# Patient Record
Sex: FEMALE | Race: WHITE | NOT HISPANIC OR LATINO | Employment: OTHER | URBAN - METROPOLITAN AREA
[De-identification: names, ages, dates, MRNs, and addresses within clinical notes are randomized per-mention and may not be internally consistent; named-entity substitution may affect disease eponyms.]

---

## 2017-01-04 ENCOUNTER — ALLSCRIPTS OFFICE VISIT (OUTPATIENT)
Dept: OTHER | Facility: OTHER | Age: 67
End: 2017-01-04

## 2017-02-24 ENCOUNTER — ALLSCRIPTS OFFICE VISIT (OUTPATIENT)
Dept: OTHER | Facility: OTHER | Age: 67
End: 2017-02-24

## 2017-05-16 ENCOUNTER — TRANSCRIBE ORDERS (OUTPATIENT)
Dept: ADMINISTRATIVE | Facility: HOSPITAL | Age: 67
End: 2017-05-16

## 2017-05-16 ENCOUNTER — ALLSCRIPTS OFFICE VISIT (OUTPATIENT)
Dept: OTHER | Facility: OTHER | Age: 67
End: 2017-05-16

## 2017-05-16 DIAGNOSIS — R07.89 OTHER CHEST PAIN: Primary | ICD-10-CM

## 2017-05-16 DIAGNOSIS — R06.02 SHORTNESS OF BREATH: ICD-10-CM

## 2017-05-16 DIAGNOSIS — R07.89 OTHER CHEST PAIN: ICD-10-CM

## 2017-05-30 ENCOUNTER — HOSPITAL ENCOUNTER (OUTPATIENT)
Dept: NON INVASIVE DIAGNOSTICS | Facility: HOSPITAL | Age: 67
Discharge: HOME | End: 2017-05-30
Attending: INTERNAL MEDICINE
Payer: MEDICARE

## 2017-05-30 ENCOUNTER — HOSPITAL ENCOUNTER (OUTPATIENT)
Dept: RADIOLOGY | Facility: HOSPITAL | Age: 67
Discharge: HOME | End: 2017-05-30
Attending: INTERNAL MEDICINE
Payer: MEDICARE

## 2017-05-30 DIAGNOSIS — R07.89 OTHER CHEST PAIN: ICD-10-CM

## 2017-05-30 PROCEDURE — 93017 CV STRESS TEST TRACING ONLY: CPT

## 2017-05-30 PROCEDURE — 78452 HT MUSCLE IMAGE SPECT MULT: CPT

## 2017-05-30 PROCEDURE — A9502 TC99M TETROFOSMIN: HCPCS

## 2017-05-30 RX ORDER — FLUTICASONE PROPIONATE 50 MCG
1 SPRAY, SUSPENSION (ML) NASAL DAILY
COMMUNITY
End: 2018-04-03

## 2017-05-30 RX ORDER — LOSARTAN POTASSIUM 25 MG/1
25 TABLET ORAL DAILY
COMMUNITY
End: 2018-05-01 | Stop reason: SDUPTHER

## 2017-05-30 RX ORDER — POTASSIUM CHLORIDE 750 MG/1
10 TABLET, FILM COATED, EXTENDED RELEASE ORAL 2 TIMES DAILY
COMMUNITY
End: 2018-03-26 | Stop reason: SDUPTHER

## 2017-05-30 RX ORDER — BUMETANIDE 1 MG/1
1 TABLET ORAL DAILY
COMMUNITY
End: 2018-04-03

## 2017-05-30 RX ORDER — LEVOTHYROXINE SODIUM 0.05 MG/1
50 TABLET ORAL DAILY
COMMUNITY
End: 2018-06-24 | Stop reason: SDUPTHER

## 2017-05-30 RX ORDER — ROSUVASTATIN CALCIUM 10 MG/1
10 TABLET, COATED ORAL DAILY
COMMUNITY
End: 2018-01-22

## 2017-05-30 RX ORDER — OMEPRAZOLE 20 MG/1
20 CAPSULE, DELAYED RELEASE ORAL DAILY
COMMUNITY
End: 2018-01-22

## 2017-05-31 ENCOUNTER — GENERIC CONVERSION - ENCOUNTER (OUTPATIENT)
Dept: OTHER | Facility: OTHER | Age: 67
End: 2017-05-31

## 2017-06-01 LAB
MAX DIASTOLIC BP: 90 MMHG
MAX HEART RATE: 146 BPM
MAX PREDICTED HEART RATE: 153 BPM
MAX. SYSTOLIC BP: 150 MMHG
PROTOCOL NAME: NORMAL
TIME IN EXERCISE PHASE: 304 S

## 2017-06-09 ENCOUNTER — ALLSCRIPTS OFFICE VISIT (OUTPATIENT)
Dept: OTHER | Facility: OTHER | Age: 67
End: 2017-06-09

## 2017-07-05 ENCOUNTER — GENERIC CONVERSION - ENCOUNTER (OUTPATIENT)
Dept: OTHER | Facility: OTHER | Age: 67
End: 2017-07-05

## 2017-07-05 ENCOUNTER — HOSPITAL ENCOUNTER (OUTPATIENT)
Dept: PULMONOLOGY | Facility: HOSPITAL | Age: 67
Discharge: HOME | End: 2017-07-05
Attending: INTERNAL MEDICINE
Payer: MEDICARE

## 2017-07-05 DIAGNOSIS — R06.02 SHORTNESS OF BREATH: ICD-10-CM

## 2017-07-05 PROCEDURE — 94729 DIFFUSING CAPACITY: CPT

## 2017-07-05 PROCEDURE — 94060 EVALUATION OF WHEEZING: CPT

## 2017-07-05 PROCEDURE — 94726 PLETHYSMOGRAPHY LUNG VOLUMES: CPT

## 2017-07-05 PROCEDURE — 94760 N-INVAS EAR/PLS OXIMETRY 1: CPT

## 2017-07-05 RX ORDER — ALBUTEROL SULFATE 2.5 MG/3ML
2.5 SOLUTION RESPIRATORY (INHALATION) ONCE
Status: DISCONTINUED | OUTPATIENT
Start: 2017-07-05 | End: 2017-07-09 | Stop reason: HOSPADM

## 2017-07-07 ENCOUNTER — GENERIC CONVERSION - ENCOUNTER (OUTPATIENT)
Dept: OTHER | Facility: OTHER | Age: 67
End: 2017-07-07

## 2017-07-17 ENCOUNTER — GENERIC CONVERSION - ENCOUNTER (OUTPATIENT)
Dept: OTHER | Facility: OTHER | Age: 67
End: 2017-07-17

## 2017-07-20 ENCOUNTER — ALLSCRIPTS OFFICE VISIT (OUTPATIENT)
Dept: OTHER | Facility: OTHER | Age: 67
End: 2017-07-20

## 2017-07-20 ENCOUNTER — GENERIC CONVERSION - ENCOUNTER (OUTPATIENT)
Dept: OTHER | Facility: OTHER | Age: 67
End: 2017-07-20

## 2017-07-20 LAB
BASOPHILS # BLD AUTO: 0.4 %
BASOPHILS # BLD AUTO: 19 CELLS/UL (ref 0–200)
DEPRECATED RDW RBC AUTO: 13.9 % (ref 11–15)
EOSINOPHIL # BLD AUTO: 1.7 %
EOSINOPHIL # BLD AUTO: 82 CELLS/UL (ref 15–500)
HCT VFR BLD AUTO: 29.2 % (ref 35–45)
HGB BLD-MCNC: 9.6 G/DL (ref 11.7–15.5)
LYMPHOCYTES # BLD AUTO: 1421 CELLS/UL (ref 850–3900)
LYMPHOCYTES # BLD AUTO: 29.6 %
MCH RBC QN AUTO: 27.9 PG (ref 27–33)
MCHC RBC AUTO-ENTMCNC: 32.9 G/DL (ref 32–36)
MCV RBC AUTO: 84.9 FL (ref 80–100)
MONOCYTES # BLD AUTO: 374 CELLS/UL (ref 200–950)
MONOCYTES (HISTORICAL): 7.8 %
NEUTROPHILS # BLD AUTO: 2904 CELLS/UL (ref 1500–7800)
NEUTROPHILS # BLD AUTO: 60.5 %
PLATELET # BLD AUTO: 211 THOUSAND/UL (ref 140–400)
PMV BLD AUTO: 11.2 FL (ref 7.5–12.5)
RBC # BLD AUTO: 3.44 MILLION/UL (ref 3.8–5.1)
WBC # BLD AUTO: 4.8 THOUSAND/UL (ref 3.8–10.8)

## 2017-07-21 ENCOUNTER — GENERIC CONVERSION - ENCOUNTER (OUTPATIENT)
Dept: OTHER | Facility: OTHER | Age: 67
End: 2017-07-21

## 2017-07-25 ENCOUNTER — GENERIC CONVERSION - ENCOUNTER (OUTPATIENT)
Dept: OTHER | Facility: OTHER | Age: 67
End: 2017-07-25

## 2017-07-27 ENCOUNTER — APPOINTMENT (OUTPATIENT)
Dept: LAB | Facility: HOSPITAL | Age: 67
End: 2017-07-27
Attending: FAMILY MEDICINE
Payer: MEDICARE

## 2017-07-27 ENCOUNTER — TRANSCRIBE ORDERS (OUTPATIENT)
Dept: ADMINISTRATIVE | Facility: HOSPITAL | Age: 67
End: 2017-07-27

## 2017-07-27 DIAGNOSIS — D50.0 IRON DEFICIENCY ANEMIA SECONDARY TO BLOOD LOSS (CHRONIC): Primary | ICD-10-CM

## 2017-07-27 DIAGNOSIS — D50.0 IRON DEFICIENCY ANEMIA SECONDARY TO BLOOD LOSS (CHRONIC): ICD-10-CM

## 2017-07-27 LAB
BASOPHILS # BLD AUTO: 0 THOUSANDS/ΜL (ref 0–0.1)
BASOPHILS NFR BLD AUTO: 0 % (ref 0–1)
EOSINOPHIL # BLD AUTO: 0.1 THOUSAND/ΜL (ref 0–0.61)
EOSINOPHIL NFR BLD AUTO: 2 % (ref 0–6)
ERYTHROCYTE [DISTWIDTH] IN BLOOD BY AUTOMATED COUNT: 15 % (ref 11.6–15.1)
HCT VFR BLD AUTO: 33.3 % (ref 37–47)
HGB BLD-MCNC: 10.9 G/DL (ref 12–16)
LYMPHOCYTES # BLD AUTO: 1.4 THOUSANDS/ΜL (ref 0.6–4.47)
LYMPHOCYTES NFR BLD AUTO: 31 % (ref 14–44)
MCH RBC QN AUTO: 27.8 PG (ref 27–31)
MCHC RBC AUTO-ENTMCNC: 32.6 G/DL (ref 31.4–37.4)
MCV RBC AUTO: 85 FL (ref 82–98)
MONOCYTES # BLD AUTO: 0.4 THOUSAND/ΜL (ref 0.17–1.22)
MONOCYTES NFR BLD AUTO: 9 % (ref 4–12)
NEUTROPHILS # BLD AUTO: 2.7 THOUSANDS/ΜL (ref 1.85–7.62)
NEUTS SEG NFR BLD AUTO: 58 % (ref 43–75)
NRBC BLD AUTO-RTO: 0 /100 WBCS
PLATELET # BLD AUTO: 218 THOUSANDS/UL (ref 130–400)
PMV BLD AUTO: 8.7 FL (ref 8.9–12.7)
RBC # BLD AUTO: 3.9 MILLION/UL (ref 4.2–5.4)
WBC # BLD AUTO: 4.7 THOUSAND/UL (ref 4.8–10.8)

## 2017-07-27 PROCEDURE — 36415 COLL VENOUS BLD VENIPUNCTURE: CPT

## 2017-07-27 PROCEDURE — 85025 COMPLETE CBC W/AUTO DIFF WBC: CPT

## 2017-08-28 ENCOUNTER — APPOINTMENT (OUTPATIENT)
Dept: LAB | Facility: HOSPITAL | Age: 67
End: 2017-08-28
Attending: FAMILY MEDICINE
Payer: MEDICARE

## 2017-08-28 ENCOUNTER — GENERIC CONVERSION - ENCOUNTER (OUTPATIENT)
Dept: OTHER | Facility: OTHER | Age: 67
End: 2017-08-28

## 2017-08-28 ENCOUNTER — TRANSCRIBE ORDERS (OUTPATIENT)
Dept: ADMINISTRATIVE | Facility: HOSPITAL | Age: 67
End: 2017-08-28

## 2017-08-28 DIAGNOSIS — D50.0 IRON DEFICIENCY ANEMIA DUE TO CHRONIC BLOOD LOSS: ICD-10-CM

## 2017-08-28 LAB
BASOPHILS # BLD AUTO: 0 THOUSANDS/ΜL (ref 0–0.1)
BASOPHILS NFR BLD AUTO: 1 % (ref 0–1)
EOSINOPHIL # BLD AUTO: 0.1 THOUSAND/ΜL (ref 0–0.61)
EOSINOPHIL NFR BLD AUTO: 1 % (ref 0–6)
ERYTHROCYTE [DISTWIDTH] IN BLOOD BY AUTOMATED COUNT: 15.3 % (ref 11.6–15.1)
HCT VFR BLD AUTO: 39 % (ref 37–47)
HGB BLD-MCNC: 12.8 G/DL (ref 12–16)
LYMPHOCYTES # BLD AUTO: 1.4 THOUSANDS/ΜL (ref 0.6–4.47)
LYMPHOCYTES NFR BLD AUTO: 34 % (ref 14–44)
MCH RBC QN AUTO: 28.1 PG (ref 27–31)
MCHC RBC AUTO-ENTMCNC: 32.7 G/DL (ref 31.4–37.4)
MCV RBC AUTO: 86 FL (ref 82–98)
MONOCYTES # BLD AUTO: 0.4 THOUSAND/ΜL (ref 0.17–1.22)
MONOCYTES NFR BLD AUTO: 10 % (ref 4–12)
NEUTROPHILS # BLD AUTO: 2.3 THOUSANDS/ΜL (ref 1.85–7.62)
NEUTS SEG NFR BLD AUTO: 54 % (ref 43–75)
NRBC BLD AUTO-RTO: 0 /100 WBCS
PLATELET # BLD AUTO: 188 THOUSANDS/UL (ref 130–400)
PMV BLD AUTO: 8.8 FL (ref 8.9–12.7)
RBC # BLD AUTO: 4.53 MILLION/UL (ref 4.2–5.4)
WBC # BLD AUTO: 4.2 THOUSAND/UL (ref 4.8–10.8)

## 2017-08-28 PROCEDURE — 36415 COLL VENOUS BLD VENIPUNCTURE: CPT

## 2017-08-28 PROCEDURE — 85025 COMPLETE CBC W/AUTO DIFF WBC: CPT

## 2017-09-19 ENCOUNTER — GENERIC CONVERSION - ENCOUNTER (OUTPATIENT)
Dept: OTHER | Facility: OTHER | Age: 67
End: 2017-09-19

## 2017-09-25 ENCOUNTER — TRANSCRIBE ORDERS (OUTPATIENT)
Dept: ADMINISTRATIVE | Facility: HOSPITAL | Age: 67
End: 2017-09-25

## 2017-09-25 DIAGNOSIS — K31.89 DYSPEPSIA AND OTHER SPECIFIED DISORDERS OF FUNCTION OF STOMACH: Primary | ICD-10-CM

## 2017-09-25 DIAGNOSIS — A08.11 EPIDEMIC VOMITING SYNDROME: ICD-10-CM

## 2017-09-25 DIAGNOSIS — B15.9: ICD-10-CM

## 2017-09-25 DIAGNOSIS — R10.13 DYSPEPSIA AND OTHER SPECIFIED DISORDERS OF FUNCTION OF STOMACH: Primary | ICD-10-CM

## 2017-10-02 ENCOUNTER — HOSPITAL ENCOUNTER (OUTPATIENT)
Dept: RADIOLOGY | Facility: HOSPITAL | Age: 67
Discharge: HOME | End: 2017-10-02
Attending: INTERNAL MEDICINE
Payer: MEDICARE

## 2017-10-02 DIAGNOSIS — R10.13 DYSPEPSIA AND OTHER SPECIFIED DISORDERS OF FUNCTION OF STOMACH: ICD-10-CM

## 2017-10-02 DIAGNOSIS — B15.9: ICD-10-CM

## 2017-10-02 DIAGNOSIS — A08.11 EPIDEMIC VOMITING SYNDROME: ICD-10-CM

## 2017-10-02 DIAGNOSIS — K31.89 DYSPEPSIA AND OTHER SPECIFIED DISORDERS OF FUNCTION OF STOMACH: ICD-10-CM

## 2017-10-02 PROCEDURE — 74246 X-RAY XM UPR GI TRC 2CNTRST: CPT

## 2017-10-04 ENCOUNTER — ALLSCRIPTS OFFICE VISIT (OUTPATIENT)
Dept: OTHER | Facility: OTHER | Age: 67
End: 2017-10-04

## 2017-10-04 DIAGNOSIS — E03.9 HYPOTHYROIDISM: ICD-10-CM

## 2017-10-04 DIAGNOSIS — Z12.31 ENCOUNTER FOR SCREENING MAMMOGRAM FOR MALIGNANT NEOPLASM OF BREAST: ICD-10-CM

## 2017-10-04 DIAGNOSIS — E78.2 MIXED HYPERLIPIDEMIA: ICD-10-CM

## 2017-10-04 DIAGNOSIS — I25.10 ATHEROSCLEROTIC HEART DISEASE OF NATIVE CORONARY ARTERY WITHOUT ANGINA PECTORIS: ICD-10-CM

## 2017-10-04 DIAGNOSIS — D51.0 VITAMIN B12 DEFICIENCY ANEMIA DUE TO INTRINSIC FACTOR DEFICIENCY: ICD-10-CM

## 2017-10-04 DIAGNOSIS — N39.3 STRESS INCONTINENCE: ICD-10-CM

## 2017-10-04 DIAGNOSIS — I10 ESSENTIAL (PRIMARY) HYPERTENSION: ICD-10-CM

## 2017-10-04 DIAGNOSIS — Z13.820 ENCOUNTER FOR SCREENING FOR OSTEOPOROSIS: ICD-10-CM

## 2017-10-24 ENCOUNTER — HOSPITAL ENCOUNTER (OUTPATIENT)
Dept: RADIOLOGY | Facility: HOSPITAL | Age: 67
Discharge: HOME | End: 2017-10-24
Attending: FAMILY MEDICINE
Payer: MEDICARE

## 2017-10-24 ENCOUNTER — APPOINTMENT (OUTPATIENT)
Dept: LAB | Facility: HOSPITAL | Age: 67
End: 2017-10-24
Attending: FAMILY MEDICINE
Payer: MEDICARE

## 2017-10-24 ENCOUNTER — TRANSCRIBE ORDERS (OUTPATIENT)
Dept: ADMINISTRATIVE | Facility: HOSPITAL | Age: 67
End: 2017-10-24

## 2017-10-24 DIAGNOSIS — N39.3 STRESS INCONTINENCE: ICD-10-CM

## 2017-10-24 DIAGNOSIS — Z13.820 ENCOUNTER FOR SCREENING FOR OSTEOPOROSIS: ICD-10-CM

## 2017-10-24 DIAGNOSIS — I25.10 ATHEROSCLEROTIC HEART DISEASE OF NATIVE CORONARY ARTERY WITHOUT ANGINA PECTORIS: ICD-10-CM

## 2017-10-24 DIAGNOSIS — D51.0 VITAMIN B12 DEFICIENCY ANEMIA DUE TO INTRINSIC FACTOR DEFICIENCY: ICD-10-CM

## 2017-10-24 DIAGNOSIS — Z12.31 ENCOUNTER FOR SCREENING MAMMOGRAM FOR MALIGNANT NEOPLASM OF BREAST: ICD-10-CM

## 2017-10-24 DIAGNOSIS — E78.2 MIXED HYPERLIPIDEMIA: ICD-10-CM

## 2017-10-24 DIAGNOSIS — I10 ESSENTIAL (PRIMARY) HYPERTENSION: ICD-10-CM

## 2017-10-24 DIAGNOSIS — E03.9 HYPOTHYROIDISM: ICD-10-CM

## 2017-10-24 LAB
ALBUMIN SERPL BCP-MCNC: 3.6 G/DL (ref 3.5–5)
ALP SERPL-CCNC: 77 U/L (ref 46–116)
ALT SERPL W P-5'-P-CCNC: 34 U/L (ref 12–78)
ANION GAP SERPL CALCULATED.3IONS-SCNC: 10 MMOL/L (ref 4–13)
AST SERPL W P-5'-P-CCNC: 27 U/L (ref 5–45)
BASOPHILS # BLD AUTO: 0 THOUSANDS/ΜL (ref 0–0.1)
BASOPHILS NFR BLD AUTO: 1 % (ref 0–1)
BILIRUB SERPL-MCNC: 0.6 MG/DL (ref 0.2–1)
BUN SERPL-MCNC: 14 MG/DL (ref 5–25)
CALCIUM SERPL-MCNC: 8.9 MG/DL (ref 8.3–10.1)
CHLORIDE SERPL-SCNC: 104 MMOL/L (ref 100–108)
CHOLEST SERPL-MCNC: 160 MG/DL (ref 50–200)
CO2 SERPL-SCNC: 26 MMOL/L (ref 21–32)
CREAT SERPL-MCNC: 0.72 MG/DL (ref 0.6–1.3)
EOSINOPHIL # BLD AUTO: 0.1 THOUSAND/ΜL (ref 0–0.61)
EOSINOPHIL NFR BLD AUTO: 3 % (ref 0–6)
ERYTHROCYTE [DISTWIDTH] IN BLOOD BY AUTOMATED COUNT: 14.8 % (ref 11.6–15.1)
GFR SERPL CREATININE-BSD FRML MDRD: 86 ML/MIN/1.73SQ M
GLUCOSE P FAST SERPL-MCNC: 103 MG/DL (ref 65–99)
HCT VFR BLD AUTO: 41.2 % (ref 37–47)
HDLC SERPL-MCNC: 61 MG/DL (ref 40–60)
HGB BLD-MCNC: 13.3 G/DL (ref 12–16)
LDLC SERPL CALC-MCNC: 82 MG/DL (ref 0–100)
LYMPHOCYTES # BLD AUTO: 1.6 THOUSANDS/ΜL (ref 0.6–4.47)
LYMPHOCYTES NFR BLD AUTO: 34 % (ref 14–44)
MCH RBC QN AUTO: 27.3 PG (ref 27–31)
MCHC RBC AUTO-ENTMCNC: 32.3 G/DL (ref 31.4–37.4)
MCV RBC AUTO: 84 FL (ref 82–98)
MONOCYTES # BLD AUTO: 0.4 THOUSAND/ΜL (ref 0.17–1.22)
MONOCYTES NFR BLD AUTO: 9 % (ref 4–12)
NEUTROPHILS # BLD AUTO: 2.5 THOUSANDS/ΜL (ref 1.85–7.62)
NEUTS SEG NFR BLD AUTO: 53 % (ref 43–75)
NRBC BLD AUTO-RTO: 0 /100 WBCS
PLATELET # BLD AUTO: 213 THOUSANDS/UL (ref 130–400)
PMV BLD AUTO: 9 FL (ref 8.9–12.7)
POTASSIUM SERPL-SCNC: 3.6 MMOL/L (ref 3.5–5.3)
PROT SERPL-MCNC: 7 G/DL (ref 6.4–8.2)
RBC # BLD AUTO: 4.89 MILLION/UL (ref 4.2–5.4)
SODIUM SERPL-SCNC: 140 MMOL/L (ref 136–145)
TRIGL SERPL-MCNC: 85 MG/DL
TSH SERPL DL<=0.05 MIU/L-ACNC: 0.61 UIU/ML (ref 0.36–3.74)
VIT B12 SERPL-MCNC: 234 PG/ML (ref 100–900)
WBC # BLD AUTO: 4.7 THOUSAND/UL (ref 4.8–10.8)

## 2017-10-24 PROCEDURE — G0202 SCR MAMMO BI INCL CAD: HCPCS

## 2017-10-24 PROCEDURE — 36415 COLL VENOUS BLD VENIPUNCTURE: CPT

## 2017-10-24 PROCEDURE — 82607 VITAMIN B-12: CPT

## 2017-10-24 PROCEDURE — 85025 COMPLETE CBC W/AUTO DIFF WBC: CPT

## 2017-10-24 PROCEDURE — 80053 COMPREHEN METABOLIC PANEL: CPT

## 2017-10-24 PROCEDURE — 84443 ASSAY THYROID STIM HORMONE: CPT

## 2017-10-24 PROCEDURE — 80061 LIPID PANEL: CPT

## 2017-10-24 PROCEDURE — 77080 DXA BONE DENSITY AXIAL: CPT

## 2017-10-25 ENCOUNTER — ALLSCRIPTS OFFICE VISIT (OUTPATIENT)
Dept: OTHER | Facility: OTHER | Age: 67
End: 2017-10-25

## 2017-10-26 NOTE — CONSULTS
Assessment  1  Abdominal pain (789 00) (R10 9)   2  Hernia, hiatal (553 3) (K44 9)   3  Constipation, unspecified constipation type (564 00) (K59 00)    Discussion/Summary  Discussion Summary:   69-year-old female with a history of hiatal hernia, Jim's ulcer, irritable bowel syndrome, colonoscopy 2 years ago with tubular adenoma  Hiatal hernia with Jim's ulcers:PPI therapy in the morning, H2 blocker in the eveningsignificant other concerning symptoms at this timeindication for upper endoscopyhemoglobin was normaldiscussed the role of surgery for repair of her hiatal hernia if her symptoms do not improve with medical therapy  Chronic constipation: Likely secondary to irritable bowel syndrome, she had colonoscopy 2 years ago with tubular adenomathat she take daily stool softener, MiraLax, fiber supplementationreports gas with Metamucil, recommended Benefiber or Citrucel forconcerning lower GI symptomsreports having a CT scan [facility], we will try to obtain these recordswill see the patient back in the next 2 to 3 months, she was asked to cough worsen symptoms  Chief Complaint  Chief Complaint Free Text Note Form: Evaluation for hiatal hernia and change in bowel habits      History of Present Illness  HPI: As you know this is a 71-year-old female with a history of cholecystectomy, hysterectomy, hypertension who has had an upper endoscopy and colonoscopy in the past, recently had a cardiac catheterization the next day she developed anemia and melena presented to the hospital was found to have bleeding Jim's ulcers in a large hiatal hernia which was treated with argon plasma coagulation  Since then she reports some retrosternal discomfort and pressure at times, she has been taking PPI therapy in the morning H2 blocker in the evening  She has a history of dysphagia in the past which required dilation but that has been improved at this point    also then developed constipation type symptoms since July of this year with bowel movements occurring approximately every 7 to 10 days, with stool softener she goes about every for 5 days  She has tried MiraLax, Benefiber, probiotics in the past which have not helped her  In fact probiotics worsen her symptoms  She had a colonoscopy 2 years ago which was unremarkable except for 1 tubular adenoma  has lost approximately 17 lb since modifying her diet  She has gone on a gluten free diet, she has avoided dairy products, avoided fatty foods, acidic foods  any melena or rectal bleeding  She does have right lower quadrant pain which is intermittent, does seem to improve after bowel movement  Occasional she have a explosive bowel movement after several days of no bowel movements at all  In the past many years ago she had more diarrhea predominant symptoms  has a continued occasional nausea but no vomiting  He notes that pantoprazole gave her headaches and was transitioned ultimately to Nexium which seems to work fairly well for her  denies any alcohol or tobacco  Has a history of colitis in her mother, most recent upper endoscopy in July of 2017  Review of Systems  Complete-Female GI Adult: Other Symptoms: The remainder of the ten ROS was negative  Active Problems  1  Anemia, pernicious (281 0) (D51 0)   2  Benign hypertension (401 1) (I10)   3  BMI 32 0-32 9,adult (V85 32) (Z68 32)   4  Coronary artery disease (414 00) (I25 10)   5  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) (I77 89)   6  Encounter for screening breast examination (V76 10) (Z12 31)   7  Encounter for screening for malignant neoplasm of colon (V76 51) (Z12 11)   8  Encounter for screening for osteoporosis (V82 81) (Z13 820)   9  GERD (gastroesophageal reflux disease) (530 81) (K21 9)   10  Hernia, hiatal (553 3) (K44 9)   11  Hypokalemia (276 8) (E87 6)   12  Hypothyroidism (244 9) (E03 9)   13  IBS (irritable bowel syndrome) (564 1) (K58 9)   14   Medicare annual wellness visit, initial (V70 0) (Z00 00)   15  Mixed hyperlipidemia (272 2) (E78 2)   16  Multinodular goiter (241 1) (E04 2)   17  Need for vaccination (V05 9) (Z23)   18  Obesity, Class I, BMI 30-34 9 (278 00) (E66 9)   19  Osteopenia (733 90) (M85 80)   20  Screening for glaucoma (V80 1) (Z13 5)   21  Stress incontinence, female (625 6) (N39 3)   22  Upper gastrointestinal bleeding (578 9) (K92 2)    Past Medical History  1  History of Anxiety (300 00) (F41 9)   2  History of Atypical chest pain (786 59) (R07 89)   3  Benign paroxysmal positional vertigo (386 11) (H81 10)   4  History of Blood loss anemia (280 0) (D50 0)   5  History of BMI 35 0-35 9,adult (V85 35) (Z68 35)   6  History of acute sinusitis (V12 69) (Z87 09)   7  History of lipoma (V13 89) (Z86 018)   8  History of shortness of breath (V13 89) (Z87 898)   9  Influenza (487 1) (J11 1)   10  History of Influenza vaccine needed (V04 81) (Z23)   11  History of Need for pneumococcal vaccination (V03 82) (Z23)   12  History of Need for vaccine for TD (tetanus-diphtheria) (V06 5) (Z23)   13  History of Need for zoster vaccine (V04 89) (Z23)   14  History of Otalgia, unspecified laterality (388 70) (H92 09)   15  History of Screening for other and unspecified genitourinary condition (V81 6) (Z13 89)  Active Problems And Past Medical History Reviewed: The active problems and past medical history were reviewed and updated today  Surgical History  1  History of Biopsy Skin   2  History of Cholecystectomy   3  History of Hysterectomy   4  History of Surgery Excision Lipoma   5  History of Thyroid Surgery Sub-Total Thyroidectomy   6  History of Tonsillectomy  Surgical History Reviewed: The surgical history was reviewed and updated today  Family History  Father    1  Family history of hypertension (V17 49) (Z82 49)  Sister    2  History of open heart surgery  Family History Reviewed: The family history was reviewed and updated today         Social History   · Never smoker  Social History Reviewed: The social history was reviewed and updated today  Current Meds   1  Atorvastatin Calcium 20 MG Oral Tablet; 1 Every Day At Bedtime; Therapy: 40LFM1420 to (Last Rx:80Ksq4496)  Requested for: 50Edz0895 Ordered   2  Bumetanide 1 MG Oral Tablet; TAKE 1 TABLET DAILY; Therapy: 87FYS1808 to ([phone])  Requested for: 04Oct2017; Last   Rx:04Oct2017 Ordered   3  Docusate Sodium 100 MG Oral Capsule; TAKE 2 CAPSULE Daily; Therapy: (AMTZQILP:28SAV7658) to Recorded   4  Esomeprazole Magnesium 40 MG Oral Capsule Delayed Release; TAKE 1 CAPSULE   ONCE DAILY; Therapy: (CEUSHJEX:76RXR8282) to Recorded   5  Famotidine 20 MG Oral Tablet; TAKE 1 TABLET AT BEDTIME; Therapy: (HDOSABKO:21GKU0416) to Recorded   6  Flonase Allergy Relief 50 MCG/ACT Nasal Suspension; instill 2 sprays into each nostril   once daily; Therapy: 84CLZ9210 to (Last Rx:10Aug2016)  Requested for: 54Xfr7660 Ordered   7  Klor-Con 10 10 MEQ Oral Tablet Extended Release; one tablet daily as directed; Therapy: 92ELN5762 to (Last Rx:04Oct2017)  Requested for: 26QSI0707 Ordered   8  Levothyroxine Sodium 50 MCG Oral Tablet; Take 1 tablet daily; Therapy: 98QCZ8684 to (Evaluate:16Jan2018)  Requested for: 24Znm7024; Last   Rx:53Jgc0557 Ordered   9  Losartan Potassium 25 MG Oral Tablet; TAKE 1 TABLET DAILY; Therapy: 23HEG6767 to (Last Rx:55Ehu3553)  Requested for: 31EBF8738 Ordered   10  Spiriva Respimat 2 5 MCG/ACT Inhalation Aerosol Solution; inhale 2 puffs once daily; Therapy: 63SBK7734 to (Last Rx:04Oct2017)  Requested for: 37XTR5758 Ordered  Medication List Reviewed: The medication list was reviewed and updated today  Allergies  1   Penicillins    Vitals  Vital Signs    Recorded: 25FSN4466 03:37PM   Temperature 98 5 F   Heart Rate 89   Respiration 16   Systolic 422   Diastolic 80   Height 5 ft 5 5 in   Weight 198 lb 6 oz   BMI Calculated 32 51   BSA Calculated 1 98   O2 Saturation 98     Physical Exam  Gen: wn/wd, NAD  HEENT: anicteric, MMM, no cervical LAD  CVS: RRR, no m/r/g  CHEST: CTA b/l  ABD: +BS, soft, NT,ND, no hepatosplenomegaly  EXT: no c/c/e  NEURO: aaox3  SKIN: NO rashes         Results/Data  (1) CBC/PLT/DIFF 24Oct2017 09:13AM Rox Castorena    Order Number: [id number]     Test Name Result Flag Reference   WBC COUNT 4 70 Thousand/uL L 4 80-10 80   RBC COUNT 4 89 Million/uL  4 20-5 40   HEMOGLOBIN 13 3 g/dL  12 0-16 0   HEMATOCRIT 41 2 %  37 0-47 0   MCV 84 fL  82-98   MCH 27 3 pg  27 0-31 0   MCHC 32 3 g/dL  31 4-37 4   RDW 14 8 %  11 6-15 1   MPV 9 0 fL  8 9-12 7   PLATELET COUNT 533 Thousands/uL  130-400   nRBC AUTOMATED 0 /100 WBCs     NEUTROPHILS RELATIVE PERCENT 53 %  43-75   LYMPHOCYTES RELATIVE PERCENT 34 %  14-44   MONOCYTES RELATIVE PERCENT 9 %  4-12   EOSINOPHILS RELATIVE PERCENT 3 %  0-6   BASOPHILS RELATIVE PERCENT 1 %  0-1   NEUTROPHILS ABSOLUTE COUNT 2 50 Thousands/? ??L  1 85-7 62   LYMPHOCYTES ABSOLUTE COUNT 1 60 Thousands/? ??L  0 60-4 47   MONOCYTES ABSOLUTE COUNT 0 40 Thousand/? ??L  0 17-1 22   EOSINOPHILS ABSOLUTE COUNT 0 10 Thousand/? ??L  0 00-0 61   BASOPHILS ABSOLUTE COUNT 0 00 Thousands/? ??L  0 00-0 10     (1) COMPREHENSIVE METABOLIC PANEL 75YZD5714 64:46JF [employer]  Order Number: [id number]     Test Name Result Flag Reference   SODIUM 140 mmol/L  136-145   POTASSIUM 3 6 mmol/L  3 5-5 3   CHLORIDE 104 mmol/L  100-108   CARBON DIOXIDE 26 mmol/L  21-32   ANION GAP (CALC) 10 mmol/L  4-13   BLOOD UREA NITROGEN 14 mg/dL  5-25   CREATININE 0 72 mg/dL  0 60-1 30   Standardized to IDMS reference method   CALCIUM 8 9 mg/dL  8 3-10 1   BILI, TOTAL 0 60 mg/dL  0 20-1 00   ALK PHOSPHATAS 77 U/L     ALT (SGPT) 34 U/L  12-78   Specimen collection should occur prior to Sulfasalazine administration due to the potential for falsely depressed results     AST(SGOT) 27 U/L  5-45   Specimen collection should occur prior to Sulfasalazine administration due to the potential for falsely depressed results  ALBUMIN 3 6 g/dL  3 5-5 0   TOTAL PROTEIN 7 0 g/dL  6 4-8 2   eGFR 86 ml/min/1 73sq m     National Kidney Disease Education Program recommendations are as follows:  GFR calculation is accurate only with a steady state creatinine  Chronic Kidney disease less than 60 ml/min/1 73 sq  meters  Kidney failure less than 15 ml/min/1 73 sq  meters  GLUCOSE FASTING 103 mg/dL H 65-99   Specimen collection should occur prior to Sulfasalazine administration due to the potential for falsely depressed results  Specimen collection should occur prior to Sulfapyridine administration due to the potential for falsely elevated results  (1) LIPID PANEL, FASTING 24Oct2017 09:13AM [employer] Order Number: [id number]     Test Name Result Flag Reference   CHOLESTEROL 160 mg/dL     HDL,DIRECT 61 mg/dL H 40-60   Specimen collection should occur prior to Metamizole administration due to the potential for falsley depressed results  LDL CHOLESTEROL CALCULATED 82 mg/dL  0-100   Triglyceride:        Normal <150 mg/dl   Borderline High 150-199 mg/dl   High 200-499 mg/dl   Very High >499 mg/dl      Cholesterol:       Desirable <200 mg/dl    Borderline High 200-239 mg/dl    High >239 mg/dl      HDL Cholesterol:       High>59 mg/dL    Low <41 mg/dL      This screening LDL is a calculated result  It does not have the accuracy of the Direct Measured LDL in the monitoring of patients with hyperlipidemia and/or statin therapy  Direct Measure LDL (RSV225) must be ordered separately in these patients  TRIGLYCERIDES 85 mg/dL  <=150   Specimen collection should occur prior to N-Acetylcysteine or Metamizole administration due to the potential for falsely depressed results       (1) TSH 24Oct2017 09:13AM [employer] Order Number: [id number]     Test Name Result Flag Reference   TSH 0 606 uIU/mL  0 358-3 740   Patients undergoing fluorescein dye angiography may retain small amounts of fluorescein in the body for 48-72 hours post procedure  Samples containing fluorescein can produce falsely depressed TSH values  If the patient had this procedure,a specimen should be resubmitted post fluorescein clearance  The recommended reference ranges for TSH during pregnancy are as follows:  First trimester 0 1 to 2 5 uIU/mL  Second trimester  0 2 to 3 0 uIU/mL  Third trimester 0 3 to 3 0 uIU/m     (1) VITAMIN B12 24Oct2017 09:13AM Claire Colonleif Order Number: [id number]     Test Name Result Flag Reference   VITAMIN B12 234 pg/mL  100-900     * DXA BONE DENSITY SPINE HIP AND PELVIS 24Oct2017 09:12AM Claire Colonleif Order Number: [id number]    - Patient Instructions: To schedule this appointment, please contact Central Scheduling at [phone]  Test Name Result Flag Reference   DXA BONE DENSITY SPINE HIP AND PELVIS (Report)     CENTRAL DXA SCAN     CLINICAL HISTORY:  75year old post-menopausal  female  TECHNIQUE: Bone densitometry was performed using a [employer] bone densitometer  Regions of interest appear properly placed  There are no obvious fractures or other confounding variables which could limit the study  COMPARISON: None  RESULTS:    LUMBAR SPINE: L1-L4:   BMD 1 034 gm/cm2   T-score -1 3   Z-score 0 3     LEFT TOTAL HIP:   BMD 0 788 gm/cm2   T-score -1 7   Z-score -0 4     LEFT FEMORAL NECK:   BMD 0 790 gm/cm2   T-score -1 8   Z-score -0 2     RIGHT TOTAL HIP:   BMD 0 789 gm/cm2   T-score -1 7   Z-score -0 4     RIGHT FEMORAL NECK:   BMD 0 836 gm/cm2   T-score -1 5   Z-score 0 2       IMPRESSION:   1  Based on the [facility] classification, the T-score of -1 8 is consistent with low bone mineral density  2  The 10 year risk of hip fracture is 2 4 %, with the 10 year risk of major osteoporotic fracture being 16 1 %, as calculated by the WHO fracture risk assessment tool (FRAX)   The current NOF guidelines recommend treating patients with FRAX 10 year    risk score of >3% for hip fracture and >20% for major osteoporotic fracture  3  Any secondary causes of low bone mineral density should be excluded prior to treatment, if clinically indicated  4  A daily intake of at least 1200 mg calcium and 800 - 1000 IU of Vitamin D, as well as weight bearing and muscle strengthening exercise, fall prevention and avoidance of tobacco and excessive alcohol intake as basic preventive measures are suggested  5  Repeat DXA scan in 18-24 months as clinically indicated  WHO CLASSIFICATION:   Normal (a T-score of -1 0 or higher)   Low bone mineral density (a T-score of less than -1 0 but higher than -2 5)   Osteoporosis (a T-score of -2 5 or less)   Severe osteoporosis (a T-score of -2 5 or less with a fragility fracture)             Workstation performed: [id number]     Signed by:   Nay Starkey MD   10/24/17     * MAMMO SCREENING BILATERAL W CAD 78Gni7737 09:11AM Roxie Gallegos Order Number: [id number]    - Patient Instructions: To schedule this appointment, please contact Central Scheduling at [phone]  Do not wear any perfume, powder, lotion or deodorant on breast or underarm area  Please bring your doctors order, referral (if needed) and insurance information with you on the day of the test  Failure to bring this information may result in this test being rescheduled  Arrive 15 minutes prior to your appointment time to register  On the day of your test, please bring any prior mammogram or breast studies with you that were not performed at a [facility]  Failure to bring prior exams may result in your test needing to be rescheduled  Test Name Result Flag Reference   MAMMO SCREENING BILATERAL W CAD (Report)     Patient History:   Patient is postmenopausal    Family history of endometrial cancer at age 72 in maternal    grandmother  Benign stereotactic core biopsy of both breasts     Patient has never smoked  Patient's BMI is 32 9  Reason for exam: screening, asymptomatic  Mammo Screening Bilateral W CAD: October 24, 2017 - Check In #:    [de-identified]   Bilateral CC and MLO view(s) were taken  Technologist: IZABELA Bey   Prior study comparison: November 22, 2013, mammo screening    bilateral W CAD, performed at [facility]  December 28, 2011, mammo screening bilateral W CAD, performed at [facility]  Zaira 3, 2010, mammo screening bilateral W CAD,    performed at [facility]  June 30, 2009, bilateral    diagnostic mammogram, performed at [facility]      The breast tissue is heterogeneously dense, potentially limiting    the sensitivity of mammography  Patient risk, included in this    report, assists in determining the appropriate screening regimen    (such as 3-D mammography or the inclusion of automated breast    ultrasound or MRI)  3-D mammography may also remain indicated as    screening  No dominant soft tissue mass, architectural distortion or    suspicious calcifications are noted in either breast   The skin    and nipple structures are within normal limits  Scattered benign   appearing calcifications are noted  No evidence of malignancy  No significant changes when compared with prior studies  ACR BI-RADSï¾® Assessments: BiRad:2 - Benign     Recommendation:   Routine screening mammogram of both breasts in 1 year  Analyzed by CAD     The patient is scheduled in a reminder system for screening    mammography  8-10% of cancers will be missed on mammography  Management of a    palpable abnormality must be based on clinical grounds  Patients   will be notified of their results via letter from our facility  Accredited by [employer] of Radiology and FDA       Transcription Location: [facility] 98: [id number]     Risk Value(s):   Miriamer-Cucalvinck 10 Year: 2 800%, Tyrer-Cukeysha Lifetime: 5 000%,    Myriad Table: 1 5%, DEMI 5 Year: 2 2%, NCI Lifetime: 7 1%   Signed by:   Nahomi Jerry MD   10/24/17       Future Appointments    Date/Time Provider Specialty Site   12/19/2017 10:00 AM SREEDHAR Peoples   Gastroenterology Adult [facility] GASTROENTEROLOGY [address] [place] [address]     Signatures   Electronically signed by : SREEDHAR Garcia ; Oct 25 2017  9:10PM EST                       (Author)

## 2017-12-19 ENCOUNTER — GENERIC CONVERSION - ENCOUNTER (OUTPATIENT)
Dept: OTHER | Facility: OTHER | Age: 67
End: 2017-12-19

## 2017-12-19 ENCOUNTER — TRANSCRIBE ORDERS (OUTPATIENT)
Dept: ADMINISTRATIVE | Facility: HOSPITAL | Age: 67
End: 2017-12-19

## 2017-12-19 ENCOUNTER — ALLSCRIPTS OFFICE VISIT (OUTPATIENT)
Dept: OTHER | Facility: OTHER | Age: 67
End: 2017-12-19

## 2017-12-19 ENCOUNTER — APPOINTMENT (OUTPATIENT)
Dept: LAB | Facility: HOSPITAL | Age: 67
End: 2017-12-19
Attending: INTERNAL MEDICINE
Payer: MEDICARE

## 2017-12-19 DIAGNOSIS — K21.9 GASTROESOPHAGEAL REFLUX DISEASE, ESOPHAGITIS PRESENCE NOT SPECIFIED: ICD-10-CM

## 2017-12-19 DIAGNOSIS — R10.9 ABDOMINAL PAIN: ICD-10-CM

## 2017-12-19 DIAGNOSIS — K21.9 GASTRO-ESOPHAGEAL REFLUX DISEASE WITHOUT ESOPHAGITIS: ICD-10-CM

## 2017-12-19 DIAGNOSIS — K44.9 DIAPHRAGMATIC HERNIA WITHOUT OBSTRUCTION OR GANGRENE: ICD-10-CM

## 2017-12-19 DIAGNOSIS — E66.01 MORBID OBESITY WITH BMI OF 40.0-44.9, ADULT (HCC): ICD-10-CM

## 2017-12-19 DIAGNOSIS — R10.9 ABDOMINAL PAIN, UNSPECIFIED ABDOMINAL LOCATION: Primary | ICD-10-CM

## 2017-12-19 LAB
ERYTHROCYTE [DISTWIDTH] IN BLOOD BY AUTOMATED COUNT: 15.9 % (ref 11.6–15.1)
HCT VFR BLD AUTO: 40.6 % (ref 37–47)
HGB BLD-MCNC: 13.2 G/DL (ref 12–16)
MCH RBC QN AUTO: 27.3 PG (ref 27–31)
MCHC RBC AUTO-ENTMCNC: 32.5 G/DL (ref 31.4–37.4)
MCV RBC AUTO: 84 FL (ref 82–98)
PLATELET # BLD AUTO: 218 THOUSANDS/UL (ref 130–400)
PMV BLD AUTO: 8.4 FL (ref 8.9–12.7)
RBC # BLD AUTO: 4.84 MILLION/UL (ref 4.2–5.4)
WBC # BLD AUTO: 5 THOUSAND/UL (ref 4.8–10.8)

## 2017-12-19 PROCEDURE — 36415 COLL VENOUS BLD VENIPUNCTURE: CPT

## 2017-12-19 PROCEDURE — 85027 COMPLETE CBC AUTOMATED: CPT

## 2017-12-20 NOTE — PROGRESS NOTES
Assessment  1  GERD (gastroesophageal reflux disease) (530 81) (K21 9)   2  Hernia, hiatal (553 3) (K44 9)   3  Abdominal pain (789 00) (R10 9)   4  Constipation, unspecified constipation type (564 00) (K59 00)    Plan  Abdominal pain, GERD (gastroesophageal reflux disease), Hernia, hiatal    · Esomeprazole Magnesium 40 MG Oral Capsule Delayed Release; TAKE 1CAPSULE TWICE DAILY 30 MINUTES PRIOR TO BREAKFAST AND DINNER   Rx By: Socorro Bruno; Dispense: 0 Days ; #:60 Capsule Delayed Release; Refill: 3;For: Abdominal pain, GERD (gastroesophageal reflux disease), Hernia, hiatal; LEA = N; Verified Transmission to [facility] (; Last Updated By: System, SureScripts; 12/19/2017 10:50:05 AM   · (1) CBC/ PLT (NO DIFF); Status:Resulted - Requires Verification;   Done: [id number]   Due:97Wvy4883; Ordered; For:Abdominal pain, GERD (gastroesophageal reflux disease), Hernia, hiatal; Ordered By:Richard Bledsoe;   · EGD; Status:Active; Requested for:90Xfs8604;    Perform:[facility]; Due:94Cdb9140; Last Updated By:Rufus Graham; 12/19/2017 10:46:36 AM;Ordered; For:Abdominal pain, GERD (gastroesophageal reflux disease), Hernia, hiatal; Ordered By:Richard Bledsoe;   · Esophageal Manometry; Status:Active; Requested for:62Ndv1439;    Perform:[employer] [facility]; NBB:85VNX7454;MARIAHABMBELLG; For:Abdominal pain, GERD (gastroesophageal reflux disease), Hernia, hiatal; Ordered By:Richard Bledsoe;  Constipation, unspecified constipation type    · Linzess 72 MCG Oral Capsule; take 1 capsule daily   Rx By: Socorro Bruno; Dispense: 30 Days ; #:3 Capsule; Refill: 3;For: Constipation, unspecified constipation type; LEA = N; Verified Transmission to [facility] ([address] [facility]; Last Updated By: System, SureScripts; 12/19/2017 10:50:05 AM    Discussion/Summary  Discussion Summary: This is a pleasant 71-year-old female with a history of a large hiatal hernia, chronic constipation, symptomatic reflux and dysphagia  #1 symptomatic hiatal hernia: Now with 2 episodes of epigastric abdominal pain  Continues to have some reflux symptoms and now with intermittent dysphagia-We'll increase her PPI therapy to twice daily-We will proceed with an upper endoscopy to evaluate-We'll also proceed with an esophageal manometry-Based on these results, we may have her evaluated for repair of the hiatal hernia/Nissen fundoplication given her persistent symptoms which I suspect are due to her large hiatal hernia,-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 hernia as well#2 chronic constipation: Likely functional constipation/IBS-C, she has had difficulty with Radha lax, has had disruption to her daily life, has had increasing bloating and discomfort with both fiber and probiotics-we'll start the patient on lens a 72 Âµg daily, I asked her to call us with an update on her symptoms  Chief Complaint  Chief Complaint Free Text Note Form: Evaluation for difficulty swallowing and epigastric pain      History of Present Illness  HPI: As you know this is a pleasant 77-year-old female, with a history of hypertension, hypothyroidism, with known history of a large hiatal hernia  This was seen on her last endoscopy, she's had dilations in the past  She has episodes recently of sharp epigastric pain once before eating, once after eating which she has taken her breath away  Occasionally associated with some nausea for several minutes  She notes occasional difficulty swallowing which had been improving the past but recent has become more frequent  We had started her on PPI therapy in the morning, H2 blocker at night initially did well with this but has had again worsening symptoms are noted above  We tried Radha lax, stool softeners and fiber for her chronic constipation  She notes that she'll have no bowel movement for 3-4 days and then loose watery stools one or 2 times a day   This interferes with her quality of life as she has a urgency, abdominal cramping and bloating and this afraid to leave the house  Previously she was having a bowel movement every 6 or 7 a there are some improvement however the interferes in the patient's quality of life is substantial at this time  She continues to have some cramping  She has stopped the fiber supplement, she stopped the probiotics which also caused bloating and cramping  She's been trying to adjust the Radha lax to see if her symptoms  Review of Systems  Complete-Female GI Adult: Other Symptoms: The remainder of the ten ROS was negative  Active Problems  1  Abdominal pain (789 00) (R10 9)   2  Anemia, pernicious (281 0) (D51 0)   3  Benign hypertension (401 1) (I10)   4  BMI 32 0-32 9,adult (V85 32) (Z68 32)   5  Change in bowel habits (787 99) (R19 4)   6  Constipation, unspecified constipation type (564 00) (K59 00)   7  Coronary artery disease (414 00) (I25 10)   8  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) (I77 89)   9  Diarrhea (787 91) (R19 7)   10  Encounter for screening breast examination (V76 10) (Z12 31)   11  Encounter for screening for malignant neoplasm of colon (V76 51) (Z12 11)   12  Encounter for screening for osteoporosis (V82 81) (Z13 820)   13  GERD (gastroesophageal reflux disease) (530 81) (K21 9)   14  Hemorrhoids (455 6) (K64 9)   15  Hernia, hiatal (553 3) (K44 9)   16  Hypokalemia (276 8) (E87 6)   17  Hypothyroidism (244 9) (E03 9)   18  IBS (irritable bowel syndrome) (564 1) (K58 9)   19  Medicare annual wellness visit, initial (V70 0) (Z00 00)   20  Mixed hyperlipidemia (272 2) (E78 2)   21  Multinodular goiter (241 1) (E04 2)   22  Nausea and vomiting (787 01) (R11 2)   23  Need for vaccination (V05 9) (Z23)   24  Obesity, Class I, BMI 30-34 9 (278 00) (E66 9)   25  Osteopenia (733 90) (M85 80)   26  Recent weight loss (783 21) (R63 4)   27  Screening for glaucoma (V80 1) (Z13 5)   28  Stress incontinence, female (625 6) (N39 3)   29  Trouble swallowing (787 20) (R13 10)   30   Upper gastrointestinal bleeding (578 9) (K92 2)    Past Medical History  1  History of Anxiety (300 00) (F41 9)   2  History of Atypical chest pain (786 59) (R07 89)   3  Benign paroxysmal positional vertigo (386 11) (H81 10)   4  History of Blood loss anemia (280 0) (D50 0)   5  History of BMI 35 0-35 9,adult (V85 35) (Z68 35)   6  History of acute sinusitis (V12 69) (Z87 09)   7  History of lipoma (V13 89) (Z86 018)   8  History of shortness of breath (V13 89) (Z87 898)   9  Influenza (487 1) (J11 1)   10  History of Influenza vaccine needed (V04 81) (Z23)   11  History of Need for pneumococcal vaccination (V03 82) (Z23)   12  History of Need for vaccine for TD (tetanus-diphtheria) (V06 5) (Z23)   13  History of Need for zoster vaccine (V04 89) (Z23)   14  History of Otalgia, unspecified laterality (388 70) (H92 09)   15  History of Screening for other and unspecified genitourinary condition (V81 6) (Z13 89)  Active Problems And Past Medical History Reviewed: The active problems and past medical history were reviewed and updated today  Surgical History  1  History of Biopsy Skin   2  History of Cholecystectomy   3  History of Complete Colonoscopy   4  History of Diagnostic Esophagogastroduodenoscopy   5  History of Hysterectomy   6  History of Surgery Excision Lipoma   7  History of Thyroid Surgery Sub-Total Thyroidectomy   8  History of Tonsillectomy  Surgical History Reviewed: The surgical history was reviewed and updated today  Family History  Mother    1  Family history of colitis (V18 59) (Z83 79)   2  Denied: Family history of colon cancer   3  Denied: Family history of colonic polyps   4  Denied: Family history of liver disease  Father    11  Denied: Family history of colon cancer   6  Denied: Family history of colonic polyps   7  Family history of hypertension (V17 49) (Z82 49)   8  Denied: Family history of liver disease  Sister    5  Family history of colitis (V18 59) (Z83 79)   10   History of open heart surgery  Family History Reviewed: The family history was reviewed and updated today  Social History   · Feels safe at home   · Never smoker   · No alcohol use  Social History Reviewed: The social history was reviewed and updated today  Current Meds   1  Atorvastatin Calcium 20 MG Oral Tablet; 1 Every Day At Bedtime; Therapy: 74DFC7989 to (Last Rx:43Twi0230)  Requested for: 06Ick3801 Ordered   2  Bumetanide 1 MG Oral Tablet; TAKE 1 TABLET DAILY; Therapy: 19WSF4298 to (132-921-325)  Requested for: 04Oct2017; Last Rx:04Oct2017 Ordered   3  Docusate Sodium 100 MG Oral Capsule; TAKE 2 CAPSULE Daily; Therapy: (CHOFVEUT:91PSH2227) to Recorded   4  Esomeprazole Magnesium 40 MG Oral Capsule Delayed Release; TAKE 1 CAPSULE ONCE DAILY; Therapy: (MUMGRWDZ:59XLP0890) to Recorded   5  Famotidine 20 MG Oral Tablet; TAKE 1 TABLET AT BEDTIME; Therapy: (QOLEGQLI:62RMA5472) to Recorded   6  Flonase Allergy Relief 50 MCG/ACT Nasal Suspension; instill 2 sprays into each nostril once daily; Therapy: 95DBR9168 to (Last Rx:10Aug2016)  Requested for: 05Nou6002 Ordered   7  Klor-Con 10 10 MEQ Oral Tablet Extended Release; one tablet daily as directed; Therapy: 53OPT0895 to (Last Rx:04Oct2017)  Requested for: 32STY2013 Ordered   8  Levothyroxine Sodium 50 MCG Oral Tablet; Take 1 tablet daily; Therapy: 20HYP0790 to (Evaluate:16Jan2018)  Requested for: 93Wuv1057; Last Rx:05Sfj5079 Ordered   9  Losartan Potassium 25 MG Oral Tablet; TAKE 1 TABLET DAILY; Therapy: 61XJL3491 to (Last Rx:80Ooo6113)  Requested for: 97UFZ4149 Ordered   10  Spiriva Respimat 2 5 MCG/ACT Inhalation Aerosol Solution; inhale 2 puffs once daily; Therapy: 39EBA0614 to (Last Rx:04Oct2017)  Requested for: 78KHX7960 Ordered  Medication List Reviewed: The medication list was reviewed and updated today  Allergies  1  Penicillins  2  Animal dander - Cats   3   Other    Vitals  Vital Signs    Recorded: 33KCL0406 10:06AM   Temperature 96 3 F   Heart Rate 69   Systolic 682   Diastolic 64   Weight 145 lb    BMI Calculated 32 78   BSA Calculated 1 99   O2 Saturation 97     Physical Exam  Gen  : Elderly female, Well-nourished well-developed, no acute distress HEENT: Anicteric, moist mucous membranes, no cervical or supraclavicular lymphadenopathy Cardiovascular: Regular rate and rhythm, no murmurs rubs or gallops Pulmonary: Clear to auscultation bilaterally Abdomen: Soft, nontender, nondistended  No hepatosplenomegaly  Bowel sounds present and regular  Extremities: No cyanosis, clubbing, or edema, Skin: No rashes Neuro: Alert, awake, oriented x3      Results/Data  (1) CBC/ PLT (NO DIFF) 09ZYV5218 11:13AM Markne Pencil Order Number: [id number]     Test Name Result Flag Reference   HEMATOCRIT 40 6 %  37 0-47 0   HEMOGLOBIN 13 2 g/dL  12 0-16 0   MCHC 32 5 g/dL  31 4-37 4   MCH 27 3 pg  27 0-31 0   MCV 84 fL  82-98   PLATELET COUNT 419 Thousands/uL  130-400   RBC COUNT 4 84 Million/uL  4 20-5  40   RDW 15 9 % H 11 6-15 1   WBC COUNT 5 00 Thousand/uL  4 80-10 80   MPV 8 4 fL L 8 9-12 7       Future Appointments    Date/Time Provider Specialty Site   01/23/2018 08:00 AM SREEDHAR Moscoso   Gastroenterology Adult [facility]     Signatures   Electronically signed by : SREEDHAR Villa ; Dec 19 2017  4:04PM EST                       (Author)

## 2018-01-11 NOTE — RESULT NOTES
Discussion/Summary   hemoglobin has stabilized  Should be rechecked in three months     Verified Results  (1) CBC/PLT/DIFF 34Gyw1799 11:32AM Giovanny Mendoza Order Number: [id number]     Test Name Result Flag Reference   WBC COUNT 4 20 Thousand/uL L 4 80-10 80   RBC COUNT 4 53 Million/uL  4 20-5 40   HEMOGLOBIN 12 8 g/dL  12 0-16 0   HEMATOCRIT 39 0 %  37 0-47 0   MCV 86 fL  82-98   MCH 28 1 pg  27 0-31 0   MCHC 32 7 g/dL  31 4-37 4   RDW 15 3 % H 11 6-15 1   MPV 8 8 fL L 8 9-12 7   PLATELET COUNT 549 Thousands/uL  130-400   nRBC AUTOMATED 0 /100 WBCs     NEUTROPHILS RELATIVE PERCENT 54 %  43-75   LYMPHOCYTES RELATIVE PERCENT 34 %  14-44   MONOCYTES RELATIVE PERCENT 10 %  4-12   EOSINOPHILS RELATIVE PERCENT 1 %  0-6   BASOPHILS RELATIVE PERCENT 1 %  0-1   NEUTROPHILS ABSOLUTE COUNT 2 30 Thousands/? ??L  1 85-7 62   LYMPHOCYTES ABSOLUTE COUNT 1 40 Thousands/? ??L  0 60-4 47   MONOCYTES ABSOLUTE COUNT 0 40 Thousand/? ??L  0 17-1 22   EOSINOPHILS ABSOLUTE COUNT 0 10 Thousand/? ??L  0 00-0 61   BASOPHILS ABSOLUTE COUNT 0 00 Thousands/? ??L  0 00-0 10

## 2018-01-12 VITALS
WEIGHT: 215 LBS | SYSTOLIC BLOOD PRESSURE: 106 MMHG | BODY MASS INDEX: 34.55 KG/M2 | HEART RATE: 76 BPM | TEMPERATURE: 98 F | DIASTOLIC BLOOD PRESSURE: 76 MMHG | RESPIRATION RATE: 16 BRPM | HEIGHT: 66 IN

## 2018-01-12 NOTE — RESULT NOTES
Discussion/Summary   please call pt ;ooks like labs are ok  Her mammo is ok as well and will need to be repeated in a year  PT did have osteopenia on the dxa screen  I would suggest 1000 of vitamin d 1200 of calcium daily as well as light weight bearing exercises 4-5 days a week  this should be repeated in 2 years to watch for progress  Mammo should be repeated in a year - please send reminder to greg maximo for mammo in one year     Verified Results  (1) CBC/PLT/DIFF 24Oct2017 09:13AM Alison Doughertys Order Number: [id number]     Test Name Result Flag Reference   WBC COUNT 4 70 Thousand/uL L 4 80-10 80   RBC COUNT 4 89 Million/uL  4 20-5 40   HEMOGLOBIN 13 3 g/dL  12 0-16 0   HEMATOCRIT 41 2 %  37 0-47 0   MCV 84 fL  82-98   MCH 27 3 pg  27 0-31 0   MCHC 32 3 g/dL  31 4-37 4   RDW 14 8 %  11 6-15 1   MPV 9 0 fL  8 9-12 7   PLATELET COUNT 776 Thousands/uL  130-400   nRBC AUTOMATED 0 /100 WBCs     NEUTROPHILS RELATIVE PERCENT 53 %  43-75   LYMPHOCYTES RELATIVE PERCENT 34 %  14-44   MONOCYTES RELATIVE PERCENT 9 %  4-12   EOSINOPHILS RELATIVE PERCENT 3 %  0-6   BASOPHILS RELATIVE PERCENT 1 %  0-1   NEUTROPHILS ABSOLUTE COUNT 2 50 Thousands/? ??L  1 85-7 62   LYMPHOCYTES ABSOLUTE COUNT 1 60 Thousands/? ??L  0 60-4 47   MONOCYTES ABSOLUTE COUNT 0 40 Thousand/? ??L  0 17-1 22   EOSINOPHILS ABSOLUTE COUNT 0 10 Thousand/? ??L  0 00-0 61   BASOPHILS ABSOLUTE COUNT 0 00 Thousands/? ??L  0 00-0 10     (1) COMPREHENSIVE METABOLIC PANEL 42CNK1649 05:31BQ Alison Doughertys Order Number: [id number]     Test Name Result Flag Reference   SODIUM 140 mmol/L  136-145   POTASSIUM 3 6 mmol/L  3 5-5 3   CHLORIDE 104 mmol/L  100-108   CARBON DIOXIDE 26 mmol/L  21-32   ANION GAP (CALC) 10 mmol/L  4-13   BLOOD UREA NITROGEN 14 mg/dL  5-25   CREATININE 0 72 mg/dL  0 60-1 30   Standardized to IDMS reference method   CALCIUM 8 9 mg/dL  8 3-10 1   BILI, TOTAL 0 60 mg/dL  0 20-1 00   ALK PHOSPHATAS 77 U/L     ALT (SGPT) 34 U/L  12-78   Specimen collection should occur prior to Sulfasalazine administration due to the potential for falsely depressed results  AST(SGOT) 27 U/L  5-45   Specimen collection should occur prior to Sulfasalazine administration due to the potential for falsely depressed results  ALBUMIN 3 6 g/dL  3 5-5 0   TOTAL PROTEIN 7 0 g/dL  6 4-8 2   eGFR 86 ml/min/1 73sq m     National Kidney Disease Education Program recommendations are as follows:  GFR calculation is accurate only with a steady state creatinine  Chronic Kidney disease less than 60 ml/min/1 73 sq  meters  Kidney failure less than 15 ml/min/1 73 sq  meters  GLUCOSE FASTING 103 mg/dL H 65-99   Specimen collection should occur prior to Sulfasalazine administration due to the potential for falsely depressed results  Specimen collection should occur prior to Sulfapyridine administration due to the potential for falsely elevated results  (1) LIPID PANEL, FASTING 24Oct2017 09:13AM Jayla Soni Order Number: [id number]     Test Name Result Flag Reference   CHOLESTEROL 160 mg/dL     HDL,DIRECT 61 mg/dL H 40-60   Specimen collection should occur prior to Metamizole administration due to the potential for falsley depressed results  LDL CHOLESTEROL CALCULATED 82 mg/dL  0-100   Triglyceride:        Normal <150 mg/dl   Borderline High 150-199 mg/dl   High 200-499 mg/dl   Very High >499 mg/dl      Cholesterol:       Desirable <200 mg/dl    Borderline High 200-239 mg/dl    High >239 mg/dl      HDL Cholesterol:       High>59 mg/dL    Low <41 mg/dL      This screening LDL is a calculated result  It does not have the accuracy of the Direct Measured LDL in the monitoring of patients with hyperlipidemia and/or statin therapy  Direct Measure LDL (SNN315) must be ordered separately in these patients     TRIGLYCERIDES 85 mg/dL  <=150   Specimen collection should occur prior to N-Acetylcysteine or Metamizole administration due to the potential for falsely depressed results  (1) TSH 24Oct2017 09:13AM Damaris Heck Order Number: [id number]     Test Name Result Flag Reference   TSH 0 606 uIU/mL  0 358-3 740   Patients undergoing fluorescein dye angiography may retain small amounts of fluorescein in the body for 48-72 hours post procedure  Samples containing fluorescein can produce falsely depressed TSH values  If the patient had this procedure,a specimen should be resubmitted post fluorescein clearance  The recommended reference ranges for TSH during pregnancy are as follows:  First trimester 0 1 to 2 5 uIU/mL  Second trimester  0 2 to 3 0 uIU/mL  Third trimester 0 3 to 3 0 uIU/m     (1) VITAMIN B12 24Oct2017 09:13AM Damaris Heck Order Number: [id number]     Test Name Result Flag Reference   VITAMIN B12 234 pg/mL  100-900     * DXA BONE DENSITY SPINE HIP AND PELVIS 24Oct2017 09:12AM Damaris Heck Order Number: [id number]    - Patient Instructions: To schedule this appointment, please contact Central Scheduling at [phone]  Test Name Result Flag Reference   DXA BONE DENSITY SPINE HIP AND PELVIS (Report)     CENTRAL DXA SCAN     CLINICAL HISTORY:  75year old post-menopausal  female  TECHNIQUE: Bone densitometry was performed using a Fablic bone densitometer  Regions of interest appear properly placed  There are no obvious fractures or other confounding variables which could limit the study  COMPARISON: None  RESULTS:    LUMBAR SPINE: L1-L4:   BMD 1 034 gm/cm2   T-score -1 3   Z-score 0 3     LEFT TOTAL HIP:   BMD 0 788 gm/cm2   T-score -1 7   Z-score -0 4     LEFT FEMORAL NECK:   BMD 0 790 gm/cm2   T-score -1 8   Z-score -0 2     RIGHT TOTAL HIP:   BMD 0 789 gm/cm2   T-score -1 7   Z-score -0 4     RIGHT FEMORAL NECK:   BMD 0 836 gm/cm2   T-score -1 5   Z-score 0 2       IMPRESSION:   1   Based on the [facility] classification, the T-score of -1 8 is consistent with low bone mineral density  2  The 10 year risk of hip fracture is 2 4 %, with the 10 year risk of major osteoporotic fracture being 16 1 %, as calculated by the WHO fracture risk assessment tool (FRAX)  The current NOF guidelines recommend treating patients with FRAX 10 year    risk score of >3% for hip fracture and >20% for major osteoporotic fracture  3  Any secondary causes of low bone mineral density should be excluded prior to treatment, if clinically indicated  4  A daily intake of at least 1200 mg calcium and 800 - 1000 IU of Vitamin D, as well as weight bearing and muscle strengthening exercise, fall prevention and avoidance of tobacco and excessive alcohol intake as basic preventive measures are suggested  5  Repeat DXA scan in 18-24 months as clinically indicated  WHO CLASSIFICATION:   Normal (a T-score of -1 0 or higher)   Low bone mineral density (a T-score of less than -1 0 but higher than -2 5)   Osteoporosis (a T-score of -2 5 or less)   Severe osteoporosis (a T-score of -2 5 or less with a fragility fracture)             Workstation performed: [id number]     Signed by:   Gopal Robbins MD   10/24/17     * MAMMO SCREENING BILATERAL W CAD 06Hnx2994 09:11AM Brandie River Order Number: [id number]    - Patient Instructions: To schedule this appointment, please contact Central Scheduling at [phone]  Do not wear any perfume, powder, lotion or deodorant on breast or underarm area  Please bring your doctors order, referral (if needed) and insurance information with you on the day of the test  Failure to bring this information may result in this test being rescheduled  Arrive 15 minutes prior to your appointment time to register  On the day of your test, please bring any prior mammogram or breast studies with you that were not performed at a [facility]  Failure to bring prior exams may result in your test needing to be rescheduled       Test Name Result Flag Reference   MAMMO SCREENING BILATERAL W CAD (Report)     Patient History:   Patient is postmenopausal    Family history of endometrial cancer at age 72 in maternal    grandmother  Benign stereotactic core biopsy of both breasts  Patient has never smoked  Patient's BMI is 32 9  Reason for exam: screening, asymptomatic  Mammo Screening Bilateral W CAD: October 24, 2017 - Check In #:    [de-identified]   Bilateral CC and MLO view(s) were taken  Technologist: IZABELA Anton   Prior study comparison: November 22, 2013, mammo screening    bilateral W CAD, performed at [facility]  December 28, 2011, mammo screening bilateral W CAD, performed at [facility]  Zaira 3, 2010, mammo screening bilateral W CAD,    performed at [facility]  June 30, 2009, bilateral    diagnostic mammogram, performed at [facility]      The breast tissue is heterogeneously dense, potentially limiting    the sensitivity of mammography  Patient risk, included in this    report, assists in determining the appropriate screening regimen    (such as 3-D mammography or the inclusion of automated breast    ultrasound or MRI)  3-D mammography may also remain indicated as    screening  No dominant soft tissue mass, architectural distortion or    suspicious calcifications are noted in either breast   The skin    and nipple structures are within normal limits  Scattered benign   appearing calcifications are noted  No evidence of malignancy  No significant changes when compared with prior studies  ACR BI-RADSï¾® Assessments: BiRad:2 - Benign     Recommendation:   Routine screening mammogram of both breasts in 1 year  Analyzed by CAD     The patient is scheduled in a reminder system for screening    mammography  8-10% of cancers will be missed on mammography  Management of a    palpable abnormality must be based on clinical grounds   Patients   will be notified of their results via letter from our facility  Accredited by [employer] of [employer] and FDA       Transcription Location: [facility] [address]erta 98: [id number]     Risk Value(s):   Tyrer-Cuzick 10 Year: 2 800%, Tyrer-Cuzick Lifetime: 5 000%,    Myriad Table: 1 5%, DEMI 5 Year: 2 2%, NCI Lifetime: 7 1%   Signed by:   Samy Dodd MD   10/24/17

## 2018-01-13 VITALS
SYSTOLIC BLOOD PRESSURE: 134 MMHG | HEART RATE: 87 BPM | WEIGHT: 213 LBS | DIASTOLIC BLOOD PRESSURE: 76 MMHG | HEIGHT: 66 IN | BODY MASS INDEX: 34.23 KG/M2

## 2018-01-14 VITALS
DIASTOLIC BLOOD PRESSURE: 80 MMHG | HEART RATE: 89 BPM | TEMPERATURE: 98.5 F | SYSTOLIC BLOOD PRESSURE: 128 MMHG | RESPIRATION RATE: 16 BRPM | HEIGHT: 66 IN | BODY MASS INDEX: 31.88 KG/M2 | OXYGEN SATURATION: 98 % | WEIGHT: 198.38 LBS

## 2018-01-14 VITALS
DIASTOLIC BLOOD PRESSURE: 80 MMHG | SYSTOLIC BLOOD PRESSURE: 126 MMHG | WEIGHT: 214 LBS | TEMPERATURE: 98.5 F | BODY MASS INDEX: 34.39 KG/M2 | HEIGHT: 66 IN | RESPIRATION RATE: 16 BRPM | HEART RATE: 72 BPM

## 2018-01-14 VITALS
HEART RATE: 92 BPM | SYSTOLIC BLOOD PRESSURE: 122 MMHG | HEIGHT: 66 IN | DIASTOLIC BLOOD PRESSURE: 80 MMHG | WEIGHT: 210 LBS | BODY MASS INDEX: 33.75 KG/M2

## 2018-01-14 NOTE — PROGRESS NOTES
Assessment    1  Medicare annual wellness visit, initial (V70 0) (Z00 00)   2  Stress incontinence, female (625 6) (N39 3)   3  Benign hypertension (401 1) (I10)   4  Coronary artery disease (414 00) (I25 10)   5  Hypothyroidism (244 9) (E03 9)   6  Mixed hyperlipidemia (272 2) (E78 2)   7  History of Thyroid Surgery Sub-Total Thyroidectomy   8  Multinodular goiter (241 1) (E04 2)   9  Anemia, pernicious (281 0) (D51 0)   10  History of Cholecystectomy   11  History of Hysterectomy   12  History of Biopsy Skin   13  History of Surgery Excision Lipoma   14  History of lipoma (V13 89) (Z86 018)   15  History of Tonsillectomy   16  Hernia, hiatal (553 3) (K44 9)   17  IBS (irritable bowel syndrome) (564 1) (K58 9)   18  Upper gastrointestinal bleeding (578 9) (K92 2)   19  Encounter for screening breast examination (V76 10) (Z12 31)   20  Encounter for screening for osteoporosis (V82 81) (Z13 820)   21  Screening for glaucoma (V80 1) (Z13 5)   22  Hypokalemia (276 8) (E87 6)   23  Never smoker   24  BMI 32 0-32 9,adult (V85 32) (Z68 32)   25  Obesity, Class I, BMI 30-34 9 (278 00) (E66 9)   26  Need for vaccination (V05 9) (Z23)    Plan  Anemia, pernicious, Benign hypertension, Coronary artery disease, Hypothyroidism,  Mixed hyperlipidemia, Stress incontinence, female    · (1) VITAMIN B12; Status:Active; Requested for:04Oct2017;   Benign hypertension    · Bumetanide 1 MG Oral Tablet; TAKE 1 TABLET DAILY   · Losartan Potassium 25 MG Oral Tablet; TAKE 1 TABLET DAILY  Benign hypertension, Coronary artery disease, Hypothyroidism, Mixed hyperlipidemia,  Stress incontinence, female    · (1) CBC/PLT/DIFF; Status:Active; Requested for:04Oct2017;    · (1) COMPREHENSIVE METABOLIC PANEL; Status:Active; Requested for:04Oct2017;    · (1) LIPID PANEL, FASTING; Status:Active; Requested for:04Oct2017;    · (1) TSH; Status:Active;  Requested EAJ:86WCC0727;   Encounter for screening breast examination    · * MAMMO SCREENING BILATERAL W CAD; Status:Active; Requested for:04Oct2017;   Encounter for screening for osteoporosis    · * DXA BONE DENSITY SPINE HIP AND PELVIS; Status:Active; Requested  for:04Oct2017;   Hypokalemia    · Klor-Con 10 10 MEQ Oral Tablet Extended Release; one tablet daily as directed  IBS (irritable bowel syndrome)    · 1 - Richard Bledsoe MD (Gastroenterology) Co-Management  *  Status: Active  Requested for:  01DMC2461  Care Summary provided  : Yes  Need for vaccination    · Fluzone High-Dose 0 5 ML Intramuscular Suspension Prefilled Syringe   · Pneumo (Pneumovax)  Obesity, Class I, BMI 30-34 9    · Avoid alcoholic beverages ; Status:Complete;   Done: 76NAW3674   · Begin a limited exercise program ; Status:Complete;   Done: 11UHZ5437   · Begin or continue regular aerobic exercise  Gradually work up to at least 3 sessions of 30  minutes of exercise a week ; Status:Complete;   Done: 99JHB9286   · Eat a low fat and low cholesterol diet ; Status:Complete;   Done: 58YUJ7787   · Keep a diary of when and what you eat ; Status:Complete;   Done: 29KEK2632   · Shared Decision Making Aid given; Status:Complete;   Done: 74JZJ7092   · Some eating tips that can help you lose weight ; Status:Complete;   Done: 31XTG8882   · Stretch and warm up your muscles during the first 10 minutes , then cool down your  muscles for the last 10 minutes of exercise ; Status:Complete;   Done: 78RTV7872   · There are many exercise options for seniors ; Status:Complete;   Done: 18JDZ9687   · We encourage all of our patients to exercise regularly  30 minutes of exercise or physical  activity five or more days a week is recommended for children and adults ;  Status:Complete;   Done: 05QCC2692   · We recommend that you bring your body mass index down to 26 ; Status:Complete;    Done: 18NZS4501   · We recommend that you change your eating habits slowly ; Status:Complete;   Done:  32MSU3578   · We recommend you modify your diet to achieve and maintain a healthy weight  Being  overweight may increase your risk for developing health problems such as diabetes,  heart disease, and cancer  Avoid high fat foods and eat a balanced diet rich  in fruits and vegetables  The combination of a reduced-calorie diet and increased  physical activity is recommended    Please let us know if you would like to  learn more about your nutrition and calorie needs, and additional options including  weight loss programs that can help you achieve your goals ; Status:Complete;   Done:  50DHL4659   · We want you to follow the Therapeutic Lifestyle Changes (TLC) diet ; Status:Complete;    Done: 04LXJ3204   · Call [phone] if: You are considering suicide ; Status:Complete;   Done:  46EJF0001   · Call [phone] if: You are having difficulty sleeping (insomnia) ; Status:Complete;    Done: 22QEU2842   · Call [phone] if: You are urinating too frequently ; Status:Complete;   Done:  68QBZ0906   · Call [phone] if: You feel thirsty most of the time ; Status:Complete;   Done:  09POU2425   · Call [phone] if: You feel your heart is beating very fast or skipping beats ;  Status:Complete;   Done: 89VOD8701   · Call [phone] if: You have feelings of extreme sadness and feelings of  hopelessness ; Status:Complete;   Done: 02GSP1698   · Call [phone] if: You have pain in your abdomen ; Status:Complete;   Done:  43LTB2527   · Call [phone] if: You have symptoms of sleep apnea ; Status:Complete;   Done:  49WID5665   · Call 911 if: You have sudden or severe chest pain with shortness of breath, rapid  breathing, or cough ; Status:Complete;   Done: 87MYT9428   · Seek Immediate Medical Attention if: You experience a new kind of chest pain (angina)  or pressure ; Status:Complete;   Done: 53UQA5971  PMH: History of shortness of breath    · Spiriva Respimat 2 5 MCG/ACT Inhalation Aerosol Solution; inhale 2 puffs once  daily  Screening for glaucoma    · Lluvia Irene MD, Erna Riddle (Ophthalmology) Co-Management  *  Status: Active  Requested  for: 64NDZ9565  Care Summary provided  : Yes  Stress incontinence, female    · Call [phone] if: You have pain in the kidney or low back area ; Status:Complete;    Done: 99NPI8388   · Seek Immediate Medical Attention if: You are unable to urinate ; Status:Complete;    Done: 00BKV5515   · Seek Immediate Medical Attention if: You feel too sick or weak to get out of bed ;  Status:Complete;   Done: 18HXB9107   · Seek Immediate Medical Attention if: You have pain in the lower abdominal area ;  Status:Complete;   Done: 38KQF6754   · Seek Immediate Medical Attention if: You or your family members notice any confusion  or difficulty with memory ; Status:Complete;   Done: 59VLO7696   · Seek Immediate Medical Attention if: You see blood in your urine ; Status:Complete;    Done: 33LLE3826   · Seek Immediate Medical Attention if: Your limb becomes weak ; Status:Active; Requested for:04Oct2017;    · After you empty your bladder, wait a moment and try to go again  Do not strain or push to  empty ; Status:Active; Requested VBY:55DLD6956;    · Avoid being constipated and avoid straining while having a bowel movement ;  Status:Complete;   Done: 93CGR0006   · Begin a bladder training program to help you control your urgency  Start by urinating  every 2 hours ; Status:Complete;   Done: 41STC0702   · Keep a record of your urination for 3 days ; Status:Complete;   Done: 17WMZ4452   · We have prescribed Kegel exercises to be done in a set of 15 repetitions, 3 times a day ;  Status:Complete;   Done: 90QIN5357    Discussion/Summary    Pt states it was advised she can stop the spiriva and see if her symptoms return  Impression: Initial Annual Wellness Visit  Cardiovascular screening and counseling: the risks and benefits of screening were discussed, screening is current, due for a lipid panel and Dx - V81 2 Screen for CV Disorder     Diabetes screening and counseling: the risks and benefits of screening were discussed, screening is current, due for blood glucose and Dx - V77 1 Screen for DM  Colorectal cancer screening and counseling: the risks and benefits of screening were discussed, screening is current and Dx - V76 51 Screen for CRC  Osteoporosis screening and counseling: the risks and benefits of screening were discussed and due for bone density ultrasound  Glaucoma screening and counseling: the risks and benefits of screening were discussed, ophthalmologist referral and Dx - V80 1 Screen for Glaucoma  Chief Complaint  pt present for AWV  ac/cma      History of Present Illness  HPI: pt here for an AWV    pt states she has issues with sneezing and coughing and then urinating    since custodial anxiety has cleared    pt states years ago she was getting b12 shots for pernicious anemia would like to know what her b 12 is  has not had shots recently    pt states she had a sub total thyroid ectomy and the [address] [place] [address] has a multinodular goiter  pt was seeing endo but states they have advised her to follow with her pcp and it can be followed with labs    pt states she had an upper gi done and this was performed by DR Genny Nayak - pt states she has a hietal hernia    pt states we put her on spiriva and states it has worked like a miricale - staes all her chest pain and sob has gone away   Welcome to [employer] and Wellness Visits: The patient is being seen for the initial annual wellness visit  Medicare Screening and Risk Factors   Hospitalizations: she has been previously hospitalizied and 1  Medicare Screening Tests Risk Questions   Drug and Alcohol Use: The patient has never smoked cigarettes  The patient reports never drinking alcohol  She has never used illicit drugs     Diet and Physical Activity: Current diet includes well balanced meals, low fat food choices, low carbohydrate food choices, low salt food choices, limited junk food, 2 servings of fruit per day, 2 servings of vegetables per day, 2 servings of meat per day, 1 servings of whole grains per day, 1 servings of simple carbohydrates per day, 1 cups of tea per day and 6 glasses of water  She exercises infrequently  Exercise: walking  Mood Disorder and Cognitive Impairment Screening: Anxiety screening was done using  Depression screening score was 0     negative for symptoms  She denies feeling down, depressed, or hopeless over the past two weeks  She denies feeling little interest or pleasure in doing things over the past two weeks  Cognitive impairment screening: difficulty learning/retaining new information, denies difficulty handling complex tasks, denies difficulty with reasoning, denies difficulty with spatial ability and orientation, denies difficulty with language and denies difficulty with behavior  Functional Ability/Level of Safety: Hearing is normal bilaterally, normal in the right ear and normal in the left ear  She denies hearing difficulties  She does not use a hearing aid  Activities of daily living details: does not need help using the phone, no transportation help needed, does not need help shopping, no meal preparation help needed, does not need help doing housework, does not need help doing laundry, does not need help managing medications and does not need help managing money  Fall risk factors: The patient fell 0 times in the past 12 months  Home safety risk factors:  pt does not have stairs in her house , but no unfamiliar surroundings, no loose rugs, no poor household lighting, no uneven floors, no household clutter and grab bars in the bathroom  Advance Directives: Advance directives: living will and durable power of  for health care directives, but no advance directives     Co-Managers and Medical Equipment/Suppliers: See Patient Care Team   Preventive Quality Program 72 and Older: Urinary Incontinence Symptoms includes: urinary incontinence        Patient Care Team    Care Team Member Role Specialty Office Number   Jericho Abbott MD Specialist Ophthalmology [phone]   Val Pretty MD Specialist Gastroenterology Adult [phone]   Eanbrianda Dumont  [facility] [phone] [facility] Specialist Internal Medicine [phone]     Review of Systems    Constitutional: negative  Head and Face: negative  Eyes: negative  ENT: negative  Cardiovascular: negative  Respiratory: negative  Gastrointestinal: negative  Genitourinary: stress incontinance  Musculoskeletal: negative  Integumentary and Breasts: negative  Neurological: negative  Psychiatric: negative  Endocrine: negative  Active Problems    1  Benign hypertension (401 1) (I10)   2  Coronary artery disease (414 00) (I25 10)   3  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) (I77 89)   4  Encounter for screening for malignant neoplasm of colon (V76 51) (Z12 11)   5  GERD (gastroesophageal reflux disease) (530 81) (K21 9)   6  Hypokalemia (276 8) (E87 6)   7  Hypothyroidism (244 9) (E03 9)   8  Mixed hyperlipidemia (272 2) (E78 2)   9   Upper gastrointestinal bleeding (578 9) (K92 2)    Past Medical History    · History of Anxiety (300 00) (F41 9)   · History of Atypical chest pain (786 59) (R07 89)   · Benign paroxysmal positional vertigo (386 11) (H81 10)   · History of Blood loss anemia (280 0) (D50 0)   · History of BMI 35 0-35 9,adult (V85 35) (Z68 35)   · History of acute sinusitis (V12 69) (Z87 09)   · History of lipoma (V13 89) (Z86 018)   · History of shortness of breath (V13 89) (Z87 898)   · Influenza (487 1) (J11 1)   · History of Influenza vaccine needed (V04 81) (Z23)   · History of Need for pneumococcal vaccination (V03 82) (Z23)   · History of Need for vaccine for TD (tetanus-diphtheria) (V06 5) (Z23)   · History of Need for zoster vaccine (V04 89) (Z23)   · History of Otalgia, unspecified laterality (388 70) (H92 09)   · History of Screening for other and unspecified genitourinary condition (V81 6) (Z13 89)    The active problems and past medical history were reviewed and updated today  Surgical History    · History of Biopsy Skin   · History of Cholecystectomy   · History of Hysterectomy   · History of Surgery Excision Lipoma   · History of Thyroid Surgery Sub-Total Thyroidectomy   · History of Tonsillectomy    The surgical history was reviewed and updated today  Family History  Father    · Family history of hypertension (V17 49) (Z82 49)  Sister    · History of open heart surgery    The family history was reviewed and updated today  Social History    · Never smoker  The social history was reviewed and updated today  Current Meds   1  Atorvastatin Calcium 20 MG Oral Tablet; 1 Every Day At Bedtime; Therapy: 70UTV1387 to (Last Rx:64Nxh7930)  Requested for: 09Eyp0520 Ordered   2  Bumetanide 1 MG Oral Tablet; TAKE 1 TABLET DAILY; Therapy: 44QGO2309 to (Nelma Channel)  Requested for: 03Apr2017; Last   Rx:03Apr2017 Ordered   3  Flonase Allergy Relief 50 MCG/ACT Nasal Suspension; instill 2 sprays into each nostril   once daily; Therapy: 56PAM7938 to (Last Rx:10Aug2016)  Requested for: 10Aug2016 Ordered   4  Klor-Con 10 10 MEQ Oral Tablet Extended Release; one tablet daily as directed; Therapy: 08VIW4905 to (Last Rx:04Apr2017)  Requested for: 04Apr2017 Ordered   5  Levothyroxine Sodium 50 MCG Oral Tablet; Take 1 tablet daily; Therapy: 25WBC6755 to (Evaluate:16Jan2018)  Requested for: 51Ddx8939; Last   Rx:17Xjm1352 Ordered   6  Losartan Potassium 25 MG Oral Tablet; TAKE 1 TABLET DAILY; Therapy: 03WMO5194 to (Last Rx:36Bqx6086)  Requested for: 81Wyy9360 Ordered   7  Metamucil CAPS; USE AS DIRECTED; Therapy: (Recorded:84Emh9018) to Recorded   8  Omeprazole 40 MG Oral Capsule Delayed Release; 1 DAILY; Therapy: (Recorded:10Ctr5962) to Recorded   9   Spiriva Respimat 2 5 MCG/ACT Inhalation Aerosol Solution; inhale 2 puffs once daily; Therapy: 09Zra0429 to ([facility])  Requested for: 73Ydt0304 Ordered    The medication list was reviewed and updated today  Allergies    1  Penicillins    Immunizations   1 2    Influenza  02-Nov-2015 27-Oct-2016    PCV  10-Aug-2016     Tdap  30-Jul-2015      Vitals  Signs    Temperature: 97 F  Heart Rate: 72  Respiration: 16  Systolic: 615  Diastolic: 74  Height: 5 ft 5 5 in  Weight: 200 lb   BMI Calculated: 32 78  BSA Calculated: 1 99    Physical Exam    Constitutional   General appearance: No acute distress, well appearing and well nourished  Head and Face   Head and face: Normal     Palpation of the face and sinuses: No sinus tenderness  Eyes   Conjunctiva and lids: No swelling, erythema or discharge  Pupils and irises: Equal, round, reactive to light  Ears, Nose, Mouth, and Throat   External inspection of ears and nose: Normal     Otoscopic examination: Tympanic membranes translucent with normal light reflex  Canals patent without erythema  Pulmonary   Respiratory effort: No increased work of breathing or signs of respiratory distress  Auscultation of lungs: Clear to auscultation  Cardiovascular   Palpation of heart: Normal PMI, no thrills  Auscultation of heart: Normal rate and rhythm, normal S1 and S2, no murmurs  Abdomen   Abdomen: Non-tender, no masses  Lymphatic   Palpation of lymph nodes in neck: No lymphadenopathy  Palpation of lymph nodes in axillae: No lymphadenopathy  Palpation of lymph nodes in groin: No lymphadenopathy  Palpation of lymph nodes in other areas: No lymphadenopathy  Musculoskeletal   Gait and station: Normal     Skin   Skin and subcutaneous tissue: Normal without rashes or lesions  Neurologic   Cranial nerves: Cranial nerves II-XII intact      Psychiatric   Judgment and insight: Normal        Results/Data  PHQ-2 Adult Depression Screening 25WTK5085 01:32PM [facility]     Test Name Result Flag Reference   PHQ-2 Adult Depression Score 0     Over the last two weeks, how often have you been bothered by any of the following problems?   Little interest or pleasure in doing things: Not at all - 0  Feeling down, depressed, or hopeless: Not at all - 0   PHQ-2 Adult Depression Screening Negative       Falls Risk Assessment (Dx Z13 89 Screen for Neurologic Disorder) 44UAD8574 01:32PM [facility]up[facility]     Test Name Result Flag Reference   Falls Risk      No falls in the past year       Signatures   Electronically signed by : Rajiv Mena DO; Oct  4 2017  3:54PM EST                       (Author)

## 2018-01-15 NOTE — RESULT NOTES
Verified Results  (1) CBC/PLT/DIFF 01Sep2016 09:10AM [facility] [employer]     Test Name Result Flag Reference   WHITE BLOOD CELL COUNT 4 7 Thousand/uL  3 8-10 8   RED BLOOD CELL COUNT 4 72 Million/uL  3 80-5 10   HEMOGLOBIN 13 2 g/dL  11 7-15 5   HEMATOCRIT 40 3 %  35 0-45 0   MCV 85 2 fL  80 0-100 0   MCH 27 9 pg  27 0-33 0   EOSINOPHILS 2 5 %     BASOPHILS 0 5 %     ABSOLUTE MONOCYTES 343 cells/uL  200-950   ABSOLUTE EOSINOPHILS 118 cells/uL     ABSOLUTE BASOPHILS 24 cells/uL  0-200   NEUTROPHILS 60 1 %     LYMPHOCYTES 29 6 %     MONOCYTES 7 3 %     MCHC 32 8 g/dL  32 0-36 0   RDW 14 8 %  11 0-15 0   PLATELET COUNT 848 Thousand/uL  140-400   MPV 9 2 fL  7 5-11 5   ABSOLUTE NEUTROPHILS 2825 cells/uL  7337-4778   ABSOLUTE LYMPHOCYTES 1391 cells/uL  850-3900     (1) COMPREHENSIVE METABOLIC PANEL 94YOJ8882 90:77WI [facility] [employer]     Test Name Result Flag Reference   GLUCOSE 100 mg/dL H 65-99   Fasting reference interval   UREA NITROGEN (BUN) 10 mg/dL  7-25   CREATININE 0 69 mg/dL  0 50-0 99   For patients >52years of age, the reference limit  for Creatinine is approximately 13% higher for people  identified as -American  eGFR NON-AFR   AMERICAN 90 mL/min/1 73m2  > OR = 60   eGFR AFRICAN AMERICAN 104 mL/min/1 73m2  > OR = 60   BUN/CREATININE RATIO   9-98   NOT APPLICABLE (calc)   ALT 19 U/L  6-29   ALBUMIN 4 2 g/dL  3 6-5 1   GLOBULIN 2 2 g/dL (calc)  1 9-3 7   ALBUMIN/GLOBULIN RATIO 1 9 (calc)  1 0-2 5   BILIRUBIN, TOTAL 0 8 mg/dL  0 2-1 2   ALKALINE PHOSPHATASE 61 U/L     AST 25 U/L  10-35   SODIUM 140 mmol/L  135-146   POTASSIUM 4 1 mmol/L  3 5-5 3   CHLORIDE 105 mmol/L     CARBON DIOXIDE 27 mmol/L  20-31   CALCIUM 9 2 mg/dL  8 6-10 4   PROTEIN, TOTAL 6 4 g/dL  6 1-8 1     (1) LIPID PANEL, FASTING 01Sep2016 09:10AM [facility] [employer]     Test Name Result Flag Reference   CHOLESTEROL, TOTAL 144 mg/dL  125-200   HDL CHOLESTEROL 57 mg/dL  > OR = 46   TRIGLICERIDES 92 mg/dL  <343   LDL-CHOLESTEROL 69 mg/dL (calc)  <130   Desirable range <100 mg/dL for patients with CHD or  diabetes and <70 mg/dL for diabetic patients with  known heart disease  CHOL/HDLC RATIO 2 5 (calc)  < OR = 5 0   NON HDL CHOLESTEROL 87 mg/dL (calc)     Target for non-HDL cholesterol is 30 mg/dL higher than   LDL cholesterol target       (Q) TSH, 3RD GENERATION W/REFLEX TO FT4 84Qbg0644 09:10AM Pedro Danielle   REPORT COMMENT:  FASTING:YES     Test Name Result Flag Reference   TSH W/REFLEX TO FT4 0 46 mIU/L  0 40-4 50       Discussion/Summary   Your bloodwork looks very good, please continue same medications  - Dr Nguyễn Oneal

## 2018-01-17 NOTE — RESULT NOTES
Verified Results  NM MYOCARDIAL PERFUSION SPECT (RX STRESS AND/OR REST) [id number] 08:47AM Ganesh Callaway     Test Name Result Flag Reference   NM MYOCARDIAL PERFUSION SPECT (RX STRESS AND/OR REST) (Report)     [address]   [address] [facility]   [place]   [phone]     Rest/Stress Gated SPECT Myocardial Perfusion Imaging After Exercise     Patient: Dori Boles   MR number: [id number]   Account number: [de-identified]   : 1950   Age: 78 years   Gender: Female   Status: Outpatient   Location: Stress lab   Height: 65 in   Weight: 210 lb   BP: 132/ 84 mmHg     Allergies: ALLERGIES NOT ON FILE     Diagnosis: R07 89 - Other chest pain     Primary Physician: Lianne Alcazar MD   RN: NURYS Lyn   Referring Physician: Adeel Aguilar MD   Group: Kristi Hanna   Report Prepared By[de-identified] NURYS Lyn   Interpreting Physician: Adeel Aguilar MD     INDICATIONS: Evaluation of known coronary artery disease  HISTORY: The patient is a 78year old  female  Chest pain status: no chest pain  Other symptoms: dyspnea  Coronary artery disease risk factors: dyslipidemia, hypertension, and family history of premature coronary artery disease  Cardiovascular history: coronary artery disease  Prior cardiovascular procedures: coronary angiogram (on )  Medications: aspirin, a lipid lowering agent, an antihypertensive agent, and thyroid medications  PHYSICAL EXAM: Baseline physical exam screening: normal      REST ECG: Normal sinus rhythm  The ECG showed rare premature ventricular contractions  PROCEDURE: The study was performed in the stress lab  Treadmill exercise testing was performed, using the Miguel protocol  Gated SPECT myocardial perfusion imaging was performed after stress and at rest  Systolic blood pressure was 132   mmHg, at the start of the study  Diastolic blood pressure was 84 mmHg, at the start of the study   The heart rate was 77 bpm, at the start of the study  IV double checked  MAGAN PROTOCOL:   HR bpm SBP mmHg DBP mmHg Symptoms Rhythm/conduct   Baseline 77 132 84 none NSR, occasional PVC's   Stage 1 129 145 84 none --   Stage 2 142 -- -- chest discomfort, moderate dyspnea same as above, occasional PVC's   Immediate 129 150 90 subsiding --   Recovery 1 113 135 80 none --   No medications or fluids given  STRESS SUMMARY: Duration of exercise was 5 min  The patient exercised to protocol stage 2  Maximal work rate was 7 METs  Maximal heart rate during stress was 146 bpm ( 95 % of maximal predicted heart rate)  The heart rate response to   stress was exaggerated  There was resting hypertension with an appropriate blood pressure response to stress  The rate-pressure product for the peak heart rate and blood pressure was 77827  There was no chest pain during stress  The   patient experienced chest pain during stress; pain resolved spontaneously  The stress test was terminated due to achievement of target heart rate, chest discomfort, and moderate dyspnea  The stress ECG was negative for ischemia and normal    There were no stress arrhythmias or conduction abnormalities  ISOTOPE ADMINISTRATION:   Resting isotope administration Stress isotope administration   Agent Tetrofosmin Tetrofosmin   Dose 10 9 mCi 32 6 mCi   Date 05/30/2017 05/30/2017     Radiopharmaceutical was administered 3 min, 52 sec into the stress protocol  There was 1 min of exercise after the injection  MYOCARDIAL PERFUSION IMAGING:   The image quality was fair  Rotating projection images reveal mild breast attenuation  Left ventricular size was normal  The TID ratio was 1 01  PERFUSION DEFECTS:   - There were no perfusion defects  GATED SPECT:   The calculated left ventricular ejection fraction was 70 %  Left ventricular ejection fraction was within normal limits by visual estimate  There was no left ventricular regional abnormality       SUMMARY:   - Stress results: Duration of exercise was 5 min  Target heart rate was achieved  There was resting hypertension with an appropriate blood pressure response to stress  There was no chest pain during stress  The patient experienced chest   pain during stress; pain resolved spontaneously  - ECG conclusions: The stress ECG was negative for ischemia and normal    - Perfusion imaging: There were no perfusion defects    - Gated SPECT: The calculated left ventricular ejection fraction was 70 %  Left ventricular ejection fraction was within normal limits by visual estimate  There was no left ventricular regional abnormality  IMPRESSIONS: Normal study after maximal exercise  There were no significant perfusion abnormalities   Left ventricular systolic function was normal      Prepared and signed by     Vanita Wu MD   Signed 05/31/2017 10:11:42

## 2018-01-18 NOTE — RESULT NOTES
Discussion/Summary   see task     Verified Results  (1) CBC/PLT/DIFF [id number] 11:21AM Oneil Lennon   REPORT COMMENT:  FASTING:NO     Test Name Result Flag Reference   WHITE BLOOD CELL COUNT 4 8 Thousand/uL  3 8-10 8   RED BLOOD CELL COUNT 3 44 Million/uL L 3 80-5 10   HEMOGLOBIN 9 6 g/dL L 11 7-15 5   HEMATOCRIT 29 2 % L 35 0-45 0   MCV 84 9 fL  80 0-100 0   MCH 27 9 pg  27 0-33 0   MCHC 32 9 g/dL  32 0-36 0   RDW 13 9 %  11 0-15 0   PLATELET COUNT 342 Thousand/uL  140-400   ABSOLUTE NEUTROPHILS 2904 cells/uL  2096-4908   ABSOLUTE LYMPHOCYTES 1421 cells/uL  850-3900   ABSOLUTE MONOCYTES 374 cells/uL  200-950   ABSOLUTE EOSINOPHILS 82 cells/uL     ABSOLUTE BASOPHILS 19 cells/uL  0-200   NEUTROPHILS 60 5 %     LYMPHOCYTES 29 6 %     MONOCYTES 7 8 %     EOSINOPHILS 1 7 %     BASOPHILS 0 4 %     MPV 11 2 fL  7 5-12 5

## 2018-01-22 ENCOUNTER — ANESTHESIA EVENT (OUTPATIENT)
Dept: GASTROENTEROLOGY | Facility: AMBULARY SURGERY CENTER | Age: 68
End: 2018-01-22
Payer: MEDICARE

## 2018-01-22 VITALS
TEMPERATURE: 97 F | HEIGHT: 66 IN | BODY MASS INDEX: 32.14 KG/M2 | WEIGHT: 200 LBS | DIASTOLIC BLOOD PRESSURE: 74 MMHG | HEART RATE: 72 BPM | RESPIRATION RATE: 16 BRPM | SYSTOLIC BLOOD PRESSURE: 120 MMHG

## 2018-01-22 VITALS
HEIGHT: 66 IN | BODY MASS INDEX: 33.59 KG/M2 | RESPIRATION RATE: 16 BRPM | SYSTOLIC BLOOD PRESSURE: 116 MMHG | WEIGHT: 209 LBS | TEMPERATURE: 97.4 F | DIASTOLIC BLOOD PRESSURE: 74 MMHG | HEART RATE: 72 BPM

## 2018-01-22 RX ORDER — ESOMEPRAZOLE MAGNESIUM 40 MG/1
40 CAPSULE, DELAYED RELEASE ORAL
COMMUNITY
End: 2018-02-23 | Stop reason: SDUPTHER

## 2018-01-22 RX ORDER — FAMOTIDINE 20 MG/1
20 TABLET, FILM COATED ORAL 2 TIMES DAILY
COMMUNITY
End: 2018-02-02 | Stop reason: SDUPTHER

## 2018-01-22 RX ORDER — ATORVASTATIN CALCIUM 20 MG/1
20 TABLET, FILM COATED ORAL DAILY
COMMUNITY
End: 2018-05-01 | Stop reason: SDUPTHER

## 2018-01-22 NOTE — PRE-PROCEDURE INSTRUCTIONS
Pre-Surgery Instructions:   Medication Instructions    atorvastatin (LIPITOR) 20 mg tablet Patient was instructed by Physician and understands   bumetanide (BUMEX) 1 mg tablet Patient was instructed by Physician and understands   Calcium Carb-Cholecalciferol 600-1000 MG-UNIT CAPS Patient was instructed by Physician and understands   esomeprazole (NexIUM) 40 MG capsule Patient was instructed by Physician and understands   famotidine (PEPCID) 20 mg tablet Patient was instructed by Physician and understands   fluticasone (FLONASE ALLERGY RELIEF) 50 mcg/act nasal spray Patient was instructed by Physician and understands   levothyroxine 50 mcg tablet Patient was instructed by Physician and understands   Linaclotide (LINZESS) 67 MCG CAPS Patient was instructed by Physician and understands   losartan (COZAAR) 25 mg tablet Patient was instructed by Physician and understands   potassium chloride (KLOR-CON 10) 10 mEq tablet Patient was instructed by Physician and understands   tiotropium (SPIRIVA) 18 mcg inhalation capsule Patient was instructed by Physician and understands

## 2018-01-22 NOTE — ANESTHESIA PREPROCEDURE EVALUATION
Review of Systems/Medical History  Patient summary reviewed  Chart reviewed      Cardiovascular    Comment: NM myocardial perfusion spect (rx stress and/or rest)   Status: Final result  PACS Images     Show images for NM myocardial perfusion spect (rx stress and/or rest)  Order Report      Order Details   Study Result     St  [address]  [address] [facility] [place] [facility]  [phone]     Rest/Stress Gated SPECT Myocardial Perfusion Imaging After Exercise     Patient: Paola Lopez  MR number: [id number]  Account number: [de-identified]  : 1950  Age: 78 years  Gender: Female  Status: Outpatient  Location: Stress lab  Height: 65 in  Weight: 210 lb  BP: 132/ 84 mmHg     Allergies: ALLERGIES NOT ON FILE     Diagnosis: R07 89 - Other chest pain     Primary Physician:  Shoshana Alvarez MD  RN:  NURYS Hudson  Referring Physician:  Myriam Jackson MD  Group:  Ambika Dietz  Report Prepared By[de-identified]  NURYS Hudson  Interpreting Physician:  Myriam Jackson MD     INDICATIONS: Evaluation of known coronary artery disease      HISTORY: The patient is a 78year old  female  Chest pain status: no chest pain  Other symptoms: dyspnea  Coronary artery disease risk factors: dyslipidemia, hypertension, and family history of premature coronary artery disease  Cardiovascular history: coronary artery disease  Prior cardiovascular procedures: coronary angiogram (on )  Medications: aspirin, a lipid lowering agent, an antihypertensive agent, and thyroid medications      PHYSICAL EXAM: Baseline physical exam screening: normal      REST ECG: Normal sinus rhythm  The ECG showed rare premature ventricular contractions      PROCEDURE: The study was performed in the stress lab  Treadmill exercise testing was performed, using the Miguel protocol   Gated SPECT myocardial perfusion imaging was performed after stress and at rest  Systolic blood pressure was 132  mmHg, at the start of the study  Diastolic blood pressure was 84 mmHg, at the start of the study  The heart rate was 77 bpm, at the start of the study  IV double checked      MAGAN PROTOCOL:  HR bpm SBP mmHg DBP mmHg Symptoms Rhythm/conduct  Baseline 77 132 84 none NSR, occasional PVC's  Stage 1 129 145 84 none --  Stage 2 142 -- -- chest discomfort, moderate dyspnea same as above, occasional PVC's  Immediate 129 150 90 subsiding --  Recovery 1 113 135 80 none --  No medications or fluids given      STRESS SUMMARY: Duration of exercise was 5 min  The patient exercised to protocol stage 2  Maximal work rate was 7 METs  Maximal heart rate during stress was 146 bpm ( 95 % of maximal predicted heart rate)  The heart rate response to  stress was exaggerated  There was resting hypertension with an appropriate blood pressure response to stress  The rate-pressure product for the peak heart rate and blood pressure was 96111  There was no chest pain during stress  The  patient experienced chest pain during stress; pain resolved spontaneously  The stress test was terminated due to achievement of target heart rate, chest discomfort, and moderate dyspnea  The stress ECG was negative for ischemia and normal   There were no stress arrhythmias or conduction abnormalities      ISOTOPE ADMINISTRATION:  Resting isotope administration Stress isotope administration  Agent Tetrofosmin Tetrofosmin  Dose 10 9 mCi 32 6 mCi  Date 05/30/2017 05/30/2017     Radiopharmaceutical was administered 3 min, 52 sec into the stress protocol  There was 1 min of exercise after the injection      MYOCARDIAL PERFUSION IMAGING:  The image quality was fair  Rotating projection images reveal mild breast attenuation  Left ventricular size was normal  The TID ratio was 1 01      PERFUSION DEFECTS:  -  There were no perfusion defects      GATED SPECT:  The calculated left ventricular ejection fraction was 70 %   Left ventricular ejection fraction was within normal limits by visual estimate  There was no left ventricular regional abnormality      SUMMARY:  -  Stress results: Duration of exercise was 5 min  Target heart rate was achieved  There was resting hypertension with an appropriate blood pressure response to stress  There was no chest pain during stress  The patient experienced chest  pain during stress; pain resolved spontaneously  -  ECG conclusions: The stress ECG was negative for ischemia and normal   -  Perfusion imaging: There were no perfusion defects   -  Gated SPECT: The calculated left ventricular ejection fraction was 70 %  Left ventricular ejection fraction was within normal limits by visual estimate  There was no left ventricular regional abnormality      IMPRESSIONS: Normal study after maximal exercise  There were no significant perfusion abnormalities  Left ventricular systolic function was normal      Prepared and signed by     Jamila Monroe MD  Signed 05/31/2017 10:11:42       ,  Pulmonary       GI/Hepatic            Endo/Other    Obesity    GYN       Hematology   Musculoskeletal       Neurology   Psychology           Physical Exam    Airway    Mallampati score: II  TM Distance: >3 FB  Neck ROM: full     Dental       Cardiovascular  Rhythm: regular, Rate: normal,     Pulmonary  Breath sounds clear to auscultation,     Other Findings        Anesthesia Plan  ASA Score- 2     Anesthesia Type- IV sedation with anesthesia with ASA Monitors  Additional Monitors:   Airway Plan:         Plan Factors-    Induction- intravenous  Postoperative Plan-     Informed Consent- Anesthetic plan and risks discussed with patient

## 2018-01-23 ENCOUNTER — HOSPITAL ENCOUNTER (OUTPATIENT)
Facility: AMBULARY SURGERY CENTER | Age: 68
Setting detail: OUTPATIENT SURGERY
Discharge: HOME | End: 2018-01-23
Attending: INTERNAL MEDICINE | Admitting: INTERNAL MEDICINE
Payer: MEDICARE

## 2018-01-23 ENCOUNTER — ANESTHESIA (OUTPATIENT)
Dept: GASTROENTEROLOGY | Facility: AMBULARY SURGERY CENTER | Age: 68
End: 2018-01-23
Payer: MEDICARE

## 2018-01-23 VITALS
DIASTOLIC BLOOD PRESSURE: 74 MMHG | TEMPERATURE: 97.6 F | OXYGEN SATURATION: 97 % | SYSTOLIC BLOOD PRESSURE: 143 MMHG | HEART RATE: 58 BPM | RESPIRATION RATE: 16 BRPM

## 2018-01-23 VITALS
SYSTOLIC BLOOD PRESSURE: 122 MMHG | OXYGEN SATURATION: 97 % | DIASTOLIC BLOOD PRESSURE: 64 MMHG | WEIGHT: 200 LBS | BODY MASS INDEX: 32.78 KG/M2 | TEMPERATURE: 96.3 F | HEART RATE: 69 BPM

## 2018-01-23 DIAGNOSIS — K44.9 DIAPHRAGMATIC HERNIA WITHOUT OBSTRUCTION OR GANGRENE: ICD-10-CM

## 2018-01-23 DIAGNOSIS — K21.9 GASTRO-ESOPHAGEAL REFLUX DISEASE WITHOUT ESOPHAGITIS: ICD-10-CM

## 2018-01-23 DIAGNOSIS — R10.9 ABDOMINAL PAIN: ICD-10-CM

## 2018-01-23 PROCEDURE — 88341 IMHCHEM/IMCYTCHM EA ADD ANTB: CPT | Performed by: INTERNAL MEDICINE

## 2018-01-23 PROCEDURE — 88342 IMHCHEM/IMCYTCHM 1ST ANTB: CPT | Performed by: INTERNAL MEDICINE

## 2018-01-23 PROCEDURE — 88305 TISSUE EXAM BY PATHOLOGIST: CPT | Performed by: INTERNAL MEDICINE

## 2018-01-23 RX ORDER — PROPOFOL 10 MG/ML
INJECTION, EMULSION INTRAVENOUS AS NEEDED
Status: DISCONTINUED | OUTPATIENT
Start: 2018-01-23 | End: 2018-01-23 | Stop reason: SURG

## 2018-01-23 RX ORDER — SODIUM CHLORIDE 9 MG/ML
125 INJECTION, SOLUTION INTRAVENOUS CONTINUOUS
Status: DISCONTINUED | OUTPATIENT
Start: 2018-01-23 | End: 2018-01-23 | Stop reason: HOSPADM

## 2018-01-23 RX ORDER — PROPOFOL 10 MG/ML
INJECTION, EMULSION INTRAVENOUS CONTINUOUS PRN
Status: DISCONTINUED | OUTPATIENT
Start: 2018-01-23 | End: 2018-01-23 | Stop reason: SURG

## 2018-01-23 RX ADMIN — SODIUM CHLORIDE 125 ML/HR: 0.9 INJECTION, SOLUTION INTRAVENOUS at 07:51

## 2018-01-23 RX ADMIN — SODIUM CHLORIDE: 0.9 INJECTION, SOLUTION INTRAVENOUS at 08:01

## 2018-01-23 RX ADMIN — PROPOFOL 100 MG: 10 INJECTION, EMULSION INTRAVENOUS at 08:05

## 2018-01-23 RX ADMIN — PROPOFOL 50 MCG/KG/MIN: 10 INJECTION, EMULSION INTRAVENOUS at 08:05

## 2018-01-23 RX ADMIN — PROPOFOL 50 MCG/KG/MIN: 10 INJECTION, EMULSION INTRAVENOUS at 08:14

## 2018-01-23 NOTE — RESULT NOTES
Discussion/Summary   Patient called requesting lab results  CBC normal  No signs of iron def anemia  Does not need to take iron  Office to call patient  Verified Results  (1) CBC/ PLT (NO DIFF) [id number] 11:13AM Anabel Renner Order Number: [id number]     Test Name Result Flag Reference   HEMATOCRIT 40 6 %  37 0-47 0   HEMOGLOBIN 13 2 g/dL  12 0-16 0   MCHC 32 5 g/dL  31 4-37 4   MCH 27 3 pg  27 0-31 0   MCV 84 fL  82-98   PLATELET COUNT 925 Thousands/uL  130-400   RBC COUNT 4 84 Million/uL  4 20-5  40   RDW 15 9 % H 11 6-15 1   WBC COUNT 5 00 Thousand/uL  4 80-10 80   MPV 8 4 fL L 8 9-12 7

## 2018-01-23 NOTE — DISCHARGE INSTRUCTIONS
Discharge home  Resume regular diet  Resume home medications  Follow up biopsy results  Proceed with esophageal manometry as scheduled  Call with any abdominal pain, bleeding, fevers

## 2018-01-23 NOTE — OP NOTE
ESOPHAGOGASTRODUODENOSCOPY    PROCEDURE: EGD/ Biopsy    INDICATIONS: Abdominal pain, Epigastric and GERD    POST-OP DIAGNOSIS: See the impression below    SEDATION: Monitored anesthesia care, check anesthesia records    PHYSICAL EXAM:    Vitals:    01/23/18 0734   BP: 140/77   Pulse: 63   Resp: 18   Temp: 97 6 °F (36 4 °C)   SpO2: 98%    There is no height or weight on file to calculate BMI  General: NAD  Heart: S1 & S2 normal, RRR  Lungs: CTA, No rales or rhonchi  Abdomen: Soft, nontender, nondistended, good bowel sounds    CONSENT:  Informed consent was obtained for the procedure, including sedation after explaining the risks and benefits of the procedure  Risks including but not limited to bleeding, perforation, infection, aspiration were discussed in detail  Also explained about less than 100% sensitivity with the exam and other alternatives  PREPARATION:   EKG tracing, pulse oximetry, blood pressure were monitored throughout the procedure  Patient was identified by myself both verbally and by visual inspection of ID band  DESCRIPTION:   Patient was placed in the left lateral decubitus position and was sedated with the above medication  The gastroscope was introduced in to the oropharynx and the esophagus was intubated under direct visualization  Scope was passed down the esophagus up to 2nd part of the duodenum  A careful inspection was made as the gastroscope was withdrawn, including a retroflexed view of the stomach; findings and interventions are described below  FINDINGS:    #1  Esophagus and GEJ- tortuous distal esophagus    #2  Stomach- medium to large size hiatal hernia, GE junction at 34 cm, hiatal opening at 40 cm, no significant Jim's ulcerations were noted, appears to be a sliding hiatal hernia  Moderate antral gastritis with several erosions, biopsies taken for H pylori  Several gastric polyps consistent with fundic gland polyps were seen on prior endoscopy    #3   Duodenum- normal appearing duodenum  Tortuous distal esophagus, medium to large size hiatal hernia measuring approximately 6 cm  Moderate gastritis biopsies taken for H pylori  Several small gastric polyps noted         IMPRESSIONS:      Moderate to large size hiatal hernia  Tortuous distal esophagus  Gastritis biopsies taken    RECOMMENDATIONS:     Discharge home  Resume regular diet  Resume home medications  Follow up biopsy results  Proceed with esophageal manometry as scheduled  Call with any abdominal pain, bleeding, fevers    COMPLICATIONS:  None; patient tolerated the procedure well            DISPOSITION: PACU           CONDITION: Stable

## 2018-01-23 NOTE — H&P
History and Physical - [facility]  Ender Vera 75 y o  female MRN: [id number]    HPI: Ender Vera is a 75y o  year old female who presents with epigastric pain and hiatal hernia         Review of Systems    Historical Information   Past Medical History:   Diagnosis Date    Anemia     Hx secondary to bleeding ulcer    Asthma     seasonal    Disease of thyroid gland     GERD (gastroesophageal reflux disease)     Goiter, nodular     History of transfusion     x1 after bleeding ulcer    Hyperlipidemia     Hypertension     Irritable bowel syndrome     Kidney stone     RA (rheumatoid arthritis) ([address] )     Seasonal allergies      Past Surgical History:   Procedure Laterality Date    CARDIAC CATHETERIZATION      x2 secondary to exertional chest pain, due to lung issues, possible mild COPD    CHOLECYSTECTOMY  2015    lap    HYSTERECTOMY      partial    SKIN BIOPSY Right     lump benign    THYROIDECTOMY, PARTIAL      TONSILLECTOMY       Social History   History   Alcohol Use No     History   Drug Use No     History   Smoking Status    Never Smoker   Smokeless Tobacco    Never Used     Family History   Problem Relation Age of Onset    Alzheimer's disease Mother     Cancer Mother      skin     Thyroid disease Mother     Cancer Father      prostate    Stroke Father     Hypertension Father     Thyroid disease Sister     Hyperlipidemia Brother     No Known Problems Son     No Known Problems Son        Meds/Allergies     Prescriptions Prior to Admission   Medication    atorvastatin (LIPITOR) 20 mg tablet    bumetanide (BUMEX) 1 mg tablet    Calcium Carb-Cholecalciferol 600-1000 MG-UNIT CAPS    esomeprazole (NexIUM) 40 MG capsule    famotidine (PEPCID) 20 mg tablet    levothyroxine 50 mcg tablet    Linaclotide (LINZESS) 72 MCG CAPS    losartan (COZAAR) 25 mg tablet    potassium chloride (KLOR-CON 10) 10 mEq tablet    fluticasone (FLONASE ALLERGY RELIEF) 50 mcg/act nasal spray    tiotropium (SPIRIVA) 18 mcg inhalation capsule       Allergies   Allergen Reactions    Pantoprazole Headache    Penicillins Other (See Comments)     During allergy testing instructed to NEVER take PCN       Objective     Blood pressure 140/77, pulse 63, temperature 97 6 °F (36 4 °C), temperature source Tympanic, resp  rate 18, SpO2 98 %  PHYSICAL EXAM    Gen: NAD  CV: RRR  CHEST: Clear  ABD: soft, NT/ND  EXT: no edema  Neuro: AAO      ASSESSMENT/PLAN:  This is a 75y o  year old female here for epigastric pain and hiatal hernia       PLAN:   Procedure: Kirby Diaz

## 2018-01-24 NOTE — MISCELLANEOUS
Assessment   1  Shortness of breath (786 05) (R06 02)1   2  Coronary artery disease (414 00) (I25 10)1   3  Blood loss anemia (280 0) (D50 0)1   4  Upper gastrointestinal bleeding (578 9) (K92 2)1   5  Benign hypertension (401 1) (I10)1   6  Atypical chest pain (786 59) (R07 89)1   7  GERD (gastroesophageal reflux disease) (530 81) (K21 9)1   8  Mixed hyperlipidemia (272 2) (E78 2)1      1 Amended By: Mahnaz Stringer ; Jul 20 2017 10:53 AM EST    Plan  Benign hypertension    · Renew: Losartan Potassium 25 MG Oral Tablet; TAKE 1 TABLET DAILY1   Rx By: Mahnaz Stringer; Dispense: 0 Days ; #:90 Tablet; Refill: 1;For: Benign   hypertension; LEA = N;1 1,2  Verified Transmission to [facility];2 1,2  Last Updated By: System, SureWestleyXiimo; 7/20/2017   10:56:23 AM2   Benign hypertension, Blood loss anemia    · (1) CBC/PLT/DIFF; Status:Active; Requested for:03Wpy9983; 1   Perform:Quest; Due:34Fqy3639; Ordered; For:Benign hypertension, Blood loss anemia; Ordered By:Lombardi, Frank T1   Hypothyroidism    · Renew: Levothyroxine Sodium 50 MCG Oral Tablet; Take 1 tablet daily1   Rx By: Mahnaz Stringer; Dispense: 90 Days ; #:90 Tablet; Refill: 1;For: Hypothyroidism;   LEA = N;1 1,2  Verified Transmission to [facility];2 1,2  Last Updated By: System, [employer]; 7/20/2017 10:56:23 AM2   Mixed hyperlipidemia    · Start: Atorvastatin Calcium 20 MG Oral Tablet; 1 Every Day At [facility]   Rx By: Mahnaz Stringer; Dispense: 0 Days ; #:90 Tablet;  Refill: 1;For: Mixed   hyperlipidemia; LEA = N;1 1,2  Verified Transmission to [facility];2 1,2  Last Updated By: System, [employer]; 7/20/2017   10:56:23 AM2   Shortness of breath    · Start: Spiriva Respimat 2 5 MCG/ACT Inhalation Aerosol Solution; INHALE 2 PUFFS  ONCE DAILY1   Rx By: Arva Clement; Dispense: 0 Days ; #:1 GM; Refill: 0;For: Shortness of breath;   LEA = N;1 1,2  Verified Transmission to [place] [address] (;2 1,2  Last Updated By: System, [employer]; 7/20/2017 10:56:22 AM2      1 Amended By: Reddy Garza ; Jul 20 2017 10:49 AM EST   2 Amended By: Reddy Garza ; Jul 20 2017 10:57 AM EST    Discussion/Summary  Discussion Summary:   Pulmonary test reviewed  pt advised to stop the baby asa for now  pt advised on getting her cbc 1  1,2  today    I will trial pt on spiriva seems her flow improved with the dilator not the capacity this could be an issue with her SOB at least if coupled with her non obtructive CAD when she exerts herself  Of course she really did not have chest pain on the stress but she was only on the treadmill for 5 mon  as far as the bleeding etc  i feel she may still be bleeding from her GI bleed  as she is washed out and is not really feeling much better and still having black tarry stools  i will move her cbc to today  she was discharged with what i mentioned in the hpi    pt had her discharge packet with her which i reviewed with her2        1 Amended By: Reddy Garza ; Jul 20 2017 10:20 AM EST   2 Amended By: Reddy Garza ; Jul 20 2017 10:57 AM EST    Chief Complaint   I spoke with Marcelle Ndiaye on 7/19/17 after her [facility] discharge to her home on 7/17/17  She feels better than she did  She states that she had a blood transfusion while hospitalized  She is still having some acid reflux at times  Last pm she ate rice with chicken broth and carrots and she experienced some reflux last pm afterward  She is taking the 40 mg Omeprazole as directed  As per Marcelle Ndiaye, she will need a repeat endoscopy in a few weeks  She eats a gluten free diet and avoid spicy foods  She is drinking fluids and tolerating that   I reviewed her medications with her and updated her chart and she understands to call our office at any time with any questions or concerns and she is aware to go to the ER if any chest pain, dyspnea, weakness, dizziness, palpitations, dark stools or vomiting/abdominal pains  Nini 32       Chief Complaint Free Text Note Form: follow up from dark tarry stools needing blood transfusion2        2 Amended By: Perico Wade; Jul 20 2017 9:59 AM EST    History of Present Illness  TCM Communication [facility]: The patient is being contacted for follow-up after hospitalization  Hospital records were reviewed and I received [facility] but not a discharge summary  She was hospitalized [facility]  The date of discharge: 7/17/17  Diagnosis: GI Bleed  She was discharged to home  Medications reviewed and updated today  She scheduled a follow up appointment  Follow-up appointments with other specialists: Dr Sumeet Rosales, Cardiologist next week  Counseling was provided to the patient  Topics counseled included instructions for management, risk factor reductions, patient and family education, activities of daily living and importance of compliance with treatment  Communication performed and completed by [facility] LPN 6/55/10   HPI: nurses TCM note appreciated    Pt states she is here post hospital stay  pt will be traveling next week    Pt states friday she went in for a cardiac cath, saturday started with black stool did this all night was very sick by the am  She went to [place] [facility], she was admitted to \[facility]\"" hemoglobin was dropping down to 8 and she was transfused  Pt left the hospital with a 9 9  this was on the 18th  Pt states she needs coordination of her care  Pt states she went to a different cardiac doc , she was seeing Dr Emmy Gonzalez, pt states she told them she had sob with ambulation  pt had a nuclear stress testing was fine and pt asked again with her chest pain - pt was given ranexa  When pt went home given her IBS etc she did not take it - had a second opinion  PT had the cath by dr Stephanie Monson - then her cath came back clean   pt states she had a cath done a number of years ago and would like that copy  Pt states she also had a pulmonary test on the fifth that she was told i would go over this with her  hbg was 11 7    pt states it has been suggested to her that her chest pain and sob could be from her acid reflux and her anemia  pt wants to know what her chest pain and SOB is coming from    Pt states states she has not been able to walk due to being sick  Pt so she is not sure if she is going to have issues going forward    pt states she woke up this am with a moth full of acid  pt had a bm this am was still black but she was told it wouyld be  pt has a hemoglobin ordered for tomorrow  pt will be seeing dr Zaira Medel    pt also needs refills on her chronic meds1        1 Amended By: Pennie Turner ; Jul 20 2017 10:13 AM EST    Review of Systems  Complete-Female:   Constitutional:1  feeling poorly1  and feeling tired1 , but as noted in HPI1   Eyes: No complaints of eye pain, no red eyes, no eyesight problems, no discharge, no dry eyes, no itching of eyes1   ENT:1  no complaints of earache, no loss of hearing, no nose bleeds, no nasal discharge, no sore throat, no hoarseness1   Cardiovascular:1  chest pain1 , but as noted in HPI1   Respiratory: shortness of breath during exertion1 , but as noted in HPI1   Gastrointestinal:1  No complaints of abdominal pain, no constipation, no nausea or vomiting, no diarrhea, no bloody stools1   Genitourinary:1  No complaints of dysuria, no incontinence, no pelvic pain, no dysmenorrhea, no vaginal discharge or bleeding1   Musculoskeletal:1  No complaints of arthralgias, no myalgias, no joint swelling or stiffness, no limb pain or swelling1   Integumentary:1  bruise1 , but as noted in HPI1   Neurological:1  No complaints of headache, no confusion, no convulsions, no numbness, no dizziness or fainting, no tingling, no limb weakness, no difficulty walking1      Psychiatric:1  Not suicidal, no sleep disturbance, no anxiety or depression, no change in personality, no emotional problems1         1 Amended By: Nilda Dinh T ; Jul 20 2017 10:57 AM EST    Active Problems   1  Anxiety (300 00) (F41 9)  2  Atypical chest pain (786 59) (R07 89)  3  Benign hypertension (401 1) (I10)  4  BMI 35 0-35 9,adult (V85 35) (Z68 35)  5  Coronary artery disease (414 00) (I25 10)  6  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) (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)  7  Encounter for screening for malignant neoplasm of colon (V76 51) (Z12 11)  8  GERD (gastroesophageal reflux disease) (530 81) (K21 9)  9  Hypokalemia (276 8) (E87 6)  10  Hypothyroidism (244 9) (E03 9)  11  Mixed hyperlipidemia (272 2) (E78 2)  12  Shortness of breath (786 05) (R06 02)    Past Medical History   1  Benign paroxysmal positional vertigo (386 11) (H81 10)  2  History of acute sinusitis (V12 69) (Z87 09)  3  Influenza (487 1) (J11 1)  4  History of Influenza vaccine needed (V04 81) (Z23)  5  History of Need for pneumococcal vaccination (V03 82) (Z23)  6  History of Need for vaccine for TD (tetanus-diphtheria) (V06 5) (Z23)  7  History of Need for zoster vaccine (V04 89) (Z23)  8  History of Otalgia, unspecified laterality (388 70) (H92 09)  9  History of Screening for other and unspecified genitourinary condition (V81 6) (Z13 89)    Surgical History  Surgical History Reviewed: The surgical history was reviewed and updated today  1        1 Amended By: Ivan Mckeon ; Jul 20 2017 10:57 AM EST    Family History  Father   1  Family history of hypertension (V17 49) (Z82 49)  Sister   2  History of open heart surgery  Family History Reviewed: The family history was reviewed and updated today  1        1 Amended By: Ivan Mckeon ; Jul 20 2017 10:57 AM EST    Social History    · Never smoker    Social History Reviewed: The social history was reviewed and updated today  1        1 Amended By: Ivan Mckeon ; Jul 20 2017 10:57 AM EST    Current Meds  1  Aspirin 81 MG TABS; Take 1 tablet daily Recorded  2  Bumetanide 1 MG Oral Tablet; TAKE 1 TABLET DAILY;    Therapy: 86EZV7575 to (Samina Crane) Requested for: 03Apr2017; Last   Rx:03Apr2017 Ordered  3  Crestor 10 MG Oral Tablet; TAKE 1 TABLET AT BEDTIME; Therapy: 74IFB1127 to (Elnor Alert) Recorded  4  Flonase Allergy Relief 50 MCG/ACT Nasal Suspension; instill 2 sprays into each nostril   once daily; Therapy: 13EVO2544 to (Last Rx:10Aug2016)  Requested for: 10Aug2016 Ordered  5  Klor-Con 10 10 MEQ Oral Tablet Extended Release; one tablet daily as directed; Therapy: 48NVV8714 to (Last Rx:04Apr2017)  Requested for: 04Apr2017 Ordered  6  Levothyroxine Sodium 50 MCG Oral Tablet; Take 1 tablet daily; Therapy: 09ZEH0103 to (Evaluate:51Psc2305)  Requested for: 19ZBP5144; Last   Rx:13Jan2017 Ordered  7  Losartan Potassium 25 MG Oral Tablet; TAKE 1 TABLET DAILY; Therapy: 06UNK5990 to (Last Rx:13Jan2017)  Requested for: 21MDF0251 Ordered  8  Metamucil CAPS; USE AS DIRECTED; Therapy: (Recorded:28Eme0908) to Recorded  9  Omeprazole 40 MG Oral Capsule Delayed Release; 1 DAILY; Therapy: (Recorded:01Vqg9957) to Recorded  Medication List Reviewed: The medication list was reviewed and updated today  Allergies   1  Penicillins    Physical Exam    Constitutional1    General appearance: No acute distress, well appearing and well nourished  1    Eyes1    Conjunctiva and lids: No swelling, erythema or discharge  1    Pupils and irises: Equal, round and reactive to light  1    Ears, Nose, Mouth, and Throat1    External inspection of ears and nose: Normal 1    Otoscopic examination: Tympanic membranes translucent with normal light reflex  Canals patent without erythema  1    Pulmonary1    Auscultation of lungs: Clear to auscultation  1    Cardiovascular1    Auscultation of heart: Normal rate and rhythm, normal S1 and S2, without murmurs  1    Abdomen1    Abdomen: Non-tender, no masses  1    Lymphatic1    Palpation of lymph nodes in neck: No lymphadenopathy  1    Musculoskeletal1    Gait and station: Normal 1    Digits and nails: Normal without clubbing or cyanosis  1    Skin1    Examination of the skin for lesions: Abnormal  1  Bruised cath site1           1 Amended By: Loreto An ; 2017 10:57 AM EST    Results/Data  NM MYOCARDIAL PERFUSION SPECT (RX STRESS AND/OR REST)1 [id number] 08:47AM1 Juanjo Ledesma     Test Name Result Flag Reference   NM MYOCARDIAL PERFUSION SPECT (RX STRESS AND/OR REST)1 Ousmane Ortiz [id number] [facility] 6 [phone]     Rest/Stress Gated SPECT Myocardial Perfusion Imaging After Exercise     Patient: Nichole Harrell   MR number: [id number]   Account number: [de-identified]   : 1950   Age: 78 years   Gender: Female   Status: Outpatient   Location: Stress lab   Height: 65 in   Weight: 210 lb   BP: 132/ 84 mmHg     Allergies: ALLERGIES NOT ON FILE     Diagnosis: R07 89 - Other chest pain     Primary Physician: Rosy Runner, MD   RN: NURYS Garcia   Referring Physician: Rosio Mendez MD   Group: Geetha Rajan   Report Prepared By[de-identified] NURYS Garcia   Interpreting Physician: Rosio Mendez MD     INDICATIONS: Evaluation of known coronary artery disease  HISTORY: The patient is a 78year old  female  Chest pain status: no chest pain  Other symptoms: dyspnea  Coronary artery disease risk factors: dyslipidemia, hypertension, and family history of premature coronary artery disease  Cardiovascular history: coronary artery disease  Prior cardiovascular procedures: coronary angiogram (on )  Medications: aspirin, a lipid lowering agent, an antihypertensive agent, and thyroid medications  PHYSICAL EXAM: Baseline physical exam screening: normal      REST ECG: Normal sinus rhythm  The ECG showed rare premature ventricular contractions  PROCEDURE: The study was performed in the stress lab  Treadmill exercise testing was performed, using the Miguel protocol   Gated SPECT myocardial perfusion imaging was performed after stress and at rest  Systolic blood pressure was 132   mmHg, at the start of the study  Diastolic blood pressure was 84 mmHg, at the start of the study  The heart rate was 77 bpm, at the start of the study  IV double checked  MAGAN PROTOCOL:   HR bpm SBP mmHg DBP mmHg Symptoms Rhythm/conduct   Baseline 77 132 84 none NSR, occasional PVC's   Stage 1 129 145 84 none --   Stage 2 142 -- -- chest discomfort, moderate dyspnea same as above, occasional PVC's   Immediate 129 150 90 subsiding --   Recovery 1 113 135 80 none --   No medications or fluids given  STRESS SUMMARY: Duration of exercise was 5 min  The patient exercised to protocol stage 2  Maximal work rate was 7 METs  Maximal heart rate during stress was 146 bpm ( 95 % of maximal predicted heart rate)  The heart rate response to   stress was exaggerated  There was resting hypertension with an appropriate blood pressure response to stress  The rate-pressure product for the peak heart rate and blood pressure was 14440  There was no chest pain during stress  The   patient experienced chest pain during stress; pain resolved spontaneously  The stress test was terminated due to achievement of target heart rate, chest discomfort, and moderate dyspnea  The stress ECG was negative for ischemia and normal    There were no stress arrhythmias or conduction abnormalities  ISOTOPE ADMINISTRATION:   Resting isotope administration Stress isotope administration   Agent Tetrofosmin Tetrofosmin   Dose 10 9 mCi 32 6 mCi   Date 05/30/2017 05/30/2017     Radiopharmaceutical was administered 3 min, 52 sec into the stress protocol  There was 1 min of exercise after the injection  MYOCARDIAL PERFUSION IMAGING:   The image quality was fair  Rotating projection images reveal mild breast attenuation  Left ventricular size was normal  The TID ratio was 1 01  PERFUSION DEFECTS:   - There were no perfusion defects  GATED SPECT:   The calculated left ventricular ejection fraction was 70 %  Left ventricular ejection fraction was within normal limits by visual estimate  There was no left ventricular regional abnormality  SUMMARY:   - Stress results: Duration of exercise was 5 min  Target heart rate was achieved  There was resting hypertension with an appropriate blood pressure response to stress  There was no chest pain during stress  The patient experienced chest   pain during stress; pain resolved spontaneously  - ECG conclusions: The stress ECG was negative for ischemia and normal    - Perfusion imaging: There were no perfusion defects    - Gated SPECT: The calculated left ventricular ejection fraction was 70 %  Left ventricular ejection fraction was within normal limits by visual estimate  There was no left ventricular regional abnormality  IMPRESSIONS: Normal study after maximal exercise  There were no significant perfusion abnormalities  Left ventricular systolic function was normal      Prepared and signed by     Ankush Tompkins MD   Signed 05/31/2017 10:11:42          1  Amended By: Kailash Brian ; Jul 20 2017 10:24 AM EST Message   Recorded as Task   Date: 07/18/2017 03:38 PM, Created By: Alix Goldsmith   Task Name: Follow Up   Assigned To: Madhu Sanchez   Regarding Patient: Anjelica Casper, Status: Active   Comment:    Noemi Rashid - 18 Jul 2017 3:38 PM     TASK CREATED  Caller: Self; Other; [phone] (Home)  F/U scheduled for Thursday with Dr Dena Young because Dr Jose Roberto Chavez and Dr Zari Best have nothing  She wanted to be seen sooner than later  She was in [facility], blood transfusion, gastro bleed  [place]?    [phone]   [facility] 87 - 19 Jul 2017 9:17 AM     TASK EDITED  should this be a TCM?      Future Appointments    Date/Time Provider Specialty Site   07/20/2017 10:00 AM [place] [id number], DO Family Medicine [facility]     Signatures   Electronically signed by : Amy Parks, ; Jul 19 2017 11:51AM EST (Co-author)    Electronically signed by : Adolfo Gandhi DO; Jul 19 2017 12:07PM EST                       (Author)    Electronically signed by : Adolfo Gandhi DO; Jul 19 2017 12:50PM EST                       (Author)    Electronically signed by : Adolfo Gandhi DO; Jul 20 2017 10:59AM EST                       (Author)

## 2018-01-29 ENCOUNTER — TELEPHONE (OUTPATIENT)
Dept: GASTROENTEROLOGY | Facility: CLINIC | Age: 68
End: 2018-01-29

## 2018-01-29 DIAGNOSIS — K21.9 GASTROESOPHAGEAL REFLUX DISEASE WITHOUT ESOPHAGITIS: Primary | ICD-10-CM

## 2018-01-29 NOTE — TELEPHONE ENCOUNTER
[facility]  RX Cloyde Males [id number]  RX GROUP# [id number]  [facility]# [id number]  #[phone]  [address] [place] [address] Road [phone]

## 2018-01-30 ENCOUNTER — HOSPITAL ENCOUNTER (OUTPATIENT)
Dept: GASTROENTEROLOGY | Facility: AMBULARY SURGERY CENTER | Age: 68
Discharge: HOME | End: 2018-01-30
Payer: MEDICARE

## 2018-01-30 VITALS
RESPIRATION RATE: 18 BRPM | WEIGHT: 198 LBS | SYSTOLIC BLOOD PRESSURE: 136 MMHG | HEART RATE: 56 BPM | OXYGEN SATURATION: 100 % | DIASTOLIC BLOOD PRESSURE: 92 MMHG | HEIGHT: 65 IN | TEMPERATURE: 98 F | BODY MASS INDEX: 32.99 KG/M2

## 2018-01-30 RX ORDER — LIDOCAINE HYDROCHLORIDE 40 MG/ML
SOLUTION TOPICAL ONCE
Status: DISCONTINUED | OUTPATIENT
Start: 2018-01-30 | End: 2018-01-30

## 2018-01-30 RX ADMIN — LIDOCAINE HYDROCHLORIDE 15 ML: 20 SOLUTION ORAL; TOPICAL at 13:24

## 2018-01-30 NOTE — PERIOPERATIVE NURSING NOTE
Esophogeal manometry probe passed by Anna Alegre without difficulty via left nares  Patient tolerated procedure well  LES at 37cm  Patient refused 24 hour pH study at this time  Stated the probe was too uncomfortable in her throat area  Was concerned about if she had a problem with the probe at home her  would not be able to assist her  He would "pass out " Support given to patient  Encouraged to let the doctor know if she changes her mind and would want to move forward with the 24 hour pH study   She agreed

## 2018-02-02 ENCOUNTER — TELEPHONE (OUTPATIENT)
Dept: GASTROENTEROLOGY | Facility: AMBULARY SURGERY CENTER | Age: 68
End: 2018-02-02

## 2018-02-02 DIAGNOSIS — K21.9 GASTROESOPHAGEAL REFLUX DISEASE WITHOUT ESOPHAGITIS: Primary | ICD-10-CM

## 2018-02-02 RX ORDER — FAMOTIDINE 20 MG/1
20 TABLET, FILM COATED ORAL 2 TIMES DAILY
Qty: 60 TABLET | Refills: 3 | Status: SHIPPED | OUTPATIENT
Start: 2018-02-02 | End: 2018-02-05 | Stop reason: SDUPTHER

## 2018-02-02 NOTE — TELEPHONE ENCOUNTER
Patient called regarding her results  She is aware  [facility]  Thank you

## 2018-02-05 DIAGNOSIS — K21.9 GASTROESOPHAGEAL REFLUX DISEASE WITHOUT ESOPHAGITIS: ICD-10-CM

## 2018-02-05 RX ORDER — FAMOTIDINE 20 MG/1
20 TABLET, FILM COATED ORAL 2 TIMES DAILY
Qty: 180 TABLET | Refills: 3 | Status: SHIPPED | OUTPATIENT
Start: 2018-02-05 | End: 2018-02-23 | Stop reason: SDUPTHER

## 2018-02-05 RX ORDER — FAMOTIDINE 20 MG/1
20 TABLET, FILM COATED ORAL 2 TIMES DAILY
Qty: 180 TABLET | Refills: 1 | Status: SHIPPED | OUTPATIENT
Start: 2018-02-05 | End: 2018-02-23 | Stop reason: SDUPTHER

## 2018-02-05 NOTE — TELEPHONE ENCOUNTER
Pt called again wanting to speak to someone about her medication  Pt stated it was the wrong meds and sent to the wrong pharmacy  Pt did not want to talk to me and wanted someone from the office to call right away   Pt can be reached at [phone]

## 2018-02-06 NOTE — TELEPHONE ENCOUNTER
I spoke with patient; she should continue pepcid twice daily until her upcoming appointment with Dr Fish [place] at the end of the month  She agreed to that

## 2018-02-15 ENCOUNTER — TELEPHONE (OUTPATIENT)
Dept: GASTROENTEROLOGY | Facility: AMBULARY SURGERY CENTER | Age: 68
End: 2018-02-15

## 2018-02-23 ENCOUNTER — OFFICE VISIT (OUTPATIENT)
Dept: GASTROENTEROLOGY | Facility: CLINIC | Age: 68
End: 2018-02-23
Payer: MEDICARE

## 2018-02-23 VITALS
DIASTOLIC BLOOD PRESSURE: 76 MMHG | HEIGHT: 65 IN | RESPIRATION RATE: 14 BRPM | WEIGHT: 200 LBS | TEMPERATURE: 98.6 F | HEART RATE: 68 BPM | BODY MASS INDEX: 33.32 KG/M2 | SYSTOLIC BLOOD PRESSURE: 112 MMHG

## 2018-02-23 DIAGNOSIS — K44.9 HIATAL HERNIA WITH GERD WITHOUT ESOPHAGITIS: ICD-10-CM

## 2018-02-23 DIAGNOSIS — K21.9 HIATAL HERNIA WITH GERD WITHOUT ESOPHAGITIS: ICD-10-CM

## 2018-02-23 DIAGNOSIS — K21.9 GASTROESOPHAGEAL REFLUX DISEASE WITHOUT ESOPHAGITIS: ICD-10-CM

## 2018-02-23 DIAGNOSIS — K59.09 CHRONIC CONSTIPATION: Primary | ICD-10-CM

## 2018-02-23 PROCEDURE — 99213 OFFICE O/P EST LOW 20 MIN: CPT | Performed by: INTERNAL MEDICINE

## 2018-02-23 RX ORDER — ESOMEPRAZOLE MAGNESIUM 40 MG/1
40 CAPSULE, DELAYED RELEASE ORAL
Qty: 180 CAPSULE | Refills: 3 | Status: SHIPPED | OUTPATIENT
Start: 2018-02-23 | End: 2018-10-02 | Stop reason: ALTCHOICE

## 2018-02-23 RX ORDER — FAMOTIDINE 20 MG/1
20 TABLET, FILM COATED ORAL 2 TIMES DAILY
Qty: 180 TABLET | Refills: 3 | Status: SHIPPED | OUTPATIENT
Start: 2018-02-23 | End: 2018-05-01

## 2018-02-23 NOTE — PROGRESS NOTES
[facility]  Callie Blake 75 y o  female MRN: [id number]            Assessment & Plan: This is a pleasant 75year old female who presents in follow-up to discuss EGD (1/23/18) and esophageal manometry (1/31/18) results  She feels that her GERD is better controlled with her current regimen of Nexium 40 mg twice daily and Pepcid 20 mg at bedtime  Her bowel urgency and regularity has improved greatly since starting Linzess  1  GERD  -Reflux is well controlled with Nexium 40 mg twice daily and Pepcid 20 mg twice daily  -Changed Pepcid to 20 mg once daily at bedtime  -She may consider eliminating evening dose of Nexium if her reflux remains well controlled      2  Hiatal hernia   -Briefly discussed options for laparoscopic hiatal hernia repair with Nissen Fundoplication given symptoms of intermittent pain, longstanding reflux, Jim's ulcers  -Referred to Dr Lovely Paz for further evaluation   -manometry demonstrates normal motility, somewhat hypertensive LES      3  Chronic constipation:  -much improved with Linzess  -continue current regimen, prescription was sent to patient's pharmacy    _____________________________________________________________      CC: EGD and Esophageal Manometry Follow-up and Hiatal Hernia    HPI:  Callie Blake is a 75 y  o female who is here for follow-up after her EGD on 1/23/18 and esophageal manometry and to discuss need for hiatal hernia repair  EGD biopsy showed benign mucosa with mild chronic inflammation and epithelial changes consistent with reactive/chemical gastritis, no evidence of ulceration, dysplasia, or malignancy  Esophageal manometry on 1/31/18 showed elevated pressure in her lower esophagus consistent with reflux and hiatal hernia  She feels significant improvement since starting Linzess  She feels that she is able to have complete bowel movements   If she takes the medication in the morning she will have a bowel movement the next morning  Her urgency has improved significantly  She is now able to avoid incontinence  She currently takes Nexium 40 mg twice daily and Pepcid 20 mg at bedtime  Before, she had a "golf ball" in her throat when she wakes up in the morning  Otherwise, she feels her GERD control has improved greatly  Her abdominal pain and difficulty swallowing have also improved  She continues to have a limited diet  She only has infrequent RUQ pain  Return for follow-up in 3 months  ROS:  The remainder of the ROS was negative except for the pertinent positives mentioned in HPI        Allergies: Pantoprazole and Penicillins    Medications:   Current Outpatient Prescriptions:     atorvastatin (LIPITOR) 20 mg tablet, Take 20 mg by mouth daily, Disp: , Rfl:     bumetanide (BUMEX) 1 mg tablet, Take 1 mg by mouth daily, Disp: , Rfl:     Calcium Carb-Cholecalciferol 600-1000 MG-UNIT CAPS, Take by mouth, Disp: , Rfl:     esomeprazole (NexIUM) 40 MG capsule, Take 40 mg by mouth 2 (two) times a day before meals, Disp: , Rfl:     famotidine (PEPCID) 20 mg tablet, Take 1 tablet (20 mg total) by mouth 2 (two) times a day, Disp: 180 tablet, Rfl: 1    fluticasone (FLONASE ALLERGY RELIEF) 50 mcg/act nasal spray, 1 spray into each nostril daily, Disp: , Rfl:     levothyroxine 50 mcg tablet, Take 50 mcg by mouth daily, Disp: , Rfl:     Linaclotide (LINZESS) 72 MCG CAPS, Take by mouth every evening, Disp: , Rfl:     losartan (COZAAR) 25 mg tablet, Take 25 mg by mouth daily, Disp: , Rfl:     potassium chloride (KLOR-CON 10) 10 mEq tablet, Take 10 mEq by mouth 2 (two) times a day, Disp: , Rfl:     tiotropium (SPIRIVA) 18 mcg inhalation capsule, Place 18 mcg into inhaler and inhale as needed, Disp: , Rfl:     Past Medical History:   Diagnosis Date    Anemia     Hx secondary to bleeding ulcer    Asthma     seasonal    Disease of thyroid gland     GERD (gastroesophageal reflux disease)     Goiter, nodular     History of transfusion     x1 after bleeding ulcer    Hyperlipidemia     Hypertension     Irritable bowel syndrome     Kidney stone     RA (rheumatoid arthritis) ([address] )     Seasonal allergies        Past Surgical History:   Procedure Laterality Date    CARDIAC CATHETERIZATION      x2 secondary to exertional chest pain, due to lung issues, possible mild COPD    CHOLECYSTECTOMY  2015    lap    ESOPHAGOGASTRODUODENOSCOPY N/A 1/23/2018    Procedure: ESOPHAGOGASTRODUODENOSCOPY (EGD); Surgeon: Gema Cohen MD;  Location: [facility]; Service: Gastroenterology    HYSTERECTOMY      partial    SKIN BIOPSY Right     lump benign    THYROIDECTOMY, PARTIAL      TONSILLECTOMY         Family History   Problem Relation Age of Onset    Alzheimer's disease Mother     Cancer Mother      skin     Thyroid disease Mother     Cancer Father      prostate    Stroke Father     Hypertension Father     Thyroid disease Sister     Hyperlipidemia Brother     No Known Problems Son     No Known Problems Son         reports that she has never smoked  She has never used smokeless tobacco  She reports that she does not drink alcohol or use drugs  Physical Exam:    /76   Pulse 68   Temp 98 6 °F (37 °C) (Tympanic)   Resp 14   Ht 5' 5" (1 651 m)   Wt 90 7 kg (200 lb)   BMI 33 28 kg/m²     Gen: wn/wd, NAD  HEENT: anicteric, MMM, no cervical LAD  CVS: RRR, no m/r/g  CHEST: CTA b/l  ABD: +BS, soft, NT,ND, no hepatosplenomegaly  EXT: no c/c/e  NEURO: aaox3  SKIN: NO rashes    By signing my name below, Stephie MEZA, attest that this documentation has been prepared under the direction and in the presence of Gema Cohen MD  Electonically Signed: Teresa Alaniz 2/23/2018      Gema MEZA, personally performed the services described in this documenation  All medical record entries made by the scribe were at my direction and in my presence   I have reviewed the chart and discharge instructions (if applicable) and agree that the record reflects my personal performance and is accurate and complete  Ralph Al MD  2/23/2018

## 2018-02-23 NOTE — LETTER
February 23, 2018     Arturo Dugan, DO  [address] [id number]    Patient: Tre Miramontes   YOB: 1950   Date of Visit: 2/23/2018       Dear Dr Bijal Baldwin:    Thank you for referring Betty Zaragoza to me for evaluation  Below are my notes for this consultation  If you have questions, please do not hesitate to call me  I look forward to following your patient along with you           Sincerely,        Wicho Kim MD        CC: No Recipients

## 2018-02-23 NOTE — PATIENT INSTRUCTIONS
Take Nexium twice daily,  Take pepcid before bedtime  If doing very well, consider stopping evening dose of nexium  Continue linzess  Refer for correction of hiatal hernia  Check at local store famotidine 20mg (pepcid) or ranitidine 150mg (zantac extra strength)

## 2018-02-27 ENCOUNTER — TELEPHONE (OUTPATIENT)
Dept: GASTROENTEROLOGY | Facility: CLINIC | Age: 68
End: 2018-02-27

## 2018-02-27 NOTE — TELEPHONE ENCOUNTER
Dr Cassidy Hernandez pt    Dr Ap Brice office needs results for Manometry, they can see it in the chart but they stated there are no interpretations on it  Please send to [phone]   Pt is scheduled with Dr Ap Brice on 3/26, results are needed before then

## 2018-03-22 ENCOUNTER — TELEPHONE (OUTPATIENT)
Dept: GASTROENTEROLOGY | Facility: AMBULARY SURGERY CENTER | Age: 68
End: 2018-03-22

## 2018-03-26 ENCOUNTER — OFFICE VISIT (OUTPATIENT)
Dept: CARDIAC SURGERY | Facility: CLINIC | Age: 68
End: 2018-03-26
Payer: MEDICARE

## 2018-03-26 VITALS
HEART RATE: 58 BPM | WEIGHT: 205.8 LBS | SYSTOLIC BLOOD PRESSURE: 173 MMHG | TEMPERATURE: 97.5 F | OXYGEN SATURATION: 99 % | BODY MASS INDEX: 34.29 KG/M2 | HEIGHT: 65 IN | DIASTOLIC BLOOD PRESSURE: 94 MMHG

## 2018-03-26 DIAGNOSIS — K21.9 HIATAL HERNIA WITH GERD WITHOUT ESOPHAGITIS: Primary | ICD-10-CM

## 2018-03-26 DIAGNOSIS — K44.9 HIATAL HERNIA WITH GERD WITHOUT ESOPHAGITIS: Primary | ICD-10-CM

## 2018-03-26 DIAGNOSIS — I10 ESSENTIAL HYPERTENSION: Primary | ICD-10-CM

## 2018-03-26 PROCEDURE — 99204 OFFICE O/P NEW MOD 45 MIN: CPT | Performed by: THORACIC SURGERY (CARDIOTHORACIC VASCULAR SURGERY)

## 2018-03-26 RX ORDER — POTASSIUM CHLORIDE 750 MG/1
TABLET, FILM COATED, EXTENDED RELEASE ORAL
Qty: 90 TABLET | Refills: 1 | Status: SHIPPED | OUTPATIENT
Start: 2018-03-26 | End: 2018-09-27 | Stop reason: SDUPTHER

## 2018-03-26 RX ORDER — SODIUM CHLORIDE 9 MG/ML
50 INJECTION, SOLUTION INTRAVENOUS CONTINUOUS
Status: CANCELLED | OUTPATIENT
Start: 2018-03-26

## 2018-03-26 NOTE — ASSESSMENT & PLAN NOTE
Ms Carlos Gibbons has a large symptomatic hiatal hernia  We discussed options that included further management with proton pump inhibitors as well as a surgical approach  I described for her a laparoscopic paraesophageal hernia repair with, fundoplication, and possible esophageal lengthening procedure  I emphasized that surgical repair would be done to improve her quality of life as I think it would be low risk that this hernia would become strangulated  I, personally, described the risks and benefits associated with the procedure and she would like to proceed  She will undergo routine preoperative testing and pick a date with our surgical scheduler

## 2018-03-26 NOTE — PROGRESS NOTES
Thoracic Consult  Assessment/Plan:    Hiatal hernia with GERD without esophagitis  Ms Elliott Norris has a large symptomatic hiatal hernia  We discussed options that included further management with proton pump inhibitors as well as a surgical approach  I described for her a laparoscopic paraesophageal hernia repair with, fundoplication, and possible esophageal lengthening procedure  I emphasized that surgical repair would be done to improve her quality of life as I think it would be low risk that this hernia would become strangulated  I, personally, described the risks and benefits associated with the procedure and she would like to proceed  She will undergo routine preoperative testing and pick a date with our surgical scheduler  Diagnoses and all orders for this visit:    Hiatal hernia with GERD without esophagitis  -     Ambulatory referral to Thoracic Surgery  -     Type and screen; Future  -     Basic metabolic panel; Future  -     APTT; Future  -     CBC and Platelet; Future  -     Protime-INR; Future  -     EKG 12 lead; Future  -     Case request operating room: REPAIR HERNIA PARAESOPHAGEAL  LAPAROSCOPIC, ESOPHAGOGASTRODUODENOSCOPY (EGD); Standing  -     Case request operating room: REPAIR HERNIA PARAESOPHAGEAL  LAPAROSCOPIC, ESOPHAGOGASTRODUODENOSCOPY (EGD)    Other orders  -     Diet NPO; Sips with meds; Standing  -     Height and weight upon arrival; Standing  -     Void on call to OR; Standing  -     Insert peripheral IV; Standing  -     Place sequential compression device; Standing  -     Shower/scrub; Standing  -     sodium chloride 0 9 % infusion;  Infuse 50 mL/hr into a venous catheter continuous   -     ceFAZolin (ANCEF) IVPB (premix) 2,000 mg; Infuse 2,000 mg into a venous catheter once   -     metroNIDAZOLE (FLAGYL) IVPB (premix) 500 mg; Infuse 100 mL (500 mg total) into a venous catheter once           Thoracic History   Diagnosis: Hiatal hernia   Procedures/Surgeries:    Pathology:    Adjuvant Therapy:       Subjective:    Patient ID: Alona Hay is a 75 y o  female  Ms Morena Herman is a 54-year-old referred by Dr Maty Pal for evaluation for a hiatal hernia repair  She has had a long-standing hiatal hernia and gastroesophageal reflux disease  In July of 2017 she was anticoagulated with aspirin and experienced an upper GI bleed associated with Jim's ulcers  She has had no further bleeding since stopping the aspirin  She has some dysphagia in the cervical region when eating bread but does not feel like food gets stuck further down in her chest   She does have frequent regurgitant symptoms with initially brackish ascitic fluid but now that she is on proton pump inhibitors it is less ascitic fluid  She has undergone a barium swallow in October of 2017 confirming a large hiatal hernia with a large portion of her stomach in her chest   She appeared to have normal esophageal motility and no reflux was observed on the swallow  She also underwent esophageal manometry in January of 2018 and this demonstrated normal esophageal contractility and bolus transit  Her LES relaxed normally and her DCI was 1006  She has had no major upper abdominal surgery other than a laparoscopic cholecystectomy  She denies weight loss, hematemesis, cough or admissions for pneumonia  She does have irritable bowel syndrome that is constipation predominant  This is controlled with medication          The following portions of the patient's history were reviewed and updated as appropriate: allergies, current medications, past family history, past medical history, past social history, past surgical history and problem list     Past Medical History:   Diagnosis Date    Anemia     Hx secondary to bleeding ulcer    Asthma     seasonal    Disease of thyroid gland     GERD (gastroesophageal reflux disease)     Goiter, nodular     History of transfusion     x1 after bleeding ulcer    Hyperlipidemia     Hypertension     Irritable bowel syndrome     Kidney stone     RA (rhe[address]oid arthritis) [address] [address]     Seasonal allergies       Past Surgical History:   Procedure Laterality Date    CARDIAC CATHETERIZATION      x2 secondary to exertional chest pain, due to lung issues, possible mild COPD    CHOLECYSTECTOMY  2015    lap    ESOPHAGOGASTRODUODENOSCOPY N/A 1/23/2018    Procedure: ESOPHAGOGASTRODUODENOSCOPY (EGD); Surgeon: Dawn Huggins MD;  Location: [facility]; Service: Gastroenterology    HYSTERECTOMY      partial    SKIN BIOPSY Right     lump benign    THYROIDECTOMY, PARTIAL      TONSILLECTOMY        Family History   Problem Relation Age of Onset    Alzheimer's disease Mother     Cancer Mother      skin     Thyroid disease Mother     Cancer Father      prostate    Stroke Father     Hypertension Father     Thyroid disease Sister     Hyperlipidemia Brother     No Known Problems Son     No Known Problems Son       Social History     Social History    Marital status: /Civil Union     Spouse name: N/A    Number of children: N/A    Years of education: N/A     Occupational History    Not on file  Social History Main Topics    Smoking status: Never Smoker    Smokeless tobacco: Never Used    Alcohol use No    Drug use: No    Sexual activity: Not on file     Other Topics Concern    Not on file     Social History Narrative    No narrative on file      Review of Systems   Constitutional: Negative for activity change, appetite change, chills, fever and unexpected weight change  HENT: Negative for voice change  Respiratory: Negative for cough, shortness of breath and wheezing  Cardiovascular: Positive for chest pain (Associated with eating) and leg swelling (Stable)  Negative for palpitations  Gastrointestinal: Positive for abdominal distention and constipation (Better recently)  Negative for abdominal pain, diarrhea, nausea and vomiting  Musculoskeletal: Negative for arthralgias and myalgias  Skin: Negative for rash  Neurological: Negative for dizziness, seizures, weakness, numbness and headaches  Hematological: Negative for adenopathy  Psychiatric/Behavioral: Negative for confusion  Objective:   Physical Exam   Constitutional: She is oriented to person, place, and time  She appears well-developed and well-nourished  HENT:   Head: Normocephalic and atraumatic  Mouth/Throat: No oropharyngeal exudate  Eyes: Conjunctivae and EOM are normal  Pupils are equal, round, and reactive to light  No scleral icterus  Neck: Normal range of motion  Neck supple  No tracheal deviation present  No thyromegaly present  Cardiovascular: Normal rate, regular rhythm and normal heart sounds  Pulmonary/Chest: Effort normal and breath sounds normal  No respiratory distress  She has no wheezes  Abdominal: Soft  She exhibits no distension  There is no tenderness  Musculoskeletal: Normal range of motion  Lymphadenopathy:     She has no cervical adenopathy  Neurological: She is alert and oriented to person, place, and time  Skin: Skin is warm and dry  Psychiatric: She has a normal mood and affect  Her behavior is normal  Judgment and thought content normal    Vitals reviewed  BP (!) 173/94 (BP Location: Right arm, Patient Position: Sitting, Cuff Size: Standard)   Pulse 58   Temp 97 5 °F (36 4 °C) (Tympanic)   Ht 5' 5" (1 651 m)   Wt 93 4 kg (205 lb 12 8 oz)   SpO2 99%   BMI 34 25 kg/m²     Upper GI on October 22, 2017 is personally reviewed  FINDINGS:    There  is  a  large  hiatal  hernia  present  with  a  good  portion  of  the  stomach  lying  within  the  chest     Esophageal  motility  is  normal  and  emptying  of  contrast  from  the  esophagus  is  prompt   There  is  no  mucosal  mass,  ulceration  or  fold  thickening  identified       Surgical  clips  on  the  right  side  of  the  neck  related  to  right  thyroidectomy      The  stomach  is  unremarkable  in  size     The  gastric  mucosa  is  normal     No  penetrating  ulcers  or  masses  Contrast  empties  promptly  into  the  duodenum     The  duodenum  is  normal  in  caliber     The  ligament  of  Treitz/duodenojejunal  junction  lies  in  a  normal  position  Gastroesophageal  reflux  was  not  observed         Large  hiatal  hernia  as  described  above             IMPRESSION:    Large  hiatal  hernia  with  a  good  portion  of  the  upper  stomach  lying  within  the  lower  chest     Otherwise  unremarkable

## 2018-03-29 ENCOUNTER — TRANSCRIBE ORDERS (OUTPATIENT)
Dept: ADMINISTRATIVE | Facility: HOSPITAL | Age: 68
End: 2018-03-29

## 2018-03-31 ENCOUNTER — OFFICE VISIT (OUTPATIENT)
Dept: LAB | Facility: HOSPITAL | Age: 68
End: 2018-03-31
Payer: MEDICARE

## 2018-03-31 ENCOUNTER — APPOINTMENT (OUTPATIENT)
Dept: LAB | Facility: HOSPITAL | Age: 68
End: 2018-03-31
Payer: MEDICARE

## 2018-03-31 ENCOUNTER — TRANSCRIBE ORDERS (OUTPATIENT)
Dept: ADMINISTRATIVE | Facility: HOSPITAL | Age: 68
End: 2018-03-31

## 2018-03-31 DIAGNOSIS — K44.9 HIATAL HERNIA WITH GERD WITHOUT ESOPHAGITIS: ICD-10-CM

## 2018-03-31 DIAGNOSIS — Z01.818 PRE-OP EXAM: ICD-10-CM

## 2018-03-31 DIAGNOSIS — Z01.818 PRE-OP EXAM: Primary | ICD-10-CM

## 2018-03-31 DIAGNOSIS — K21.9 HIATAL HERNIA WITH GERD WITHOUT ESOPHAGITIS: ICD-10-CM

## 2018-03-31 LAB
ANION GAP SERPL CALCULATED.3IONS-SCNC: 10 MMOL/L (ref 4–13)
APTT PPP: 28 SECONDS (ref 24–33)
BUN SERPL-MCNC: 7 MG/DL (ref 5–25)
CALCIUM SERPL-MCNC: 9 MG/DL (ref 8.3–10.1)
CHLORIDE SERPL-SCNC: 105 MMOL/L (ref 100–108)
CO2 SERPL-SCNC: 28 MMOL/L (ref 21–32)
CREAT SERPL-MCNC: 0.82 MG/DL (ref 0.6–1.3)
ERYTHROCYTE [DISTWIDTH] IN BLOOD BY AUTOMATED COUNT: 14.4 % (ref 11.6–15.1)
GFR SERPL CREATININE-BSD FRML MDRD: 74 ML/MIN/1.73SQ M
GLUCOSE P FAST SERPL-MCNC: 80 MG/DL (ref 65–99)
HCT VFR BLD AUTO: 40.5 % (ref 37–47)
HGB BLD-MCNC: 13.1 G/DL (ref 12–16)
INR PPP: 1.02 (ref 0.86–1.16)
MCH RBC QN AUTO: 27 PG (ref 27–31)
MCHC RBC AUTO-ENTMCNC: 32.3 G/DL (ref 31.4–37.4)
MCV RBC AUTO: 84 FL (ref 82–98)
PLATELET # BLD AUTO: 210 THOUSANDS/UL (ref 130–400)
PMV BLD AUTO: 8.6 FL (ref 8.9–12.7)
POTASSIUM SERPL-SCNC: 3.5 MMOL/L (ref 3.5–5.3)
PROTHROMBIN TIME: 10.7 SECONDS (ref 9.4–11.7)
RBC # BLD AUTO: 4.85 MILLION/UL (ref 4.2–5.4)
SODIUM SERPL-SCNC: 143 MMOL/L (ref 136–145)
WBC # BLD AUTO: 5.2 THOUSAND/UL (ref 4.8–10.8)

## 2018-03-31 PROCEDURE — 85027 COMPLETE CBC AUTOMATED: CPT

## 2018-03-31 PROCEDURE — 85610 PROTHROMBIN TIME: CPT

## 2018-03-31 PROCEDURE — 80048 BASIC METABOLIC PNL TOTAL CA: CPT

## 2018-03-31 PROCEDURE — 36415 COLL VENOUS BLD VENIPUNCTURE: CPT

## 2018-03-31 PROCEDURE — 93005 ELECTROCARDIOGRAM TRACING: CPT

## 2018-03-31 PROCEDURE — 85730 THROMBOPLASTIN TIME PARTIAL: CPT

## 2018-04-01 ENCOUNTER — LAB REQUISITION (OUTPATIENT)
Dept: LAB | Facility: HOSPITAL | Age: 68
End: 2018-04-01
Payer: MEDICARE

## 2018-04-01 DIAGNOSIS — Z01.818 ENCOUNTER FOR OTHER PREPROCEDURAL EXAMINATION: ICD-10-CM

## 2018-04-01 LAB
ABO GROUP BLD: NORMAL
BLD GP AB SCN SERPL QL: NEGATIVE
RH BLD: NEGATIVE
SPECIMEN EXPIRATION DATE: NORMAL

## 2018-04-01 PROCEDURE — 86850 RBC ANTIBODY SCREEN: CPT | Performed by: THORACIC SURGERY (CARDIOTHORACIC VASCULAR SURGERY)

## 2018-04-01 PROCEDURE — 86900 BLOOD TYPING SEROLOGIC ABO: CPT | Performed by: THORACIC SURGERY (CARDIOTHORACIC VASCULAR SURGERY)

## 2018-04-01 PROCEDURE — 86901 BLOOD TYPING SEROLOGIC RH(D): CPT | Performed by: THORACIC SURGERY (CARDIOTHORACIC VASCULAR SURGERY)

## 2018-04-02 LAB
ATRIAL RATE: 62 BPM
P AXIS: 66 DEGREES
PR INTERVAL: 176 MS
QRS AXIS: -1 DEGREES
QRSD INTERVAL: 92 MS
QT INTERVAL: 422 MS
QTC INTERVAL: 428 MS
T WAVE AXIS: 30 DEGREES
VENTRICULAR RATE: 62 BPM

## 2018-04-02 PROCEDURE — 93010 ELECTROCARDIOGRAM REPORT: CPT | Performed by: INTERNAL MEDICINE

## 2018-04-03 ENCOUNTER — ANESTHESIA EVENT (OUTPATIENT)
Dept: PERIOP | Facility: HOSPITAL | Age: 68
DRG: 328 | End: 2018-04-03
Payer: MEDICARE

## 2018-04-03 RX ORDER — BUMETANIDE 0.5 MG/1
0.5 TABLET ORAL DAILY
COMMUNITY
End: 2019-01-09 | Stop reason: SDUPTHER

## 2018-04-03 NOTE — PRE-PROCEDURE INSTRUCTIONS
Pre-Surgery Instructions:   Medication Instructions    atorvastatin (LIPITOR) 20 mg tablet Instructed patient per Anesthesia Guidelines   bumetanide (BUMEX) 0 5 MG tablet Instructed patient per Anesthesia Guidelines   Calcium Carb-Cholecalciferol 600-1000 MG-UNIT CAPS Instructed patient per Anesthesia Guidelines   esomeprazole (NexIUM) 40 MG capsule Instructed patient per Anesthesia Guidelines   famotidine (PEPCID) 20 mg tablet Instructed patient per Anesthesia Guidelines   levothyroxine 50 mcg tablet Instructed patient per Anesthesia Guidelines   Linaclotide (LINZESS) 67 MCG CAPS Instructed patient per Anesthesia Guidelines   losartan (COZAAR) 25 mg tablet Instructed patient per Anesthesia Guidelines   potassium chloride (K-DUR) 10 mEq tablet Instructed patient per Anesthesia Guidelines   tiotropium (SPIRIVA) 18 mcg inhalation capsule Instructed patient per Anesthesia Guidelines

## 2018-04-10 NOTE — ANESTHESIA PREPROCEDURE EVALUATION
Review of Systems/Medical History  Patient summary reviewed  Chart reviewed  No history of anesthetic complications     Cardiovascular  EKG reviewed, Exercise tolerance: good,  Hyperlipidemia, Hypertension ,   Comment: Narrative       Normal sinus rhythm  Nonspecific ST and T wave abnormality  Abnormal ECG  No previous ECGs available  Confirmed by Laura Ramos ([id number]) on 4/2/2018 10:34:23 AM          GATED SPECT:  The calculated left ventricular ejection fraction was 70 %  Left ventricular ejection fraction was within normal limits by visual estimate  There was no left ventricular regional abnormality      SUMMARY:  -  Stress results: Duration of exercise was 5 min  Target heart rate was achieved  There was resting hypertension with an appropriate blood pressure response to stress  There was no chest pain during stress  The patient experienced chest  pain during stress; pain resolved spontaneously  -  ECG conclusions: The stress ECG was negative for ischemia and normal   -  Perfusion imaging: There were no perfusion defects   -  Gated SPECT: The calculated left ventricular ejection fraction was 70 %  Left ventricular ejection fraction was within normal limits by visual estimate  There was no left ventricular regional abnormality      IMPRESSIONS: Normal study after maximal exercise  There were no significant perfusion abnormalities   Left ventricular systolic function was normal      Prepared and signed by     Lorna Arboleda MD  Signed 05/31/2017 10:11:42    Specimen Collected: 03/31/18 11:07  Last Resulted: 04/02/18 10:34      ,  Pulmonary  Asthma: ,        GI/Hepatic    PUD, GERD ,  Hiatal hernia,   Comment: IBS     Kidney stones,        Endo/Other  History of thyroid disease , hypothyroidism,      GYN       Hematology  Anemia ,     Musculoskeletal  Rheumatoid arthritis ,   Arthritis     Neurology      Comment: History of vertigo Psychology           Physical Exam    Airway    Mallampati score: II  TM Distance: >3 FB  Neck ROM: full     Dental   No notable dental hx     Cardiovascular  Cardiovascular exam normal    Pulmonary  Pulmonary exam normal Breath sounds clear to auscultation,     Other Findings        Anesthesia Plan  ASA Score- 3     Anesthesia Type- general with ASA Monitors  Additional Monitors:   Airway Plan: ETT  Plan Factors- Patient instructed to abstain from smoking on day of procedure  Patient did not smoke on day of surgery  Induction- intravenous and rapid sequence induction  Postoperative Plan- Plan for postoperative opioid use  Planned trial extubation    Informed Consent- Anesthetic plan and risks discussed with patient  I personally reviewed this patient with the CRNA  Discussed and agreed on the Anesthesia Plan with the CRNA             Lab Results   Component Value Date    WBC 5 20 03/31/2018    HGB 13 1 03/31/2018    HCT 40 5 03/31/2018    MCV 84 03/31/2018     03/31/2018     Lab Results   Component Value Date    CALCIUM 9 0 03/31/2018     03/31/2018    K 3 5 03/31/2018    CO2 28 03/31/2018     03/31/2018    BUN 7 03/31/2018    CREATININE 0 82 03/31/2018     Lab Results   Component Value Date    INR 1 02 03/31/2018    PROTIME 10 7 03/31/2018     Lab Results   Component Value Date    PTT 28 03/31/2018     Type and Screen:  O        I, Dr Kip Pedraza, the attending physician, have personally seen and evaluated the patient prior to anesthetic care  I have reviewed the pre-anesthetic record, and other medical records if appropriate to the anesthetic care  If a CRNA is involved in the case, I have reviewed the CRNA assessment, if present, and agree  The patient is in a suitable condition to proceed with my formulated anesthetic plan

## 2018-04-11 ENCOUNTER — APPOINTMENT (OUTPATIENT)
Dept: RADIOLOGY | Facility: HOSPITAL | Age: 68
DRG: 328 | End: 2018-04-11
Payer: MEDICARE

## 2018-04-11 ENCOUNTER — ANESTHESIA (OUTPATIENT)
Dept: PERIOP | Facility: HOSPITAL | Age: 68
DRG: 328 | End: 2018-04-11
Payer: MEDICARE

## 2018-04-11 ENCOUNTER — HOSPITAL ENCOUNTER (INPATIENT)
Facility: HOSPITAL | Age: 68
LOS: 3 days | Discharge: HOME HEALTH | DRG: 328 | End: 2018-04-14
Attending: THORACIC SURGERY (CARDIOTHORACIC VASCULAR SURGERY) | Admitting: THORACIC SURGERY (CARDIOTHORACIC VASCULAR SURGERY)
Payer: MEDICARE

## 2018-04-11 DIAGNOSIS — K44.9 HIATAL HERNIA WITH GERD WITHOUT ESOPHAGITIS: ICD-10-CM

## 2018-04-11 DIAGNOSIS — K21.9 HIATAL HERNIA WITH GERD WITHOUT ESOPHAGITIS: ICD-10-CM

## 2018-04-11 LAB
PLATELET # BLD AUTO: 168 THOUSANDS/UL (ref 149–390)
PMV BLD AUTO: 10 FL (ref 8.9–12.7)

## 2018-04-11 PROCEDURE — 0DV44ZZ RESTRICTION OF ESOPHAGOGASTRIC JUNCTION, PERCUTANEOUS ENDOSCOPIC APPROACH: ICD-10-PCS | Performed by: THORACIC SURGERY (CARDIOTHORACIC VASCULAR SURGERY)

## 2018-04-11 PROCEDURE — 4A133B1 MONITORING OF ARTERIAL PRESSURE, PERIPHERAL, PERCUTANEOUS APPROACH: ICD-10-PCS | Performed by: ANESTHESIOLOGY

## 2018-04-11 PROCEDURE — 43283 LAP ESOPH LENGTHENING: CPT | Performed by: THORACIC SURGERY (CARDIOTHORACIC VASCULAR SURGERY)

## 2018-04-11 PROCEDURE — 85049 AUTOMATED PLATELET COUNT: CPT | Performed by: PHYSICIAN ASSISTANT

## 2018-04-11 PROCEDURE — 0DX64Z5 TRANSFER STOMACH TO ESOPHAGUS, PERCUTANEOUS ENDOSCOPIC APPROACH: ICD-10-PCS | Performed by: THORACIC SURGERY (CARDIOTHORACIC VASCULAR SURGERY)

## 2018-04-11 PROCEDURE — 03HY32Z INSERTION OF MONITORING DEVICE INTO UPPER ARTERY, PERCUTANEOUS APPROACH: ICD-10-PCS | Performed by: ANESTHESIOLOGY

## 2018-04-11 PROCEDURE — 88302 TISSUE EXAM BY PATHOLOGIST: CPT | Performed by: PATHOLOGY

## 2018-04-11 PROCEDURE — 0DJ08ZZ INSPECTION OF UPPER INTESTINAL TRACT, VIA NATURAL OR ARTIFICIAL OPENING ENDOSCOPIC: ICD-10-PCS | Performed by: THORACIC SURGERY (CARDIOTHORACIC VASCULAR SURGERY)

## 2018-04-11 PROCEDURE — 43281 LAP PARAESOPHAG HERN REPAIR: CPT | Performed by: THORACIC SURGERY (CARDIOTHORACIC VASCULAR SURGERY)

## 2018-04-11 PROCEDURE — 0BQT4ZZ REPAIR DIAPHRAGM, PERCUTANEOUS ENDOSCOPIC APPROACH: ICD-10-PCS | Performed by: THORACIC SURGERY (CARDIOTHORACIC VASCULAR SURGERY)

## 2018-04-11 PROCEDURE — 94760 N-INVAS EAR/PLS OXIMETRY 1: CPT

## 2018-04-11 PROCEDURE — 4A133J1 MONITORING OF ARTERIAL PULSE, PERIPHERAL, PERCUTANEOUS APPROACH: ICD-10-PCS | Performed by: ANESTHESIOLOGY

## 2018-04-11 PROCEDURE — 71045 X-RAY EXAM CHEST 1 VIEW: CPT

## 2018-04-11 RX ORDER — ONDANSETRON 2 MG/ML
4 INJECTION INTRAMUSCULAR; INTRAVENOUS EVERY 6 HOURS
Status: DISCONTINUED | OUTPATIENT
Start: 2018-04-11 | End: 2018-04-14 | Stop reason: HOSPADM

## 2018-04-11 RX ORDER — ROCURONIUM BROMIDE 10 MG/ML
INJECTION, SOLUTION INTRAVENOUS AS NEEDED
Status: DISCONTINUED | OUTPATIENT
Start: 2018-04-11 | End: 2018-04-11 | Stop reason: SURG

## 2018-04-11 RX ORDER — CALCIUM CHLORIDE 100 MG/ML
INJECTION INTRAVENOUS; INTRAVENTRICULAR AS NEEDED
Status: DISCONTINUED | OUTPATIENT
Start: 2018-04-11 | End: 2018-04-11 | Stop reason: SURG

## 2018-04-11 RX ORDER — ONDANSETRON 2 MG/ML
INJECTION INTRAMUSCULAR; INTRAVENOUS AS NEEDED
Status: DISCONTINUED | OUTPATIENT
Start: 2018-04-11 | End: 2018-04-11 | Stop reason: SURG

## 2018-04-11 RX ORDER — PANTOPRAZOLE SODIUM 20 MG/1
20 TABLET, DELAYED RELEASE ORAL
Status: DISCONTINUED | OUTPATIENT
Start: 2018-04-12 | End: 2018-04-14 | Stop reason: HOSPADM

## 2018-04-11 RX ORDER — ONDANSETRON 2 MG/ML
4 INJECTION INTRAMUSCULAR; INTRAVENOUS ONCE AS NEEDED
Status: DISCONTINUED | OUTPATIENT
Start: 2018-04-11 | End: 2018-04-11 | Stop reason: HOSPADM

## 2018-04-11 RX ORDER — ALBUMIN, HUMAN INJ 5% 5 %
SOLUTION INTRAVENOUS CONTINUOUS PRN
Status: DISCONTINUED | OUTPATIENT
Start: 2018-04-11 | End: 2018-04-11 | Stop reason: SURG

## 2018-04-11 RX ORDER — MAGNESIUM SULFATE HEPTAHYDRATE 40 MG/ML
INJECTION, SOLUTION INTRAVENOUS AS NEEDED
Status: DISCONTINUED | OUTPATIENT
Start: 2018-04-11 | End: 2018-04-11 | Stop reason: SURG

## 2018-04-11 RX ORDER — PROPOFOL 10 MG/ML
INJECTION, EMULSION INTRAVENOUS AS NEEDED
Status: DISCONTINUED | OUTPATIENT
Start: 2018-04-11 | End: 2018-04-11 | Stop reason: SURG

## 2018-04-11 RX ORDER — LIDOCAINE HYDROCHLORIDE AND EPINEPHRINE 10; 10 MG/ML; UG/ML
INJECTION, SOLUTION INFILTRATION; PERINEURAL AS NEEDED
Status: DISCONTINUED | OUTPATIENT
Start: 2018-04-11 | End: 2018-04-11 | Stop reason: HOSPADM

## 2018-04-11 RX ORDER — MIDAZOLAM HYDROCHLORIDE 1 MG/ML
INJECTION INTRAMUSCULAR; INTRAVENOUS AS NEEDED
Status: DISCONTINUED | OUTPATIENT
Start: 2018-04-11 | End: 2018-04-11 | Stop reason: SURG

## 2018-04-11 RX ORDER — GLYCOPYRROLATE 0.2 MG/ML
INJECTION INTRAMUSCULAR; INTRAVENOUS AS NEEDED
Status: DISCONTINUED | OUTPATIENT
Start: 2018-04-11 | End: 2018-04-11 | Stop reason: SURG

## 2018-04-11 RX ORDER — METOCLOPRAMIDE HYDROCHLORIDE 5 MG/ML
10 INJECTION INTRAMUSCULAR; INTRAVENOUS ONCE AS NEEDED
Status: DISCONTINUED | OUTPATIENT
Start: 2018-04-11 | End: 2018-04-11 | Stop reason: HOSPADM

## 2018-04-11 RX ORDER — LOSARTAN POTASSIUM 25 MG/1
25 TABLET ORAL DAILY
Status: DISCONTINUED | OUTPATIENT
Start: 2018-04-12 | End: 2018-04-14 | Stop reason: HOSPADM

## 2018-04-11 RX ORDER — ATORVASTATIN CALCIUM 20 MG/1
20 TABLET, FILM COATED ORAL DAILY
Status: DISCONTINUED | OUTPATIENT
Start: 2018-04-12 | End: 2018-04-14 | Stop reason: HOSPADM

## 2018-04-11 RX ORDER — SODIUM CHLORIDE, SODIUM LACTATE, POTASSIUM CHLORIDE, CALCIUM CHLORIDE 600; 310; 30; 20 MG/100ML; MG/100ML; MG/100ML; MG/100ML
INJECTION, SOLUTION INTRAVENOUS CONTINUOUS PRN
Status: DISCONTINUED | OUTPATIENT
Start: 2018-04-11 | End: 2018-04-11

## 2018-04-11 RX ORDER — FENTANYL CITRATE 50 UG/ML
INJECTION, SOLUTION INTRAMUSCULAR; INTRAVENOUS AS NEEDED
Status: DISCONTINUED | OUTPATIENT
Start: 2018-04-11 | End: 2018-04-11 | Stop reason: SURG

## 2018-04-11 RX ORDER — SODIUM CHLORIDE 9 MG/ML
50 INJECTION, SOLUTION INTRAVENOUS CONTINUOUS
Status: DISCONTINUED | OUTPATIENT
Start: 2018-04-11 | End: 2018-04-11

## 2018-04-11 RX ORDER — SODIUM CHLORIDE, SODIUM LACTATE, POTASSIUM CHLORIDE, CALCIUM CHLORIDE 600; 310; 30; 20 MG/100ML; MG/100ML; MG/100ML; MG/100ML
50 INJECTION, SOLUTION INTRAVENOUS CONTINUOUS
Status: DISCONTINUED | OUTPATIENT
Start: 2018-04-11 | End: 2018-04-12

## 2018-04-11 RX ORDER — HEPARIN SODIUM 5000 [USP'U]/ML
5000 INJECTION, SOLUTION INTRAVENOUS; SUBCUTANEOUS EVERY 8 HOURS SCHEDULED
Status: DISCONTINUED | OUTPATIENT
Start: 2018-04-11 | End: 2018-04-14 | Stop reason: HOSPADM

## 2018-04-11 RX ORDER — FENTANYL CITRATE/PF 50 MCG/ML
50 SYRINGE (ML) INJECTION
Status: COMPLETED | OUTPATIENT
Start: 2018-04-11 | End: 2018-04-11

## 2018-04-11 RX ORDER — SODIUM CHLORIDE 9 MG/ML
50 INJECTION, SOLUTION INTRAVENOUS CONTINUOUS
Status: DISCONTINUED | OUTPATIENT
Start: 2018-04-11 | End: 2018-04-13

## 2018-04-11 RX ORDER — METOCLOPRAMIDE HYDROCHLORIDE 5 MG/ML
10 INJECTION INTRAMUSCULAR; INTRAVENOUS EVERY 6 HOURS PRN
Status: DISCONTINUED | OUTPATIENT
Start: 2018-04-11 | End: 2018-04-14 | Stop reason: HOSPADM

## 2018-04-11 RX ORDER — ALBUTEROL SULFATE 2.5 MG/3ML
2.5 SOLUTION RESPIRATORY (INHALATION) ONCE AS NEEDED
Status: DISCONTINUED | OUTPATIENT
Start: 2018-04-11 | End: 2018-04-11 | Stop reason: HOSPADM

## 2018-04-11 RX ORDER — LIDOCAINE HYDROCHLORIDE 10 MG/ML
INJECTION, SOLUTION INFILTRATION; PERINEURAL AS NEEDED
Status: DISCONTINUED | OUTPATIENT
Start: 2018-04-11 | End: 2018-04-11 | Stop reason: SURG

## 2018-04-11 RX ORDER — MAGNESIUM HYDROXIDE 1200 MG/15ML
LIQUID ORAL AS NEEDED
Status: DISCONTINUED | OUTPATIENT
Start: 2018-04-11 | End: 2018-04-11 | Stop reason: HOSPADM

## 2018-04-11 RX ORDER — LEVOTHYROXINE SODIUM 0.05 MG/1
50 TABLET ORAL DAILY
Status: DISCONTINUED | OUTPATIENT
Start: 2018-04-12 | End: 2018-04-14 | Stop reason: HOSPADM

## 2018-04-11 RX ORDER — PROMETHAZINE HYDROCHLORIDE 25 MG/ML
12.5 INJECTION, SOLUTION INTRAMUSCULAR; INTRAVENOUS ONCE AS NEEDED
Status: DISCONTINUED | OUTPATIENT
Start: 2018-04-11 | End: 2018-04-11 | Stop reason: HOSPADM

## 2018-04-11 RX ADMIN — SODIUM CHLORIDE, SODIUM LACTATE, POTASSIUM CHLORIDE, AND CALCIUM CHLORIDE: .6; .31; .03; .02 INJECTION, SOLUTION INTRAVENOUS at 11:10

## 2018-04-11 RX ADMIN — ONDANSETRON 4 MG: 2 INJECTION INTRAMUSCULAR; INTRAVENOUS at 22:31

## 2018-04-11 RX ADMIN — MIDAZOLAM HYDROCHLORIDE 1 MG: 1 INJECTION, SOLUTION INTRAMUSCULAR; INTRAVENOUS at 08:16

## 2018-04-11 RX ADMIN — CEFAZOLIN SODIUM 2000 MG: 2 SOLUTION INTRAVENOUS at 08:40

## 2018-04-11 RX ADMIN — HEPARIN SODIUM 5000 UNITS: 5000 INJECTION, SOLUTION INTRAVENOUS; SUBCUTANEOUS at 22:31

## 2018-04-11 RX ADMIN — ROCURONIUM BROMIDE 10 MG: 10 INJECTION INTRAVENOUS at 11:18

## 2018-04-11 RX ADMIN — DEXAMETHASONE SODIUM PHOSPHATE 10 MG: 10 INJECTION INTRAMUSCULAR; INTRAVENOUS at 09:01

## 2018-04-11 RX ADMIN — FENTANYL CITRATE 50 MCG: 50 INJECTION, SOLUTION INTRAMUSCULAR; INTRAVENOUS at 12:48

## 2018-04-11 RX ADMIN — SODIUM CHLORIDE 100 ML/HR: 0.9 INJECTION, SOLUTION INTRAVENOUS at 23:30

## 2018-04-11 RX ADMIN — PROPOFOL 200 MG: 10 INJECTION, EMULSION INTRAVENOUS at 08:24

## 2018-04-11 RX ADMIN — ONDANSETRON 4 MG: 2 INJECTION INTRAMUSCULAR; INTRAVENOUS at 11:51

## 2018-04-11 RX ADMIN — FENTANYL CITRATE 50 MCG: 50 INJECTION, SOLUTION INTRAMUSCULAR; INTRAVENOUS at 08:49

## 2018-04-11 RX ADMIN — HYDROMORPHONE HYDROCHLORIDE 0.5 MG: 1 INJECTION, SOLUTION INTRAMUSCULAR; INTRAVENOUS; SUBCUTANEOUS at 17:28

## 2018-04-11 RX ADMIN — FENTANYL CITRATE 50 MCG: 50 INJECTION, SOLUTION INTRAMUSCULAR; INTRAVENOUS at 12:30

## 2018-04-11 RX ADMIN — MAGNESIUM SULFATE IN WATER 2 G: 40 INJECTION, SOLUTION INTRAVENOUS at 10:05

## 2018-04-11 RX ADMIN — HEPARIN SODIUM 5000 UNITS: 5000 INJECTION, SOLUTION INTRAVENOUS; SUBCUTANEOUS at 17:28

## 2018-04-11 RX ADMIN — CALCIUM CHLORIDE 0.5 G: 100 INJECTION INTRAVENOUS; INTRAVENTRICULAR at 10:16

## 2018-04-11 RX ADMIN — ALBUMIN HUMAN: 0.05 INJECTION, SOLUTION INTRAVENOUS at 10:16

## 2018-04-11 RX ADMIN — ONDANSETRON 4 MG: 2 INJECTION INTRAMUSCULAR; INTRAVENOUS at 17:27

## 2018-04-11 RX ADMIN — ROCURONIUM BROMIDE 40 MG: 10 INJECTION INTRAVENOUS at 08:24

## 2018-04-11 RX ADMIN — LIDOCAINE HYDROCHLORIDE 100 MG: 10 INJECTION, SOLUTION INFILTRATION; PERINEURAL at 08:24

## 2018-04-11 RX ADMIN — MIDAZOLAM HYDROCHLORIDE 1 MG: 1 INJECTION, SOLUTION INTRAMUSCULAR; INTRAVENOUS at 11:01

## 2018-04-11 RX ADMIN — METRONIDAZOLE 500 MG: 500 INJECTION, SOLUTION INTRAVENOUS at 08:46

## 2018-04-11 RX ADMIN — GLYCOPYRROLATE 0.8 MG: 0.2 INJECTION, SOLUTION INTRAMUSCULAR; INTRAVENOUS at 11:51

## 2018-04-11 RX ADMIN — FENTANYL CITRATE 50 MCG: 50 INJECTION, SOLUTION INTRAMUSCULAR; INTRAVENOUS at 14:33

## 2018-04-11 RX ADMIN — HYDROMORPHONE HYDROCHLORIDE 0.4 MG: 1 INJECTION, SOLUTION INTRAMUSCULAR; INTRAVENOUS; SUBCUTANEOUS at 15:38

## 2018-04-11 RX ADMIN — SODIUM CHLORIDE 100 ML/HR: 0.9 INJECTION, SOLUTION INTRAVENOUS at 13:30

## 2018-04-11 RX ADMIN — ONDANSETRON 4 MG: 2 INJECTION INTRAMUSCULAR; INTRAVENOUS at 08:24

## 2018-04-11 RX ADMIN — HYDROMORPHONE HYDROCHLORIDE 0.5 MG: 1 INJECTION, SOLUTION INTRAMUSCULAR; INTRAVENOUS; SUBCUTANEOUS at 22:31

## 2018-04-11 RX ADMIN — SODIUM CHLORIDE, SODIUM LACTATE, POTASSIUM CHLORIDE, AND CALCIUM CHLORIDE: .6; .31; .03; .02 INJECTION, SOLUTION INTRAVENOUS at 07:35

## 2018-04-11 RX ADMIN — FENTANYL CITRATE 50 MCG: 50 INJECTION, SOLUTION INTRAMUSCULAR; INTRAVENOUS at 08:24

## 2018-04-11 RX ADMIN — FENTANYL CITRATE 50 MCG: 50 INJECTION, SOLUTION INTRAMUSCULAR; INTRAVENOUS at 13:32

## 2018-04-11 RX ADMIN — ROCURONIUM BROMIDE 20 MG: 10 INJECTION INTRAVENOUS at 09:36

## 2018-04-11 RX ADMIN — HYDROMORPHONE HYDROCHLORIDE 0.4 MG: 1 INJECTION, SOLUTION INTRAMUSCULAR; INTRAVENOUS; SUBCUTANEOUS at 16:09

## 2018-04-11 RX ADMIN — NEOSTIGMINE METHYLSULFATE 4 MG: 1 INJECTION, SOLUTION INTRAMUSCULAR; INTRAVENOUS; SUBCUTANEOUS at 11:51

## 2018-04-11 RX ADMIN — PHENYLEPHRINE HYDROCHLORIDE 20 MCG/MIN: 10 INJECTION INTRAVENOUS at 09:19

## 2018-04-11 NOTE — ANESTHESIA POSTPROCEDURE EVALUATION
Post-Op Assessment Note      CV Status:  Stable    Mental Status:  Alert and awake    Hydration Status:  Euvolemic    PONV Controlled:  Controlled    Airway Patency:  Patent    Post Op Vitals Reviewed: Yes          Staff: CRNA           /62 (04/11/18 1219)    Temp 97 5 °F (36 4 °C) (04/11/18 1219)    Pulse 88 (04/11/18 1219)   Resp (!) 24 (04/11/18 1219)    SpO2 98 % (04/11/18 1219)

## 2018-04-11 NOTE — PROGRESS NOTES
Pt arrived to P4 post-op  V S  Stable, Alert and oriented x4  Pt complained of swelling of right eye and hard to open  Left eye normal  PERRLA b/l eyes  Neurologically intact  Neurovascular assessment done and stable  Notified white surgery of swelling and irritation complaint in right eye  White surgery came and assessed patient  Contributed swelling of right eye due to post-op suborbital emphysema  Normal findings per white surgery  No further orders at this time

## 2018-04-11 NOTE — H&P (VIEW-ONLY)
Thoracic Consult  Assessment/Plan:    Hiatal hernia with GERD without esophagitis  Ms Otto Cramer has a large symptomatic hiatal hernia  We discussed options that included further management with proton pump inhibitors as well as a surgical approach  I described for her a laparoscopic paraesophageal hernia repair with, fundoplication, and possible esophageal lengthening procedure  I emphasized that surgical repair would be done to improve her quality of life as I think it would be low risk that this hernia would become strangulated  I, personally, described the risks and benefits associated with the procedure and she would like to proceed  She will undergo routine preoperative testing and pick a date with our surgical scheduler  Diagnoses and all orders for this visit:    Hiatal hernia with GERD without esophagitis  -     Ambulatory referral to Thoracic Surgery  -     Type and screen; Future  -     Basic metabolic panel; Future  -     APTT; Future  -     CBC and Platelet; Future  -     Protime-INR; Future  -     EKG 12 lead; Future  -     Case request operating room: REPAIR HERNIA PARAESOPHAGEAL  LAPAROSCOPIC, ESOPHAGOGASTRODUODENOSCOPY (EGD); Standing  -     Case request operating room: REPAIR HERNIA PARAESOPHAGEAL  LAPAROSCOPIC, ESOPHAGOGASTRODUODENOSCOPY (EGD)    Other orders  -     Diet NPO; Sips with meds; Standing  -     Height and weight upon arrival; Standing  -     Void on call to OR; Standing  -     Insert peripheral IV; Standing  -     Place sequential compression device; Standing  -     Shower/scrub; Standing  -     sodium chloride 0 9 % infusion;  Infuse 50 mL/hr into a venous catheter continuous   -     ceFAZolin (ANCEF) IVPB (premix) 2,000 mg; Infuse 2,000 mg into a venous catheter once   -     metroNIDAZOLE (FLAGYL) IVPB (premix) 500 mg; Infuse 100 mL (500 mg total) into a venous catheter once           Thoracic History   Diagnosis: Hiatal hernia   Procedures/Surgeries:    Pathology:    Adjuvant Therapy:       Subjective:    Patient ID: Mary Anne Alarcon is a 75 y o  female  Ms Lupillo Peter is a 58-year-old referred by Dr German Anderson for evaluation for a hiatal hernia repair  She has had a long-standing hiatal hernia and gastroesophageal reflux disease  In July of 2017 she was anticoagulated with aspirin and experienced an upper GI bleed associated with Jim's ulcers  She has had no further bleeding since stopping the aspirin  She has some dysphagia in the cervical region when eating bread but does not feel like food gets stuck further down in her chest   She does have frequent regurgitant symptoms with initially brackish ascitic fluid but now that she is on proton pump inhibitors it is less ascitic fluid  She has undergone a barium swallow in October of 2017 confirming a large hiatal hernia with a large portion of her stomach in her chest   She appeared to have normal esophageal motility and no reflux was observed on the swallow  She also underwent esophageal manometry in January of 2018 and this demonstrated normal esophageal contractility and bolus transit  Her LES relaxed normally and her DCI was 1006  She has had no major upper abdominal surgery other than a laparoscopic cholecystectomy  She denies weight loss, hematemesis, cough or admissions for pneumonia  She does have irritable bowel syndrome that is constipation predominant  This is controlled with medication          The following portions of the patient's history were reviewed and updated as appropriate: allergies, current medications, past family history, past medical history, past social history, past surgical history and problem list     Past Medical History:   Diagnosis Date    Anemia     Hx secondary to bleeding ulcer    Asthma     seasonal    Disease of thyroid gland     GERD (gastroesophageal reflux disease)     Goiter, nodular     History of transfusion     x1 after bleeding ulcer    Hyperlipidemia     Hypertension     Irritable bowel syndrome     Kidney stone     RA [facility]umatoid arthritis) ([facility] [address] )     Seasonal allergies       Past Surgical History:   Procedure Laterality Date    CARDIAC CATHETERIZATION      x2 secondary to exertional chest pain, due to lung issues, possible mild COPD    CHOLECYSTECTOMY  2015    lap    ESOPHAGOGASTRODUODENOSCOPY N/A 1/23/2018    Procedure: ESOPHAGOGASTRODUODENOSCOPY (EGD); Surgeon: Karen Johnson MD;  Location: [facility]; Service: Gastroenterology    HYSTERECTOMY      partial    SKIN BIOPSY Right     lump benign    THYROIDECTOMY, PARTIAL      TONSILLECTOMY        Family History   Problem Relation Age of Onset    Alzheimer's disease Mother     Cancer Mother      skin     Thyroid disease Mother     Cancer Father      prostate    Stroke Father     Hypertension Father     Thyroid disease Sister     Hyperlipidemia Brother     No Known Problems Son     No Known Problems Son       Social History     Social History    Marital status: /Civil Union     Spouse name: N/A    Number of children: N/A    Years of education: N/A     Occupational History    Not on file  Social History Main Topics    Smoking status: Never Smoker    Smokeless tobacco: Never Used    Alcohol use No    Drug use: No    Sexual activity: Not on file     Other Topics Concern    Not on file     Social History Narrative    No narrative on file      Review of Systems   Constitutional: Negative for activity change, appetite change, chills, fever and unexpected weight change  HENT: Negative for voice change  Respiratory: Negative for cough, shortness of breath and wheezing  Cardiovascular: Positive for chest pain (Associated with eating) and leg swelling (Stable)  Negative for palpitations  Gastrointestinal: Positive for abdominal distention and constipation (Better recently)  Negative for abdominal pain, diarrhea, nausea and vomiting  Musculoskeletal: Negative for arthralgias and myalgias  Skin: Negative for rash  Neurological: Negative for dizziness, seizures, weakness, numbness and headaches  Hematological: Negative for adenopathy  Psychiatric/Behavioral: Negative for confusion  Objective:   Physical Exam   Constitutional: She is oriented to person, place, and time  She appears well-developed and well-nourished  HENT:   Head: Normocephalic and atraumatic  Mouth/Throat: No oropharyngeal exudate  Eyes: Conjunctivae and EOM are normal  Pupils are equal, round, and reactive to light  No scleral icterus  Neck: Normal range of motion  Neck supple  No tracheal deviation present  No thyromegaly present  Cardiovascular: Normal rate, regular rhythm and normal heart sounds  Pulmonary/Chest: Effort normal and breath sounds normal  No respiratory distress  She has no wheezes  Abdominal: Soft  She exhibits no distension  There is no tenderness  Musculoskeletal: Normal range of motion  Lymphadenopathy:     She has no cervical adenopathy  Neurological: She is alert and oriented to person, place, and time  Skin: Skin is warm and dry  Psychiatric: She has a normal mood and affect  Her behavior is normal  Judgment and thought content normal    Vitals reviewed  BP (!) 173/94 (BP Location: Right arm, Patient Position: Sitting, Cuff Size: Standard)   Pulse 58   Temp 97 5 °F (36 4 °C) (Tympanic)   Ht 5' 5" (1 651 m)   Wt 93 4 kg (205 lb 12 8 oz)   SpO2 99%   BMI 34 25 kg/m²      Upper GI on October 22, 2017 is personally reviewed  FINDINGS:    There  is  a  large  hiatal  hernia  present  with  a  good  portion  of  the  stomach  lying  within  the  chest     Esophageal  motility  is  normal  and  emptying  of  contrast  from  the  esophagus  is  prompt   There  is  no  mucosal  mass,  ulceration  or  fold  thickening  identified       Surgical  clips  on  the  right  side  of  the  neck  related  to  right  thyroidectomy      The  stomach  is  unremarkable  in  size     The  gastric  mucosa  is  normal     No  penetrating  ulcers  or  masses  Contrast  empties  promptly  into  the  duodenum     The  duodenum  is  normal  in  caliber     The  ligament  of  Treitz/duodenojejunal  junction  lies  in  a  normal  position  Gastroesophageal  reflux  was  not  observed         Large  hiatal  hernia  as  described  above             IMPRESSION:    Large  hiatal  hernia  with  a  good  portion  of  the  upper  stomach  lying  within  the  lower  chest     Otherwise  unremarkable

## 2018-04-11 NOTE — ANESTHESIA PROCEDURE NOTES
Arterial Line Insertion  Date/Time: 4/11/2018 8:35 AM  Performed by: Dayana Upton  Authorized by: Elena Rodrigues   Consent: Written consent obtained  Risks and benefits: risks, benefits and alternatives were discussed  Consent given by: patient and spouse  Patient understanding: patient states understanding of the procedure being performed  Patient consent: the patient's understanding of the procedure matches consent given  Procedure consent: procedure consent matches procedure scheduled  Relevant documents: relevant documents present and verified  Required items: required blood products, implants, devices, and special equipment available  Patient identity confirmed: arm band, provided demographic data and hospital-assigned identification number  Time out: Immediately prior to procedure a "time out" was called to verify the correct patient, procedure, equipment, support staff and site/side marked as required  Preparation: Patient was prepped and draped in the usual sterile fashion    Indications: hemodynamic monitoring  Orientation:  RightLocation: radial artery  Anesthesia: see MAR for details    Sedation:  Patient sedated: no  Reuben's test normal: yes  Needle gauge: 20  Seldinger technique: Seldinger technique used  Number of attempts: 1  Post-procedure: dressing applied  Post-procedure CNS: normal and unchanged  Patient tolerance: Patient tolerated the procedure well with no immediate complications

## 2018-04-11 NOTE — OP NOTE
OPERATIVE REPORT  PATIENT NAME: Lanie Mace    :  1950  MRN: [id number]  Pt Location: [facility] OR ROOM 08    SURGERY DATE: 2018    Surgeon(s) and Role:     * Darya Gonzales MD - Primary     * Kimi Vanegas PA-C - Jo-Ann Herrera MD - Assisting    Preop Diagnosis:  Paraesophageal hernia with GERD without esophagitis [K44 9, K21 9]    Post-Op Diagnosis Codes:  Paraesophageal hernia with GERD without esophagitis [K44 9, K21 9]  Short esophagus    Procedure(s) (LRB):  REPAIR HERNIA PARAESOPHAGEAL  LAPAROSCOPIC,ELEONORA GASTROPLASTY, TUEPET FUNDOPLICATION (N/A)  ESOPHAGOGASTRODUODENOSCOPY (EGD) (N/A)    Specimen(s):  ID Type Source Tests Collected by Time Destination   1 : HERNIA SAC, WEDGE GASTROPLASTY  Tissue Other TISSUE EXAM Darya Gonzales MD 2018 [id number]        Estimated Blood Loss:   Minimal    Drains:   None    Anesthesia Type:   General    Operative Indications:  Paraesophageal hernia with GERD without esophagitis [K44 9, K21 9]  Mrs Earnest Oropeza is a 18-year-old with a BMI of 34 who has a large symptomatic paraesophageal hernia  She has had Jim's ulcers the lead the past   She has normal motility but evidence concerning for a short esophagus  Operative Findings:  Large paraesophageal hernia with approximately 50% of the stomach in the chest   Short esophagus    Complications:   None    Procedure and Technique:  The patient was brought to the operating room and placed in the supine position  After institution of adequate general anesthesia she was placed in a modified lithotomy position with bolsters to her thighs to allow for reverse Trendelenburg  Her entire abdomen was prepped and draped into a sterile field  A Veress needle was used to insufflate the abdomen at a 0 2 finger breaths below the midclavicular line off the costal arch  A 10 mm camera port was then placed through a incision 2 fingerbreadths above the umbilicus and just to the left using a Opti port technique  A [place] liver retractor was utilized  The surgeon's right hand was a 10 mm port located where the Veress needle was put in and a 5 mm left hand port  The gastrohepatic ligament was divided and the right crura isolated  The hernia sac was dissected away from the right crura and retracted medially  Dissection just outside of the hernia sac was performed in the avascular plane beginning on the right side and then going anterior and leftward  The hernia sac was dissected away from the left valerie where it was noted to be thickest   The hernia sac could then be dissected out of the mediastinum away from the mediastinal structures allowing the stomach to be reduced  Dissection posterior to the esophagus was then performed beginning along the right side and going in a leftward direction  Left valerie was identified and staying on the abdominal side a free plane was identified to the left side of the abdomen  A Penrose drain was then placed around the esophagus for retraction  The greater curve was then freed at the level of the fundus by dividing the short gastric vessels with the Harmonic  Dissection was then carried up into the mediastinum  The vagus nerves were identified and preserved  Dissection was carried out circumferentially around the esophagus up into the mediastinum to the level of the inferior pulmonary veins bilaterally  Intraoperative endoscopy was then performed  The Z-line was identified and the light was visible through the wall of the esophagus and was clearly at or just above the level of the diaphragm  As expected, this was diagnostic for a short esophagus  The hernia sac was resected from the left side clearly avoiding the anterior vagus and exposing the angle of Hiss  The 10 mm right hand surgeon's port was exchanged for a 15 mm port to accommodate a linear stapler  A Werner gastroplasty was then performed with multiple fires of the linear stapler   A small wedge fundoplasty was performed creating a 3-3 5 cm lisa esophagus  This Werner gastroplasty was performed over a 42 Slovak bougie  The esophageal hiatus was then Re calibrated using multiple interrupted 0 pledgetted Ethibond sutures posteriorly  At the completion the crura touched the edges of the esophagus but there was plenty of room around the esophagus for graspers tube be placed up along side it  The remaining fundus was then wrapped behind the esophagus and a toupet fundoplication was performed  The wrapped stomach was sutured to the lisa esophagus with 2 0 Ethibond  A single suture between the wrapped fundus on the right side and the right valerie was performed  There was no twisting motion on the esophagus and there was no upward traction being placed to pull the wrap into the chest   Hemostasis was excellent  The 10 and 15 mm ports were repaired using 0 Vicryl the rest of the ports were removed under direct vision  The abdomen was desufflated and the skin closed with Monocryl  Dry sterile dressings were applied  The patient was extubated on the table and brought to the recovery room in stable condition having tolerated the procedure well  Sponge and instrument counts were correct     I was present for the entire procedure    Patient Disposition:  PACU     SIGNATURE: Evi San MD  DATE: April 11, 2018  TIME: 11:58 AM

## 2018-04-12 ENCOUNTER — APPOINTMENT (INPATIENT)
Dept: RADIOLOGY | Facility: HOSPITAL | Age: 68
DRG: 328 | End: 2018-04-12
Payer: MEDICARE

## 2018-04-12 LAB
ANION GAP SERPL CALCULATED.3IONS-SCNC: 6 MMOL/L (ref 4–13)
BUN SERPL-MCNC: 14 MG/DL (ref 5–25)
CALCIUM SERPL-MCNC: 8.5 MG/DL
CHLORIDE SERPL-SCNC: 109 MMOL/L (ref 100–108)
CO2 SERPL-SCNC: 26 MMOL/L (ref 21–32)
CREAT SERPL-MCNC: 0.69 MG/DL (ref 0.6–1.3)
ERYTHROCYTE [DISTWIDTH] IN BLOOD BY AUTOMATED COUNT: 14.4 % (ref 11.6–15.1)
GFR SERPL CREATININE-BSD FRML MDRD: 90 ML/MIN/1.73SQ M
GLUCOSE SERPL-MCNC: 117 MG/DL (ref 65–140)
HCT VFR BLD AUTO: 37.6 % (ref 34.8–46.1)
HGB BLD-MCNC: 11.8 G/DL (ref 11.5–15.4)
MAGNESIUM SERPL-MCNC: 2.4 MG/DL (ref 1.6–2.6)
MCH RBC QN AUTO: 27.2 PG (ref 26.8–34.3)
MCHC RBC AUTO-ENTMCNC: 31.4 G/DL (ref 31.4–37.4)
MCV RBC AUTO: 87 FL (ref 82–98)
PLATELET # BLD AUTO: 151 THOUSANDS/UL (ref 149–390)
PMV BLD AUTO: 10.9 FL (ref 8.9–12.7)
POTASSIUM SERPL-SCNC: 3.8 MMOL/L (ref 3.5–5.3)
RBC # BLD AUTO: 4.34 MILLION/UL (ref 3.81–5.12)
SODIUM SERPL-SCNC: 141 MMOL/L (ref 136–145)
WBC # BLD AUTO: 10.48 THOUSAND/UL (ref 4.31–10.16)

## 2018-04-12 PROCEDURE — 83735 ASSAY OF MAGNESIUM: CPT | Performed by: THORACIC SURGERY (CARDIOTHORACIC VASCULAR SURGERY)

## 2018-04-12 PROCEDURE — G8979 MOBILITY GOAL STATUS: HCPCS

## 2018-04-12 PROCEDURE — 97162 PT EVAL MOD COMPLEX 30 MIN: CPT

## 2018-04-12 PROCEDURE — G8988 SELF CARE GOAL STATUS: HCPCS

## 2018-04-12 PROCEDURE — 74220 X-RAY XM ESOPHAGUS 1CNTRST: CPT

## 2018-04-12 PROCEDURE — 97166 OT EVAL MOD COMPLEX 45 MIN: CPT

## 2018-04-12 PROCEDURE — G8989 SELF CARE D/C STATUS: HCPCS

## 2018-04-12 PROCEDURE — G8987 SELF CARE CURRENT STATUS: HCPCS

## 2018-04-12 PROCEDURE — 80048 BASIC METABOLIC PNL TOTAL CA: CPT | Performed by: THORACIC SURGERY (CARDIOTHORACIC VASCULAR SURGERY)

## 2018-04-12 PROCEDURE — G8978 MOBILITY CURRENT STATUS: HCPCS

## 2018-04-12 PROCEDURE — 85027 COMPLETE CBC AUTOMATED: CPT | Performed by: PHYSICIAN ASSISTANT

## 2018-04-12 RX ORDER — OXYCODONE HYDROCHLORIDE 10 MG/1
10 TABLET ORAL EVERY 4 HOURS PRN
Status: DISCONTINUED | OUTPATIENT
Start: 2018-04-12 | End: 2018-04-14 | Stop reason: HOSPADM

## 2018-04-12 RX ORDER — GUAIFENESIN 600 MG
600 TABLET, EXTENDED RELEASE 12 HR ORAL EVERY 12 HOURS SCHEDULED
Status: DISCONTINUED | OUTPATIENT
Start: 2018-04-12 | End: 2018-04-14 | Stop reason: HOSPADM

## 2018-04-12 RX ORDER — ACETAMINOPHEN 325 MG/1
650 TABLET ORAL EVERY 6 HOURS PRN
Status: DISCONTINUED | OUTPATIENT
Start: 2018-04-12 | End: 2018-04-14 | Stop reason: HOSPADM

## 2018-04-12 RX ORDER — OXYCODONE HYDROCHLORIDE 5 MG/1
5 TABLET ORAL EVERY 4 HOURS PRN
Status: DISCONTINUED | OUTPATIENT
Start: 2018-04-12 | End: 2018-04-14 | Stop reason: HOSPADM

## 2018-04-12 RX ADMIN — HYDROMORPHONE HYDROCHLORIDE 0.5 MG: 1 INJECTION, SOLUTION INTRAMUSCULAR; INTRAVENOUS; SUBCUTANEOUS at 05:06

## 2018-04-12 RX ADMIN — GUAIFENESIN 600 MG: 600 TABLET, EXTENDED RELEASE ORAL at 23:36

## 2018-04-12 RX ADMIN — HEPARIN SODIUM 5000 UNITS: 5000 INJECTION, SOLUTION INTRAVENOUS; SUBCUTANEOUS at 13:24

## 2018-04-12 RX ADMIN — HYDROMORPHONE HYDROCHLORIDE 0.5 MG: 1 INJECTION, SOLUTION INTRAMUSCULAR; INTRAVENOUS; SUBCUTANEOUS at 13:11

## 2018-04-12 RX ADMIN — LOSARTAN POTASSIUM 25 MG: 25 TABLET, FILM COATED ORAL at 13:23

## 2018-04-12 RX ADMIN — ONDANSETRON 4 MG: 2 INJECTION INTRAMUSCULAR; INTRAVENOUS at 17:06

## 2018-04-12 RX ADMIN — LEVOTHYROXINE SODIUM 50 MCG: 50 TABLET ORAL at 13:24

## 2018-04-12 RX ADMIN — ONDANSETRON 4 MG: 2 INJECTION INTRAMUSCULAR; INTRAVENOUS at 23:26

## 2018-04-12 RX ADMIN — IOHEXOL 100 ML: 350 INJECTION, SOLUTION INTRAVENOUS at 12:15

## 2018-04-12 RX ADMIN — HEPARIN SODIUM 5000 UNITS: 5000 INJECTION, SOLUTION INTRAVENOUS; SUBCUTANEOUS at 23:26

## 2018-04-12 RX ADMIN — HEPARIN SODIUM 5000 UNITS: 5000 INJECTION, SOLUTION INTRAVENOUS; SUBCUTANEOUS at 05:06

## 2018-04-12 RX ADMIN — SODIUM CHLORIDE 100 ML/HR: 0.9 INJECTION, SOLUTION INTRAVENOUS at 11:11

## 2018-04-12 RX ADMIN — OXYCODONE HYDROCHLORIDE 5 MG: 5 TABLET ORAL at 17:07

## 2018-04-12 RX ADMIN — ONDANSETRON 4 MG: 2 INJECTION INTRAMUSCULAR; INTRAVENOUS at 05:06

## 2018-04-12 RX ADMIN — ATORVASTATIN CALCIUM 20 MG: 20 TABLET, FILM COATED ORAL at 13:24

## 2018-04-12 RX ADMIN — ONDANSETRON 4 MG: 2 INJECTION INTRAMUSCULAR; INTRAVENOUS at 12:02

## 2018-04-12 NOTE — CASE MANAGEMENT
Initial Clinical Review    Age/Sex: 75 y o  female    Surgery Date: 04/11/2018    Procedure: REPAIR HERNIA PARAESOPHAGEAL  LAPAROSCOPIC,ELEONORA GASTROPLASTY, TUEPET FUNDOPLICATION (N/A)  ESOPHAGOGASTRODUODENOSCOPY (EGD) (N/A)     Anesthesia: General    Admission Orders: Date/Time/Statement: 4/11/18 @ 1707   Orders Placed This Encounter   Procedures    Inpatient Admission     Standing Status:   Standing     Number of Occurrences:   1     Order Specific Question:   Admitting Physician     Answer:   Ralf Farnsworth [[id number]]     Order Specific Question:   Level of Care     Answer:   Med Surg [16]     Order Specific Question:   Estimated length of stay     Answer:   Inpatient Only Surgery       Vital Signs: /58   Pulse 85   Temp 98 2 °F (36 8 °C) (Oral)   Resp 20   Ht 5' 5" (1 651 m)   Wt 96 kg (211 lb 10 3 oz)   SpO2 92%   BMI 35 22 kg/m²     Diet:        Diet Orders            Start     Ordered    04/12/18 1304  Diet Clear Liquid  Diet effective now     Question Answer Comment   Diet Type Clear Liquid    RD to adjust diet per protocol?  Yes        04/12/18 1303      Consult PT  Elevate HOB  30 degree    Mobility: Ambulate TID    DVT Prophylaxis: Geraldo SCDs    Medication:    Scheduled Meds:  Current Facility-Administered Medications:  acetaminophen 650 mg Oral Q6H PRN    atorvastatin 20 mg Oral Daily    heparin (porcine) 5,000 Units Subcutaneous Q8H [facility] term    levothyroxine 50 mcg Oral Daily    losartan 25 mg Oral Daily    metoclopramide 10 mg Intravenous Q6H PRN    ondansetron 4 mg Intravenous Q6H    oxyCODONE 10 mg Oral Q4H PRN    oxyCODONE 5 mg Oral Q4H PRN    pantoprazole 20 mg Oral Early Morning    sodium chloride 50 mL/hr Intravenous Continuous Last Rate: 50 mL/hr (04/12/18 1315)   tiotropium 18 mcg Inhalation Daily    IV dilaudid x 4 doses then DC 04/12    Continuous Infusions:  sodium chloride 50 mL/hr Last Rate: 50 mL/hr (04/12/18 1315)

## 2018-04-12 NOTE — SOCIAL WORK
MCG Guide Used for Initial Round: transthoracic hiatel henia repair  Optimal GLOS: 3  Hospital Day: 1 day  DC Readiness:   Goal Length of Stay: 3 days postoperative  Note: Goal Length of Stay assumes optimal recovery, decision making, and care  Patients may be discharged to a lower level of care (either later than or sooner than the goal) when it is appropriate for their clinical status and care needs  Discharge Readiness  Return to top of Hiatal Hernia Repair, Transthoracic RRG - ISC   Discharge readiness is indicated by patient meeting Recovery Milestones, including ALL of the following:   Hemodynamic stability   No evidence of ileus or bowel obstruction   Ambulatory   No evidence of postoperative or surgical site infection   Stable respiratory status   Pain absent or managed   Oral hydration and diet   Discharge plans and education understood    Identified Barriers: anemia, asthma, thyroid disease, GERD, bleeding ulcer hx, HTN, IBS, kidney stone hx, RA, possible COPD    Older patients   Patients 72 years or older may require longer acute hospital care  Expect brief stay extension    Anemia   Extended stay beyond goal length of stay for primary condition may be needed until ALL of the following are present:   Hemodynamic stability   Active blood loss absent   Signs and symptoms of anemia absent or improved   Mental status normal or at baseline   Hgb/Hct level stable and acceptable for next level of care   Etiology of anemia requiring inpatient care absent  Gastrointestinal bleeding   Extended stay beyond goal length of stay for primary condition may be needed until ALL of the following are present:   Hemodynamic stability   Bleeding absent   Stable Hgb/Hct   Platelet count, prothrombin time, and partial thromboplastin time acceptable for next level of care   Surgical or other acute intervention not needed   Mental status at baseline   Pain absent or managed   Oral hydration and diet tolerated  Respiratory insufficiency   Extended stay beyond goal length of stay for primary condition may be needed until ALL of the following are present:   Evaluation of cause of respiratory insufficiency complete   Suctioning, pulmonary toilet, or other therapy performable at lower level of care   Respiratory insufficiency has resolved or is manageable at lower level of care  Anticoagulation treatment is not needed or can be performed at lower level of care     Medical comorbidities manageable at lower level of care    Discussion Date (Time): 04/12/18 with Dr Williams Esquivel

## 2018-04-12 NOTE — OCCUPATIONAL THERAPY NOTE
[address]  Evaluation     Patient Name: Carlyon Lanes  IADLP'X Date: 4/12/2018  Problem List  Patient Active Problem List   Diagnosis    Hernia, hiatal    GERD (gastroesophageal reflux disease)    Hiatal hernia with GERD without esophagitis     Past Medical History  Past Medical History:   Diagnosis Date    Anemia     Hx secondary to bleeding ulcer    Asthma     seasonal    Disease of thyroid gland     GERD (gastroesophageal reflux disease)     Goiter, nodular     History of transfusion     x1 after bleeding ulcer    Hyperlipidemia     Hypertension     Irritable bowel syndrome     Kidney stone     RA (rheumatoid arthritis) ([address] )     Seasonal allergies     Stomach ulcer     Vertigo      Past Surgical History  Past Surgical History:   Procedure Laterality Date    CARDIAC CATHETERIZATION      x2 secondary to exertional chest pain, due to lung issues, possible mild COPD    CHOLECYSTECTOMY  2015    lap    ESOPHAGOGASTRODUODENOSCOPY N/A 1/23/2018    Procedure: ESOPHAGOGASTRODUODENOSCOPY (EGD); Surgeon: Radha Tracy MD;  Location: [facility] GI LAB; Service: Gastroenterology    HYSTERECTOMY      partial    [place] ESOPHAGOSCOPY FLEXIBLE TRANSORAL WITH BIOPSY N/A 4/11/2018    Procedure: ESOPHAGOGASTRODUODENOSCOPY (EGD);   Surgeon: Jonn Augustine MD;  [facility];  Service: Thoracic    [place] LAP, REPAIR PARAESOPHAGEAL HERNIA, INCL FUNDOPLASTY W/ MESH N/A 4/11/2018    Procedure: REPAIR HERNIA PARAESOPHAGEAL  LAPAROSCOPIC,ELEONORA GASTROPLASTY, Con Gosselin FUNDOPLICATION;  Surgeon: Jonn Augustine MD;  [facility];  Service: Thoracic    SKIN BIOPSY Right     lump benign    THYROIDECTOMY, PARTIAL      TONSILLECTOMY             04/12/18 1204   Note Type   Note type Eval only   Restrictions/Precautions   Weight Bearing Precautions Per Order No   Other Precautions Multiple lines;Pain   Pain Assessment   Pain Assessment 0-10   Pain Score 9   Pain Type Acute pain;Surgical pain   Pain Location Abdomen; Chest   Pain Orientation Mid   Pain Descriptors Aching;Discomfort   Pain Frequency Intermittent   Pain Onset Gradual   Patient's Stated Pain Goal No pain   Hospital Pain Intervention(s) Repositioned; Ambulation/increased activity; Emotional support   Response to Interventions tolerated well   Multiple Pain Sites Yes   Home Living   Type of [address] One level   Bathroom Shower/Tub Tub/shower unit   Bathroom Toilet Standard   Bathroom Equipment Grab bars in [facility]   Additional Comments Pt reports living in 1 story ranch home w/ 1 NESTOR   Prior Function   Level of [place] Independent with ADLs and functional mobility   Lives With Margot Help From Family   ADL Assistance Independent   IADLs Independent   Vocational Retired   Comments Pt reports being I w/ ADLS, IADLS, transfers and functional mobility PTA   Lifestyle   Autonomy Pt reports being I w/ ADLS, IADLS, transfers and functional mobility PTA   Reciprocal Relationships Pt lives w/ spouse   Service to Others Pt is retired   Intrinsic Gratification Pt enjoys going to [address] [facility] ([facility]) [address] [facility] 7  3 [facility] 7  [address] 5  Supervision/Setup   [address] 5  [address] [facility] 5  Supervision/Setup    [facility] 5  [address]  5  [address] [facility] 5  Supervision/Setup   Bed Mobility   Additional Comments Unable to assess, pt seated in chair prior to and end of session   Transfers   Sit to Stand 5  Supervision   Additional items Verbal cues; Increased time required;Armrests   Stand to Sit 5  Supervision   Additional items Increased time required;Verbal cues;Armrests   Toilet transfer 5  Supervision   Additional items Increased time required;Verbal cues;Standard toilet   Additional Comments Pt performed sit-stand from chair w/ S for safety and 1 toilet transfer to standard toilet w/ S for safety and use of grab bars for support  Functional Mobility   Functional Mobility 5  Supervision   Additional Comments Pt ambulated down hallways w/ S for safety and RW in standing for support and stability   Additional items Rolling walker   Balance   Static Sitting Good   Dynamic Sitting Fair   Static Standing Fair   Ambulatory Fair   Activity Tolerance   Activity Tolerance Patient tolerated treatment well   Medical Staff Made Aware PT, Sharon   Nurse Made Aware yes, Carson Hay Assessment   RUE Assessment WFL   LUE Assessment   LUE Assessment WFL   Hand Function   Gross Motor Coordination Functional   Fine Motor Coordination Functional   Cognition   Overall Cognitive Status WFL   Arousal/Participation Alert; Responsive; Cooperative   Attention Within functional limits   Orientation Level Oriented X4   Memory Within functional limits   Following Commands Follows one step commands without difficulty   Comments Pt is pleasant and cooperative   Assessment   Limitation Decreased high-level ADLs; Decreased endurance   Prognosis Fair   Assessment Pt is a 74 y/o female seen for OT eval s/p adm to [facility] for eval for hiatal hernia repair  Pt is s/p REPAIR HERNIA PARAESOPHAGEAL LAPAROSCOPIC,ELEONORA GASTROPLASTY, TUEPET FUNDOPLICATION (N/A)ESOPHAGOGASTRODUODENOSCOPY (EGD)(N/A) dx'd w/ hiatal hernia w/ GERD w/o esophagitis  Comorbidities include a h/o anemia, asthma, disease of thyroid gland, GERD, goiter, hx of transfusion, HLD, HTN, irritiable bowel syndrome, kidney stone, Ra, cardiac cath, and EGD  Pt with active OT orders and OOB, ambulate pt  Pt lives with spouse in 3 story ran home w/ 1 NESTOR   Pt was I w/  ADLS and IADLS, drove, & required no use of DME PTA but has a walker  Pt is currently demonstrating the following occupational deficits: S UB and LB ADLS, S transfers and functional mobility w/ RW    Pt with deficits and limitations in all baseline areas of occupation 2* decreased endurance, fatigue, decreased activity tolerance, and pain  Pt scored overall 85/100 on the Barthel Index  Based on the aforementioned OT evaluation, functional performance deficits, and assessments, pt has been identified as a moderate complexity evaluation  Recommend pt d/c home w/ increased family support when medically stable  Pt appears to be functioning at baseline and does not have any further questions or concerns regarding OT services  Pt has family support at home and was left w/ OT contact information, if questions arise in the future  Pt will no longer benefit from immediate acute skilled OT services   D/C OT    Goals   Patient Goals none expressed   Recommendation   OT Discharge Recommendation Home with family support   OT - OK to Discharge Yes  (when medically stable)   Barthel Index   Feeding 10   Bathing 5   Grooming Score 5   Dressing Score 10   Bladder Score 10   Bowels Score 10   Toilet Use Score 10   Transfers (Bed/Chair) Score 15   Mobility (Level Surface) Score 10   Stairs Score 0   Barthel Index Score 85   Modified [place] Scale   Modified [place] Scale 3  (assist to walk only for IV pole)        [facility] MOT, OTR/L

## 2018-04-12 NOTE — PROGRESS NOTES
Patient voided 150 ml post catheter removal  Bladder scanned for 333 ml  Notified white surgery resident  Ye surgery stated was okay to hold off straight-cath'ing pt and to give pt more time to urinate until morning, allowing anesthesia to further wear off

## 2018-04-12 NOTE — PHYSICAL THERAPY NOTE
Physical Therapy Evaluation      Patient Active Problem List   Diagnosis    Hernia, hiatal    GERD (gastroesophageal reflux disease)    Hiatal hernia with GERD without esophagitis       Past Medical History:   Diagnosis Date    Anemia     Hx secondary to bleeding ulcer    Asthma     seasonal    Disease of thyroid gland     GERD (gastroesophageal reflux disease)     Goiter, nodular     History of transfusion     x1 after bleeding ulcer    Hyperlipidemia     Hypertension     Irritable bowel syndrome     Kidney stone     [address]he[address]oid [address]) [address] [address]     Seasonal allergies     Stomach ulcer     Vertigo        Past Surgical History:   Procedure Laterality Date    CARDIAC CATHETERIZATION      x2 secondary to exertional chest pain, due to lung issues, possible mild COPD    CHOLECYSTECTOMY  2015    lap    ESOPHAGOGASTRODUODENOSCOPY N/A 1/23/2018    Procedure: ESOPHAGOGASTRODUODENOSCOPY (EGD); Surgeon: Amy Charles MD;  Location: [facility] GI LAB; Service: Gastroenterology    HYSTERECTOMY      partial    [place] ESOPHAGOSCOPY FLEXIBLE TRANSORAL WITH BIOPSY N/A 4/11/2018    Procedure: ESOPHAGOGASTRODUODENOSCOPY (EGD); Surgeon: Angelique Majano MD;  [facility];  Service: Thoracic    [place] LAP, REPAIR PARAESOPHAGEAL HERNIA, INCL FUNDOPLASTY W/ MESH N/A 4/11/2018    Procedure: REPAIR HERNIA PARAESOPHAGEAL  LAPAROSCOPIC,ELEONORA GASTROPLASTY, Audria Ledger FUNDOPLICATION;  Surgeon: Angelique Majano MD;  [facility];  Service: Thoracic    SKIN BIOPSY Right     lump benign    THYROIDECTOMY, PARTIAL      TONSILLECTOMY        04/12/18 1200   Note Type   Note type Eval only   Pain Assessment   Pain Assessment 0-10   Pain Score 8   Pain Type Acute pain   Pain Location Abdomen; Chest   Pain Descriptors Dull;Discomfort   Pain Onset Ongoing   Hospital Pain Intervention(s) Medication (See MAR); Repositioned; Emotional support   Home Living   Type of [address] [place] [address] One level   Bathroom Shower/Tub Tub/shower unit   Bathroom Toilet Standard   Bathroom Equipment Grab bars in [facility]  (does not use - owns from husbands prior sx)   Prior Function   Level of [place] Independent with ADLs and functional mobility   Lives With Reich-Miranda Help From Family   ADL Assistance Independent   IADLs Independent   Falls in the last 6 months 0   Vocational Retired   Restrictions/Precautions   [place]s [place] Bearing Precautions Per Order No   General   Additional Pertinent History pt is scheduled for barium swallow   Family/Caregiver Present Yes  ()   Cognition   Overall Cognitive Status WFL   Arousal/Participation Alert   Orientation Level Oriented X4   Memory Within functional limits   Following Commands Follows all commands and directions without difficulty   RLE Assessment   RLE Assessment WNL   LLE Assessment   LLE Assessment WNL   Bed Mobility   Additional Comments positioned/recevied OOB   Transfers   Sit to Stand 5  Supervision   Additional items Increased time required   Stand to Sit 5  Supervision   Additional items Increased time required   Stand pivot 5  Supervision   Additional items Increased time required   Ambulation/Elevation   Gait pattern Foward flexed; Short stride; Step to;Excessively slow   Gait Assistance 5  Supervision   Assistive Device Rolling walker   Distance 250   Stair Management Assistance Not tested  (pt deferred)   Balance   Dynamic Standing Fair -   Ambulatory Fair -   Endurance Deficit   Endurance Deficit Yes   Endurance Deficit Description generalized deconditioning   Activity Tolerance   Activity Tolerance Patient limited by fatigue   Medical Staff Made Aware OT   Nurse Made Aware yes   Assessment   Prognosis Good   Problem List Decreased endurance;Decreased mobility;Pain   Assessment Pt is 75 y o  female seen for PT evaluation s/p admit to [facility] on 4/11/2018 w/ Hiatal hernia with GERD without esophagitis   PT consulted to assess pt's functional mobility and d/c needs  Order placed for PT eval and tx, w/ up w/ A order  Comorbidities affecting pt's physical performance at time of assessment include: pain, deconditioning, fatigue  PTA, pt was independent w/ all functional mobility w/ without the use of AD  Personal factors affecting pt at time of IE include: ambulating w/ assistive device, unable to perform dynamic tasks in community and limited home support  Please find objective findings from PT assessment regarding body systems outlined above with impairments and limitations including weakness, impaired balance, decreased endurance, gait deviations, decreased activity tolerance, decreased functional mobility tolerance and decreased safety awareness  The following objective measures performed on IE also reveal limitations: Barthel Index: 75/100  Pt's clinical presentation is currently evolving seen in pt's presentation of above deficits and imapirments  Pt to benefit from continued PT tx to address deficits as defined above and maximize level of functional independent mobility and consistency  From PT/mobility standpoint, recommendation at time of d/c would be Home PT pending progress in order to facilitate return to PLOF  Goals   Patient Goals to return home   STG Expiration Date 04/22/18   Short Term Goal #1 In 7-10 days pt will amb >500ft with no AD, transfer mod I with all transfers and bed mobility and negotiate 1 step to enter home  Plan   Treatment/Interventions Functional transfer training;LE strengthening/ROM; Elevations; Therapeutic exercise; Endurance training;Patient/family training;Gait training;OT;Spoke to case management;Spoke to nursing   PT Frequency 5x/wk   Recommendation   Recommendation Home PT   Barthel Index   Feeding 10   Bathing 5   Grooming Score 5   Dressing Score 5   Bladder Score 10   Bowels Score 10   Toilet Use Score 5   Transfers (Bed/Chair) Score 10   Mobility (Level Surface) Score 10 Stairs Score 5   Barthel Index Score 75         Monica Cummins, PT

## 2018-04-12 NOTE — PROGRESS NOTES
Progress Note - Thoracic  Hughrosio Numbers 75 y o  female MRN: [id number]  Unit/Bed#: [facility] 419-01 Encounter: [id number]    Assessment:  75y o -year-old female with paraesophageal hernia, s/p lap PEH repair, kelly gastroplasty, toupet fundoplication 5/72    Plan:  1  PEH   - barium swallow today   - adv to clears if passes   - OOB/ambulate   - due to void   - saline lock if diet advanced    2  DVT Prophylaxis   - SQH   - SCDs    3  Disposition   - med surg, possible discharge today or tomorrow depending on results of barium swallow and if tolerates clear liquids    Alice Marquez MD PGY-4  6:58 AM  04/12/18      Subjective:  Feels well, pain controlled  Whiteside out due to void  Not passing flatus      Objective:  Patient Vitals for the past 24 hrs:   BP Temp Temp src Pulse Resp SpO2   04/11/18 2305 127/73 97 6 °F (36 4 °C) Oral 75 18 90 %   04/11/18 1815 132/66 98 8 °F (37 1 °C) Oral 80 18 -   04/11/18 1800 132/68 98 7 °F (37 1 °C) Oral 78 18 -   04/11/18 1755 - - - 90 - 95 %   04/11/18 1745 138/72 98 8 °F (37 1 °C) Oral 80 18 -   04/11/18 1730 - - - - - 98 %   04/11/18 1728 142/82 98 8 °F (37 1 °C) Oral 80 18 98 %   04/11/18 1630 133/70 98 6 °F (37 °C) - 82 16 99 %   04/11/18 1600 136/78 - - 80 15 99 %   04/11/18 1530 140/74 - - 80 14 100 %   04/11/18 1500 132/74 - - 74 13 100 %   04/11/18 1430 129/73 - - 76 13 100 %   04/11/18 1400 140/67 - - 72 13 100 %   04/11/18 1330 117/66 (!) 97 4 °F (36 3 °C) - 76 15 98 %   04/11/18 1300 106/59 - - 66 15 97 %   04/11/18 1245 110/60 - - 66 12 97 %   04/11/18 1230 112/64 - - 76 20 97 %   04/11/18 1219 144/62 97 5 °F (36 4 °C) - 88 (!) 24 98 %   04/11/18 1213 121/80 97 5 °F (36 4 °C) - 89 15 96 %     Intake/Output Summary (Last 24 hours) at 04/12/18 0658  Last data filed at 04/12/18 0501   Gross per 24 hour   Intake             4385 ml   Output             1465 ml   Net             2920 ml   UOP  1 5    Physical Exam:  General: NAD  Cardiovascular: RRR  Respiratory: breath sounds b/l  Abdomen: soft, NT, ND incisions c/d/i  Extremities: no edema    Medications:    Current Facility-Administered Medications:  atorvastatin 20 mg Oral Daily Jordan Regan PA-C    heparin (porcine) 5,000 Units Subcutaneous [facility] Jordan Regan PA-C    HYDROmorphone 0 5 mg Intravenous Q2H PRN Jordan Regan PA-C    lactated ringers 50 mL/hr Intravenous Continuous Jessie Puri MD    lactated ringers 50 mL/hr Intravenous Continuous Soumya Res, CRNA Last Rate: Stopped (04/11/18 1328)   levothyroxine 50 mcg Oral Daily Jordan Regan PA-C    losartan 25 mg Oral Daily Jordan Regan PA-C    metoclopramide 10 mg Intravenous Q6H PRN Jordan Regan PA-C    ondansetron 4 mg Intravenous Q6H Lia Son PA-C    pantoprazole 20 mg Oral Early Morning Jordan Regan PA-C    sodium chloride 100 mL/hr Intravenous Continuous Jordan Regan PA-C Last Rate: 100 mL/hr (04/11/18 2330)   tiotropium 18 mcg Inhalation Daily Lia Son PA-C      lactated ringers 50 mL/hr    lactated ringers 50 mL/hr Last Rate: Stopped (04/11/18 1328)   sodium chloride 100 mL/hr Last Rate: 100 mL/hr (04/11/18 2330)     HYDROmorphone 0 5 mg Q2H PRN   metoclopramide 10 mg Q6H PRN     Laboratory results:   CBC:   Lab Results   Component Value Date    WBC 10 48 (H) 04/12/2018    HGB 11 8 04/12/2018    HCT 37 6 04/12/2018    MCV 87 04/12/2018     04/12/2018    MCH 27 2 04/12/2018    MCHC 31 4 04/12/2018    RDW 14 4 04/12/2018    MPV 10 9 04/12/2018   , CMP: No results found for: NA, K, CL, CO2, ANIONGAP, BUN, CREATININE, GLUCOSE, CALCIUM, AST, ALT, ALKPHOS, PROT, ALBUMIN, BILITOT, EGFR, Coagulation: No results found for: PT, INR, APTT, Urinalysis: No results found for: David Saber, SPECGRAV, PHUR, LEUKOCYTESUR, NITRITE, PROTEINUA, GLUCOSEU, KETONESU, BILIRUBINUR, BLOODU, Amylase: No results found for: AMYLASE, Lipase: No results found for: LIPASE    VTE Pharmacologic Prophylaxis: Heparin  VTE Mechanical Prophylaxis: sequential compression device

## 2018-04-12 NOTE — CASE MANAGEMENT
The correct patient class is INPATIENT  Initially had an OUTPT No Charge Bed order entered 4/11/18 @1254  The case was then reviewed by Coding and determination made that INPATIENT ONLY procedure was performed  Requested INPT order, and a second Extended Recovery or Outpatient No Charge Bed order was entered at approximately 1620 on 4/11/18 as signed and held  Additional request for INPT order was made and INPT order was then entered as signed & held, around 4/11/18 @1655  Spoke with RN as patient was arriving to floor to notify that INPT is the correct patient class  Both the Extended Recovery/Outpt No Charge Bed and INPT orders were released at the same exact time  There was no way to avoid this based on EPIC functionality, of releasing orders simultaneously, although the INPT order is the active correct order, and was entered later than the Extended Recovery/OP NC Bed order     04/11/18 1707  Extended Recovery or Outpatient No Charge Bed Once     Transfer Service: Thoracic Surgery       Question: Admitting Physician Answer: Liang Lomeli    04/11/18      04/11/18 1707  Inpatient Admission Once     Transfer Service: Thoracic Surgery    Expected Discharge Date: 04/12/18       Question Answer Comment   Admitting Physician Liang Lomeli    Level of Care Med Surg    Estimated length of stay Inpatient Only Surgery        04/11/18 1707     04/11/18 1255  Outpatient No Charge Bed Once     Transfer Service: Thoracic Surgery       Question: Admitting Physician Answer: Liang Lomeli    04/11/18 1254

## 2018-04-12 NOTE — PROGRESS NOTES
Notified by nursing that patient's eye is "still puffy " Asked nursing to continue to monitor  Natalie Timbo [facility]  PGY3

## 2018-04-12 NOTE — SOCIAL WORK
Met with pt at bedside to explain CM role  Pt resides with her hsb in a ranch home with 1 NESTOR  Pt is indep and has never required home care or DME  Pt and hsb both drive  Pts PCP is Dr Franklyn Torres and her RX is [employer] in [place]  Pt denies mental illness or substance abuse  Pts primary contact is her hsb Cem Shepherd [phone]  CM reviewed d/c planning process including the following: identifying help at home, patient preference for d/c planning needs, Discharge Lounge, [facility] Meds to Bed program, availability of treatment team to discuss questions or concerns patient and/or family may have regarding understanding medications and recognizing signs and symptoms once discharged  CM also encouraged patient to follow up with all recommended appointments after discharge  Patient advised of importance for patient and family to participate in managing patients medical well being  Patient/caregiver received discharge checklist  Content reviewed  Patient/caregiver encouraged to participate in discharge plan of care prior to discharge home

## 2018-04-12 NOTE — PLAN OF CARE
Problem: PHYSICAL THERAPY ADULT  Goal: Performs mobility at highest level of function for planned discharge setting  See evaluation for individualized goals  Treatment/Interventions: Functional transfer training, LE strengthening/ROM, Elevations, Therapeutic exercise, Endurance training, Patient/family training, Gait training, OT, Spoke to case management, Spoke to nursing          See flowsheet documentation for full assessment, interventions and recommendations  Outcome: Progressing  Prognosis: Good  Problem List: Decreased endurance, Decreased mobility, Pain  Assessment: Pt is 75 y o  female seen for PT evaluation s/p admit to [facility] on 4/11/2018 w/ Hiatal hernia with GERD without esophagitis  PT consulted to assess pt's functional mobility and d/c needs  Order placed for PT eval and tx, w/ up w/ A order  Comorbidities affecting pt's physical performance at time of assessment include: pain, deconditioning, fatigue  PTA, pt was independent w/ all functional mobility w/ without the use of AD  Personal factors affecting pt at time of IE include: ambulating w/ assistive device, unable to perform dynamic tasks in community and limited home support  Please find objective findings from PT assessment regarding body systems outlined above with impairments and limitations including weakness, impaired balance, decreased endurance, gait deviations, decreased activity tolerance, decreased functional mobility tolerance and decreased safety awareness  The following objective measures performed on IE also reveal limitations: Barthel Index: 75/100  Pt's clinical presentation is currently evolving seen in pt's presentation of above deficits and imapirments  Pt to benefit from continued PT tx to address deficits as defined above and maximize level of functional independent mobility and consistency   From PT/mobility standpoint, recommendation at time of d/c would be Home PT pending progress in order to facilitate return to PLOF  Recommendation: Home PT          See flowsheet documentation for full assessment

## 2018-04-12 NOTE — PROGRESS NOTES
Pt  With very mild right eye swelling, pt  Offers no complaints about right eye  Magdalena Fabiana with sx  Notified

## 2018-04-13 PROCEDURE — 97116 GAIT TRAINING THERAPY: CPT

## 2018-04-13 PROCEDURE — 97110 THERAPEUTIC EXERCISES: CPT

## 2018-04-13 PROCEDURE — 99024 POSTOP FOLLOW-UP VISIT: CPT | Performed by: THORACIC SURGERY (CARDIOTHORACIC VASCULAR SURGERY)

## 2018-04-13 RX ADMIN — ATORVASTATIN CALCIUM 20 MG: 20 TABLET, FILM COATED ORAL at 08:01

## 2018-04-13 RX ADMIN — LOSARTAN POTASSIUM 25 MG: 25 TABLET, FILM COATED ORAL at 08:13

## 2018-04-13 RX ADMIN — ONDANSETRON 4 MG: 2 INJECTION INTRAMUSCULAR; INTRAVENOUS at 16:51

## 2018-04-13 RX ADMIN — GUAIFENESIN 600 MG: 600 TABLET, EXTENDED RELEASE ORAL at 22:31

## 2018-04-13 RX ADMIN — HEPARIN SODIUM 5000 UNITS: 5000 INJECTION, SOLUTION INTRAVENOUS; SUBCUTANEOUS at 22:32

## 2018-04-13 RX ADMIN — OXYCODONE HYDROCHLORIDE 5 MG: 5 TABLET ORAL at 17:14

## 2018-04-13 RX ADMIN — GUAIFENESIN 600 MG: 600 TABLET, EXTENDED RELEASE ORAL at 08:01

## 2018-04-13 RX ADMIN — ONDANSETRON 4 MG: 2 INJECTION INTRAMUSCULAR; INTRAVENOUS at 11:25

## 2018-04-13 RX ADMIN — OXYCODONE HYDROCHLORIDE 5 MG: 5 TABLET ORAL at 11:57

## 2018-04-13 RX ADMIN — LEVOTHYROXINE SODIUM 50 MCG: 50 TABLET ORAL at 06:06

## 2018-04-13 RX ADMIN — ONDANSETRON 4 MG: 2 INJECTION INTRAMUSCULAR; INTRAVENOUS at 23:37

## 2018-04-13 RX ADMIN — ONDANSETRON 4 MG: 2 INJECTION INTRAMUSCULAR; INTRAVENOUS at 04:16

## 2018-04-13 RX ADMIN — HEPARIN SODIUM 5000 UNITS: 5000 INJECTION, SOLUTION INTRAVENOUS; SUBCUTANEOUS at 06:06

## 2018-04-13 RX ADMIN — SODIUM CHLORIDE 50 ML/HR: 0.9 INJECTION, SOLUTION INTRAVENOUS at 04:00

## 2018-04-13 RX ADMIN — OXYCODONE HYDROCHLORIDE 5 MG: 5 TABLET ORAL at 22:32

## 2018-04-13 RX ADMIN — OXYCODONE HYDROCHLORIDE 5 MG: 5 TABLET ORAL at 03:55

## 2018-04-13 RX ADMIN — HEPARIN SODIUM 5000 UNITS: 5000 INJECTION, SOLUTION INTRAVENOUS; SUBCUTANEOUS at 13:06

## 2018-04-13 NOTE — PLAN OF CARE
DISCHARGE PLANNING     Discharge to home or other facility with appropriate resources Progressing        DISCHARGE PLANNING - CARE MANAGEMENT     Discharge to post-acute care or home with appropriate resources Progressing        GASTROINTESTINAL - ADULT     Minimal or absence of nausea and/or vomiting Progressing     Maintains or returns to baseline bowel function Progressing     Maintains adequate nutritional intake Progressing        GENITOURINARY - ADULT     Maintains or returns to baseline urinary function Progressing     Absence of urinary retention Progressing        Knowledge Deficit     Patient/family/caregiver demonstrates understanding of disease process, treatment plan, medications, and discharge instructions Progressing        METABOLIC, FLUID AND ELECTROLYTES - ADULT     Electrolytes maintained within normal limits Progressing     Fluid balance maintained Progressing     Glucose maintained within target range Progressing        MUSCULOSKELETAL - ADULT     Maintain or return mobility to safest level of function Progressing        PAIN - ADULT     Verbalizes/displays adequate comfort level or baseline comfort level Progressing        Potential for Falls     Patient will remain free of falls Progressing        RESPIRATORY - ADULT     Achieves optimal ventilation and oxygenation Progressing        SAFETY ADULT     Maintain or return to baseline ADL function Progressing     Maintain or return mobility status to optimal level Progressing        SKIN/TISSUE INTEGRITY - ADULT     Skin integrity remains intact Progressing     Incision(s), wounds(s) or drain site(s) healing without S/S of infection Progressing     Oral mucous membranes remain intact Progressing

## 2018-04-13 NOTE — PLAN OF CARE
Present in room with Dr Dulce Tinoco to discuss plan of care to possibly discharge home later today after ability to tolerate descent amounts of full liquids  Questions and concerns addressed from patient at this time

## 2018-04-13 NOTE — PLAN OF CARE
Problem: PHYSICAL THERAPY ADULT  Goal: Performs mobility at highest level of function for planned discharge setting  See evaluation for individualized goals  Treatment/Interventions: Functional transfer training, LE strengthening/ROM, Elevations, Therapeutic exercise, Endurance training, Patient/family training, Gait training, OT, Spoke to case management, Spoke to nursing          See flowsheet documentation for full assessment, interventions and recommendations  Outcome: Progressing  Prognosis: Good  Problem List: Decreased endurance, Pain  Assessment: Pt demonstrates progress with overall mobility - continues to move purposefully, slow but without LOB or noted deficits  This session pt amb without RW with SLOW justen and almost step-to gait  Encouraged pt to continue walks without AD to progress toward PLOF  Pt and  in agreement with POC  Provided pt with exercises to perform in room as well  Pt and  demonstrated understanding of exercises  Recommendation: Home PT     PT - OK to Discharge: Yes    See flowsheet documentation for full assessment

## 2018-04-13 NOTE — DISCHARGE INSTRUCTIONS
Gently wash your incisions daily with soap and water, do not soak in a tub  Do not apply any lotions, creams, or ointments to incisions  No lifting over 10 lbs or strenuous exercise  No driving until seen at your post operative visit  Please obtain a pa/lat chest xray at a [facility] within 3 days of your follow up visit  When you are discharged from the hospital, you will be given a prescription for a narcotic pain medicine every 4-6 hours as needed for pain and start to decrease the frequency as soon as possible  We would also like you to take an anti-inflammatory medicine with your narcotic  We recommend Ibuprofen (Advil, Motrin) 800 mg 3 times a day with food  Continue Ibuprofen until your first post -operative visit  Your pain medicine needs will be reassessed at that visit  If you are unable to take anti-inflammatory medicines secondary to a pre-existing medical condition, please take Tylenol 650 mg 4 times a day as needed for pain

## 2018-04-13 NOTE — PROGRESS NOTES
Progress Note - General Surgery   Talisha Hill 75 y o  female MRN: [id number]  Unit/Bed#: [facility] 419-01 Encounter: [id number]    Assessment:  75y o -year-old female with paraesophageal hernia, s/p lap PEH repair, kelly gastroplasty, toupet fundoplication 7/89    Plan:  - advance to fulls  - d/c IVF  - resp protocol  - OOB/ambulate  - dispo planning      Subjective/Objective   Subjective: tolerating clears, no flatus or BM    Objective:    Blood pressure 127/72, pulse 102, temperature 98 7 °F (37 1 °C), temperature source Oral, resp  rate 18, height 5' 5" (1 651 m), weight 96 kg (211 lb 10 3 oz), SpO2 92 %  ,Body mass index is 35 22 kg/m²  I/O last 24 hours: In: 3094 2 [P O :200;  I V :2894 2]  Out: 1350 [Urine:1350]    Invasive Devices     Peripheral Intravenous Line            Peripheral IV 04/11/18 Left;Lateral Forearm 1 day    Peripheral IV 04/11/18 Right;Dorsal (posterior) Hand 1 day                Physical Exam:   NAD  Norm resp effort  RRR  Abd soft, incisions cdi closed with histoacryl, appropriately tender, ND    Lab, Imaging and other studies:  Lab Results   Component Value Date    WBC 10 48 (H) 04/12/2018    HGB 11 8 04/12/2018    HCT 37 6 04/12/2018    MCV 87 04/12/2018     04/12/2018      Lab Results   Component Value Date    GLUCOSE 117 04/12/2018    CALCIUM 8 5 04/12/2018     04/12/2018    K 3 8 04/12/2018    CO2 26 04/12/2018     (H) 04/12/2018    BUN 14 04/12/2018    CREATININE 0 69 04/12/2018       VTE Pharmacologic Prophylaxis: Heparin  VTE Mechanical Prophylaxis: sequential compression device

## 2018-04-13 NOTE — SOCIAL WORK
Pt will be cleared for d/c either later this day for next day  Pt is accepted for services by [address] [place] [address] for her aftercare plan  The pt and her  Cecille Conn were both informed of d/c paln   will transport pt home upon d/c, pickup day and time TBD  IMM signed  No chart copy required  CM to follow

## 2018-04-13 NOTE — PHYSICAL THERAPY NOTE
Physical Therapy Progress Note     04/13/18 1420   Pain Assessment   Pain Assessment 0-10   Pain Score 4   Pain Type Acute pain;Surgical pain   Pain Location Abdomen   Pain Descriptors Discomfort;Aching   Pain Frequency Intermittent   Pain Onset Gradual   Clinical Progression Gradually improving   Effect of Pain on Daily Activities limits mobility   Patient's Stated Pain Goal No pain   Hospital Pain Intervention(s) Medication (See MAR); Cold applied;Repositioned; Ambulation/increased activity   Response to Interventions toelrated   Restrictions/Precautions   Weight Bearing Precautions Per Order No   Other Precautions Pain   General   Chart Reviewed Yes   Response to Previous Treatment Patient with no complaints from previous session  Family/Caregiver Present Yes  ()   Cognition   Overall Cognitive Status WFL   Arousal/Participation Alert; Responsive; Cooperative   Attention Within functional limits   Orientation Level Oriented X4   Memory Within functional limits   Following Commands Follows all commands and directions without difficulty   Comments Pt is pleasent and cooperative - agreeable to therapy session     Subjective   Subjective Received supine in bed,  at side, stating she recently returned from a walk   Bed Mobility   Rolling L 6  Modified independent   Additional items Increased time required   Supine to Sit 6  Modified independent   Additional items Increased time required   Sit to Supine 6  Modified independent   Additional items Increased time required   Transfers   Sit to Stand 6  Modified independent   Additional items Increased time required   Stand to Sit 6  Modified independent   Additional items Increased time required   Ambulation/Elevation   Gait pattern Forward Flexion;Narrow CLAUDIO;Step to;Excessively slow   Gait Assistance 6  Modified independent   Assistive Device None   Distance 400ft   Stair Management Assistance Not tested  (pt deferred as not a necessity at home)   Balance Dynamic Standing Fair   Ambulatory Fair   Endurance Deficit   Endurance Deficit Yes   Endurance Deficit Description pain   Activity Tolerance   Activity Tolerance Patient limited by pain; Patient limited by fatigue   Nurse Made Aware yes   Exercises   Heelslides Supine;10 reps;AROM; Bilateral   Hip Abduction Supine;10 reps;AROM; Bilateral   Hip Adduction 10 reps;AROM; Bilateral;Sitting;Supine   Knee AROM Long Arc Quad Sitting;10 reps;AROM; Bilateral   Ankle Pumps Sitting;10 reps;AROM; Bilateral   Assessment   Prognosis Good   Problem List Decreased endurance;Pain   Assessment Pt demonstrates progress with overall mobility - continues to move purposefully, slow but without LOB or noted deficits  This session pt amb without RW with SLOW justen and almost step-to gait  Encouraged pt to continue walks without AD to progress toward PLOF  Pt and  in agreement with POC  Provided pt with exercises to perform in room as well  Pt and  demonstrated understanding of exercises      Goals   STG Expiration Date 04/22/18   Treatment Day 1   Plan   Treatment/Interventions Therapeutic exercise;Gait training;Spoke to case management;OT   PT Frequency Other (Comment)  (1-2 additional sessions to progress gait/amb distance)   Recommendation   Recommendation Home PT   Equipment Recommended Other (Comment)  (none)   PT - OK to Discharge Yes   *of note session was between [address], PT

## 2018-04-14 VITALS
OXYGEN SATURATION: 92 % | TEMPERATURE: 98 F | BODY MASS INDEX: 35.08 KG/M2 | RESPIRATION RATE: 18 BRPM | WEIGHT: 210.54 LBS | SYSTOLIC BLOOD PRESSURE: 131 MMHG | HEART RATE: 75 BPM | DIASTOLIC BLOOD PRESSURE: 80 MMHG | HEIGHT: 65 IN

## 2018-04-14 PROCEDURE — 99024 POSTOP FOLLOW-UP VISIT: CPT | Performed by: THORACIC SURGERY (CARDIOTHORACIC VASCULAR SURGERY)

## 2018-04-14 RX ADMIN — ONDANSETRON 4 MG: 2 INJECTION INTRAMUSCULAR; INTRAVENOUS at 05:56

## 2018-04-14 RX ADMIN — ATORVASTATIN CALCIUM 20 MG: 20 TABLET, FILM COATED ORAL at 09:11

## 2018-04-14 RX ADMIN — PANTOPRAZOLE SODIUM 20 MG: 20 TABLET, DELAYED RELEASE ORAL at 05:56

## 2018-04-14 RX ADMIN — LOSARTAN POTASSIUM 25 MG: 25 TABLET, FILM COATED ORAL at 09:11

## 2018-04-14 RX ADMIN — HEPARIN SODIUM 5000 UNITS: 5000 INJECTION, SOLUTION INTRAVENOUS; SUBCUTANEOUS at 05:57

## 2018-04-14 RX ADMIN — LEVOTHYROXINE SODIUM 50 MCG: 50 TABLET ORAL at 05:56

## 2018-04-14 NOTE — PROGRESS NOTES
Progress Note - Thoracic Surgery   Emmy Medley 75 y o  female MRN: [id number]  Unit/Bed#: [facility] 419-01 Encounter: [id number]    Assessment:  75y o -year-old female with paraesophageal hernia, s/p lap PEH repair, kelly gastroplasty, toupet fundoplication 7/99    Plan:  - fulls as tolerated  - discharge once patient tolerating adequate PO  - OOB, ambulate  - analgesia  - dispo planning    Subjective/Objective     Subjective: Patient denies pain  Tolerating small sips  Objective:     Vitals: Blood pressure 131/80, pulse 75, temperature 98 °F (36 7 °C), temperature source Oral, resp  rate 18, height 5' 5" (1 651 m), weight 95 5 kg (210 lb 8 6 oz), SpO2 92 %  ,Body mass index is 35 04 kg/m²  I/O       04/12 0701 - 04/13 0700 04/13 0701 - 04/14 0700 04/14 0701 - 04/15 0700    P  O  200 75     I V  (mL/kg) 1659 2 (17 3) 100 (1)     IV Piggyback       Total Intake(mL/kg) 1859 2 (19 4) 175 (1 8)     Urine (mL/kg/hr) 1025 (0 4) 858 (0 4)     Total Output 1025 858      Net +834 2 -683                   Physical Exam:  GEN: NAD  HEENT: MMM  CV: RRR  Lung: Normal effort  Ab: Soft, NT/ND  Extrem: No CCE  Neuro:  A+Ox3    Lab, Imaging and other studies: CBC with diff: No results found for: WBC, HGB, HCT, MCV, PLT, ADJUSTEDWBC, MCH, MCHC, RDW, MPV, NRBC, BMP/CMP: No results found for: NA, K, CL, CO2, ANIONGAP, BUN, CREATININE, GLUCOSE, CALCIUM, AST, ALT, ALKPHOS, PROT, ALBUMIN, BILITOT, EGFR, Magnesium: No results found for: MAG  VTE Pharmacologic Prophylaxis: Heparin  VTE Mechanical Prophylaxis: sequential compression device

## 2018-04-16 ENCOUNTER — TELEPHONE (OUTPATIENT)
Dept: FAMILY MEDICINE CLINIC | Facility: CLINIC | Age: 68
End: 2018-04-16

## 2018-04-16 NOTE — TELEPHONE ENCOUNTER
I spoke with Alexx Joshi on 4/14/18  She was discharged from [facility] on 4/14 S/P Hiatal Hernia Repair  She states that she is not able to leave the house right now (her physicians advised this) and she has visiting nurses set up  I offered a home visit with Dr Alice Lara but she declined  She is doing fine  Her pain is controlled right now and she is trying not to take the Oxycodone right now  She is doing fine without it  She has a follow up with Dr Giselle Solis (thoracic sugeon) on 4/27 and Dr German Anderson on 5/15/18   She knows to call us at any time with any questions or concerns and she knows to go to the ER if any chest pain, dyspnea, weakness, dizziness, palpitations, uncontrolled pain, etc ADRYAN LEE

## 2018-04-17 ENCOUNTER — HOSPITAL ENCOUNTER (INPATIENT)
Facility: HOSPITAL | Age: 68
LOS: 2 days | Discharge: HOME HEALTH | DRG: 948 | End: 2018-04-20
Attending: EMERGENCY MEDICINE | Admitting: INTERNAL MEDICINE
Payer: MEDICARE

## 2018-04-17 ENCOUNTER — APPOINTMENT (EMERGENCY)
Dept: RADIOLOGY | Facility: HOSPITAL | Age: 68
DRG: 948 | End: 2018-04-17
Payer: MEDICARE

## 2018-04-17 ENCOUNTER — TELEPHONE (OUTPATIENT)
Dept: FAMILY MEDICINE CLINIC | Facility: CLINIC | Age: 68
End: 2018-04-17

## 2018-04-17 DIAGNOSIS — K76.9 LIVER LESION, LEFT LOBE: ICD-10-CM

## 2018-04-17 DIAGNOSIS — R16.0 LIVER MASS, LEFT LOBE: ICD-10-CM

## 2018-04-17 DIAGNOSIS — R10.9 ABDOMINAL PAIN: ICD-10-CM

## 2018-04-17 DIAGNOSIS — K44.9 HIATAL HERNIA WITH GERD WITHOUT ESOPHAGITIS: ICD-10-CM

## 2018-04-17 DIAGNOSIS — R16.0 LIVER MASS: ICD-10-CM

## 2018-04-17 DIAGNOSIS — R07.9 LEFT SIDED CHEST PAIN: ICD-10-CM

## 2018-04-17 DIAGNOSIS — K21.9 HIATAL HERNIA WITH GERD WITHOUT ESOPHAGITIS: ICD-10-CM

## 2018-04-17 DIAGNOSIS — R06.02 SHORTNESS OF BREATH: Primary | ICD-10-CM

## 2018-04-17 PROBLEM — E03.9 HYPOTHYROIDISM: Status: ACTIVE | Noted: 2018-04-17

## 2018-04-17 PROBLEM — E87.6 HYPOKALEMIA: Status: ACTIVE | Noted: 2018-04-17

## 2018-04-17 PROBLEM — I10 HTN (HYPERTENSION): Status: ACTIVE | Noted: 2018-04-17

## 2018-04-17 PROBLEM — E78.5 HLD (HYPERLIPIDEMIA): Status: ACTIVE | Noted: 2018-04-17

## 2018-04-17 LAB
ALBUMIN SERPL BCP-MCNC: 3.1 G/DL (ref 3.5–5)
ALP SERPL-CCNC: 68 U/L (ref 46–116)
ALT SERPL W P-5'-P-CCNC: 144 U/L (ref 12–78)
ANION GAP SERPL CALCULATED.3IONS-SCNC: 9 MMOL/L (ref 4–13)
APTT PPP: 27 SECONDS (ref 24–33)
AST SERPL W P-5'-P-CCNC: 35 U/L (ref 5–45)
BASOPHILS # BLD AUTO: 0 THOUSANDS/ΜL (ref 0–0.1)
BASOPHILS NFR BLD AUTO: 0 % (ref 0–1)
BILIRUB SERPL-MCNC: 0.7 MG/DL (ref 0.2–1)
BUN SERPL-MCNC: 6 MG/DL (ref 5–25)
CALCIUM SERPL-MCNC: 8.7 MG/DL (ref 8.3–10.1)
CHLORIDE SERPL-SCNC: 103 MMOL/L (ref 100–108)
CO2 SERPL-SCNC: 27 MMOL/L (ref 21–32)
CREAT SERPL-MCNC: 0.76 MG/DL (ref 0.6–1.3)
EOSINOPHIL # BLD AUTO: 0.2 THOUSAND/ΜL (ref 0–0.61)
EOSINOPHIL NFR BLD AUTO: 2 % (ref 0–6)
ERYTHROCYTE [DISTWIDTH] IN BLOOD BY AUTOMATED COUNT: 14.7 % (ref 11.6–15.1)
GFR SERPL CREATININE-BSD FRML MDRD: 81 ML/MIN/1.73SQ M
GLUCOSE SERPL-MCNC: 101 MG/DL (ref 65–140)
HCT VFR BLD AUTO: 35.8 % (ref 37–47)
HGB BLD-MCNC: 11.6 G/DL (ref 12–16)
INR PPP: 1.08 (ref 0.86–1.16)
LYMPHOCYTES # BLD AUTO: 1.4 THOUSANDS/ΜL (ref 0.6–4.47)
LYMPHOCYTES NFR BLD AUTO: 20 % (ref 14–44)
MAGNESIUM SERPL-MCNC: 1.8 MG/DL (ref 1.6–2.6)
MCH RBC QN AUTO: 27 PG (ref 27–31)
MCHC RBC AUTO-ENTMCNC: 32.3 G/DL (ref 31.4–37.4)
MCV RBC AUTO: 84 FL (ref 82–98)
MONOCYTES # BLD AUTO: 0.8 THOUSAND/ΜL (ref 0.17–1.22)
MONOCYTES NFR BLD AUTO: 11 % (ref 4–12)
NEUTROPHILS # BLD AUTO: 4.6 THOUSANDS/ΜL (ref 1.85–7.62)
NEUTS SEG NFR BLD AUTO: 66 % (ref 43–75)
NRBC BLD AUTO-RTO: 0 /100 WBCS
PHOSPHATE SERPL-MCNC: 3.3 MG/DL (ref 2.3–4.1)
PLATELET # BLD AUTO: 217 THOUSANDS/UL (ref 130–400)
PMV BLD AUTO: 8.2 FL (ref 8.9–12.7)
POTASSIUM SERPL-SCNC: 3 MMOL/L (ref 3.5–5.3)
PROT SERPL-MCNC: 6.9 G/DL (ref 6.4–8.2)
PROTHROMBIN TIME: 11.3 SECONDS (ref 9.4–11.7)
RBC # BLD AUTO: 4.27 MILLION/UL (ref 4.2–5.4)
SODIUM SERPL-SCNC: 139 MMOL/L (ref 136–145)
TROPONIN I SERPL-MCNC: <0.02 NG/ML
TROPONIN I SERPL-MCNC: <0.02 NG/ML
WBC # BLD AUTO: 6.9 THOUSAND/UL (ref 4.8–10.8)

## 2018-04-17 PROCEDURE — 36415 COLL VENOUS BLD VENIPUNCTURE: CPT | Performed by: EMERGENCY MEDICINE

## 2018-04-17 PROCEDURE — 85610 PROTHROMBIN TIME: CPT | Performed by: EMERGENCY MEDICINE

## 2018-04-17 PROCEDURE — 85025 COMPLETE CBC W/AUTO DIFF WBC: CPT | Performed by: EMERGENCY MEDICINE

## 2018-04-17 PROCEDURE — 85730 THROMBOPLASTIN TIME PARTIAL: CPT | Performed by: EMERGENCY MEDICINE

## 2018-04-17 PROCEDURE — 99285 EMERGENCY DEPT VISIT HI MDM: CPT

## 2018-04-17 PROCEDURE — 93005 ELECTROCARDIOGRAM TRACING: CPT

## 2018-04-17 PROCEDURE — 84484 ASSAY OF TROPONIN QUANT: CPT | Performed by: INTERNAL MEDICINE

## 2018-04-17 PROCEDURE — 80053 COMPREHEN METABOLIC PANEL: CPT | Performed by: EMERGENCY MEDICINE

## 2018-04-17 PROCEDURE — 74177 CT ABD & PELVIS W/CONTRAST: CPT

## 2018-04-17 PROCEDURE — 76705 ECHO EXAM OF ABDOMEN: CPT

## 2018-04-17 PROCEDURE — 71275 CT ANGIOGRAPHY CHEST: CPT

## 2018-04-17 PROCEDURE — 87081 CULTURE SCREEN ONLY: CPT | Performed by: INTERNAL MEDICINE

## 2018-04-17 PROCEDURE — 83735 ASSAY OF MAGNESIUM: CPT | Performed by: EMERGENCY MEDICINE

## 2018-04-17 PROCEDURE — 96360 HYDRATION IV INFUSION INIT: CPT

## 2018-04-17 PROCEDURE — 84100 ASSAY OF PHOSPHORUS: CPT | Performed by: EMERGENCY MEDICINE

## 2018-04-17 PROCEDURE — 84484 ASSAY OF TROPONIN QUANT: CPT | Performed by: EMERGENCY MEDICINE

## 2018-04-17 PROCEDURE — 99220 PR INITIAL OBSERVATION CARE/DAY 70 MINUTES: CPT | Performed by: INTERNAL MEDICINE

## 2018-04-17 RX ORDER — ESOMEPRAZOLE MAGNESIUM 40 MG/1
40 CAPSULE, DELAYED RELEASE ORAL
Status: DISCONTINUED | OUTPATIENT
Start: 2018-04-17 | End: 2018-04-17

## 2018-04-17 RX ORDER — ACETAMINOPHEN 160 MG/5ML
650 SUSPENSION, ORAL (FINAL DOSE FORM) ORAL EVERY 6 HOURS PRN
Status: DISCONTINUED | OUTPATIENT
Start: 2018-04-17 | End: 2018-04-17

## 2018-04-17 RX ORDER — POTASSIUM CHLORIDE 20 MEQ/1
40 TABLET, EXTENDED RELEASE ORAL ONCE
Status: DISCONTINUED | OUTPATIENT
Start: 2018-04-17 | End: 2018-04-17

## 2018-04-17 RX ORDER — ACETAMINOPHEN 325 MG/1
650 TABLET ORAL EVERY 6 HOURS PRN
Status: DISCONTINUED | OUTPATIENT
Start: 2018-04-17 | End: 2018-04-17

## 2018-04-17 RX ORDER — FAMOTIDINE 20 MG/1
20 TABLET, FILM COATED ORAL 2 TIMES DAILY
Status: DISCONTINUED | OUTPATIENT
Start: 2018-04-17 | End: 2018-04-17

## 2018-04-17 RX ORDER — FAMOTIDINE 20 MG/1
20 TABLET, FILM COATED ORAL
Status: DISCONTINUED | OUTPATIENT
Start: 2018-04-18 | End: 2018-04-20 | Stop reason: HOSPADM

## 2018-04-17 RX ORDER — ATORVASTATIN CALCIUM 20 MG/1
20 TABLET, FILM COATED ORAL
Status: DISCONTINUED | OUTPATIENT
Start: 2018-04-18 | End: 2018-04-20 | Stop reason: HOSPADM

## 2018-04-17 RX ORDER — BUMETANIDE 1 MG/1
0.5 TABLET ORAL DAILY
Status: DISCONTINUED | OUTPATIENT
Start: 2018-04-18 | End: 2018-04-20 | Stop reason: HOSPADM

## 2018-04-17 RX ORDER — ACETAMINOPHEN 160 MG/5ML
650 SUSPENSION, ORAL (FINAL DOSE FORM) ORAL EVERY 6 HOURS PRN
Status: DISCONTINUED | OUTPATIENT
Start: 2018-04-17 | End: 2018-04-20 | Stop reason: HOSPADM

## 2018-04-17 RX ORDER — OXYCODONE HYDROCHLORIDE AND ACETAMINOPHEN 5; 325 MG/1; MG/1
TABLET ORAL
COMMUNITY
Start: 2018-04-14 | End: 2018-04-20 | Stop reason: HOSPADM

## 2018-04-17 RX ORDER — LOSARTAN POTASSIUM 25 MG/1
25 TABLET ORAL DAILY
Status: DISCONTINUED | OUTPATIENT
Start: 2018-04-18 | End: 2018-04-20 | Stop reason: HOSPADM

## 2018-04-17 RX ORDER — LEVOTHYROXINE SODIUM 0.05 MG/1
50 TABLET ORAL
Status: DISCONTINUED | OUTPATIENT
Start: 2018-04-18 | End: 2018-04-20 | Stop reason: HOSPADM

## 2018-04-17 RX ORDER — POTASSIUM CHLORIDE 20MEQ/15ML
40 LIQUID (ML) ORAL ONCE
Status: COMPLETED | OUTPATIENT
Start: 2018-04-17 | End: 2018-04-17

## 2018-04-17 RX ADMIN — ACETAMINOPHEN 650 MG: 160 SUSPENSION ORAL at 21:31

## 2018-04-17 RX ADMIN — POTASSIUM CHLORIDE 40 MEQ: 20 SOLUTION ORAL at 19:06

## 2018-04-17 RX ADMIN — IOHEXOL 100 ML: 350 INJECTION, SOLUTION INTRAVENOUS at 15:15

## 2018-04-17 RX ADMIN — SODIUM CHLORIDE 1000 ML: 0.9 INJECTION, SOLUTION INTRAVENOUS at 15:26

## 2018-04-17 NOTE — ED NOTES
Call to pharmacy to change KCL tablets to liquid, patient is post esophageal procedure and cannot swallow pills   Report to [place] Sudha, CRISSY  04/17/18 5047

## 2018-04-17 NOTE — ASSESSMENT & PLAN NOTE
MRI confirmed irregular left liver lobe mass [id number], unclear primary vs secondary  CT chest/abd/pelvis reviewed - no additional mass  Hem/Onc consult - d/w'ed Dr Carreon Colon input appreciated   GI and IR consulted for biopsy plan  F/u AFP, CEA, :   Ref   Range 4/18/2018 13:03   AFP-TUMOR MARKER Latest Ref Range: 0 5 - 8 ng/mL 4 5   CA 19-9 Latest Ref Range: 0 - 35 U/mL 4   CARCINOEMBRYONIC ANTIGEN Latest Ref Range: 0 0 - 3 0 ng/mL 0 9

## 2018-04-17 NOTE — ED NOTES
US was completed  Patient is having broth at this time   at bedside  Patient for admission    For troponin at Melisa Neff RN  04/17/18 2620

## 2018-04-17 NOTE — H&P
History and Physical - [facility] Internal Medicine  Patient Information: Arnoldo Lakhani 75 y o  female MRN: [id number]  Unit/Bed#: ED 10 Encounter: [id number]  Admitting Physician: Moni Sung MD  PCP: Bety Brandon DO  Date of Admission:  4/17/2018    Chief Complaint: Shortness of Breath (Patient presents with SOB to her left lung  "The pain is like a rubber band, when I breath in it tightens " Patient reports that she had a laproscopic hiatal hernia repare and esophageal elongation done on 4/11 by Dr Jonny Henderson at [address] [place] [address]  )    History of Present Illness: The history is provided by the patient  Family is at bedside  75 y o  female with a history of HTN, HLD, Severe hiatal hernia and GERD s/p recent laproscopic hiatal hernia repair and esophageal elongation on 4/11/18 (Dr Jonny Henderson at [facility]), presents with intermittent left sided chest pain and subjective sob for 1 day  She described the chest pain as rubber band like, accompanied by sob  She denied any fever, chill, cough, N/V, epigastric abd pain, melena, or BRBPR  Upon arrival to ED, patient appears tachypneic and SpO2 92% at RA  STAT CTA no evidence of PE or PNA, but ?? left hepatic lobe segment 3 infarct  US liver:   1  Liver hepatic artery, vein and portal vein Doppler appear normal   2   Ill-defined isoechoic 3 7 x 3 4 cm mass in the left lobe of the liver as seen on CT could represent hemangioma or focal fatty infiltration or pulmonary infarct as suggested on CT  Follow-up contrast enhanced abdominal MRI for further catheterization  K = 3  First troponin -ve  EKG NS with PAC    Review of Systems:  10 point remainder of review of systems negative, see HPI  Constitutional: Negative for fatigue, fever, chills, appetite change and unexpected weight change  HEENT: Negative for hearing loss, ear pain, congestion, rhinorrhea, neck pain, neck stiffness, postnasal drip, sinus pressure and ear discharge     Eyes: Negative for photophobia and visual disturbance  Respiratory: Negative for cough, Positive for shortness of breath  Cardiovascular: Positive for left sided chest pain  Gastrointestinal: Negative for nausea, abdominal pain, diarrhea, constipation and blood in stool  Genitourinary: Negative for dysuria and difficulty urinating  Skin: Negative for rash  Neurological: Negative for dizziness, numbness and headaches  Negative for syncope and weakness  Psychiatric/Behavioral: Negative for sleep disturbance  Negative for suicidal ideas, self-injury and dysphoric mood  Past Medical and Surgical History:   Past Medical History:   Diagnosis Date    Anemia     Hx secondary to bleeding ulcer    Asthma     seasonal    Disease of thyroid gland     GERD (gastroesophageal reflux disease)     Goiter, nodular     History of transfusion     x1 after bleeding ulcer    Hyperlipidemia     Hypertension     Irritable bowel syndrome     Kidney stone     RA [facility]he[facility]oid arthritis) ([facility] [address] )     Seasonal allergies     Stomach ulcer     Vertigo        Past Surgical History:   Procedure Laterality Date    CARDIAC CATHETERIZATION      x2 secondary to exertional chest pain, due to lung issues, possible mild COPD    CHOLECYSTECTOMY  2015    lap    ESOPHAGOGASTRODUODENOSCOPY N/A 1/23/2018    Procedure: ESOPHAGOGASTRODUODENOSCOPY (EGD); Surgeon: Emmy Goss MD;  Location: [facility] GI LAB; Service: Gastroenterology    HYSTERECTOMY      partial    OK ESOPHAGOSCOPY FLEXIBLE TRANSORAL WITH BIOPSY N/A 4/11/2018    Procedure: ESOPHAGOGASTRODUODENOSCOPY (EGD);   Surgeon: Raul Rolle MD;  Location: [facility];  Service: Thoracic    OK LAP, REPAIR PARAESOPHAGEAL HERNIA, INCL FUNDOPLASTY W/ MESH N/A 4/11/2018    Procedure: REPAIR HERNIA PARAESOPHAGEAL  LAPAROSCOPIC,ELEONORA GASTROPLASTY, Margarita Sinner FUNDOPLICATION;  Surgeon: Raul Rolle MD;  Location: [facility];  Service: Thoracic    SKIN BIOPSY Right     lump benign    THYROIDECTOMY, PARTIAL      TONSILLECTOMY         Family History:  Family History   Problem Relation Age of Onset    Alzheimer's disease Mother     Cancer Mother      skin     Thyroid disease Mother     Cancer Father      prostate    Stroke Father     Hypertension Father     Thyroid disease Sister     Hyperlipidemia Brother     No Known Problems Son     No Known Problems Son        Meds/Allergies:  (Not in a hospital admission)    Allergies   Allergen Reactions    Pantoprazole Headache    Penicillins Other (See Comments)     During allergy testing instructed to NEVER take PCN       Current Medications:  Current Facility-Administered Medications   Medication Dose Route Frequency Provider Last Rate Last Dose    acetaminophen (TYLENOL) tablet 650 mg  650 mg Oral Q6H PRN Carmen Archuleta MD        potassium chloride 10 % oral solution 40 mEq  40 mEq Oral Once Carmen Archuleta MD         Current Outpatient Prescriptions   Medication Sig Dispense Refill    oxyCODONE-acetaminophen (PERCOCET) 5-325 mg per tablet       atorvastatin (LIPITOR) 20 mg tablet Take 20 mg by mouth daily      bumetanide (BUMEX) 0 5 MG tablet Take 0 5 mg by mouth daily      Calcium Carb-Cholecalciferol 600-1000 MG-UNIT CAPS Take 1 capsule by mouth daily        esomeprazole (NexIUM) 40 MG capsule Take 1 capsule (40 mg total) by mouth 2 (two) times a day before meals for 360 days 180 capsule 3    famotidine (PEPCID) 20 mg tablet Take 1 tablet (20 mg total) by mouth 2 (two) times a day (Patient taking differently: Take 20 mg by mouth every evening  ) 180 tablet 3    levothyroxine 50 mcg tablet Take 50 mcg by mouth daily      Linaclotide (LINZESS) 72 MCG CAPS Take 72 mcg by mouth every evening 90 capsule 3    losartan (COZAAR) 25 mg tablet Take 25 mg by mouth daily      potassium chloride (K-DUR) 10 mEq tablet TAKE 1 TABLET DAILY AS DIRECTED 90 tablet 1    tiotropium (SPIRIVA) 18 mcg inhalation capsule Place 18 mcg into inhaler and inhale as needed Immunizations:  Immunization History   Administered Date(s) Administered    Influenza Split High Dose Preservative Free IM 11/02/2015, 10/27/2016, 10/04/2017    Pneumococcal Conjugate 13-Valent 08/10/2016    Pneumococcal Polysaccharide PPV23 10/04/2017    Tdap 07/30/2015     History:  Social History     Social History    Marital status: /Civil Union     Spouse name: N/A    Number of children: N/A    Years of education: N/A     Occupational History    Not on file  Social History Main Topics    Smoking status: Never Smoker    Smokeless tobacco: Never Used    Alcohol use No    Drug use: No    Sexual activity: Not on file     Other Topics Concern    Not on file     Social History Narrative    No narrative on file        Substance Use History:   History   Alcohol Use No     History   Smoking Status    Never Smoker   Smokeless Tobacco    Never Used     History   Drug Use No       Physical Exam:   Vitals:   Blood Pressure: 160/73 (04/17/18 1815)  Pulse: 72 (04/17/18 1845)  Temperature: 97 9 °F (36 6 °C) (04/17/18 1423)  Temp Source: Oral (04/17/18 1423)  Respirations: (!) 24 (04/17/18 1845)  Height: 5' 5" (165 1 cm) (04/17/18 1423)  Weight - Scale: 90 7 kg (200 lb) (04/17/18 1423)  SpO2: 96 % (04/17/18 1845)  Body mass index is 33 28 kg/m²    General: NAD  HEENT: EOMI, anicteric, oral moist, no oral thrush or mucosal lesion, neck supple, no mass or JVD  Chest: Left under breast tenderness on palpation, CTAB, no wheeze/rales  Cardiac: RRR, S1/S2, No murmur  Abd: S/ND/NT/BS+  MSK: No LE pitting edema, pulses intact  Neuro: AAOx3, moving all extremities  Psychiatric: Mood with normal affect    Lab Results:     Results from last 7 days  Lab Units 04/17/18  1436   WBC Thousand/uL 6 90   HEMOGLOBIN g/dL 11 6*   HEMATOCRIT % 35 8*   PLATELETS Thousands/uL 217   NEUTROS PCT % 66   LYMPHS PCT % 20   MONOS PCT % 11   EOS PCT % 2       Results from last 7 days  Lab Units 04/17/18  1436   SODIUM mmol/L 139   POTASSIUM mmol/L 3 0*   CHLORIDE mmol/L 103   CO2 mmol/L 27   BUN mg/dL 6   CREATININE mg/dL 0 76   CALCIUM mg/dL 8 7   TOTAL PROTEIN g/dL 6 9   BILIRUBIN TOTAL mg/dL 0 70   ALK PHOS U/L 68   ALT U/L 144*   AST U/L 35   GLUCOSE RANDOM mg/dL 101       Results from last 7 days  Lab Units 04/17/18  1436   INR  1 08       Recent Results (from the past 24 hour(s))   CBC and differential    Collection Time: 04/17/18  2:36 PM   Result Value Ref Range    WBC 6 90 4 80 - 10 80 Thousand/uL    RBC 4 27 4 20 - 5 40 Million/uL    Hemoglobin 11 6 (L) 12 0 - 16 0 g/dL    Hematocrit 35 8 (L) 37 0 - 47 0 %    MCV 84 82 - 98 fL    MCH 27 0 27 0 - 31 0 pg    MCHC 32 3 31 4 - 37 4 g/dL    RDW 14 7 11 6 - 15 1 %    MPV 8 2 (L) 8 9 - 12 7 fL    Platelets 609 395 - 979 Thousands/uL    nRBC 0 /100 WBCs    Neutrophils Relative 66 43 - 75 %    Lymphocytes Relative 20 14 - 44 %    Monocytes Relative 11 4 - 12 %    Eosinophils Relative 2 0 - 6 %    Basophils Relative 0 0 - 1 %    Neutrophils Absolute 4 60 1 85 - 7 62 Thousands/µL    Lymphocytes Absolute 1 40 0 60 - 4 47 Thousands/µL    Monocytes Absolute 0 80 0 17 - 1 22 Thousand/µL    Eosinophils Absolute 0 20 0 00 - 0 61 Thousand/µL    Basophils Absolute 0 00 0 00 - 0 10 Thousands/µL   Comprehensive metabolic panel    Collection Time: 04/17/18  2:36 PM   Result Value Ref Range    Sodium 139 136 - 145 mmol/L    Potassium 3 0 (L) 3 5 - 5 3 mmol/L    Chloride 103 100 - 108 mmol/L    CO2 27 21 - 32 mmol/L    Anion Gap 9 4 - 13 mmol/L    BUN 6 5 - 25 mg/dL    Creatinine 0 76 0 60 - 1 30 mg/dL    Glucose 101 65 - 140 mg/dL    Calcium 8 7 8 3 - 10 1 mg/dL    AST 35 5 - 45 U/L     (H) 12 - 78 U/L    Alkaline Phosphatase 68 46 - 116 U/L    Total Protein 6 9 6 4 - 8 2 g/dL    Albumin 3 1 (L) 3 5 - 5 0 g/dL    Total Bilirubin 0 70 0 20 - 1 00 mg/dL    eGFR 81 ml/min/1 73sq m   Magnesium    Collection Time: 04/17/18  2:36 PM   Result Value Ref Range    Magnesium 1 8 1 6 - 2 6 mg/dL Phosphorus    Collection Time: 04/17/18  2:36 PM   Result Value Ref Range    Phosphorus 3 3 2 3 - 4 1 mg/dL   Protime-INR    Collection Time: 04/17/18  2:36 PM   Result Value Ref Range    Protime 11 3 9 4 - 11 7 seconds    INR 1 08 0 86 - 1 16   APTT    Collection Time: 04/17/18  2:36 PM   Result Value Ref Range    PTT 27 24 - 33 seconds   Troponin I    Collection Time: 04/17/18  2:46 PM   Result Value Ref Range    Troponin I <0 02 <=0 04 ng/mL       Imaging:  Xr Chest Portable    Result Date: 4/11/2018  Narrative: CHEST INDICATION:   s/p paraesophageal hernia repair  COMPARISON:  None EXAM PERFORMED/VIEWS:  XR CHEST PORTABLE FINDINGS:  Extensive subcutaneous emphysema in the chest wall and neck  Cardiomediastinal silhouette appears unremarkable  The lungs are clear  There is a faint curvilinear density in the left lung apex, however there appears to be lung markings extending beyond this  Otherwise no pneumothorax or pleural effusion  Osseous structures appear within normal limits for patient age  Impression: Extensive subcutaneous emphysema in the chest wall and neck  No acute cardiopulmonary disease  Workstation performed: [id number]     Fl Barium Swallow    Result Date: 4/12/2018  Narrative: BARIUM SWALLOW-ESOPHAGRAM INDICATION:   s/p paraesophageal hernia repair  COMPARISON:  10/2/2017 IMAGES:  8 FLUOROSCOPY TIME:   0 97 min  TECHNIQUE: The patient was given water-soluble contrast by mouth and multiple images of the esophagus with attention to the gastroesophageal junction were obtained  After no leak was confirmed, the patient was given thin barium and multiple images obtained  FINDINGS: Postsurgical changes of fundoplication for repair of hernia is noted  There is no evidence of extraluminal contrast   Contrast flows freely through the gastroesophageal junction  Impression: Postsurgical changes of fundoplication  No evidence of leak   Workstation performed: [id number]     Cta Chest Ct Abdomen Pelvis W Contrast    Result Date: 4/17/2018  Narrative: CT PULMONARY ANGIOGRAM OF THE CHEST AND CT ABDOMEN AND PELVIS WITH INTRAVENOUS CONTRAST INDICATION:   Shortness of breath, hypoxia  Status post laparoscopic paraesophageal hernia repair with fundoplication COMPARISON: None  TECHNIQUE:  CT examination of the chest, abdomen and pelvis was performed  Thin section CT angiographic technique was used in the chest in order to evaluate for pulmonary embolus and coronal 3D MIP postprocessing was performed on the acquisition scanner  Axial, sagittal, and coronal 2D reformatted images were created from the source data and submitted for interpretation  Radiation dose length product (DLP) for this visit:  1127 81 mGy-cm   This examination, like all CT scans performed in the [facility], was performed utilizing techniques to minimize radiation dose exposure, including the use of iterative reconstruction and automated exposure control  IV Contrast:  100 mL of iohexol (OMNIPAQUE) Enteric Contrast:  Enteric contrast was not administered  FINDINGS: CHEST PULMONARY ARTERIAL TREE:  No pulmonary embolus is seen  LUNGS:  There is bibasilar consolidation, atelectasis versus aspiration  There is no tracheal or endobronchial lesion  PLEURA:  There are small bilateral pleural effusions  HEART/AORTA:  Unremarkable for patient's age  MEDIASTINUM AND GUILLE:  There is gas in the anterior mediastinum  CHEST WALL AND LOWER NECK:   There is gas tracking along the right neck, chest/abdominal wall and axilla  ABDOMEN LIVER/BILIARY TREE:  There is geographic hypoattenuation throughout segment 3 of the left hepatic lobe which is suspicious for infarct, particularly as the portal veins to this segment appear attenuated  This is of uncertain etiology  GALLBLADDER:  Gallbladder is surgically absent  SPLEEN:  Unremarkable  PANCREAS:  Unremarkable  ADRENAL GLANDS:  Unremarkable   KIDNEYS/URETERS:  There is a punctate nonobstructing right renal calculus  No hydronephrosis  STOMACH AND BOWEL:  There are postoperative changes at the GE junction status post hiatal hernia repair and fundoplication  APPENDIX:  No findings to suggest appendicitis  ABDOMINOPELVIC CAVITY:  There is mild pelvic free fluid  No free intraperitoneal air  No lymphadenopathy  VESSELS:  Unremarkable for patient's age  PELVIS REPRODUCTIVE ORGANS:  Patient is status post hysterectomy  URINARY BLADDER:  There is a small focus of gas in the urinary bladder likely related to instrumentation  ABDOMINAL WALL/INGUINAL REGIONS:  Unremarkable  OSSEOUS STRUCTURES:  No acute fracture or destructive osseous lesion  Impression: 1  No acute pulmonary embolism  2   Small bilateral pleural effusions with overlying consolidation, atelectasis versus aspiration  3   Gas extending from the neck along the right chest/abdominal wall and axilla as well as in the anterior mediastinum likely related to prior surgery  4   Geographic hypoattenuation throughout the lateral segment of the left hepatic lobe  This is suspicious for segment 3 infarct, particularly as the portal veins within this segment appear somewhat attenuated  The etiology for this is uncertain  Less  likely is direct liver contusion related to surgery as the study was performed laparoscopically  5   Punctate nonobstructing right renal calculus  Workstation performed: [id number]       US right upper quadrant with liver dopplers   Final Result         1  Liver hepatic artery, vein and portal vein Doppler appear normal    2   Ill-defined isoechoic 3 7 x 3 4 cm mass in the left lobe of the liver as seen on CT could represent hemangioma or focal fatty infiltration or pulmonary infarct as suggested on CT  Follow-up contrast enhanced abdominal MRI for further    catheterization  Workstation performed: [id number]         CTA chest ct abdomen pelvis w contrast   Final Result      1  No acute pulmonary embolism        2   Small bilateral pleural effusions with overlying consolidation, atelectasis versus aspiration  3   Gas extending from the neck along the right chest/abdominal wall and axilla as well as in the anterior mediastinum likely related to prior surgery  4   Geographic hypoattenuation throughout the lateral segment of the left hepatic lobe  This is suspicious for segment 3 infarct, particularly as the portal veins within this segment appear somewhat attenuated  The etiology for this is uncertain  Less    likely is direct liver contusion related to surgery as the study was performed laparoscopically  5   Punctate nonobstructing right renal calculus  Workstation performed: [id number]         MRI inpatient order    (Results Pending)       EKG, Pathology, and Other Studies Reviewed on Admission:     Allscripts Records Reviewed: Yes     Assessment/Plan:  Hospital Problem List:   Principal Problem:    Shortness of breath  Active Problems:    Left sided chest pain    Hypokalemia    Liver lesion, left lobe    GERD (gastroesophageal reflux disease)    Hiatal hernia with GERD without esophagitis    HTN (hypertension)    HLD (hyperlipidemia)    Hypothyroidism    75 y o  female with a history of HTN, HLD, Severe hiatal hernia and GERD s/p recent laproscopic hiatal hernia repair and esophageal elongation on 4/11/18 (Dr Kevin Bobby at [facility]), presents with intermittent left sided chest pain and subjective sob for 1 day  She described the chest pain as rubber band like, accompanied by sob  She denied any fever, chill, cough, N/V, epigastric abd pain, melena, or BRBPR  Upon arrival to ED, patient appears tachypneic and SpO2 92% at RA  STAT CTA no evidence of PE or PNA, but ?? left hepatic lobe segment 3 infarct  US liver:   1    Liver hepatic artery, vein and portal vein Doppler appear normal   2   Ill-defined isoechoic 3 7 x 3 4 cm mass in the left lobe of the liver as seen on CT could represent hemangioma or focal fatty infiltration or pulmonary infarct as suggested on CT  Follow-up contrast enhanced abdominal MRI for further catheterization  K = 3  First troponin -ve  EKG NS with PAC    * Shortness of breath   Assessment & Plan    Unclear etiology  CTA chest no evidence of PE or PNA  ? more likely recent lap procedure related as per hx  Serial trop, EKG, tele to r/o cardiac origin  Continue supplemental O2 to keep SpO2>90%  Continue home PPI bid, Pepcid bid  Tylenol prn for pain  CTA, US liver reviewed  MRI liver am  Consider GI consult am          Left sided chest pain   Assessment & Plan    Serial trop, EKG, tele to r/o cardiac origin        Liver lesion, left lobe   Assessment & Plan    See above        Hypokalemia   Assessment & Plan    KCl supplement        Hypothyroidism   Assessment & Plan    Continue Synthyroid        HLD (hyperlipidemia)   Assessment & Plan    Continue Lipitor        HTN (hypertension)   Assessment & Plan    Continue losartan        GERD (gastroesophageal reflux disease)   Assessment & Plan    Continue home Nexium bid, Pepcid bid (intolerance to Protonix in the past)          DVT Prophylaxis: on SCD, ambulation  Code Status: Full Code  Disposition: anticipate d/c home once clinically improved  Anticipated Length of Stay:  Patient will be admitted on an Observation basis with an anticipated length of stay of  <= 2 midnights  Total Time for Visit, including Counseling/Coordination of Care: 45 minutes  Greater than 50% of this total time spent on direct patient counseling and coordination of care      Signed by:  Kay Lincoln MD  Attending Hospitalist  Page#: [phone] (Hours 7am to 7pm)  4/17/2018 7:03 PM

## 2018-04-17 NOTE — ED PROVIDER NOTES
History  Chief Complaint   Patient presents with    Shortness of Breath     Patient presents with SOB to her left lung  "The pain is like a rubber band, when I breath in it tightens " Patient reports that she had a laproscopic hiatal hernia repare and esophageal elongation done on 4/11 by Dr Anne Law at [place] [address]  50-year-old female with past medical history of IBS, GERD, hypothyroidism, hyperlipidemia, hypertension, asthma, rheumatoid arthritis, kidney stone, anemia, vertigo, presents to the ER with acute onset shortness of breath and left-sided chest tightness since yesterday  Patient is status post 1 week after having laparoscopic hiatal hernia repair  Patient started to note dyspnea on exertion yesterday  Patient was seen by her visiting nurse today who recommended patient call her PCP  Patient called her PCP who recommended patient come to the ER for further evaluation  Upon arrival to the ER patient is tachypneic and satting 92% on room air  History provided by:  Patient  Shortness of Breath   Associated symptoms: no abdominal pain, no chest pain, no cough, no ear pain, no fever, no headaches, no rash and no wheezing        Prior to Admission Medications   Prescriptions Last Dose Informant Patient Reported? Taking?    Calcium Carb-Cholecalciferol 600-1000 MG-UNIT CAPS   Yes No   Sig: Take 1 capsule by mouth daily     Linaclotide (LINZESS) 72 MCG CAPS   No No   Sig: Take 72 mcg by mouth every evening   atorvastatin (LIPITOR) 20 mg tablet   Yes No   Sig: Take 20 mg by mouth daily   bumetanide (BUMEX) 0 5 MG tablet   Yes No   Sig: Take 0 5 mg by mouth daily   esomeprazole (NexIUM) 40 MG capsule   No No   Sig: Take 1 capsule (40 mg total) by mouth 2 (two) times a day before meals for 360 days   famotidine (PEPCID) 20 mg tablet   No No   Sig: Take 1 tablet (20 mg total) by mouth 2 (two) times a day   Patient taking differently: Take 20 mg by mouth every evening     levothyroxine 50 mcg tablet Yes No   Sig: Take 50 mcg by mouth daily   losartan (COZAAR) 25 mg tablet   Yes No   Sig: Take 25 mg by mouth daily   oxyCODONE-acetaminophen (PERCOCET) 5-325 mg per tablet Past Week at Unknown time  Yes Yes   potassium chloride (K-DUR) 10 mEq tablet   No No   Sig: TAKE 1 TABLET DAILY AS DIRECTED   tiotropium (SPIRIVA) 18 mcg inhalation capsule   Yes No   Sig: Place 18 mcg into inhaler and inhale as needed      Facility-Administered Medications: None       Past Medical History:   Diagnosis Date    Anemia     Hx secondary to bleeding ulcer    Asthma     seasonal    Disease of thyroid gland     GERD (gastroesophageal reflux disease)     Goiter, nodular     History of transfusion     x1 after bleeding ulcer    Hyperlipidemia     Hypertension     Irritable bowel syndrome     Kidney stone     RA (rheumatoid arthritis) (HCC)     Seasonal allergies     Stomach ulcer     Vertigo        Past Surgical History:   Procedure Laterality Date    CARDIAC CATHETERIZATION      x2 secondary to exertional chest pain, due to lung issues, possible mild COPD    CHOLECYSTECTOMY  2015    lap    ESOPHAGOGASTRODUODENOSCOPY N/A 1/23/2018    Procedure: ESOPHAGOGASTRODUODENOSCOPY (EGD); Surgeon: Melania Childs MD;  Location: [facility]; Service: Gastroenterology    HYSTERECTOMY      partial    VA ESOPHAGOSCOPY FLEXIBLE TRANSORAL WITH BIOPSY N/A 4/11/2018    Procedure: ESOPHAGOGASTRODUODENOSCOPY (EGD);   Surgeon: Anel Cerda MD;  Location: [facility];  Service: Thoracic    VA LAP, REPAIR PARAESOPHAGEAL HERNIA, INCL FUNDOPLASTY W/ MESH N/A 4/11/2018    Procedure: REPAIR HERNIA PARAESOPHAGEAL  LAPAROSCOPIC,ELEONORA GASTROPLASTY, Gerry Gelineau FUNDOPLICATION;  Surgeon: Anel Cerda MD;  Location: [facility];  Service: Thoracic    SKIN BIOPSY Right     lump benign    THYROIDECTOMY, PARTIAL      TONSILLECTOMY         Family History   Problem Relation Age of Onset    Alzheimer's disease Mother     Cancer Mother      skin  Thyroid disease Mother     Cancer Father      prostate    Stroke Father     Hypertension Father     Thyroid disease Sister     Hyperlipidemia Brother     No Known Problems Son     No Known Problems Son      I have reviewed and agree with the history as documented  Social History   Substance Use Topics    Smoking status: Never Smoker    Smokeless tobacco: Never Used    Alcohol use No        Review of Systems   Constitutional: Negative for activity change, appetite change, chills and fever  HENT: Negative for congestion and ear pain  Eyes: Negative for pain and discharge  Respiratory: Positive for shortness of breath  Negative for cough, chest tightness, wheezing and stridor  Cardiovascular: Negative for chest pain and palpitations  Gastrointestinal: Negative for abdominal distention, abdominal pain, constipation, diarrhea and nausea  Endocrine: Negative for cold intolerance  Genitourinary: Negative for dysuria, frequency and urgency  Musculoskeletal: Negative for arthralgias and back pain  Skin: Negative for color change and rash  Allergic/Immunologic: Negative for environmental allergies and food allergies  Neurological: Negative for dizziness, weakness, numbness and headaches  Hematological: Negative for adenopathy  Psychiatric/Behavioral: Negative for agitation, behavioral problems and confusion  The patient is not nervous/anxious  All other systems reviewed and are negative        Physical Exam  ED Triage Vitals [04/17/18 1423]   Temperature Pulse Respirations Blood Pressure SpO2   97 9 °F (36 6 °C) 94 22 153/93 92 %      Temp Source Heart Rate Source Patient Position - Orthostatic VS BP Location FiO2 (%)   Oral Monitor Lying Right arm --      Pain Score       5           Orthostatic Vital Signs  Vitals:    04/17/18 1800 04/17/18 1815 04/17/18 1830 04/17/18 1845   BP: 166/74 160/73     Pulse: 72 68 68 72   Patient Position - Orthostatic VS:           Physical Exam Constitutional: She is oriented to person, place, and time  She appears well-developed and well-nourished  HENT:   Head: Normocephalic and atraumatic  Mouth/Throat: Oropharynx is clear and moist    Eyes: Conjunctivae and EOM are normal    Neck: Normal range of motion  Neck supple  Cardiovascular: Normal rate, regular rhythm, normal heart sounds and intact distal pulses  Pulmonary/Chest: Effort normal and breath sounds normal    No wheezing or rales noted on exam     Left lower rib tenderness noted to palpation  Abdominal: Soft  Bowel sounds are normal  She exhibits no distension  There is no tenderness  Bowel sounds are present, abdomen is soft  Five laparoscopic incision scars noted throughout abdomen that appears to be healing well  No edema, erythema, warm to touch noted along any of the incision sites  Patient does have mild epigastric tenderness to palpation that patient states has been present since her surgery  Musculoskeletal: Normal range of motion  Neurological: She is alert and oriented to person, place, and time  Skin: Skin is warm and dry  Psychiatric: She has a normal mood and affect  Her behavior is normal  Judgment and thought content normal    Nursing note and vitals reviewed        ED Medications  Medications   potassium chloride (K-DUR,KLOR-CON) CR tablet 40 mEq (not administered)   sodium chloride 0 9 % bolus 1,000 mL (0 mL Intravenous Stopped 4/17/18 1638)   iohexol (OMNIPAQUE) 350 MG/ML injection (MULTI-DOSE) 100 mL (100 mL Intravenous Given 4/17/18 1515)       Diagnostic Studies  Results Reviewed     Procedure Component Value Units Date/Time    Troponin I [[id number]]     Lab Status:  No result Specimen:  Blood     APTT [[id number]]  (Normal) Collected:  04/17/18 1436    Lab Status:  Final result Specimen:  Blood from Arm, Left Updated:  04/17/18 1756     PTT 27 seconds     Protime-INR [[id number]]  (Normal) Collected:  04/17/18 1436    Lab Status:  Final result Specimen: Blood from Arm, Left Updated:  04/17/18 1756     Protime 11 3 seconds      INR 1 08    Troponin I [[id number]]  (Normal) Collected:  04/17/18 1446    Lab Status:  Final result Specimen:  Blood from Arm, Left Updated:  04/17/18 1511     Troponin I <0 02 ng/mL     Narrative:         [employer] Chemistry analyzer 99% cutoff is > 0 04 ng/mL in network labs    o cTnI 99% cutoff is useful only when applied to patients in the clinical setting of myocardial ischemia  o cTnI 99% cutoff should be interpreted in the context of clinical history, ECG findings and possibly cardiac imaging to establish correct diagnosis  o cTnI 99% cutoff may be suggestive but clearly not indicative of a coronary event without the clinical setting of myocardial ischemia  Comprehensive metabolic panel [[id number]]  (Abnormal) Collected:  04/17/18 1436    Lab Status:  Final result Specimen:  Blood from Arm, Left Updated:  04/17/18 1504     Sodium 139 mmol/L      Potassium 3 0 (L) mmol/L      Chloride 103 mmol/L      CO2 27 mmol/L      Anion Gap 9 mmol/L      BUN 6 mg/dL      Creatinine 0 76 mg/dL      Glucose 101 mg/dL      Calcium 8 7 mg/dL      AST 35 U/L       (H) U/L      Alkaline Phosphatase 68 U/L      Total Protein 6 9 g/dL      Albumin 3 1 (L) g/dL      Total Bilirubin 0 70 mg/dL      eGFR 81 ml/min/1 73sq m     Narrative:         National Kidney Disease Education Program recommendations are as follows:  GFR calculation is accurate only with a steady state creatinine  Chronic Kidney disease less than 60 ml/min/1 73 sq  meters  Kidney failure less than 15 ml/min/1 73 sq  meters      Magnesium [[id number]]  (Normal) Collected:  04/17/18 1436    Lab Status:  Final result Specimen:  Blood from Arm, Left Updated:  04/17/18 1504     Magnesium 1 8 mg/dL     Phosphorus [[id number]]  (Normal) Collected:  04/17/18 1436    Lab Status:  Final result Specimen:  Blood from Arm, Left Updated:  04/17/18 1504     Phosphorus 3 3 mg/dL     CBC and differential [[id number]]  (Abnormal) Collected:  04/17/18 1436    Lab Status:  Final result Specimen:  Blood from Arm, Left Updated:  04/17/18 1447     WBC 6 90 Thousand/uL      RBC 4 27 Million/uL      Hemoglobin 11 6 (L) g/dL      Hematocrit 35 8 (L) %      MCV 84 fL      MCH 27 0 pg      MCHC 32 3 g/dL      RDW 14 7 %      MPV 8 2 (L) fL      Platelets 140 Thousands/uL      nRBC 0 /100 WBCs      Neutrophils Relative 66 %      Lymphocytes Relative 20 %      Monocytes Relative 11 %      Eosinophils Relative 2 %      Basophils Relative 0 %      Neutrophils Absolute 4 60 Thousands/µL      Lymphocytes Absolute 1 40 Thousands/µL      Monocytes Absolute 0 80 Thousand/µL      Eosinophils Absolute 0 20 Thousand/µL      Basophils Absolute 0 00 Thousands/µL                  US right upper quadrant with liver dopplers   Final Result by Krissy Martinez MD (04/17 3193)         1  Liver hepatic artery, vein and portal vein Doppler appear normal    2   Ill-defined isoechoic 3 7 x 3 4 cm mass in the left lobe of the liver as seen on CT could represent hemangioma or focal fatty infiltration or pulmonary infarct as suggested on CT  Follow-up contrast enhanced abdominal MRI for further    catheterization  Workstation performed: [id number]         CTA chest ct abdomen pelvis w contrast   Final Result by Lolita Agiurre MD (04/17 1606)      1  No acute pulmonary embolism  2   Small bilateral pleural effusions with overlying consolidation, atelectasis versus aspiration  3   Gas extending from the neck along the right chest/abdominal wall and axilla as well as in the anterior mediastinum likely related to prior surgery  4   Geographic hypoattenuation throughout the lateral segment of the left hepatic lobe  This is suspicious for segment 3 infarct, particularly as the portal veins within this segment appear somewhat attenuated  The etiology for this is uncertain    Less    likely is direct liver contusion related to surgery as the study was performed laparoscopically  5   Punctate nonobstructing right renal calculus  Workstation performed: [id number]                    Procedures  ECG 12 Lead Documentation  Date/Time: 4/17/2018 2:23 PM  Performed by: Azam Baltazar  Authorized by: Azam Baltazar     Indications / Diagnosis:  Shortness of breath  ECG reviewed by me, the ED Provider: yes    Patient location:  ED  Previous ECG:     Previous ECG:  Unavailable  Interpretation:     Interpretation: abnormal    Comments:      Sinus rhythm, rate 90, all normal axis home all  milliseconds, T-wave inversions noted to lead 3, no acute ST elevations, nonspecific T-wave abnormalities  Phone Contacts  ED Phone Contact    ED Course  ED Course as of Apr 17 1854   Tue Apr 17, 2018   1708 Case discussed with GI Dr Addis Doty, who recommends obtaining Pt/INR, US RUQ with dopplars and consult Vascular surgery  1710 Patient vascular surgeon on call, awaiting callback  1742 Case discussed with patient's surgeon Dr Rosio Wagner,   Who notes that CT finding of possible hepatic infarct is most likely secondary to manipulation during surgery  At this point no intervention is necessary and patient should be observed for any worsening symptoms  Patient can be observed at home or if patient is symptomatic then she can be observed in the hospital   If patient's symptom worsens then Dr Rosio Wagner  will be happy to see patient at [facility]  Number of Diagnoses or Management Options  Abdominal pain: new and requires workup  Shortness of breath: new and requires workup  Diagnosis management comments: Obtain CBC, CMP, troponin, CTA chest with CT abdomen pelvis with IV contrast to rule out any acute abnormalities  Place patient was supplemental nasal cannula oxygen  Give IV fluids       Amount and/or Complexity of Data Reviewed  Clinical lab tests: ordered and reviewed  Tests in the radiology section of CPT®: ordered and reviewed  Tests in the medicine section of CPT®: ordered and reviewed  Decide to obtain previous medical records or to obtain history from someone other than the patient: yes  Obtain history from someone other than the patient: yes  Review and summarize past medical records: yes  Discuss the patient with other providers: yes  Independent visualization of images, tracings, or specimens: yes    Risk of Complications, Morbidity, and/or Mortality  General comments: Patient presenting with chest pain, shortness of breath, abdominal pain  Patient did not have any elevated white count or any other concern for infection  CTA lungs does not show any concern for PE but does show some mild atelectasis  CT scan of abdomen/pelvis shows possible hepatic infarct  Case was discussed with GI on call who recommended right upper quadrant ultrasound with Doppler flow for further evaluation  Ultrasound results are currently pending  Case was discussed with patient's surgeon who states that this may be a sequela from manipulation of during surgery  Dr Chloe Neil recommended observation for any worsening symptoms  Patient was initially tachypneic and tachycardic and mildly hypoxic upon arrival to the ER  Patient's oxygenation improved with supplemental nasal cannula oxygen  At this point patient is admitted to observation for any worsening shortness of breath and abdominal pain  Case was discussed with inpatient team and at this point decision was made not to start patient on any antibiotics for her shortness of breath as her lung findings does not show any acute infiltrate and patient does not have any fever white count in the ER  Inpatient team will follow up with right upper quadrant ultrasound results  Patient currently agrees with admission plans      Patient Progress  Patient progress: improved    CritCare Time    Disposition  Final diagnoses:   Shortness of breath   Abdominal pain Time reflects when diagnosis was documented in both MDM as applicable and the Disposition within this note     Time User Action Codes Description Comment    4/17/2018  5:56 PM Ivon Downey Add [R06 02] Shortness of breath     4/17/2018  5:59 PM Nasreen [address] [place] [address] [R10 9] Abdominal pain       ED Disposition     ED Disposition Condition Comment    Admit  Case was discussed with Dr Jordan Caro and the patient's admission status was agreed to be Admission Status: observation status to the service of Dr Jordan Caro  Follow-up Information    None       Patient's Medications   Discharge Prescriptions    No medications on file     No discharge procedures on file      ED Provider  Electronically Signed by           Varun Manley DO  04/17/18 0700

## 2018-04-17 NOTE — ED NOTES
Patient is permitted ice chips by physician   Patient placed on O2 at [address]  04/17/18 6796

## 2018-04-17 NOTE — TELEPHONE ENCOUNTER
I spoke with both Sandra Harman and her Physical Therapist Brittani Hill who just arrived at her home  Sandra Harman is complaining of intermittent shortness of breath which started last pm  (she is S/P hiatal hernia repair)  She has no fever and her O2 Sat is 96 %  Her pulse is 78 but she does admit to some chest "tightness" which is new  I recommended that she go immediately to the ER and Dr Judy Torres is aware of this triage   Sandra Harman agreed to go now to [facility] [address]

## 2018-04-18 ENCOUNTER — APPOINTMENT (OUTPATIENT)
Dept: RADIOLOGY | Facility: HOSPITAL | Age: 68
DRG: 948 | End: 2018-04-18
Payer: MEDICARE

## 2018-04-18 PROBLEM — R16.0 LIVER MASS, LEFT LOBE: Status: ACTIVE | Noted: 2018-04-17

## 2018-04-18 LAB
ANION GAP SERPL CALCULATED.3IONS-SCNC: 10 MMOL/L (ref 4–13)
ATRIAL RATE: 90 BPM
BUN SERPL-MCNC: 4 MG/DL (ref 5–25)
CALCIUM SERPL-MCNC: 8.6 MG/DL (ref 8.3–10.1)
CEA SERPL-MCNC: 0.9 NG/ML (ref 0–3)
CHLORIDE SERPL-SCNC: 110 MMOL/L (ref 100–108)
CO2 SERPL-SCNC: 23 MMOL/L (ref 21–32)
CREAT SERPL-MCNC: 0.64 MG/DL (ref 0.6–1.3)
GFR SERPL CREATININE-BSD FRML MDRD: 92 ML/MIN/1.73SQ M
GLUCOSE P FAST SERPL-MCNC: 107 MG/DL (ref 65–99)
GLUCOSE SERPL-MCNC: 107 MG/DL (ref 65–140)
P AXIS: 70 DEGREES
POTASSIUM SERPL-SCNC: 3.2 MMOL/L (ref 3.5–5.3)
PR INTERVAL: 204 MS
QRS AXIS: -12 DEGREES
QRSD INTERVAL: 94 MS
QT INTERVAL: 402 MS
QTC INTERVAL: 491 MS
SODIUM SERPL-SCNC: 143 MMOL/L (ref 136–145)
T WAVE AXIS: 9 DEGREES
TROPONIN I SERPL-MCNC: <0.02 NG/ML
VENTRICULAR RATE: 90 BPM

## 2018-04-18 PROCEDURE — 93010 ELECTROCARDIOGRAM REPORT: CPT | Performed by: INTERNAL MEDICINE

## 2018-04-18 PROCEDURE — 84484 ASSAY OF TROPONIN QUANT: CPT | Performed by: INTERNAL MEDICINE

## 2018-04-18 PROCEDURE — 82105 ALPHA-FETOPROTEIN SERUM: CPT | Performed by: INTERNAL MEDICINE

## 2018-04-18 PROCEDURE — 86301 IMMUNOASSAY TUMOR CA 19-9: CPT | Performed by: INTERNAL MEDICINE

## 2018-04-18 PROCEDURE — 74183 MRI ABD W/O CNTR FLWD CNTR: CPT

## 2018-04-18 PROCEDURE — 99232 SBSQ HOSP IP/OBS MODERATE 35: CPT | Performed by: INTERNAL MEDICINE

## 2018-04-18 PROCEDURE — A9577 INJ MULTIHANCE: HCPCS | Performed by: INTERNAL MEDICINE

## 2018-04-18 PROCEDURE — 82378 CARCINOEMBRYONIC ANTIGEN: CPT | Performed by: INTERNAL MEDICINE

## 2018-04-18 PROCEDURE — 80048 BASIC METABOLIC PNL TOTAL CA: CPT | Performed by: INTERNAL MEDICINE

## 2018-04-18 RX ORDER — LANOLIN ALCOHOL/MO/W.PET/CERES
6 CREAM (GRAM) TOPICAL
Status: DISCONTINUED | OUTPATIENT
Start: 2018-04-18 | End: 2018-04-20 | Stop reason: HOSPADM

## 2018-04-18 RX ORDER — POTASSIUM CHLORIDE 20MEQ/15ML
40 LIQUID (ML) ORAL EVERY 4 HOURS
Status: COMPLETED | OUTPATIENT
Start: 2018-04-18 | End: 2018-04-18

## 2018-04-18 RX ORDER — POTASSIUM CHLORIDE 20 MEQ/1
40 TABLET, EXTENDED RELEASE ORAL EVERY 4 HOURS
Status: DISCONTINUED | OUTPATIENT
Start: 2018-04-18 | End: 2018-04-18

## 2018-04-18 RX ADMIN — LOSARTAN POTASSIUM 25 MG: 25 TABLET, FILM COATED ORAL at 10:25

## 2018-04-18 RX ADMIN — POTASSIUM CHLORIDE 40 MEQ: 20 SOLUTION ORAL at 10:25

## 2018-04-18 RX ADMIN — Medication 40 MG: at 10:27

## 2018-04-18 RX ADMIN — POTASSIUM CHLORIDE 40 MEQ: 20 SOLUTION ORAL at 14:41

## 2018-04-18 RX ADMIN — BUMETANIDE 0.5 MG: 1 TABLET ORAL at 10:25

## 2018-04-18 RX ADMIN — GADOBENATE DIMEGLUMINE 18 ML: 529 INJECTION, SOLUTION INTRAVENOUS at 09:36

## 2018-04-18 RX ADMIN — ATORVASTATIN CALCIUM 20 MG: 20 TABLET, FILM COATED ORAL at 17:16

## 2018-04-18 RX ADMIN — Medication 40 MG: at 17:18

## 2018-04-18 RX ADMIN — MELATONIN TAB 3 MG 6 MG: 3 TAB at 21:13

## 2018-04-18 RX ADMIN — FAMOTIDINE 20 MG: 20 TABLET, FILM COATED ORAL at 17:17

## 2018-04-18 RX ADMIN — LEVOTHYROXINE SODIUM 50 MCG: 50 TABLET ORAL at 05:14

## 2018-04-18 NOTE — PLAN OF CARE
CARDIOVASCULAR - ADULT     Maintains optimal cardiac output and hemodynamic stability Progressing     Absence of cardiac dysrhythmias or at baseline rhythm Progressing        INFECTION - ADULT     Absence or prevention of progression during hospitalization Progressing        PAIN - ADULT     Verbalizes/displays adequate comfort level or baseline comfort level Progressing        RESPIRATORY - ADULT     Achieves optimal ventilation and oxygenation Progressing        SAFETY ADULT     Maintain or return to baseline ADL function Progressing

## 2018-04-18 NOTE — CASE MANAGEMENT
Initial Clinical Review    Admission: Date/Time/Statement: 4/17/18 1800    Orders Placed This Encounter   Procedures    Place in Observation (expected length of stay for this patient is less than two midnights)     Standing Status:   Standing     Number of Occurrences:   1     Order Specific Question:   Admitting Physician     Answer:   Yariel Torres     Order Specific Question:   Level of Care     Answer:   Med Surg [16]         ED: Date/Time/Mode of Arrival:   ED Arrival Information     Expected Arrival Acuity Means of Arrival Escorted By Service Admission Type    - 4/17/2018 14:11 Urgent Walk-In Spouse General Medicine Urgent    Arrival Complaint    shortness of breath          Chief Complaint:   Chief Complaint   Patient presents with    Shortness of Breath     Patient presents with SOB to her left lung  "The pain is like a rubber band, when I breath in it tightens " Patient reports that she had a laproscopic hiatal hernia repare and esophageal elongation done on 4/11 by Dr Narayan Najera at [facility]  History of Illness: 75 y o  female with a history of HTN, HLD, Severe hiatal hernia and GERD s/p recent laproscopic hiatal hernia repair and esophageal elongation on 4/11/18 (Dr Narayan Najera at [facility]), presents with intermittent left sided chest pain and subjective sob for 1 day  She described the chest pain as rubber band like, accompanied by sob  She denied any fever, chill, cough, N/V, epigastric abd pain, melena, or BRBPR  Upon arrival to ED, patient appears tachypneic and SpO2 92% at        ED Vital Signs:   ED Triage Vitals [04/17/18 1423]   Temperature Pulse Respirations Blood Pressure SpO2   97 9 °F (36 6 °C) 94 22 153/93 92 %      Temp Source Heart Rate Source Patient Position - Orthostatic VS BP Location FiO2 (%)   Oral Monitor Lying Right arm --      Pain Score       5        Wt Readings from Last 1 Encounters:   04/18/18 92 1 kg (203 lb)       Abnormal Labs/Diagnostic Test Results: K 3 2  BUN 4 GLUCOSE 107 TROPONIN <0 02 X3 , H/H 11 6/35 8   CTA ABDOMEN  No acute pulmonary embolism  2   Small bilateral pleural effusions with overlying consolidation, atelectasis versus aspiration  3   Gas extending from the neck along the right chest/abdominal wall and axilla as well as in the anterior mediastinum likely related to prior surgery  4   Geographic hypoattenuation throughout the lateral segment of the left hepatic lobe  This is suspicious for segment 3 infarct, particularly as the portal veins within this segment appear somewhat attenuated  The etiology for this is uncertain  Less   likely is direct liver contusion related to surgery as the study was performed laparoscopically  5   Punctate nonobstructing right renal calculus  US RUQ LIVER DIPPLERS   1   Liver hepatic artery, vein and portal vein Doppler appear normal   2   Ill-defined isoechoic 3 7 x 3 4 cm mass in the left lobe of the liver as seen on CT could represent hemangioma or focal fatty infiltration or pulmonary infarct as suggested on CT  Follow-up contrast enhanced abdominal MRI for further   catheterization  ED Treatment:   Medication Administration from 04/17/2018 1411 to 04/17/2018 2000       Date/Time Order Dose Route Action Action by Comments     04/17/2018 1638 sodium chloride 0 9 % bolus 1,000 mL 0 mL Intravenous Stopped Jack Franz RN      04/17/2018 1526 sodium chloride 0 9 % bolus 1,000 mL 1,000 mL Intravenous [address] Jack Franz RN      04/17/2018 1515 iohexol (OMNIPAQUE) 350 MG/ML injection (MULTI-DOSE) 100 mL 100 mL Intravenous Given Kelsey Sipel V      04/17/2018 1906 potassium chloride 10 % oral solution 40 mEq 40 mEq Oral Given Kyrie Pearson RN           Past Medical/Surgical History:    Active Ambulatory Problems     Diagnosis Date Noted    Hernia, hiatal 10/04/2017    GERD (gastroesophageal reflux disease) 04/24/2015    Hiatal hernia with GERD without esophagitis 03/26/2018     Resolved Ambulatory Problems     Diagnosis Date Noted    No Resolved Ambulatory Problems     Past Medical History:   Diagnosis Date    Anemia     Asthma     Disease of thyroid gland     GERD (gastroesophageal reflux disease)     Goiter, nodular     History of transfusion     Hyperlipidemia     Hypertension     Irritable bowel syndrome     Kidney stone     RA (rheumatoid arthritis) (HCC)     Seasonal allergies     Stomach ulcer     Vertigo        Admitting Diagnosis: Shortness of breath [R06 02]  Abdominal pain [R10 9]    Age/Sex: 75 y o  female    Assessment/Plan:   Shortness of breath   Assessment & Plan     Unclear etiology  CTA chest no evidence of PE or PNA  ? more likely recent lap procedure related as per hx  Serial trop, EKG, tele to r/o cardiac origin  Continue supplemental O2 to keep SpO2>90%  Continue home PPI bid, Pepcid bid  Tylenol prn for pain  CTA, US liver reviewed  MRI liver am  Consider GI consult am   Left sided chest pain   Assessment & Plan     Serial trop, EKG, tele to r/o cardiac origin      Liver lesion, left lobe   Assessment & Plan     See above   Hypokalemia   Assessment & Plan     KCl supplement      Hypothyroidism   Assessment & Plan     Continue Synthyroid      HLD (hyperlipidemia)   Assessment & Plan     Continue Lipitor      HTN (hypertension)   Assessment & Plan     Continue losartan      GERD (gastroesophageal reflux disease)   Assessment & Plan     Continue home Nexium bid, Pepcid bid (intolerance to Protonix in the past)         DVT Prophylaxis: on SCD, ambulation  Code Status: Full Code  Disposition: anticipate d/c home once clinically improved  Anticipated Length of Stay:  Patient will be admitted on an Observation basis with an anticipated length of stay of  <= 2 midnights        Admission Orders:  OBSERVATION  MRI  DAILY WEIGHT  IS  SURGICAL LIQUID  Scheduled Meds:   Current Facility-Administered Medications:  acetaminophen 650 mg Oral Q6H PRN KENNEDY Turner atorvastatin 20 mg Oral Daily With Susan Parker MD   bumetanide 0 5 mg Oral Daily Kay Lincoln MD   famotidine 20 mg Oral Daily With Dinner KENNEDY Walters   levothyroxine 50 mcg Oral Early Morning Kay Lincoln MD   losartan 25 mg Oral Daily Kay Lincoln MD   omeprazole (PRILOSEC) oral suspension 40 mg Oral BID AC KENNEDY Walters     Continuous Infusions:    PRN Meds:   acetaminophen

## 2018-04-18 NOTE — PHYSICIAN ADVISOR
Current patient class: Inpatient  The patient is currently on Hospital Day: 2      The patient was admitted to the hospital  on 4/18/18 at 1520 for the following diagnosis:  Shortness of breath [R06 02]  Abdominal pain [R10 9]       There is documentation in the medical record of an expected length of stay of at least 2 midnights  The patient is therefore expected to satisfy the 2 midnight benchmark and given the 2 midnight presumption is appropriate for INPATIENT ADMISSION  Given this expectation of a satisfying stay, CMS instructs us that the patient is most often appropriate for inpatient admission under part A provided medical necessity is documented in the chart  After review of the relevant documentation, labs, vital signs and test results, the patient is appropriate for INPATIENT ADMISSION  Admission to the hospital as an inpatient is a complex decision making process which requires the practitioner to consider the patients presenting complaint, history and physical examination and all relevant testing  With this in mind, in this case, the patient was deemed appropriate for INPATIENT ADMISSION  After review of the documentation and testing available at the time of the admission I concur with this clinical determination of medical necessity  The patient does have an inpatient admission within the previous 30 days  The patient was admitted on 4/11/18 and discharged on 4/14/18 as an inpatient  The patient therefore required readmission review  In this case the patient should be considered a SEPARATE and UNRELATED INPATIENT ADMISSION  The patient had been discharged in stable condition with a completed care plan  There were no unresolved acute medical issues at the time of discharged which would have reasonably been expected to prompt this readmission  Rationale is as follows:    75 y  o  female with a history of HTN, HLD, Severe hiatal hernia and GERD s/p recent laproscopic hiatal hernia repair and esophageal elongation on 4/11/18 by Dr Jacob Nicolas at [facility], presents with intermittent left sided chest pain and subjective SOB for 1 day   CT c/a/p showed ill defined isoechoic mass in left lobe of liver which could represent hemangioma or focal fatty infiltration or pulmonary infarct   Pt was admitted for pain control and further investigation and MRI revealed peripherally enhancing heterogeneous left hepatic lobe mass measuring 6 6 x 4 1 x 5 0 cm concerning for infiltrating neoplasia including a peripheral cholangiocarcinoma  Pt is NPO, oncology has been consulted   After reviewing both admissions and results of diagnostic imaging, visits appear UNRELATED  The patients vitals on arrival were ED Triage Vitals [04/17/18 1423]   Temperature Pulse Respirations Blood Pressure SpO2   97 9 °F (36 6 °C) 94 22 153/93 92 %      Temp Source Heart Rate Source Patient Position - Orthostatic VS BP Location FiO2 (%)   Oral Monitor Lying Right arm --      Pain Score       5           Past Medical History:   Diagnosis Date    Anemia     Hx secondary to bleeding ulcer    Asthma     seasonal    Disease of thyroid gland     GERD (gastroesophageal reflux disease)     Goiter, nodular     History of transfusion     x1 after bleeding ulcer    Hyperlipidemia     Hypertension     Irritable bowel syndrome     Kidney stone     RA (rheumatoid arthritis) (HCC)     Seasonal allergies     Stomach ulcer     Vertigo      Past Surgical History:   Procedure Laterality Date    CARDIAC CATHETERIZATION      x2 secondary to exertional chest pain, due to lung issues, possible mild COPD    CHOLECYSTECTOMY  2015    lap    ESOPHAGOGASTRODUODENOSCOPY N/A 1/23/2018    Procedure: ESOPHAGOGASTRODUODENOSCOPY (EGD); Surgeon: Denisse Kim MD;  Location: [facility];   Service: Gastroenterology    HYSTERECTOMY      partial    AL ESOPHAGOSCOPY FLEXIBLE TRANSORAL WITH BIOPSY N/A 4/11/2018    Procedure: ESOPHAGOGASTRODUODENOSCOPY (EGD); Surgeon: Angelique Majano MD;  Location: [facility];  Service: Thoracic    CA LAP, REPAIR PARAESOPHAGEAL HERNIA, INCL FUNDOPLASTY W/ MESH N/A 4/11/2018    Procedure: REPAIR HERNIA PARAESOPHAGEAL  LAPAROSCOPIC,ELEONORA GASTROPLASTY, Audria Ledger FUNDOPLICATION;  Surgeon: Angelique Majano MD;  Location: [facility];  Service: Thoracic    SKIN BIOPSY Right     lump benign    THYROIDECTOMY, PARTIAL      TONSILLECTOMY             Consults have been placed to:   IP CONSULT TO ONCOLOGY    Vitals:    04/18/18 0324 04/18/18 0459 04/18/18 0748 04/18/18 1435   BP: 141/82  141/90 170/89   BP Location: Right arm  Right arm Right arm   Pulse: 96  65 70   Resp: 18 18 19   Temp: 97 6 °F (36 4 °C)  98 1 °F (36 7 °C) (!) 96 5 °F (35 8 °C)   TempSrc: Tympanic  Tympanic Tympanic   SpO2: 96%  95% 96%   Weight:  92 1 kg (203 lb)     Height:           Most recent labs:    Recent Labs      04/17/18   1436   04/18/18   0521   WBC  6 90   --    --    HGB  11 6*   --    --    HCT  35 8*   --    --    PLT  217   --    --    K  3 0*   --   3 2*   NA  139   --   143   CALCIUM  8 7   --   8 6   BUN  6   --   4*   CREATININE  0 76   --   0 64   INR  1 08   --    --    TROPONINI   --    < >  <0 02   AST  35   --    --    ALT  144*   --    --    ALKPHOS  68   --    --    BILITOT  0 70   --    --     < > = values in this interval not displayed         Scheduled Meds:  Current Facility-Administered Medications:  acetaminophen 650 mg Oral Q6H PRN KENNEDY Walters   atorvastatin 20 mg Oral Daily With Rad Herman MD   bumetanide 0 5 mg Oral Daily Eli Anderson MD   famotidine 20 mg Oral Daily With Dinner KENNEDY Walters   levothyroxine 50 mcg Oral Early Morning Eli Anderson MD   losartan 25 mg Oral Daily Eli Anderson MD   melatonin 6 mg Oral HS Eli Anderson MD   omeprazole (PRILOSEC) suspension 2 mg/mL 40 mg Oral BID AC KENNEDY Walters     Continuous Infusions:   PRN Meds:   acetaminophen    Surgical procedures (if appropriate):

## 2018-04-18 NOTE — PROGRESS NOTES
[facility] Progress Note  Patient: Divya Yan 75 y o  female   MRN: [id number]  PCP: Dilshad Pacheco DO  Unit/Bed#: [address] [place] [address] Encounter: [id number]  Date Of Visit: 18    Subjective:   SOB resolved  Improving left lower chest pain    Objective:   Vitals:   Temp (24hrs), Av 5 °F (36 9 °C), Min:97 6 °F (36 4 °C), Max:100 2 °F (37 9 °C)    HR:  [62-96] 65  Resp:  [15-24] 18  BP: (140-176)/(73-99) 141/90  SpO2:  [92 %-100 %] 95 %  Body mass index is 33 78 kg/m²         Intake/Output Summary (Last 24 hours) at 18 1355  Last data filed at 18 1638   Gross per 24 hour   Intake             1000 ml   Output                0 ml   Net             1000 ml       Physical Exam:   General: NAD  HEENT: EOMI, anicteric, oral moist, no oral thrush or mucosal lesion, neck supple, no mass or JVD  Chest: CTAB, no wheeze/rales  Cardiac: RRR, S1/S2, No murmur  Abd: S/ND/NT/BS+  MSK: No LE pitting edema, pulses intact  Neuro: AAOx3, moving all extremities  Psychiatric: Mood with normal affect    Additional Data:     Labs:    Results from last 7 days  Lab Units 18  1436   WBC Thousand/uL 6 90   HEMOGLOBIN g/dL 11 6*   HEMATOCRIT % 35 8*   PLATELETS Thousands/uL 217   NEUTROS PCT % 66   LYMPHS PCT % 20   MONOS PCT % 11   EOS PCT % 2       Results from last 7 days  Lab Units 18  0521 18  1436   SODIUM mmol/L 143 139   POTASSIUM mmol/L 3 2* 3 0*   CHLORIDE mmol/L 110* 103   CO2 mmol/L 23 27   BUN mg/dL 4* 6   CREATININE mg/dL 0 64 0 76   CALCIUM mg/dL 8 6 8 7   TOTAL PROTEIN g/dL  --  6 9   BILIRUBIN TOTAL mg/dL  --  0 70   ALK PHOS U/L  --  68   ALT U/L  --  144*   AST U/L  --  35   GLUCOSE RANDOM mg/dL 107 101       Results from last 7 days  Lab Units 18  1436   INR  1 08       Recent Results (from the past 24 hour(s))   ECG 12 lead    Collection Time: 18  2:23 PM   Result Value Ref Range    Ventricular Rate 90 BPM    Atrial Rate 90 BPM    IN Interval 204 ms    QRSD Interval 94 ms    QT Interval 402 ms    QTC Interval 491 ms    P Axis 70 degrees    QRS Axis -12 degrees    T Wave Axis 9 degrees   CBC and differential    Collection Time: 04/17/18  2:36 PM   Result Value Ref Range    WBC 6 90 4 80 - 10 80 Thousand/uL    RBC 4 27 4 20 - 5 40 Million/uL    Hemoglobin 11 6 (L) 12 0 - 16 0 g/dL    Hematocrit 35 8 (L) 37 0 - 47 0 %    MCV 84 82 - 98 fL    MCH 27 0 27 0 - 31 0 pg    MCHC 32 3 31 4 - 37 4 g/dL    RDW 14 7 11 6 - 15 1 %    MPV 8 2 (L) 8 9 - 12 7 fL    Platelets 043 259 - 150 Thousands/uL    nRBC 0 /100 WBCs    Neutrophils Relative 66 43 - 75 %    Lymphocytes Relative 20 14 - 44 %    Monocytes Relative 11 4 - 12 %    Eosinophils Relative 2 0 - 6 %    Basophils Relative 0 0 - 1 %    Neutrophils Absolute 4 60 1 85 - 7 62 Thousands/µL    Lymphocytes Absolute 1 40 0 60 - 4 47 Thousands/µL    Monocytes Absolute 0 80 0 17 - 1 22 Thousand/µL    Eosinophils Absolute 0 20 0 00 - 0 61 Thousand/µL    Basophils Absolute 0 00 0 00 - 0 10 Thousands/µL   Comprehensive metabolic panel    Collection Time: 04/17/18  2:36 PM   Result Value Ref Range    Sodium 139 136 - 145 mmol/L    Potassium 3 0 (L) 3 5 - 5 3 mmol/L    Chloride 103 100 - 108 mmol/L    CO2 27 21 - 32 mmol/L    Anion Gap 9 4 - 13 mmol/L    BUN 6 5 - 25 mg/dL    Creatinine 0 76 0 60 - 1 30 mg/dL    Glucose 101 65 - 140 mg/dL    Calcium 8 7 8 3 - 10 1 mg/dL    AST 35 5 - 45 U/L     (H) 12 - 78 U/L    Alkaline Phosphatase 68 46 - 116 U/L    Total Protein 6 9 6 4 - 8 2 g/dL    Albumin 3 1 (L) 3 5 - 5 0 g/dL    Total Bilirubin 0 70 0 20 - 1 00 mg/dL    eGFR 81 ml/min/1 73sq m   Magnesium    Collection Time: 04/17/18  2:36 PM   Result Value Ref Range    Magnesium 1 8 1 6 - 2 6 mg/dL   Phosphorus    Collection Time: 04/17/18  2:36 PM   Result Value Ref Range    Phosphorus 3 3 2 3 - 4 1 mg/dL   Protime-INR    Collection Time: 04/17/18  2:36 PM   Result Value Ref Range    Protime 11 3 9 4 - 11 7 seconds    INR 1 08 0 86 - 1 16 APTT    Collection Time: 04/17/18  2:36 PM   Result Value Ref Range    PTT 27 24 - 33 seconds   Troponin I    Collection Time: 04/17/18  2:46 PM   Result Value Ref Range    Troponin I <0 02 <=0 04 ng/mL   Troponin I    Collection Time: 04/17/18  7:05 PM   Result Value Ref Range    Troponin I <0 02 <=0 04 ng/mL   Troponin I    Collection Time: 04/18/18  5:21 AM   Result Value Ref Range    Troponin I <0 02 <=0 04 ng/mL   Basic metabolic panel    Collection Time: 04/18/18  5:21 AM   Result Value Ref Range    Sodium 143 136 - 145 mmol/L    Potassium 3 2 (L) 3 5 - 5 3 mmol/L    Chloride 110 (H) 100 - 108 mmol/L    CO2 23 21 - 32 mmol/L    Anion Gap 10 4 - 13 mmol/L    BUN 4 (L) 5 - 25 mg/dL    Creatinine 0 64 0 60 - 1 30 mg/dL    Glucose 107 65 - 140 mg/dL    Glucose, Fasting 107 (H) 65 - 99 mg/dL    Calcium 8 6 8 3 - 10 1 mg/dL    eGFR 92 ml/min/1 73sq m       Cultures:         Imaging:  Xr Chest Portable    Result Date: 4/11/2018  Narrative: CHEST INDICATION:   s/p paraesophageal hernia repair  COMPARISON:  None EXAM PERFORMED/VIEWS:  XR CHEST PORTABLE FINDINGS:  Extensive subcutaneous emphysema in the chest wall and neck  Cardiomediastinal silhouette appears unremarkable  The lungs are clear  There is a faint curvilinear density in the left lung apex, however there appears to be lung markings extending beyond this  Otherwise no pneumothorax or pleural effusion  Osseous structures appear within normal limits for patient age  Impression: Extensive subcutaneous emphysema in the chest wall and neck  No acute cardiopulmonary disease  Workstation performed: [id number]     Mri Abdomen W Wo Contrast    Result Date: 4/18/2018  Narrative: MRI OF THE ABDOMEN (LIVER) WITH AND WITHOUT CONTRAST INDICATION:  Follow-up left hepatic lobe lesion  COMPARISON:  CT abdomen pelvis 4/17/2018; right upper quadrant ultrasound 4/17/2018   TECHNIQUE:  The following pulse sequences were obtained on a 1 5 T scanner:  Coronal and axial T2 with TE of 90 and 180 respectively, axial T2 with fat saturation, axial FIESTA fat-sat, axial T1-weighted in-and-out-of phase, axial DWI/ADC, pre-contrast axial T1 with fat saturation, post-contrast dynamic axial T1 with fat saturation at 20, 70, and 180 seconds, followed by coronal and 7 minute delayed axial T1 with fat saturation  IV Contrast:  18 mL of gadobenate dimeglumine (MULTIHANCE) FINDINGS: LIVER:  General:  Normal in size and contour  No loss of signal on out-of-phase images to suggest hepatic steatosis  Lesions: Within the lateral segment left hepatic lobe, a markedly heterogeneous ill-defined mass measuring 6 6 x 4 1 x 5 0 cm corresponds the CT finding  The lesion is centrally hypointense on the T2-weighted sequences with peripheral hyperintensity and predominantly hypointense on T1-weighted sequence  The margins are ill-defined with gradual enhancement of the poorly defined wall extending to the hepatic capsule  With delayed imaging, there is no enhancement of the central portion nor significant washout of the peripheral enhancement  There is no overlying capsular retraction  The finding is concerning for an infiltrating neoplastic lesion including a peripheral cholangiocarcinoma versus a solitary metastatic lesion  The liver is otherwise normal in morphology favoring against hepatocellular carcinoma  The poorly defined margins favor against a hyalinized hemangioma  Vasculature:  Portal and hepatic veins patent without evidence of thrombosis  BILIARY TREE:  Normal   GALLBLADDER:  Surgically absent  PANCREAS:  Normal  ADRENAL GLANDS:  Normal  SPLEEN:  Normal  KIDNEYS:  Normal  ABDOMINAL CAVITY:  No lymphadenopathy or mass  No ascites  BOWEL:  Postoperative changes status post gastric fundoplication  No bowel obstruction  OSSEOUS STRUCTURES:  No osseous destruction   EXTRAHEPATIC VASCULAR STRUCTURES:  Visualized vasculature is normal  ABDOMINAL WALL:  Infiltration of the ventral wall fat again noted, perhaps iatrogenic due to subcutaneous injections  LOWER CHEST:  Bibasilar atelectasis, left greater than right, with tiny pleural effusion stable  Impression: Peripherally enhancing heterogeneous left hepatic lobe mass measuring 6 6 x 4 1 x 5 0 cm concerning for infiltrating neoplasia including a peripheral cholangiocarcinoma  Biopsy recommended  Cholecystectomy  Bibasilar atelectasis with tiny pleural effusions  I personally discussed this study with Clementina Galeano on 4/18/2018 at 12:00 PM  Workstation performed: [id number]     Fl Barium Swallow    Result Date: 4/12/2018  Narrative: BARIUM SWALLOW-ESOPHAGRAM INDICATION:   s/p paraesophageal hernia repair  COMPARISON:  10/2/2017 IMAGES:  8 FLUOROSCOPY TIME:   0 97 min  TECHNIQUE: The patient was given water-soluble contrast by mouth and multiple images of the esophagus with attention to the gastroesophageal junction were obtained  After no leak was confirmed, the patient was given thin barium and multiple images obtained  FINDINGS: Postsurgical changes of fundoplication for repair of hernia is noted  There is no evidence of extraluminal contrast   Contrast flows freely through the gastroesophageal junction  Impression: Postsurgical changes of fundoplication  No evidence of leak  Workstation performed: [id number]     Cta Chest Ct Abdomen Pelvis W Contrast    Result Date: 4/17/2018  Narrative: CT PULMONARY ANGIOGRAM OF THE CHEST AND CT ABDOMEN AND PELVIS WITH INTRAVENOUS CONTRAST INDICATION:   Shortness of breath, hypoxia  Status post laparoscopic paraesophageal hernia repair with fundoplication COMPARISON: None  TECHNIQUE:  CT examination of the chest, abdomen and pelvis was performed  Thin section CT angiographic technique was used in the chest in order to evaluate for pulmonary embolus and coronal 3D MIP postprocessing was performed on the acquisition scanner   Axial, sagittal, and coronal 2D reformatted images were created from the source data and submitted for interpretation  Radiation dose length product (DLP) for this visit:  1127 81 mGy-cm   This examination, like all CT scans performed in the [facility], was performed utilizing techniques to minimize radiation dose exposure, including the use of iterative reconstruction and automated exposure control  IV Contrast:  100 mL of iohexol (OMNIPAQUE) Enteric Contrast:  Enteric contrast was not administered  FINDINGS: CHEST PULMONARY ARTERIAL TREE:  No pulmonary embolus is seen  LUNGS:  There is bibasilar consolidation, atelectasis versus aspiration  There is no tracheal or endobronchial lesion  PLEURA:  There are small bilateral pleural effusions  HEART/AORTA:  Unremarkable for patient's age  MEDIASTINUM AND GUILLE:  There is gas in the anterior mediastinum  CHEST WALL AND LOWER NECK:   There is gas tracking along the right neck, chest/abdominal wall and axilla  ABDOMEN LIVER/BILIARY TREE:  There is geographic hypoattenuation throughout segment 3 of the left hepatic lobe which is suspicious for infarct, particularly as the portal veins to this segment appear attenuated  This is of uncertain etiology  GALLBLADDER:  Gallbladder is surgically absent  SPLEEN:  Unremarkable  PANCREAS:  Unremarkable  ADRENAL GLANDS:  Unremarkable  KIDNEYS/URETERS:  There is a punctate nonobstructing right renal calculus  No hydronephrosis  STOMACH AND BOWEL:  There are postoperative changes at the GE junction status post hiatal hernia repair and fundoplication  APPENDIX:  No findings to suggest appendicitis  ABDOMINOPELVIC CAVITY:  There is mild pelvic free fluid  No free intraperitoneal air  No lymphadenopathy  VESSELS:  Unremarkable for patient's age  PELVIS REPRODUCTIVE ORGANS:  Patient is status post hysterectomy  URINARY BLADDER:  There is a small focus of gas in the urinary bladder likely related to instrumentation  ABDOMINAL WALL/INGUINAL REGIONS:  Unremarkable   OSSEOUS STRUCTURES:  No acute fracture or destructive osseous lesion  Impression: 1  No acute pulmonary embolism  2   Small bilateral pleural effusions with overlying consolidation, atelectasis versus aspiration  3   Gas extending from the neck along the right chest/abdominal wall and axilla as well as in the anterior mediastinum likely related to prior surgery  4   Geographic hypoattenuation throughout the lateral segment of the left hepatic lobe  This is suspicious for segment 3 infarct, particularly as the portal veins within this segment appear somewhat attenuated  The etiology for this is uncertain  Less  likely is direct liver contusion related to surgery as the study was performed laparoscopically  5   Punctate nonobstructing right renal calculus  Workstation performed: [id number]     Us Right Upper Quadrant With Liver Dopplers    Result Date: 4/17/2018  Narrative: RIGHT UPPER QUADRANT ULTRASOUND WITH LIVER DOPPLER INDICATION: Liver infarct COMPARISON:  Same date CT TECHNIQUE:   Real-time ultrasound of the right upper quadrant was performed with a curvilinear transducer with both volumetric sweeps and still imaging techniques  FINDINGS: PANCREAS:  Visualized portions of the pancreas are within normal limits  AORTA AND IVC:  Visualized portions are normal for patient age  LIVER: Size:  Within normal range  The liver measures 16 5 cm in the midclavicular line  Contour:  Surface contour is smooth  Parenchyma:  Echogenicity and echotexture are within normal limits  Ill-defined isoechoic 3 7 x 3 4 cm mass in the left lobe of the liver as seen on CT could represent hemangioma or pulmonary infarct as suggested on CT  Follow-up contrast enhanced abdominal MRI for further catheterization  LIVER DOPPLER: The main portal vein and primary branch segments are patent and hepatopetal with normal spectral waveform  Hepatic veins are patent  Spectral waveforms within normal limits    Main hepatic artery appears normal size, patent with normal spectral waveform  BILIARY: Patient has undergone prior cholecystectomy  No intrahepatic biliary dilatation  CBD measures 7 9 mm  No choledocholithiasis  KIDNEY: Right kidney measures 11 9 cm  Within normal limits  ASCITES:   None  Impression: 1  Liver hepatic artery, vein and portal vein Doppler appear normal  2   Ill-defined isoechoic 3 7 x 3 4 cm mass in the left lobe of the liver as seen on CT could represent hemangioma or focal fatty infiltration or pulmonary infarct as suggested on CT  Follow-up contrast enhanced abdominal MRI for further catheterization   Workstation performed: [id number]     Imaging Reports Reviewed by myself    Last 24 Hours Medication List:     Current Facility-Administered Medications:     acetaminophen (TYLENOL) oral suspension 650 mg, 650 mg, Oral, Q6H PRN, KENNEDY Walters, 650 mg at 04/17/18 2131    atorvastatin (LIPITOR) tablet 20 mg, 20 mg, Oral, Daily With Dinner, [place] May MD    [facility]) tablet 0 5 mg, 0 5 mg, Oral, Daily, [place] May MD, 0 5 mg at 04/18/18 1025    famotidine (PEPCID) tablet 20 mg, 20 mg, Oral, Daily With Dinner, KENNEDY Walters    levothyroxine tablet 50 mcg, 50 mcg, Oral, Early Morning, [place] May MD, 50 mcg at 04/18/18 0514    losartan (COZAAR) tablet 25 mg, 25 mg, Oral, Daily, [place] May MD, 25 mg at 04/18/18 1025    omeprazole (PRILOSEC) suspension 2 mg/mL, 40 mg, Oral, BID AC, KENNEDY Walters, 40 mg at 04/18/18 1027    potassium chloride 10 % oral solution 40 mEq, 40 mEq, Oral, Q4H, [place] May MD, 40 mEq at 04/18/18 1025     Assessment and Plans:  Hospital Problem List:   Principal Problem:    Shortness of breath  Active Problems:    Left sided chest pain    Hypokalemia    Liver mass, left lobe    GERD (gastroesophageal reflux disease)    Hiatal hernia with GERD without esophagitis    HTN (hypertension)    HLD (hyperlipidemia)    Hypothyroidism      * Shortness of breath   Assessment & Plan    Unclear etiology  CTA chest no evidence of PE or PNA  ? more likely recent lap procedure related as per hx  SOB improving today sh is at RA today  Continue home PPI bid, Pepcid bid  Tylenol prn for pain        Liver mass, left lobe   Assessment & Plan    MRI confirmed irregular left liver lobe mass [id number]  CT chest/abd/pelvis reviewed - no additional mass  Hem/Onc consult - d/w'ed Dr Lianne Valencia input appreciated   Will attempt IR guided liver biopsy, NPO m/n  F/u AFP, CEA,         Left sided chest pain   Assessment & Plan    Serial trop, EKG, tele to r/o cardiac origin        Hypokalemia   Assessment & Plan    KCl supplement        Hypothyroidism   Assessment & Plan    Continue Synthyroid        HLD (hyperlipidemia)   Assessment & Plan    Continue Lipitor        HTN (hypertension)   Assessment & Plan    Continue losartan        GERD (gastroesophageal reflux disease)   Assessment & Plan    Continue home Nexium bid, Pepcid bid (intolerance to Protonix in the past)          DVT Prophylaxis: on SCDs, ambulation  Code Status: Level 1 - Full Code  Disposition: anticipate d/c home once clinically improved      Signed by:  Charbel Jeff MD  Attending Hospitalist  Page#: [phone] (Hours 7am to 7pm)  4/18/2018 1:55 PM

## 2018-04-18 NOTE — SOCIAL WORK
DASH discussion completed  Discussed goals of making sure pt's  needs are met upon discharge, pt's preferences are taken into account, pt understands health condition, medications and symptoms to watch for after returning home and pt is aware of any follow up appointments recommended by hospital physician  PT LIVES INDEPENDENTLY WITH [facility], NO DME, IS CUR[facility] AT THIS [facility] S/P RECENT HERNIA SURGERY, USES [facility] FOR RX NEEDS, CM WILL FOLLOW FOR DCP NEEDS

## 2018-04-18 NOTE — PLAN OF CARE
CARDIOVASCULAR - ADULT     Maintains optimal cardiac output and hemodynamic stability Progressing     Absence of cardiac dysrhythmias or at baseline rhythm Progressing        DISCHARGE PLANNING - CARE MANAGEMENT     Discharge to post-acute care or home with appropriate resources Progressing        INFECTION - ADULT     Absence or prevention of progression during hospitalization Progressing        PAIN - ADULT     Verbalizes/displays adequate comfort level or baseline comfort level Progressing        RESPIRATORY - ADULT     Achieves optimal ventilation and oxygenation Progressing        SAFETY ADULT     Maintain or return to baseline ADL function Progressing

## 2018-04-19 LAB
AFP-TM SERPL-MCNC: 4.5 NG/ML (ref 0.5–8)
CANCER AG19-9 SERPL-ACNC: 4 U/ML (ref 0–35)
MRSA NOSE QL CULT: NORMAL

## 2018-04-19 PROCEDURE — 99232 SBSQ HOSP IP/OBS MODERATE 35: CPT | Performed by: INTERNAL MEDICINE

## 2018-04-19 PROCEDURE — 99223 1ST HOSP IP/OBS HIGH 75: CPT | Performed by: INTERNAL MEDICINE

## 2018-04-19 RX ORDER — ESOMEPRAZOLE MAGNESIUM 40 MG/1
40 CAPSULE, DELAYED RELEASE ORAL
Status: DISCONTINUED | OUTPATIENT
Start: 2018-04-19 | End: 2018-04-20 | Stop reason: HOSPADM

## 2018-04-19 RX ADMIN — FAMOTIDINE 20 MG: 20 TABLET, FILM COATED ORAL at 16:40

## 2018-04-19 RX ADMIN — LOSARTAN POTASSIUM 25 MG: 25 TABLET, FILM COATED ORAL at 08:41

## 2018-04-19 RX ADMIN — ATORVASTATIN CALCIUM 20 MG: 20 TABLET, FILM COATED ORAL at 16:39

## 2018-04-19 RX ADMIN — BUMETANIDE 0.5 MG: 1 TABLET ORAL at 08:41

## 2018-04-19 RX ADMIN — MELATONIN TAB 3 MG 6 MG: 3 TAB at 21:27

## 2018-04-19 RX ADMIN — LEVOTHYROXINE SODIUM 50 MCG: 50 TABLET ORAL at 06:08

## 2018-04-19 RX ADMIN — ESOMEPRAZOLE MAGNESIUM 40 MG: 40 CAPSULE, DELAYED RELEASE ORAL at 16:39

## 2018-04-19 NOTE — PROGRESS NOTES
[facility] Internal Medicine Progress Note  Patient: Ever Davidson 75 y o  female   MRN: [id number]  PCP: Stewart Lieberman DO  Unit/Bed#: [address] [place] [address] Encounter: [id number]  Date Of Visit: 18    Subjective:   SOB resolved  Left lower chest pain improving  No N/V    Objective:   Vitals:   Temp (24hrs), Av 2 °F (36 8 °C), Min:97 6 °F (36 4 °C), Max:99 2 °F (37 3 °C)    HR:  [68-76] 68  Resp:  [18-20] 18  BP: (128-134)/(78-97) 132/80  SpO2:  [94 %-98 %] 98 %  Body mass index is 33 86 kg/m²  No intake or output data in the 24 hours ending 18 2328    Physical Exam:   General: NAD  HEENT: EOMI, anicteric, oral moist, no oral thrush or mucosal lesion, neck supple, no mass or JVD  Chest: CTAB, no wheeze/rales  Cardiac: RRR, S1/S2, No murmur  Abd: S/ND/NT/BS+  MSK: No LE pitting edema, pulses intact  Neuro: AAOx3, moving all extremities  Psychiatric: Mood with normal affect    Additional Data:     Labs:    Results from last 7 days  Lab Units 18  1436   WBC Thousand/uL 6 90   HEMOGLOBIN g/dL 11 6*   HEMATOCRIT % 35 8*   PLATELETS Thousands/uL 217   NEUTROS PCT % 66   LYMPHS PCT % 20   MONOS PCT % 11   EOS PCT % 2       Results from last 7 days  Lab Units 18  0521 18  1436   SODIUM mmol/L 143 139   POTASSIUM mmol/L 3 2* 3 0*   CHLORIDE mmol/L 110* 103   CO2 mmol/L 23 27   BUN mg/dL 4* 6   CREATININE mg/dL 0 64 0 76   CALCIUM mg/dL 8 6 8 7   TOTAL PROTEIN g/dL  --  6 9   BILIRUBIN TOTAL mg/dL  --  0 70   ALK PHOS U/L  --  68   ALT U/L  --  144*   AST U/L  --  35   GLUCOSE RANDOM mg/dL 107 101       Results from last 7 days  Lab Units 18  1436   INR  1 08       No results found for this or any previous visit (from the past 24 hour(s))  Cultures:         Imaging:  Xr Chest Portable    Result Date: 2018  Narrative: CHEST INDICATION:   s/p paraesophageal hernia repair   COMPARISON:  None EXAM PERFORMED/VIEWS:  XR CHEST PORTABLE FINDINGS:  Extensive subcutaneous emphysema in the chest wall and neck  Cardiomediastinal silhouette appears unremarkable  The lungs are clear  There is a faint curvilinear density in the left lung apex, however there appears to be lung markings extending beyond this  Otherwise no pneumothorax or pleural effusion  Osseous structures appear within normal limits for patient age  Impression: Extensive subcutaneous emphysema in the chest wall and neck  No acute cardiopulmonary disease  Workstation performed: [id number]     Mri Abdomen W Wo Contrast    Result Date: 4/18/2018  Narrative: MRI OF THE ABDOMEN (LIVER) WITH AND WITHOUT CONTRAST INDICATION:  Follow-up left hepatic lobe lesion  COMPARISON:  CT abdomen pelvis 4/17/2018; right upper quadrant ultrasound 4/17/2018  TECHNIQUE:  The following pulse sequences were obtained on a 1 5 T scanner:  Coronal and axial T2 with TE of 90 and 180 respectively, axial T2 with fat saturation, axial FIESTA fat-sat, axial T1-weighted in-and-out-of phase, axial DWI/ADC, pre-contrast axial T1 with fat saturation, post-contrast dynamic axial T1 with fat saturation at 20, 70, and 180 seconds, followed by coronal and 7 minute delayed axial T1 with fat saturation  IV Contrast:  18 mL of gadobenate dimeglumine (MULTIHANCE) FINDINGS: LIVER:  General:  Normal in size and contour  No loss of signal on out-of-phase images to suggest hepatic steatosis  Lesions: Within the lateral segment left hepatic lobe, a markedly heterogeneous ill-defined mass measuring 6 6 x 4 1 x 5 0 cm corresponds the CT finding  The lesion is centrally hypointense on the T2-weighted sequences with peripheral hyperintensity and predominantly hypointense on T1-weighted sequence  The margins are ill-defined with gradual enhancement of the poorly defined wall extending to the hepatic capsule  With delayed imaging, there is no enhancement of the central portion nor significant washout of the peripheral enhancement  There is no overlying capsular retraction    The finding is concerning for an infiltrating neoplastic lesion including a peripheral cholangiocarcinoma versus a solitary metastatic lesion  The liver is otherwise normal in morphology favoring against hepatocellular carcinoma  The poorly defined margins favor against a hyalinized hemangioma  Vasculature:  Portal and hepatic veins patent without evidence of thrombosis  BILIARY TREE:  Normal   GALLBLADDER:  Surgically absent  PANCREAS:  Normal  ADRENAL GLANDS:  Normal  SPLEEN:  Normal  KIDNEYS:  Normal  ABDOMINAL CAVITY:  No lymphadenopathy or mass  No ascites  BOWEL:  Postoperative changes status post gastric fundoplication  No bowel obstruction  OSSEOUS STRUCTURES:  No osseous destruction  EXTRAHEPATIC VASCULAR STRUCTURES:  Visualized vasculature is normal  ABDOMINAL WALL:  Infiltration of the ventral wall fat again noted, perhaps iatrogenic due to subcutaneous injections  LOWER CHEST:  Bibasilar atelectasis, left greater than right, with tiny pleural effusion stable  Impression: Peripherally enhancing heterogeneous left hepatic lobe mass measuring 6 6 x 4 1 x 5 0 cm concerning for infiltrating neoplasia including a peripheral cholangiocarcinoma  Biopsy recommended  Cholecystectomy  Bibasilar atelectasis with tiny pleural effusions  I personally discussed this study with Stephanie Spence on 4/18/2018 at 12:00 PM  Workstation performed: [id number]     Fl Barium Swallow    Result Date: 4/12/2018  Narrative: BARIUM SWALLOW-ESOPHAGRAM INDICATION:   s/p paraesophageal hernia repair  COMPARISON:  10/2/2017 IMAGES:  8 FLUOROSCOPY TIME:   0 97 min  TECHNIQUE: The patient was given water-soluble contrast by mouth and multiple images of the esophagus with attention to the gastroesophageal junction were obtained  After no leak was confirmed, the patient was given thin barium and multiple images obtained  FINDINGS: Postsurgical changes of fundoplication for repair of hernia is noted    There is no evidence of extraluminal contrast   Contrast flows freely through the gastroesophageal junction  Impression: Postsurgical changes of fundoplication  No evidence of leak  Workstation performed: [id number]     Cta Chest Ct Abdomen Pelvis W Contrast    Result Date: 4/17/2018  Narrative: CT PULMONARY ANGIOGRAM OF THE CHEST AND CT ABDOMEN AND PELVIS WITH INTRAVENOUS CONTRAST INDICATION:   Shortness of breath, hypoxia  Status post laparoscopic paraesophageal hernia repair with fundoplication COMPARISON: None  TECHNIQUE:  CT examination of the chest, abdomen and pelvis was performed  Thin section CT angiographic technique was used in the chest in order to evaluate for pulmonary embolus and coronal 3D MIP postprocessing was performed on the acquisition scanner  Axial, sagittal, and coronal 2D reformatted images were created from the source data and submitted for interpretation  Radiation dose length product (DLP) for this visit:  1127 81 mGy-cm   This examination, like all CT scans performed in the [facility], was performed utilizing techniques to minimize radiation dose exposure, including the use of iterative reconstruction and automated exposure control  IV Contrast:  100 mL of iohexol (OMNIPAQUE) Enteric Contrast:  Enteric contrast was not administered  FINDINGS: CHEST PULMONARY ARTERIAL TREE:  No pulmonary embolus is seen  LUNGS:  There is bibasilar consolidation, atelectasis versus aspiration  There is no tracheal or endobronchial lesion  PLEURA:  There are small bilateral pleural effusions  HEART/AORTA:  Unremarkable for patient's age  MEDIASTINUM AND GUILLE:  There is gas in the anterior mediastinum  CHEST WALL AND LOWER NECK:   There is gas tracking along the right neck, chest/abdominal wall and axilla  ABDOMEN LIVER/BILIARY TREE:  There is geographic hypoattenuation throughout segment 3 of the left hepatic lobe which is suspicious for infarct, particularly as the portal veins to this segment appear attenuated  This is of uncertain etiology  GALLBLADDER:  Gallbladder is surgically absent  SPLEEN:  Unremarkable  PANCREAS:  Unremarkable  ADRENAL GLANDS:  Unremarkable  KIDNEYS/URETERS:  There is a punctate nonobstructing right renal calculus  No hydronephrosis  STOMACH AND BOWEL:  There are postoperative changes at the GE junction status post hiatal hernia repair and fundoplication  APPENDIX:  No findings to suggest appendicitis  ABDOMINOPELVIC CAVITY:  There is mild pelvic free fluid  No free intraperitoneal air  No lymphadenopathy  VESSELS:  Unremarkable for patient's age  PELVIS REPRODUCTIVE ORGANS:  Patient is status post hysterectomy  URINARY BLADDER:  There is a small focus of gas in the urinary bladder likely related to instrumentation  ABDOMINAL WALL/INGUINAL REGIONS:  Unremarkable  OSSEOUS STRUCTURES:  No acute fracture or destructive osseous lesion  Impression: 1  No acute pulmonary embolism  2   Small bilateral pleural effusions with overlying consolidation, atelectasis versus aspiration  3   Gas extending from the neck along the right chest/abdominal wall and axilla as well as in the anterior mediastinum likely related to prior surgery  4   Geographic hypoattenuation throughout the lateral segment of the left hepatic lobe  This is suspicious for segment 3 infarct, particularly as the portal veins within this segment appear somewhat attenuated  The etiology for this is uncertain  Less  likely is direct liver contusion related to surgery as the study was performed laparoscopically  5   Punctate nonobstructing right renal calculus   Workstation performed: [id number]     Us Right Upper Quadrant With Liver Dopplers    Result Date: 4/17/2018  Narrative: RIGHT UPPER QUADRANT ULTRASOUND WITH LIVER DOPPLER INDICATION: Liver infarct COMPARISON:  Same date CT TECHNIQUE:   Real-time ultrasound of the right upper quadrant was performed with a curvilinear transducer with both volumetric sweeps and still imaging techniques  FINDINGS: PANCREAS:  Visualized portions of the pancreas are within normal limits  AORTA AND IVC:  Visualized portions are normal for patient age  LIVER: Size:  Within normal range  The liver measures 16 5 cm in the midclavicular line  Contour:  Surface contour is smooth  Parenchyma:  Echogenicity and echotexture are within normal limits  Ill-defined isoechoic 3 7 x 3 4 cm mass in the left lobe of the liver as seen on CT could represent hemangioma or pulmonary infarct as suggested on CT  Follow-up contrast enhanced abdominal MRI for further catheterization  LIVER DOPPLER: The main portal vein and primary branch segments are patent and hepatopetal with normal spectral waveform  Hepatic veins are patent  Spectral waveforms within normal limits  Main hepatic artery appears normal size, patent with normal spectral waveform  BILIARY: Patient has undergone prior cholecystectomy  No intrahepatic biliary dilatation  CBD measures 7 9 mm  No choledocholithiasis  KIDNEY: Right kidney measures 11 9 cm  Within normal limits  ASCITES:   None  Impression: 1  Liver hepatic artery, vein and portal vein Doppler appear normal  2   Ill-defined isoechoic 3 7 x 3 4 cm mass in the left lobe of the liver as seen on CT could represent hemangioma or focal fatty infiltration or pulmonary infarct as suggested on CT  Follow-up contrast enhanced abdominal MRI for further catheterization   Workstation performed: [id number]     Imaging Reports Reviewed by myself    Last 24 Hours Medication List:     Current Facility-Administered Medications:     acetaminophen (TYLENOL) oral suspension 650 mg, 650 mg, Oral, Q6H PRN, KENNEDY Walters, 650 mg at 04/17/18 2131    atorvastatin (LIPITOR) tablet 20 mg, 20 mg, Oral, Daily With Dinner, Janina Moser MD, 20 mg at 04/19/18 1639    bumetanide (BUMEX) tablet 0 5 mg, 0 5 mg, Oral, Daily, Janina Moser MD, 0 5 mg at 04/19/18 0841    esomeprazole (NexIUM) capsule 40 mg, 40 mg, Oral, BID Viktor Waller MD, 40 mg at 04/19/18 1639    famotidine (PEPCID) tablet 20 mg, 20 mg, Oral, Daily With Dinner, KENNEDY Walters, 20 mg at 04/19/18 1640    levothyroxine tablet 50 mcg, 50 mcg, Oral, Early Morning, Eli Anderson MD, 50 mcg at 04/19/18 0608    losartan (COZAAR) tablet 25 mg, 25 mg, Oral, Daily, Eli Anderson MD, 25 mg at 04/19/18 0841    melatonin tablet 6 mg, 6 mg, Oral, HS, Eli Anderson MD, 6 mg at 04/19/18 2127     Assessment and Plans:  Hospital Problem List:   Principal Problem:    Liver mass, left lobe  Active Problems:    Shortness of breath    Left sided chest pain    Hypokalemia    GERD (gastroesophageal reflux disease)    Hiatal hernia with GERD without esophagitis    HTN (hypertension)    HLD (hyperlipidemia)    Hypothyroidism    75 y o  female with a history of HTN, HLD, Severe hiatal hernia and GERD s/p recent laproscopic hiatal hernia repair and esophageal elongation on 4/11/18 (Dr Angelique Majano at [facility]), presented with intermittent left sided chest pain and subjective sob for 1 day now resolved  Imaging studies revealing left liver lobe irregular mass [id number]  Shortness of breath   Assessment & Plan    Unclear etiology though more likely 2/2 left chest pain which is recent lap procedure related as per hx  CTA chest no evidence of PE or PNA  SOB resolved - she is at [facility]  Continue home PPI bid, Pepcid bid  Tylenol prn for pain        * Liver mass, left lobe   Assessment & Plan    MRI confirmed irregular left liver lobe mass [id number], unclear primary vs secondary  CT chest/abd/pelvis reviewed - no additional mass  Hem/Onc consult - d/w'ed Dr Shasha Paredes input appreciated   GI and IR consulted for biopsy plan  F/u AFP, CEA, :   Ref   Range 4/18/2018 13:03   AFP-TUMOR MARKER Latest Ref Range: 0 5 - 8 ng/mL 4 5   CA 19-9 Latest Ref Range: 0 - 35 U/mL 4   CARCINOEMBRYONIC ANTIGEN Latest Ref Range: 0 0 - 3 0 ng/mL 0 9           Left sided chest pain   Assessment & Plan    Serial trop, EKG, tele to r/o cardiac origin        Hypokalemia   Assessment & Plan    KCl supplement        Hypothyroidism   Assessment & Plan    Continue Synthyroid        HLD (hyperlipidemia)   Assessment & Plan    Continue Lipitor        HTN (hypertension)   Assessment & Plan    Continue losartan        GERD (gastroesophageal reflux disease)   Assessment & Plan    Continue home Nexium bid, Pepcid bid (intolerance to Protonix in the past)          DVT Prophylaxis: on SCDs, ambulation  Code Status: Level 1 - Full Code  Disposition: anticipate d/c home once clinically improved      Signed by:  Carlos Walter MD  Attending Hospitalist  Page#: [phone] (Hours 7am to 7pm)  4/19/2018 11:28 PM

## 2018-04-19 NOTE — CONSULTS
Hematology Oncology Consult Report    Hever Treviño, [birth year], [id number]  [address]    Impression and plan:    1  Shortness of breath POA  Improving  Patient is now stable on room air  Extensive subcutaneous emphysema in the chest wall and neck seen on Chest X-ray on 4/11/18  2  Left hepatic lobe mass measuring 6 6 x 4 1 x 5 cm  This mass needs to be biopsied to rule out neoplasm  Ultrasound guided biopsy scheduled for tomorrow  Tumor markers unremarkable  Dr Poppy Mackay had lengthy discussion with Dr Cindy Brumfield concerning biopsy  Also consider GI consult  Ref  Range 4/18/2018 13:03   AFP-TUMOR MARKER Latest Ref Range: 0 5 - 8 ng/mL 4 5   CA 19-9 Latest Ref Range: 0 - 35 U/mL 4   CARCINOEMBRYONIC ANTIGEN Latest Ref Range: 0 0 - 3 0 ng/mL 0 9       3  POD # 8 s/p laprascopic hernia repair and esophageal elongation  Patient is tolerating diet  Pain control is adequate  4  Anemia  Likely related to complication of large paraesophageal hernia + hx of GI bleed and Rheumatoid Arthritis  Hemoglobin is 11 6 g/dL  Continue to monitor with CBC  Monitor for active bleeding  Please do not hesitate to contact the Hematology service if you have any questions or concerns  Thank you for this consult   ______________________________________________________________________________    Chief complaint:  Chest pain/Shortness of breath    History of present illness: This is a 74 yo  F with history of hypertension, hyperlipidemia, multinodular goiter, anemia 2/2 bleeding ulcer, and large hiatal hernia s/p laprascopic hiatal hernia repair with esophageal elongation on 4/11/18 who presents with pleuritic chest pain associated with shortness of breath x few days  Hematology/Oncology consult has been requested for incidental finding of left hepatic lobe mass measuring 6 6 x 4 1 x 5 cm  Recent mammogram and colonoscopy within normal limits   Of note, patient did work in a [employer] x 18 years  Patient has also had a partial hysterectomy for endometriosis  Currently, patient is seen sitting up in bed in no apparent distress  Spouse is at bedside  Patient states she is very anxious about undergoing liver biopsy tomorrow       Past medical history:   Past Medical History:   Diagnosis Date    Anemia     Hx secondary to bleeding ulcer    Asthma     seasonal    Disease of thyroid gland     GERD (gastroesophageal reflux disease)     Goiter, nodular     History of transfusion     x1 after bleeding ulcer    Hyperlipidemia     Hypertension     Irritable bowel syndrome     Kidney stone     RA (rheumatoid arthritis) ([address] )     Seasonal allergies     Stomach ulcer     Vertigo        Past surgical history:   Past Surgical History:   Procedure Laterality Date    CARDIAC CATHETERIZATION      x2 secondary to exertional chest pain, due to lung issues, possible mild COPD    CHOLECYSTECTOMY  2015    lap    ESOPHAGOGASTRODUODENOSCOPY N/A 1/23/2018    Procedure: ESOPHAGOGASTRODUODENOSCOPY (EGD); Surgeon: Radha Tracy MD;  Location: [facility]; Service: Gastroenterology    HYSTERECTOMY      partial    [place] ESOPHAGOSCOPY FLEXIBLE TRANSORAL WITH BIOPSY N/A 4/11/2018    Procedure: ESOPHAGOGASTRODUODENOSCOPY (EGD); Surgeon: Jonn Augustine MD;  Location: [facility];  Service: Thoracic    [place] LAP, REPAIR PARAESOPHAGEAL HERNIA, INCL FUNDOPLASTY W/ MESH N/A 4/11/2018    Procedure: REPAIR HERNIA PARAESOPHAGEAL  LAPAROSCOPIC,ELEONORA GASTROPLASTY, [place] FUNDOPLICATION;  Surgeon: Jonn Augustine MD;  Location: [facility];  Service: Thoracic    SKIN BIOPSY Right     lump benign    THYROIDECTOMY, PARTIAL      TONSILLECTOMY         Allergies:    Allergies   Allergen Reactions    Pantoprazole Headache    Penicillins Other (See Comments)     During allergy testing instructed to NEVER take PCN       Home medications:   Prescriptions Prior to Admission   Medication    oxyCODONE-acetaminophen (PERCOCET) 5-325 mg per tablet    atorvastatin (LIPITOR) 20 mg tablet    bumetanide (BUMEX) 0 5 MG tablet    Calcium Carb-Cholecalciferol 600-1000 MG-UNIT CAPS    esomeprazole (NexIUM) 40 MG capsule    famotidine (PEPCID) 20 mg tablet    levothyroxine 50 mcg tablet    Linaclotide (LINZESS) 72 MCG CAPS    losartan (COZAAR) 25 mg tablet    potassium chloride (K-DUR) 10 mEq tablet    tiotropium (SPIRIVA) 18 mcg inhalation capsule       Hospital medications:   Current Facility-Administered Medications:     acetaminophen (TYLENOL) oral suspension 650 mg, 650 mg, Oral, Q6H PRN, KENNEDY Walters, 650 mg at 04/17/18 2131    atorvastatin (LIPITOR) tablet 20 mg, 20 mg, Oral, Daily With Dinner, Mp Alford MD, 20 mg at 04/18/18 1716    bumetanide (BUMEX) tablet 0 5 mg, 0 5 mg, Oral, Daily, Mp Alford MD, 0 5 mg at 04/19/18 0841    famotidine (PEPCID) tablet 20 mg, 20 mg, Oral, Daily With Dinner, KENNEDY Walters, 20 mg at 04/18/18 1717    levothyroxine tablet 50 mcg, 50 mcg, Oral, Early Morning, Mp Alford MD, 50 mcg at 04/19/18 0608    losartan (COZAAR) tablet 25 mg, 25 mg, Oral, Daily, Mp Alford MD, 25 mg at 04/19/18 0841    melatonin tablet 6 mg, 6 mg, Oral, HS, Mp Alford MD, 6 mg at 04/18/18 2113    omeprazole (PRILOSEC) suspension 2 mg/mL, 40 mg, Oral, BID AC, KENNEDY Walters, 40 mg at 04/18/18 1718    Social history:   Social History     Social History    Marital status: /Civil Union     Spouse name: N/A    Number of children: N/A    Years of education: N/A     Occupational History    Not on file       Social History Main Topics    Smoking status: Never Smoker    Smokeless tobacco: Never Used    Alcohol use No    Drug use: No    Sexual activity: Not on file     Other Topics Concern    Not on file     Social History Narrative    No narrative on file       Family history:   Family History   Problem Relation Age of Onset    Alzheimer's disease Mother     Cancer Mother      skin     Thyroid disease Mother     Cancer Father      prostate    Stroke Father     Hypertension Father     Thyroid disease Sister     Hyperlipidemia Brother     No Known Problems Son     No Known Problems Son        Review of systems: No blurred vision or double vision, no tinnitus or trouble hearing, no headaches, dizziness or body aches, no chest pain or pressure, no shortness of breath or dyspnea on exertion, no cough, sputum or hemoptysis, no nausea, vomiting,or constipation, no blood in the stools, no weight loss, no urinary incontinence, frequency or dribbling, no hematuria, no yellowing of the skin, no problems with excessive bruising or bleeding from the skin, no numbness, tingling, seizures or syncopal episodes    Physical exam  Vitals:    04/19/18 0735   BP: 134/97   Pulse: 76   Resp: 18   Temp: 97 9 °F (36 6 °C)   SpO2: 98%     Constitutional:  Well nourished, well developed  In no apparent distress  Eyes:  PERRL, conjunctiva pink, anicteric   HENT:  Atraumatic, external ears normal, nose normal,   Oropharynx: moist, no pharyngeal exudates, no thrush, pink  Neck: No adenopathy, good range of motion  Respiratory: Fair air entry bilaterally  Breasts: Deferred  Cardiovascular:  Normal rate, normal rhythm, no murmurs, no gallops, no rubs   GI:  Soft, appropriately tender, non-distended, bowel sounds +, port sites without any signs of infection  :  No costovertebral angle tenderness, normal reproductive organs, no discharge  Musculoskeletal: Normal range of motion  Integument: Scattered ecchymoses over abdomen  No purpura or petechiae  No active bleeding  Lymphatic:  No lymphadenopathy in the neck, supraclavicular region, infraclavicular region, axilla and groin bilaterally  Extremities:  Trace edema  Pulses 1+   No cords  Neurologic:  Alert & oriented x 3, CN 2-12 normal, normal motor function, normal sensory function, no focal deficits noted  Psychiatric:  Speech and behavior appropriate, response time appropriate  +Anxious  Rectal: Deferred    Laboratory test results  Results for [facility] (MRN [id number]) as of 4/19/2018 15:52   Ref  Range 4/12/2018 04:47 4/17/2018 14:36   WBC Latest Ref Range: 4 80 - 10 80 Thousand/uL 10 48 (H) 6 90   RBC Latest Ref Range: 4 20 - 5 40 Million/uL 4 34 4 27   Hemoglobin Latest Ref Range: 12 0 - 16 0 g/dL 11 8 11 6 (L)   Hematocrit Latest Ref Range: 37 0 - 47 0 % 37 6 35 8 (L)   MCV Latest Ref Range: 82 - 98 fL 87 84   MCH Latest Ref Range: 27 0 - 31 0 pg 27 2 27 0   MCHC Latest Ref Range: 31 4 - 37 4 g/dL 31 4 32 3   RDW Latest Ref Range: 11 6 - 15 1 % 14 4 14 7   Platelets Latest Ref Range: 130 - 400 Thousands/uL 151 217     Radiology reports    Xr Chest Portable     Result Date: 4/11/2018     Impression: Extensive subcutaneous emphysema in the chest wall and neck  No acute cardiopulmonary disease       Mri Abdomen W Wo Contrast     Result Date: 4/18/2018     Impression: Peripherally enhancing heterogeneous left hepatic lobe mass measuring 6 6 x 4 1 x 5 0 cm concerning for infiltrating neoplasia including a peripheral cholangiocarcinoma  Biopsy recommended  Cholecystectomy  Bibasilar atelectasis with tiny pleural effusions      Result Date: 4/12/2018  Narrative: BARIUM SWALLOW-ESOPHAGRAM INDICATION:   s/p paraesophageal hernia repair       Impression: Postsurgical changes of fundoplication  No evidence of leak  Workstation performed: [id number]      Cta Chest Ct Abdomen Pelvis W Contrast     Result Date: 4/17/2018  Impression:     1  No acute pulmonary embolism  2   Small bilateral pleural effusions with overlying consolidation, atelectasis versus aspiration  3   Gas extending from the neck along the right chest/abdominal wall and axilla as well as in the anterior mediastinum likely related to prior surgery  4   Geographic hypoattenuation throughout the lateral segment of the left hepatic lobe    This is suspicious for segment 3 infarct, particularly as the portal veins within this segment appear somewhat attenuated  The etiology for this is uncertain  Less  likely is direct liver contusion related to surgery as the study was performed laparoscopically  5   Punctate nonobstructing right renal calculus  Workstation performed: [id number]      Us Right Upper Quadrant With Liver Dopplers     Result Date: 4/17/2018    Impression: 1  Liver hepatic artery, vein and portal vein Doppler appear normal  2   Ill-defined isoechoic 3 7 x 3 4 cm mass in the left lobe of the liver as seen on CT could represent hemangioma or focal fatty infiltration or pulmonary infarct as suggested on CT  Follow-up contrast enhanced abdominal MRI for further catheterization    Mammogram (10/24/17): No evidence of malignancy  No significant changes when compared with prior studies  ACR BI-RADS® Assessments: BiRad:2 - Benign      Pathology     Final Diagnosis   A   Hernia sac, paraesophageal,  wedge gastroplasty:             - Benign oxyntic mucosa with submucosal and subserosal hemorrhage              - No dysplasia or malignancy is identified      Interpretation performed at [address]     Electronically signed by Allison Pedro MD on 4/13/2018 at [phone]

## 2018-04-20 ENCOUNTER — TELEPHONE (OUTPATIENT)
Dept: FAMILY MEDICINE CLINIC | Facility: CLINIC | Age: 68
End: 2018-04-20

## 2018-04-20 ENCOUNTER — TRANSITIONAL CARE MANAGEMENT (OUTPATIENT)
Dept: FAMILY MEDICINE CLINIC | Facility: CLINIC | Age: 68
End: 2018-04-20

## 2018-04-20 VITALS
OXYGEN SATURATION: 95 % | HEART RATE: 100 BPM | HEIGHT: 65 IN | SYSTOLIC BLOOD PRESSURE: 159 MMHG | WEIGHT: 197.31 LBS | RESPIRATION RATE: 18 BRPM | BODY MASS INDEX: 32.87 KG/M2 | DIASTOLIC BLOOD PRESSURE: 68 MMHG | TEMPERATURE: 98 F

## 2018-04-20 PROCEDURE — 99238 HOSP IP/OBS DSCHRG MGMT 30/<: CPT | Performed by: INTERNAL MEDICINE

## 2018-04-20 RX ADMIN — LEVOTHYROXINE SODIUM 50 MCG: 50 TABLET ORAL at 06:16

## 2018-04-20 RX ADMIN — ESOMEPRAZOLE MAGNESIUM 40 MG: 40 CAPSULE, DELAYED RELEASE ORAL at 06:16

## 2018-04-20 RX ADMIN — LOSARTAN POTASSIUM 25 MG: 25 TABLET, FILM COATED ORAL at 08:37

## 2018-04-20 RX ADMIN — BUMETANIDE 0.5 MG: 1 TABLET ORAL at 08:37

## 2018-04-20 NOTE — NURSING NOTE
Pt left via wheelchair with steady gait accompanied by Rn  Discharge instructions provided  Patient supplied medication returned to patient  IV dc and intact  Up to date with immunizations  No further questions at this time

## 2018-04-20 NOTE — DISCHARGE SUMMARY
Discharge Summary - [facility]  Patient Information: Aldo Archibald 75 y o  female MRN: [id number]  Unit/Bed#: [address] Encounter: [id number]    Admission Date: 4/17/2018  Discharge Date: 4/20/2018    Admitting Physician: [place] May MD  Discharging Physician/Practitioner: [place] May MD  PCP: Jayesh Yeager DO    Reason for Admission: Shortness of Breath (Patient presents with SOB to her left lung   "The pain is like a rubber band, when I breath in it tightens " Patient reports that she had a laproscopic hiatal hernia repare and esophageal elongation done on 4/11 by Dr Cade Salvador at [facility]  )    Admission Diagnoses:  Shortness of breath [R06 02]  Abdominal pain [R10 9]    Discharge Diagnoses:  Principal Problem:    Shortness of breath  Active Problems:    Liver lesion, left lobe    Left sided chest pain    Hypokalemia    GERD (gastroesophageal reflux disease)    Hiatal hernia with GERD without esophagitis    HTN (hypertension)    HLD (hyperlipidemia)    Hypothyroidism      Consultations During Hospital Stay:  IP CONSULT TO ONCOLOGY  IP CONSULT TO PULMONOLOGY    Hospital Course:   75 y o  female with a history of HTN, HLD, Severe hiatal hernia and GERD s/p recent laproscopic hiatal hernia repair and esophageal elongation on 4/11/18 (Dr Griselda Graham at [facility]), presented with intermittent left sided chest pain and subjective sob      Shortness of breath  Unclear etiology though more likely 2/2 left chest pain which is recent lap procedure related as per hx  CTA chest no evidence of PE or PNA  SOB resolved - she is at RA  Continue home PPI bid, Pepcid bid  Tylenol prn for pain     Liver lesion, left lobe  MRI confirmed irregular left liver lobe lesion [id number], unclear origin  CT chest/abd/pelvis reviewed - no additional mass  Hem/Onc consulted and Dr Rosio Wagner discussed - could be hemorrhage from recent surgical maneuver, recs a follow up CT within 3 weeks and follow up as outpatient, no liver biopsy now  Tumor markers of AFP, CEA,  all negative    Left sided chest pain  Resolved  Serial trop, EKG, tele to r/o cardiac origin     Hypokalemia  S/p KCl supplement    Hypothyroidism  Continue Synthyroid     HLD (hyperlipidemia)  Continue Lipitor    HTN (hypertension)  Continue losartan     GERD (gastroesophageal reflux disease)  Continue home Nexium bid, Pepcid bid (intolerance to Protonix in the past)     DVT Prophylaxis: on SCDs, ambulation  Code Status: Level 1 - Full Code    Imaging while in hospital:  Xr Chest Portable    Result Date: 4/11/2018  Narrative: CHEST INDICATION:   s/p paraesophageal hernia repair  COMPARISON:  None EXAM PERFORMED/VIEWS:  XR CHEST PORTABLE FINDINGS:  Extensive subcutaneous emphysema in the chest wall and neck  Cardiomediastinal silhouette appears unremarkable  The lungs are clear  There is a faint curvilinear density in the left lung apex, however there appears to be lung markings extending beyond this  Otherwise no pneumothorax or pleural effusion  Osseous structures appear within normal limits for patient age  Impression: Extensive subcutaneous emphysema in the chest wall and neck  No acute cardiopulmonary disease  Workstation performed: [id number]     Mri Abdomen W Wo Contrast    Result Date: 4/18/2018  Narrative: MRI OF THE ABDOMEN (LIVER) WITH AND WITHOUT CONTRAST INDICATION:  Follow-up left hepatic lobe lesion  COMPARISON:  CT abdomen pelvis 4/17/2018; right upper quadrant ultrasound 4/17/2018  TECHNIQUE:  The following pulse sequences were obtained on a 1 5 T scanner:  Coronal and axial T2 with TE of 90 and 180 respectively, axial T2 with fat saturation, axial FIESTA fat-sat, axial T1-weighted in-and-out-of phase, axial DWI/ADC, pre-contrast axial T1 with fat saturation, post-contrast dynamic axial T1 with fat saturation at 20, 70, and 180 seconds, followed by coronal and 7 minute delayed axial T1 with fat saturation    IV Contrast:  18 mL of gadobenate dimeglumine (MULTIHANCE) FINDINGS: LIVER:  General:  Normal in size and contour  No loss of signal on out-of-phase images to suggest hepatic steatosis  Lesions: Within the lateral segment left hepatic lobe, a markedly heterogeneous ill-defined mass measuring 6 6 x 4 1 x 5 0 cm corresponds the CT finding  The lesion is centrally hypointense on the T2-weighted sequences with peripheral hyperintensity and predominantly hypointense on T1-weighted sequence  The margins are ill-defined with gradual enhancement of the poorly defined wall extending to the hepatic capsule  With delayed imaging, there is no enhancement of the central portion nor significant washout of the peripheral enhancement  There is no overlying capsular retraction  The finding is concerning for an infiltrating neoplastic lesion including a peripheral cholangiocarcinoma versus a solitary metastatic lesion  The liver is otherwise normal in morphology favoring against hepatocellular carcinoma  The poorly defined margins favor against a hyalinized hemangioma  Vasculature:  Portal and hepatic veins patent without evidence of thrombosis  BILIARY TREE:  Normal   GALLBLADDER:  Surgically absent  PANCREAS:  Normal  ADRENAL GLANDS:  Normal  SPLEEN:  Normal  KIDNEYS:  Normal  ABDOMINAL CAVITY:  No lymphadenopathy or mass  No ascites  BOWEL:  Postoperative changes status post gastric fundoplication  No bowel obstruction  OSSEOUS STRUCTURES:  No osseous destruction  EXTRAHEPATIC VASCULAR STRUCTURES:  Visualized vasculature is normal  ABDOMINAL WALL:  Infiltration of the ventral wall fat again noted, perhaps iatrogenic due to subcutaneous injections  LOWER CHEST:  Bibasilar atelectasis, left greater than right, with tiny pleural effusion stable  Impression: Peripherally enhancing heterogeneous left hepatic lobe mass measuring 6 6 x 4 1 x 5 0 cm concerning for infiltrating neoplasia including a peripheral cholangiocarcinoma    Biopsy recommended  Cholecystectomy  Bibasilar atelectasis with tiny pleural effusions  I personally discussed this study with Akash Martinez on 4/18/2018 at 12:00 PM  Workstation performed: [id number]     Fl Barium Swallow    Result Date: 4/12/2018  Narrative: BARIUM SWALLOW-ESOPHAGRAM INDICATION:   s/p paraesophageal hernia repair  COMPARISON:  10/2/2017 IMAGES:  8 FLUOROSCOPY TIME:   0 97 min  TECHNIQUE: The patient was given water-soluble contrast by mouth and multiple images of the esophagus with attention to the gastroesophageal junction were obtained  After no leak was confirmed, the patient was given thin barium and multiple images obtained  FINDINGS: Postsurgical changes of fundoplication for repair of hernia is noted  There is no evidence of extraluminal contrast   Contrast flows freely through the gastroesophageal junction  Impression: Postsurgical changes of fundoplication  No evidence of leak  Workstation performed: [id number]     Cta Chest Ct Abdomen Pelvis W Contrast    Result Date: 4/17/2018  Narrative: CT PULMONARY ANGIOGRAM OF THE CHEST AND CT ABDOMEN AND PELVIS WITH INTRAVENOUS CONTRAST INDICATION:   Shortness of breath, hypoxia  Status post laparoscopic paraesophageal hernia repair with fundoplication COMPARISON: None  TECHNIQUE:  CT examination of the chest, abdomen and pelvis was performed  Thin section CT angiographic technique was used in the chest in order to evaluate for pulmonary embolus and coronal 3D MIP postprocessing was performed on the acquisition scanner  Axial, sagittal, and coronal 2D reformatted images were created from the source data and submitted for interpretation  Radiation dose length product (DLP) for this visit:  1127 81 mGy-cm   This examination, like all CT scans performed in the [facility], was performed utilizing techniques to minimize radiation dose exposure, including the use of iterative reconstruction and automated exposure control   IV Contrast: 100 mL of iohexol (OMNIPAQUE) Enteric Contrast:  Enteric contrast was not administered  FINDINGS: CHEST PULMONARY ARTERIAL TREE:  No pulmonary embolus is seen  LUNGS:  There is bibasilar consolidation, atelectasis versus aspiration  There is no tracheal or endobronchial lesion  PLEURA:  There are small bilateral pleural effusions  HEART/AORTA:  Unremarkable for patient's age  MEDIASTINUM AND GUILLE:  There is gas in the anterior mediastinum  CHEST WALL AND LOWER NECK:   There is gas tracking along the right neck, chest/abdominal wall and axilla  ABDOMEN LIVER/BILIARY TREE:  There is geographic hypoattenuation throughout segment 3 of the left hepatic lobe which is suspicious for infarct, particularly as the portal veins to this segment appear attenuated  This is of uncertain etiology  GALLBLADDER:  Gallbladder is surgically absent  SPLEEN:  Unremarkable  PANCREAS:  Unremarkable  ADRENAL GLANDS:  Unremarkable  KIDNEYS/URETERS:  There is a punctate nonobstructing right renal calculus  No hydronephrosis  STOMACH AND BOWEL:  There are postoperative changes at the GE junction status post hiatal hernia repair and fundoplication  APPENDIX:  No findings to suggest appendicitis  ABDOMINOPELVIC CAVITY:  There is mild pelvic free fluid  No free intraperitoneal air  No lymphadenopathy  VESSELS:  Unremarkable for patient's age  PELVIS REPRODUCTIVE ORGANS:  Patient is status post hysterectomy  URINARY BLADDER:  There is a small focus of gas in the urinary bladder likely related to instrumentation  ABDOMINAL WALL/INGUINAL REGIONS:  Unremarkable  OSSEOUS STRUCTURES:  No acute fracture or destructive osseous lesion  Impression: 1  No acute pulmonary embolism  2   Small bilateral pleural effusions with overlying consolidation, atelectasis versus aspiration  3   Gas extending from the neck along the right chest/abdominal wall and axilla as well as in the anterior mediastinum likely related to prior surgery   4   Geographic hypoattenuation throughout the lateral segment of the left hepatic lobe  This is suspicious for segment 3 infarct, particularly as the portal veins within this segment appear somewhat attenuated  The etiology for this is uncertain  Less  likely is direct liver contusion related to surgery as the study was performed laparoscopically  5   Punctate nonobstructing right renal calculus  Workstation performed: [id number]     Us Right Upper Quadrant With Liver Dopplers    Result Date: 4/17/2018  Narrative: RIGHT UPPER QUADRANT ULTRASOUND WITH LIVER DOPPLER INDICATION: Liver infarct COMPARISON:  Same date CT TECHNIQUE:   Real-time ultrasound of the right upper quadrant was performed with a curvilinear transducer with both volumetric sweeps and still imaging techniques  FINDINGS: PANCREAS:  Visualized portions of the pancreas are within normal limits  AORTA AND IVC:  Visualized portions are normal for patient age  LIVER: Size:  Within normal range  The liver measures 16 5 cm in the midclavicular line  Contour:  Surface contour is smooth  Parenchyma:  Echogenicity and echotexture are within normal limits  Ill-defined isoechoic 3 7 x 3 4 cm mass in the left lobe of the liver as seen on CT could represent hemangioma or pulmonary infarct as suggested on CT  Follow-up contrast enhanced abdominal MRI for further catheterization  LIVER DOPPLER: The main portal vein and primary branch segments are patent and hepatopetal with normal spectral waveform  Hepatic veins are patent  Spectral waveforms within normal limits  Main hepatic artery appears normal size, patent with normal spectral waveform  BILIARY: Patient has undergone prior cholecystectomy  No intrahepatic biliary dilatation  CBD measures 7 9 mm  No choledocholithiasis  KIDNEY: Right kidney measures 11 9 cm  Within normal limits  ASCITES:   None  Impression: 1    Liver hepatic artery, vein and portal vein Doppler appear normal  2   Ill-defined isoechoic 3 7 x 3 4 cm mass in the left lobe of the liver as seen on CT could represent hemangioma or focal fatty infiltration or pulmonary infarct as suggested on CT  Follow-up contrast enhanced abdominal MRI for further catheterization  Workstation performed: [id number]       Allergies:    Allergies   Allergen Reactions    Pantoprazole Headache    Penicillins Other (See Comments)     During allergy testing instructed to NEVER take PCN       Discharge Medications:  Current Discharge Medication List        Current Discharge Medication List        Current Discharge Medication List      CONTINUE these medications which have NOT CHANGED    Details   atorvastatin (LIPITOR) 20 mg tablet Take 20 mg by mouth daily      bumetanide (BUMEX) 0 5 MG tablet Take 0 5 mg by mouth daily      Calcium Carb-Cholecalciferol 600-1000 MG-UNIT CAPS Take 1 capsule by mouth daily        esomeprazole (NexIUM) 40 MG capsule Take 1 capsule (40 mg total) by mouth 2 (two) times a day before meals for 360 days  Qty: 180 capsule, Refills: 3    Associated Diagnoses: Hiatal hernia with GERD without esophagitis      famotidine (PEPCID) 20 mg tablet Take 1 tablet (20 mg total) by mouth 2 (two) times a day  Qty: 180 tablet, Refills: 3    Associated Diagnoses: Gastroesophageal reflux disease without esophagitis      levothyroxine 50 mcg tablet Take 50 mcg by mouth daily      Linaclotide (LINZESS) 72 MCG CAPS Take 72 mcg by mouth every evening  Qty: 90 capsule, Refills: 3    Associated Diagnoses: Chronic constipation      losartan (COZAAR) 25 mg tablet Take 25 mg by mouth daily      potassium chloride (K-DUR) 10 mEq tablet TAKE 1 TABLET DAILY AS DIRECTED  Qty: 90 tablet, Refills: 1    Associated Diagnoses: Essential hypertension      tiotropium (SPIRIVA) 18 mcg inhalation capsule Place 18 mcg into inhaler and inhale as needed           Current Discharge Medication List      STOP taking these medications       oxyCODONE-acetaminophen (PERCOCET) 5-325 mg per tablet Comments: Reason for Stopping:               Medications were reconciliated and instructions were given to Ms Lopez at bedside prior to the discharge  Discharge Day Visit/Exam:   Subjective:  /80 (BP Location: Left arm)   Pulse 61   Temp 97 7 °F (36 5 °C) (Oral)   Resp 18   Ht 5' 5" (1 651 m)   Wt 89 5 kg (197 lb 5 oz)   SpO2 97%   Breastfeeding? No   BMI 32 83 kg/m²   General: NAD  HEENT: EOMI, anicteric, oral moist, no oral thrush or mucosal lesion, neck supple, no mass or JVD  Chest: CTAB, no wheeze/rales  Cardiac: RRR, S1/S2, No murmur  Abd: S/ND/NT/BS+  MSK: No LE pitting edema, pulses intact  Neuro: AAOx3, moving all extremities  Psychiatric: Mood with normal affect    Patient Instructions: Activity: activity as tolerated  Diet: regular diet  Wound Care: none needed    Discharge instructions/Information to patient and family:(Discharge Medications and Follow up):  See after visit summary for information provided to patient and family  Provisions for Follow-Up Care:  See after visit summary for information related to follow-up care and any pertinent home health orders  Follow-up Informations:  No follow-up provider specified  Disposition: Home    Planned Readmission: No     Discharge Statement:  I spent 20 minutes discharging the patient  This time was spent on the day of discharge  I had direct contact with the patient on the day of discharge  Greater than 50% of the total time was spent examining patient, answering all patient questions, arranging and discussing plan of care with patient as well as directly providing post-discharge instructions  Additional time then spent on discharge activities  Discharge Medications:  See after visit summary for reconciled discharge medications provided to patient and family

## 2018-04-20 NOTE — TELEPHONE ENCOUNTER
Pt  Being discharge today      gave  Verbal okay for [facility] seervices  Including OT and PT      [facility]   Will be faxing a verbal order to be signed  af/rma lombardi

## 2018-04-20 NOTE — DISCHARGE INSTRUCTIONS
Follow up with Dr Rios Valdes at [place] as scheduled  Repeat CT abd/pelvis with contrast within 3 weeks to re-evaluation left liver lobe finding

## 2018-04-20 NOTE — PLAN OF CARE
CARDIOVASCULAR - ADULT     Maintains optimal cardiac output and hemodynamic stability Adequate for Discharge     Absence of cardiac dysrhythmias or at baseline rhythm Adequate for Discharge        DISCHARGE PLANNING - CARE MANAGEMENT     Discharge to post-acute care or home with appropriate resources Adequate for Discharge        INFECTION - ADULT     Absence or prevention of progression during hospitalization Adequate for Discharge        PAIN - ADULT     Verbalizes/displays adequate comfort level or baseline comfort level Adequate for Discharge        RESPIRATORY - ADULT     Achieves optimal ventilation and oxygenation Adequate for Discharge        SAFETY ADULT     Maintain or return to baseline ADL function Adequate for Discharge

## 2018-04-27 ENCOUNTER — OFFICE VISIT (OUTPATIENT)
Dept: CARDIAC SURGERY | Facility: CLINIC | Age: 68
End: 2018-04-27

## 2018-04-27 VITALS
TEMPERATURE: 98.4 F | OXYGEN SATURATION: 98 % | HEIGHT: 65 IN | BODY MASS INDEX: 32.82 KG/M2 | WEIGHT: 197 LBS | DIASTOLIC BLOOD PRESSURE: 63 MMHG | SYSTOLIC BLOOD PRESSURE: 126 MMHG | HEART RATE: 67 BPM

## 2018-04-27 DIAGNOSIS — K44.9 HERNIA, HIATAL: Primary | ICD-10-CM

## 2018-04-27 PROCEDURE — 99024 POSTOP FOLLOW-UP VISIT: CPT | Performed by: PHYSICIAN ASSISTANT

## 2018-04-27 NOTE — ASSESSMENT & PLAN NOTE
Ms Tila Chaves is progressing slowly from a thoracic surgery standpoint  She is advancing her diet, without any limitation  Her pain is slowly improving and she returning to her daily activities  She is scheduled to undergo a repeat CT scan of the abdomen/pelvis 3 weeks after her last one, to further evaluate the liver lesion seen on the MRI from her hospital admission  She will return to our office in 4 weeks for a 2nd post operative visit  She is in agreement with the above plan

## 2018-04-27 NOTE — PROGRESS NOTES
Thoracic Follow-Up  Assessment/Plan:    Hernia, hiatal  Ms Violet Logan is progressing slowly from a thoracic surgery standpoint  She is advancing her diet, without any limitation  Her pain is slowly improving and she returning to her daily activities  She is scheduled to undergo a repeat CT scan of the abdomen/pelvis 3 weeks after her last one, to further evaluate the liver lesion seen on the MRI from her hospital admission  She will return to our office in 4 weeks for a 2nd post operative visit  She is in agreement with the above plan  Diagnoses and all orders for this visit:    Hernia, hiatal          Thoracic History     Diagnosis: Paraesophageal hernia   Procedure: EGD, laparoscopic paraesophageal hernia repair, Werner gastroplasty, Toupet fundoplication performed on 4/11/18  Pathology: hernia sac, wedge gastroplasty revealed benign oxyntic mucosa with submucosal and subserosal hemorrhage  No dysplasia or malignancy identified  Patient ID: Aldo Archibald is a 75 y o  female  HPI  Ms Violet Logan is a 74 yo female seen in our office for a paraesophageal hernia, which was repaired on 4/11/18  Her hospital course was uneventful and she was discharged to home on 4/14/18  She was admitted to [facility]"" from 4/17-4/20 for shortness of breath  Her CT scan did not reveal any abnormalities  A MRI revealed a 6 x 5 x 4 cm left liver lesion, which could have been a hematoma from her surgery  Dr Rosio Wagner had recommended a repeat CT scan in 3 weeks  On discussion, she still has discomfort around her incisions  She is eating soft cookies, oatmeal, rice, canned fruit, and fish  She does have chronic IBS, and is starting to have her bowels normalize  She did have visiting nurses, but was not happy with the continuity of her care  She denies further shortness of breath, cough, or dysphagia  Review of Systems   Constitutional: Negative for chills and fever  HENT: Negative for trouble swallowing      Respiratory: Negative for cough, chest tightness and shortness of breath  Cardiovascular: Negative for chest pain  Gastrointestinal: Negative for abdominal distention, nausea and vomiting  Neurological: Negative for headaches  Psychiatric/Behavioral: Negative for agitation and behavioral problems  Objective:   Physical Exam   Constitutional: She is oriented to person, place, and time  She appears well-developed and well-nourished  HENT:   Head: Normocephalic and atraumatic  Eyes: EOM are normal  Pupils are equal, round, and reactive to light  Cardiovascular: Normal rate, regular rhythm and normal heart sounds  Pulmonary/Chest: Effort normal and breath sounds normal  No respiratory distress  She has no wheezes  Abdominal: Soft  Bowel sounds are normal  She exhibits no distension  Laparoscopic incisions healing well  No evidence of infection  Musculoskeletal: Normal range of motion  Neurological: She is alert and oriented to person, place, and time  Skin: Skin is warm and dry  Psychiatric: She has a normal mood and affect  Her behavior is normal    Vitals reviewed  /63 (BP Location: Left arm, Patient Position: Sitting, Cuff Size: Adult)   Pulse 67   Temp 98 4 °F (36 9 °C)   Ht 5' 5" (1 651 m)   Wt 89 4 kg (197 lb)   SpO2 98%   BMI 32 78 kg/m²     No Chest XR results available for this patient

## 2018-04-30 PROBLEM — E04.2 MULTINODULAR GOITER: Status: ACTIVE | Noted: 2017-10-04

## 2018-04-30 PROBLEM — R06.02 SHORTNESS OF BREATH: Status: RESOLVED | Noted: 2018-04-17 | Resolved: 2018-04-30

## 2018-04-30 PROBLEM — K64.9 HEMORRHOIDS: Status: ACTIVE | Noted: 2017-10-25

## 2018-04-30 PROBLEM — E78.2 MIXED HYPERLIPIDEMIA: Status: ACTIVE | Noted: 2018-04-17

## 2018-04-30 PROBLEM — I25.10 CORONARY ARTERY DISEASE: Status: ACTIVE | Noted: 2017-05-16

## 2018-04-30 PROBLEM — D51.0 ANEMIA, PERNICIOUS: Status: ACTIVE | Noted: 2017-10-04

## 2018-04-30 PROBLEM — E03.0 CONGENITAL HYPOTHYROIDISM WITH DIFFUSE GOITER: Status: ACTIVE | Noted: 2018-04-17

## 2018-04-30 PROBLEM — E04.2 MULTINODULAR GOITER: Status: RESOLVED | Noted: 2017-10-04 | Resolved: 2018-04-30

## 2018-04-30 PROBLEM — R07.9 LEFT SIDED CHEST PAIN: Status: RESOLVED | Noted: 2018-04-17 | Resolved: 2018-04-30

## 2018-04-30 PROBLEM — M85.80 OSTEOPENIA: Status: ACTIVE | Noted: 2017-10-24

## 2018-04-30 PROBLEM — I77.89 CORONARY ARTERY ECTASIA (HCC): Status: ACTIVE | Noted: 2017-06-09

## 2018-04-30 PROBLEM — N39.3 STRESS INCONTINENCE, FEMALE: Status: ACTIVE | Noted: 2017-10-04

## 2018-04-30 PROBLEM — K58.9 IBS (IRRITABLE BOWEL SYNDROME): Status: ACTIVE | Noted: 2017-10-04

## 2018-04-30 PROBLEM — K44.9 HERNIA, HIATAL: Status: RESOLVED | Noted: 2017-10-04 | Resolved: 2018-04-30

## 2018-05-01 ENCOUNTER — OFFICE VISIT (OUTPATIENT)
Dept: FAMILY MEDICINE CLINIC | Facility: CLINIC | Age: 68
End: 2018-05-01
Payer: MEDICARE

## 2018-05-01 VITALS
SYSTOLIC BLOOD PRESSURE: 122 MMHG | WEIGHT: 198 LBS | HEIGHT: 65 IN | TEMPERATURE: 97.8 F | BODY MASS INDEX: 32.99 KG/M2 | DIASTOLIC BLOOD PRESSURE: 68 MMHG | HEART RATE: 78 BPM | RESPIRATION RATE: 18 BRPM

## 2018-05-01 DIAGNOSIS — K76.9 LIVER LESION, LEFT LOBE: ICD-10-CM

## 2018-05-01 DIAGNOSIS — M25.511 CHRONIC RIGHT SHOULDER PAIN: ICD-10-CM

## 2018-05-01 DIAGNOSIS — D51.0 ANEMIA, PERNICIOUS: ICD-10-CM

## 2018-05-01 DIAGNOSIS — R06.02 SHORTNESS OF BREATH: Primary | ICD-10-CM

## 2018-05-01 DIAGNOSIS — R79.89 ABNORMAL LFTS: ICD-10-CM

## 2018-05-01 DIAGNOSIS — G89.29 CHRONIC RIGHT SHOULDER PAIN: ICD-10-CM

## 2018-05-01 DIAGNOSIS — E78.2 MIXED HYPERLIPIDEMIA: ICD-10-CM

## 2018-05-01 DIAGNOSIS — I10 BENIGN HYPERTENSION: ICD-10-CM

## 2018-05-01 PROCEDURE — 99495 TRANSJ CARE MGMT MOD F2F 14D: CPT | Performed by: FAMILY MEDICINE

## 2018-05-01 RX ORDER — LOSARTAN POTASSIUM 25 MG/1
25 TABLET ORAL DAILY
Refills: 0
Start: 2018-05-01 | End: 2018-06-24 | Stop reason: SDUPTHER

## 2018-05-01 RX ORDER — ALBUTEROL SULFATE 90 UG/1
2 AEROSOL, METERED RESPIRATORY (INHALATION) EVERY 6 HOURS PRN
Qty: 1 INHALER | Refills: 0 | Status: SHIPPED | OUTPATIENT
Start: 2018-05-01 | End: 2018-10-02 | Stop reason: ALTCHOICE

## 2018-05-01 RX ORDER — ATORVASTATIN CALCIUM 20 MG/1
20 TABLET, FILM COATED ORAL DAILY
Refills: 0
Start: 2018-05-01 | End: 2018-06-24 | Stop reason: SDUPTHER

## 2018-05-01 NOTE — PROGRESS NOTES
Assessment/Plan:    Problem List Items Addressed This Visit     Benign hypertension    Relevant Medications    losartan (COZAAR) 25 mg tablet    Other Relevant Orders    CBC and differential    Comprehensive metabolic panel    Mixed hyperlipidemia    Relevant Medications    atorvastatin (LIPITOR) 20 mg tablet    Other Relevant Orders    CBC and differential    Comprehensive metabolic panel    Liver lesion, left lobe    Relevant Orders    CBC and differential    Comprehensive metabolic panel    Anemia, pernicious     Pt advised it would be ok to continue with iron supplementation         Relevant Orders    CBC and differential    Comprehensive metabolic panel      Other Visit Diagnoses     Shortness of breath    -  Primary    Relevant Medications    Tiotropium Bromide Monohydrate (SPIRIVA RESPIMAT) 2 5 MCG/ACT AERS    albuterol (PROAIR HFA) 90 mcg/act inhaler    Other Relevant Orders    Ambulatory referral to Physical Therapy    CBC and differential    Comprehensive metabolic panel    Chronic right shoulder pain        Relevant Orders    CBC and differential    Comprehensive metabolic panel    Abnormal LFTs        Relevant Orders    CBC and differential    Comprehensive metabolic panel          There are no Patient Instructions on file for this visit  No Follow-up on file  Subjective:      Patient ID: Lanie Mace is a 75 y o  female  Chief Complaint   Patient presents with   [address]  [facility]   Hiatal Hernia repair  [id number]       Pt states she went in for her hiatal hernia surgery states she got out Saturday am   Monday night had bouts of SOB  Pt was sent to the ed - pt states she was advised she has liver cancer  Pt states her SOB was not addressed as per pt in the hospital   PT states she had a ct scan and an US that saw something on the liver  Pt had an MRI done - pt was advised she was to have a biopsy - pt states the procedure was cancelled   Pt states she spoke with a surgeon - she was advised she did not need a biopsy at this time and the liver mass could be a hematoma from the surgery  Pt was discharged  Pt has a follow up ct scan sched for may 11th  This will be to evaluate the liver lesion  Pt states she is concerned that her breathing issues were not addressed  Pt had similar episodes last year and we gave her spiriova  Pt states she started it on Friday and states its taking its time to get into her system  Pt states her physical therapy is to walk and she gets very winded  States her lungs feel clear and the feeling of SOB is high in her throat  Pt was sent home with an incentive spirometer she is not using it as she said they listened to her lungs and she was told she did not need it  Pt has already followed with surgery    PT 02 sat on RA is 98%    Pt states she had shoulder pain before she went in for the surgery,  States if she reaches for something it hurts  This got worse with the surgery back to base  Pt is asking about shoulder exercises  Pt states she is at the point she can only take tylenol            The following portions of the patient's history were reviewed and updated as appropriate: allergies, current medications, past family history, past medical history, past social history, past surgical history and problem list     Review of Systems   Constitutional: Negative for activity change, appetite change, chills, diaphoresis, fatigue and fever  HENT: Positive for trouble swallowing  Negative for dental problem, sinus pressure and sore throat  Eyes: Negative for discharge and itching  Respiratory: Positive for shortness of breath  Negative for chest tightness  Cardiovascular: Negative for chest pain, palpitations and leg swelling  Gastrointestinal: Positive for abdominal pain  Negative for blood in stool  Endocrine: Negative for cold intolerance and heat intolerance  Genitourinary: Negative for difficulty urinating  Musculoskeletal: Positive for arthralgias and myalgias  Neurological: Negative for facial asymmetry  Current Outpatient Prescriptions   Medication Sig Dispense Refill    atorvastatin (LIPITOR) 20 mg tablet Take 1 tablet (20 mg total) by mouth daily  0    bumetanide (BUMEX) 0 5 MG tablet Take 0 5 mg by mouth daily      Calcium Carb-Cholecalciferol 600-1000 MG-UNIT CAPS Take 1 capsule by mouth daily        esomeprazole (NexIUM) 40 MG capsule Take 1 capsule (40 mg total) by mouth 2 (two) times a day before meals for 360 days 180 capsule 3    levothyroxine 50 mcg tablet Take 50 mcg by mouth daily      losartan (COZAAR) 25 mg tablet Take 1 tablet (25 mg total) by mouth daily  0    potassium chloride (K-DUR) 10 mEq tablet TAKE 1 TABLET DAILY AS DIRECTED 90 tablet 1    albuterol (PROAIR HFA) 90 mcg/act inhaler Inhale 2 puffs every 6 (six) hours as needed for wheezing 1 Inhaler 0    Tiotropium Bromide Monohydrate (SPIRIVA RESPIMAT) 2 5 MCG/ACT AERS Inhale 2 Act (5 mcg total) daily 1 Inhaler 3     No current facility-administered medications for this visit  Objective:    /68   Pulse 78   Temp 97 8 °F (36 6 °C)   Resp 18   Ht 5' 5" (1 651 m)   Wt 89 8 kg (198 lb)   BMI 32 95 kg/m²     I will change her spiriva back to the script she had as the hospital chnaged it  Pt advised on using the incentive spirometer  Pt offerd chest x ray - she would like to wait to see what ct scan will reveal    I will get LFT's as well as cbc for the anemia and the lft's being abnormal   Physical Exam   Constitutional: She is oriented to person, place, and time  She appears well-developed and well-nourished  HENT:   Head: Normocephalic and atraumatic  Right Ear: External ear normal    Left Ear: External ear normal    Eyes: EOM are normal  Pupils are equal, round, and reactive to light  Neck: Normal range of motion  Neck supple  Cardiovascular: Regular rhythm    Exam reveals no gallop and no friction rub     No murmur heard  Pulmonary/Chest: Effort normal and breath sounds normal  No respiratory distress  She has no wheezes  Abdominal: Soft  Bowel sounds are normal    Musculoskeletal: Normal range of motion  Neurological: She is alert and oriented to person, place, and time  She has normal reflexes  Skin: Skin is warm  Nursing note and vitals reviewed             Date and time hospital follow up call was made:  4/20/2018  2:55 PM  Hospital care reviewed:  Records reviewed  Patient was hopsitalized at:  [facility]  Date of admission:  4/17/18  Date of discharge:  4/20/18  Diagnosis:  Shortness of Breath  Were the patients medicaitons reviewed and updated:  Yes  Current symptoms:  None  Post hospital issues:  None  Should patient be enrolled in anticoag monitoring?:  No  Scheduled for follow up?:  Yes  Patients specialists:  Other (comment)  Other specialists Name:  Dr Marilynn Rose 4/27/18  Other specialists contact #:  Dr Yair Garcia 5/15/18  Did you obtain your prescribed medications:  Yes  Do you need help managing your perscriptions or medications:  Yes  Is transportation to your appointments needed:  Yes  Specify why:  She is post op  I have advised the patient to call PCP with any new or worsening symptoms (please type in name along with any credentials):  Lazarus Sahni LPN  Living Arrangements:  Spouse or Significiant other  Support System:  Family, Friends  The type of support provided:  Physical, Emotional  Do you have social support:  Yes, as much as I need  Are you recieving outpatient services:  No  Are you recieving home care services:  No  Are you using any community resources:  No  Current waiver service:  No  Have you fallen in the last 12 months:  No  Interperter language line required?:  No  Counseling:  Patient  Counseling topics:  Activities of daily living, Importance of RX compliance, patient and family education, Risk factor reduction, instructions for management  Comments:  I spoke with Alo Eric who states that she is feeling better  She has no fever, chest pain or dyspnea  She declined visiting nursing since she does not feel that she needs any assistance with PT or OT   She knows to call our office at any time with any concerns and is aware to go to the ER if any chest pain, dyspnea, weakness, dizziness, palpitations, severe pain, etc            Manish Nino, DO

## 2018-05-08 ENCOUNTER — TELEPHONE (OUTPATIENT)
Dept: FAMILY MEDICINE CLINIC | Facility: CLINIC | Age: 68
End: 2018-05-08

## 2018-05-08 DIAGNOSIS — R53.81 PHYSICAL DECONDITIONING: Primary | ICD-10-CM

## 2018-05-08 DIAGNOSIS — R06.00 DYSPNEA, UNSPECIFIED TYPE: ICD-10-CM

## 2018-05-08 NOTE — TELEPHONE ENCOUNTER
Debbie from [facility] called  She states pt came in with PT order from Dr Lorenzo Sanchez, diagnosis was shortness of breath, which she says will not work for physical therapy  Patient is also complaining of shoulder pain  Debbie states if it is shortness of breath, she should be going to [facility] [employer] [facility] [employer]  Pls follow up with  and then contact patient on how to proceed

## 2018-05-10 ENCOUNTER — TELEPHONE (OUTPATIENT)
Dept: FAMILY MEDICINE CLINIC | Facility: CLINIC | Age: 68
End: 2018-05-10

## 2018-05-10 NOTE — TELEPHONE ENCOUNTER
I do not see an order in chart for a Pulmonary Dr Flora Solo you please create order   Lorenzo Carrizales, [address] [facility] Betzaida Mabry

## 2018-05-10 NOTE — TELEPHONE ENCOUNTER
Referral for Pulmonary Dr  And one for new  PT per Samia Emery   Please call when ready for     She wants a hard copy of referrals

## 2018-05-10 NOTE — TELEPHONE ENCOUNTER
Bobby Smith wants to know if she can get CT scan of her chest as well?   She doesn't understand why they would be able to do this test since she has had so many tests done lately and her breathing is good    Please call Bobby Smith

## 2018-05-11 ENCOUNTER — HOSPITAL ENCOUNTER (OUTPATIENT)
Dept: CT IMAGING | Facility: HOSPITAL | Age: 68
Discharge: HOME | End: 2018-05-11
Payer: MEDICARE

## 2018-05-11 DIAGNOSIS — K76.9 LIVER LESION, LEFT LOBE: ICD-10-CM

## 2018-05-11 PROCEDURE — 74177 CT ABD & PELVIS W/CONTRAST: CPT

## 2018-05-11 RX ADMIN — IOHEXOL 100 ML: 350 INJECTION, SOLUTION INTRAVENOUS at 07:22

## 2018-05-11 NOTE — TELEPHONE ENCOUNTER
Patient calling back to speak to Lexis Hernandez  She stated that she is returning her phone call    [phone]

## 2018-05-15 ENCOUNTER — TELEPHONE (OUTPATIENT)
Dept: FAMILY MEDICINE CLINIC | Facility: CLINIC | Age: 68
End: 2018-05-15

## 2018-05-15 ENCOUNTER — OFFICE VISIT (OUTPATIENT)
Dept: GASTROENTEROLOGY | Facility: CLINIC | Age: 68
End: 2018-05-15
Payer: MEDICARE

## 2018-05-15 VITALS
DIASTOLIC BLOOD PRESSURE: 88 MMHG | HEIGHT: 65 IN | SYSTOLIC BLOOD PRESSURE: 124 MMHG | BODY MASS INDEX: 32.86 KG/M2 | WEIGHT: 197.2 LBS | TEMPERATURE: 99.1 F | HEART RATE: 85 BPM

## 2018-05-15 DIAGNOSIS — K44.9 HIATAL HERNIA WITH GERD WITHOUT ESOPHAGITIS: Primary | ICD-10-CM

## 2018-05-15 DIAGNOSIS — M25.519 SHOULDER PAIN, UNSPECIFIED CHRONICITY, UNSPECIFIED LATERALITY: ICD-10-CM

## 2018-05-15 DIAGNOSIS — K21.9 HIATAL HERNIA WITH GERD WITHOUT ESOPHAGITIS: Primary | ICD-10-CM

## 2018-05-15 DIAGNOSIS — R93.2 ABNORMAL CT OF LIVER: ICD-10-CM

## 2018-05-15 DIAGNOSIS — R06.00 DYSPNEA, UNSPECIFIED TYPE: Primary | ICD-10-CM

## 2018-05-15 DIAGNOSIS — K58.1 IRRITABLE BOWEL SYNDROME WITH CONSTIPATION: ICD-10-CM

## 2018-05-15 PROCEDURE — 99213 OFFICE O/P EST LOW 20 MIN: CPT | Performed by: INTERNAL MEDICINE

## 2018-05-15 NOTE — PROGRESS NOTES
[facility]  Consuelo Figueroa 75 y o  female MRN: [id number]            Assessment & Plan:    Pleasant 60-year-old female status post laparoscopic repair of large hiatal hernia/paraesophageal hernia with a fundoplication due to refractory reflux  Symptomatic the patient is much improved  1   Acid reflux was large hiatal hernia paraesophageal component:  -patient is much better after surgical repair  -still having some difficulty swallowing with gradually improving, she reports that she has about 70 percent back to normal  -taking PPI therapy once daily as per thoracic surgery to be weaned off in the future    2  Abnormal imaging:  Area in her liver previously noted was the hematoma, discussed with Radiology today   - apears to be resolving consistent with hematoma, no evidence of malignancy    3  Acute onset of right upper quadrant abdominal pain:  Most likely musculoskeletal given positional component, absence of fevers  -she was asked to give us a call or call thoracic surgery for symptoms worsen over the next 24 hours  -she had a CT scan 4 days ago which demonstrates significant improvement in the hematoma    4  Constipation:  The patient was instructed to take Linzess once again and increased fluid intake    Will see the patient back in about 3 months       _____________________________________________________________         CC:  Abnormal imaging    HPI:  Consuelo Figueroa is a 75 y  o female who is here for abnormal imaging  She is status post laparoscopic repair of hiatal hernia and fundoplication due to refractory acid reflux symptoms  The patient did well initially postoperatively though she did have some respiratory discomfort with pleural effusions, currently has been on inhalers for this  Also had imaging studies which demonstrated a hematoma for which she is here to have followed up as well  She was doing relatively well, increased exercise tolerance recently    This morning when getting out of bed she had acute onset of right-sided abdominal pain  Seems to be positional in certain positions the pain continues to worsen  She denies any fevers or chills  No symptoms with taking cough or deep breaths  ROS:  The remainder of the ROS was negative except for the pertinent positives mentioned in HPI        Allergies: Pantoprazole and Penicillins    Medications:   Current Outpatient Prescriptions:     albuterol (PROAIR HFA) 90 mcg/act inhaler, Inhale 2 puffs every 6 (six) hours as needed for wheezing, Disp: 1 Inhaler, Rfl: 0    atorvastatin (LIPITOR) 20 mg tablet, Take 1 tablet (20 mg total) by mouth daily, Disp: , Rfl: 0    bumetanide (BUMEX) 0 5 MG tablet, Take 0 5 mg by mouth daily, Disp: , Rfl:     Calcium Carb-Cholecalciferol 600-1000 MG-UNIT CAPS, Take 1 capsule by mouth daily  , Disp: , Rfl:     esomeprazole (NexIUM) 40 MG capsule, Take 1 capsule (40 mg total) by mouth 2 (two) times a day before meals for 360 days (Patient taking differently: Take 40 mg by mouth daily  ), Disp: 180 capsule, Rfl: 3    levothyroxine 50 mcg tablet, Take 50 mcg by mouth daily, Disp: , Rfl:     losartan (COZAAR) 25 mg tablet, Take 1 tablet (25 mg total) by mouth daily, Disp: , Rfl: 0    potassium chloride (K-DUR) 10 mEq tablet, TAKE 1 TABLET DAILY AS DIRECTED, Disp: 90 tablet, Rfl: 1    Tiotropium Bromide Monohydrate (SPIRIVA RESPIMAT) 2 5 MCG/ACT AERS, Inhale 2 Act (5 mcg total) daily, Disp: 1 Inhaler, Rfl: 3    Past Medical History:   Diagnosis Date    Anemia     Hx secondary to bleeding ulcer    Anxiety     resolved 10/4/17    Asthma     seasonal    Chronic kidney disease     kidney stone    Disease of thyroid gland     GERD (gastroesophageal reflux disease)     Goiter, nodular     History of transfusion     x1 after bleeding ulcer    Hyperlipidemia     Hypertension     Irritable bowel syndrome     RA (rheumatoid arthritis) (HCC)     Seasonal allergies     Stomach ulcer  Vertigo        Past Surgical History:   Procedure Laterality Date    CARDIAC CATHETERIZATION      x2 secondary to exertional chest pain, due to lung issues, possible mild COPD    CHOLECYSTECTOMY  2015    lap - last assessed 10/4/17    COLONOSCOPY      complete    ESOPHAGOGASTRODUODENOSCOPY N/A 1/23/2018    Procedure: ESOPHAGOGASTRODUODENOSCOPY (EGD); Surgeon: Kimi Solomon MD;  Location: [facility]; Service: Gastroenterology    HYSTERECTOMY      partial - last assessed 10/4/17    LIPOMA RESECTION      last assessed 10/4/17    AL ESOPHAGOSCOPY FLEXIBLE TRANSORAL WITH BIOPSY N/A 4/11/2018    Procedure: ESOPHAGOGASTRODUODENOSCOPY (EGD); Surgeon: Alicia Abbasi MD;  [facility];  Service: Thoracic    AL LAP, REPAIR PARAESOPHAGEAL HERNIA, INCL FUNDOPLASTY W/ MESH N/A 4/11/2018    Procedure: REPAIR HERNIA PARAESOPHAGEAL  LAPAROSCOPIC,ELEONORA GASTROPLASTY, [place] FUNDOPLICATION;  Surgeon: Alicia Abbasi MD;  [facility];  Service: Thoracic    SKIN BIOPSY Right     lump benign - last assessed 10/4/17    THYROIDECTOMY, PARTIAL      sub - total - last assessed 10/4/17    TONSILLECTOMY         Family History   Problem Relation Age of Onset    Alzheimer's disease Mother     Cancer Mother      skin     Thyroid disease Mother     Ulcerative colitis Mother     Cancer Father      prostate    Stroke Father     Hypertension Father     Thyroid disease Sister     Ulcerative colitis Sister     Other Sister      open heart surgery    Hyperlipidemia Brother     No Known Problems Son     No Known Problems Son         reports that she has never smoked  She has never used smokeless tobacco  She reports that she does not drink alcohol or use drugs        Physical Exam:    /88 (BP Location: Right arm, Patient Position: Sitting, Cuff Size: Standard)   Pulse 85   Temp 99 1 °F (37 3 °C) (Axillary)   Ht 5' 5" (1 651 m)   Wt 89 4 kg (197 lb 3 2 oz)   BMI 32 82 kg/m²     Gen: wn/wd, NAD  HEENT: anicteric, MMM, no cervical LAD  CVS:  Tachycardic, no m/r/g  CHEST: CTA b/l  ABD: +BS, soft, subcutaneous nodules at the site of port from recent surgery, tenderness in right upper quadrant, no rebound, no guarding, no ecchymosis  EXT: no c/c/e  NEURO: aaox3  SKIN: NO rashes

## 2018-05-15 NOTE — TELEPHONE ENCOUNTER
There is a PT referral in chart and Jacqueline Saravia is calling to change the diagnosis  She said it is for her right shoulder, why does it say deconditioning? She doesn't know what the problem is  She left a message and wants a call back  Not sure exactly why she is upset      Please call Jacqueline Saravia at [phone]    Thank You

## 2018-05-15 NOTE — TELEPHONE ENCOUNTER
She was not sent to physical therapy for her houlder pain she was sent because of her breathing  Would she like to go for her shoulder pain instead?   I can give her that order if she likes

## 2018-05-15 NOTE — LETTER
May 15, 2018     [facility] Muller   [address] [address]    Patient: Shaq Walker   YOB: 1950   Date of Visit: 5/15/2018       Dear Dr John Ramirez:    Thank you for referring Chinmay Dumont to me for evaluation  Below are my notes for this consultation  If you have questions, please do not hesitate to call me  I look forward to following your patient along with you  Sincerely,        Jun Gamble MD        CC: No Recipients  Jun Gamble MD  5/15/2018  5:12 PM  Sign at close encounter  [facility]  Shaq Walker 75 y o  female MRN: [id number]            Assessment & Plan:    Pleasant 50-year-old female status post laparoscopic repair of large hiatal hernia/paraesophageal hernia with a fundoplication due to refractory reflux  Symptomatic the patient is much improved  1   Acid reflux was large hiatal hernia paraesophageal component:  -patient is much better after surgical repair  -still having some difficulty swallowing with gradually improving, she reports that she has about 70 percent back to normal  -taking PPI therapy once daily as per thoracic surgery to be weaned off in the future    2  Abnormal imaging:  Area in her liver previously noted was the hematoma, discussed with Radiology today   - apears to be resolving consistent with hematoma, no evidence of malignancy    3  Acute onset of right upper quadrant abdominal pain:  Most likely musculoskeletal given positional component, absence of fevers  -she was asked to give us a call or call thoracic surgery for symptoms worsen over the next 24 hours  -she had a CT scan 4 days ago which demonstrates significant improvement in the hematoma    Will see the patient back in about 3 months       _____________________________________________________________         CC:  Abnormal imaging    HPI:  Shaq Walker is a 75 y  o female who is here for abnormal imaging    She is status post laparoscopic repair of hiatal hernia and fundoplication due to refractory acid reflux symptoms  The patient did well initially postoperatively though she did have some respiratory discomfort with pleural effusions, currently has been on inhalers for this  Also had imaging studies which demonstrated a hematoma for which she is here to have followed up as well  She was doing relatively well, increased exercise tolerance recently  This morning when getting out of bed she had acute onset of right-sided abdominal pain  Seems to be positional in certain positions the pain continues to worsen  She denies any fevers or chills  No symptoms with taking cough or deep breaths  ROS:  The remainder of the ROS was negative except for the pertinent positives mentioned in HPI        Allergies: Pantoprazole and Penicillins    Medications:   Current Outpatient Prescriptions:     albuterol (PROAIR HFA) 90 mcg/act inhaler, Inhale 2 puffs every 6 (six) hours as needed for wheezing, Disp: 1 Inhaler, Rfl: 0    atorvastatin (LIPITOR) 20 mg tablet, Take 1 tablet (20 mg total) by mouth daily, Disp: , Rfl: 0    bumetanide (BUMEX) 0 5 MG tablet, Take 0 5 mg by mouth daily, Disp: , Rfl:     Calcium Carb-Cholecalciferol 600-1000 MG-UNIT CAPS, Take 1 capsule by mouth daily  , Disp: , Rfl:     esomeprazole (NexIUM) 40 MG capsule, Take 1 capsule (40 mg total) by mouth 2 (two) times a day before meals for 360 days (Patient taking differently: Take 40 mg by mouth daily  ), Disp: 180 capsule, Rfl: 3    levothyroxine 50 mcg tablet, Take 50 mcg by mouth daily, Disp: , Rfl:     losartan (COZAAR) 25 mg tablet, Take 1 tablet (25 mg total) by mouth daily, Disp: , Rfl: 0    potassium chloride (K-DUR) 10 mEq tablet, TAKE 1 TABLET DAILY AS DIRECTED, Disp: 90 tablet, Rfl: 1    Tiotropium Bromide Monohydrate (SPIRIVA RESPIMAT) 2 5 MCG/ACT AERS, Inhale 2 Act (5 mcg total) daily, Disp: 1 Inhaler, Rfl: 3    Past Medical History:   Diagnosis Date  Anemia     Hx secondary to bleeding ulcer    Anxiety     resolved 10/4/17    Asthma     seasonal    Chronic kidney disease     kidney stone    Disease of thyroid gland     GERD (gastroesophageal reflux disease)     Goiter, nodular     History of transfusion     x1 after bleeding ulcer    Hyperlipidemia     Hypertension     Irritable bowel syndrome     RA (rheumatoid arthritis) ([facility] [address] )     Seasonal allergies     Stomach ulcer     Vertigo        Past Surgical History:   Procedure Laterality Date    CARDIAC CATHETERIZATION      x2 secondary to exertional chest pain, due to lung issues, possible mild COPD    CHOLECYSTECTOMY  2015    lap - last assessed 10/4/17    COLONOSCOPY      complete    ESOPHAGOGASTRODUODENOSCOPY N/A 1/23/2018    Procedure: ESOPHAGOGASTRODUODENOSCOPY (EGD); Surgeon: Emmy Goss MD;  Location: [facility]; Service: Gastroenterology    HYSTERECTOMY      partial - last assessed 10/4/17    LIPOMA RESECTION      last assessed 10/4/17    IN ESOPHAGOSCOPY FLEXIBLE TRANSORAL WITH BIOPSY N/A 4/11/2018    Procedure: ESOPHAGOGASTRODUODENOSCOPY (EGD);   Surgeon: Raul Rolle MD;  [facility];  Service: Thoracic    IN LAP, REPAIR PARAESOPHAGEAL HERNIA, INCL FUNDOPLASTY W/ MESH N/A 4/11/2018    Procedure: REPAIR HERNIA PARAESOPHAGEAL  LAPAROSCOPIC,ELEONORA GASTROPLASTY, [place] FUNDOPLICATION;  Surgeon: Raul Rolle MD;  [facility];  Service: Thoracic    SKIN BIOPSY Right     lump benign - last assessed 10/4/17    THYROIDECTOMY, PARTIAL      sub - total - last assessed 10/4/17    TONSILLECTOMY         Family History   Problem Relation Age of Onset    Alzheimer's disease Mother     Cancer Mother      skin     Thyroid disease Mother     Ulcerative colitis Mother     Cancer Father      prostate    Stroke Father     Hypertension Father     Thyroid disease Sister     Ulcerative colitis Sister     Other Sister      open heart surgery    Hyperlipidemia Brother     No Known Problems Son     No Known Problems Son         reports that she has never smoked  She has never used smokeless tobacco  She reports that she does not drink alcohol or use drugs        Physical Exam:    /88 (BP Location: Right arm, Patient Position: Sitting, Cuff Size: Standard)   Pulse 85   Temp 99 1 °F (37 3 °C) (Axillary)   Ht 5' 5" (1 651 m)   Wt 89 4 kg (197 lb 3 2 oz)   BMI 32 82 kg/m²      Gen: wn/wd, NAD  HEENT: anicteric, MMM, no cervical LAD  CVS:  Tachycardic, no m/r/g  CHEST: CTA b/l  ABD: +BS, soft, subcutaneous nodules at the site of port from recent surgery, tenderness in right upper quadrant, no rebound, no guarding, no ecchymosis  EXT: no c/c/e  NEURO: aaox3  SKIN: NO rashes

## 2018-05-17 ENCOUNTER — EVALUATION (OUTPATIENT)
Dept: PHYSICAL THERAPY | Facility: CLINIC | Age: 68
End: 2018-05-17
Payer: MEDICARE

## 2018-05-17 DIAGNOSIS — M25.511 PAIN IN JOINT OF RIGHT SHOULDER: Primary | ICD-10-CM

## 2018-05-17 PROCEDURE — 97162 PT EVAL MOD COMPLEX 30 MIN: CPT | Performed by: PHYSICAL THERAPIST

## 2018-05-17 PROCEDURE — G8985 CARRY GOAL STATUS: HCPCS | Performed by: PHYSICAL THERAPIST

## 2018-05-17 PROCEDURE — G8984 CARRY CURRENT STATUS: HCPCS | Performed by: PHYSICAL THERAPIST

## 2018-05-17 NOTE — PROGRESS NOTES
PT Evaluation     Today's date: 2018  Patient name: Juarez Marie  : 1950  MRN: [id number]  Referring provider: Luciana Santillan DO  Dx:   Encounter Diagnosis     ICD-10-CM    1  Pain in joint of right shoulder M25 511 Ambulatory referral to Physical Therapy       Start Time: 425  Stop Time: 050  Total time in clinic (min): 40 minutes    Assessment  Impairments: abnormal muscle tone, abnormal or restricted ROM, activity intolerance, impaired physical strength, lacks appropriate home exercise program, pain with function and scapular dyskinesis    Assessment details: Juarez Marie is a 75 y o  female who presents to physical therapy with pain, decreased UE range of motion, decreased UE strength and fair posture  Patient's clinical presentation is consistent with their referring diagnosis of Pain in joint of right shoulder  (primary encounter diagnosis)  The pt presents with functional limitations of ADLs, performing household chores, self-care and reaching  Pt would benefit from physical therapy services to address these limitations and maximize function  Pt was instructed and educated on good sitting posture today and demonstrates understanding  Understanding of Dx/Px/POC: good  Goals  Short term goals:  (3 weeks)  1  Patient will have pain level 2/10 right shoulder at rest  2  Patient will report a 50% improvement in symptoms with ADL's  3  Patient will have improved right shoulder ROM by 20 degrees    Long term goals: (6 weeks)  1  Patient will report 85 % improvement improvement in symptoms with ADL's  2  Patient will demonstrate appropriate scapulohumeral rhythm with reaching overhead  3   Patient will be independent in a comprehensive home exercise program      Plan  Patient would benefit from: PT eval and skilled PT  Planned modality interventions: thermotherapy: hydrocollator packs and cryotherapy  Planned therapy interventions: joint mobilization, massage, neuromuscular re-education, patient education, postural training, abdominal trunk stabilization, home exercise program, functional ROM exercises, flexibility, therapeutic exercise, stretching and strengthening  Frequency: 2x week  Duration in visits: 12  Duration in weeks: 6  Treatment plan discussed with: patient        Subjective Evaluation    History of Present Illness  Date of surgery: 2018  Mechanism of injury: He reports 2 months ago she began having little twinges of pain in Right shld  She then had a laproscopic procedure to repair a hiatal hernia on 18 and since then her shld has become more constantly painful especially with pulling and reaching  Pain  Current pain ratin  At best pain ratin  At worst pain rating: 10  Quality: sharp and dull ache  Relieving factors: rest  Aggravating factors: overhead activity    Social Support    Employment status: not working (retired)  Hand dominance: right    Patient Goals  Patient goals for therapy: decreased pain          Objective     Postural Observations  Seated posture: fair        Palpation     Right   Hypertonic in the levator scapulae, pectoralis major, pectoralis minor, rhomboids and upper trapezius  Tenderness of the levator scapulae, rhomboids and upper trapezius       Passive Range of Motion   Left Shoulder   Flexion: 155 degrees   Abduction: 137 degrees   External rotation 0°: 80 degrees   Internal rotation 0°: 90 degrees     Right Shoulder   Flexion: 118 degrees   Abduction: 54 degrees   External rotation 0°: 36 degrees   Internal rotation 0°: 69 degrees     Scapular Mobility     Right Shoulder   Scapular Dyskinesis: grade I  Scapular mobility: fair  Scapular Mobility with Shoulder to 90° FF   Scapular winging: minimal  Scapular elevation: minimal    Scapular Mobility beyond 90° FF   Scapular winging: minimal  Scapular elevation: moderate  Upward rotation: inadequate    Strength/Myotome Testing     Left Shoulder     Planes of Motion   Flexion: 5   Abduction: 5 External rotation at 0°: 5   Internal rotation at 0°: 5     Right Shoulder     Planes of Motion   Flexion: 4-   Abduction: 3-   External rotation at 0°: 4-   Internal rotation at 0°: 4-       Flowsheet Rows      Most Recent Value   PT/OT G-Codes   Current Score  51   Projected Score  65   FOTO information reviewed  Yes   Assessment Type  Evaluation   G code set  Carrying, Moving & Handling Objects   Carrying, Moving and Handling Objects Current Status ()  CK   Carrying, Moving and Handling Objects Goal Status ()  CJ          Precautions - Asthma, Hypertension , IBS    Specialty Daily Treatment Diary     Manual  5/17/18       STM to Right UT ,levator, paraspinals        ST joint mobs into retraction        PROM Right shld                            Exercise Diary         Pulleys        TBand row        TBand ext        TBand walk aways        UT stretch        Scap squeeze        ER  AROM        Pec corner stretch                                                                                                            Modalities        MH  (pre)        CP  (post)        Visit # 1

## 2018-05-30 ENCOUNTER — OFFICE VISIT (OUTPATIENT)
Dept: PHYSICAL THERAPY | Facility: CLINIC | Age: 68
End: 2018-05-30
Payer: MEDICARE

## 2018-05-30 DIAGNOSIS — M25.511 PAIN IN JOINT OF RIGHT SHOULDER: Primary | ICD-10-CM

## 2018-05-30 PROCEDURE — 97140 MANUAL THERAPY 1/> REGIONS: CPT

## 2018-05-30 PROCEDURE — 97110 THERAPEUTIC EXERCISES: CPT

## 2018-05-30 NOTE — PROGRESS NOTES
Daily Note     Today's date: 2018  Patient name: Stephen Sparrow  : 1950  MRN: [id number]  Referring provider: Pavan Madden DO  Dx:   Encounter Diagnosis     ICD-10-CM    1  Pain in joint of right shoulder M25 511                   Subjective:  Reported no pain in R shld at this time but gets pain when "shifting pocketbook from side to side"  And when returning to neutral after reaching for something  Objective: See treatment diary below    Precautions - Asthma, Hypertension , IBS    Specialty Daily Treatment Diary     Manual  18      STM to Right UT ,levator, paraspinals  8 min      ST joint mobs into retraction  3 min      PROM Right shld  4 min                          Exercise Diary         Pulleys  20      TBand row  10 x red      TBand ext  10 x red      TBand walk aways  10 x red in Ext      UT stretch  10 x 10 sec      Scap squeeze  20      ER  AROM  20      Pec corner stretch  5 X 10 sec                                                                                                          Modalities        MH  (pre)  10 min      CP  (post)        Visit # 1 2                Assessment: Tolerated treatment well  Reported R shld fatigue ext walk aways  Reported R UT discomfort when returning to neutral with stretching  Pt has difficulty lying on side secondary to recent surgery  Patient would benefit from continued PT      Plan: Continue per plan of care

## 2018-06-01 ENCOUNTER — OFFICE VISIT (OUTPATIENT)
Dept: PHYSICAL THERAPY | Facility: CLINIC | Age: 68
End: 2018-06-01
Payer: MEDICARE

## 2018-06-01 DIAGNOSIS — M25.511 PAIN IN JOINT OF RIGHT SHOULDER: Primary | ICD-10-CM

## 2018-06-01 PROCEDURE — 97140 MANUAL THERAPY 1/> REGIONS: CPT | Performed by: PHYSICAL THERAPIST

## 2018-06-01 PROCEDURE — 97110 THERAPEUTIC EXERCISES: CPT | Performed by: PHYSICAL THERAPIST

## 2018-06-01 NOTE — PROGRESS NOTES
Daily Note     Today's date: 2018  Patient name: Babatunde Sumner  : 1950  MRN: [id number]  Referring provider: Sindy Butler DO  Dx:   Encounter Diagnosis     ICD-10-CM    1  Pain in joint of right shoulder M25 511                   Subjective:  Reported 2/10 pain in R shld  Today stating that the shld is sore to the touch and that she fell into her clothes dryer yesterday    Objective: See treatment diary below    Precautions - Asthma, Hypertension , IBS    Specialty Daily Treatment Diary     Manual  18     STM to Right UT ,levator, paraspinals  8 min 9 min     ST joint mobs into retraction  3 min      PROM Right shld  4 min 6 min                         Exercise Diary        Pulleys  20 20x     TBand row  10 x red 20x     TBand ext  10 x red 10x Single arm    TBand walk aways  10 x red in Ext 5x     UT stretch  10 x 10 sec 10x     Scap squeeze  20      ER  AROM  20      Pec corner stretch  5 X 10 sec 10x     Wand flex   10x                                                                                                 Modalities       MH  (pre)  10 min 10 min     CP  (post)        Visit # 1 2 3               Assessment:  She has abdominal soreness with shld ext from high pull  Plan: Continue per plan of care  Attempt single arm ext from neutral position

## 2018-06-04 ENCOUNTER — OFFICE VISIT (OUTPATIENT)
Dept: CARDIAC SURGERY | Facility: CLINIC | Age: 68
End: 2018-06-04

## 2018-06-04 VITALS
OXYGEN SATURATION: 98 % | HEIGHT: 65 IN | WEIGHT: 197.8 LBS | DIASTOLIC BLOOD PRESSURE: 64 MMHG | SYSTOLIC BLOOD PRESSURE: 136 MMHG | BODY MASS INDEX: 32.96 KG/M2 | TEMPERATURE: 98 F | HEART RATE: 71 BPM

## 2018-06-04 DIAGNOSIS — K44.9 HIATAL HERNIA WITH GERD WITHOUT ESOPHAGITIS: Primary | ICD-10-CM

## 2018-06-04 DIAGNOSIS — K21.9 HIATAL HERNIA WITH GERD WITHOUT ESOPHAGITIS: Primary | ICD-10-CM

## 2018-06-04 PROCEDURE — 99024 POSTOP FOLLOW-UP VISIT: CPT | Performed by: PHYSICIAN ASSISTANT

## 2018-06-04 NOTE — LETTER
June 4, 2018     Jacqueline Morales, DO  [facility]    Patient: Jay Jay Avitia   YOB: 1950   Date of Visit: 6/4/2018       Dear Dr Keo Small:    Thank you for referring Martha Villasenor to me for evaluation  Below are my notes for this consultation  If you have questions, please do not hesitate to call me  I look forward to following your patient along with you  Sincerely,        Melburn Median, PA-C        CC: No Recipients  Melburn Median, PA-C  6/4/2018  9:38 AM  Sign at close encounter  Thoracic Follow-Up  Assessment/Plan:    Hiatal hernia with GERD without esophagitis  Ms Nicky Langley is currently 6 weeks out of her paraesophageal hernia repair and is progressing slowly from a thoracic surgery standpoint  She is advancing her diet without any significant limitations  Her left sided pain should improve  Her follow up CT scan was consistent with resolving liver hematoma  With respect to her hoarseness and intermittent cough, I had suggested she discuss a possible ENT consultation with her PCP  We will have her return in 4 months with a repeat CXR for her final appt  She was instructed to call the office if any issues arise in the meantime  She is in agreement with the above plan  Diagnoses and all orders for this visit:    Hiatal hernia with GERD without esophagitis  -     XR chest pa & lateral; Future          Thoracic History    Diagnosis: Paraesophageal hernia   Procedure: EGD, laparoscopic paraesophageal hernia repair, Werner gastroplasty, Toupet fundoplication performed on 4/11/18  Pathology: hernia sac, wedge gastroplasty revealed benign oxyntic mucosa with submucosal and subserosal hemorrhage  No dysplasia or malignancy identified               Patient ID: Jay Jay Avitia is a 75 y o  female  HPI  Ms Nicky Langley is a 74 yo female seen in our office for a paraesophageal hernia, which was repaired on 4/11/18   Her hospital course was uneventful and she was discharged to home on 4/14/18  She was admitted to [facility] from 4/17-4/20 for shortness of breath  Her CT scan did not reveal any abnormalities  A MRI revealed a 6 x 5 x 4 cm left liver lesion, which could have been a hematoma from her surgery  Dr Tarsha Marshall had recommended a repeat CT scan in 3 weeks       She was last seen on 4/27/18 and was tolerating soft foods  Her follow up CT scan from 5/11/18 revealed a resolving liver hematoma, no evidence of malignancy  On discussion, her incisional pain is improving, but still present  She is eating almost everything with exception of raw vegetables and steak  She also has some dysphagia with potatoes and water  She does have a cough and some hoarseness since the procedure  She does have an appt in August with a pulmonologist  She denies any regurgitation, nausea, or vomiting  Review of Systems   Constitutional: Negative for chills and fever  HENT: Positive for voice change  Negative for trouble swallowing  Respiratory: Positive for cough  Negative for chest tightness and shortness of breath  Cardiovascular: Negative for chest pain  Gastrointestinal: Positive for abdominal pain  Negative for abdominal distention, nausea and vomiting  Neurological: Negative for headaches  Psychiatric/Behavioral: Negative for agitation and behavioral problems  Objective:   Physical Exam   Constitutional: She is oriented to person, place, and time  She appears well-developed and well-nourished  HENT:   Head: Normocephalic and atraumatic  Eyes: EOM are normal  Pupils are equal, round, and reactive to light  Cardiovascular: Normal rate, regular rhythm and normal heart sounds  Pulmonary/Chest: Effort normal and breath sounds normal  No respiratory distress  She has no wheezes  Abdominal: Soft  Bowel sounds are normal  She exhibits no distension  Laparoscopic incisions healing well  No evidence of infection  Musculoskeletal: Normal range of motion  Neurological: She is alert and oriented to person, place, and time  Skin: Skin is warm and dry  Psychiatric: She has a normal mood and affect  Her behavior is normal    Vitals reviewed      /64 (BP Location: Left arm, Patient Position: Sitting, Cuff Size: Adult)   Pulse 71   Temp 98 °F (36 7 °C)   Ht 5' 5" (1 651 m)   Wt 89 7 kg (197 lb 12 8 oz)   SpO2 98%   BMI 32 92 kg/m²

## 2018-06-04 NOTE — ASSESSMENT & PLAN NOTE
Ms Ivet oYung is currently 6 weeks out of her paraesophageal hernia repair and is progressing slowly from a thoracic surgery standpoint  She is advancing her diet without any significant limitations  Her left sided pain should improve  Her follow up CT scan was consistent with resolving liver hematoma  With respect to her hoarseness and intermittent cough, I had suggested she discuss a possible ENT consultation with her PCP  We will have her return in 4 months with a repeat CXR for her final appt  She was instructed to call the office if any issues arise in the meantime  She is in agreement with the above plan

## 2018-06-04 NOTE — PROGRESS NOTES
Thoracic Follow-Up  Assessment/Plan:    Hiatal hernia with GERD without esophagitis  Ms Kassie Burr is currently 6 weeks out of her paraesophageal hernia repair and is progressing slowly from a thoracic surgery standpoint  She is advancing her diet without any significant limitations  Her left sided pain should improve  Her follow up CT scan was consistent with resolving liver hematoma  With respect to her hoarseness and intermittent cough, I had suggested she discuss a possible ENT consultation with her PCP  We will have her return in 4 months with a repeat CXR for her final appt  She was instructed to call the office if any issues arise in the meantime  She is in agreement with the above plan  Diagnoses and all orders for this visit:    Hiatal hernia with GERD without esophagitis  -     XR chest pa & lateral; Future          Thoracic History    Diagnosis: Paraesophageal hernia   Procedure: EGD, laparoscopic paraesophageal hernia repair, Werner gastroplasty, Toupet fundoplication performed on 4/11/18  Pathology: hernia sac, wedge gastroplasty revealed benign oxyntic mucosa with submucosal and subserosal hemorrhage  No dysplasia or malignancy identified               Patient ID: Carlyon Lanes is a 75 y o  female  HPI  Ms Kassie Burr is a 76 yo female seen in our office for a paraesophageal hernia, which was repaired on 4/11/18  Her hospital course was uneventful and she was discharged to home on 4/14/18  She was admitted to [facility] from 4/17-4/20 for shortness of breath  Her CT scan did not reveal any abnormalities  A MRI revealed a 6 x 5 x 4 cm left liver lesion, which could have been a hematoma from her surgery  Dr Idalia Baird had recommended a repeat CT scan in 3 weeks       She was last seen on 4/27/18 and was tolerating soft foods  Her follow up CT scan from 5/11/18 revealed a resolving liver hematoma, no evidence of malignancy  On discussion, her incisional pain is improving, but still present   She is eating almost everything with exception of raw vegetables and steak  She also has some dysphagia with potatoes and water  She does have a cough and some hoarseness since the procedure  She does have an appt in August with a pulmonologist  She denies any regurgitation, nausea, or vomiting  Review of Systems   Constitutional: Negative for chills and fever  HENT: Positive for voice change  Negative for trouble swallowing  Respiratory: Positive for cough  Negative for chest tightness and shortness of breath  Cardiovascular: Negative for chest pain  Gastrointestinal: Positive for abdominal pain  Negative for abdominal distention, nausea and vomiting  Neurological: Negative for headaches  Psychiatric/Behavioral: Negative for agitation and behavioral problems  Objective:   Physical Exam   Constitutional: She is oriented to person, place, and time  She appears well-developed and well-nourished  HENT:   Head: Normocephalic and atraumatic  Eyes: EOM are normal  Pupils are equal, round, and reactive to light  Cardiovascular: Normal rate, regular rhythm and normal heart sounds  Pulmonary/Chest: Effort normal and breath sounds normal  No respiratory distress  She has no wheezes  Abdominal: Soft  Bowel sounds are normal  She exhibits no distension  Laparoscopic incisions healing well  No evidence of infection  Musculoskeletal: Normal range of motion  Neurological: She is alert and oriented to person, place, and time  Skin: Skin is warm and dry  Psychiatric: She has a normal mood and affect  Her behavior is normal    Vitals reviewed      /64 (BP Location: Left arm, Patient Position: Sitting, Cuff Size: Adult)   Pulse 71   Temp 98 °F (36 7 °C)   Ht 5' 5" (1 651 m)   Wt 89 7 kg (197 lb 12 8 oz)   SpO2 98%   BMI 32 92 kg/m²

## 2018-06-06 ENCOUNTER — OFFICE VISIT (OUTPATIENT)
Dept: PHYSICAL THERAPY | Facility: CLINIC | Age: 68
End: 2018-06-06
Payer: MEDICARE

## 2018-06-06 DIAGNOSIS — R06.02 SHORTNESS OF BREATH: Primary | ICD-10-CM

## 2018-06-06 DIAGNOSIS — M25.511 PAIN IN JOINT OF RIGHT SHOULDER: Primary | ICD-10-CM

## 2018-06-06 PROCEDURE — 97140 MANUAL THERAPY 1/> REGIONS: CPT | Performed by: PHYSICAL THERAPIST

## 2018-06-06 PROCEDURE — 97110 THERAPEUTIC EXERCISES: CPT | Performed by: PHYSICAL THERAPIST

## 2018-06-06 NOTE — PROGRESS NOTES
Daily Note     Today's date: 2018  Patient name: Gerard Harrington  : 1950  MRN: [id number]  Referring provider: Ari Greer DO  Dx:   Encounter Diagnosis     ICD-10-CM    1  Pain in joint of right shoulder M25 511                   Subjective:  Reported 2/10 pain in R shld  Since last session she has had headaches at times each day  Objective: See treatment diary below    Precautions - Asthma, Hypertension , IBS    Specialty Daily Treatment Diary     Manual  18    STM to Right UT ,levator, paraspinals  8 min 9 min 9 min    ST joint mobs into retraction  3 min      PROM Right shld  4 min 6 min 5 min                        Exercise Diary   18    Pulleys  20 20x 20x    TBand row  10 x red 20x 20x    TBand ext  10 x red 10x Single arm    TBand walk aways  10 x red in Ext 5x 5x    UT stretch  10 x 10 sec 10x     Scap squeeze  20  20    ER  AROM  20  20    Pec corner stretch  5 X 10 sec 10x 10x    Wand flex   10x 10x                                                                                                Modalities       MH  (pre)  10 min 10 min 10 min    CP  (post)    5 min    Visit # 1 2 3 4              Assessment:  She has abdominal soreness with shld ext from high pull  Plan: Continue per plan of care  Attempt single arm ext from neutral position

## 2018-06-07 NOTE — TELEPHONE ENCOUNTER
Pt left a message on refill hotline stating she called on Monday and needed her flonase refilled and she needs a different inhaler because the one she has now gives her heart palpitations  Pt wants Rx's sent to [facility] Please advise   Azeem Evans

## 2018-06-08 ENCOUNTER — OFFICE VISIT (OUTPATIENT)
Dept: PHYSICAL THERAPY | Facility: CLINIC | Age: 68
End: 2018-06-08
Payer: MEDICARE

## 2018-06-08 DIAGNOSIS — M25.511 PAIN IN JOINT OF RIGHT SHOULDER: Primary | ICD-10-CM

## 2018-06-08 PROCEDURE — 97140 MANUAL THERAPY 1/> REGIONS: CPT | Performed by: PHYSICAL THERAPIST

## 2018-06-08 PROCEDURE — 97110 THERAPEUTIC EXERCISES: CPT | Performed by: PHYSICAL THERAPIST

## 2018-06-08 NOTE — PROGRESS NOTES
Daily Note     Today's date: 2018  Patient name: Andrea Valderrama  : 1950  MRN: [id number]  Referring provider: Damian Parson DO  Dx:   Encounter Diagnosis     ICD-10-CM    1  Pain in joint of right shoulder M25 511                   Subjective:  Reported 2/10 pain in R shld  Since last session she has had headaches at times each day  Objective: See treatment diary below    Precautions - Asthma, Hypertension , IBS    Specialty Daily Treatment Diary     Manual  18   STM to Right UT ,levator, paraspinals  8 min 9 min 9 min 10 min   ST joint mobs into retraction  3 min      PROM Right shld  4 min 6 min 5 min 6 min                       Exercise Diary   18    Pulleys  20 20x 20x 20x   TBand row  10 x red 20x 20x 20x  green   TBand ext  10 x red 10x Single arm Single arm  10x   red   TBand walk aways  10 x red in Ext 5x 5x    UT stretch  10 x 10 sec 10x     Scap squeeze  20  20 20x   ER  AROM  20  20 20x   Pec corner stretch  5 X 10 sec 10x 10x 10x   Wand flex   10x 10x 10x                                                                                               Modalities       MH  (pre)  10 min 10 min 10 min 10 min   CP  (post)    5 min 5 min   Visit # 1 2 3 4 5 - FOTO             Assessment:  Pain after 45 degrees of ER during PROM  She has no abdominal pain with single arm extension from neutral position  Plan: Continue per plan of care

## 2018-06-11 ENCOUNTER — OFFICE VISIT (OUTPATIENT)
Dept: PHYSICAL THERAPY | Facility: CLINIC | Age: 68
End: 2018-06-11
Payer: MEDICARE

## 2018-06-11 DIAGNOSIS — M25.511 PAIN IN JOINT OF RIGHT SHOULDER: Primary | ICD-10-CM

## 2018-06-11 PROCEDURE — 97110 THERAPEUTIC EXERCISES: CPT | Performed by: PHYSICAL THERAPIST

## 2018-06-11 PROCEDURE — 97140 MANUAL THERAPY 1/> REGIONS: CPT | Performed by: PHYSICAL THERAPIST

## 2018-06-11 NOTE — PROGRESS NOTES
Daily Note     Today's date: 2018  Patient name: Luann Garcia  : 1950  MRN: [id number]  Referring provider: Rox Castorena DO  Dx:   Encounter Diagnosis     ICD-10-CM    1  Pain in joint of right shoulder M25 511                   Subjective:  Reported 2/10 pain in R shld  Objective: See treatment diary below    Precautions - Asthma, Hypertension , IBS    Specialty Daily Treatment Diary     Manual  18       STM to Right UT ,levator, paraspinals 8 min       ST joint mobs into retraction 2 min       PROM Right shld 5 min                           Exercise Diary         Pulleys 20x       TBand row 20x   green       TBand ext 10x   green       TBand walk aways        UT stretch ---------       Scap squeeze 20x       ER  AROM 20x       Pec corner stretch 10x       Wand flex 15x       Ball over head                                                                                            Modalities        MH  (pre) 10 min       CP  (post) 5 min       Visit # 6                 Assessment:  Flexion and ER PROM continues to improve but becomes painful at 75% full motion  Plan: Continue per plan of care

## 2018-06-13 ENCOUNTER — OFFICE VISIT (OUTPATIENT)
Dept: PHYSICAL THERAPY | Facility: CLINIC | Age: 68
End: 2018-06-13
Payer: MEDICARE

## 2018-06-13 DIAGNOSIS — M25.511 PAIN IN JOINT OF RIGHT SHOULDER: Primary | ICD-10-CM

## 2018-06-13 PROCEDURE — 97140 MANUAL THERAPY 1/> REGIONS: CPT | Performed by: PHYSICAL THERAPIST

## 2018-06-13 PROCEDURE — 97110 THERAPEUTIC EXERCISES: CPT | Performed by: PHYSICAL THERAPIST

## 2018-06-13 NOTE — PROGRESS NOTES
Daily Note     Today's date: 2018  Patient name: Jeffrey Ventura  : 1950  MRN: [id number]  Referring provider: Radha Shen DO  Dx:   Encounter Diagnosis     ICD-10-CM    1  Pain in joint of right shoulder M25 511                   Subjective:  2/10 pain today  Objective: See treatment diary below    Precautions - Asthma, Hypertension , IBS    Specialty Daily Treatment Diary     Manual  18      STM to Right UT ,levator, paraspinals 8 min 8 min      ST joint mobs into retraction 2 min 2 min      PROM Right shld 5 min 5 min                          Exercise Diary         Pulleys 20x 20x      TBand row 20x   green 20x  green      TBand ext 10x   green 15x  green      TBand walk aways        UT stretch --------- ---------      Scap squeeze 20x 20x      ER  AROM 20x 20x      Pec corner stretch 10x 10x      Wand flex 15x 20x      Ball over head  15x                                                                                          Modalities        MH  (pre) 10 min 10 min      CP  (post) 5 min 5 min      Visit # 6 7                Assessment:  Have achieved short term ROM goal for Right shld and will adjust goal to have full ROM in 2 more weeks       Plan: Continue per plan of care

## 2018-06-15 ENCOUNTER — APPOINTMENT (OUTPATIENT)
Dept: PHYSICAL THERAPY | Facility: CLINIC | Age: 68
End: 2018-06-15
Payer: MEDICARE

## 2018-06-18 ENCOUNTER — OFFICE VISIT (OUTPATIENT)
Dept: PHYSICAL THERAPY | Facility: CLINIC | Age: 68
End: 2018-06-18
Payer: MEDICARE

## 2018-06-18 DIAGNOSIS — M25.511 PAIN IN JOINT OF RIGHT SHOULDER: Primary | ICD-10-CM

## 2018-06-18 PROCEDURE — 97110 THERAPEUTIC EXERCISES: CPT | Performed by: PHYSICAL THERAPIST

## 2018-06-18 PROCEDURE — 97140 MANUAL THERAPY 1/> REGIONS: CPT | Performed by: PHYSICAL THERAPIST

## 2018-06-18 NOTE — PROGRESS NOTES
Daily Note     Today's date: 2018  Patient name: Oswaldo Tesfaye  : 1950  MRN: [id number]  Referring provider: Oneil Lennon DO  Dx:   Encounter Diagnosis     ICD-10-CM    1  Pain in joint of right shoulder M25 511                   Subjective:  2/10 pain today  Objective: See treatment diary below    Precautions - Asthma, Hypertension , IBS    Specialty Daily Treatment Diary     Manual  18     STM to Right UT ,levator, paraspinals 8 min 8 min 8 min     ST joint mobs into retraction 2 min 2 min 2 min     PROM Right shld 5 min 5 min 5 min                         Exercise Diary         Pulleys 20x 20x 20x     TBand row 20x   green 20x  green 20x   green     TBand ext 10x   green 15x  green 20x   green     TBand walk aways        UT stretch --------- ---------      Scap squeeze 20x 20x 20x     ER  AROM 20x 20x 20x     Pec corner stretch 10x 10x 10x     Wand flex 15x 20x 20x     Ball over head  15x 15x                                                                                         Modalities        MH  (pre) 10 min 10 min 10 min     CP  (post) 5 min 5 min 5 min     Visit # 6 7 8               Assessment:  Flexion PROM is the greatest deficit  She agreed to perform wand flexion twice a day at home  Plan: Continue per plan of care

## 2018-06-20 ENCOUNTER — OFFICE VISIT (OUTPATIENT)
Dept: PHYSICAL THERAPY | Facility: CLINIC | Age: 68
End: 2018-06-20
Payer: MEDICARE

## 2018-06-20 DIAGNOSIS — M25.511 PAIN IN JOINT OF RIGHT SHOULDER: Primary | ICD-10-CM

## 2018-06-20 PROCEDURE — 97110 THERAPEUTIC EXERCISES: CPT | Performed by: PHYSICAL THERAPIST

## 2018-06-20 PROCEDURE — G8984 CARRY CURRENT STATUS: HCPCS | Performed by: PHYSICAL THERAPIST

## 2018-06-20 PROCEDURE — G8985 CARRY GOAL STATUS: HCPCS | Performed by: PHYSICAL THERAPIST

## 2018-06-20 PROCEDURE — 97140 MANUAL THERAPY 1/> REGIONS: CPT | Performed by: PHYSICAL THERAPIST

## 2018-06-20 NOTE — PROGRESS NOTES
Daily Note     Today's date: 2018  Patient name: Jay Jay Avitia  : 1950  MRN: [id number]  Referring provider: Jaime Brennan DO  Dx:   Encounter Diagnosis     ICD-10-CM    1  Pain in joint of right shoulder M25 511                   Subjective:  2/10 pain today  Objective: See treatment diary below    Precautions - Asthma, Hypertension , IBS    Specialty Daily Treatment Diary     Manual  18    STM to Right UT ,levator, paraspinals 8 min 8 min 8 min 8 min    ST joint mobs into retraction 2 min 2 min 2 min 2 min     PROM Right shld 5 min 5 min 5 min 5 min                        Exercise Diary         Pulleys 20x 20x 20x 20x    TBand row 20x   green 20x  green 20x   green 20x  green    TBand ext 10x   green 15x  green 20x   green 20x  green    TBand walk aways        UT stretch --------- ---------      Scap squeeze 20x 20x 20x 20x    ER  AROM 20x 20x 20x 20x    Pec corner stretch 10x 10x 10x 10x    Wand flex 15x 20x 20x 20x    Ball over head  15x 15x 15x                                                                                        Modalities        MH  (pre) 10 min 10 min 10 min 10 min    CP  (post) 5 min 5 min 5 min 5 min    Visit # 6 7 8 9              Assessment:  Manual pec stretch at side of table improved flexion ROM by 50% more  She agreed to perform pec corner stretch twice per day  Plan: Continue per plan of care    Attempt active warm up on Nustep

## 2018-06-22 ENCOUNTER — APPOINTMENT (OUTPATIENT)
Dept: PHYSICAL THERAPY | Facility: CLINIC | Age: 68
End: 2018-06-22
Payer: MEDICARE

## 2018-06-24 DIAGNOSIS — E78.2 MIXED HYPERLIPIDEMIA: ICD-10-CM

## 2018-06-24 DIAGNOSIS — E03.0 CONGENITAL HYPOTHYROIDISM WITH DIFFUSE GOITER: Primary | ICD-10-CM

## 2018-06-24 DIAGNOSIS — I10 BENIGN HYPERTENSION: ICD-10-CM

## 2018-06-25 ENCOUNTER — OFFICE VISIT (OUTPATIENT)
Dept: PHYSICAL THERAPY | Facility: CLINIC | Age: 68
End: 2018-06-25
Payer: MEDICARE

## 2018-06-25 DIAGNOSIS — M25.511 PAIN IN JOINT OF RIGHT SHOULDER: Primary | ICD-10-CM

## 2018-06-25 PROCEDURE — 97140 MANUAL THERAPY 1/> REGIONS: CPT | Performed by: PHYSICAL THERAPIST

## 2018-06-25 PROCEDURE — 97110 THERAPEUTIC EXERCISES: CPT | Performed by: PHYSICAL THERAPIST

## 2018-06-25 PROCEDURE — G8985 CARRY GOAL STATUS: HCPCS

## 2018-06-25 PROCEDURE — G8984 CARRY CURRENT STATUS: HCPCS

## 2018-06-25 RX ORDER — LOSARTAN POTASSIUM 25 MG/1
TABLET ORAL
Qty: 90 TABLET | Refills: 1 | Status: SHIPPED | OUTPATIENT
Start: 2018-06-25 | End: 2019-01-07 | Stop reason: SDUPTHER

## 2018-06-25 RX ORDER — LEVOTHYROXINE SODIUM 0.05 MG/1
TABLET ORAL
Qty: 90 TABLET | Refills: 1 | Status: SHIPPED | OUTPATIENT
Start: 2018-06-25 | End: 2019-01-04 | Stop reason: SDUPTHER

## 2018-06-25 RX ORDER — BUMETANIDE 1 MG/1
TABLET ORAL
Qty: 90 TABLET | Refills: 1 | Status: SHIPPED | OUTPATIENT
Start: 2018-06-25 | End: 2019-07-22

## 2018-06-25 RX ORDER — ATORVASTATIN CALCIUM 20 MG/1
TABLET, FILM COATED ORAL
Qty: 90 TABLET | Refills: 1 | Status: SHIPPED | OUTPATIENT
Start: 2018-06-25 | End: 2019-01-07 | Stop reason: SDUPTHER

## 2018-06-25 NOTE — PROGRESS NOTES
Daily Note     Today's date: 2018  Patient name: Katherine Munguia  : 1950  MRN: [id number]  Referring provider: Fide Santiago DO  Dx:   Encounter Diagnosis     ICD-10-CM    1  Pain in joint of right shoulder M25 511                   Subjective:  4/10 pain today in R shld    Objective: See treatment diary below    Precautions - Asthma, Hypertension , IBS    Specialty Daily Treatment Diary     Manual  18   STM to Right UT ,levator, paraspinals 8 min 8 min 8 min 8 min 8 min   ST joint mobs into retraction 2 min 2 min 2 min 2 min  2 min   PROM Right shld 5 min 5 min 5 min 5 min 5 min                       Exercise Diary         Pulleys 20x 20x 20x 20x 20   TBand row 20x   green 20x  green 20x   green 20x  green 20 x gr   TBand ext 10x   green 15x  green 20x   green 20x  green 20 x gr   TBand walk aways        UT stretch --------- ---------      Scap squeeze 20x 20x 20x 20x 20   ER  AROM 20x 20x 20x 20x 20   Pec corner stretch 10x 10x 10x 10x 10   Wand flex 15x 20x 20x 20x 20   Ball over head  15x 15x 15x 15                                                                                       Modalities        MH  (pre) 10 min 10 min 10 min 10 min 10 min   CP  (post) 5 min 5 min 5 min 5 min 5 min   Visit # 6 7 8 9 10  FOTO             Assessment:   Pt stated that she can reach the second shelf now in her kitchen and she vacumned over the week end  Some discomfort at end range ER    Plan: Continue per plan of care    Attempt active warm up on Nustep

## 2018-06-27 ENCOUNTER — OFFICE VISIT (OUTPATIENT)
Dept: PHYSICAL THERAPY | Facility: CLINIC | Age: 68
End: 2018-06-27
Payer: MEDICARE

## 2018-06-27 DIAGNOSIS — M25.511 PAIN IN JOINT OF RIGHT SHOULDER: Primary | ICD-10-CM

## 2018-06-27 PROCEDURE — 97140 MANUAL THERAPY 1/> REGIONS: CPT | Performed by: PHYSICAL THERAPIST

## 2018-06-27 PROCEDURE — 97110 THERAPEUTIC EXERCISES: CPT | Performed by: PHYSICAL THERAPIST

## 2018-06-27 NOTE — PROGRESS NOTES
Daily Note     Today's date: 2018  Patient name: Joanne Reid  : 1950  MRN: [id number]  Referring provider: Ovi Kunz DO  Dx:   No diagnosis found  Subjective:  4/10 pain today in R shld  I reached back for the door yesterday and I saw stars but the pain pasted quickly    Objective: See treatment diary below    Precautions - Asthma, Hypertension , IBS    Specialty Daily Treatment Diary     Manual  18       STM to Right UT ,levator, paraspinals 8 min       ST joint mobs into retraction 2 min       PROM Right shld 5 min                           Exercise Diary         Pulleys 20x       TBand row 20x   green       TBand ext 10x   green       TBand walk aways        UT stretch ---------       Scap squeeze 20x       ER  AROM 20x       Pec corner stretch 10x       Wand flex 15x       Ball over head                                                                                            Modalities        MH  (pre) 10 min       CP  (post) 5 min       Visit # 11                 Assessment:   Pt stated Some discomfort at end range ER and flexion  Reported tenderness lasting the rest of the day after the reaching for the door indcident    Plan: Continue per plan of care    Attempt active warm up on Nustep

## 2018-07-02 ENCOUNTER — OFFICE VISIT (OUTPATIENT)
Dept: PHYSICAL THERAPY | Facility: CLINIC | Age: 68
End: 2018-07-02
Payer: MEDICARE

## 2018-07-02 DIAGNOSIS — M25.511 PAIN IN JOINT OF RIGHT SHOULDER: Primary | ICD-10-CM

## 2018-07-02 PROCEDURE — 97140 MANUAL THERAPY 1/> REGIONS: CPT

## 2018-07-02 PROCEDURE — 97110 THERAPEUTIC EXERCISES: CPT

## 2018-07-02 NOTE — PROGRESS NOTES
Daily Note     Today's date: 2018  Patient name: Janett Dorsey  : 1950  MRN: [id number]  Referring provider: Nell Lara DO  Dx:   Encounter Diagnosis     ICD-10-CM    1  Pain in joint of right shoulder M25 511        Start Time: 930  Stop Time: 102  Total time in clinic (min): 55 minutes    Subjective:  my R shld feels better today      Objective: See treatment diary below    Precautions - Asthma, Hypertension , IBS    Specialty Daily Treatment Diary     Manual  18      STM to Right UT ,levator, paraspinals 8 min 8 min      ST joint mobs into retraction 2 min 2 min      PROM Right shld 5 min 5 min                          Exercise Diary        Pulleys 20x 20      TBand row 20x   green 20 x gr      TBand ext 10x   green 20 x gr      TBand walk aways        UT stretch ---------       Scap squeeze 20x 20      ER  AROM 20x       Pec corner stretch 10x 10 x 10 sec      Wand flex 15x 15      Ball over head  15      Nustep  10 min Lev 1                                                                                  Modalities       MH  (pre) 10 min       CP  (post) 5 min 10 min      Visit # 11 12                Assessment:   Pt showed no difficulty with Nustep reported straining her R shld with overhead activities  Pain with passive flexion after 80 deg    Plan: Continue per plan of care

## 2018-07-09 ENCOUNTER — OFFICE VISIT (OUTPATIENT)
Dept: PHYSICAL THERAPY | Facility: CLINIC | Age: 68
End: 2018-07-09
Payer: MEDICARE

## 2018-07-09 DIAGNOSIS — M25.511 PAIN IN JOINT OF RIGHT SHOULDER: Primary | ICD-10-CM

## 2018-07-09 PROCEDURE — 97140 MANUAL THERAPY 1/> REGIONS: CPT | Performed by: PHYSICAL THERAPIST

## 2018-07-09 PROCEDURE — G8985 CARRY GOAL STATUS: HCPCS | Performed by: PHYSICAL THERAPIST

## 2018-07-09 PROCEDURE — G8986 CARRY D/C STATUS: HCPCS | Performed by: PHYSICAL THERAPIST

## 2018-07-09 PROCEDURE — 97110 THERAPEUTIC EXERCISES: CPT | Performed by: PHYSICAL THERAPIST

## 2018-07-09 NOTE — PROGRESS NOTES
Daily Note     Today's date: 2018  Patient name: Adele Gongora  : 1950  MRN: [id number]  Referring provider: Augustina Burleson DO  Dx:   Encounter Diagnosis     ICD-10-CM    1  Pain in joint of right shoulder M25 511                   Subjective:  She feels 90% better overall  Objective: See treatment diary below    Precautions - Asthma, Hypertension , IBS    Specialty Daily Treatment Diary     Manual  18     STM to Right UT ,levator, paraspinals 8 min 8 min 10 min     ST joint mobs into retraction 2 min 2 min 2 min     PROM Right shld 5 min 5 min 4 min                         Exercise Diary       Pulleys 20x 20 20     TBand row 20x   green 20 x gr 20x  green     TBand ext 10x   green 20 x gr 20x  green     TBand walk aways        UT stretch ---------       Scap squeeze 20x 20 20     ER  AROM 20x  20     Pec corner stretch 10x 10 x 10 sec 10x     Wand flex 15x 15 15x  Flex and ext     Ball over head  15 15     Nustep  10 min Lev 1 ---                                                                                 Modalities       MH  (pre) 10 min  10 min     CP  (post) 5 min 10 min 5 min     Visit # 11 12 13               Assessment:   Modified Functional level from CK on FOTO to CJ dur to patient feeling 90% better and 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

## 2018-07-09 NOTE — PROGRESS NOTES
PT Discharge    Today's date: 2018  Patient name: Juarez Marie  : 1950  MRN: [id number]  Referring provider: Luciana Santillan DO  Dx:   Encounter Diagnosis     ICD-10-CM    1  Pain in joint of right shoulder M25 511        Start Time: 1015  Stop Time: 1110  Total time in clinic (min): 55 minutes    Assessment    Assessment details:       Goals  Short term goals:  (3 weeks)  1  Patient will have pain level 2/10 right shoulder at rest - met  2  Patient will report a 50% improvement in symptoms with ADL's - met  3  Patient will have improved right shoulder ROM by 20 degrees - met    Long term goals: (6 weeks)  1  Patient will report 85 % improvement improvement in symptoms with ADL's - met  2  Patient will demonstrate appropriate scapulohumeral rhythm with reaching overhead - met  3  Patient will be independent in a comprehensive home exercise program - met      Plan  Plan details: D/C at this time        Subjective Evaluation    History of Present Illness  Date of surgery: 2018  Mechanism of injury: She feels 90 % better overall  Pain  Current pain ratin  At best pain ratin  At worst pain rating: 3          Objective     Postural Observations  Seated posture: good        Palpation     Right   Hypertonic in the upper trapezius       Passive Range of Motion   Left Shoulder   Flexion: 155 degrees   Abduction: 137 degrees   External rotation 0°: 80 degrees   Internal rotation 0°: 90 degrees     Right Shoulder   Flexion: 151 degrees   Abduction: 102 degrees   External rotation 0°: 64 degrees   Internal rotation 0°: 80 degrees     Scapular Mobility     Right Shoulder   Scapular Dyskinesis: grade I and none  Scapular mobility: WFL    Strength/Myotome Testing     Left Shoulder     Planes of Motion   Flexion: 5   Abduction: 5   External rotation at 0°: 5   Internal rotation at 0°: 5     Right Shoulder     Planes of Motion   Flexion: 4+   Abduction: 4-   External rotation at 0°: 4   Internal rotation at 0°: 4+       Flowsheet Rows      Most Recent Value   PT/OT G-Codes   Current Score  56   Projected Score  65   FOTO information reviewed  Yes   Assessment Type  Discharge   G code set  Carrying, Moving & Handling Objects   Carrying, Moving and Handling Objects Goal Status ()  CJ   Carrying, Moving and Handling Objects Discharge Status ()  CJ          Precautions - Asthma, Hypertension , IBS

## 2018-07-11 ENCOUNTER — APPOINTMENT (OUTPATIENT)
Dept: PHYSICAL THERAPY | Facility: CLINIC | Age: 68
End: 2018-07-11
Payer: MEDICARE

## 2018-08-03 ENCOUNTER — TELEPHONE (OUTPATIENT)
Dept: PULMONOLOGY | Facility: CLINIC | Age: 68
End: 2018-08-03

## 2018-08-03 NOTE — TELEPHONE ENCOUNTER
Attempted to call pt to remind her about her chest xray that is ordered  Left pt a message to call [phone] regarding her appointment on Wednesday aug  8th

## 2018-09-24 ENCOUNTER — HOSPITAL ENCOUNTER (OUTPATIENT)
Dept: RADIOLOGY | Facility: HOSPITAL | Age: 68
Discharge: HOME | End: 2018-09-24
Payer: MEDICARE

## 2018-09-24 ENCOUNTER — TRANSCRIBE ORDERS (OUTPATIENT)
Dept: ADMINISTRATIVE | Facility: HOSPITAL | Age: 68
End: 2018-09-24

## 2018-09-24 DIAGNOSIS — K44.9 HIATAL HERNIA WITH GERD WITHOUT ESOPHAGITIS: ICD-10-CM

## 2018-09-24 DIAGNOSIS — K21.9 HIATAL HERNIA WITH GERD WITHOUT ESOPHAGITIS: ICD-10-CM

## 2018-09-24 PROCEDURE — 71046 X-RAY EXAM CHEST 2 VIEWS: CPT

## 2018-09-26 ENCOUNTER — IMMUNIZATION (OUTPATIENT)
Dept: FAMILY MEDICINE CLINIC | Facility: CLINIC | Age: 68
End: 2018-09-26
Payer: MEDICARE

## 2018-09-26 DIAGNOSIS — Z23 NEED FOR INFLUENZA VACCINATION: Primary | ICD-10-CM

## 2018-09-26 PROCEDURE — G0008 ADMIN INFLUENZA VIRUS VAC: HCPCS

## 2018-09-26 PROCEDURE — 90662 IIV NO PRSV INCREASED AG IM: CPT

## 2018-09-27 DIAGNOSIS — I10 ESSENTIAL HYPERTENSION: ICD-10-CM

## 2018-09-27 RX ORDER — POTASSIUM CHLORIDE 750 MG/1
TABLET, FILM COATED, EXTENDED RELEASE ORAL
Qty: 90 TABLET | Refills: 1 | Status: SHIPPED | OUTPATIENT
Start: 2018-09-27 | End: 2019-01-07 | Stop reason: SDUPTHER

## 2018-10-02 ENCOUNTER — OFFICE VISIT (OUTPATIENT)
Dept: CARDIAC SURGERY | Facility: CLINIC | Age: 68
End: 2018-10-02
Payer: MEDICARE

## 2018-10-02 VITALS
HEART RATE: 57 BPM | BODY MASS INDEX: 33.22 KG/M2 | DIASTOLIC BLOOD PRESSURE: 89 MMHG | HEIGHT: 65 IN | SYSTOLIC BLOOD PRESSURE: 131 MMHG | OXYGEN SATURATION: 98 % | TEMPERATURE: 97.8 F | WEIGHT: 199.4 LBS

## 2018-10-02 DIAGNOSIS — K44.9 HIATAL HERNIA WITH GERD WITHOUT ESOPHAGITIS: Primary | ICD-10-CM

## 2018-10-02 DIAGNOSIS — K21.9 HIATAL HERNIA WITH GERD WITHOUT ESOPHAGITIS: Primary | ICD-10-CM

## 2018-10-02 PROCEDURE — 99212 OFFICE O/P EST SF 10 MIN: CPT | Performed by: PHYSICIAN ASSISTANT

## 2018-10-02 NOTE — PROGRESS NOTES
Thoracic Follow-Up  Assessment/Plan:    Hiatal hernia with GERD without esophagitis  Ms Lopez is approximately 6 months out from surgery and her post op symptoms are slowly resolving  Her chest xray shows no abnormalities  Therefore, she will only return to our office on as needed basis, but was advised to call if she has any future questions or concerns  Diagnoses and all orders for this visit:    Hiatal hernia with GERD without esophagitis          Thoracic History   Diagnosis: Paraesophageal hernia   Procedure: EGD, laparoscopic paraesophageal hernia repair, Werner gastroplasty, Toupet fundoplication performed on 4/11/18  Pathology: hernia sac, wedge gastroplasty revealed benign oxyntic mucosa with submucosal and subserosal hemorrhage  No dysplasia or malignancy identified  Patient ID: Alona Hay is a 70 y o  female  HPI    Ms Morena Herman is a 69 yo female seen in our office for a paraesophageal hernia, which was repaired on 4/11/18  Her hospital course was uneventful and she was discharged to home on 4/14/18  She was admitted to \[facility]"" from 4/17-4/20 for shortness of breath, but her CT scan did not reveal any abnormalities  A MRI revealed a 6 x 5 x 4 cm left liver lesion, which was most likely consistent with a post operative hematoma  Her follow up CT from 5/11/18 revealed a resolving liver hematoma, without evidence of malignancy  She returns today, almost 6 months out from surgery  Her chest xray from 9/24/18 did not reveal any effusion or migration of her stomach  On discussion, her hoarseness and cough have resolved  She continues to have the feeling of regurgitation when sitting down for a meal, but has not actually regurgitated or vomited  It seems to dissipate after a couple of minutes  She is returning to all consistencies of food without any limitation  Review of Systems   Constitutional: Negative for chills and fever     HENT: Negative for trouble swallowing and voice change  Respiratory: Negative for cough, chest tightness and shortness of breath  Cardiovascular: Negative for chest pain  Gastrointestinal: Positive for constipation and diarrhea  Negative for abdominal distention, abdominal pain, nausea and vomiting  Musculoskeletal: Negative for gait problem  Neurological: Negative for headaches  Psychiatric/Behavioral: Negative for agitation and behavioral problems  Objective:   Physical Exam   Constitutional: She is oriented to person, place, and time  She appears well-developed and well-nourished  HENT:   Head: Normocephalic and atraumatic  Eyes: Pupils are equal, round, and reactive to light  EOM are normal    Cardiovascular: Normal rate, regular rhythm and normal heart sounds  Pulmonary/Chest: Effort normal and breath sounds normal  No respiratory distress  She has no wheezes  Abdominal: Soft  Bowel sounds are normal  She exhibits no distension  Laparoscopic incisions healed well  No evidence of infection  Musculoskeletal: Normal range of motion  Neurological: She is alert and oriented to person, place, and time  Skin: Skin is warm and dry  Psychiatric: She has a normal mood and affect  Her behavior is normal    Vitals reviewed  /89 (BP Location: Left arm, Patient Position: Sitting, Cuff Size: Adult)   Pulse 57   Temp 97 8 °F (36 6 °C)   Ht 5' 5" (1 651 m)   Wt 90 4 kg (199 lb 6 4 oz)   SpO2 98%   BMI 33 18 kg/m²     Xr Chest Pa & Lateral    Result Date: 9/25/2018  Impression No acute cardiopulmonary disease   Workstation performed: [id number]

## 2018-10-02 NOTE — ASSESSMENT & PLAN NOTE
Ms Miguel Franklin is approximately 6 months out from surgery and her post op symptoms are slowly resolving  Her chest xray shows no abnormalities  Therefore, she will only return to our office on as needed basis, but was advised to call if she has any future questions or concerns

## 2019-01-04 DIAGNOSIS — E03.0 CONGENITAL HYPOTHYROIDISM WITH DIFFUSE GOITER: ICD-10-CM

## 2019-01-04 RX ORDER — LEVOTHYROXINE SODIUM 0.05 MG/1
50 TABLET ORAL DAILY
Qty: 90 TABLET | Refills: 1 | Status: SHIPPED | OUTPATIENT
Start: 2019-01-04 | End: 2019-06-03 | Stop reason: SDUPTHER

## 2019-01-07 DIAGNOSIS — I10 BENIGN HYPERTENSION: ICD-10-CM

## 2019-01-07 DIAGNOSIS — E78.2 MIXED HYPERLIPIDEMIA: ICD-10-CM

## 2019-01-07 DIAGNOSIS — I10 ESSENTIAL HYPERTENSION: ICD-10-CM

## 2019-01-07 RX ORDER — ATORVASTATIN CALCIUM 20 MG/1
20 TABLET, FILM COATED ORAL
Qty: 90 TABLET | Refills: 1 | Status: SHIPPED | OUTPATIENT
Start: 2019-01-07 | End: 2019-06-03 | Stop reason: SDUPTHER

## 2019-01-07 RX ORDER — LOSARTAN POTASSIUM 25 MG/1
25 TABLET ORAL DAILY
Qty: 90 TABLET | Refills: 1 | Status: SHIPPED | OUTPATIENT
Start: 2019-01-07 | End: 2019-06-03 | Stop reason: SDUPTHER

## 2019-01-07 RX ORDER — POTASSIUM CHLORIDE 750 MG/1
10 TABLET, FILM COATED, EXTENDED RELEASE ORAL DAILY
Qty: 90 TABLET | Refills: 1 | Status: SHIPPED | OUTPATIENT
Start: 2019-01-07 | End: 2019-07-22 | Stop reason: SDUPTHER

## 2019-01-09 ENCOUNTER — TELEPHONE (OUTPATIENT)
Dept: FAMILY MEDICINE CLINIC | Facility: CLINIC | Age: 69
End: 2019-01-09

## 2019-01-09 DIAGNOSIS — I10 ESSENTIAL HYPERTENSION: Primary | ICD-10-CM

## 2019-01-09 RX ORDER — BUMETANIDE 0.5 MG/1
0.5 TABLET ORAL DAILY
Qty: 90 TABLET | Refills: 0 | Status: SHIPPED | OUTPATIENT
Start: 2019-01-09 | End: 2019-06-03 | Stop reason: SDUPTHER

## 2019-01-09 NOTE — TELEPHONE ENCOUNTER
I do not believe we write this med for the pt, we should call and see who manages her bumex, they may have sent the refill request to the wrong office

## 2019-01-09 NOTE — TELEPHONE ENCOUNTER
Pt's mail order has changed, she uses [facility] now but her last prescription for Bumetanide tab 1mg was sent to the old mail order, please re send to [facility]

## 2019-01-16 NOTE — TELEPHONE ENCOUNTER
"  SUBJECTIVE:   Joshua Barron is a 55 year old male who presents to clinic today for the following health issues:      Chronic Pain Follow-Up       Type / Location of Pain: Back  Analgesia/pain control:       Recent changes:  worse      Overall control: Tolerable with discomfort  Activity level/function:      Daily activities:  Able to do moderate activities    Work:  not applicable  Adverse effects:  No  Adherance    Taking medication as directed?  Yes    Participating in other treatments: no   Risk Factors:    Sleep:  Fair    Mood/anxiety:  worsened    Recent family or social stressors:  none noted    Other aggravating factors: prolonged sitting, prolonged standing and poor posture  PHQ-9 SCORE 12/5/2018 12/5/2018 1/9/2019   PHQ-9 Total Score - - -   PHQ-9 Total Score 0 0 20     DAYANA-7 SCORE 12/5/2018 12/5/2018 1/9/2019   Total Score 0 0 16     Encounter-Level CSA - 05/23/2017:    Controlled Substance Agreement - Scan on 7/31/2018 12:12 PM: CONTROLLED SUBSTANCE AGREEMENT (below)       Encounter-Level CSA - 09/18/2015:    Controlled Substance Agreement - Scan on 11/25/2015  2:25 PM: SCHEDULED MEDICATION USE AGREEMENT (below)       Patient-Level CSA:    There are no patient-level csa.        He reports that his back pain is tolerable until he feels \"POP\" then he has chronic aching and spasms of the bilateral lower back.  The pain after the POP he has severe sharp stabbing pains with induced nausea.      Amount of exercise or physical activity: 4-5 days/week for an average of 15-30 minutes    Problems taking medications regularly: No    Medication side effects: none    Diet: regular (no restrictions)    Joshua recently saw Dr. Fernandez who wanted him to stop nortriptyline and start Seroquel with a dose increased to 50 mg.  He reports that that he is having needle like sensation throughout the back.          Wt Readings from Last 5 Encounters:   01/24/19 82.6 kg (182 lb)   01/22/19 82.6 kg (182 lb)   12/26/18 82.7 kg " My point is I probably sent it for her for someone else    Ill just refill it  Thank You (182 lb 6.4 oz)   12/05/18 81.6 kg (180 lb)   11/07/18 81.6 kg (180 lb)     -------------------------------------    Problem list and histories reviewed & adjusted, as indicated.  Additional history: as documented    Patient Active Problem List   Diagnosis     Mixed hyperlipidemia     Tobacco Abuse, History of     Degeneration of lumbar or lumbosacral intervertebral disc     Depression, major     Chronic pain syndrome     Chemical dependency (H)     Chronic rhinitis     Tinnitus of both ears     SNHL (sensorineural hearing loss)     Back pain     Bipolar disorder (H)     Seizure-like activity (H)     Somatic dysfunction of pelvis region     Bilateral foot pain     Prostate cancer screening     Trigger index finger of right hand     Moderate persistent asthma without complication     Chronic lower back pain     ACP (advance care planning)     Onychia of toe of left foot     DDD (degenerative disc disease), lumbar     Seizure disorder (H)     Back muscle spasm     Benign essential hypertension     Retrograde ejaculation     Allergic rhinitis due to other allergen     Attention to dressings and sutures     Cervical spondylosis without myelopathy     Chronic headaches     DDD (degenerative disc disease)     Diabetes mellitus, type II (H)     Generalized anxiety disorder     Hypertrophy of prostate without urinary obstruction and other lower urinary tract symptoms (LUTS)     GERD (gastroesophageal reflux disease)     Lumbago     Major depressive disorder, recurrent episode, moderate (H)     Myalgia and myositis     Hyperlipidemia     Other pain disorders related to psychological factors     Spinal stenosis in cervical region     Status post lumbar spinal fusion     Tobacco use disorder     Trigger finger     Asthma     Polypharmacy     Past Surgical History:   Procedure Laterality Date     APPENDECTOMY      Appendicitis     BACK SURGERY  2007,2010    back surgery 3 disk fusion     BACK SURGERY      L1-L2, L3-L4  laminectomy     COLONOSCOPY  2007    repeat 5-10 years     COLONOSCOPY N/A 2016    Procedure: COLONOSCOPY;  Surgeon: Steve Hoff DO;  Location: [facility]     exophytic lesion posterior scalp line  2011    Excision     laminectomy L3-4 and L1-2       RELEASE TRIGGER FINGER      4th digit both hands     RELEASE TRIGGER FINGER Right 2016    Procedure: RELEASE TRIGGER FINGER;  Surgeon: Zev Schroeder MD;  Location: [facility] OR       Social History     Tobacco Use     Smoking status: Former Smoker     Packs/day: 1.00     Years: 30.00     Pack years: 30.00     Last attempt to quit: 2007     Years since quittin.4     Smokeless tobacco: Current User     Types: Chew   Substance Use Topics     Alcohol use: Yes     Comment: Rarely     Family History   Problem Relation Age of Onset     Asthma Mother      Musculoskeletal Disorder Mother         arthritis     Diabetes Father      Cancer Maternal Grandmother         stomach     Alzheimer Disease Maternal Grandfather      Cancer Paternal Grandmother         stomach     Hypertension Paternal Grandfather            Reviewed and updated as needed this visit by clinical staff       Reviewed and updated as needed this visit by Provider         ROS:  Constitutional, HEENT, cardiovascular, pulmonary, gi and gu systems are negative, except as otherwise noted.  CONSTITUTIONAL: NEGATIVE for fever, chills, change in weight  EYES: NEGATIVE for vision changes or irritation  ENT/MOUTH: NEGATIVE for ear, mouth and throat problems  ENT/MOUTH: He reports a chronic dry mouth   RESP: NEGATIVE for significant cough or SOB  CV: NEGATIVE for chest pain, palpitations or peripheral edema  GI: POSITIVE for constipation and NEGATIVE for abdominal pain, heartburn or reflux, melena, nausea, poor appetite and vomiting  : NEGATIVE for frequency, dysuria, or hematuria  MUSCULOSKELETAL:See HPI  NEURO: NEGATIVE for weakness, dizziness or paresthesias  PSYCHIATRIC:   See  HPI    OBJECTIVE:     /88 (BP Location: Right arm, Patient Position: Sitting, Cuff Size: Adult Regular)   Pulse 73   Temp 97.7  F (36.5  C) (Tympanic)   Resp 20   Wt 82.6 kg (182 lb)   SpO2 95%   BMI 26.88 kg/m    Body mass index is 26.88 kg/m .  GENERAL: healthy, alert and no distress  EYES: Eyes grossly normal to inspection and conjunctivae and sclerae normal  RESP: lungs clear to auscultation - no rales, rhonchi or wheezes  CV: regular rate and rhythm, normal S1 S2, no S3 or S4, no murmur, click or rub, no peripheral edema and peripheral pulses strong  ABDOMEN: soft, nontender, no hepatosplenomegaly, no masses and bowel sounds normal  MS: no gross musculoskeletal defects noted, no edema  PSYCH: concentration poor and tangential    Diagnostic Test Results:  none     ASSESSMENT/PLAN:   (M54.5,  G89.29) Chronic bilateral low back pain without sciatica  (primary encounter diagnosis)  Comment: Secondary to Lumbar DDD, weak paraspinal muscles and poor posture.  Plan:   As below.  Joshua has been advised that he needs to continue the paraspinal strengthening daily and eventually twice per day after two weeks.    (M54.5,  G89.29) Chronic midline low back pain without sciatica  Comment: This is mainly due to facet disease or the thoracic and lumbar spine.  Plan:   As below.  Joshua has been advised that he needs to continue the paraspinal strengthening daily and eventually twice per day after two weeks.    (F11.90) Chronic, continuous use of opioids  Comment: Joshua has stable back anatomy per his last MRI  Plan:   He will follow his opioid wean and he will continue his wean off of Nortriptyline and be off by the 28 th of the month.    In December he will wean to Morphine ER 60 TID and  Hydrocodone 10 every 8 hours for breakthrough pain ( 210 MME).  Then,  Morphine ER 50 TID and  Hydrocodone 10 every 8 hours for breakthrough pain ( 180 MME).  Then,  Morphine ER 60 BID and  Hydrocodone 10 every 8 hours for  breakthrough pain ( 150 MME).  Then,  Morphine ER 40 BID and  Hydrocodone 10 every 12 hours for breakthrough pain ( 130  MME).  Then,  Morphine ER 30 TID ( 90 MME) with a wean every 2-3 weeks first to 30 mg BID, then 20 mg BID.  From there we will have a plan for other options.                 FUTURE APPOINTMENTS:       - Follow-up visit in 3 weeks for chronic back pain.    Lyle Fisher DO, DO  [facility]

## 2019-06-03 DIAGNOSIS — I10 ESSENTIAL HYPERTENSION: ICD-10-CM

## 2019-06-03 DIAGNOSIS — E03.0 CONGENITAL HYPOTHYROIDISM WITH DIFFUSE GOITER: ICD-10-CM

## 2019-06-03 DIAGNOSIS — E78.2 MIXED HYPERLIPIDEMIA: ICD-10-CM

## 2019-06-03 DIAGNOSIS — I10 BENIGN HYPERTENSION: ICD-10-CM

## 2019-06-03 RX ORDER — LOSARTAN POTASSIUM 25 MG/1
TABLET ORAL
Qty: 90 TABLET | Refills: 1 | Status: SHIPPED | OUTPATIENT
Start: 2019-06-03 | End: 2019-07-22 | Stop reason: SDUPTHER

## 2019-06-03 RX ORDER — ATORVASTATIN CALCIUM 20 MG/1
TABLET, FILM COATED ORAL
Qty: 90 TABLET | Refills: 1 | Status: SHIPPED | OUTPATIENT
Start: 2019-06-03 | End: 2019-07-22 | Stop reason: SDUPTHER

## 2019-06-03 RX ORDER — BUMETANIDE 0.5 MG/1
TABLET ORAL
Qty: 90 TABLET | Refills: 0 | Status: SHIPPED | OUTPATIENT
Start: 2019-06-03 | End: 2019-07-22

## 2019-06-03 RX ORDER — LEVOTHYROXINE SODIUM 0.05 MG/1
TABLET ORAL
Qty: 90 TABLET | Refills: 1 | Status: SHIPPED | OUTPATIENT
Start: 2019-06-03 | End: 2019-07-22 | Stop reason: SDUPTHER

## 2019-07-22 ENCOUNTER — OFFICE VISIT (OUTPATIENT)
Dept: FAMILY MEDICINE CLINIC | Facility: CLINIC | Age: 69
End: 2019-07-22
Payer: MEDICARE

## 2019-07-22 VITALS
TEMPERATURE: 97.4 F | SYSTOLIC BLOOD PRESSURE: 110 MMHG | HEIGHT: 65 IN | RESPIRATION RATE: 16 BRPM | WEIGHT: 212 LBS | BODY MASS INDEX: 35.32 KG/M2 | HEART RATE: 84 BPM | DIASTOLIC BLOOD PRESSURE: 68 MMHG

## 2019-07-22 DIAGNOSIS — I10 ESSENTIAL HYPERTENSION: ICD-10-CM

## 2019-07-22 DIAGNOSIS — E66.01 SEVERE OBESITY (BMI 35.0-39.9) WITH COMORBIDITY (HCC): ICD-10-CM

## 2019-07-22 DIAGNOSIS — E78.2 MIXED HYPERLIPIDEMIA: ICD-10-CM

## 2019-07-22 DIAGNOSIS — I77.89 CORONARY ARTERY ECTASIA (HCC): ICD-10-CM

## 2019-07-22 DIAGNOSIS — I10 BENIGN HYPERTENSION: ICD-10-CM

## 2019-07-22 DIAGNOSIS — L30.9 DERMATITIS: ICD-10-CM

## 2019-07-22 DIAGNOSIS — Z12.11 SCREEN FOR COLON CANCER: ICD-10-CM

## 2019-07-22 DIAGNOSIS — Z11.59 NEED FOR HEPATITIS C SCREENING TEST: ICD-10-CM

## 2019-07-22 DIAGNOSIS — Z12.83 SCREENING EXAM FOR SKIN CANCER: ICD-10-CM

## 2019-07-22 DIAGNOSIS — M25.551 HIP PAIN, ACUTE, RIGHT: ICD-10-CM

## 2019-07-22 DIAGNOSIS — Z00.00 MEDICARE ANNUAL WELLNESS VISIT, SUBSEQUENT: Primary | ICD-10-CM

## 2019-07-22 DIAGNOSIS — E03.0 CONGENITAL HYPOTHYROIDISM WITH DIFFUSE GOITER: ICD-10-CM

## 2019-07-22 DIAGNOSIS — I25.118 CORONARY ARTERY DISEASE OF NATIVE ARTERY OF NATIVE HEART WITH STABLE ANGINA PECTORIS (HCC): ICD-10-CM

## 2019-07-22 DIAGNOSIS — Z12.39 SCREENING FOR BREAST CANCER: ICD-10-CM

## 2019-07-22 DIAGNOSIS — Z13.820 SCREENING FOR OSTEOPOROSIS: ICD-10-CM

## 2019-07-22 DIAGNOSIS — D51.0 ANEMIA, PERNICIOUS: ICD-10-CM

## 2019-07-22 DIAGNOSIS — Z12.4 SCREENING FOR CERVICAL CANCER: ICD-10-CM

## 2019-07-22 PROCEDURE — G0439 PPPS, SUBSEQ VISIT: HCPCS | Performed by: FAMILY MEDICINE

## 2019-07-22 RX ORDER — POTASSIUM CHLORIDE 750 MG/1
10 TABLET, FILM COATED, EXTENDED RELEASE ORAL DAILY
Qty: 90 TABLET | Refills: 1 | Status: SHIPPED | OUTPATIENT
Start: 2019-07-22 | End: 2019-12-14 | Stop reason: SDUPTHER

## 2019-07-22 RX ORDER — BUMETANIDE 1 MG/1
1 TABLET ORAL DAILY
Qty: 90 TABLET | Refills: 1 | Status: SHIPPED | OUTPATIENT
Start: 2019-07-22 | End: 2019-11-27 | Stop reason: SDUPTHER

## 2019-07-22 RX ORDER — ATORVASTATIN CALCIUM 20 MG/1
20 TABLET, FILM COATED ORAL
Qty: 90 TABLET | Refills: 1 | Status: SHIPPED | OUTPATIENT
Start: 2019-07-22 | End: 2019-11-27 | Stop reason: SDUPTHER

## 2019-07-22 RX ORDER — MOMETASONE FUROATE 1 MG/G
CREAM TOPICAL DAILY
Qty: 45 G | Refills: 0 | Status: SHIPPED | OUTPATIENT
Start: 2019-07-22 | End: 2020-09-21

## 2019-07-22 RX ORDER — LEVOTHYROXINE SODIUM 0.05 MG/1
50 TABLET ORAL DAILY
Qty: 90 TABLET | Refills: 1 | Status: SHIPPED | OUTPATIENT
Start: 2019-07-22 | End: 2019-11-27 | Stop reason: SDUPTHER

## 2019-07-22 RX ORDER — LOSARTAN POTASSIUM 25 MG/1
25 TABLET ORAL DAILY
Qty: 90 TABLET | Refills: 1 | Status: SHIPPED | OUTPATIENT
Start: 2019-07-22 | End: 2019-11-27 | Stop reason: SDUPTHER

## 2019-07-22 NOTE — PATIENT INSTRUCTIONS
Obesity   AMBULATORY CARE:   Obesity  is when your body mass index (BMI) is greater than 30  Your healthcare provider will use your height and weight to measure your BMI  The risks of obesity include  many health problems, such as injuries or physical disability  You may need tests to check for the following:  · Diabetes     · High blood pressure or high cholesterol     · Heart disease     · Gallbladder or liver disease     · Cancer of the colon, breast, prostate, liver, or kidney     · Sleep apnea     · Arthritis or gout  Seek care immediately if:   · You have a severe headache, confusion, or difficulty speaking  · You have weakness on one side of your body  · You have chest pain, sweating, or shortness of breath  Contact your healthcare provider if:   · You have symptoms of gallbladder or liver disease, such as pain in your upper abdomen  · You have knee or hip pain and discomfort while walking  · You have symptoms of diabetes, such as intense hunger and thirst, and frequent urination  · You have symptoms of sleep apnea, such as snoring or daytime sleepiness  · You have questions or concerns about your condition or care  Treatment for obesity  focuses on helping you lose weight to improve your health  Even a small decrease in BMI can reduce the risk for many health problems  Your healthcare provider will help you set a weight-loss goal   · Lifestyle changes  are the first step in treating obesity  These include making healthy food choices and getting regular physical activity  Your healthcare provider may suggest a weight-loss program that involves coaching, education, and therapy  · Medicine  may help you lose weight when it is used with a healthy diet and physical activity  · Surgery  can help you lose weight if you are very obese and have other health problems  There are several types of weight-loss surgery  Ask your healthcare provider for more information    Be successful losing weight:   · Set small, realistic goals  An example of a small goal is to walk for 20 minutes 5 days a week  Anther goal is to lose 5% of your body weight  · Tell friends, family members, and coworkers about your goals  and ask for their support  Ask a friend to lose weight with you, or join a weight-loss support group  · Identify foods or triggers that may cause you to overeat , and find ways to avoid them  Remove tempting high-calorie foods from your home and workplace  Place a bowl of fresh fruit on your kitchen counter  If stress causes you to eat, then find other ways to cope with stress  · Keep a diary to track what you eat and drink  Also write down how many minutes of physical activity you do each day  Weigh yourself once a week and record it in your diary  Eating changes: You will need to eat 500 to 1,000 fewer calories each day than you currently eat to lose 1 to 2 pounds a week  The following changes will help you cut calories:  · Eat smaller portions  Use small plates, no larger than 9 inches in diameter  Fill your plate half full of fruits and vegetables  Measure your food using measuring cups until you know what a serving size looks like  · Eat 3 meals and 1 or 2 snacks each day  Plan your meals in advance  Elana Silence and eat at home most of the time  Eat slowly  · Eat fruits and vegetables at every meal   They are low in calories and high in fiber, which makes you feel full  Do not add butter, margarine, or cream sauce to vegetables  Use herbs to season steamed vegetables  · Eat less fat and fewer fried foods  Eat more baked or grilled chicken and fish  These protein sources are lower in calories and fat than red meat  Limit fast food  Dress your salads with olive oil and vinegar instead of bottled dressing  · Limit the amount of sugar you eat  Do not drink sugary beverages  Limit alcohol  Activity changes:  Physical activity is good for your body in many ways   It helps you burn calories and build strong muscles  It decreases stress and depression, and improves your mood  It can also help you sleep better  Talk to your healthcare provider before you begin an exercise program   · Exercise for at least 30 minutes 5 days a week  Start slowly  Set aside time each day for physical activity that you enjoy and that is convenient for you  It is best to do both weight training and an activity that increases your heart rate, such as walking, bicycling, or swimming  · Find ways to be more active  Do yard work and housecleaning  Walk up the stairs instead of using elevators  Spend your leisure time going to events that require walking, such as outdoor festivals or fairs  This extra physical activity can help you lose weight and keep it off  Follow up with your healthcare provider as directed: You may need to meet with a dietitian  Write down your questions so you remember to ask them during your visits  © 2017 [address] [employer] Information is for End User's use only and may not be sold, redistributed or otherwise used for commercial purposes  All illustrations and images included in CareNotes® are the copyrighted property of [employer]  or Luis Hernandez  The above information is an  only  It is not intended as medical advice for individual conditions or treatments  Talk to your doctor, nurse or pharmacist before following any medical regimen to see if it is safe and effective for you  Urinary Incontinence   WHAT YOU NEED TO KNOW:   What is urinary incontinence? Urinary incontinence (UI) is when you lose control of your bladder  What causes UI? UI occurs because your bladder cannot store or empty urine properly  The following are the most common types of UI:  · Stress incontinence  is when you leak urine due to increased bladder pressure  This may happen when you cough, sneeze, or exercise       · Urge incontinence  is when you feel the need to urinate right away and leak urine accidentally  · Mixed incontinence  is when you have both stress and urge UI  What are the signs and symptoms of UI?   · You feel like your bladder does not empty completely when you urinate  · You urinate often and need to urinate immediately  · You leak urine when you sleep, or you wake up with the urge to urinate  · You leak urine when you cough, sneeze, exercise, or laugh  How is UI diagnosed? Your healthcare provider will ask how often you leak urine and whether you have stress or urge symptoms  Tell him which medicines you take, how often you urinate, and how much liquid you drink each day  You may need any of the following tests:  · Urine tests  may show infection or kidney function  · A pelvic exam  may be done to check for blockages  A pelvic exam will also show if your bladder, uterus, or other organs have moved out of place  · An x-ray, ultrasound, or CT  may show problems with parts of your urinary system  You may be given contrast liquid to help your organs show up better in the pictures  Tell the healthcare provider if you have ever had an allergic reaction to contrast liquid  Do not enter the MRI room with anything metal  Metal can cause serious injury  Tell the healthcare provider if you have any metal in or on your body  · A bladder scan  will show how much urine is left in your bladder after you urinate  You will be asked to urinate and then healthcare providers will use a small ultrasound machine to check the urine left in your bladder  · Cystometry  is used to check the function of your urinary system  Your healthcare provider checks the pressure in your bladder while filling it with fluid  Your bladder pressure may also be tested when your bladder is full and while you urinate  How is UI treated? · Medicines  can help strengthen your bladder control      · Electrical stimulation  is used to send a small amount of electrical energy to your pelvic floor muscles  This helps control your bladder function  Electrodes may be placed outside your body or in your rectum  For women, the electrodes may be placed in the vagina  · A bulking agent  may be injected into the wall of your urethra to make it thicker  This helps keep your urethra closed and decreases urine leakage  · Devices  such as a clamp, pessary, or tampon may help stop urine leaks  Ask your healthcare provider for more information about these and other devices  · Surgery  may be needed if other treatments do not work  Several types of surgery can help improve your bladder control  Ask your healthcare provider for more information about the surgery you may need  How can I manage my symptoms? · Do pelvic muscle exercises often  Your pelvic muscles help you stop urinating  Squeeze these muscles tight for 5 seconds, then relax for 5 seconds  Gradually work up to squeezing for 10 seconds  Do 3 sets of 15 repetitions a day, or as directed  This will help strengthen your pelvic muscles and improve bladder control  · A catheter  may be used to help empty your bladder  A catheter is a tiny, plastic tube that is put into your bladder to drain your urine  Your healthcare provider may tell you to use a catheter to prevent your bladder from getting too full and leaking urine  · Keep a UI record  Write down how often you leak urine and how much you leak  Make a note of what you were doing when you leaked urine  · Train your bladder  Go to the bathroom at set times, such as every 2 hours, even if you do not feel the urge to go  You can also try to hold your urine when you feel the urge to go  For example, hold your urine for 5 minutes when you feel the urge to go  As that becomes easier, hold your urine for 10 minutes  · Drink liquids as directed  Ask your healthcare provider how much liquid to drink each day and which liquids are best for you   You may need to limit the amount of liquid you drink to help control your urine leakage  Limit or do not have drinks that contain caffeine or alcohol  Do not drink any liquid right before you go to bed  · Prevent constipation  Eat a variety of high-fiber foods  Good examples are high-fiber cereals, beans, vegetables, and whole-grain breads  Prune juice may help make your bowel movement softer  Walking is the best way to trigger your intestines to have a bowel movement  · Exercise regularly and maintain a healthy weight  Ask your healthcare provider how much you should weigh and about the best exercise plan for you  Weight loss and exercise will decrease pressure on your bladder and help you control your leakage  Ask him to help you create a weight loss plan if you are overweight  When should I seek immediate care? · You have severe pain  · You are confused or cannot think clearly  When should I contact my healthcare provider? · You have a fever  · You see blood in your urine  · You have pain when you urinate  · You have new or worse pain, even after treatment  · Your mouth feels dry or you have vision changes  · Your urine is cloudy or smells bad  · You have questions or concerns about your condition or care  CARE AGREEMENT:   You have the right to help plan your care  Learn about your health condition and how it may be treated  Discuss treatment options with your caregivers to decide what care you want to receive  You always have the right to refuse treatment  The above information is an  only  It is not intended as medical advice for individual conditions or treatments  Talk to your doctor, nurse or pharmacist before following any medical regimen to see if it is safe and effective for you  © 2017 [address] [employer] Information is for End User's use only and may not be sold, redistributed or otherwise used for commercial purposes   All illustrations and images included in CareNotes® are the copyrighted property of [employer]  or Luis Hernandez  Cigarette Smoking and Your Health   AMBULATORY CARE:   Risks to your health if you smoke:  Nicotine and other chemicals found in tobacco damage every cell in your body  Even if you are a light smoker, you have an increased risk for cancer, heart disease, and lung disease  If you are pregnant or have diabetes, smoking increases your risk for complications  Benefits to your health if you stop smoking:   · You decrease respiratory symptoms such as coughing, wheezing, and shortness of breath  · You reduce your risk for cancers of the lung, mouth, throat, kidney, bladder, pancreas, stomach, and cervix  If you already have cancer, you increase the benefits of chemotherapy  You also reduce your risk for cancer returning or a second cancer from developing  · You reduce your risk for heart disease, blood clots, heart attack, and stroke  · You reduce your risk for lung infections, and diseases such as pneumonia, asthma, chronic bronchitis, and emphysema  · Your circulation improves  More oxygen can be delivered to your body  If you have diabetes, you lower your risk for complications, such as kidney, artery, and eye diseases  You also lower your risk for nerve damage  Nerve damage can lead to amputations, poor vision, and blindness  · You improve your body's ability to heal and to fight infections  Benefits to the health of others if you stop smoking:  Tobacco is harmful to nonsmokers who breathe in your secondhand smoke  The following are ways the health of others around you may improve when you stop smoking:  · You lower the risks for lung cancer and heart disease in nonsmoking adults  · If you are pregnant, you lower the risk for miscarriage, early delivery, low birth weight, and stillbirth  You also lower your baby's risk for SIDS, obesity, developmental delay, and neurobehavioral problems, such as ADHD  · If you have children, you lower their risk for ear infections, colds, pneumonia, bronchitis, and asthma  For more information and support to stop smoking:   · [employer]  [employer]  Phone: 1- 541 - 018-4513  Web Address: www smokefree  gov  Follow up with your healthcare provider as directed:  Write down your questions so you remember to ask them during your visits  © 2017 [address] [employer] Information is for End User's use only and may not be sold, redistributed or otherwise used for commercial purposes  All illustrations and images included in CareNotes® are the copyrighted property of [employer]  or Luis Hernandez  The above information is an  only  It is not intended as medical advice for individual conditions or treatments  Talk to your doctor, nurse or pharmacist before following any medical regimen to see if it is safe and effective for you  Fall Prevention   AMBULATORY CARE:   Fall prevention  includes ways to make your home and other areas safer  It also includes ways you can move more carefully to prevent a fall  Health conditions that cause changes in your blood pressure, vision, or muscle strength and coordination may increase your risk for falls  Medicines may also increase your risk for falls if they make you dizzy, weak, or sleepy  Call 911 or have someone else call if:   · You have fallen and are unconscious  · You have fallen and cannot move part of your body  Contact your healthcare provider if:   · You have fallen and have pain or a headache  · You have questions or concerns about your condition or care  Fall prevention tips:   · Stand or sit up slowly  This may help you keep your balance and prevent falls  · Use assistive devices as directed  Your healthcare provider may suggest that you use a cane or walker to help you keep your balance  You may need to have grab bars put in your bathroom near the toilet or in the shower      · Wear shoes that fit well and have soles that   Wear shoes both inside and outside  Use slippers with good   Do not wear shoes with high heels  · Wear a personal alarm  This is a device that allows you to call 911 if you fall and need help  Ask your healthcare provider for more information  · Stay active  Exercise can help strengthen your muscles and improve your balance  Your healthcare provider may recommend water aerobics or walking  He or she may also recommend physical therapy to improve your coordination  Never start an exercise program without talking to your healthcare provider first      · Manage your medical conditions  Keep all appointments with your healthcare providers  Visit your eye doctor as directed  Home safety tips:   · Add items to prevent falls in the bathroom  Put nonslip strips on your bath or shower floor to prevent you from slipping  Use a bath mat if you do not have carpet in the bathroom  This will prevent you from falling when you step out of the bath or shower  Use a shower seat so you do not need to stand while you shower  Sit on the toilet or a chair in your bathroom to dry yourself and put on clothing  This will prevent you from losing your balance from drying or dressing yourself while you are standing  · Keep paths clear  Remove books, shoes, and other objects from walkways and stairs  Place cords for telephones and lamps out of the way so that you do not need to walk over them  Tape them down if you cannot move them  Remove small rugs  If you cannot remove a rug, secure it with double-sided tape  This will prevent you from tripping  · Install bright lights in your home  Use night lights to help light paths to the bathroom or kitchen  Always turn on the light before you start walking  · Keep items you use often on shelves within reach  Do not use a step stool to help you reach an item  · Paint or place reflective tape on the edges of your stairs    This will help you see the stairs better  Follow up with your healthcare provider as directed:  Write down your questions so you remember to ask them during your visits  © 2017 [address] [employer] Information is for End User's use only and may not be sold, redistributed or otherwise used for commercial purposes  All illustrations and images included in CareNotes® are the copyrighted property of [employer]  or Luis Hernandez  The above information is an  only  It is not intended as medical advice for individual conditions or treatments  Talk to your doctor, nurse or pharmacist before following any medical regimen to see if it is safe and effective for you  Advance Directives   WHAT YOU NEED TO KNOW:   What are advance directives? Advance directives are legal documents that state your wishes and plans for medical care  These plans are made ahead of time in case you lose your ability to make decisions for yourself  Advance directives can apply to any medical decision, such as the treatments you want, and if you want to donate organs  What are the types of advance directives? There are many types of advance directives, and each state has rules about how to use them  You may choose a combination of any of the following:  · Living will: This is a written record of the treatment you want  You can also choose which treatments you do not want, which to limit, and which to stop at a certain time  This includes surgery, medicine, IV fluid, and tube feedings  · Durable power of  for healthcare [facility]): This is a written record that states who you want to make healthcare choices for you when you are unable to make them for yourself  This person, called a proxy, is usually a family member or a friend  You may choose more than 1 proxy  · Do not resuscitate (DNR) order:  A DNR order is used in case your heart stops beating or you stop breathing   It is a request not to have certain forms of treatment, such as CPR  A DNR order may be included in other types of advance directives  · Medical directive: This covers the care that you want if you are in a coma, near death, or unable to make decisions for yourself  You can list the treatments you want for each condition  Treatment may include pain medicine, surgery, blood transfusions, dialysis, IV or tube feedings, and a ventilator (breathing machine)  · Values history: This document has questions about your views, beliefs, and how you feel and think about life  This information can help others choose the care that you would choose  Why are advance directives important? An advance directive helps you control your care  Although spoken wishes may be used, it is better to have your wishes written down  Spoken wishes can be misunderstood, or not followed  Treatments may be given even if you do not want them  An advance directive may make it easier for your family to make difficult choices about your care  How do I decide what to put in my advance directives? · Make informed decisions:  Make sure you fully understand treatments or care you may receive  Think about the benefits and problems your decisions could cause for you or your family  Talk to healthcare providers if you have concerns or questions before you write down your wishes  You may also want to talk with your [place] or , or a   Check your state laws to make sure that what you put in your advance directive is legal      · Sign all forms:  Sign and date your advance directive when you have finished  You may also need 2 witnesses to sign the forms  Witnesses cannot be your doctor or his staff, your spouse, heirs or beneficiaries, people you owe money to, or your chosen proxy  Talk to your family, proxy, and healthcare providers about your advance directive  Give each person a copy, and keep one for yourself in a place you can get to easily   Do not keep it hidden or locked away  · Review and revise your plans: You can revise your advance directive at any time, as long as you are able to make decisions  Review your plan every year, and when there are changes in your life, or your health  When you make changes, let your family, proxy, and healthcare providers know  Give each a new copy  Where can I find more information? · [employer]  [address] , [place] [address]  Phone: 5- 233 - [phone]  Phone: 7- 666 - [phone]  Web Address: http://www  aafp org  · [address] [facility])  [address] [address] [facility], [address] [facility] , [address] [place] [address]  Phone: [phone]  Phone: [phone]  Web Address: Acacia trevino  CARE AGREEMENT:   You have the right to help plan your care  To help with this plan, you must learn about your health condition and treatment options  You must also learn about advance directives and how they are used  Work with your healthcare providers to decide what care will be used to treat you  You always have the right to refuse treatment  The above information is an  only  It is not intended as medical advice for individual conditions or treatments  Talk to your doctor, nurse or pharmacist before following any medical regimen to see if it is safe and effective for you  © 2017 [address] [employer]  Information is for End User's use only and may not be sold, redistributed or otherwise used for commercial purposes  All illustrations and images included in CareNotes® are the copyrighted property of [employer]  or Luis Hernandez  Obesity   AMBULATORY CARE:   Obesity  is when your body mass index (BMI) is greater than 30  Your healthcare provider will use your height and weight to measure your BMI  The risks of obesity include  many health problems, such as injuries or physical disability   You may need tests to check for the following:  · Diabetes     · High blood pressure or high cholesterol     · Heart disease     · Gallbladder or liver disease     · Cancer of the colon, breast, prostate, liver, or kidney     · Sleep apnea     · Arthritis or gout  Seek care immediately if:   · You have a severe headache, confusion, or difficulty speaking  · You have weakness on one side of your body  · You have chest pain, sweating, or shortness of breath  Contact your healthcare provider if:   · You have symptoms of gallbladder or liver disease, such as pain in your upper abdomen  · You have knee or hip pain and discomfort while walking  · You have symptoms of diabetes, such as intense hunger and thirst, and frequent urination  · You have symptoms of sleep apnea, such as snoring or daytime sleepiness  · You have questions or concerns about your condition or care  Treatment for obesity  focuses on helping you lose weight to improve your health  Even a small decrease in BMI can reduce the risk for many health problems  Your healthcare provider will help you set a weight-loss goal   · Lifestyle changes  are the first step in treating obesity  These include making healthy food choices and getting regular physical activity  Your healthcare provider may suggest a weight-loss program that involves coaching, education, and therapy  · Medicine  may help you lose weight when it is used with a healthy diet and physical activity  · Surgery  can help you lose weight if you are very obese and have other health problems  There are several types of weight-loss surgery  Ask your healthcare provider for more information  Be successful losing weight:   · Set small, realistic goals  An example of a small goal is to walk for 20 minutes 5 days a week  Anther goal is to lose 5% of your body weight  · Tell friends, family members, and coworkers about your goals  and ask for their support   Ask a friend to lose weight with you, or join a weight-loss support group  · Identify foods or triggers that may cause you to overeat , and find ways to avoid them  Remove tempting high-calorie foods from your home and workplace  Place a bowl of fresh fruit on your kitchen counter  If stress causes you to eat, then find other ways to cope with stress  · Keep a diary to track what you eat and drink  Also write down how many minutes of physical activity you do each day  Weigh yourself once a week and record it in your diary  Eating changes: You will need to eat 500 to 1,000 fewer calories each day than you currently eat to lose 1 to 2 pounds a week  The following changes will help you cut calories:  · Eat smaller portions  Use small plates, no larger than 9 inches in diameter  Fill your plate half full of fruits and vegetables  Measure your food using measuring cups until you know what a serving size looks like  · Eat 3 meals and 1 or 2 snacks each day  Plan your meals in advance  Tasneem Belcher and eat at home most of the time  Eat slowly  · Eat fruits and vegetables at every meal   They are low in calories and high in fiber, which makes you feel full  Do not add butter, margarine, or cream sauce to vegetables  Use herbs to season steamed vegetables  · Eat less fat and fewer fried foods  Eat more baked or grilled chicken and fish  These protein sources are lower in calories and fat than red meat  Limit fast food  Dress your salads with olive oil and vinegar instead of bottled dressing  · Limit the amount of sugar you eat  Do not drink sugary beverages  Limit alcohol  Activity changes:  Physical activity is good for your body in many ways  It helps you burn calories and build strong muscles  It decreases stress and depression, and improves your mood  It can also help you sleep better  Talk to your healthcare provider before you begin an exercise program   · Exercise for at least 30 minutes 5 days a week  Start slowly   Set aside time each day for physical activity that you enjoy and that is convenient for you  It is best to do both weight training and an activity that increases your heart rate, such as walking, bicycling, or swimming  · Find ways to be more active  Do yard work and housecleaning  Walk up the stairs instead of using elevators  Spend your leisure time going to events that require walking, such as outdoor festivals or fairs  This extra physical activity can help you lose weight and keep it off  Follow up with your healthcare provider as directed: You may need to meet with a dietitian  Write down your questions so you remember to ask them during your visits  © 2017 [address] Information is for End User's use only and may not be sold, redistributed or otherwise used for commercial purposes  All illustrations and images included in CareNotes® are the copyrighted property of [employer]  or Luis Hernandez  The above information is an  only  It is not intended as medical advice for individual conditions or treatments  Talk to your doctor, nurse or pharmacist before following any medical regimen to see if it is safe and effective for you  Weight Management   AMBULATORY CARE:   Why it is important to manage your weight:  Being overweight increases your risk of health conditions such as heart disease, high blood pressure, type 2 diabetes, and certain types of cancer  It can also increase your risk for osteoarthritis, sleep apnea, and other respiratory problems  Aim for a slow, steady weight loss  Even a small amount of weight loss can lower your risk of health problems  How to lose weight safely:  A safe and healthy way to lose weight is to eat fewer calories and get regular exercise  You can lose up about 1 pound a week by decreasing the number of calories you eat by 500 calories each day  You can decrease calories by eating smaller portion sizes or by cutting out high-calorie foods   Read labels to find out how many calories are in the foods you eat  You can also burn calories with exercise such as walking, swimming, or biking  You will be more likely to keep weight off if you make these changes part of your lifestyle  Healthy meal plan for weight management:  A healthy meal plan includes a variety of foods, contains fewer calories, and helps you stay healthy  A healthy meal plan includes the following:  · Eat whole-grain foods more often  A healthy meal plan should contain fiber  Fiber is the part of grains, fruits, and vegetables that is not broken down by your body  Whole-grain foods are healthy and provide extra fiber in your diet  Some examples of whole-grain foods are whole-wheat breads and pastas, oatmeal, brown rice, and bulgur  · Eat a variety of vegetables every day  Include dark, leafy greens such as spinach, kale, otto greens, and mustard greens  Eat yellow and orange vegetables such as carrots, sweet potatoes, and winter squash  · Eat a variety of fruits every day  Choose fresh or canned fruit (canned in its own juice or light syrup) instead of juice  Fruit juice has very little or no fiber  · Eat low-fat dairy foods  Drink fat-free (skim) milk or 1% milk  Eat fat-free yogurt and low-fat cottage cheese  Try low-fat cheeses such as mozzarella and other reduced-fat cheeses  · Choose meat and other protein foods that are low in fat  Choose beans or other legumes such as split peas or lentils  Choose fish, skinless poultry (chicken or turkey), or lean cuts of red meat (beef or pork)  Before you cook meat or poultry, cut off any visible fat  · Use less fat and oil  Try baking foods instead of frying them  Add less fat, such as margarine, sour cream, regular salad dressing and mayonnaise to foods  Eat fewer high-fat foods  Some examples of high-fat foods include french fries, doughnuts, ice cream, and cakes  · Eat fewer sweets    Limit foods and drinks that are high in sugar  This includes candy, cookies, regular soda, and sweetened drinks  Ways to decrease calories:   · Eat smaller portions  ¨ Use a small plate with smaller servings  ¨ Do not eat second helpings  ¨ When you eat at a restaurant, ask for a box and place half of your meal in the box before you eat  ¨ Share an entrée with someone else  · Replace high-calorie snacks with healthy, low-calorie snacks  ¨ Choose fresh fruit, vegetables, fat-free rice cakes, or air-popped popcorn instead of potato chips, nuts, or chocolate  ¨ Choose water or calorie-free drinks instead of soda or sweetened drinks  · Eat regular meals  Skipping meals can lead to overeating later in the day  Eat a healthy snack in place of a meal if you do not have time to eat a regular meal      · Do not shop for groceries when you are hungry  You may be more likely to make unhealthy food choices  Take a grocery list of healthy foods and shop after you have eaten  Exercise:  Exercise at least 30 minutes per day on most days of the week  Some examples of exercise include walking, biking, dancing, and swimming  You can also fit in more physical activity by taking the stairs instead of the elevator or parking farther away from stores  Ask your healthcare provider about the best exercise plan for you  Other things to consider as you try to lose weight:   · Be aware of situations that may give you the urge to overeat, such as eating while watching television  Find ways to avoid these situations  For example, read a book, go for a walk, or do crafts  · Meet with a weight loss support group or friends who are also trying to lose weight  This may help you stay motivated to continue working on your weight loss goals  © 2017 [address] Information is for End User's use only and may not be sold, redistributed or otherwise used for commercial purposes   All illustrations and images included in CareNotes® are the copyrighted property of [employer]  or Luis Hernandez  The above information is an  only  It is not intended as medical advice for individual conditions or treatments  Talk to your doctor, nurse or pharmacist before following any medical regimen to see if it is safe and effective for you

## 2019-07-22 NOTE — PROGRESS NOTES
Assessment and Plan:     Problem List Items Addressed This Visit        Endocrine    Congenital hypothyroidism with diffuse goiter    Relevant Medications    levothyroxine 50 mcg tablet    Other Relevant Orders    TSH, 3rd generation       Cardiovascular and Mediastinum    Benign hypertension    Relevant Medications    bumetanide (BUMEX) 1 mg tablet    losartan (COZAAR) 25 mg tablet    Coronary artery disease       Other    Mixed hyperlipidemia    Relevant Medications    atorvastatin (LIPITOR) 20 mg tablet    Other Relevant Orders    Comprehensive metabolic panel    Lipid Panel with Direct LDL reflex    Anemia, pernicious    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HCC)      Other Visit Diagnoses     Medicare annual wellness visit, subsequent    -  Primary    Need for hepatitis C screening test        Relevant Orders    Hepatitis C antibody    Severe obesity (BMI 35 0-39  9) with comorbidity ([facility] [address] )        BMI 35 0-35 9,adult        Screening for breast cancer        Relevant Orders    Mammo screening bilateral w cad    Screen for colon cancer        Relevant Orders    Ambulatory referral to Gastroenterology    Screening for cervical cancer        Relevant Orders    Ambulatory referral to Obstetrics / Gynecology    Screening for osteoporosis        Relevant Orders    DXA bone density spine hip and pelvis    Screening exam for skin cancer        Relevant Orders    Ambulatory referral to Dermatology    Essential hypertension        Relevant Medications    bumetanide (BUMEX) 1 mg tablet    losartan (COZAAR) 25 mg tablet    potassium chloride (K-DUR) 10 mEq tablet    Dermatitis        Relevant Medications    mometasone (ELOCON) 0 1 % cream    Hip pain, acute, right        Relevant Orders    XR hip/pelv 2-3 vws right if performed         History of Present Illness:     Patient presents for Medicare Annual Wellness visit  Pt states she has a multinodular thuyroid was seening endo, pt states they said she could just see her PCp    Pt states she saw Dr Anahy Montero last year asking if she should still see him    Pt would like to see derm      Pt states she gets an itch that she has had for a number of months, started going away with warm weather  Not itchy at the mom,ent, does not think she has a rash at the moment      Pt would like me to examine her thyroid as her endo instructed, States she does have pain in her left neck  Pt states her rt hip is bothering her when she sits down and gets up, does not hurt a ton if she is walking unless she walks a lot    Hurts when she sits in the am     Patient Care Team:  Jere Capone DO as PCP - MD Gladys Hermosillo MD Guillermina Hake, MD Lita Hu, MD Marcial Sack, DO     Problem List:     Patient Active Problem List   Diagnosis    Hiatal hernia with GERD without esophagitis    Hypokalemia    Benign hypertension    Mixed hyperlipidemia    Congenital hypothyroidism with diffuse goiter    Liver lesion, left lobe    Anemia, pernicious    Coronary artery disease    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HCC)    Hemorrhoids    IBS (irritable bowel syndrome)    Osteopenia    Stress incontinence, female    Abnormal CT of liver    Morbid obesity with BMI of 40 0-44 9, adult [facility])      Past Medical and Surgical History:     Past Medical History:   Diagnosis Date    Anemia     Hx secondary to bleeding ulcer    Anxiety     resolved 10/4/17    Asthma     seasonal    Chronic kidney disease     kidney stone    Disease of thyroid gland     GERD (gastroesophageal reflux disease)     Goiter, nodular     Hiatal hernia with GERD without esophagitis     History of transfusion     x1 after bleeding ulcer    Hyperlipidemia     Hypertension     Irritable bowel syndrome     RA (rheumatoid arthritis) ([facility] [address] )     Seasonal allergies     Stomach ulcer     Vertigo      Past Surgical History:   Procedure Laterality Date    CARDIAC CATHETERIZATION      x2 secondary to exertional chest pain, due to lung issues, possible mild COPD    CHOLECYSTECTOMY  2015    lap - last assessed 10/4/17    COLONOSCOPY      complete    ESOPHAGOGASTRODUODENOSCOPY N/A 1/23/2018    Procedure: ESOPHAGOGASTRODUODENOSCOPY (EGD); Surgeon: Juan Daniel Donahue MD;  [facility]; Service: Gastroenterology    HYSTERECTOMY      partial - last assessed 10/4/17    LIPOMA RESECTION      last assessed 10/4/17    [place] ESOPHAGOSCOPY FLEXIBLE TRANSORAL WITH BIOPSY N/A 4/11/2018    Procedure: ESOPHAGOGASTRODUODENOSCOPY (EGD);   Surgeon: Minerva Gresham MD;  Location: [facility];  Service: Thoracic    [place] LAP, REPAIR PARAESOPHAGEAL HERNIA, INCL FUNDOPLASTY W/ MESH N/A 4/11/2018    Procedure: REPAIR HERNIA PARAESOPHAGEAL  LAPAROSCOPIC,ELEONORA GASTROPLASTY, TUEPET FUNDOPLICATION;  Surgeon: Minerva Gresham MD;  Location: [facility];  Service: Thoracic    SKIN BIOPSY Right     lump benign - last assessed 10/4/17    THYROIDECTOMY, PARTIAL      sub - total - last assessed 10/4/17    TONSILLECTOMY        Family History:     Family History   Problem Relation Age of Onset    Alzheimer's disease Mother     Cancer Mother         skin     Thyroid disease Mother     Ulcerative colitis Mother     Cancer Father         prostate    Stroke Father     Hypertension Father     Thyroid disease Sister     Ulcerative colitis Sister     Other Sister         open heart surgery    Hyperlipidemia Brother     No Known Problems Son     No Known Problems Son       Social History:     Social History     Tobacco Use   Smoking Status Never Smoker   Smokeless Tobacco Never Used     Social History     Substance and Sexual Activity   Alcohol Use No     Social History     Substance and Sexual Activity   Drug Use No      Medications and Allergies:     Current Outpatient Medications   Medication Sig Dispense Refill    atorvastatin (LIPITOR) 20 mg tablet Take 1 tablet (20 mg total) by mouth daily at bedtime 90 tablet 1    Calcium Carb-Cholecalciferol 600-1000 MG-UNIT CAPS Take 1 capsule by mouth daily        levothyroxine 50 mcg tablet Take 1 tablet (50 mcg total) by mouth daily 90 tablet 1    losartan (COZAAR) 25 mg tablet Take 1 tablet (25 mg total) by mouth daily 90 tablet 1    potassium chloride (K-DUR) 10 mEq tablet Take 1 tablet (10 mEq total) by mouth daily 90 tablet 1    bumetanide (BUMEX) 1 mg tablet Take 1 tablet (1 mg total) by mouth daily 90 tablet 1    mometasone (ELOCON) 0 1 % cream Apply topically daily 45 g 0     No current facility-administered medications for this visit  Allergies   Allergen Reactions    Pantoprazole Headache    Penicillins Other (See Comments)     During allergy testing instructed to NEVER take PCN      Immunizations:     Immunization History   Administered Date(s) Administered    INFLUENZA 10/19/2010, 09/30/2011, 10/19/2012, 11/05/2013, 10/01/2014    Influenza Split High Dose Preservative Free IM 11/02/2015, 10/27/2016, 10/04/2017    Influenza, high dose seasonal 0 5 mL 09/26/2018    Pneumococcal Conjugate 13-Valent 08/10/2016    Pneumococcal Polysaccharide PPV23 10/04/2017    Tdap 07/30/2015      Medicare Screening Tests and Risk Assessments:     Orlando Reilly is here for her Subsequent Wellness visit  Last Medicare Wellness visit information reviewed, patient interviewed, no change since last AWV  Health Risk Assessment:  Patient rates overall health as good  Patient feels that their physical health rating is Slightly better  Eyesight was rated as Same  Hearing was rated as Same  Patient feels that their emotional and mental health rating is Same  Pain experienced by patient in the last 7 days has been Some  Patient's pain rating has been 2/10  Patient states that she has experienced no weight loss or gain in last 6 months  (Additional comments: Hip pain-lj)    Emotional/Mental Health:  Patient has been feeling nervous/anxious      PHQ-9 Depression Screening:    Frequency of the following problems over the past two weeks:      1  Little interest or pleasure in doing things: 0 - not at all      2  Feeling down, depressed, or hopeless: 0 - not at all  PHQ-2 Score: 0          Broken Bones/Falls: Fall Risk Assessment:    In the past year, patient has experienced: No history of falling in past year          Bladder/Bowel:  Patient has not leaked urine accidently in the last six months  Patient reports no loss of bowel control  Immunizations:  Patient has had a flu vaccination within the last year  Patient has received a pneumonia shot  Patient has not received a shingles shot  Patient has received tetanus/diphtheria shot  Home Safety:  Patient does not have trouble with stairs inside or outside of their home  Patient currently reports that there are no safety hazards present in home, working smoke alarms, no working carbon monoxide detectors  Preventative Screenings:   Breast cancer screening performed, colon cancer screen completed, cholesterol screen completed, glaucoma eye exam completed, (Additional Comments: Labs prior to her surgery last year-augustine)    Nutrition:  Current diet: No Added Salt with servings of the following:  (Additional Comments: gluten free--augustine)    Medications:  Patient is not currently taking any over-the-counter supplements  Patient is able to manage medications  Lifestyle Choices:  Patient reports no tobacco use  Patient has not smoked or used tobacco in the past   Patient reports alcohol use  Alcohol use per week: social  Patient drives a vehicle  Patient wears seat belt      Current level of exercise of physical activity described by patient as: low to medium--   (Additional Comments: Sews & knits a lot--augustine)    Activities of Daily Living:  Can get out of bed by his or her self, able to dress self, able to make own meals, able to do own shopping, able to bathe self, can do own laundry/housekeeping, can manage own money, pay bills and track expenses    Previous Hospitalizations:  No hospitalization or ED visit in past 12 months        Advanced Directives:  Patient has decided on a power of   Patient has spoken to designated power of   Patient has completed advanced directive  Preventative Screening/Counseling:      Cardiovascular:      General: Risks and Benefits Discussed and Screening Current      Counseling: Healthy Diet     Due for Labs/Analytes/Optional EKG: Lipid Panel          Diabetes:      General: Risks and Benefits Discussed and Screening Current      Counseling: Healthy Diet      Due for labs: Blood Glucose          Colorectal Cancer:      General: Risks and Benefits Discussed and Screening Current      Counseling: high fiber diet      Comments: Pt will be seeing DR Ese Valle        Breast Cancer:      General: Risks and Benefits Discussed          Cervical Cancer:      General: Risks and Benefits Discussed      Comments: Has not been seeing OBGYN does not remember her last one  Used to see Dr Fletcher Patient  Will set up with OBGYN but think given past hx of hysterectomy and being 78 she would be done with screening        Osteoporosis:      General: Risks and Benefits Discussed      Counseling: Calcium and Vitamin D Intake and Regular Weightbearing Exercise      Due for studies: Bone Density Ultrasound          AAA:      General: Screening Not Indicated          Glaucoma:      General: Risks and Benefits Discussed and Screening Current      Comments: Sees dr Naty Altman yearly        HIV:      General: Screening Not Indicated          Hepatitis C:      General: Risks and Benefits Discussed      Counseling: has received general HCV counseling        Advanced Directives:   Patient has living will for healthcare, has durable POA for healthcare, patient has an advanced directive  Information on ACP and/or AD provided  No 5 wishes given  End of life assessment reviewed with patient  Provider agrees with end of life decisions      Additional Comments:  is POA      BMI Counseling: Body mass index is 35 28 kg/m²  Discussed the patient's BMI with her  The BMI is above average  BMI counseling and education was provided to the patient  Nutrition recommendations include reducing portion sizes

## 2019-08-02 ENCOUNTER — APPOINTMENT (OUTPATIENT)
Dept: LAB | Facility: HOSPITAL | Age: 69
End: 2019-08-02
Attending: FAMILY MEDICINE
Payer: MEDICARE

## 2019-08-02 ENCOUNTER — HOSPITAL ENCOUNTER (OUTPATIENT)
Dept: RADIOLOGY | Facility: HOSPITAL | Age: 69
Discharge: HOME | End: 2019-08-02
Attending: FAMILY MEDICINE
Payer: MEDICARE

## 2019-08-02 ENCOUNTER — TRANSCRIBE ORDERS (OUTPATIENT)
Dept: ADMINISTRATIVE | Facility: HOSPITAL | Age: 69
End: 2019-08-02

## 2019-08-02 DIAGNOSIS — Z11.59 NEED FOR HEPATITIS C SCREENING TEST: ICD-10-CM

## 2019-08-02 DIAGNOSIS — E03.0 CONGENITAL HYPOTHYROIDISM WITH DIFFUSE GOITER: ICD-10-CM

## 2019-08-02 DIAGNOSIS — M25.551 HIP PAIN, ACUTE, RIGHT: ICD-10-CM

## 2019-08-02 DIAGNOSIS — E78.2 MIXED HYPERLIPIDEMIA: ICD-10-CM

## 2019-08-02 LAB
ALBUMIN SERPL BCP-MCNC: 3.6 G/DL (ref 3.5–5)
ALP SERPL-CCNC: 60 U/L (ref 46–116)
ALT SERPL W P-5'-P-CCNC: 28 U/L (ref 12–78)
ANION GAP SERPL CALCULATED.3IONS-SCNC: 10 MMOL/L (ref 4–13)
AST SERPL W P-5'-P-CCNC: 22 U/L (ref 5–45)
BILIRUB SERPL-MCNC: 0.8 MG/DL (ref 0.2–1)
BUN SERPL-MCNC: 12 MG/DL (ref 5–25)
CALCIUM SERPL-MCNC: 8.7 MG/DL (ref 8.3–10.1)
CHLORIDE SERPL-SCNC: 103 MMOL/L (ref 100–108)
CHOLEST SERPL-MCNC: 140 MG/DL (ref 50–200)
CO2 SERPL-SCNC: 28 MMOL/L (ref 21–32)
CREAT SERPL-MCNC: 0.78 MG/DL (ref 0.6–1.3)
GFR SERPL CREATININE-BSD FRML MDRD: 77 ML/MIN/1.73SQ M
GLUCOSE P FAST SERPL-MCNC: 99 MG/DL (ref 65–99)
HCV AB SER QL: NORMAL
HDLC SERPL-MCNC: 64 MG/DL (ref 40–60)
LDLC SERPL CALC-MCNC: 60 MG/DL (ref 0–100)
POTASSIUM SERPL-SCNC: 3.7 MMOL/L (ref 3.5–5.3)
PROT SERPL-MCNC: 7 G/DL (ref 6.4–8.2)
SODIUM SERPL-SCNC: 141 MMOL/L (ref 136–145)
TRIGL SERPL-MCNC: 81 MG/DL
TSH SERPL DL<=0.05 MIU/L-ACNC: 0.84 UIU/ML (ref 0.36–3.74)

## 2019-08-02 PROCEDURE — 84443 ASSAY THYROID STIM HORMONE: CPT

## 2019-08-02 PROCEDURE — 73502 X-RAY EXAM HIP UNI 2-3 VIEWS: CPT

## 2019-08-02 PROCEDURE — 36415 COLL VENOUS BLD VENIPUNCTURE: CPT

## 2019-08-02 PROCEDURE — 86803 HEPATITIS C AB TEST: CPT

## 2019-08-02 PROCEDURE — 80053 COMPREHEN METABOLIC PANEL: CPT

## 2019-08-02 PROCEDURE — 80061 LIPID PANEL: CPT

## 2019-08-13 ENCOUNTER — TELEPHONE (OUTPATIENT)
Dept: GASTROENTEROLOGY | Facility: MEDICAL CENTER | Age: 69
End: 2019-08-13

## 2019-08-13 NOTE — TELEPHONE ENCOUNTER
Dr Oneil Garcia patient    Patient called to schedule a repeat colonoscopy   Patient can be reached at [phone]

## 2019-08-28 NOTE — TELEPHONE ENCOUNTER
Spoke with pt, after reviewing pt's chart  She is not due for repeat colon until 4/2020  I asked pt was she having any symptoms now that is requiring her to have colon done sooner  She answered no   Pt was ok with calling back in Jan/Feb to get scheduled with Rakan in April for repeat colon

## 2019-10-17 ENCOUNTER — IMMUNIZATIONS (OUTPATIENT)
Dept: FAMILY MEDICINE CLINIC | Facility: CLINIC | Age: 69
End: 2019-10-17
Payer: MEDICARE

## 2019-10-17 DIAGNOSIS — Z23 FLU VACCINE NEED: Primary | ICD-10-CM

## 2019-10-17 PROCEDURE — 90662 IIV NO PRSV INCREASED AG IM: CPT

## 2019-10-17 PROCEDURE — G0008 ADMIN INFLUENZA VIRUS VAC: HCPCS

## 2019-10-28 ENCOUNTER — HOSPITAL ENCOUNTER (OUTPATIENT)
Dept: RADIOLOGY | Facility: HOSPITAL | Age: 69
Discharge: HOME | End: 2019-10-28
Attending: FAMILY MEDICINE
Payer: MEDICARE

## 2019-10-28 VITALS — WEIGHT: 200 LBS | BODY MASS INDEX: 32.14 KG/M2 | HEIGHT: 66 IN

## 2019-10-28 DIAGNOSIS — Z12.39 SCREENING FOR BREAST CANCER: ICD-10-CM

## 2019-10-28 DIAGNOSIS — Z13.820 SCREENING FOR OSTEOPOROSIS: ICD-10-CM

## 2019-10-28 PROCEDURE — 77080 DXA BONE DENSITY AXIAL: CPT

## 2019-10-28 PROCEDURE — 77063 BREAST TOMOSYNTHESIS BI: CPT

## 2019-10-28 PROCEDURE — 77067 SCR MAMMO BI INCL CAD: CPT

## 2019-10-29 ENCOUNTER — TELEPHONE (OUTPATIENT)
Dept: RADIOLOGY | Facility: HOSPITAL | Age: 69
End: 2019-10-29

## 2019-10-29 ENCOUNTER — TELEPHONE (OUTPATIENT)
Dept: FAMILY MEDICINE CLINIC | Facility: CLINIC | Age: 69
End: 2019-10-29

## 2019-11-06 ENCOUNTER — HOSPITAL ENCOUNTER (OUTPATIENT)
Dept: RADIOLOGY | Facility: HOSPITAL | Age: 69
Discharge: HOME | End: 2019-11-06
Attending: FAMILY MEDICINE
Payer: MEDICARE

## 2019-11-06 ENCOUNTER — TRANSCRIBE ORDERS (OUTPATIENT)
Dept: ADMINISTRATIVE | Facility: HOSPITAL | Age: 69
End: 2019-11-06

## 2019-11-06 DIAGNOSIS — R92.8 ABNORMAL MAMMOGRAM: ICD-10-CM

## 2019-11-06 PROCEDURE — G0279 TOMOSYNTHESIS, MAMMO: HCPCS

## 2019-11-06 PROCEDURE — 76642 ULTRASOUND BREAST LIMITED: CPT

## 2019-11-06 PROCEDURE — 77065 DX MAMMO INCL CAD UNI: CPT

## 2019-11-27 DIAGNOSIS — E03.0 CONGENITAL HYPOTHYROIDISM WITH DIFFUSE GOITER: ICD-10-CM

## 2019-11-27 DIAGNOSIS — I10 BENIGN HYPERTENSION: ICD-10-CM

## 2019-11-27 DIAGNOSIS — E78.2 MIXED HYPERLIPIDEMIA: ICD-10-CM

## 2019-11-29 RX ORDER — ATORVASTATIN CALCIUM 20 MG/1
TABLET, FILM COATED ORAL
Qty: 90 TABLET | Refills: 0 | Status: SHIPPED | OUTPATIENT
Start: 2019-11-29 | End: 2020-02-21

## 2019-11-29 RX ORDER — BUMETANIDE 1 MG/1
TABLET ORAL
Qty: 90 TABLET | Refills: 0 | Status: SHIPPED | OUTPATIENT
Start: 2019-11-29 | End: 2020-06-04 | Stop reason: SDUPTHER

## 2019-11-29 RX ORDER — LEVOTHYROXINE SODIUM 0.05 MG/1
TABLET ORAL
Qty: 90 TABLET | Refills: 0 | Status: SHIPPED | OUTPATIENT
Start: 2019-11-29 | End: 2020-02-21

## 2019-11-29 RX ORDER — LOSARTAN POTASSIUM 25 MG/1
TABLET ORAL
Qty: 90 TABLET | Refills: 0 | Status: SHIPPED | OUTPATIENT
Start: 2019-11-29 | End: 2020-02-21

## 2019-12-14 DIAGNOSIS — I10 ESSENTIAL HYPERTENSION: ICD-10-CM

## 2019-12-16 RX ORDER — POTASSIUM CHLORIDE 750 MG/1
TABLET, FILM COATED, EXTENDED RELEASE ORAL
Qty: 90 TABLET | Refills: 1 | Status: SHIPPED | OUTPATIENT
Start: 2019-12-16 | End: 2020-06-02

## 2020-01-27 ENCOUNTER — OFFICE VISIT (OUTPATIENT)
Dept: FAMILY MEDICINE CLINIC | Facility: CLINIC | Age: 70
End: 2020-01-27
Payer: MEDICARE

## 2020-01-27 VITALS
BODY MASS INDEX: 35.03 KG/M2 | TEMPERATURE: 97.4 F | DIASTOLIC BLOOD PRESSURE: 70 MMHG | HEIGHT: 66 IN | OXYGEN SATURATION: 97 % | HEART RATE: 86 BPM | RESPIRATION RATE: 16 BRPM | SYSTOLIC BLOOD PRESSURE: 110 MMHG | WEIGHT: 218 LBS

## 2020-01-27 DIAGNOSIS — I77.89 CORONARY ARTERY ECTASIA (HCC): ICD-10-CM

## 2020-01-27 DIAGNOSIS — E78.2 MIXED HYPERLIPIDEMIA: ICD-10-CM

## 2020-01-27 DIAGNOSIS — I25.10 CORONARY ARTERY DISEASE INVOLVING NATIVE CORONARY ARTERY OF NATIVE HEART WITHOUT ANGINA PECTORIS: ICD-10-CM

## 2020-01-27 DIAGNOSIS — Z12.11 SCREEN FOR COLON CANCER: ICD-10-CM

## 2020-01-27 DIAGNOSIS — D51.0 ANEMIA, PERNICIOUS: ICD-10-CM

## 2020-01-27 DIAGNOSIS — K21.9 HIATAL HERNIA WITH GERD WITHOUT ESOPHAGITIS: ICD-10-CM

## 2020-01-27 DIAGNOSIS — E87.6 HYPOKALEMIA: ICD-10-CM

## 2020-01-27 DIAGNOSIS — Q67.0 FACIAL ASYMMETRY: ICD-10-CM

## 2020-01-27 DIAGNOSIS — E03.0 CONGENITAL HYPOTHYROIDISM WITH DIFFUSE GOITER: ICD-10-CM

## 2020-01-27 DIAGNOSIS — K44.9 HIATAL HERNIA WITH GERD WITHOUT ESOPHAGITIS: ICD-10-CM

## 2020-01-27 DIAGNOSIS — E66.01 CLASS 2 SEVERE OBESITY DUE TO EXCESS CALORIES WITH SERIOUS COMORBIDITY AND BODY MASS INDEX (BMI) OF 35.0 TO 35.9 IN ADULT (HCC): ICD-10-CM

## 2020-01-27 DIAGNOSIS — E66.01 SEVERE OBESITY (BMI 35.0-39.9) WITH COMORBIDITY (HCC): ICD-10-CM

## 2020-01-27 DIAGNOSIS — I10 BENIGN HYPERTENSION: Primary | ICD-10-CM

## 2020-01-27 PROBLEM — E66.812 CLASS 2 SEVERE OBESITY DUE TO EXCESS CALORIES WITH SERIOUS COMORBIDITY AND BODY MASS INDEX (BMI) OF 35.0 TO 35.9 IN ADULT (HCC): Status: ACTIVE | Noted: 2020-01-27

## 2020-01-27 PROCEDURE — 99214 OFFICE O/P EST MOD 30 MIN: CPT | Performed by: FAMILY MEDICINE

## 2020-01-27 NOTE — PATIENT INSTRUCTIONS
Obesity   AMBULATORY CARE:   Obesity  is when your body mass index (BMI) is greater than 30  Your healthcare provider will use your height and weight to measure your BMI  The risks of obesity include  many health problems, such as injuries or physical disability  You may need tests to check for the following:  · Diabetes     · High blood pressure or high cholesterol     · Heart disease     · Gallbladder or liver disease     · Cancer of the colon, breast, prostate, liver, or kidney     · Sleep apnea     · Arthritis or gout  Seek care immediately if:   · You have a severe headache, confusion, or difficulty speaking  · You have weakness on one side of your body  · You have chest pain, sweating, or shortness of breath  Contact your healthcare provider if:   · You have symptoms of gallbladder or liver disease, such as pain in your upper abdomen  · You have knee or hip pain and discomfort while walking  · You have symptoms of diabetes, such as intense hunger and thirst, and frequent urination  · You have symptoms of sleep apnea, such as snoring or daytime sleepiness  · You have questions or concerns about your condition or care  Treatment for obesity  focuses on helping you lose weight to improve your health  Even a small decrease in BMI can reduce the risk for many health problems  Your healthcare provider will help you set a weight-loss goal   · Lifestyle changes  are the first step in treating obesity  These include making healthy food choices and getting regular physical activity  Your healthcare provider may suggest a weight-loss program that involves coaching, education, and therapy  · Medicine  may help you lose weight when it is used with a healthy diet and physical activity  · Surgery  can help you lose weight if you are very obese and have other health problems  There are several types of weight-loss surgery  Ask your healthcare provider for more information    Be successful losing weight:   · Set small, realistic goals  An example of a small goal is to walk for 20 minutes 5 days a week  Anther goal is to lose 5% of your body weight  · Tell friends, family members, and coworkers about your goals  and ask for their support  Ask a friend to lose weight with you, or join a weight-loss support group  · Identify foods or triggers that may cause you to overeat , and find ways to avoid them  Remove tempting high-calorie foods from your home and workplace  Place a bowl of fresh fruit on your kitchen counter  If stress causes you to eat, then find other ways to cope with stress  · Keep a diary to track what you eat and drink  Also write down how many minutes of physical activity you do each day  Weigh yourself once a week and record it in your diary  Eating changes: You will need to eat 500 to 1,000 fewer calories each day than you currently eat to lose 1 to 2 pounds a week  The following changes will help you cut calories:  · Eat smaller portions  Use small plates, no larger than 9 inches in diameter  Fill your plate half full of fruits and vegetables  Measure your food using measuring cups until you know what a serving size looks like  · Eat 3 meals and 1 or 2 snacks each day  Plan your meals in advance  João Ye and eat at home most of the time  Eat slowly  · Eat fruits and vegetables at every meal   They are low in calories and high in fiber, which makes you feel full  Do not add butter, margarine, or cream sauce to vegetables  Use herbs to season steamed vegetables  · Eat less fat and fewer fried foods  Eat more baked or grilled chicken and fish  These protein sources are lower in calories and fat than red meat  Limit fast food  Dress your salads with olive oil and vinegar instead of bottled dressing  · Limit the amount of sugar you eat  Do not drink sugary beverages  Limit alcohol  Activity changes:  Physical activity is good for your body in many ways   It helps you burn calories and build strong muscles  It decreases stress and depression, and improves your mood  It can also help you sleep better  Talk to your healthcare provider before you begin an exercise program   · Exercise for at least 30 minutes 5 days a week  Start slowly  Set aside time each day for physical activity that you enjoy and that is convenient for you  It is best to do both weight training and an activity that increases your heart rate, such as walking, bicycling, or swimming  · Find ways to be more active  Do yard work and housecleaning  Walk up the stairs instead of using elevators  Spend your leisure time going to events that require walking, such as outdoor festivals or fairs  This extra physical activity can help you lose weight and keep it off  Follow up with your healthcare provider as directed: You may need to meet with a dietitian  Write down your questions so you remember to ask them during your visits  © 2017 [employer] Information is for End User's use only and may not be sold, redistributed or otherwise used for commercial purposes  All illustrations and images included in CareNotes® are the copyrighted property of A D A M [employer] Inc  or Luis Hernandez  The above information is an  only  It is not intended as medical advice for individual conditions or treatments  Talk to your doctor, nurse or pharmacist before following any medical regimen to see if it is safe and effective for you

## 2020-01-27 NOTE — PROGRESS NOTES
Assessment/Plan:    1  Benign hypertension  Assessment & Plan:  BP is stable    Orders:  -     CT head wo contrast; Future; Expected date: 01/27/2020    2  Coronary artery disease involving native coronary artery of native heart without angina pectoris  -     CT head wo contrast; Future; Expected date: 01/27/2020    3  Congenital hypothyroidism with diffuse goiter  -     TSH, 3rd generation; Future    4  Screen for colon cancer  -     Ambulatory referral to Gastroenterology; Future    5  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HCC)    6  Mixed hyperlipidemia  -     CT head wo contrast; Future; Expected date: 01/27/2020    7  Class 2 severe obesity due to excess calories with serious comorbidity and body mass index (BMI) of 35 0 to 35 9 in adult [facility])  -     Lipid Panel with Direct LDL reflex; Future    8  BMI 35 0-35 9,adult    9  Severe obesity (BMI 35 0-39  9) with comorbidity ([facility] [address] )    10  Hiatal hernia with GERD without esophagitis    11  Hypokalemia  -     Comprehensive metabolic panel; Future    12  Anemia, pernicious  -     CBC; Future    13  Facial asymmetry  -     CT head wo contrast; Future; Expected date: 01/27/2020          Patient Instructions     Obesity   AMBULATORY CARE:   Obesity  is when your body mass index (BMI) is greater than 30  Your healthcare provider will use your height and weight to measure your BMI  The risks of obesity include  many health problems, such as injuries or physical disability  You may need tests to check for the following:  · Diabetes     · High blood pressure or high cholesterol     · Heart disease     · Gallbladder or liver disease     · Cancer of the colon, breast, prostate, liver, or kidney     · Sleep apnea     · Arthritis or gout  Seek care immediately if:   · You have a severe headache, confusion, or difficulty speaking  · You have weakness on one side of your body  · You have chest pain, sweating, or shortness of breath    Contact your healthcare provider if:   · You have symptoms of gallbladder or liver disease, such as pain in your upper abdomen  · You have knee or hip pain and discomfort while walking  · You have symptoms of diabetes, such as intense hunger and thirst, and frequent urination  · You have symptoms of sleep apnea, such as snoring or daytime sleepiness  · You have questions or concerns about your condition or care  Treatment for obesity  focuses on helping you lose weight to improve your health  Even a small decrease in BMI can reduce the risk for many health problems  Your healthcare provider will help you set a weight-loss goal   · Lifestyle changes  are the first step in treating obesity  These include making healthy food choices and getting regular physical activity  Your healthcare provider may suggest a weight-loss program that involves coaching, education, and therapy  · Medicine  may help you lose weight when it is used with a healthy diet and physical activity  · Surgery  can help you lose weight if you are very obese and have other health problems  There are several types of weight-loss surgery  Ask your healthcare provider for more information  Be successful losing weight:   · Set small, realistic goals  An example of a small goal is to walk for 20 minutes 5 days a week  Anther goal is to lose 5% of your body weight  · Tell friends, family members, and coworkers about your goals  and ask for their support  Ask a friend to lose weight with you, or join a weight-loss support group  · Identify foods or triggers that may cause you to overeat , and find ways to avoid them  Remove tempting high-calorie foods from your home and workplace  Place a bowl of fresh fruit on your kitchen counter  If stress causes you to eat, then find other ways to cope with stress  · Keep a diary to track what you eat and drink  Also write down how many minutes of physical activity you do each day   Weigh yourself once a week and record it in your diary  Eating changes: You will need to eat 500 to 1,000 fewer calories each day than you currently eat to lose 1 to 2 pounds a week  The following changes will help you cut calories:  · Eat smaller portions  Use small plates, no larger than 9 inches in diameter  Fill your plate half full of fruits and vegetables  Measure your food using measuring cups until you know what a serving size looks like  · Eat 3 meals and 1 or 2 snacks each day  Plan your meals in advance  Gilda Regalado and eat at home most of the time  Eat slowly  · Eat fruits and vegetables at every meal   They are low in calories and high in fiber, which makes you feel full  Do not add butter, margarine, or cream sauce to vegetables  Use herbs to season steamed vegetables  · Eat less fat and fewer fried foods  Eat more baked or grilled chicken and fish  These protein sources are lower in calories and fat than red meat  Limit fast food  Dress your salads with olive oil and vinegar instead of bottled dressing  · Limit the amount of sugar you eat  Do not drink sugary beverages  Limit alcohol  Activity changes:  Physical activity is good for your body in many ways  It helps you burn calories and build strong muscles  It decreases stress and depression, and improves your mood  It can also help you sleep better  Talk to your healthcare provider before you begin an exercise program   · Exercise for at least 30 minutes 5 days a week  Start slowly  Set aside time each day for physical activity that you enjoy and that is convenient for you  It is best to do both weight training and an activity that increases your heart rate, such as walking, bicycling, or swimming  · Find ways to be more active  Do yard work and housecleaning  Walk up the stairs instead of using elevators  Spend your leisure time going to events that require walking, such as outdoor festivals or fairs   This extra physical activity can help you lose weight and keep it off   Follow up with your healthcare provider as directed: You may need to meet with a dietitian  Write down your questions so you remember to ask them during your visits  © 2017 [employer] Information is for End User's use only and may not be sold, redistributed or otherwise used for commercial purposes  All illustrations and images included in CareNotes® are the copyrighted property of A [employer] M [employer] Inc  or Luis Hernandez  The above information is an  only  It is not intended as medical advice for individual conditions or treatments  Talk to your doctor, nurse or pharmacist before following any medical regimen to see if it is safe and effective for you  No follow-ups on file  Subjective:      Patient ID: Wilfredo Ventura is a 78 y o  female  Chief Complaint   Patient presents with    Follow-up     Boogie Mckenzie        Pt is here for a 6 month follow up  No labs today  No CP, no SOB    Pt states she is getting over a cold    Pt states she was seeing DR Rosie Olivia - before her stomach problems  All of the chest pain and discomfort was from the hietal hernia and not the heart as per pt  Pt states she started seeing a new Heart doc,  Dr Alice Calvin in [place]  Pt states she went to make an appt for her colon she was advised she had to wait for April    Pt states she will be seeing a different Derm in [facility]    Pt states Friday night whne she was getting ready for bed she noticed the rt side of her face hangs more than the rt  States it has always been that way noticed her eye lid on the rt side closed more than normal         The following portions of the patient's history were reviewed and updated as appropriate: allergies, current medications, past family history, past medical history, past social history, past surgical history and problem list     Review of Systems   Constitutional: Negative    Negative for activity change, appetite change, chills, diaphoresis and fatigue  HENT: Negative  Negative for dental problem, ear pain, sinus pressure and sore throat  Eyes: Negative  Negative for photophobia, pain, discharge, redness, itching and visual disturbance  Respiratory: Negative for apnea and chest tightness  Cardiovascular: Negative  Negative for chest pain, palpitations and leg swelling  Gastrointestinal: Negative  Negative for abdominal distention, abdominal pain, constipation and diarrhea  Endocrine: Negative  Negative for cold intolerance and heat intolerance  Genitourinary: Negative  Negative for difficulty urinating and dyspareunia  Musculoskeletal: Negative  Negative for arthralgias and back pain  Skin: Negative  Allergic/Immunologic: Negative for environmental allergies  Neurological: Negative  Negative for dizziness  Psychiatric/Behavioral: Negative  Negative for agitation  Current Outpatient Medications   Medication Sig Dispense Refill    atorvastatin (LIPITOR) 20 mg tablet TAKE 1 TABLET BY MOUTH  DAILY AT BEDTIME 90 tablet 0    bumetanide (BUMEX) 1 mg tablet TAKE 1 TABLET BY MOUTH  DAILY 90 tablet 0    Calcium Carb-Cholecalciferol 600-1000 MG-UNIT CAPS Take 1 capsule by mouth daily        levothyroxine 50 mcg tablet TAKE 1 TABLET BY MOUTH  DAILY 90 tablet 0    losartan (COZAAR) 25 mg tablet TAKE 1 TABLET BY MOUTH  DAILY 90 tablet 0    potassium chloride (K-DUR) 10 mEq tablet TAKE 1 TABLET BY MOUTH  DAILY 90 tablet 1    mometasone (ELOCON) 0 1 % cream Apply topically daily (Patient not taking: Reported on 1/27/2020) 45 g 0     No current facility-administered medications for this visit  Objective:    /70   Pulse 86   Temp (!) 97 4 °F (36 3 °C)   Resp 16   Ht 5' 6" (1 676 m)   Wt 98 9 kg (218 lb)   SpO2 97%   BMI 35 19 kg/m²        Physical Exam   Constitutional: She appears well-developed and well-nourished  No distress  HENT:   Head: Normocephalic and atraumatic         Right Ear: External ear normal    Left Ear: External ear normal    Nose: Nose normal    Mouth/Throat: Oropharynx is clear and moist  No oropharyngeal exudate  Eyes: Pupils are equal, round, and reactive to light  EOM are normal  Right eye exhibits no discharge  Left eye exhibits no discharge  No scleral icterus  Neck: No thyromegaly present  Cardiovascular: Normal rate and normal heart sounds  No murmur heard  Pulmonary/Chest: Effort normal and breath sounds normal  No respiratory distress  She has no wheezes  Abdominal: Soft  Bowel sounds are normal  She exhibits no distension and no mass  There is no tenderness  There is no rebound and no guarding  Musculoskeletal: Normal range of motion  Neurological: She is alert  She displays normal reflexes  Coordination normal    Skin: Skin is warm and dry  No rash noted  She is not diaphoretic  No erythema  Psychiatric: She has a normal mood and affect  Her behavior is normal    Nursing note and vitals reviewed             Recent Results (from the past 6048 hour(s))   Hepatitis C antibody    Collection Time: 08/02/19  9:41 AM   Result Value Ref Range    Hepatitis C Ab Non-reactive Non-reactive   Comprehensive metabolic panel    Collection Time: 08/02/19  9:41 AM   Result Value Ref Range    Sodium 141 136 - 145 mmol/L    Potassium 3 7 3 5 - 5 3 mmol/L    Chloride 103 100 - 108 mmol/L    CO2 28 21 - 32 mmol/L    ANION GAP 10 4 - 13 mmol/L    BUN 12 5 - 25 mg/dL    Creatinine 0 78 0 60 - 1 30 mg/dL    Glucose, Fasting 99 65 - 99 mg/dL    Calcium 8 7 8 3 - 10 1 mg/dL    AST 22 5 - 45 U/L    ALT 28 12 - 78 U/L    Alkaline Phosphatase 60 46 - 116 U/L    Total Protein 7 0 6 4 - 8 2 g/dL    Albumin 3 6 3 5 - 5 0 g/dL    Total Bilirubin 0 80 0 20 - 1 00 mg/dL    eGFR 77 ml/min/1 73sq m   Lipid Panel with Direct LDL reflex    Collection Time: 08/02/19  9:41 AM   Result Value Ref Range    Cholesterol 140 50 - 200 mg/dL    Triglycerides 81 <=150 mg/dL    HDL, Direct 64 (H) 40 - 60 mg/dL    LDL Calculated 60 0 - 100 mg/dL   TSH, 3rd generation    Collection Time: 08/02/19  9:41 AM   Result Value Ref Range    TSH 3RD GENERATON 0 844 0 358 - 3 740 uIU/mL         Sherice Marie DO  BMI Counseling: Body mass index is 35 19 kg/m²  The BMI is above normal  Nutrition recommendations include reducing portion sizes

## 2020-02-11 ENCOUNTER — HOSPITAL ENCOUNTER (OUTPATIENT)
Dept: RADIOLOGY | Facility: HOSPITAL | Age: 70
Discharge: HOME | End: 2020-02-11
Attending: FAMILY MEDICINE
Payer: MEDICARE

## 2020-02-11 DIAGNOSIS — I10 BENIGN HYPERTENSION: ICD-10-CM

## 2020-02-11 DIAGNOSIS — E78.2 MIXED HYPERLIPIDEMIA: ICD-10-CM

## 2020-02-11 DIAGNOSIS — I25.10 CORONARY ARTERY DISEASE INVOLVING NATIVE CORONARY ARTERY OF NATIVE HEART WITHOUT ANGINA PECTORIS: ICD-10-CM

## 2020-02-11 DIAGNOSIS — Q67.0 FACIAL ASYMMETRY: ICD-10-CM

## 2020-02-11 PROCEDURE — 70450 CT HEAD/BRAIN W/O DYE: CPT

## 2020-02-14 ENCOUNTER — APPOINTMENT (OUTPATIENT)
Dept: LAB | Facility: HOSPITAL | Age: 70
End: 2020-02-14
Attending: FAMILY MEDICINE
Payer: MEDICARE

## 2020-02-14 ENCOUNTER — TRANSCRIBE ORDERS (OUTPATIENT)
Dept: ADMINISTRATIVE | Facility: HOSPITAL | Age: 70
End: 2020-02-14

## 2020-02-14 DIAGNOSIS — D51.0 ANEMIA, PERNICIOUS: ICD-10-CM

## 2020-02-14 DIAGNOSIS — E87.6 HYPOKALEMIA: ICD-10-CM

## 2020-02-14 DIAGNOSIS — E03.0 CONGENITAL HYPOTHYROIDISM WITH DIFFUSE GOITER: ICD-10-CM

## 2020-02-14 DIAGNOSIS — E66.01 CLASS 2 SEVERE OBESITY DUE TO EXCESS CALORIES WITH SERIOUS COMORBIDITY AND BODY MASS INDEX (BMI) OF 35.0 TO 35.9 IN ADULT (HCC): ICD-10-CM

## 2020-02-14 LAB
ALBUMIN SERPL BCP-MCNC: 3.6 G/DL (ref 3.5–5)
ALP SERPL-CCNC: 59 U/L (ref 46–116)
ALT SERPL W P-5'-P-CCNC: 26 U/L (ref 12–78)
ANION GAP SERPL CALCULATED.3IONS-SCNC: 8 MMOL/L (ref 4–13)
AST SERPL W P-5'-P-CCNC: 23 U/L (ref 5–45)
BILIRUB SERPL-MCNC: 0.7 MG/DL (ref 0.2–1)
BUN SERPL-MCNC: 11 MG/DL (ref 5–25)
CALCIUM SERPL-MCNC: 8.5 MG/DL (ref 8.3–10.1)
CHLORIDE SERPL-SCNC: 107 MMOL/L (ref 100–108)
CHOLEST SERPL-MCNC: 142 MG/DL (ref 50–200)
CO2 SERPL-SCNC: 29 MMOL/L (ref 21–32)
CREAT SERPL-MCNC: 0.72 MG/DL (ref 0.6–1.3)
ERYTHROCYTE [DISTWIDTH] IN BLOOD BY AUTOMATED COUNT: 12.7 % (ref 11.6–15.1)
GFR SERPL CREATININE-BSD FRML MDRD: 85 ML/MIN/1.73SQ M
GLUCOSE P FAST SERPL-MCNC: 102 MG/DL (ref 65–99)
HCT VFR BLD AUTO: 42.5 % (ref 34.8–46.1)
HDLC SERPL-MCNC: 60 MG/DL
HGB BLD-MCNC: 14 G/DL (ref 11.5–15.4)
LDLC SERPL CALC-MCNC: 66 MG/DL (ref 0–100)
MCH RBC QN AUTO: 30.4 PG (ref 26.8–34.3)
MCHC RBC AUTO-ENTMCNC: 32.9 G/DL (ref 31.4–37.4)
MCV RBC AUTO: 92 FL (ref 82–98)
PLATELET # BLD AUTO: 193 THOUSANDS/UL (ref 149–390)
PMV BLD AUTO: 10.5 FL (ref 8.9–12.7)
POTASSIUM SERPL-SCNC: 3.7 MMOL/L (ref 3.5–5.3)
PROT SERPL-MCNC: 6.8 G/DL (ref 6.4–8.2)
RBC # BLD AUTO: 4.61 MILLION/UL (ref 3.81–5.12)
SODIUM SERPL-SCNC: 144 MMOL/L (ref 136–145)
TRIGL SERPL-MCNC: 79 MG/DL
TSH SERPL DL<=0.05 MIU/L-ACNC: 0.8 UIU/ML (ref 0.36–3.74)
WBC # BLD AUTO: 4.08 THOUSAND/UL (ref 4.31–10.16)

## 2020-02-14 PROCEDURE — 80061 LIPID PANEL: CPT

## 2020-02-14 PROCEDURE — 85027 COMPLETE CBC AUTOMATED: CPT

## 2020-02-14 PROCEDURE — 36415 COLL VENOUS BLD VENIPUNCTURE: CPT

## 2020-02-14 PROCEDURE — 80053 COMPREHEN METABOLIC PANEL: CPT

## 2020-02-14 PROCEDURE — 84443 ASSAY THYROID STIM HORMONE: CPT

## 2020-02-20 DIAGNOSIS — E03.0 CONGENITAL HYPOTHYROIDISM WITH DIFFUSE GOITER: ICD-10-CM

## 2020-02-20 DIAGNOSIS — I10 BENIGN HYPERTENSION: ICD-10-CM

## 2020-02-20 DIAGNOSIS — E78.2 MIXED HYPERLIPIDEMIA: ICD-10-CM

## 2020-02-21 RX ORDER — LEVOTHYROXINE SODIUM 0.05 MG/1
TABLET ORAL
Qty: 90 TABLET | Refills: 1 | Status: SHIPPED | OUTPATIENT
Start: 2020-02-21 | End: 2020-08-10

## 2020-02-21 RX ORDER — ATORVASTATIN CALCIUM 20 MG/1
TABLET, FILM COATED ORAL
Qty: 90 TABLET | Refills: 1 | Status: SHIPPED | OUTPATIENT
Start: 2020-02-21 | End: 2020-08-10

## 2020-02-21 RX ORDER — LOSARTAN POTASSIUM 25 MG/1
TABLET ORAL
Qty: 90 TABLET | Refills: 1 | Status: SHIPPED | OUTPATIENT
Start: 2020-02-21 | End: 2020-08-10

## 2020-03-13 ENCOUNTER — OFFICE VISIT (OUTPATIENT)
Dept: GASTROENTEROLOGY | Facility: CLINIC | Age: 70
End: 2020-03-13
Payer: MEDICARE

## 2020-03-13 VITALS
SYSTOLIC BLOOD PRESSURE: 118 MMHG | DIASTOLIC BLOOD PRESSURE: 72 MMHG | HEIGHT: 66 IN | HEART RATE: 77 BPM | TEMPERATURE: 99.1 F | BODY MASS INDEX: 35.19 KG/M2

## 2020-03-13 DIAGNOSIS — K59.09 OTHER CONSTIPATION: ICD-10-CM

## 2020-03-13 DIAGNOSIS — R13.19 ESOPHAGEAL DYSPHAGIA: ICD-10-CM

## 2020-03-13 DIAGNOSIS — K58.1 IRRITABLE BOWEL SYNDROME WITH CONSTIPATION: Primary | ICD-10-CM

## 2020-03-13 PROBLEM — R13.10 DYSPHAGIA: Status: ACTIVE | Noted: 2020-03-13

## 2020-03-13 PROBLEM — K59.00 CONSTIPATION: Status: ACTIVE | Noted: 2020-03-13

## 2020-03-13 PROCEDURE — 4040F PNEUMOC VAC/ADMIN/RCVD: CPT | Performed by: INTERNAL MEDICINE

## 2020-03-13 PROCEDURE — 3078F DIAST BP <80 MM HG: CPT | Performed by: INTERNAL MEDICINE

## 2020-03-13 PROCEDURE — 1160F RVW MEDS BY RX/DR IN RCRD: CPT | Performed by: INTERNAL MEDICINE

## 2020-03-13 PROCEDURE — 1036F TOBACCO NON-USER: CPT | Performed by: INTERNAL MEDICINE

## 2020-03-13 PROCEDURE — 3074F SYST BP LT 130 MM HG: CPT | Performed by: INTERNAL MEDICINE

## 2020-03-13 PROCEDURE — 99214 OFFICE O/P EST MOD 30 MIN: CPT | Performed by: INTERNAL MEDICINE

## 2020-03-13 NOTE — PROGRESS NOTES
[address] [facility]  Conrado Cristina 78 y o  female MRN: [id number]            Assessment & Plan:    Very pleasant 78-year-old female with history of large hiatal hernia with paraesophageal component, underwent hiatal hernia repair with Nissen fundoplication 2 years ago, now presenting with dysphagia  She also has a history of constipation which has worsened recently, last colonoscopy 5 years ago  1  Dysphagia:  Most likely secondary to Nissen fundoplication  -we will plan to proceed with an upper endoscopy, however I would like to start with a barium esophagram prior to her endoscopic evaluation to evaluate for any recurrence of her hiatal hernia  -patient has not been taking PPI therapy, she denies any acid reflux symptoms, the I discussed with her that this may be beneficial for motility  -we will schedule the upper endoscopy  -we will biopsy her duodenal for celiac sprue with the same time as upper endoscopy    2  Chronic constipation:  Unclear etiology, most likely functional  -given that her last colonoscopy was 5 years ago, recommend proceeding with colonoscopy at this time  -recommended a high-fiber diet, she is on a gluten free diet and I suggested that she may need to increase her fiber intake    I discussed with her the risks of the procedures including bleeding, surgery, perforation              _____________________________________________________________        CC:  Constipation and dysphagia    HPI:  Conrado Cristina is a 78 y  o female who is here for constipation and dysphagia  As you know this is a pleasant 78-year-old female, generally in good health, she has a history of large hiatal hernia with paraesophageal component, underwent hiatal hernia with Nissen fundoplication about 2 years ago  Since then she has had dysphagia which has become more persistent  Additionally patient reports significant constipation  She has difficulty moving her bowels over the last 1 year    She denies any melena or rectal bleeding  More recently she has has had some intermittent epigastric abdominal pain  She notes that she tried to modify her diet to help with her stools, occasionally she will have loose stool she notes that dairy does seem to help her when she has constipation  She does have a daily bowel movement  In the past we had her on Linzess for constipation, she notes that she stop taking about 1 year ago at  At that time she had significant urgency  She has been on a gluten free diet for many years, she generally tried a dairy free diet as well  Her weight has been stable  ROS:  The remainder of the ROS was negative except for the pertinent positives mentioned in HPI        Allergies: Pantoprazole and Penicillins    Medications:   Current Outpatient Medications:     atorvastatin (LIPITOR) 20 mg tablet, TAKE 1 TABLET BY MOUTH  DAILY AT BEDTIME, Disp: 90 tablet, Rfl: 1    bumetanide (BUMEX) 1 mg tablet, TAKE 1 TABLET BY MOUTH  DAILY, Disp: 90 tablet, Rfl: 0    Calcium Carb-Cholecalciferol 600-1000 MG-UNIT CAPS, Take 1 capsule by mouth daily  , Disp: , Rfl:     levothyroxine 50 mcg tablet, TAKE 1 TABLET BY MOUTH  DAILY, Disp: 90 tablet, Rfl: 1    losartan (COZAAR) 25 mg tablet, TAKE 1 TABLET BY MOUTH  DAILY, Disp: 90 tablet, Rfl: 1    potassium chloride (K-DUR) 10 mEq tablet, TAKE 1 TABLET BY MOUTH  DAILY, Disp: 90 tablet, Rfl: 1    mometasone (ELOCON) 0 1 % cream, Apply topically daily (Patient not taking: Reported on 1/27/2020), Disp: 45 g, Rfl: 0    Na Sulfate-K Sulfate-Mg Sulf (Suprep Bowel Prep Kit) 17 5-3 13-1 6 GM/177ML SOLN, Take 1 Bottle by mouth once for 1 dose, Disp: 1 Bottle, Rfl: 0    Past Medical History:   Diagnosis Date    Anemia     Hx secondary to bleeding ulcer    Anxiety     resolved 10/4/17    Asthma     seasonal    Chronic kidney disease     kidney stone    Disease of thyroid gland     GERD (gastroesophageal reflux disease)     Goiter, nodular     Hiatal hernia with GERD without esophagitis     History of transfusion     x1 after bleeding ulcer    Hyperlipidemia     Hypertension     Irritable bowel syndrome     RA (rheumatoid arthritis) ([facility] [address] )     Seasonal allergies     Stomach ulcer     Vertigo        Past Surgical History:   Procedure Laterality Date    BREAST BIOPSY Bilateral     negative    CARDIAC CATHETERIZATION      x2 secondary to exertional chest pain, due to lung issues, possible mild COPD    CHOLECYSTECTOMY  2015    lap - last assessed 10/4/17    COLONOSCOPY      complete    ESOPHAGOGASTRODUODENOSCOPY N/A 1/23/2018    Procedure: ESOPHAGOGASTRODUODENOSCOPY (EGD); Surgeon: Herlinda Bueno MD;  [facility]; Service: Gastroenterology    HYSTERECTOMY      partial - last assessed 10/4/17    LIPOMA RESECTION      last assessed 10/4/17    HI ESOPHAGOSCOPY FLEXIBLE TRANSORAL WITH BIOPSY N/A 4/11/2018    Procedure: ESOPHAGOGASTRODUODENOSCOPY (EGD);   Surgeon: Leandro Brock MD;  [facility];  Service: Thoracic    HI LAP, REPAIR PARAESOPHAGEAL HERNIA, INCL FUNDOPLASTY W/ MESH N/A 4/11/2018    Procedure: REPAIR HERNIA PARAESOPHAGEAL  LAPAROSCOPIC,ELEONORA GASTROPLASTY, [place] FUNDOPLICATION;  Surgeon: Leadnro Brock MD;  Location: [facility];  Service: Thoracic    SKIN BIOPSY Right     lump benign - last assessed 10/4/17    THYROIDECTOMY, PARTIAL      sub - total - last assessed 10/4/17    TONSILLECTOMY         Family History   Problem Relation Age of Onset    Alzheimer's disease Mother     Cancer Mother         skin     Thyroid disease Mother     Ulcerative colitis Mother     Cancer Father         prostate    Stroke Father     Hypertension Father     Prostate cancer Father     Thyroid disease Sister     Ulcerative colitis Sister     Other Sister         open heart surgery    Hyperlipidemia Brother     No Known Problems Son     No Known Problems Son     No Known Problems Maternal Grandmother     No Known Problems Paternal Grandmother         reports that she has never smoked  She has never used smokeless tobacco  She reports that she drinks alcohol  She reports that she does not use drugs        Physical Exam:    /72 (BP Location: Right arm, Patient Position: Sitting, Cuff Size: Standard)   Pulse 77   Temp 99 1 °F (37 3 °C)   Ht 5' 6" (1 676 m)   BMI 35 19 kg/m²     Gen: wn/wd, NAD a pleasant elderly female  HEENT: anicteric, MMM, no cervical LAD  CVS: RRR, no m/r/g  CHEST: CTA b/l  ABD: +BS, soft, focal epigastric tenderness, no hepatosplenomegaly  EXT: no c/c/e  NEURO: aaox3  SKIN: NO rashes

## 2020-03-13 NOTE — LETTER
March 13, 2020     Jared Anderson, DO  [address] [facility] [place] [address]    Patient: Paul Don   YOB: 1950   Date of Visit: 3/13/2020       Dear Dr Jerrod Tobar:    Thank you for referring Italo Lana to me for evaluation  Below are my notes for this consultation  If you have questions, please do not hesitate to call me  I look forward to following your patient along with you  Sincerely,        Pio Christensen MD        CC: No Recipients  Pio Christensen MD  3/13/2020 10:56 AM  Sign at close encounter  [address] [facility]  Paul Don 78 y o  female MRN: [id number]            Assessment & Plan:    Very pleasant 59-year-old female with history of large hiatal hernia with paraesophageal component, underwent hiatal hernia repair with Nissen fundoplication 2 years ago, now presenting with dysphagia  She also has a history of constipation which has worsened recently, last colonoscopy 5 years ago  1  Dysphagia:  Most likely secondary to Nissen fundoplication  -we will plan to proceed with an upper endoscopy, however I would like to start with a barium esophagram prior to her endoscopic evaluation to evaluate for any recurrence of her hiatal hernia  -patient has not been taking PPI therapy, she denies any acid reflux symptoms, the I discussed with her that this may be beneficial for motility  -we will schedule the upper endoscopy  -we will biopsy her duodenal for celiac sprue with the same time as upper endoscopy    2   Chronic constipation:  Unclear etiology, most likely functional  -given that her last colonoscopy was 5 years ago, recommend proceeding with colonoscopy at this time  -recommended a high-fiber diet, she is on a gluten free diet and I suggested that she may need to increase her fiber intake    I discussed with her the risks of the procedures including bleeding, surgery, perforation              _____________________________________________________________        CC:  Constipation and dysphagia    HPI:  Zen Gramajo is a 78 y  o female who is here for constipation and dysphagia  As you know this is a pleasant 27-year-old female, generally in good health, she has a history of large hiatal hernia with paraesophageal component, underwent hiatal hernia with Nissen fundoplication about 2 years ago  Since then she has had dysphagia which has become more persistent  Additionally patient reports significant constipation  She has difficulty moving her bowels over the last 1 year  She denies any melena or rectal bleeding  More recently she has has had some intermittent epigastric abdominal pain  She notes that she tried to modify her diet to help with her stools, occasionally she will have loose stool she notes that dairy does seem to help her when she has constipation  She does have a daily bowel movement  In the past we had her on Linzess for constipation, she notes that she stop taking about 1 year ago at  At that time she had significant urgency  She has been on a gluten free diet for many years, she generally tried a dairy free diet as well  Her weight has been stable  ROS:  The remainder of the ROS was negative except for the pertinent positives mentioned in HPI        Allergies: Pantoprazole and Penicillins    Medications:   Current Outpatient Medications:     atorvastatin (LIPITOR) 20 mg tablet, TAKE 1 TABLET BY MOUTH  DAILY AT BEDTIME, Disp: 90 tablet, Rfl: 1    bumetanide (BUMEX) 1 mg tablet, TAKE 1 TABLET BY MOUTH  DAILY, Disp: 90 tablet, Rfl: 0    Calcium Carb-Cholecalciferol 600-1000 MG-UNIT CAPS, Take 1 capsule by mouth daily  , Disp: , Rfl:     levothyroxine 50 mcg tablet, TAKE 1 TABLET BY MOUTH  DAILY, Disp: 90 tablet, Rfl: 1    losartan (COZAAR) 25 mg tablet, TAKE 1 TABLET BY MOUTH  DAILY, Disp: 90 tablet, Rfl: 1    potassium chloride (K-DUR) 10 mEq tablet, TAKE 1 TABLET BY MOUTH  DAILY, Disp: 90 tablet, Rfl: 1    mometasone (ELOCON) 0 1 % cream, Apply topically daily (Patient not taking: Reported on 1/27/2020), Disp: 45 g, Rfl: 0    Na Sulfate-K Sulfate-Mg Sulf (Suprep Bowel Prep Kit) 17 5-3 13-1 6 GM/177ML SOLN, Take 1 Bottle by mouth once for 1 dose, Disp: 1 Bottle, Rfl: 0    Past Medical History:   Diagnosis Date    Anemia     Hx secondary to bleeding ulcer    Anxiety     resolved 10/4/17    Asthma     seasonal    Chronic kidney disease     kidney stone    Disease of thyroid gland     GERD (gastroesophageal reflux disease)     Goiter, nodular     Hiatal hernia with GERD without esophagitis     History of transfusion     x1 after bleeding ulcer    Hyperlipidemia     Hypertension     Irritable bowel syndrome     RA (rheumatoid arthritis) ([facility] [place] [address] )     Seasonal allergies     Stomach ulcer     Vertigo        Past Surgical History:   Procedure Laterality Date    BREAST BIOPSY Bilateral     negative    CARDIAC CATHETERIZATION      x2 secondary to exertional chest pain, due to lung issues, possible mild COPD    CHOLECYSTECTOMY  2015    lap - last assessed 10/4/17    COLONOSCOPY      complete    ESOPHAGOGASTRODUODENOSCOPY N/A 1/23/2018    Procedure: ESOPHAGOGASTRODUODENOSCOPY (EGD); Surgeon: Yariel Shelton MD;  [facility]; Service: Gastroenterology    HYSTERECTOMY      partial - last assessed 10/4/17    LIPOMA RESECTION      last assessed 10/4/17    [place] ESOPHAGOSCOPY FLEXIBLE TRANSORAL WITH BIOPSY N/A 4/11/2018    Procedure: ESOPHAGOGASTRODUODENOSCOPY (EGD);   Surgeon: Baldomero Connor MD;  [facility];  Service: Thoracic    AZ LAP, REPAIR PARAESOPHAGEAL HERNIA, INCL FUNDOPLASTY W/ MESH N/A 4/11/2018    Procedure: REPAIR HERNIA PARAESOPHAGEAL  LAPAROSCOPIC,ELEONORA GASTROPLASTY, Dagoberto Horn FUNDOPLICATION;  Surgeon: Baldomero Connor MD;  [facility];  Service: Thoracic    SKIN BIOPSY Right     lump benign - last assessed 10/4/17    THYROIDECTOMY, PARTIAL      sub - total - last assessed 10/4/17    TONSILLECTOMY         Family History   Problem Relation Age of Onset    Alzheimer's disease Mother     Cancer Mother         skin     Thyroid disease Mother     Ulcerative colitis Mother     Cancer Father         prostate    Stroke Father     Hypertension Father     Prostate cancer Father     Thyroid disease Sister     Ulcerative colitis Sister     Other Sister         open heart surgery    Hyperlipidemia Brother     No Known Problems Son     No Known Problems Son     No Known Problems Maternal Grandmother     No Known Problems Paternal Grandmother         reports that she has never smoked  She has never used smokeless tobacco  She reports that she drinks alcohol  She reports that she does not use drugs        Physical Exam:    /72 (BP Location: Right arm, Patient Position: Sitting, Cuff Size: Standard)   Pulse 77   Temp 99 1 °F (37 3 °C)   Ht 5' 6" (1 676 m)   BMI 35 19 kg/m²      Gen: wn/wd, NAD a pleasant elderly female  HEENT: anicteric, MMM, no cervical LAD  CVS: RRR, no m/r/g  CHEST: CTA b/l  ABD: +BS, soft, focal epigastric tenderness, no hepatosplenomegaly  EXT: no c/c/e  NEURO: aaox3  SKIN: NO rashes

## 2020-03-19 ENCOUNTER — TELEPHONE (OUTPATIENT)
Dept: GASTROENTEROLOGY | Facility: CLINIC | Age: 70
End: 2020-03-19

## 2020-05-22 NOTE — TELEPHONE ENCOUNTER
Pt rescheduled due to needing test prior to procedures   Scheduled for 7/16/2020    COVID TEST ORDERED: to be done 7/12/2020 [facility]    Pt expressed understanding

## 2020-06-01 DIAGNOSIS — I10 ESSENTIAL HYPERTENSION: ICD-10-CM

## 2020-06-02 RX ORDER — POTASSIUM CHLORIDE 750 MG/1
TABLET, FILM COATED, EXTENDED RELEASE ORAL
Qty: 90 TABLET | Refills: 1 | Status: SHIPPED | OUTPATIENT
Start: 2020-06-02 | End: 2021-01-05

## 2020-06-04 DIAGNOSIS — I10 BENIGN HYPERTENSION: ICD-10-CM

## 2020-06-04 RX ORDER — BUMETANIDE 1 MG/1
1 TABLET ORAL DAILY
Qty: 90 TABLET | Refills: 1 | Status: SHIPPED | OUTPATIENT
Start: 2020-06-04 | End: 2020-10-19

## 2020-06-17 ENCOUNTER — TELEPHONE (OUTPATIENT)
Dept: GASTROENTEROLOGY | Facility: AMBULARY SURGERY CENTER | Age: 70
End: 2020-06-17

## 2020-06-17 NOTE — TELEPHONE ENCOUNTER
Patients GI provider:  Dr Emilie Rendon    Number to return call: (  [phone]    Reason for call: Pt calling to Rs her colon / egd    Scheduled procedure/appointment date if applicable: Apt/procedure  7-16-20

## 2020-06-23 NOTE — TELEPHONE ENCOUNTER
Patient rescheduled to 7/28/2020 from 7/16/2020  She is aware of COVID testing to be done on 7/24/2020(added to labs) and has all her instructions

## 2020-06-29 ENCOUNTER — HOSPITAL ENCOUNTER (OUTPATIENT)
Dept: RADIOLOGY | Facility: HOSPITAL | Age: 70
Discharge: HOME | End: 2020-06-29
Attending: INTERNAL MEDICINE
Payer: MEDICARE

## 2020-06-29 ENCOUNTER — TRANSCRIBE ORDERS (OUTPATIENT)
Dept: ADMINISTRATIVE | Facility: HOSPITAL | Age: 70
End: 2020-06-29

## 2020-06-29 DIAGNOSIS — R13.19 ESOPHAGEAL DYSPHAGIA: ICD-10-CM

## 2020-06-29 PROCEDURE — 74220 X-RAY XM ESOPHAGUS 1CNTRST: CPT

## 2020-07-05 ENCOUNTER — OFFICE VISIT (OUTPATIENT)
Dept: URGENT CARE | Facility: CLINIC | Age: 70
End: 2020-07-05
Payer: MEDICARE

## 2020-07-05 ENCOUNTER — APPOINTMENT (OUTPATIENT)
Dept: RADIOLOGY | Facility: CLINIC | Age: 70
End: 2020-07-05
Payer: MEDICARE

## 2020-07-05 VITALS
DIASTOLIC BLOOD PRESSURE: 88 MMHG | SYSTOLIC BLOOD PRESSURE: 134 MMHG | HEART RATE: 64 BPM | TEMPERATURE: 97.4 F | OXYGEN SATURATION: 95 %
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
## 2020-07-05 DIAGNOSIS — S20.211A CONTUSION OF RIB ON RIGHT SIDE, INITIAL ENCOUNTER: ICD-10-CM

## 2020-07-05 DIAGNOSIS — R07.81 RIB PAIN ON RIGHT SIDE: ICD-10-CM

## 2020-07-05 DIAGNOSIS — R07.81 RIB PAIN ON RIGHT SIDE: Primary | ICD-10-CM

## 2020-07-05 PROCEDURE — 71101 X-RAY EXAM UNILAT RIBS/CHEST: CPT

## 2020-07-05 PROCEDURE — 3075F SYST BP GE 130 - 139MM HG: CPT | Performed by: PHYSICIAN ASSISTANT

## 2020-07-05 PROCEDURE — 4040F PNEUMOC VAC/ADMIN/RCVD: CPT | Performed by: PHYSICIAN ASSISTANT

## 2020-07-05 PROCEDURE — 3079F DIAST BP 80-89 MM HG: CPT | Performed by: PHYSICIAN ASSISTANT

## 2020-07-05 PROCEDURE — 1160F RVW MEDS BY RX/DR IN RCRD: CPT | Performed by: PHYSICIAN ASSISTANT

## 2020-07-05 PROCEDURE — 1036F TOBACCO NON-USER: CPT | Performed by: PHYSICIAN ASSISTANT

## 2020-07-05 PROCEDURE — 99213 OFFICE O/P EST LOW 20 MIN: CPT | Performed by: PHYSICIAN ASSISTANT

## 2020-07-05 NOTE — PATIENT INSTRUCTIONS
Rib Contusion   WHAT YOU NEED TO KNOW:   A rib contusion is a bruise on one or more of your ribs  DISCHARGE INSTRUCTIONS:   Return to the emergency department if:   · You have increased chest pain  · You have shortness of breath  · You start to cough up blood  · Your pain does not improve with pain medicine  Contact your healthcare provider if:   · You have a cough  · You have a fever  · You have questions or concerns about your condition or care  Medicines: You may need any of the following:  · NSAIDs , such as ibuprofen, help decrease swelling, pain, and fever  This medicine is available with or without a doctor's order  NSAIDs can cause stomach bleeding or kidney problems in certain people  If you take blood thinner medicine, always ask if NSAIDs are safe for you  Always read the medicine label and follow directions  Do not give these medicines to children under 10months of age without direction from your child's healthcare provider  · Prescription pain medicine  may be given  Ask how to take this medicine safely  · Take your medicine as directed  Contact your healthcare provider if you think your medicine is not helping or if you have side effects  Tell him of her if you are allergic to any medicine  Keep a list of the medicines, vitamins, and herbs you take  Include the amounts, and when and why you take them  Bring the list or the pill bottles to follow-up visits  Carry your medicine list with you in case of an emergency  Deep breathing:   · To help prevent pneumonia, take 10 deep breaths every hour, even when you wake up during the night  Brace your ribs with your hands or a pillow while you take deep breaths or cough  This will help decrease your pain  · You may need to use an incentive spirometer to help you take deeper breaths  Put the plastic piece into your mouth and take a very deep breath  Hold your breath as long as you can  Then let out your breath   Do this 10 times in a row every hour while you are awake  Rest:  Rest your ribs to decrease swelling and allow the injury to heal faster  Avoid activities that may cause more pain or damage to your ribs  As your pain decreases, begin movements slowly  Ice:  Ice helps decrease swelling and pain  Ice may also help prevent tissue damage  Use an ice pack or put crushed ice in a plastic bag  Cover it with a towel and place it on your bruised area for 15 to 20 minutes every hour as directed  Follow up with your healthcare provider as directed:  Write down your questions so you remember to ask them during your visits  © 2017 [facility] Information is for End User's use only and may not be sold, redistributed or otherwise used for commercial purposes  All illustrations and images included in CareNotes® are the copyrighted property of [email or web address]  or Luis Hernandez  The above information is an  only  It is not intended as medical advice for individual conditions or treatments  Talk to your doctor, nurse or pharmacist before following any medical regimen to see if it is safe and effective for you

## 2020-07-05 NOTE — PROGRESS NOTES
[facility] [employer] [facility]        NAME: Hardeep Moseley is a 69 y o  female  : 1950    MRN: [id number]  DATE:  2020  TIME: 9:17 AM    Assessment and Plan   Rib pain on right side [R07 81]  1  Rib pain on right side  XR ribs right w pa chest min 3 views   2  Contusion of rib on right side, initial encounter     3  Injury due to impact of moving subject with stationary object, initial encounter           Patient Instructions     Discussed condition with patient  X-ray of the right ribcage is negative for fracture  She has sustained rib contusion for which I recommended rest, ice, deep breathing exercises, and discussed pain control  Should be re-evaluated if condition persists or worsens  Follow up with PCP in 3-5 days  Proceed to  ER if symptoms worsen  Chief Complaint     Chief Complaint   Patient presents with    Chest Pain     right side, lower pain  since last monday, had a barrium swallow test, moved wrong way on metal table         History of Present Illness       Patient presents with almost 1 week history of right-sided rib pain  She reports that she underwent a barium swallow procedure and reports that she was maneuvering on the table and believes she moved the wrong way and then had onset of pain  She reports it is worse with deep breathing and certain movements  She denies any other significant symptoms at this time including shortness of breath  Review of Systems   Review of Systems   Constitutional: Negative  Respiratory: Negative  Cardiovascular: Negative  Gastrointestinal: Negative  Genitourinary: Negative      Musculoskeletal:        Right ribcage pain         Current Medications       Current Outpatient Medications:     atorvastatin (LIPITOR) 20 mg tablet, TAKE 1 TABLET BY MOUTH  DAILY AT BEDTIME, Disp: 90 tablet, Rfl: 1    bumetanide (BUMEX) 1 mg tablet, Take 1 tablet (1 mg total) by mouth daily, Disp: 90 tablet, Rfl: 1    Calcium Carb-Cholecalciferol 600-1000 MG-UNIT CAPS, Take 1 capsule by mouth daily  , Disp: , Rfl:     levothyroxine 50 mcg tablet, TAKE 1 TABLET BY MOUTH  DAILY, Disp: 90 tablet, Rfl: 1    losartan (COZAAR) 25 mg tablet, TAKE 1 TABLET BY MOUTH  DAILY, Disp: 90 tablet, Rfl: 1    potassium chloride (K-DUR) 10 mEq tablet, TAKE 1 TABLET BY MOUTH  DAILY, Disp: 90 tablet, Rfl: 1    mometasone (ELOCON) 0 1 % cream, Apply topically daily (Patient not taking: Reported on 1/27/2020), Disp: 45 g, Rfl: 0    Na Sulfate-K Sulfate-Mg Sulf (Suprep Bowel Prep Kit) 17 5-3 13-1 6 GM/177ML SOLN, Take 1 Bottle by mouth once for 1 dose, Disp: 1 Bottle, Rfl: 0    Current Allergies     Allergies as of 07/05/2020 - Reviewed 07/05/2020   Allergen Reaction Noted    Pantoprazole Headache 01/22/2018    Penicillins Other (See Comments) 01/22/2018            The following portions of the patient's history were reviewed and updated as appropriate: allergies, current medications, past family history, past medical history, past social history, past surgical history and problem list      Past Medical History:   Diagnosis Date    Anemia     Hx secondary to bleeding ulcer    Anxiety     resolved 10/4/17    Asthma     seasonal    Chronic kidney disease     kidney stone    Disease of thyroid gland     GERD (gastroesophageal reflux disease)     Goiter, nodular     Hiatal hernia     Hiatal hernia with GERD without esophagitis     History of transfusion     x1 after bleeding ulcer    Hyperlipidemia     Hypertension     Irritable bowel syndrome     RA [facility]rhe[facility]oid arthritis) ([facility] [address]     Seasonal allergies     Stomach ulcer     Vertigo        Past Surgical History:   Procedure Laterality Date    BREAST BIOPSY Bilateral     negative    CARDIAC CATHETERIZATION      x2 secondary to exertional chest pain, due to lung issues, possible mild COPD    CHOLECYSTECTOMY  2015    lap - last assessed 10/4/17    COLONOSCOPY      complete    ESOPHAGOGASTRODUODENOSCOPY N/A 1/23/2018    Procedure: ESOPHAGOGASTRODUODENOSCOPY (EGD); Surgeon: Danita Gill MD;  [facility]: [facility] GI [facility]; Service: Gastroenterology    HYSTERECTOMY      partial - last assessed 10/4/17    LIPOMA RESECTION      last assessed 10/4/17    [place] ESOPHAGOSCOPY FLEXIBLE TRANSORAL WITH BIOPSY N/A 4/11/2018    Procedure: ESOPHAGOGASTRODUODENOSCOPY (EGD); Surgeon: Eben Hall MD;  Location:  MAIN OR;  Service: Thoracic    NV LAP, REPAIR PARAESOPHAGEAL HERNIA, INCL FUNDOPLASTY W/ MESH N/A 4/11/2018    Procedure: REPAIR HERNIA PARAESOPHAGEAL  LAPAROSCOPIC,ELEONORA GASTROPLASTY, Viktor Casa Blanca FUNDOPLICATION;  Surgeon: Eben Hall MD;  [facility];  Service: Thoracic    SKIN BIOPSY Right     lump benign - last assessed 10/4/17    THYROIDECTOMY, PARTIAL      sub - total - last assessed 10/4/17    TONSILLECTOMY         Family History   Problem Relation Age of Onset    Alzheimer's disease Mother     Cancer Mother         skin     Thyroid disease Mother     Ulcerative colitis Mother     Cancer Father         prostate    Stroke Father     Hypertension Father     Prostate cancer Father     Thyroid disease Sister     Ulcerative colitis Sister     Other Sister         open heart surgery    Hyperlipidemia Brother     No Known Problems Son     No Known Problems Son     No Known Problems Maternal Grandmother     No Known Problems Paternal Grandmother          Medications have been verified  Objective   /88 (BP Location: Left arm, Patient Position: Sitting, Cuff Size: Standard)   Pulse 64   Temp (!) 97 4 °F (36 3 °C) (Oral)   SpO2 95%        Physical Exam     Physical Exam   Constitutional: She is oriented to person, place, and time  She appears well-developed and well-nourished  No distress  Cardiovascular: Normal rate, regular rhythm and normal heart sounds  No murmur heard  Pulmonary/Chest: Effort normal and breath sounds normal  No respiratory distress     Musculoskeletal: Tenderness to palpation and on external compression of the right anterolateral lower ribcage  There is no visible or palpable deformity noted  No significant visible soft tissue swelling or ecchymosis  Neurological: She is alert and oriented to person, place, and time  Psychiatric: She has a normal mood and affect  Vitals reviewed

## 2020-07-13 NOTE — TELEPHONE ENCOUNTER
LVM to confirm procedure with Dr Abram Medina at [facility] [place] on 7/28  Reminded to get covid testing done 7/23  Told pt to call back in to confirm

## 2020-07-22 DIAGNOSIS — Z11.59 SCREENING FOR VIRAL DISEASE: ICD-10-CM

## 2020-07-22 PROCEDURE — U0003 INFECTIOUS AGENT DETECTION BY NUCLEIC ACID (DNA OR RNA); SEVERE ACUTE RESPIRATORY SYNDROME CORONAVIRUS 2 (SARS-COV-2) (CORONAVIRUS DISEASE [COVID-19]), AMPLIFIED PROBE TECHNIQUE, MAKING USE OF HIGH THROUGHPUT TECHNOLOGIES AS DESCRIBED BY CMS-2020-01-R: HCPCS

## 2020-07-23 LAB — SARS-COV-2 RNA SPEC QL NAA+PROBE: NOT DETECTED

## 2020-07-23 NOTE — PRE-PROCEDURE INSTRUCTIONS
Pre-Surgery Instructions:   Medication Instructions    atorvastatin (LIPITOR) 20 mg tablet Instructed patient per Anesthesia Guidelines   bumetanide (BUMEX) 1 mg tablet Instructed patient per Anesthesia Guidelines   Calcium Carb-Cholecalciferol 600-1000 MG-UNIT CAPS Instructed patient per Anesthesia Guidelines   levothyroxine 50 mcg tablet Instructed patient per Anesthesia Guidelines   losartan (COZAAR) 25 mg tablet Instructed patient per Anesthesia Guidelines   Na Sulfate-K Sulfate-Mg Sulf (Suprep Bowel Prep Kit) 17 5-3 13-1 6 GM/177ML SOLN Instructed patient per Anesthesia Guidelines   potassium chloride (K-DUR) 10 mEq tablet Instructed patient per Anesthesia Guidelines  To take losartan a m   Of procedure

## 2020-07-27 ENCOUNTER — ANESTHESIA EVENT (OUTPATIENT)
Dept: GASTROENTEROLOGY | Facility: AMBULARY SURGERY CENTER | Age: 70
End: 2020-07-27

## 2020-07-28 ENCOUNTER — ANESTHESIA (OUTPATIENT)
Dept: GASTROENTEROLOGY | Facility: AMBULARY SURGERY CENTER | Age: 70
End: 2020-07-28

## 2020-07-28 ENCOUNTER — HOSPITAL ENCOUNTER (OUTPATIENT)
Dept: GASTROENTEROLOGY | Facility: AMBULARY SURGERY CENTER | Age: 70
Setting detail: OUTPATIENT SURGERY
Discharge: HOME | End: 2020-07-28
Attending: INTERNAL MEDICINE | Admitting: INTERNAL MEDICINE
Payer: MEDICARE

## 2020-07-28 VITALS
HEART RATE: 61 BPM | BODY MASS INDEX: 34.99 KG/M2 | SYSTOLIC BLOOD PRESSURE: 120 MMHG | DIASTOLIC BLOOD PRESSURE: 68 MMHG | OXYGEN SATURATION: 98 % | WEIGHT: 210 LBS | TEMPERATURE: 95.4 F | HEIGHT: 65 IN | RESPIRATION RATE: 14 BRPM

## 2020-07-28 DIAGNOSIS — K21.9 HIATAL HERNIA WITH GERD WITHOUT ESOPHAGITIS: Primary | ICD-10-CM

## 2020-07-28 DIAGNOSIS — K59.09 OTHER CONSTIPATION: ICD-10-CM

## 2020-07-28 DIAGNOSIS — R13.19 ESOPHAGEAL DYSPHAGIA: ICD-10-CM

## 2020-07-28 DIAGNOSIS — K44.9 HIATAL HERNIA WITH GERD WITHOUT ESOPHAGITIS: Primary | ICD-10-CM

## 2020-07-28 DIAGNOSIS — K58.1 IRRITABLE BOWEL SYNDROME WITH CONSTIPATION: ICD-10-CM

## 2020-07-28 PROCEDURE — 88342 IMHCHEM/IMCYTCHM 1ST ANTB: CPT | Performed by: PATHOLOGY

## 2020-07-28 PROCEDURE — 88305 TISSUE EXAM BY PATHOLOGIST: CPT | Performed by: PATHOLOGY

## 2020-07-28 PROCEDURE — 88341 IMHCHEM/IMCYTCHM EA ADD ANTB: CPT | Performed by: PATHOLOGY

## 2020-07-28 PROCEDURE — NC001 PR NO CHARGE: Performed by: INTERNAL MEDICINE

## 2020-07-28 PROCEDURE — 45378 DIAGNOSTIC COLONOSCOPY: CPT | Performed by: INTERNAL MEDICINE

## 2020-07-28 PROCEDURE — 43239 EGD BIOPSY SINGLE/MULTIPLE: CPT | Performed by: INTERNAL MEDICINE

## 2020-07-28 RX ORDER — OMEPRAZOLE 40 MG/1
40 CAPSULE, DELAYED RELEASE ORAL 2 TIMES DAILY
Qty: 60 CAPSULE | Refills: 3 | Status: SHIPPED | OUTPATIENT
Start: 2020-07-28 | End: 2020-11-16 | Stop reason: SDUPTHER

## 2020-07-28 RX ORDER — PROPOFOL 10 MG/ML
INJECTION, EMULSION INTRAVENOUS AS NEEDED
Status: DISCONTINUED | OUTPATIENT
Start: 2020-07-28 | End: 2020-07-28 | Stop reason: SURG

## 2020-07-28 RX ORDER — LIDOCAINE HYDROCHLORIDE 10 MG/ML
INJECTION, SOLUTION EPIDURAL; INFILTRATION; INTRACAUDAL; PERINEURAL AS NEEDED
Status: DISCONTINUED | OUTPATIENT
Start: 2020-07-28 | End: 2020-07-28 | Stop reason: SURG

## 2020-07-28 RX ORDER — SODIUM CHLORIDE, SODIUM LACTATE, POTASSIUM CHLORIDE, CALCIUM CHLORIDE 600; 310; 30; 20 MG/100ML; MG/100ML; MG/100ML; MG/100ML
100 INJECTION, SOLUTION INTRAVENOUS CONTINUOUS
Status: DISCONTINUED | OUTPATIENT
Start: 2020-07-28 | End: 2020-08-01 | Stop reason: HOSPADM

## 2020-07-28 RX ORDER — ONDANSETRON 2 MG/ML
4 INJECTION INTRAMUSCULAR; INTRAVENOUS ONCE AS NEEDED
Status: CANCELLED | OUTPATIENT
Start: 2020-07-28

## 2020-07-28 RX ORDER — PROPOFOL 10 MG/ML
INJECTION, EMULSION INTRAVENOUS CONTINUOUS PRN
Status: DISCONTINUED | OUTPATIENT
Start: 2020-07-28 | End: 2020-07-28 | Stop reason: SURG

## 2020-07-28 RX ADMIN — PROPOFOL 50 MG: 10 INJECTION, EMULSION INTRAVENOUS at 09:55

## 2020-07-28 RX ADMIN — PROPOFOL 50 MG: 10 INJECTION, EMULSION INTRAVENOUS at 10:00

## 2020-07-28 RX ADMIN — SODIUM CHLORIDE, SODIUM LACTATE, POTASSIUM CHLORIDE, AND CALCIUM CHLORIDE 100 ML/HR: .6; .31; .03; .02 INJECTION, SOLUTION INTRAVENOUS at 08:44

## 2020-07-28 RX ADMIN — PROPOFOL 50 MG: 10 INJECTION, EMULSION INTRAVENOUS at 09:54

## 2020-07-28 RX ADMIN — LIDOCAINE HYDROCHLORIDE 50 MG: 10 INJECTION, SOLUTION EPIDURAL; INFILTRATION; INTRACAUDAL; PERINEURAL at 09:53

## 2020-07-28 RX ADMIN — PROPOFOL 150 MCG/KG/MIN: 10 INJECTION, EMULSION INTRAVENOUS at 09:56

## 2020-07-28 RX ADMIN — PROPOFOL 50 MG: 10 INJECTION, EMULSION INTRAVENOUS at 09:57

## 2020-07-28 NOTE — ANESTHESIA PREPROCEDURE EVALUATION
Review of Systems/Medical History  Patient summary reviewed  Chart reviewed  No history of anesthetic complications     Cardiovascular  Exercise tolerance (METS): >4,  Hyperlipidemia, Hypertension , No dysrhythmias , No angina ,    Pulmonary  Negative pulmonary ROS No asthma ,        GI/Hepatic    PUD, GERD ,  Hiatal hernia, Bowel prep            Endo/Other  History of thyroid disease , hypothyroidism,   Obesity    GYN       Hematology  Anemia ,     Musculoskeletal  Rheumatoid arthritis ,        Neurology  Negative neurology ROS      Psychology   Anxiety,              Physical Exam    Airway    Mallampati score: III  TM Distance: >3 FB  Neck ROM: full     Dental   No notable dental hx     Cardiovascular  Rhythm: regular, Rate: normal,     Pulmonary  Breath sounds clear to auscultation,     Other Findings        Anesthesia Plan  ASA Score- 3     Anesthesia Type- IV sedation with anesthesia with ASA Monitors  Additional Monitors:   Airway Plan:         Plan Factors-  Patient did not smoke on day of surgery  Induction- intravenous  Postoperative Plan-     Informed Consent- Anesthetic plan and risks discussed with patient  I personally reviewed this patient with the CRNA  Discussed and agreed on the Anesthesia Plan with the CRNA  Paramjit Arnold

## 2020-07-28 NOTE — ANESTHESIA POSTPROCEDURE EVALUATION
Post-Op Assessment Note    CV Status:  Stable    Pain management: adequate     Mental Status:  Alert and awake   Hydration Status:  Euvolemic and stable   PONV Controlled:  Controlled   Airway Patency:  Patent   Post Op Vitals Reviewed: Yes      Staff: CRNA           BP      Temp     Pulse     Resp      SpO2

## 2020-07-28 NOTE — H&P
History and Physical -  [facility]  Corinne Foster 69 y o  female MRN: [id number]    HPI: Corinne Foster is a 69y o  year old female who presents with dysphagia, hx of Nissen and chronic constipation  Review of Systems    Historical Information   Past Medical History:   Diagnosis Date    Anemia     Hx secondary to bleeding ulcer    Anxiety     resolved 10/4/17    Asthma     seasonal    Chronic kidney disease     kidney stone    Disease of thyroid gland     GERD (gastroesophageal reflux disease)     Goiter, nodular     Hiatal hernia     Hiatal hernia with GERD without esophagitis     History of transfusion     x1 after bleeding ulcer    Hyperlipidemia     Hypertension     Irritable bowel syndrome     RA (rheumatoid arthritis) ([address] )     Seasonal allergies     Stomach ulcer     Vertigo      Past Surgical History:   Procedure Laterality Date    BREAST BIOPSY Bilateral     negative    CARDIAC CATHETERIZATION      x2 secondary to exertional chest pain, due to lung issues, possible mild COPD    CHOLECYSTECTOMY  2015    lap - last assessed 10/4/17    COLONOSCOPY      complete    ESOPHAGOGASTRODUODENOSCOPY N/A 1/23/2018    Procedure: ESOPHAGOGASTRODUODENOSCOPY (EGD); Surgeon: Corrie Ortega MD;  Location: [facility] GI LAB; Service: Gastroenterology    HYSTERECTOMY      partial - last assessed 10/4/17    LIPOMA RESECTION      last assessed 10/4/17    [place] ESOPHAGOSCOPY FLEXIBLE TRANSORAL WITH BIOPSY N/A 4/11/2018    Procedure: ESOPHAGOGASTRODUODENOSCOPY (EGD);   Surgeon: Rich Gregorio MD;  [facility];  Service: Thoracic    [place] LAP, REPAIR PARAESOPHAGEAL HERNIA, INCL FUNDOPLASTY W/ MESH N/A 4/11/2018    Procedure: REPAIR HERNIA PARAESOPHAGEAL  LAPAROSCOPIC,ELEONORA GASTROPLASTY, Nargis Gent FUNDOPLICATION;  Surgeon: Rich Gregorio MD;  [facility];  Service: Thoracic    SKIN BIOPSY Right     lump benign - last assessed 10/4/17    THYROIDECTOMY, PARTIAL sub - total - last assessed 10/4/17    TONSILLECTOMY       Social History   Social History     Substance and Sexual Activity   Alcohol Use Yes    Comment: occ     Social History     Substance and Sexual Activity   Drug Use No     Social History     Tobacco Use   Smoking Status Never Smoker   Smokeless Tobacco Never Used     Family History   Problem Relation Age of Onset    Alzheimer's disease Mother     Cancer Mother         skin     Thyroid disease Mother     Ulcerative colitis Mother     Cancer Father         prostate    Stroke Father     Hypertension Father     Prostate cancer Father     Thyroid disease Sister     Ulcerative colitis Sister     Other Sister         open heart surgery    Hyperlipidemia Brother     No Known Problems Son     No Known Problems Son     No Known Problems Maternal Grandmother     No Known Problems Paternal Grandmother        Meds/Allergies       (Not in a hospital admission)    Allergies   Allergen Reactions    Pantoprazole Headache    Penicillins Other (See Comments)     During allergy testing instructed to NEVER take PCN       Objective     /81   Pulse 77   Temp (!) 95 4 °F (35 2 °C) (Tympanic)   Resp 14   Ht 5' 5" (1 651 m)   Wt 95 3 kg (210 lb)   SpO2 97%   BMI 34 95 kg/m²       PHYSICAL EXAM    Gen: NAD  CV: RRR  CHEST: Clear  ABD: soft, NT/ND  EXT: no edema  Neuro: AAO      ASSESSMENT/PLAN:  This is a 69y o  year old female here for dysphagia, hx of Nissen and chronic constipation         PLAN:   Procedure: egd/colonoscopy

## 2020-07-30 ENCOUNTER — OFFICE VISIT (OUTPATIENT)
Dept: FAMILY MEDICINE CLINIC | Facility: CLINIC | Age: 70
End: 2020-07-30
Payer: MEDICARE

## 2020-07-30 VITALS
DIASTOLIC BLOOD PRESSURE: 66 MMHG | OXYGEN SATURATION: 95 % | RESPIRATION RATE: 18 BRPM | TEMPERATURE: 97.8 F | HEART RATE: 76 BPM | HEIGHT: 65 IN | SYSTOLIC BLOOD PRESSURE: 110 MMHG | WEIGHT: 216 LBS | BODY MASS INDEX: 35.99 KG/M2

## 2020-07-30 DIAGNOSIS — I10 BENIGN HYPERTENSION: ICD-10-CM

## 2020-07-30 DIAGNOSIS — Z00.00 MEDICARE ANNUAL WELLNESS VISIT, SUBSEQUENT: ICD-10-CM

## 2020-07-30 DIAGNOSIS — D51.0 ANEMIA, PERNICIOUS: ICD-10-CM

## 2020-07-30 DIAGNOSIS — E66.9 OBESITY (BMI 35.0-39.9 WITHOUT COMORBIDITY): ICD-10-CM

## 2020-07-30 DIAGNOSIS — E03.0 CONGENITAL HYPOTHYROIDISM WITH DIFFUSE GOITER: Primary | ICD-10-CM

## 2020-07-30 DIAGNOSIS — E78.2 MIXED HYPERLIPIDEMIA: ICD-10-CM

## 2020-07-30 DIAGNOSIS — M85.80 OSTEOPENIA, UNSPECIFIED LOCATION: ICD-10-CM

## 2020-07-30 DIAGNOSIS — I25.10 CORONARY ARTERY DISEASE INVOLVING NATIVE CORONARY ARTERY OF NATIVE HEART WITHOUT ANGINA PECTORIS: ICD-10-CM

## 2020-07-30 PROBLEM — K21.00 GERD WITH ESOPHAGITIS: Status: ACTIVE | Noted: 2018-03-26

## 2020-07-30 PROCEDURE — 3078F DIAST BP <80 MM HG: CPT | Performed by: FAMILY MEDICINE

## 2020-07-30 PROCEDURE — 4040F PNEUMOC VAC/ADMIN/RCVD: CPT | Performed by: FAMILY MEDICINE

## 2020-07-30 PROCEDURE — 1125F AMNT PAIN NOTED PAIN PRSNT: CPT | Performed by: FAMILY MEDICINE

## 2020-07-30 PROCEDURE — 1160F RVW MEDS BY RX/DR IN RCRD: CPT | Performed by: FAMILY MEDICINE

## 2020-07-30 PROCEDURE — 3008F BODY MASS INDEX DOCD: CPT | Performed by: FAMILY MEDICINE

## 2020-07-30 PROCEDURE — G0439 PPPS, SUBSEQ VISIT: HCPCS | Performed by: FAMILY MEDICINE

## 2020-07-30 PROCEDURE — 1170F FXNL STATUS ASSESSED: CPT | Performed by: FAMILY MEDICINE

## 2020-07-30 PROCEDURE — 99214 OFFICE O/P EST MOD 30 MIN: CPT | Performed by: FAMILY MEDICINE

## 2020-07-30 PROCEDURE — 1123F ACP DISCUSS/DSCN MKR DOCD: CPT | Performed by: FAMILY MEDICINE

## 2020-07-30 PROCEDURE — 3074F SYST BP LT 130 MM HG: CPT | Performed by: FAMILY MEDICINE

## 2020-07-30 PROCEDURE — 1036F TOBACCO NON-USER: CPT | Performed by: FAMILY MEDICINE

## 2020-07-30 NOTE — PROGRESS NOTES
Assessment and Plan:     Problem List Items Addressed This Visit        Endocrine    Congenital hypothyroidism with diffuse goiter - Primary     tsh ordered         Relevant Orders    TSH, 3rd generation       Cardiovascular and Mediastinum    Benign hypertension     Stable today         Relevant Orders    Comprehensive metabolic panel    Coronary artery disease involving native coronary artery of native heart without angina pectoris     Not reporting chest pain            Musculoskeletal and Integument    Osteopenia     Pt is up to date on DXa            Other    Mixed hyperlipidemia    Relevant Orders    Lipid Panel with Direct LDL reflex    Anemia, pernicious     Pt requested  CBC         Relevant Orders    CBC    BMI 35 0-35 9,adult      Other Visit Diagnoses     Obesity (BMI 35 0-39 9 without comorbidity)               Preventive health issues were discussed with patient, and age appropriate screening tests were ordered as noted in patient's After Visit Summary  Personalized health advice and appropriate referrals for health education or preventive services given if needed, as noted in patient's After Visit Summary       History of Present Illness:     Patient presents for Medicare Annual Wellness visit    Patient Care Team:  Liya Knowles DO as PCP - General (Family Medicine)  Summer Arndt MD as Consulting Physician (Gastroenterology)  Guy Weaver MD as Consulting Physician (Gastroenterology)  Mercedez Tatum DO as Consulting Physician (Cardiology)     Problem List:     Patient Active Problem List   Diagnosis    GERD with esophagitis    Hypokalemia    Benign hypertension    Mixed hyperlipidemia    Congenital hypothyroidism with diffuse goiter    Liver lesion, left lobe    Anemia, pernicious    Coronary artery disease involving native coronary artery of native heart without angina pectoris    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HCC)    Hemorrhoids    IBS (irritable bowel syndrome)    Osteopenia    Stress incontinence, female    Abnormal CT of liver    Class 2 severe obesity due to excess calories with serious comorbidity and body mass index (BMI) of 35 0 to 35 9 in adult (HCC)    BMI 35 0-35 9,adult    Dysphagia    Constipation      Past Medical and Surgical History:     Past Medical History:   Diagnosis Date    Anemia     Hx secondary to bleeding ulcer    Anxiety     resolved 10/4/17    Asthma     seasonal    Chronic kidney disease     kidney stone    Disease of thyroid gland     GERD (gastroesophageal reflux disease)     Goiter, nodular     Hiatal hernia     Hiatal hernia with GERD without esophagitis     History of transfusion     x1 after bleeding ulcer    Hyperlipidemia     Hypertension     Irritable bowel syndrome     RA (rheumatoid arthritis) ([address] )     Seasonal allergies     Stomach ulcer     Vertigo      Past Surgical History:   Procedure Laterality Date    BREAST BIOPSY Bilateral     negative    CARDIAC CATHETERIZATION      x2 secondary to exertional chest pain, due to lung issues, possible mild COPD    CHOLECYSTECTOMY  2015    lap - last assessed 10/4/17    COLONOSCOPY      complete    ESOPHAGOGASTRODUODENOSCOPY N/A 1/23/2018    Procedure: ESOPHAGOGASTRODUODENOSCOPY (EGD); Surgeon: Carlos Palacios MD;  Location: [facility]; Service: Gastroenterology    HYSTERECTOMY      partial - last assessed 10/4/17    LIPOMA RESECTION      last assessed 10/4/17    VA ESOPHAGOSCOPY FLEXIBLE TRANSORAL WITH BIOPSY N/A 4/11/2018    Procedure: ESOPHAGOGASTRODUODENOSCOPY (EGD);   Surgeon: Carolyn Rosas MD;  Location: [facility];  Service: Thoracic    VA LAP, REPAIR PARAESOPHAGEAL HERNIA, INCL FUNDOPLASTY W/ MESH N/A 4/11/2018    Procedure: REPAIR HERNIA PARAESOPHAGEAL  LAPAROSCOPIC,ELEONORA GASTROPLASTY, Nando Maciel FUNDOPLICATION;  Surgeon: Carolyn Rosas MD;  Location: [facility];  Service: Thoracic    SKIN BIOPSY Right     lump benign - last assessed 10/4/17    THYROIDECTOMY, PARTIAL      sub - total - last assessed 10/4/17    TONSILLECTOMY        Family History:     Family History   Problem Relation Age of Onset    Alzheimer's disease Mother     Cancer Mother         skin     Thyroid disease Mother     Ulcerative colitis Mother     Cancer Father         prostate    Stroke Father     Hypertension Father     Prostate cancer Father     Thyroid disease Sister     Ulcerative colitis Sister     Other Sister         open heart surgery    Hyperlipidemia Brother     No Known Problems Son     No Known Problems Son     No Known Problems Maternal Grandmother     No Known Problems Paternal Grandmother     Mental illness Neg Hx       Social History:     E-Cigarette/Vaping    E-Cigarette Use Never User      E-Cigarette/Vaping Substances    Nicotine No     THC No     CBD No     Flavoring No     Other No     Unknown No      Social History     Socioeconomic History    Marital status: /Civil Union     Spouse name: None    Number of children: None    Years of education: None    Highest education level: None   Occupational History    None   Social Needs    Financial resource strain: None    Food insecurity:     Worry: None     Inability: None    Transportation needs:     Medical: None     Non-medical: None   Tobacco Use    Smoking status: Never Smoker    Smokeless tobacco: Never Used   Substance and Sexual Activity    Alcohol use: Yes     Frequency: Monthly or less     Comment: occ    Drug use: No    Sexual activity: None   Lifestyle    Physical activity:     Days per week: None     Minutes per session: None    Stress: None   Relationships    Social connections:     Talks on phone: None     Gets together: None     Attends Mu-ism service: None     Active member of club or organization: None     Attends meetings of clubs or organizations: None     Relationship status: None    Intimate partner violence:     Fear of current or ex partner: None     Emotionally abused: None     Physically abused: None     Forced sexual activity: None   Other Topics Concern    None   Social History Narrative    Feels safe at home      Medications and Allergies:     Current Outpatient Medications   Medication Sig Dispense Refill    atorvastatin (LIPITOR) 20 mg tablet TAKE 1 TABLET BY MOUTH  DAILY AT BEDTIME 90 tablet 1    bumetanide (BUMEX) 1 mg tablet Take 1 tablet (1 mg total) by mouth daily 90 tablet 1    Calcium Carb-Cholecalciferol 600-1000 MG-UNIT CAPS Take 1 capsule by mouth daily        levothyroxine 50 mcg tablet TAKE 1 TABLET BY MOUTH  DAILY 90 tablet 1    losartan (COZAAR) 25 mg tablet TAKE 1 TABLET BY MOUTH  DAILY 90 tablet 1    omeprazole (PriLOSEC) 40 MG capsule Take 1 capsule (40 mg total) by mouth 2 (two) times a day 60 capsule 3    potassium chloride (K-DUR) 10 mEq tablet TAKE 1 TABLET BY MOUTH  DAILY 90 tablet 1    mometasone (ELOCON) 0 1 % cream Apply topically daily (Patient not taking: Reported on 1/27/2020) 45 g 0     No current facility-administered medications for this visit        Facility-Administered Medications Ordered in Other Visits   Medication Dose Route Frequency Provider Last Rate Last Dose    lactated ringers infusion  100 mL/hr Intravenous Continuous Terrance Vuong MD   Stopped at 07/28/20 1046     Allergies   Allergen Reactions    Pantoprazole Headache    Penicillins Other (See Comments)     During allergy testing instructed to NEVER take PCN      Immunizations:     Immunization History   Administered Date(s) Administered    INFLUENZA 10/19/2010, 09/30/2011, 10/19/2012, 11/05/2013, 10/01/2014    Influenza Split High Dose Preservative Free IM 11/02/2015, 10/27/2016, 10/04/2017    Influenza, high dose seasonal 0 5 mL 09/26/2018, 10/17/2019    Pneumococcal Conjugate 13-Valent 08/10/2016    Pneumococcal Polysaccharide PPV23 10/04/2017    Tdap 07/30/2015      Health Maintenance:         Topic Date Due    MAMMOGRAM  11/06/2020    CRC Screening: Colonoscopy  07/28/2025    Hepatitis C Screening  Completed         Topic Date Due    Influenza Vaccine  07/01/2020      Medicare Health Risk Assessment:     /66   Pulse 76   Temp 97 8 °F (36 6 °C)   Resp 18   Ht 5' 5" (1 651 m)   Wt 98 kg (216 lb)   SpO2 95%   BMI 35 94 kg/m²      Marguerite Higgins is here for her Subsequent Wellness visit  Last Medicare Wellness visit information reviewed, patient interviewed, no change since last AWV  Health Risk Assessment:   Patient rates overall health as good  Patient feels that their physical health rating is same  Eyesight was rated as same  Hearing was rated as same  Patient feels that their emotional and mental health rating is same  Pain experienced in the last 7 days has been some  Patient's pain rating has been 3/10  Patient states that she has experienced no weight loss or gain in last 6 months  ulcer    Depression Screening:   PHQ-2 Score: 0      Fall Risk Screening: In the past year, patient has experienced: no history of falling in past year      Urinary Incontinence Screening:   Patient has leaked urine accidently in the last six months  Home Safety:  Patient does not have trouble with stairs inside or outside of their home  Patient has working smoke alarms and has no working carbon monoxide detector  Home safety hazards include: none  Nutrition:   Current diet is Other (please comment)  gluten free     Medications:   Patient is currently taking over-the-counter supplements  OTC medications include: see medication list  Patient is able to manage medications  Activities of Daily Living (ADLs)/Instrumental Activities of Daily Living (IADLs):   Walk and transfer into and out of bed and chair?: Yes  Dress and groom yourself?: Yes    Bathe or shower yourself?: Yes    Feed yourself?  Yes  Do your laundry/housekeeping?: Yes  Manage your money, pay your bills and track your expenses?: Yes  Make your own meals?: Yes    Do your own shopping?: Yes    Previous Hospitalizations:   Any hospitalizations or ED visits within the last 12 months?: No      Advance Care Planning:   Living will: No    Durable POA for healthcare:  Yes    Advanced directive: No      Cognitive Screening:   Provider or family/friend/caregiver concerned regarding cognition?: No    PREVENTIVE SCREENINGS      Cardiovascular Screening:    General: Screening Not Indicated, History Lipid Disorder and Risks and Benefits Discussed    Due for: Lipid Panel      Diabetes Screening:     General: Screening Current and Risks and Benefits Discussed    Due for: Blood Glucose      Colorectal Cancer Screening:     General: Screening Current      Breast Cancer Screening:     General: Screening Current      Cervical Cancer Screening:    General: Screening Not Indicated      Osteoporosis Screening:    General: Risks and Benefits Discussed and Screening Current      Abdominal Aortic Aneurysm (AAA) Screening:        General: Screening Not Indicated      Lung Cancer Screening:     General: Screening Not Indicated      Hepatitis C Screening:    General: Screening Current      Elvin Fernandez DO

## 2020-07-30 NOTE — PATIENT INSTRUCTIONS
Obesity   AMBULATORY CARE:   Obesity  is when your body mass index (BMI) is greater than 30  Your healthcare provider will use your height and weight to measure your BMI  The risks of obesity include  many health problems, such as injuries or physical disability  You may need tests to check for the following:  · Diabetes     · High blood pressure or high cholesterol     · Heart disease     · Gallbladder or liver disease     · Cancer of the colon, breast, prostate, liver, or kidney     · Sleep apnea     · Arthritis or gout  Seek care immediately if:   · You have a severe headache, confusion, or difficulty speaking  · You have weakness on one side of your body  · You have chest pain, sweating, or shortness of breath  Contact your healthcare provider if:   · You have symptoms of gallbladder or liver disease, such as pain in your upper abdomen  · You have knee or hip pain and discomfort while walking  · You have symptoms of diabetes, such as intense hunger and thirst, and frequent urination  · You have symptoms of sleep apnea, such as snoring or daytime sleepiness  · You have questions or concerns about your condition or care  Treatment for obesity  focuses on helping you lose weight to improve your health  Even a small decrease in BMI can reduce the risk for many health problems  Your healthcare provider will help you set a weight-loss goal   · Lifestyle changes  are the first step in treating obesity  These include making healthy food choices and getting regular physical activity  Your healthcare provider may suggest a weight-loss program that involves coaching, education, and therapy  · Medicine  may help you lose weight when it is used with a healthy diet and physical activity  · Surgery  can help you lose weight if you are very obese and have other health problems  There are several types of weight-loss surgery  Ask your healthcare provider for more information    Be successful losing weight:   · Set small, realistic goals  An example of a small goal is to walk for 20 minutes 5 days a week  Anther goal is to lose 5% of your body weight  · Tell friends, family members, and coworkers about your goals  and ask for their support  Ask a friend to lose weight with you, or join a weight-loss support group  · Identify foods or triggers that may cause you to overeat , and find ways to avoid them  Remove tempting high-calorie foods from your home and workplace  Place a bowl of fresh fruit on your kitchen counter  If stress causes you to eat, then find other ways to cope with stress  · Keep a diary to track what you eat and drink  Also write down how many minutes of physical activity you do each day  Weigh yourself once a week and record it in your diary  Eating changes: You will need to eat 500 to 1,000 fewer calories each day than you currently eat to lose 1 to 2 pounds a week  The following changes will help you cut calories:  · Eat smaller portions  Use small plates, no larger than 9 inches in diameter  Fill your plate half full of fruits and vegetables  Measure your food using measuring cups until you know what a serving size looks like  · Eat 3 meals and 1 or 2 snacks each day  Plan your meals in advance  Suhas Saunders and eat at home most of the time  Eat slowly  · Eat fruits and vegetables at every meal   They are low in calories and high in fiber, which makes you feel full  Do not add butter, margarine, or cream sauce to vegetables  Use herbs to season steamed vegetables  · Eat less fat and fewer fried foods  Eat more baked or grilled chicken and fish  These protein sources are lower in calories and fat than red meat  Limit fast food  Dress your salads with olive oil and vinegar instead of bottled dressing  · Limit the amount of sugar you eat  Do not drink sugary beverages  Limit alcohol  Activity changes:  Physical activity is good for your body in many ways   It helps you burn calories and build strong muscles  It decreases stress and depression, and improves your mood  It can also help you sleep better  Talk to your healthcare provider before you begin an exercise program   · Exercise for at least 30 minutes 5 days a week  Start slowly  Set aside time each day for physical activity that you enjoy and that is convenient for you  It is best to do both weight training and an activity that increases your heart rate, such as walking, bicycling, or swimming  · Find ways to be more active  Do yard work and housecleaning  Walk up the stairs instead of using elevators  Spend your leisure time going to events that require walking, such as outdoor festivals or fairs  This extra physical activity can help you lose weight and keep it off  Follow up with your healthcare provider as directed: You may need to meet with a dietitian  Write down your questions so you remember to ask them during your visits  © 2017 [address] Information is for End User's use only and may not be sold, redistributed or otherwise used for commercial purposes  All illustrations and images included in CareNotes® are the copyrighted property of [employer]  or Luis Hernandez  The above information is an  only  It is not intended as medical advice for individual conditions or treatments  Talk to your doctor, nurse or pharmacist before following any medical regimen to see if it is safe and effective for you  Medicare Preventive Visit Patient Instructions  Thank you for completing your Welcome to Medicare Visit or Medicare Annual Wellness Visit today  Your next wellness visit will be due in one year (7/30/2021)  The screening/preventive services that you may require over the next 5-10 years are detailed below  Some tests may not apply to you based off risk factors and/or age  Screening tests ordered at today's visit but not completed yet may show as past due  Also, please note that scanned in results may not display below  Preventive Screenings:  Service Recommendations Previous Testing/Comments   Colorectal Cancer Screening  * Colonoscopy    * Fecal Occult Blood Test (FOBT)/Fecal Immunochemical Test (FIT)  * Fecal DNA/Cologuard Test  * Flexible Sigmoidoscopy Age: 54-65 years old   Colonoscopy: every 10 years (may be performed more frequently if at higher risk)  OR  FOBT/FIT: every 1 year  OR  Cologuard: every 3 years  OR  Sigmoidoscopy: every 5 years  Screening may be recommended earlier than age 48 if at higher risk for colorectal cancer  Also, an individualized decision between you and your healthcare provider will decide whether screening between the ages of 74-80 would be appropriate  Colonoscopy: 07/28/2020  FOBT/FIT: Not on file  Cologuard: Not on file  Sigmoidoscopy: Not on file    Screening Current     Breast Cancer Screening Age: 36 years old  Frequency: every 1-2 years  Not required if history of left and right mastectomy Mammogram: 11/06/2019    Screening Current   Cervical Cancer Screening Between the ages of 21-29, pap smear recommended once every 3 years  Between the ages of 33-67, can perform pap smear with HPV co-testing every 5 years  Recommendations may differ for women with a history of total hysterectomy, cervical cancer, or abnormal pap smears in past  Pap Smear: Not on file    Screening Not Indicated   Hepatitis C Screening Once for adults born between 1945 and 1965  More frequently in patients at high risk for Hepatitis C Hep C Antibody: 08/02/2019    Screening Current   Diabetes Screening 1-2 times per year if you're at risk for diabetes or have pre-diabetes Fasting glucose: 102 mg/dL   A1C: No results in last 5 years    Screening Current   Cholesterol Screening Once every 5 years if you don't have a lipid disorder  May order more often based on risk factors   Lipid panel: 02/14/2020    Screening Not Indicated  History Lipid Disorder Other Preventive Screenings Covered by Medicare:  1  Abdominal Aortic Aneurysm (AAA) Screening: covered once if your at risk  You're considered to be at risk if you have a family history of AAA  2  Lung Cancer Screening: covers low dose CT scan once per year if you meet all of the following conditions: (1) Age 50-69; (2) No signs or symptoms of lung cancer; (3) Current smoker or have quit smoking within the last 15 years; (4) You have a tobacco smoking history of at least 30 pack years (packs per day multiplied by number of years you smoked); (5) You get a written order from a healthcare provider  3  Glaucoma Screening: covered annually if you're considered high risk: (1) You have diabetes OR (2) Family history of glaucoma OR (3)  aged 48 and older OR (3)  American aged 72 and older  3  Osteoporosis Screening: covered every 2 years if you meet one of the following conditions: (1) You're estrogen deficient and at risk for osteoporosis based off medical history and other findings; (2) Have a vertebral abnormality; (3) On glucocorticoid therapy for more than 3 months; (4) Have primary hyperparathyroidism; (5) On osteoporosis medications and need to assess response to drug therapy  · Last bone density test (DXA Scan): 10/28/2019   5  HIV Screening: covered annually if you're between the age of 15-65  Also covered annually if you are younger than 13 and older than 72 with risk factors for HIV infection  For pregnant patients, it is covered up to 3 times per pregnancy      Immunizations:  Immunization Recommendations   Influenza Vaccine Annual influenza vaccination during flu season is recommended for all persons aged >= 6 months who do not have contraindications   Pneumococcal Vaccine (Prevnar and Pneumovax)  * Prevnar = PCV13  * Pneumovax = PPSV23   Adults 25-60 years old: 1-3 doses may be recommended based on certain risk factors  Adults 72 years old: Prevnar (PCV13) vaccine recommended followed by Pneumovax (PPSV23) vaccine  If already received PPSV23 since turning 65, then PCV13 recommended at least one year after PPSV23 dose  Hepatitis B Vaccine 3 dose series if at intermediate or high risk (ex: diabetes, end stage renal disease, liver disease)   Tetanus (Td) Vaccine - COST NOT COVERED BY MEDICARE PART B Following completion of primary series, a booster dose should be given every 10 years to maintain immunity against tetanus  Td may also be given as tetanus wound prophylaxis  Tdap Vaccine - COST NOT COVERED BY MEDICARE PART B Recommended at least once for all adults  For pregnant patients, recommended with each pregnancy  Shingles Vaccine (Shingrix) - COST NOT COVERED BY MEDICARE PART B  2 shot series recommended in those aged 48 and above     Health Maintenance Due:      Topic Date Due    MAMMOGRAM  11/06/2020    CRC Screening: Colonoscopy  07/28/2025    Hepatitis C Screening  Completed     Immunizations Due:      Topic Date Due    Influenza Vaccine  07/01/2020     Advance Directives   What are advance directives? Advance directives are legal documents that state your wishes and plans for medical care  These plans are made ahead of time in case you lose your ability to make decisions for yourself  Advance directives can apply to any medical decision, such as the treatments you want, and if you want to donate organs  What are the types of advance directives? There are many types of advance directives, and each state has rules about how to use them  You may choose a combination of any of the following:  · Living will: This is a written record of the treatment you want  You can also choose which treatments you do not want, which to limit, and which to stop at a certain time  This includes surgery, medicine, IV fluid, and tube feedings  · Durable power of  for healthcare [facility]):   This is a written record that states who you want to make healthcare choices for you when you are unable to make them for yourself  This person, called a proxy, is usually a family member or a friend  You may choose more than 1 proxy  · Do not resuscitate (DNR) order:  A DNR order is used in case your heart stops beating or you stop breathing  It is a request not to have certain forms of treatment, such as CPR  A DNR order may be included in other types of advance directives  · Medical directive: This covers the care that you want if you are in a coma, near death, or unable to make decisions for yourself  You can list the treatments you want for each condition  Treatment may include pain medicine, surgery, blood transfusions, dialysis, IV or tube feedings, and a ventilator (breathing machine)  · Values history: This document has questions about your views, beliefs, and how you feel and think about life  This information can help others choose the care that you would choose  Why are advance directives important? An advance directive helps you control your care  Although spoken wishes may be used, it is better to have your wishes written down  Spoken wishes can be misunderstood, or not followed  Treatments may be given even if you do not want them  An advance directive may make it easier for your family to make difficult choices about your care  Urinary Incontinence   Urinary incontinence (UI)  is when you lose control of your bladder  UI develops because your bladder cannot store or empty urine properly  The 3 most common types of UI are stress incontinence, urge incontinence, or both  Medicines:   · May be given to help strengthen your bladder control  Report any side effects of medication to your healthcare provider  Do pelvic muscle exercises often:  Your pelvic muscles help you stop urinating  Squeeze these muscles tight for 5 seconds, then relax for 5 seconds  Gradually work up to squeezing for 10 seconds  Do 3 sets of 15 repetitions a day, or as directed   This will help strengthen your pelvic muscles and improve bladder control  Train your bladder:  Go to the bathroom at set times, such as every 2 hours, even if you do not feel the urge to go  You can also try to hold your urine when you feel the urge to go  For example, hold your urine for 5 minutes when you feel the urge to go  As that becomes easier, hold your urine for 10 minutes  Self-care:   · Keep a UI record  Write down how often you leak urine and how much you leak  Make a note of what you were doing when you leaked urine  · Drink liquids as directed  You may need to limit the amount of liquid you drink to help control your urine leakage  Do not drink any liquid right before you go to bed  Limit or do not have drinks that contain caffeine or alcohol  · Prevent constipation  Eat a variety of high-fiber foods  Good examples are high-fiber cereals, beans, vegetables, and whole-grain breads  Walking is the best way to trigger your intestines to have a bowel movement  · Exercise regularly and maintain a healthy weight  Weight loss and exercise will decrease pressure on your bladder and help you control your leakage  · Use a catheter as directed  to help empty your bladder  A catheter is a tiny, plastic tube that is put into your bladder to drain your urine  · Go to behavior therapy as directed  Behavior therapy may be used to help you learn to control your urge to urinate  Weight Management   Why it is important to manage your weight:  Being overweight increases your risk of health conditions such as heart disease, high blood pressure, type 2 diabetes, and certain types of cancer  It can also increase your risk for osteoarthritis, sleep apnea, and other respiratory problems  Aim for a slow, steady weight loss  Even a small amount of weight loss can lower your risk of health problems  How to lose weight safely:  A safe and healthy way to lose weight is to eat fewer calories and get regular exercise   You can lose up about 1 pound a week by decreasing the number of calories you eat by 500 calories each day  Healthy meal plan for weight management:  A healthy meal plan includes a variety of foods, contains fewer calories, and helps you stay healthy  A healthy meal plan includes the following:  · Eat whole-grain foods more often  A healthy meal plan should contain fiber  Fiber is the part of grains, fruits, and vegetables that is not broken down by your body  Whole-grain foods are healthy and provide extra fiber in your diet  Some examples of whole-grain foods are whole-wheat breads and pastas, oatmeal, brown rice, and bulgur  · Eat a variety of vegetables every day  Include dark, leafy greens such as spinach, kale, otto greens, and mustard greens  Eat yellow and orange vegetables such as carrots, sweet potatoes, and winter squash  · Eat a variety of fruits every day  Choose fresh or canned fruit (canned in its own juice or light syrup) instead of juice  Fruit juice has very little or no fiber  · Eat low-fat dairy foods  Drink fat-free (skim) milk or 1% milk  Eat fat-free yogurt and low-fat cottage cheese  Try low-fat cheeses such as mozzarella and other reduced-fat cheeses  · Choose meat and other protein foods that are low in fat  Choose beans or other legumes such as split peas or lentils  Choose fish, skinless poultry (chicken or turkey), or lean cuts of red meat (beef or pork)  Before you cook meat or poultry, cut off any visible fat  · Use less fat and oil  Try baking foods instead of frying them  Add less fat, such as margarine, sour cream, regular salad dressing and mayonnaise to foods  Eat fewer high-fat foods  Some examples of high-fat foods include french fries, doughnuts, ice cream, and cakes  · Eat fewer sweets  Limit foods and drinks that are high in sugar  This includes candy, cookies, regular soda, and sweetened drinks  Exercise:  Exercise at least 30 minutes per day on most days of the week   Some examples of exercise include walking, biking, dancing, and swimming  You can also fit in more physical activity by taking the stairs instead of the elevator or parking farther away from stores  Ask your healthcare provider about the best exercise plan for you  © Copyright [employer] 2018 Information is for End User's use only and may not be sold, redistributed or otherwise used for commercial purposes   All illustrations and images included in CareNotes® are the copyrighted property of [employer]  or [address] [place] [address]

## 2020-07-30 NOTE — PROGRESS NOTES
Assessment/Plan:    1  Congenital hypothyroidism with diffuse goiter  Assessment & Plan:  tsh ordered    Orders:  -     TSH, 3rd generation; Future    2  Benign hypertension  Assessment & Plan:  Stable today    Orders:  -     Comprehensive metabolic panel; Future    3  Coronary artery disease involving native coronary artery of native heart without angina pectoris  Assessment & Plan:  Not reporting chest pain      4  Mixed hyperlipidemia  -     Lipid Panel with Direct LDL reflex; Future    5  Anemia, pernicious  Assessment & Plan:  Pt requested  CBC    Orders:  -     CBC; Future    6  Obesity (BMI 35 0-39 9 without comorbidity)    7  BMI 35 0-35 9,adult    8  Osteopenia, unspecified location  Assessment & Plan:  Pt is up to date on DXa            Patient Instructions     Obesity   AMBULATORY CARE:   Obesity  is when your body mass index (BMI) is greater than 30  Your healthcare provider will use your height and weight to measure your BMI  The risks of obesity include  many health problems, such as injuries or physical disability  You may need tests to check for the following:  · Diabetes     · High blood pressure or high cholesterol     · Heart disease     · Gallbladder or liver disease     · Cancer of the colon, breast, prostate, liver, or kidney     · Sleep apnea     · Arthritis or gout  Seek care immediately if:   · You have a severe headache, confusion, or difficulty speaking  · You have weakness on one side of your body  · You have chest pain, sweating, or shortness of breath  Contact your healthcare provider if:   · You have symptoms of gallbladder or liver disease, such as pain in your upper abdomen  · You have knee or hip pain and discomfort while walking  · You have symptoms of diabetes, such as intense hunger and thirst, and frequent urination  · You have symptoms of sleep apnea, such as snoring or daytime sleepiness       · You have questions or concerns about your condition or care   Treatment for obesity  focuses on helping you lose weight to improve your health  Even a small decrease in BMI can reduce the risk for many health problems  Your healthcare provider will help you set a weight-loss goal   · Lifestyle changes  are the first step in treating obesity  These include making healthy food choices and getting regular physical activity  Your healthcare provider may suggest a weight-loss program that involves coaching, education, and therapy  · Medicine  may help you lose weight when it is used with a healthy diet and physical activity  · Surgery  can help you lose weight if you are very obese and have other health problems  There are several types of weight-loss surgery  Ask your healthcare provider for more information  Be successful losing weight:   · Set small, realistic goals  An example of a small goal is to walk for 20 minutes 5 days a week  Anther goal is to lose 5% of your body weight  · Tell friends, family members, and coworkers about your goals  and ask for their support  Ask a friend to lose weight with you, or join a weight-loss support group  · Identify foods or triggers that may cause you to overeat , and find ways to avoid them  Remove tempting high-calorie foods from your home and workplace  Place a bowl of fresh fruit on your kitchen counter  If stress causes you to eat, then find other ways to cope with stress  · Keep a diary to track what you eat and drink  Also write down how many minutes of physical activity you do each day  Weigh yourself once a week and record it in your diary  Eating changes: You will need to eat 500 to 1,000 fewer calories each day than you currently eat to lose 1 to 2 pounds a week  The following changes will help you cut calories:  · Eat smaller portions  Use small plates, no larger than 9 inches in diameter  Fill your plate half full of fruits and vegetables   Measure your food using measuring cups until you know what a serving size looks like  · Eat 3 meals and 1 or 2 snacks each day  Plan your meals in advance  Stephany Pereyra and eat at home most of the time  Eat slowly  · Eat fruits and vegetables at every meal   They are low in calories and high in fiber, which makes you feel full  Do not add butter, margarine, or cream sauce to vegetables  Use herbs to season steamed vegetables  · Eat less fat and fewer fried foods  Eat more baked or grilled chicken and fish  These protein sources are lower in calories and fat than red meat  Limit fast food  Dress your salads with olive oil and vinegar instead of bottled dressing  · Limit the amount of sugar you eat  Do not drink sugary beverages  Limit alcohol  Activity changes:  Physical activity is good for your body in many ways  It helps you burn calories and build strong muscles  It decreases stress and depression, and improves your mood  It can also help you sleep better  Talk to your healthcare provider before you begin an exercise program   · Exercise for at least 30 minutes 5 days a week  Start slowly  Set aside time each day for physical activity that you enjoy and that is convenient for you  It is best to do both weight training and an activity that increases your heart rate, such as walking, bicycling, or swimming  · Find ways to be more active  Do yard work and housecleaning  Walk up the stairs instead of using elevators  Spend your leisure time going to events that require walking, such as outdoor festivals or fairs  This extra physical activity can help you lose weight and keep it off  Follow up with your healthcare provider as directed: You may need to meet with a dietitian  Write down your questions so you remember to ask them during your visits  © 2017 [employer] Information is for End User's use only and may not be sold, redistributed or otherwise used for commercial purposes   All illustrations and images included in CareNotes® are the copyrighted property of [employer]  or Luis Hernandez  The above information is an  only  It is not intended as medical advice for individual conditions or treatments  Talk to your doctor, nurse or pharmacist before following any medical regimen to see if it is safe and effective for you  Return in about 6 months (around 1/30/2021) for Recheck  Subjective:      Patient ID: Mel Harp is a 69 y o  female  Chief Complaint   Patient presents with    Follow-up     medication review and blood pressure check  [id number]       Pt is here for a 6 month follow up  Pt states she had an EGD - pt was dx with an esophargeal ulcer  No CP, no SOB      The following portions of the patient's history were reviewed and updated as appropriate: allergies, current medications, past family history, past medical history, past social history, past surgical history and problem list     Review of Systems   Constitutional: Negative  Negative for activity change, appetite change, chills, diaphoresis and fatigue  HENT: Negative  Negative for dental problem, ear pain, sinus pressure and sore throat  Eyes: Negative  Negative for photophobia, pain, discharge, redness, itching and visual disturbance  Respiratory: Negative for apnea and chest tightness  Cardiovascular: Negative  Negative for chest pain, palpitations and leg swelling  Gastrointestinal: Negative  Negative for abdominal distention, abdominal pain, constipation and diarrhea  Endocrine: Negative  Negative for cold intolerance and heat intolerance  Genitourinary: Negative  Negative for difficulty urinating and dyspareunia  Musculoskeletal: Negative  Negative for arthralgias and back pain  Skin: Negative  Allergic/Immunologic: Negative for environmental allergies  Neurological: Negative  Negative for dizziness  Psychiatric/Behavioral: Negative  Negative for agitation           Current Outpatient Medications Medication Sig Dispense Refill    atorvastatin (LIPITOR) 20 mg tablet TAKE 1 TABLET BY MOUTH  DAILY AT BEDTIME 90 tablet 1    bumetanide (BUMEX) 1 mg tablet Take 1 tablet (1 mg total) by mouth daily 90 tablet 1    Calcium Carb-Cholecalciferol 600-1000 MG-UNIT CAPS Take 1 capsule by mouth daily        levothyroxine 50 mcg tablet TAKE 1 TABLET BY MOUTH  DAILY 90 tablet 1    losartan (COZAAR) 25 mg tablet TAKE 1 TABLET BY MOUTH  DAILY 90 tablet 1    omeprazole (PriLOSEC) 40 MG capsule Take 1 capsule (40 mg total) by mouth 2 (two) times a day 60 capsule 3    potassium chloride (K-DUR) 10 mEq tablet TAKE 1 TABLET BY MOUTH  DAILY 90 tablet 1    mometasone (ELOCON) 0 1 % cream Apply topically daily (Patient not taking: Reported on 1/27/2020) 45 g 0     No current facility-administered medications for this visit  Facility-Administered Medications Ordered in Other Visits   Medication Dose Route Frequency Provider Last Rate Last Dose    lactated ringers infusion  100 mL/hr Intravenous Continuous Yasmeen Felder MD   Stopped at 07/28/20 1046       Objective:    /66   Pulse 76   Temp 97 8 °F (36 6 °C)   Resp 18   Ht 5' 5" (1 651 m)   Wt 98 kg (216 lb)   SpO2 95%   BMI 35 94 kg/m²        Physical Exam   Constitutional: She appears well-developed and well-nourished  No distress  HENT:   Head: Normocephalic and atraumatic  Right Ear: External ear normal    Left Ear: External ear normal    Nose: Nose normal    Mouth/Throat: Oropharynx is clear and moist  No oropharyngeal exudate  Eyes: Pupils are equal, round, and reactive to light  EOM are normal  Right eye exhibits no discharge  Left eye exhibits no discharge  No scleral icterus  Neck: No thyromegaly present  Cardiovascular: Normal rate and normal heart sounds  No murmur heard  Pulmonary/Chest: Effort normal and breath sounds normal  No respiratory distress  She has no wheezes  Abdominal: Soft   Bowel sounds are normal  She exhibits no distension and no mass  There is no tenderness  There is no rebound and no guarding  Musculoskeletal: Normal range of motion  Neurological: She is alert  She displays normal reflexes  Coordination normal    Skin: Skin is warm and dry  No rash noted  She is not diaphoretic  No erythema  Psychiatric: She has a normal mood and affect  Her behavior is normal    Nursing note and vitals reviewed  Genevieve Elder DO  BMI Counseling: Body mass index is 35 94 kg/m²  The BMI is above normal  Nutrition recommendations include reducing portion sizes

## 2020-08-09 DIAGNOSIS — E78.2 MIXED HYPERLIPIDEMIA: ICD-10-CM

## 2020-08-09 DIAGNOSIS — E03.0 CONGENITAL HYPOTHYROIDISM WITH DIFFUSE GOITER: ICD-10-CM

## 2020-08-09 DIAGNOSIS — I10 BENIGN HYPERTENSION: ICD-10-CM

## 2020-08-10 ENCOUNTER — TRANSCRIBE ORDERS (OUTPATIENT)
Dept: ADMINISTRATIVE | Facility: HOSPITAL | Age: 70
End: 2020-08-10

## 2020-08-10 ENCOUNTER — APPOINTMENT (OUTPATIENT)
Dept: LAB | Facility: HOSPITAL | Age: 70
End: 2020-08-10
Attending: FAMILY MEDICINE
Payer: MEDICARE

## 2020-08-10 ENCOUNTER — TELEPHONE (OUTPATIENT)
Dept: GASTROENTEROLOGY | Facility: CLINIC | Age: 70
End: 2020-08-10

## 2020-08-10 DIAGNOSIS — E78.2 MIXED HYPERLIPIDEMIA: ICD-10-CM

## 2020-08-10 DIAGNOSIS — I10 BENIGN HYPERTENSION: ICD-10-CM

## 2020-08-10 DIAGNOSIS — E03.0 CONGENITAL HYPOTHYROIDISM WITH DIFFUSE GOITER: ICD-10-CM

## 2020-08-10 DIAGNOSIS — D51.0 ANEMIA, PERNICIOUS: ICD-10-CM

## 2020-08-10 LAB
ALBUMIN SERPL BCP-MCNC: 3.4 G/DL (ref 3.5–5)
ALP SERPL-CCNC: 56 U/L (ref 46–116)
ALT SERPL W P-5'-P-CCNC: 33 U/L (ref 12–78)
ANION GAP SERPL CALCULATED.3IONS-SCNC: 8 MMOL/L (ref 4–13)
AST SERPL W P-5'-P-CCNC: 31 U/L (ref 5–45)
BILIRUB SERPL-MCNC: 0.6 MG/DL (ref 0.2–1)
BUN SERPL-MCNC: 9 MG/DL (ref 5–25)
CALCIUM SERPL-MCNC: 8.5 MG/DL (ref 8.3–10.1)
CHLORIDE SERPL-SCNC: 103 MMOL/L (ref 100–108)
CHOLEST SERPL-MCNC: 138 MG/DL (ref 50–200)
CO2 SERPL-SCNC: 26 MMOL/L (ref 21–32)
CREAT SERPL-MCNC: 0.8 MG/DL (ref 0.6–1.3)
ERYTHROCYTE [DISTWIDTH] IN BLOOD BY AUTOMATED COUNT: 12.7 % (ref 11.6–15.1)
GFR SERPL CREATININE-BSD FRML MDRD: 74 ML/MIN/1.73SQ M
GLUCOSE P FAST SERPL-MCNC: 97 MG/DL (ref 65–99)
HCT VFR BLD AUTO: 42.8 % (ref 34.8–46.1)
HDLC SERPL-MCNC: 49 MG/DL
HGB BLD-MCNC: 14.1 G/DL (ref 11.5–15.4)
LDLC SERPL CALC-MCNC: 70 MG/DL (ref 0–100)
MCH RBC QN AUTO: 30.3 PG (ref 26.8–34.3)
MCHC RBC AUTO-ENTMCNC: 32.9 G/DL (ref 31.4–37.4)
MCV RBC AUTO: 92 FL (ref 82–98)
PLATELET # BLD AUTO: 200 THOUSANDS/UL (ref 149–390)
PMV BLD AUTO: 10.6 FL (ref 8.9–12.7)
POTASSIUM SERPL-SCNC: 3.6 MMOL/L (ref 3.5–5.3)
PROT SERPL-MCNC: 6.6 G/DL (ref 6.4–8.2)
RBC # BLD AUTO: 4.65 MILLION/UL (ref 3.81–5.12)
SODIUM SERPL-SCNC: 137 MMOL/L (ref 136–145)
TRIGL SERPL-MCNC: 96 MG/DL
TSH SERPL DL<=0.05 MIU/L-ACNC: 0.44 UIU/ML (ref 0.36–3.74)
WBC # BLD AUTO: 4.05 THOUSAND/UL (ref 4.31–10.16)

## 2020-08-10 PROCEDURE — 84443 ASSAY THYROID STIM HORMONE: CPT

## 2020-08-10 PROCEDURE — 80061 LIPID PANEL: CPT

## 2020-08-10 PROCEDURE — 85027 COMPLETE CBC AUTOMATED: CPT

## 2020-08-10 PROCEDURE — 80053 COMPREHEN METABOLIC PANEL: CPT

## 2020-08-10 PROCEDURE — 36415 COLL VENOUS BLD VENIPUNCTURE: CPT

## 2020-08-10 RX ORDER — ATORVASTATIN CALCIUM 20 MG/1
TABLET, FILM COATED ORAL
Qty: 90 TABLET | Refills: 3 | Status: SHIPPED | OUTPATIENT
Start: 2020-08-10 | End: 2021-06-30

## 2020-08-10 RX ORDER — LEVOTHYROXINE SODIUM 0.05 MG/1
TABLET ORAL
Qty: 90 TABLET | Refills: 3 | Status: SHIPPED | OUTPATIENT
Start: 2020-08-10 | End: 2021-06-30

## 2020-08-10 RX ORDER — LOSARTAN POTASSIUM 25 MG/1
TABLET ORAL
Qty: 90 TABLET | Refills: 3 | Status: SHIPPED | OUTPATIENT
Start: 2020-08-10 | End: 2021-06-30

## 2020-08-10 NOTE — TELEPHONE ENCOUNTER
Patients GI provider:  Dr Beto Vivas    Number to return call: [phone]    Reason for call: Pt calling to sched her repeat egd in 2 months    Scheduled procedure/appointment date if applicable: NA

## 2020-08-17 ENCOUNTER — PREP FOR PROCEDURE (OUTPATIENT)
Dept: GASTROENTEROLOGY | Facility: CLINIC | Age: 70
End: 2020-08-17

## 2020-08-17 DIAGNOSIS — Z20.822 ENCOUNTER FOR LABORATORY TESTING FOR COVID-19 VIRUS: ICD-10-CM

## 2020-08-17 DIAGNOSIS — K21.9 HIATAL HERNIA WITH GERD WITHOUT ESOPHAGITIS: ICD-10-CM

## 2020-08-17 DIAGNOSIS — K44.9 HIATAL HERNIA WITH GERD WITHOUT ESOPHAGITIS: ICD-10-CM

## 2020-08-17 DIAGNOSIS — R13.19 ESOPHAGEAL DYSPHAGIA: Primary | ICD-10-CM

## 2020-08-17 NOTE — TELEPHONE ENCOUNTER
Pt returned call, scheduled repeat EGD with Dr Ayad Layne on 9/22 at [place] [address]  Pt is aware to get covid testing done on 9/16  Reviewed prep, reminded needs a  and will get a call the day before with the arrival time  Mailed prep and covid testing info to pt

## 2020-09-16 DIAGNOSIS — Z20.822 ENCOUNTER FOR LABORATORY TESTING FOR COVID-19 VIRUS: ICD-10-CM

## 2020-09-16 PROCEDURE — U0003 INFECTIOUS AGENT DETECTION BY NUCLEIC ACID (DNA OR RNA); SEVERE ACUTE RESPIRATORY SYNDROME CORONAVIRUS 2 (SARS-COV-2) (CORONAVIRUS DISEASE [COVID-19]), AMPLIFIED PROBE TECHNIQUE, MAKING USE OF HIGH THROUGHPUT TECHNOLOGIES AS DESCRIBED BY CMS-2020-01-R: HCPCS

## 2020-09-17 LAB — SARS-COV-2 N GENE RESP QL NAA+PROBE: NEGATIVE

## 2020-09-21 ENCOUNTER — ANESTHESIA EVENT (OUTPATIENT)
Dept: GASTROENTEROLOGY | Facility: AMBULARY SURGERY CENTER | Age: 70
End: 2020-09-21

## 2020-09-21 PROBLEM — M06.9 RHEUMATOID ARTHRITIS (HCC): Status: ACTIVE | Noted: 2020-09-21

## 2020-09-21 PROBLEM — J45.909 ASTHMA: Status: ACTIVE | Noted: 2020-09-21

## 2020-09-21 PROBLEM — F41.9 ANXIETY: Status: ACTIVE | Noted: 2020-09-21

## 2020-09-21 PROBLEM — Z90.710 HISTORY OF HYSTERECTOMY: Status: ACTIVE | Noted: 2020-09-21

## 2020-09-21 PROBLEM — Z98.61 HISTORY OF PTCA: Status: ACTIVE | Noted: 2020-09-21

## 2020-09-21 PROBLEM — N18.9 CHRONIC KIDNEY DISEASE: Status: ACTIVE | Noted: 2020-09-21

## 2020-09-21 PROBLEM — G70.9 NEUROMUSCULAR DISEASE (HCC): Status: ACTIVE | Noted: 2020-09-21

## 2020-09-21 NOTE — ANESTHESIA PREPROCEDURE EVALUATION
Procedure:  EGD    Relevant Problems   CARDIO   (+) Benign hypertension   (+) Coronary artery disease involving native coronary artery of native heart without angina pectoris   (+) History of PTCA   (+) Mixed hyperlipidemia      ENDO   (+) Congenital hypothyroidism with diffuse goiter      GI/HEPATIC  h/o bleeding ulcer; IBS   (+) Dysphagia   (+) Gastroesophageal reflux disease   (+) Hiatal hernia      /RENAL   (+) Chronic kidney disease      GYN   (+) History of hysterectomy      HEMATOLOGY   (+) Anemia, pernicious      MUSCULOSKELETAL   (+) Rheumatoid arthritis (HCC)      NEURO/PSYCH   (+) Anxiety   (+) Vertigo      PULMONARY   (+) Asthma        Physical Exam    Airway    Mallampati score: II  TM Distance: >3 FB  Neck ROM: full     Dental       Cardiovascular  Rhythm: regular, Rate: normal,     Pulmonary  Breath sounds clear to auscultation,     Other Findings        Anesthesia Plan  ASA Score- 3     Anesthesia Type- IV sedation with anesthesia with ASA Monitors  Additional Monitors:   Airway Plan:           Plan Factors-    Chart reviewed  Patient is not a current smoker  Induction- intravenous  Postoperative Plan-     Informed Consent- Anesthetic plan and risks discussed with patient  I personally reviewed this patient with the CRNA  Discussed and agreed on the Anesthesia Plan with the CRNA  Claudia Love

## 2020-09-21 NOTE — PRE-PROCEDURE INSTRUCTIONS
Pre-Surgery Instructions:   Medication Instructions    atorvastatin (LIPITOR) 20 mg tablet Instructed patient per Anesthesia Guidelines   bumetanide (BUMEX) 1 mg tablet Instructed patient per Anesthesia Guidelines   Calcium Carb-Cholecalciferol 600-1000 MG-UNIT CAPS Instructed patient per Anesthesia Guidelines   levothyroxine 50 mcg tablet Instructed patient per Anesthesia Guidelines   losartan (COZAAR) 25 mg tablet Instructed patient per Anesthesia Guidelines   omeprazole (PriLOSEC) 40 MG capsule Instructed patient per Anesthesia Guidelines   potassium chloride (K-DUR) 10 mEq tablet Instructed patient per Anesthesia Guidelines  Pt to follow Dr Irwin Franks instructions  Pt to take losartan the am of the procedure     Lyric Capps [phone]

## 2020-09-22 ENCOUNTER — HOSPITAL ENCOUNTER (OUTPATIENT)
Dept: GASTROENTEROLOGY | Facility: AMBULARY SURGERY CENTER | Age: 70
Setting detail: OUTPATIENT SURGERY
Discharge: HOME | End: 2020-09-22
Attending: INTERNAL MEDICINE
Payer: MEDICARE

## 2020-09-22 ENCOUNTER — ANESTHESIA (OUTPATIENT)
Dept: GASTROENTEROLOGY | Facility: AMBULARY SURGERY CENTER | Age: 70
End: 2020-09-22

## 2020-09-22 VITALS
HEART RATE: 59 BPM | BODY MASS INDEX: 34.99 KG/M2 | WEIGHT: 210 LBS | SYSTOLIC BLOOD PRESSURE: 141 MMHG | DIASTOLIC BLOOD PRESSURE: 79 MMHG | OXYGEN SATURATION: 95 % | HEIGHT: 65 IN | TEMPERATURE: 96.3 F | RESPIRATION RATE: 16 BRPM

## 2020-09-22 VITALS — HEART RATE: 66 BPM

## 2020-09-22 DIAGNOSIS — K44.9 HIATAL HERNIA WITH GERD WITHOUT ESOPHAGITIS: ICD-10-CM

## 2020-09-22 DIAGNOSIS — R13.19 ESOPHAGEAL DYSPHAGIA: ICD-10-CM

## 2020-09-22 DIAGNOSIS — K21.9 HIATAL HERNIA WITH GERD WITHOUT ESOPHAGITIS: ICD-10-CM

## 2020-09-22 PROCEDURE — C1726 CATH, BAL DIL, NON-VASCULAR: HCPCS

## 2020-09-22 PROCEDURE — 43249 ESOPH EGD DILATION <30 MM: CPT | Performed by: INTERNAL MEDICINE

## 2020-09-22 RX ORDER — PROPOFOL 10 MG/ML
INJECTION, EMULSION INTRAVENOUS AS NEEDED
Status: DISCONTINUED | OUTPATIENT
Start: 2020-09-22 | End: 2020-09-22

## 2020-09-22 RX ORDER — LIDOCAINE HYDROCHLORIDE 10 MG/ML
INJECTION, SOLUTION EPIDURAL; INFILTRATION; INTRACAUDAL; PERINEURAL AS NEEDED
Status: DISCONTINUED | OUTPATIENT
Start: 2020-09-22 | End: 2020-09-22

## 2020-09-22 RX ORDER — SODIUM CHLORIDE, SODIUM LACTATE, POTASSIUM CHLORIDE, CALCIUM CHLORIDE 600; 310; 30; 20 MG/100ML; MG/100ML; MG/100ML; MG/100ML
75 INJECTION, SOLUTION INTRAVENOUS CONTINUOUS
Status: DISCONTINUED | OUTPATIENT
Start: 2020-09-22 | End: 2020-09-26 | Stop reason: HOSPADM

## 2020-09-22 RX ADMIN — SODIUM CHLORIDE, SODIUM LACTATE, POTASSIUM CHLORIDE, AND CALCIUM CHLORIDE: .6; .31; .03; .02 INJECTION, SOLUTION INTRAVENOUS at 09:08

## 2020-09-22 RX ADMIN — PROPOFOL 50 MG: 10 INJECTION, EMULSION INTRAVENOUS at 09:18

## 2020-09-22 RX ADMIN — SODIUM CHLORIDE, SODIUM LACTATE, POTASSIUM CHLORIDE, AND CALCIUM CHLORIDE 75 ML/HR: .6; .31; .03; .02 INJECTION, SOLUTION INTRAVENOUS at 08:16

## 2020-09-22 RX ADMIN — PROPOFOL 150 MG: 10 INJECTION, EMULSION INTRAVENOUS at 09:13

## 2020-09-22 RX ADMIN — LIDOCAINE HYDROCHLORIDE 50 MG: 10 INJECTION, SOLUTION EPIDURAL; INFILTRATION; INTRACAUDAL; PERINEURAL at 09:13

## 2020-09-22 NOTE — H&P
History and Physical -  [facility]  Fay Montalvo 69 y o  female MRN: [id number]    HPI: Fay Montalvo is a 69y o  year old female who presents with follow up esophageal ulcer  Review of Systems    Historical Information   Past Medical History:   Diagnosis Date    Anxiety     resolved 10/4/17    Asthma     seasonal    Chronic kidney disease     kidney stone    Colon polyp     Disease of thyroid gland     Dysphagia     GERD (gastroesophageal reflux disease)     Goiter, nodular     partial thyroidectomy    Hiatal hernia     Hiatal hernia with GERD without esophagitis 04/2018    surgical correction    History of esophageal varices with bleeding     History of pernicious anemia     History of transfusion     x1 after bleeding ulcer    Hyperlipidemia     Hypertension     Irritable bowel syndrome     [facility]) ([facility] [address] )     Seasonal allergies     Vertigo     Wears glasses      Past Surgical History:   Procedure Laterality Date    BREAST BIOPSY Bilateral     negative    CARDIAC CATHETERIZATION      x2 secondary to exertional chest pain, due to lung issues, possible mild COPD    CHOLECYSTECTOMY  2015    lap - last assessed 10/4/17    COLONOSCOPY      complete    ESOPHAGOGASTRODUODENOSCOPY N/A 1/23/2018    Procedure: ESOPHAGOGASTRODUODENOSCOPY (EGD); Surgeon: Monica Bender MD;  [facility]; Service: Gastroenterology    HYSTERECTOMY      partial - ovaries remain    LIPOMA RESECTION      right thigh    OR ESOPHAGOSCOPY FLEXIBLE TRANSORAL WITH BIOPSY N/A 4/11/2018    Procedure: ESOPHAGOGASTRODUODENOSCOPY (EGD);   Surgeon: Heath Peters MD;  [facility]: [facility];  Service: Thoracic    OR LAP, REPAIR PARAESOPHAGEAL HERNIA, INCL FUNDOPLASTY W/ MESH N/A 4/11/2018    Procedure: REPAIR HERNIA PARAESOPHAGEAL  LAPAROSCOPIC,ELEONORA GASTROPLASTY, Latrelle Shallow FUNDOPLICATION;  Surgeon: Heath Peters MD;  Location: [facility];  Service: Thoracic    SKIN BIOPSY Right     lump benign - last assessed 10/4/17    THYROIDECTOMY, PARTIAL  1977    TONSILLECTOMY       Social History   Social History     Substance and Sexual Activity   Alcohol Use Yes    Frequency: Monthly or less    Comment: occ     Social History     Substance and Sexual Activity   Drug Use No     Social History     Tobacco Use   Smoking Status Never Smoker   Smokeless Tobacco Never Used     Family History   Problem Relation Age of Onset    Alzheimer's disease Mother     Cancer Mother         skin     Thyroid disease Mother     Ulcerative colitis Mother     Cancer Father         prostate    Stroke Father     Hypertension Father     Prostate cancer Father     Thyroid disease Sister     Ulcerative colitis Sister     Other Sister         open heart surgery    Heart disease Sister     Hyperlipidemia Brother     No Known Problems Son     No Known Problems Son     No Known Problems Maternal Grandmother     No Known Problems Paternal Grandmother     Mental illness Neg Hx        Meds/Allergies     (Not in a hospital admission)      Allergies   Allergen Reactions    Orange (Diagnostic) Anaphylaxis     Throat closes    Pantoprazole Headache    Penicillins Other (See Comments)     During allergy testing instructed to NEVER take PCN    Gluten Meal      Following a gluten free diet on her own    Lactose Diarrhea       Objective     /71   Pulse 64   Temp (!) 96 3 °F (35 7 °C) (Temporal)   Resp 14   Ht 5' 5" (1 651 m)   Wt 95 3 kg (210 lb)   SpO2 96%   BMI 34 95 kg/m²       PHYSICAL EXAM    Gen: NAD  CV: RRR  CHEST: Clear  ABD: soft, NT/ND  EXT: no edema  Neuro: AAO      ASSESSMENT/PLAN:  This is a 69y o  year old female here for follow up esophageal ulcer         PLAN:   Procedure: Zoë Mckeon

## 2020-09-22 NOTE — ANESTHESIA POSTPROCEDURE EVALUATION
Post-Op Assessment Note    CV Status:  Stable  Pain Score: 0    Pain management: adequate     Mental Status:  Sleepy   Hydration Status:  Stable   PONV Controlled:  None   Airway Patency:  Patent      Post Op Vitals Reviewed: Yes      Staff: Anesthesiologist         No complications documented      BP      Temp     Pulse     Resp      SpO2

## 2020-10-06 ENCOUNTER — TELEPHONE (OUTPATIENT)
Dept: GASTROENTEROLOGY | Facility: AMBULARY SURGERY CENTER | Age: 70
End: 2020-10-06

## 2020-10-06 ENCOUNTER — TRANSCRIBE ORDERS (OUTPATIENT)
Dept: ADMINISTRATIVE | Facility: HOSPITAL | Age: 70
End: 2020-10-06

## 2020-10-06 DIAGNOSIS — R19.7 ACUTE DIARRHEA: Primary | ICD-10-CM

## 2020-10-07 ENCOUNTER — APPOINTMENT (OUTPATIENT)
Dept: LAB | Facility: HOSPITAL | Age: 70
End: 2020-10-07
Payer: MEDICARE

## 2020-10-07 ENCOUNTER — TRANSCRIBE ORDERS (OUTPATIENT)
Dept: ADMINISTRATIVE | Facility: HOSPITAL | Age: 70
End: 2020-10-07

## 2020-10-07 DIAGNOSIS — R19.7 ACUTE DIARRHEA: ICD-10-CM

## 2020-10-07 LAB — C DIFF TOX B TCDB STL QL NAA+PROBE: NEGATIVE

## 2020-10-07 PROCEDURE — 87427 SHIGA-LIKE TOXIN AG IA: CPT

## 2020-10-07 PROCEDURE — 36415 COLL VENOUS BLD VENIPUNCTURE: CPT

## 2020-10-07 PROCEDURE — 87045 FECES CULTURE AEROBIC BACT: CPT

## 2020-10-07 PROCEDURE — 87046 STOOL CULTR AEROBIC BACT EA: CPT

## 2020-10-07 PROCEDURE — 87493 C DIFF AMPLIFIED PROBE: CPT

## 2020-10-13 LAB
Lab: 8144
Lab: NORMAL
Lab: NORMAL
MISCELLANEOUS LAB TEST RESULT: NORMAL

## 2020-10-17 ENCOUNTER — TELEPHONE (OUTPATIENT)
Dept: GASTROENTEROLOGY | Facility: CLINIC | Age: 70
End: 2020-10-17

## 2020-10-17 ENCOUNTER — TELEPHONE (OUTPATIENT)
Dept: OTHER | Facility: OTHER | Age: 70
End: 2020-10-17

## 2020-10-18 DIAGNOSIS — I10 BENIGN HYPERTENSION: ICD-10-CM

## 2020-10-19 ENCOUNTER — TELEPHONE (OUTPATIENT)
Dept: GASTROENTEROLOGY | Facility: AMBULARY SURGERY CENTER | Age: 70
End: 2020-10-19

## 2020-10-19 RX ORDER — BUMETANIDE 1 MG/1
TABLET ORAL
Qty: 90 TABLET | Refills: 3 | Status: SHIPPED | OUTPATIENT
Start: 2020-10-19 | End: 2021-09-14

## 2020-11-16 DIAGNOSIS — K44.9 HIATAL HERNIA WITH GERD WITHOUT ESOPHAGITIS: ICD-10-CM

## 2020-11-16 DIAGNOSIS — K21.9 HIATAL HERNIA WITH GERD WITHOUT ESOPHAGITIS: ICD-10-CM

## 2020-11-16 DIAGNOSIS — R13.19 ESOPHAGEAL DYSPHAGIA: ICD-10-CM

## 2020-11-16 RX ORDER — OMEPRAZOLE 40 MG/1
40 CAPSULE, DELAYED RELEASE ORAL 2 TIMES DAILY
Qty: 180 CAPSULE | Refills: 1 | Status: SHIPPED | OUTPATIENT
Start: 2020-11-16 | End: 2021-03-19

## 2021-01-04 DIAGNOSIS — I10 ESSENTIAL HYPERTENSION: ICD-10-CM

## 2021-01-05 RX ORDER — POTASSIUM CHLORIDE 750 MG/1
TABLET, FILM COATED, EXTENDED RELEASE ORAL
Qty: 90 TABLET | Refills: 3 | Status: SHIPPED | OUTPATIENT
Start: 2021-01-05 | End: 2021-12-03

## 2021-02-23 ENCOUNTER — IMMUNIZATIONS (OUTPATIENT)
Dept: FAMILY MEDICINE CLINIC | Facility: HOSPITAL | Age: 71
End: 2021-02-23

## 2021-02-23 DIAGNOSIS — Z23 ENCOUNTER FOR IMMUNIZATION: Primary | ICD-10-CM

## 2021-02-23 PROCEDURE — 91301 SARS-COV-2 / COVID-19 MRNA VACCINE (MODERNA) 100 MCG: CPT

## 2021-02-23 PROCEDURE — 0011A SARS-COV-2 / COVID-19 MRNA VACCINE (MODERNA) 100 MCG: CPT

## 2021-03-18 DIAGNOSIS — R13.19 ESOPHAGEAL DYSPHAGIA: ICD-10-CM

## 2021-03-18 DIAGNOSIS — K21.9 HIATAL HERNIA WITH GERD WITHOUT ESOPHAGITIS: ICD-10-CM

## 2021-03-18 DIAGNOSIS — K44.9 HIATAL HERNIA WITH GERD WITHOUT ESOPHAGITIS: ICD-10-CM

## 2021-03-19 RX ORDER — OMEPRAZOLE 40 MG/1
CAPSULE, DELAYED RELEASE ORAL
Qty: 180 CAPSULE | Refills: 0 | Status: SHIPPED | OUTPATIENT
Start: 2021-03-19 | End: 2021-06-08

## 2021-03-26 ENCOUNTER — IMMUNIZATIONS (OUTPATIENT)
Dept: FAMILY MEDICINE CLINIC | Facility: HOSPITAL | Age: 71
End: 2021-03-26

## 2021-03-26 DIAGNOSIS — Z23 ENCOUNTER FOR IMMUNIZATION: Primary | ICD-10-CM

## 2021-03-26 PROCEDURE — 91301 SARS-COV-2 / COVID-19 MRNA VACCINE (MODERNA) 100 MCG: CPT

## 2021-03-26 PROCEDURE — 0012A SARS-COV-2 / COVID-19 MRNA VACCINE (MODERNA) 100 MCG: CPT

## 2021-04-23 ENCOUNTER — HOSPITAL ENCOUNTER (OUTPATIENT)
Dept: RADIOLOGY | Facility: HOSPITAL | Age: 71
Discharge: HOME | End: 2021-04-23
Attending: FAMILY MEDICINE
Payer: MEDICARE

## 2021-04-23 ENCOUNTER — LAB (OUTPATIENT)
Dept: LAB | Facility: HOSPITAL | Age: 71
End: 2021-04-23
Attending: FAMILY MEDICINE
Payer: MEDICARE

## 2021-04-23 ENCOUNTER — TRANSCRIBE ORDERS (OUTPATIENT)
Dept: ADMINISTRATIVE | Facility: HOSPITAL | Age: 71
End: 2021-04-23

## 2021-04-23 ENCOUNTER — OFFICE VISIT (OUTPATIENT)
Dept: FAMILY MEDICINE CLINIC | Facility: CLINIC | Age: 71
End: 2021-04-23
Payer: MEDICARE

## 2021-04-23 VITALS
HEIGHT: 65 IN | TEMPERATURE: 97.3 F | WEIGHT: 219.2 LBS | SYSTOLIC BLOOD PRESSURE: 122 MMHG | BODY MASS INDEX: 36.52 KG/M2 | DIASTOLIC BLOOD PRESSURE: 78 MMHG | RESPIRATION RATE: 12 BRPM | OXYGEN SATURATION: 98 % | HEART RATE: 90 BPM

## 2021-04-23 DIAGNOSIS — R59.1 LYMPHADENOPATHY OF HEAD AND NECK: Primary | ICD-10-CM

## 2021-04-23 DIAGNOSIS — M79.605 PAIN IN BOTH LOWER EXTREMITIES: ICD-10-CM

## 2021-04-23 DIAGNOSIS — M79.604 PAIN IN BOTH LOWER EXTREMITIES: ICD-10-CM

## 2021-04-23 DIAGNOSIS — M79.89 LEG SWELLING: ICD-10-CM

## 2021-04-23 DIAGNOSIS — R59.1 LYMPHADENOPATHY OF HEAD AND NECK: ICD-10-CM

## 2021-04-23 LAB
ERYTHROCYTE [DISTWIDTH] IN BLOOD BY AUTOMATED COUNT: 12.9 % (ref 11.6–15.1)
HCT VFR BLD AUTO: 44.2 % (ref 34.8–46.1)
HGB BLD-MCNC: 14.2 G/DL (ref 11.5–15.4)
MCH RBC QN AUTO: 30.1 PG (ref 26.8–34.3)
MCHC RBC AUTO-ENTMCNC: 32.1 G/DL (ref 31.4–37.4)
MCV RBC AUTO: 94 FL (ref 82–98)
PLATELET # BLD AUTO: 202 THOUSANDS/UL (ref 149–390)
PMV BLD AUTO: 10.3 FL (ref 8.9–12.7)
RBC # BLD AUTO: 4.72 MILLION/UL (ref 3.81–5.12)
WBC # BLD AUTO: 5.63 THOUSAND/UL (ref 4.31–10.16)

## 2021-04-23 PROCEDURE — 99214 OFFICE O/P EST MOD 30 MIN: CPT | Performed by: FAMILY MEDICINE

## 2021-04-23 PROCEDURE — 36415 COLL VENOUS BLD VENIPUNCTURE: CPT

## 2021-04-23 PROCEDURE — 93970 EXTREMITY STUDY: CPT | Performed by: SURGERY

## 2021-04-23 PROCEDURE — 93970 EXTREMITY STUDY: CPT

## 2021-04-23 PROCEDURE — 85027 COMPLETE CBC AUTOMATED: CPT

## 2021-04-23 NOTE — PROGRESS NOTES
Assessment/Plan:    1  Lymphadenopathy of head and neck  -     US head neck soft tissue; Future; Expected date: 04/23/2021  -     CBC; Future    2  Leg swelling  -     VAS lower limb venous duplex study, unilateral/limited; Future; Expected date: 04/23/2021    3  Pain in both lower extremities  -     VAS lower limb venous duplex study, unilateral/limited; Future; Expected date: 04/23/2021            There are no Patient Instructions on file for this visit  No follow-ups on file  Subjective:      Patient ID: Figueroa Wagner is a 69 y o  female  Chief Complaint   Patient presents with    Facial Pain     Left side of jaw is swollen/gland swollen  Was at dentist and they did xrays which showed it is not tooth related  She states swelling has come and gone since October and it has caused pain in her neck and ear left side and trouble sleeping  No fevers per patient   Leg Pain     Pain/swelling sensation behind both knees  Patient states her and her  walk at least a mile everyday and since she received her covid vaccine she has noticed this issue more frequently  Pt states she has a swollen gland under her jaw left side since October  PT states it stsrated after she had dental work done  PT was seen by dentist yesterday and he told her it was not her teeth to call her family doc  No fever no chills  Pt states while she is here her legs behind her knees, she finds that she has fluid mostly  around her knees  Pt states she sits at the couch at night its hard to gert up at night  Mostly on the rt side  This is since her covid shot  Rt leg is swollen  Leg is tender to palpation   NO SOB           The following portions of the patient's history were reviewed and updated as appropriate: allergies, current medications, past family history, past medical history, past social history, past surgical history and problem list     Review of Systems   Constitutional: Negative    Negative for activity change, appetite change, chills, diaphoresis and fatigue  HENT: Negative  Negative for dental problem, ear pain, sinus pressure and sore throat  Lump under left jaw   Eyes: Negative  Negative for photophobia, pain, discharge, redness, itching and visual disturbance  Respiratory: Negative for apnea and chest tightness  Cardiovascular: Positive for leg swelling  Negative for chest pain and palpitations  Gastrointestinal: Negative  Negative for abdominal distention, abdominal pain, constipation and diarrhea  Endocrine: Negative  Negative for cold intolerance and heat intolerance  Genitourinary: Negative  Negative for difficulty urinating and dyspareunia  Musculoskeletal: Negative  Negative for arthralgias and back pain  Skin: Negative  Allergic/Immunologic: Negative for environmental allergies  Neurological: Negative  Negative for dizziness  Psychiatric/Behavioral: Negative  Negative for agitation  Current Outpatient Medications   Medication Sig Dispense Refill    atorvastatin (LIPITOR) 20 mg tablet TAKE 1 TABLET BY MOUTH  DAILY AT BEDTIME (Patient taking differently: 20 mg every morning ) 90 tablet 3    bumetanide (BUMEX) 1 mg tablet TAKE 1 TABLET BY MOUTH  DAILY 90 tablet 3    Calcium Carb-Cholecalciferol 600-1000 MG-UNIT CAPS Take 1 capsule by mouth every morning       levothyroxine 50 mcg tablet TAKE 1 TABLET BY MOUTH  DAILY (Patient taking differently: 50 mcg every morning ) 90 tablet 3    losartan (COZAAR) 25 mg tablet TAKE 1 TABLET BY MOUTH  DAILY (Patient taking differently: 25 mg every morning ) 90 tablet 3    omeprazole (PriLOSEC) 40 MG capsule TAKE 1 CAPSULE BY MOUTH  TWICE DAILY 180 capsule 0    potassium chloride (Klor-Con) 10 mEq tablet TAKE 1 TABLET BY MOUTH  DAILY 90 tablet 3     No current facility-administered medications for this visit          Objective:    /78   Pulse 90   Temp (!) 97 3 °F (36 3 °C) (Tympanic)   Resp 12   Ht 5' 5" (1 651 m)   Wt 99 4 kg (219 lb 3 2 oz)   SpO2 98%   BMI 36 48 kg/m²        Physical Exam  Vitals signs and nursing note reviewed  Constitutional:       General: She is not in acute distress  Appearance: She is well-developed  She is not diaphoretic  HENT:      Head: Normocephalic and atraumatic  Right Ear: External ear normal       Left Ear: External ear normal       Nose: Nose normal       Mouth/Throat:      Pharynx: No oropharyngeal exudate  Eyes:      General: No scleral icterus  Right eye: No discharge  Left eye: No discharge  Pupils: Pupils are equal, round, and reactive to light  Neck:      Thyroid: No thyromegaly  Cardiovascular:      Rate and Rhythm: Normal rate  Heart sounds: Normal heart sounds  No murmur  Pulmonary:      Effort: Pulmonary effort is normal  No respiratory distress  Breath sounds: Normal breath sounds  No wheezing  Abdominal:      General: Bowel sounds are normal  There is no distension  Palpations: Abdomen is soft  There is no mass  Tenderness: There is no abdominal tenderness  There is no guarding or rebound  Musculoskeletal: Normal range of motion  Right lower leg: Edema present  Left lower leg: Edema present  Lymphadenopathy:      Cervical: Cervical adenopathy present  Skin:     General: Skin is warm and dry  Findings: No erythema or rash  Neurological:      Mental Status: She is alert        Coordination: Coordination normal       Deep Tendon Reflexes: Reflexes normal    Psychiatric:         Behavior: Behavior normal                 Thien Kam DO

## 2021-04-30 ENCOUNTER — HOSPITAL ENCOUNTER (OUTPATIENT)
Dept: RADIOLOGY | Facility: HOSPITAL | Age: 71
Discharge: HOME | End: 2021-04-30
Attending: FAMILY MEDICINE
Payer: MEDICARE

## 2021-04-30 DIAGNOSIS — R59.1 LYMPHADENOPATHY OF HEAD AND NECK: ICD-10-CM

## 2021-04-30 PROCEDURE — 76536 US EXAM OF HEAD AND NECK: CPT

## 2021-06-07 DIAGNOSIS — K44.9 HIATAL HERNIA WITH GERD WITHOUT ESOPHAGITIS: ICD-10-CM

## 2021-06-07 DIAGNOSIS — K21.9 HIATAL HERNIA WITH GERD WITHOUT ESOPHAGITIS: ICD-10-CM

## 2021-06-07 DIAGNOSIS — R13.19 ESOPHAGEAL DYSPHAGIA: ICD-10-CM

## 2021-06-08 RX ORDER — OMEPRAZOLE 40 MG/1
CAPSULE, DELAYED RELEASE ORAL
Qty: 180 CAPSULE | Refills: 0 | Status: SHIPPED | OUTPATIENT
Start: 2021-06-08 | End: 2021-08-13 | Stop reason: SDUPTHER

## 2021-06-29 DIAGNOSIS — E03.0 CONGENITAL HYPOTHYROIDISM WITH DIFFUSE GOITER: ICD-10-CM

## 2021-06-29 DIAGNOSIS — I10 BENIGN HYPERTENSION: ICD-10-CM

## 2021-06-29 DIAGNOSIS — E78.2 MIXED HYPERLIPIDEMIA: ICD-10-CM

## 2021-06-30 RX ORDER — LEVOTHYROXINE SODIUM 0.05 MG/1
TABLET ORAL
Qty: 90 TABLET | Refills: 3 | Status: SHIPPED | OUTPATIENT
Start: 2021-06-30 | End: 2022-06-02

## 2021-06-30 RX ORDER — ATORVASTATIN CALCIUM 20 MG/1
TABLET, FILM COATED ORAL
Qty: 90 TABLET | Refills: 3 | Status: SHIPPED | OUTPATIENT
Start: 2021-06-30 | End: 2022-06-02

## 2021-06-30 RX ORDER — LOSARTAN POTASSIUM 25 MG/1
TABLET ORAL
Qty: 90 TABLET | Refills: 3 | Status: SHIPPED | OUTPATIENT
Start: 2021-06-30 | End: 2022-01-10

## 2021-08-02 ENCOUNTER — TELEPHONE (OUTPATIENT)
Dept: DERMATOLOGY | Facility: CLINIC | Age: 71
End: 2021-08-02

## 2021-08-02 ENCOUNTER — OFFICE VISIT (OUTPATIENT)
Dept: FAMILY MEDICINE CLINIC | Facility: CLINIC | Age: 71
End: 2021-08-02
Payer: MEDICARE

## 2021-08-02 VITALS
DIASTOLIC BLOOD PRESSURE: 84 MMHG | SYSTOLIC BLOOD PRESSURE: 118 MMHG | TEMPERATURE: 97.6 F | HEIGHT: 64 IN | HEART RATE: 90 BPM | OXYGEN SATURATION: 98 % | WEIGHT: 213 LBS | BODY MASS INDEX: 36.37 KG/M2 | RESPIRATION RATE: 18 BRPM

## 2021-08-02 DIAGNOSIS — Z00.00 MEDICARE ANNUAL WELLNESS VISIT, SUBSEQUENT: Primary | ICD-10-CM

## 2021-08-02 DIAGNOSIS — M85.80 OSTEOPENIA, UNSPECIFIED LOCATION: ICD-10-CM

## 2021-08-02 DIAGNOSIS — I77.89 CORONARY ARTERY ECTASIA (HCC): ICD-10-CM

## 2021-08-02 DIAGNOSIS — M06.9 RHEUMATOID ARTHRITIS, INVOLVING UNSPECIFIED SITE, UNSPECIFIED WHETHER RHEUMATOID FACTOR PRESENT (HCC): ICD-10-CM

## 2021-08-02 DIAGNOSIS — K21.00 GASTROESOPHAGEAL REFLUX DISEASE WITH ESOPHAGITIS, UNSPECIFIED WHETHER HEMORRHAGE: ICD-10-CM

## 2021-08-02 DIAGNOSIS — Z13.6 SCREENING FOR CARDIOVASCULAR CONDITION: ICD-10-CM

## 2021-08-02 DIAGNOSIS — E66.01 SEVERE OBESITY (BMI 35.0-39.9) WITH COMORBIDITY (HCC): ICD-10-CM

## 2021-08-02 DIAGNOSIS — N18.9 CHRONIC KIDNEY DISEASE, UNSPECIFIED CKD STAGE: ICD-10-CM

## 2021-08-02 DIAGNOSIS — E78.2 MIXED HYPERLIPIDEMIA: ICD-10-CM

## 2021-08-02 DIAGNOSIS — Z78.0 POST-MENOPAUSAL: ICD-10-CM

## 2021-08-02 DIAGNOSIS — E03.0 CONGENITAL HYPOTHYROIDISM WITH DIFFUSE GOITER: ICD-10-CM

## 2021-08-02 DIAGNOSIS — R60.9 SUBMANDIBULAR GLAND SWELLING: ICD-10-CM

## 2021-08-02 DIAGNOSIS — I25.10 CORONARY ARTERY DISEASE INVOLVING NATIVE CORONARY ARTERY OF NATIVE HEART WITHOUT ANGINA PECTORIS: ICD-10-CM

## 2021-08-02 DIAGNOSIS — M25.562 CHRONIC PAIN OF LEFT KNEE: ICD-10-CM

## 2021-08-02 DIAGNOSIS — Z12.31 SCREENING MAMMOGRAM FOR HIGH-RISK PATIENT: ICD-10-CM

## 2021-08-02 DIAGNOSIS — I10 BENIGN HYPERTENSION: ICD-10-CM

## 2021-08-02 DIAGNOSIS — Z12.83 SCREENING FOR SKIN CANCER: ICD-10-CM

## 2021-08-02 DIAGNOSIS — G89.29 CHRONIC PAIN OF LEFT KNEE: ICD-10-CM

## 2021-08-02 PROCEDURE — G0439 PPPS, SUBSEQ VISIT: HCPCS | Performed by: FAMILY MEDICINE

## 2021-08-02 PROCEDURE — 1123F ACP DISCUSS/DSCN MKR DOCD: CPT | Performed by: FAMILY MEDICINE

## 2021-08-02 NOTE — PATIENT INSTRUCTIONS
Medicare Preventive Visit Patient Instructions  Thank you for completing your Welcome to Medicare Visit or Medicare Annual Wellness Visit today  Your next wellness visit will be due in one year (8/3/2022)  The screening/preventive services that you may require over the next 5-10 years are detailed below  Some tests may not apply to you based off risk factors and/or age  Screening tests ordered at today's visit but not completed yet may show as past due  Also, please note that scanned in results may not display below  Preventive Screenings:  Service Recommendations Previous Testing/Comments   Colorectal Cancer Screening  * Colonoscopy    * Fecal Occult Blood Test (FOBT)/Fecal Immunochemical Test (FIT)  * Fecal DNA/Cologuard Test  * Flexible Sigmoidoscopy Age: 54-65 years old   Colonoscopy: every 10 years (may be performed more frequently if at higher risk)  OR  FOBT/FIT: every 1 year  OR  Cologuard: every 3 years  OR  Sigmoidoscopy: every 5 years  Screening may be recommended earlier than age 48 if at higher risk for colorectal cancer  Also, an individualized decision between you and your healthcare provider will decide whether screening between the ages of 74-80 would be appropriate  Colonoscopy: 07/28/2020  FOBT/FIT: Not on file  Cologuard: Not on file  Sigmoidoscopy: Not on file    Screening Current     Breast Cancer Screening Age: 36 years old  Frequency: every 1-2 years  Not required if history of left and right mastectomy Mammogram: 11/06/2019    Screening Current   Cervical Cancer Screening Between the ages of 21-29, pap smear recommended once every 3 years  Between the ages of 33-67, can perform pap smear with HPV co-testing every 5 years     Recommendations may differ for women with a history of total hysterectomy, cervical cancer, or abnormal pap smears in past  Pap Smear: Not on file    Screening Not Indicated   Hepatitis C Screening Once for adults born between 1945 and 1965  More frequently in patients at high risk for Hepatitis C Hep C Antibody: 08/02/2019    Screening Current   Diabetes Screening 1-2 times per year if you're at risk for diabetes or have pre-diabetes Fasting glucose: 97 mg/dL   A1C: No results in last 5 years    Screening Current   Cholesterol Screening Once every 5 years if you don't have a lipid disorder  May order more often based on risk factors  Lipid panel: 08/10/2020    Screening Not Indicated  History Lipid Disorder     Other Preventive Screenings Covered by Medicare:  1  Abdominal Aortic Aneurysm (AAA) Screening: covered once if your at risk  You're considered to be at risk if you have a family history of AAA  2  Lung Cancer Screening: covers low dose CT scan once per year if you meet all of the following conditions: (1) Age 50-69; (2) No signs or symptoms of lung cancer; (3) Current smoker or have quit smoking within the last 15 years; (4) You have a tobacco smoking history of at least 30 pack years (packs per day multiplied by number of years you smoked); (5) You get a written order from a healthcare provider  3  Glaucoma Screening: covered annually if you're considered high risk: (1) You have diabetes OR (2) Family history of glaucoma OR (3)  aged 48 and older OR (3)  American aged 72 and older  3  Osteoporosis Screening: covered every 2 years if you meet one of the following conditions: (1) You're estrogen deficient and at risk for osteoporosis based off medical history and other findings; (2) Have a vertebral abnormality; (3) On glucocorticoid therapy for more than 3 months; (4) Have primary hyperparathyroidism; (5) On osteoporosis medications and need to assess response to drug therapy  · Last bone density test (DXA Scan): 10/28/2019   5  HIV Screening: covered annually if you're between the age of 15-65  Also covered annually if you are younger than 13 and older than 72 with risk factors for HIV infection   For pregnant patients, it is covered up to 3 times per pregnancy  Immunizations:  Immunization Recommendations   Influenza Vaccine Annual influenza vaccination during flu season is recommended for all persons aged >= 6 months who do not have contraindications   Pneumococcal Vaccine (Prevnar and Pneumovax)  * Prevnar = PCV13  * Pneumovax = PPSV23   Adults 25-60 years old: 1-3 doses may be recommended based on certain risk factors  Adults 72 years old: Prevnar (PCV13) vaccine recommended followed by Pneumovax (PPSV23) vaccine  If already received PPSV23 since turning 65, then PCV13 recommended at least one year after PPSV23 dose  Hepatitis B Vaccine 3 dose series if at intermediate or high risk (ex: diabetes, end stage renal disease, liver disease)   Tetanus (Td) Vaccine - COST NOT COVERED BY MEDICARE PART B Following completion of primary series, a booster dose should be given every 10 years to maintain immunity against tetanus  Td may also be given as tetanus wound prophylaxis  Tdap Vaccine - COST NOT COVERED BY MEDICARE PART B Recommended at least once for all adults  For pregnant patients, recommended with each pregnancy  Shingles Vaccine (Shingrix) - COST NOT COVERED BY MEDICARE PART B  2 shot series recommended in those aged 48 and above     Health Maintenance Due:      Topic Date Due    Breast Cancer Screening: Mammogram  11/06/2020    Colorectal Cancer Screening  07/28/2025    Hepatitis C Screening  Completed     Immunizations Due:      Topic Date Due    Influenza Vaccine (1) 09/01/2021     Advance Directives   What are advance directives? Advance directives are legal documents that state your wishes and plans for medical care  These plans are made ahead of time in case you lose your ability to make decisions for yourself  Advance directives can apply to any medical decision, such as the treatments you want, and if you want to donate organs  What are the types of advance directives?   There are many types of advance directives, and each state has rules about how to use them  You may choose a combination of any of the following:  · Living will: This is a written record of the treatment you want  You can also choose which treatments you do not want, which to limit, and which to stop at a certain time  This includes surgery, medicine, IV fluid, and tube feedings  · Durable power of  for healthcare [facility]): This is a written record that states who you want to make healthcare choices for you when you are unable to make them for yourself  This person, called a proxy, is usually a family member or a friend  You may choose more than 1 proxy  · Do not resuscitate (DNR) order:  A DNR order is used in case your heart stops beating or you stop breathing  It is a request not to have certain forms of treatment, such as CPR  A DNR order may be included in other types of advance directives  · Medical directive: This covers the care that you want if you are in a coma, near death, or unable to make decisions for yourself  You can list the treatments you want for each condition  Treatment may include pain medicine, surgery, blood transfusions, dialysis, IV or tube feedings, and a ventilator (breathing machine)  · Values history: This document has questions about your views, beliefs, and how you feel and think about life  This information can help others choose the care that you would choose  Why are advance directives important? An advance directive helps you control your care  Although spoken wishes may be used, it is better to have your wishes written down  Spoken wishes can be misunderstood, or not followed  Treatments may be given even if you do not want them  An advance directive may make it easier for your family to make difficult choices about your care  Fall Prevention    Fall prevention  includes ways to make your home and other areas safer  It also includes ways you can move more carefully to prevent a fall   Health conditions that cause changes in your blood pressure, vision, or muscle strength and coordination may increase your risk for falls  Medicines may also increase your risk for falls if they make you dizzy, weak, or sleepy  Fall prevention tips:   · Stand or sit up slowly  · Use assistive devices as directed  · Wear shoes that fit well and have soles that   · Wear a personal alarm  · Stay active  · Manage your medical conditions  Home Safety Tips:  · Add items to prevent falls in the bathroom  · Keep paths clear  · Install bright lights in your home  · Keep items you use often on shelves within reach  · Paint or place reflective tape on the edges of your stairs  Urinary Incontinence   Urinary incontinence (UI)  is when you lose control of your bladder  UI develops because your bladder cannot store or empty urine properly  The 3 most common types of UI are stress incontinence, urge incontinence, or both  Medicines:   · May be given to help strengthen your bladder control  Report any side effects of medication to your healthcare provider  Do pelvic muscle exercises often:  Your pelvic muscles help you stop urinating  Squeeze these muscles tight for 5 seconds, then relax for 5 seconds  Gradually work up to squeezing for 10 seconds  Do 3 sets of 15 repetitions a day, or as directed  This will help strengthen your pelvic muscles and improve bladder control  Train your bladder:  Go to the bathroom at set times, such as every 2 hours, even if you do not feel the urge to go  You can also try to hold your urine when you feel the urge to go  For example, hold your urine for 5 minutes when you feel the urge to go  As that becomes easier, hold your urine for 10 minutes  Self-care:   · Keep a UI record  Write down how often you leak urine and how much you leak  Make a note of what you were doing when you leaked urine  · Drink liquids as directed   You may need to limit the amount of liquid you drink to help control your urine leakage  Do not drink any liquid right before you go to bed  Limit or do not have drinks that contain caffeine or alcohol  · Prevent constipation  Eat a variety of high-fiber foods  Good examples are high-fiber cereals, beans, vegetables, and whole-grain breads  Walking is the best way to trigger your intestines to have a bowel movement  · Exercise regularly and maintain a healthy weight  Weight loss and exercise will decrease pressure on your bladder and help you control your leakage  · Use a catheter as directed  to help empty your bladder  A catheter is a tiny, plastic tube that is put into your bladder to drain your urine  · Go to behavior therapy as directed  Behavior therapy may be used to help you learn to control your urge to urinate  Weight Management   Why it is important to manage your weight:  Being overweight increases your risk of health conditions such as heart disease, high blood pressure, type 2 diabetes, and certain types of cancer  It can also increase your risk for osteoarthritis, sleep apnea, and other respiratory problems  Aim for a slow, steady weight loss  Even a small amount of weight loss can lower your risk of health problems  How to lose weight safely:  A safe and healthy way to lose weight is to eat fewer calories and get regular exercise  You can lose up about 1 pound a week by decreasing the number of calories you eat by 500 calories each day  Healthy meal plan for weight management:  A healthy meal plan includes a variety of foods, contains fewer calories, and helps you stay healthy  A healthy meal plan includes the following:  · Eat whole-grain foods more often  A healthy meal plan should contain fiber  Fiber is the part of grains, fruits, and vegetables that is not broken down by your body  Whole-grain foods are healthy and provide extra fiber in your diet   Some examples of whole-grain foods are whole-wheat breads and pastas, oatmeal, brown rice, and bulgur  · Eat a variety of vegetables every day  Include dark, leafy greens such as spinach, kale, otto greens, and mustard greens  Eat yellow and orange vegetables such as carrots, sweet potatoes, and winter squash  · Eat a variety of fruits every day  Choose fresh or canned fruit (canned in its own juice or light syrup) instead of juice  Fruit juice has very little or no fiber  · Eat low-fat dairy foods  Drink fat-free (skim) milk or 1% milk  Eat fat-free yogurt and low-fat cottage cheese  Try low-fat cheeses such as mozzarella and other reduced-fat cheeses  · Choose meat and other protein foods that are low in fat  Choose beans or other legumes such as split peas or lentils  Choose fish, skinless poultry (chicken or turkey), or lean cuts of red meat (beef or pork)  Before you cook meat or poultry, cut off any visible fat  · Use less fat and oil  Try baking foods instead of frying them  Add less fat, such as margarine, sour cream, regular salad dressing and mayonnaise to foods  Eat fewer high-fat foods  Some examples of high-fat foods include french fries, doughnuts, ice cream, and cakes  · Eat fewer sweets  Limit foods and drinks that are high in sugar  This includes candy, cookies, regular soda, and sweetened drinks  Exercise:  Exercise at least 30 minutes per day on most days of the week  Some examples of exercise include walking, biking, dancing, and swimming  You can also fit in more physical activity by taking the stairs instead of the elevator or parking farther away from stores  Ask your healthcare provider about the best exercise plan for you  © Copyright [employer] 2018 Information is for End User's use only and may not be sold, redistributed or otherwise used for commercial purposes   All illustrations and images included in CareNotes® are the copyrighted property of [employer]  or [facility]    Obesity   AMBULATORY CARE:   Obesity  is when your body mass index (BMI) is greater than 30  Your healthcare provider will use your height and weight to measure your BMI  The risks of obesity include  many health problems, such as injuries or physical disability  You may need tests to check for the following:  · Diabetes    · High blood pressure or high cholesterol    · Heart disease    · Gallbladder or liver disease    · Cancer of the colon, breast, prostate, liver, or kidney    · Sleep apnea    · Arthritis or gout    Seek care immediately if:   · You have a severe headache, confusion, or difficulty speaking  · You have weakness on one side of your body  · You have chest pain, sweating, or shortness of breath  Contact your healthcare provider if:   · You have symptoms of gallbladder or liver disease, such as pain in your upper abdomen  · You have knee or hip pain and discomfort while walking  · You have symptoms of diabetes, such as intense hunger and thirst, and frequent urination  · You have symptoms of sleep apnea, such as snoring or daytime sleepiness  · You have questions or concerns about your condition or care  Treatment for obesity  focuses on helping you lose weight to improve your health  Even a small decrease in BMI can reduce the risk for many health problems  Your healthcare provider will help you set a weight-loss goal   · Lifestyle changes  are the first step in treating obesity  These include making healthy food choices and getting regular physical activity  Your healthcare provider may suggest a weight-loss program that involves coaching, education, and therapy  · Medicine  may help you lose weight when it is used with a healthy diet and physical activity  · Surgery  can help you lose weight if you are very obese and have other health problems  There are several types of weight-loss surgery  Ask your healthcare provider for more information  Be successful losing weight:   · Set small, realistic goals    An example of a small goal is to walk for 20 minutes 5 days a week  Anther goal is to lose 5% of your body weight  · Tell friends, family members, and coworkers about your goals  and ask for their support  Ask a friend to lose weight with you, or join a weight-loss support group  · Identify foods or triggers that may cause you to overeat , and find ways to avoid them  Remove tempting high-calorie foods from your home and workplace  Place a bowl of fresh fruit on your kitchen counter  If stress causes you to eat, then find other ways to cope with stress  · Keep a diary to track what you eat and drink  Also write down how many minutes of physical activity you do each day  Weigh yourself once a week and record it in your diary  Eating changes: You will need to eat 500 to 1,000 fewer calories each day than you currently eat to lose 1 to 2 pounds a week  The following changes will help you cut calories:  · Eat smaller portions  Use small plates, no larger than 9 inches in diameter  Fill your plate half full of fruits and vegetables  Measure your food using measuring cups until you know what a serving size looks like  · Eat 3 meals and 1 or 2 snacks each day  Plan your meals in advance  [employer] and eat at home most of the time  Eat slowly  Do not skip meals  Skipping meals can lead to overeating later in the day  This can make it harder for you to lose weight  Talk with a dietitian to help you make a meal plan and schedule that is right for you  · Eat fruits and vegetables at every meal   They are low in calories and high in fiber, which makes you feel full  Do not add butter, margarine, or cream sauce to vegetables  Use herbs to season steamed vegetables  · Eat less fat and fewer fried foods  Eat more baked or grilled chicken and fish  These protein sources are lower in calories and fat than red meat  Limit fast food   Dress your salads with olive oil and vinegar instead of bottled dressing  · Limit the amount of sugar you eat  Do not drink sugary beverages  Limit alcohol  Activity changes:  Physical activity is good for your body in many ways  It helps you burn calories and build strong muscles  It decreases stress and depression, and improves your mood  It can also help you sleep better  Talk to your healthcare provider before you begin an exercise program   · Exercise for at least 30 minutes 5 days a week  Start slowly  Set aside time each day for physical activity that you enjoy and that is convenient for you  It is best to do both weight training and an activity that increases your heart rate, such as walking, bicycling, or swimming  · Find ways to be more active  Do yard work and housecleaning  Walk up the stairs instead of using elevators  Spend your leisure time going to events that require walking, such as outdoor festivals or fairs  This extra physical activity can help you lose weight and keep it off  Follow up with your healthcare provider as directed: You may need to meet with a dietitian  Write down your questions so you remember to ask them during your visits  © Copyright [employer] 2021 Information is for End User's use only and may not be sold, redistributed or otherwise used for commercial purposes  All illustrations and images included in CareNotes® are the copyrighted property of [employer]  or [employer]   The above information is an  only  It is not intended as medical advice for individual conditions or treatments  Talk to your doctor, nurse or pharmacist before following any medical regimen to see if it is safe and effective for you

## 2021-08-02 NOTE — PROGRESS NOTES
Assessment and Plan:     Problem List Items Addressed This Visit        Digestive    GERD with esophagitis    Relevant Orders    CBC       Endocrine    Congenital hypothyroidism with diffuse goiter    Relevant Orders    TSH, 3rd generation    CBC       Cardiovascular and Mediastinum    Benign hypertension    Relevant Orders    Comprehensive metabolic panel    CBC    Coronary artery disease involving native coronary artery of native heart without angina pectoris    Relevant Orders    CBC       Musculoskeletal and Integument    Osteopenia    Relevant Orders    DXA bone density spine hip and pelvis    Rheumatoid arthritis ([facility] )    Relevant Orders    CBC       Genitourinary    Chronic kidney disease    Relevant Orders    CBC       Other    Mixed hyperlipidemia    Relevant Orders    CBC    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[facility] )    Relevant Orders    CBC      Other Visit Diagnoses     Medicare annual wellness visit, subsequent    -  Primary    Relevant Orders    CBC    Screening mammogram for high-risk patient        Relevant Orders    Mammo screening bilateral w 3d & cad    CBC    Severe obesity (BMI 35 0-39  9) with comorbidity ([facility] )        Relevant Orders    CBC    BMI 36 0-36 9,adult        Relevant Orders    CBC    Screening for cardiovascular condition        Relevant Orders    Lipid Panel with Direct LDL reflex    CBC    Screening for skin cancer        Relevant Orders    Ambulatory referral to Dermatology    Post-menopausal        Relevant Orders    DXA bone density spine hip and pelvis    Submandibular gland swelling        Relevant Orders    Ambulatory Referral to Otolaryngology    Chronic pain of left knee        Relevant Medications    Diclofenac Sodium (VOLTAREN) 1 %    Other Relevant Orders    XR knee 3 vw left non injury    Ambulatory referral to Physical Therapy        BMI Counseling: Body mass index is 36 28 kg/m²  The BMI is above normal  Nutrition recommendations include decreasing portion sizes   Exercise recommendations include moderate physical activity 150 minutes/week  No pharmacotherapy was ordered  Preventive health issues were discussed with patient, and age appropriate screening tests were ordered as noted in patient's After Visit Summary  Personalized health advice and appropriate referrals for health education or preventive services given if needed, as noted in patient's After Visit Summary  History of Present Illness:     Patient presents for Medicare Annual Wellness visit    Pt would like to see a dermatologist for a body check    Pt still has pain at left submandibular gland  Was ok but vback again no fever    Pt states 4 months ago pt had issues with her neck and her knee  States she got swelling down by cuttinfg out salt>  PT states the left knee bother her when she lies down in her sleep  States she does not think its the bone    Right now it does not hurt but states if she twists it she can see stars    Patient Care Team:  Natalia Gutierrez DO as PCP - General (Family Medicine)  Natacha Leal MD as Consulting Physician (Gastroenterology)  Tre Ortiz MD as Consulting Physician (Gastroenterology)  Sonia Pollack DO as Consulting Physician (Cardiology)     Problem List:     Patient Active Problem List   Diagnosis    Hiatal hernia    Gastroesophageal reflux disease    GERD with esophagitis    Hypokalemia    Benign hypertension    Mixed hyperlipidemia    Congenital hypothyroidism with diffuse goiter    Liver lesion, left lobe    Anemia, pernicious    Coronary artery disease involving native coronary artery of native heart without angina pectoris    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HCC)    Hemorrhoids    IBS (irritable bowel syndrome)    Osteopenia    Stress incontinence, female    Abnormal CT of liver    Class 2 severe obesity due to excess calories with serious comorbidity and body mass index (BMI) of 35 0 to 35 9 in adult ([facility] )    BMI 35 0-35 9,adult    Dysphagia    Constipation    Anxiety    Asthma    Chronic kidney disease    Rheumatoid arthritis ([facility] )    Vertigo    History of PTCA    History of hysterectomy      Past Medical and Surgical History:     Past Medical History:   Diagnosis Date    Anxiety     resolved 10/4/17    Asthma     seasonal    Chronic kidney disease     kidney stone    Colon polyp     Disease of thyroid gland     Dysphagia     GERD (gastroesophageal reflux disease)     Goiter, nodular     partial thyroidectomy    Hiatal hernia     Hiatal hernia with GERD without esophagitis 04/2018    surgical correction    History of esophageal varices with bleeding     History of pernicious anemia     History of transfusion     x1 after bleeding ulcer    Hyperlipidemia     Hypertension     Irritable bowel syndrome     RA (rheumatoid arthritis) ([facility] )     Seasonal allergies     Vertigo     Wears glasses      Past Surgical History:   Procedure Laterality Date    BREAST BIOPSY Bilateral     negative    CARDIAC CATHETERIZATION      x2 secondary to exertional chest pain, due to lung issues, possible mild COPD    CHOLECYSTECTOMY  2015    lap - last assessed 10/4/17    COLONOSCOPY      complete    ESOPHAGOGASTRODUODENOSCOPY N/A 1/23/2018    Procedure: ESOPHAGOGASTRODUODENOSCOPY (EGD); Surgeon: Radha Tracy MD;  Location: [facility]; Service: Gastroenterology    HYSTERECTOMY      partial - ovaries remain    LIPOMA RESECTION      right thigh    TX ESOPHAGOSCOPY FLEXIBLE TRANSORAL WITH BIOPSY N/A 4/11/2018    Procedure: ESOPHAGOGASTRODUODENOSCOPY (EGD);   Surgeon: Jonn Augustine MD;  [facility]: [facility];  Service: Thoracic    TX LAP, REPAIR PARAESOPHAGEAL HERNIA, INCL FUNDOPLASTY W/ MESH N/A 4/11/2018    Procedure: REPAIR HERNIA PARAESOPHAGEAL  LAPAROSCOPIC,ELEONORA GASTROPLASTY, Con Gosselin FUNDOPLICATION;  Surgeon: Jonn Augustine MD;  Location: [facility];  Service: Thoracic    SKIN BIOPSY Right     lump benign - last assessed 10/4/17    THYROIDECTOMY, PARTIAL  1977    TONSILLECTOMY        Family History:     Family History   Problem Relation Age of Onset    Alzheimer's disease Mother     Cancer Mother         skin     Thyroid disease Mother     Ulcerative colitis Mother     Cancer Father         prostate    Stroke Father     Hypertension Father     Prostate cancer Father     Thyroid disease Sister     Ulcerative colitis Sister     Other Sister         open heart surgery    Heart disease Sister     Hyperlipidemia Brother     No Known Problems Son     No Known Problems Son     No Known Problems Maternal Grandmother     No Known Problems Paternal Grandmother     Mental illness Neg Hx       Social History:     Social History     Socioeconomic History    Marital status: /Civil Union     Spouse name: None    Number of children: None    Years of education: None    Highest education level: None   Occupational History    None   Tobacco Use    Smoking status: Never Smoker    Smokeless tobacco: Never Used   Vaping Use    Vaping Use: Never used   Substance and Sexual Activity    Alcohol use: Yes     Comment: occ    Drug use: Never    Sexual activity: None   Other Topics Concern    None   Social History Narrative    Feels safe at home     Social Determinants of Health     Financial Resource Strain:     Difficulty of Paying Living Expenses:    Food Insecurity:     Worried About Running Out of Food in the Last Year:     Ran Out of Food in the Last Year:    Transportation Needs:     Lack of Transportation (Medical):      Lack of Transportation (Non-Medical):    Physical Activity:     Days of Exercise per Week:     Minutes of Exercise per Session:    Stress:     Feeling of Stress :    Social Connections:     Frequency of Communication with Friends and Family:     Frequency of Social Gatherings with Friends and Family:     Attends Anabaptist Services:     Active Member of Clubs or Organizations:     Attends Club or Organization Meetings:     Marital Status:    Intimate Partner Violence:     Fear of Current or Ex-Partner:     Emotionally Abused:     Physically Abused:     Sexually Abused:       Medications and Allergies:     Current Outpatient Medications   Medication Sig Dispense Refill    atorvastatin (LIPITOR) 20 mg tablet TAKE 1 TABLET BY MOUTH  DAILY AT BEDTIME 90 tablet 3    bumetanide (BUMEX) 1 mg tablet TAKE 1 TABLET BY MOUTH  DAILY 90 tablet 3    Calcium Carb-Cholecalciferol 600-1000 MG-UNIT CAPS Take 1 capsule by mouth every morning       levothyroxine 50 mcg tablet TAKE 1 TABLET BY MOUTH  DAILY 90 tablet 3    losartan (COZAAR) 25 mg tablet TAKE 1 TABLET BY MOUTH  DAILY 90 tablet 3    omeprazole (PriLOSEC) 40 MG capsule TAKE 1 CAPSULE BY MOUTH  TWICE DAILY 180 capsule 0    potassium chloride (Klor-Con) 10 mEq tablet TAKE 1 TABLET BY MOUTH  DAILY 90 tablet 3    Diclofenac Sodium (VOLTAREN) 1 % Apply 4 g topically 4 (four) times a day 480 g 0     No current facility-administered medications for this visit       Allergies   Allergen Reactions    Orange (Diagnostic) - Food Allergy Anaphylaxis     Throat closes    Pantoprazole Headache    Penicillins Other (See Comments)     During allergy testing instructed to NEVER take PCN    Gluten Meal - Food Allergy      Following a gluten free diet on her own    Lactose - Food Allergy Diarrhea      Immunizations:     Immunization History   Administered Date(s) Administered    INFLUENZA 10/19/2010, 09/30/2011, 10/19/2012, 11/05/2013, 10/01/2014, 09/30/2020    Influenza Split High Dose Preservative Free IM 11/02/2015, 10/27/2016, 10/04/2017    Influenza, high dose seasonal 0 7 mL 09/26/2018, 10/17/2019    Pneumococcal Conjugate 13-Valent 08/10/2016    Pneumococcal Polysaccharide PPV23 10/04/2017    SARS-CoV-2 / COVID-19 mRNA IM (Ferman Epley) 02/23/2021, 03/26/2021    Tdap 07/30/2015      Health Maintenance:         Topic Date Due    Breast Cancer Screening: Mammogram  11/06/2020    Colorectal Cancer Screening  07/28/2025    Hepatitis C Screening  Completed         Topic Date Due    Influenza Vaccine (1) 09/01/2021      Medicare Health Risk Assessment:     /84   Pulse 90   Temp 97 6 °F (36 4 °C)   Resp 18   Ht 5' 4 25" (1 632 m)   Wt 96 6 kg (213 lb)   SpO2 98%   BMI 36 28 kg/m²      Judge Barr is here for her Subsequent Wellness visit  Last Medicare Wellness visit information reviewed, patient interviewed, no change since last AWV  Health Risk Assessment:   Patient rates overall health as very good  Patient feels that their physical health rating is same  Patient is satisfied with their life  Eyesight was rated as much worse  Hearing was rated as same  Patient feels that their emotional and mental health rating is same  Patients states they are never, rarely angry  Patient states they are sometimes unusually tired/fatigued  Pain experienced in the last 7 days has been some  Patient's pain rating has been 6/10  Patient states that she has experienced no weight loss or gain in last 6 months  Depression Screening:   PHQ-2 Score: 0      Fall Risk Screening: In the past year, patient has experienced: history of falling in past year    Number of falls: 1  Injured during fall?: No    Feels unsteady when standing or walking?: No    Worried about falling?: No      Urinary Incontinence Screening:   Patient has leaked urine accidently in the last six months  Home Safety:  Patient does not have trouble with stairs inside or outside of their home  Patient has working smoke alarms and has no working carbon monoxide detector  Home safety hazards include: none  Nutrition:   Current diet is Other (please comment)  glutton free    Medications:   Patient is currently taking over-the-counter supplements  OTC medications include: see medication list  Patient is able to manage medications       Activities of Daily Living (ADLs)/Instrumental Activities of Daily Living (IADLs):   Walk and transfer into and out of bed and chair?: Yes  Dress and groom yourself?: Yes    Bathe or shower yourself?: Yes    Feed yourself? Yes  Do your laundry/housekeeping?: Yes  Manage your money, pay your bills and track your expenses?: Yes  Make your own meals?: Yes    Do your own shopping?: Yes    Previous Hospitalizations:   Any hospitalizations or ED visits within the last 12 months?: No      Advance Care Planning:   Living will: No    Durable POA for healthcare: Yes    Advanced directive: No      Cognitive Screening:   Provider or family/friend/caregiver concerned regarding cognition?: No    PREVENTIVE SCREENINGS      Cardiovascular Screening:    General: Screening Not Indicated, History Lipid Disorder and Risks and Benefits Discussed    Due for: Lipid Panel      Diabetes Screening:     General: Screening Current and Risks and Benefits Discussed    Due for: Blood Glucose      Colorectal Cancer Screening:     General: Screening Current      Breast Cancer Screening:     General: Screening Current and Risks and Benefits Discussed    Due for: Mammogram        Cervical Cancer Screening:    General: Screening Not Indicated      Osteoporosis Screening:    General: Risks and Benefits Discussed    Due for: Bone Density Ultrasound      Abdominal Aortic Aneurysm (AAA) Screening:        General: Screening Not Indicated      Lung Cancer Screening:     General: Screening Not Indicated      Hepatitis C Screening:    General: Screening Current    Screening, Brief Intervention, and Referral to Treatment (SBIRT)    Screening  Typical number of drinks in a day: 0  Typical number of drinks in a week: 0  Interpretation: Low risk drinking behavior      AUDIT-C Screenin) How often did you have a drink containing alcohol in the past year? never  2) How many drinks did you have on a typical day when you were drinking in the past year? 0  3) How often did you have 6 or more drinks on one occasion in the past year? never    AUDIT-C Score: 0  Interpretation: Score 0-2 (female): Negative screen for alcohol misuse    Single Item Drug Screening:  How often have you used an illegal drug (including marijuana) or a prescription medication for non-medical reasons in the past year? never    Single Item Drug Screen Score: 0  Interpretation: Negative screen for possible drug use disorder    Brief Intervention  Alcohol & drug use screenings were reviewed  No concerns regarding substance use disorder identified  Jayesh Amyy, DO  BMI Counseling: Body mass index is 36 28 kg/m²  The BMI is above normal  Nutrition recommendations include reducing portion sizes

## 2021-08-04 ENCOUNTER — OFFICE VISIT (OUTPATIENT)
Dept: OTOLARYNGOLOGY | Facility: CLINIC | Age: 71
End: 2021-08-04
Payer: MEDICARE

## 2021-08-04 VITALS — WEIGHT: 200 LBS | HEIGHT: 64 IN | TEMPERATURE: 98.6 F | BODY MASS INDEX: 34.15 KG/M2

## 2021-08-04 DIAGNOSIS — K11.7 XEROSTOMIA: ICD-10-CM

## 2021-08-04 DIAGNOSIS — K11.8 PAROTID MASS: Primary | ICD-10-CM

## 2021-08-04 PROCEDURE — 99204 OFFICE O/P NEW MOD 45 MIN: CPT | Performed by: NURSE PRACTITIONER

## 2021-08-04 NOTE — PATIENT INSTRUCTIONS
Sour candies, massage to area, warm compresses, tylenol or ibuprofen for pain, Xylimelts, Biotene  Frequent sips fluids  Follow up after us neck

## 2021-08-04 NOTE — PROGRESS NOTES
Assessment/Plan:    Parotid mass  Reviewed pt history of parotid swelling on the left that comes and goes  When swelling occurs extends from left ear region down the neck  On exam there is a approx 0 5 x 0 5 cm palpable mass near parotid tail on the left  Area is tender to palpation  All salivary glands productive clear saliva, no mucopus  Reviewed results of recent neck US: indicated  3 mm hypoechoic avascular nodule in the left submandibular gland likely a tiny cyst or perhaps a very small lymph node, with no indication of parotid findings and pt notes this area was not evaluated  Discussed possible causes to intermittent parotid swelling including tumor, xerostomia secondary to autoimmune disease (RA), salivary calculus  Options include repeat imaging with specific to parotid on left, possible FNAB, Ct neck, vs surgical excision  After detailed discussion agreed to repeat US of neck  Xerostomia  On exam, oral cavity tacky, but all saliva glands produce clear saliva  Reviewed association of xerostomia and parotid swelling  Discussed possible calculus, medication impact, vs autoimmune processes impacting xerostomia  Discussed possible Sjogren's in detail  Reviewed treatment of xerostomia including frequent fluid sips, xylimelts, biotene, sour candies, warm compresses, massage, imaging, possible lip biopsy for Sjogren's  Diagnoses and all orders for this visit:    Parotid mass  -     Ultrasound head neck soft tissue; Future    Xerostomia          Subjective:      Patient ID: Michelle Soto is a 66 y o  female  Presents today as a new patient due to facial lump  Feels Cyst on left side face  Noticed last October 2020  Felt increase in size with ear pain then improved  Since then comes and goes  In April swelling started again and underwent an US per PCP  Since then keep recurring swelling and tender to touch  Feels swelling extend into neck    Rheumatoid arthritis managed by PCP         The following portions of the patient's history were reviewed and updated as appropriate: allergies, current medications, past family history, past medical history, past social history, past surgical history and problem list     Review of Systems   Constitutional: Negative  HENT: Negative for congestion, ear discharge, ear pain, hearing loss, nosebleeds, postnasal drip, rhinorrhea, sinus pressure, sinus pain, sore throat, tinnitus and voice change  Eyes: Negative  Respiratory: Negative for chest tightness and shortness of breath  Cardiovascular: Negative  Gastrointestinal: Negative  Endocrine: Negative  Musculoskeletal: Negative  Skin: Negative for color change  Neurological: Negative for dizziness, numbness and headaches  Psychiatric/Behavioral: Negative  Objective:      Temp 98 6 °F (37 °C) (Temporal)   Ht 5' 4 25" (1 632 m)   Wt 90 7 kg (200 lb)   BMI 34 06 kg/m²          Physical Exam  Constitutional:       Appearance: She is well-developed  HENT:      Head: Normocephalic  Right Ear: Hearing, tympanic membrane, ear canal and external ear normal  No decreased hearing noted  No drainage or tenderness  Tympanic membrane is not perforated or erythematous  Left Ear: Hearing, tympanic membrane, ear canal and external ear normal  No decreased hearing noted  No drainage or tenderness  Tympanic membrane is not perforated or erythematous  Nose: Nose normal  No nasal deformity or septal deviation  Mouth/Throat:      Mouth: Mucous membranes are not pale and not dry  No oral lesions  Dentition: Normal dentition  Pharynx: Uvula midline  No oropharyngeal exudate  Neck:      Trachea: No tracheal deviation  Cardiovascular:      Rate and Rhythm: Normal rate  Pulmonary:      Effort: Pulmonary effort is normal  No accessory muscle usage or respiratory distress  Musculoskeletal:      Right shoulder: Normal range of motion        Cervical back: Full passive range of motion without pain, normal range of motion and neck supple  Lymphadenopathy:      Cervical: No cervical adenopathy  Skin:     General: Skin is warm and dry  Neurological:      Mental Status: She is alert and oriented to person, place, and time  Cranial Nerves: No cranial nerve deficit  Sensory: No sensory deficit  Psychiatric:         Behavior: Behavior is cooperative

## 2021-08-05 PROBLEM — K11.7 XEROSTOMIA: Status: ACTIVE | Noted: 2021-08-05

## 2021-08-05 NOTE — ASSESSMENT & PLAN NOTE
Reviewed pt history of parotid swelling on the left that comes and goes  When swelling occurs extends from left ear region down the neck  On exam there is a approx 0 5 x 0 5 cm palpable mass near parotid tail on the left  Area is tender to palpation  All salivary glands productive clear saliva, no mucopus  Reviewed results of recent neck US: indicated  3 mm hypoechoic avascular nodule in the left submandibular gland likely a tiny cyst or perhaps a very small lymph node, with no indication of parotid findings and pt notes this area was not evaluated  Discussed possible causes to intermittent parotid swelling including tumor, xerostomia secondary to autoimmune disease (RA), salivary calculus  Options include repeat imaging with specific to parotid on left, possible FNAB, Ct neck, vs surgical excision  After detailed discussion agreed to repeat US of neck

## 2021-08-11 ENCOUNTER — LAB (OUTPATIENT)
Dept: LAB | Facility: HOSPITAL | Age: 71
End: 2021-08-11
Attending: FAMILY MEDICINE
Payer: MEDICARE

## 2021-08-11 ENCOUNTER — HOSPITAL ENCOUNTER (OUTPATIENT)
Dept: RADIOLOGY | Facility: HOSPITAL | Age: 71
Discharge: HOME | End: 2021-08-11
Attending: FAMILY MEDICINE
Payer: MEDICARE

## 2021-08-11 DIAGNOSIS — M25.562 CHRONIC PAIN OF LEFT KNEE: ICD-10-CM

## 2021-08-11 DIAGNOSIS — N18.9 CHRONIC KIDNEY DISEASE, UNSPECIFIED CKD STAGE: ICD-10-CM

## 2021-08-11 DIAGNOSIS — Z00.00 MEDICARE ANNUAL WELLNESS VISIT, SUBSEQUENT: ICD-10-CM

## 2021-08-11 DIAGNOSIS — I25.10 CORONARY ARTERY DISEASE INVOLVING NATIVE CORONARY ARTERY OF NATIVE HEART WITHOUT ANGINA PECTORIS: ICD-10-CM

## 2021-08-11 DIAGNOSIS — K21.00 GASTROESOPHAGEAL REFLUX DISEASE WITH ESOPHAGITIS, UNSPECIFIED WHETHER HEMORRHAGE: ICD-10-CM

## 2021-08-11 DIAGNOSIS — M06.9 RHEUMATOID ARTHRITIS, INVOLVING UNSPECIFIED SITE, UNSPECIFIED WHETHER RHEUMATOID FACTOR PRESENT (HCC): ICD-10-CM

## 2021-08-11 DIAGNOSIS — Z13.6 SCREENING FOR CARDIOVASCULAR CONDITION: ICD-10-CM

## 2021-08-11 DIAGNOSIS — I77.89 CORONARY ARTERY ECTASIA (HCC): ICD-10-CM

## 2021-08-11 DIAGNOSIS — E03.0 CONGENITAL HYPOTHYROIDISM WITH DIFFUSE GOITER: ICD-10-CM

## 2021-08-11 DIAGNOSIS — E66.01 SEVERE OBESITY (BMI 35.0-39.9) WITH COMORBIDITY (HCC): ICD-10-CM

## 2021-08-11 DIAGNOSIS — I10 BENIGN HYPERTENSION: ICD-10-CM

## 2021-08-11 DIAGNOSIS — G89.29 CHRONIC PAIN OF LEFT KNEE: ICD-10-CM

## 2021-08-11 DIAGNOSIS — Z12.31 SCREENING MAMMOGRAM FOR HIGH-RISK PATIENT: ICD-10-CM

## 2021-08-11 DIAGNOSIS — E78.2 MIXED HYPERLIPIDEMIA: ICD-10-CM

## 2021-08-11 LAB
ALBUMIN SERPL BCP-MCNC: 3.2 G/DL (ref 3.5–5)
ALP SERPL-CCNC: 66 U/L (ref 46–116)
ALT SERPL W P-5'-P-CCNC: 23 U/L (ref 12–78)
ANION GAP SERPL CALCULATED.3IONS-SCNC: 8 MMOL/L (ref 4–13)
AST SERPL W P-5'-P-CCNC: 23 U/L (ref 5–45)
BILIRUB SERPL-MCNC: 0.9 MG/DL (ref 0.2–1)
BUN SERPL-MCNC: 11 MG/DL (ref 5–25)
CALCIUM ALBUM COR SERPL-MCNC: 9.2 MG/DL (ref 8.3–10.1)
CALCIUM SERPL-MCNC: 8.6 MG/DL (ref 8.3–10.1)
CHLORIDE SERPL-SCNC: 105 MMOL/L (ref 100–108)
CHOLEST SERPL-MCNC: 139 MG/DL (ref 50–200)
CO2 SERPL-SCNC: 28 MMOL/L (ref 21–32)
CREAT SERPL-MCNC: 0.73 MG/DL (ref 0.6–1.3)
ERYTHROCYTE [DISTWIDTH] IN BLOOD BY AUTOMATED COUNT: 13.2 % (ref 11.6–15.1)
GFR SERPL CREATININE-BSD FRML MDRD: 83 ML/MIN/1.73SQ M
GLUCOSE P FAST SERPL-MCNC: 102 MG/DL (ref 65–99)
HCT VFR BLD AUTO: 44.6 % (ref 34.8–46.1)
HDLC SERPL-MCNC: 55 MG/DL
HGB BLD-MCNC: 14.4 G/DL (ref 11.5–15.4)
LDLC SERPL CALC-MCNC: 67 MG/DL (ref 0–100)
MCH RBC QN AUTO: 29.5 PG (ref 26.8–34.3)
MCHC RBC AUTO-ENTMCNC: 32.3 G/DL (ref 31.4–37.4)
MCV RBC AUTO: 91 FL (ref 82–98)
PLATELET # BLD AUTO: 192 THOUSANDS/UL (ref 149–390)
PMV BLD AUTO: 10.6 FL (ref 8.9–12.7)
POTASSIUM SERPL-SCNC: 3.7 MMOL/L (ref 3.5–5.3)
PROT SERPL-MCNC: 6.6 G/DL (ref 6.4–8.2)
RBC # BLD AUTO: 4.88 MILLION/UL (ref 3.81–5.12)
SODIUM SERPL-SCNC: 141 MMOL/L (ref 136–145)
TRIGL SERPL-MCNC: 87 MG/DL
TSH SERPL DL<=0.05 MIU/L-ACNC: 0.89 UIU/ML (ref 0.36–3.74)
WBC # BLD AUTO: 4.33 THOUSAND/UL (ref 4.31–10.16)

## 2021-08-11 PROCEDURE — 85027 COMPLETE CBC AUTOMATED: CPT

## 2021-08-11 PROCEDURE — 36415 COLL VENOUS BLD VENIPUNCTURE: CPT

## 2021-08-11 PROCEDURE — 73562 X-RAY EXAM OF KNEE 3: CPT

## 2021-08-11 PROCEDURE — 80053 COMPREHEN METABOLIC PANEL: CPT

## 2021-08-11 PROCEDURE — 84443 ASSAY THYROID STIM HORMONE: CPT

## 2021-08-11 PROCEDURE — 80061 LIPID PANEL: CPT

## 2021-08-13 ENCOUNTER — OFFICE VISIT (OUTPATIENT)
Dept: GASTROENTEROLOGY | Facility: CLINIC | Age: 71
End: 2021-08-13
Payer: MEDICARE

## 2021-08-13 VITALS
HEIGHT: 64 IN | BODY MASS INDEX: 36.37 KG/M2 | DIASTOLIC BLOOD PRESSURE: 84 MMHG | TEMPERATURE: 97.9 F | WEIGHT: 213 LBS | HEART RATE: 81 BPM | SYSTOLIC BLOOD PRESSURE: 119 MMHG

## 2021-08-13 DIAGNOSIS — K58.2 IRRITABLE BOWEL SYNDROME WITH BOTH CONSTIPATION AND DIARRHEA: Primary | ICD-10-CM

## 2021-08-13 DIAGNOSIS — R13.19 ESOPHAGEAL DYSPHAGIA: ICD-10-CM

## 2021-08-13 DIAGNOSIS — R10.13 EPIGASTRIC PAIN: ICD-10-CM

## 2021-08-13 DIAGNOSIS — K21.9 HIATAL HERNIA WITH GERD WITHOUT ESOPHAGITIS: ICD-10-CM

## 2021-08-13 DIAGNOSIS — K44.9 HIATAL HERNIA WITH GERD WITHOUT ESOPHAGITIS: ICD-10-CM

## 2021-08-13 PROCEDURE — 99213 OFFICE O/P EST LOW 20 MIN: CPT | Performed by: INTERNAL MEDICINE

## 2021-08-13 RX ORDER — OMEPRAZOLE 40 MG/1
40 CAPSULE, DELAYED RELEASE ORAL 2 TIMES DAILY
Qty: 180 CAPSULE | Refills: 2 | Status: SHIPPED | OUTPATIENT
Start: 2021-08-13 | End: 2022-05-30

## 2021-08-13 NOTE — LETTER
August 13, 2021     Jerod Archibald DO  [address] [facility] [address]    Patient: Emmy Medley   YOB: 1950   Date of Visit: 8/13/2021       Dear Dr Guthrie Ee:    Thank you for referring Travis Da Silva to me for evaluation  Below are my notes for this consultation  If you have questions, please do not hesitate to call me  I look forward to following your patient along with you  Sincerely,        Kimi Solomon MD        CC: No Recipients  Kimi Solomon MD  8/13/2021  5:23 PM  Sign when Signing Visit  [address] [facility]  Emmy Medley 66 y o  female MRN: [id number]            Assessment & Plan:     79-year-old female, remote history of Nissen fundoplication, had severe esophagitis in the past, healing of esophagitis on endoscopy, empiric dilation  At that time was having some dysphagia which improved with PPI therapy, continues to have some mild intermittent dysphagia  Now with severe epigastric pain in her subxiphoid area usually worse with positional component  Some irregular stools as well  1  Subxiphoid pain: Has a very strong musculoskeletal component to this on physical examination, reproducible   - recommended trial of heating pad, acetaminophen and Aspercreme for 1 week   - she was instructed to give us a call if pain persists at which time we proceed with an upper endoscopy     2  Irregular stools:  Recommended daily fiber supplement    3  Dysphagia:  Secondary to prior Nissen fundoplication, had been dilated in the past with some improvement in symptoms   -may be due to dry mouth at this time as she is undergoing evaluation for Sjogren's  -we will continue to monitor, consider repeat EGD with dilation if symptoms persist    Will have the patient follow up in 3 to 4 months  She was instructed to give us a call if worsening symptoms        Blanco Chang was seen today for medication refill, abdominal pain, constipation and diarrhea      Diagnoses and all orders for this visit:    Irritable bowel syndrome with both constipation and diarrhea    Esophageal dysphagia  -     omeprazole (PriLOSEC) 40 MG capsule; Take 1 capsule (40 mg total) by mouth 2 (two) times a day    Epigastric pain    Hiatal hernia with GERD without esophagitis  -     omeprazole (PriLOSEC) 40 MG capsule; Take 1 capsule (40 mg total) by mouth 2 (two) times a day            _____________________________________________________________        CC:  Irregular stools and abdominal discomfort    HPI:  Divya Yan is a 66 y  o female who is here for  Irregular stools and abdominal discomfort  As you know this is a pleasant 18-year-old female, we had seen her in the past alternating diarrhea constipation, predominantly had constipation symptoms in the past which now reports having some occasional loose stools  She is coming in today concerned of epigastric pain, she notes the pain when she lays down in bed just under her sternal notch, also notes the pain is present when she is bending over to clean her tub  She is going to see ENT tomorrow for evaluation of her salivary glands, this concerned that she may have Sjogren's syndrome  She has a history of rheumatoid arthritis, she does take acetaminophen  Minimizes her NSAIDs  Has a history of reflux, reports compliance with omeprazole  Had relatively unremarkable EGD and colonoscopy about a year and half ago  ROS:  The remainder of the ROS was negative except for the pertinent positives mentioned in HPI        Allergies: Orange (diagnostic) - food allergy, Pantoprazole, Penicillins, Gluten meal - food allergy, and Lactose - food allergy    Medications:   Current Outpatient Medications:     atorvastatin (LIPITOR) 20 mg tablet, TAKE 1 TABLET BY MOUTH  DAILY AT BEDTIME, Disp: 90 tablet, Rfl: 3    bumetanide (BUMEX) 1 mg tablet, TAKE 1 TABLET BY MOUTH  DAILY, Disp: 90 tablet, Rfl: 3    Calcium Carb-Cholecalciferol 600-1000 MG-UNIT CAPS, Take 1 capsule by mouth every morning , Disp: , Rfl:     Diclofenac Sodium (VOLTAREN) 1 %, Apply 4 g topically 4 (four) times a day, Disp: 480 g, Rfl: 0    levothyroxine 50 mcg tablet, TAKE 1 TABLET BY MOUTH  DAILY, Disp: 90 tablet, Rfl: 3    losartan (COZAAR) 25 mg tablet, TAKE 1 TABLET BY MOUTH  DAILY, Disp: 90 tablet, Rfl: 3    omeprazole (PriLOSEC) 40 MG capsule, Take 1 capsule (40 mg total) by mouth 2 (two) times a day, Disp: 180 capsule, Rfl: 2    potassium chloride (Klor-Con) 10 mEq tablet, TAKE 1 TABLET BY MOUTH  DAILY, Disp: 90 tablet, Rfl: 3    Past Medical History:   Diagnosis Date    Anxiety     resolved 10/4/17    Asthma     seasonal    Chronic kidney disease     kidney stone    Colon polyp     Disease of thyroid gland     Dysphagia     GERD (gastroesophageal reflux disease)     Goiter, nodular     partial thyroidectomy    Hiatal hernia     Hiatal hernia with GERD without esophagitis 04/2018    surgical correction    History of esophageal varices with bleeding     History of pernicious anemia     History of transfusion     x1 after bleeding ulcer    Hyperlipidemia     Hypertension     Irritable bowel syndrome     RA (rheumatoid arthritis) (HCC)     Seasonal allergies     Vertigo     Wears glasses        Past Surgical History:   Procedure Laterality Date    BREAST BIOPSY Bilateral     negative    CARDIAC CATHETERIZATION      x2 secondary to exertional chest pain, due to lung issues, possible mild COPD    CHOLECYSTECTOMY  2015    lap - last assessed 10/4/17    COLONOSCOPY      complete    ESOPHAGOGASTRODUODENOSCOPY N/A 1/23/2018    Procedure: ESOPHAGOGASTRODUODENOSCOPY (EGD); Surgeon: Jason Gee MD;  [facility]: [facility]; Service: Gastroenterology    HYSTERECTOMY      partial - ovaries remain    LIPOMA RESECTION      right thigh    ND ESOPHAGOSCOPY FLEXIBLE TRANSORAL WITH BIOPSY N/A 4/11/2018    Procedure: ESOPHAGOGASTRODUODENOSCOPY (EGD);   Surgeon: Shital Corona MD [place];  Location: [facility];  Service: Thoracic    PA LAP, REPAIR PARAESOPHAGEAL HERNIA, INCL FUNDOPLASTY W/ MESH N/A 4/11/2018    Procedure: REPAIR HERNIA PARAESOPHAGEAL  LAPAROSCOPIC,ELEONORA GASTROPLASTY, TUEPET FUNDOPLICATION;  Surgeon: Alicia Abbasi MD;  Location: [facility];  Service: Thoracic    SKIN BIOPSY Right     lump benign - last assessed 10/4/17    THYROIDECTOMY, PARTIAL  1977    TONSILLECTOMY         Family History   Problem Relation Age of Onset    Alzheimer's disease Mother     Cancer Mother         skin     Thyroid disease Mother     Ulcerative colitis Mother     Cancer Father         prostate    Stroke Father     Hypertension Father     Prostate cancer Father     Thyroid disease Sister     Ulcerative colitis Sister     Other Sister         open heart surgery    Heart disease Sister     Hyperlipidemia Brother     No Known Problems Son     No Known Problems Son     No Known Problems Maternal Grandmother     No Known Problems Paternal Grandmother     Mental illness Neg Hx         reports that she has never smoked  She has never used smokeless tobacco  She reports current alcohol use  She reports that she does not use drugs        Physical Exam:    /84 (BP Location: Right arm, Patient Position: Sitting, Cuff Size: Standard)   Pulse 81   Temp 97 9 °F (36 6 °C)   Ht 5' 4 25" (1 632 m)   Wt 96 6 kg (213 lb)   BMI 36 28 kg/m²     Gen: wn/wd, NAD  HEENT: anicteric, MMM, no cervical LAD  CVS: RRR, no m/r/g  CHEST: CTA b/l  ABD: +BS, soft,   Reproducible substernal focal pain, positive Carnett sign, no hepatosplenomegaly  EXT: no c/c/e  NEURO: aaox3  SKIN: NO rashes

## 2021-08-13 NOTE — PATIENT INSTRUCTIONS
Use heating pad 5 nights  Try tylenol  Aspercreme with lidocaine (apply to stomach) twice daily for 1 week    Try benefiber once daily

## 2021-08-20 ENCOUNTER — HOSPITAL ENCOUNTER (OUTPATIENT)
Dept: RADIOLOGY | Facility: HOSPITAL | Age: 71
Discharge: HOME | End: 2021-08-20
Payer: MEDICARE

## 2021-08-20 DIAGNOSIS — K11.8 PAROTID MASS: ICD-10-CM

## 2021-08-20 PROCEDURE — 76536 US EXAM OF HEAD AND NECK: CPT

## 2021-08-22 ENCOUNTER — ANESTHESIA EVENT (OUTPATIENT)
Dept: PERIOP | Facility: AMBULARY SURGERY CENTER | Age: 71
End: 2021-08-22
Payer: MEDICARE

## 2021-08-22 NOTE — ANESTHESIA PREPROCEDURE EVALUATION
Procedure:  EXTRACTION EXTRACAPSULAR CATARACT PHACO INTRAOCULAR LENS (IOL) (Right Eye)    Relevant Problems   CARDIO   (+) Benign hypertension   (+) Coronary artery disease involving native coronary artery of native heart without angina pectoris   (+) History of PTCA   (+) Mixed hyperlipidemia      ENDO   (+) Congenital hypothyroidism with diffuse goiter      GI/HEPATIC   (+) Dysphagia   (+) Gastroesophageal reflux disease   (+) Hiatal hernia      /RENAL   (+) Chronic kidney disease      GYN   (+) History of hysterectomy      HEMATOLOGY   (+) Anemia, pernicious      MUSCULOSKELETAL   (+) Rheumatoid arthritis (HCC)      NEURO/PSYCH   (+) Anxiety   (+) Vertigo      Other   (+) IBS (irritable bowel syndrome)        Physical Exam    Airway    Mallampati score: II  TM Distance: >3 FB  Neck ROM: full     Dental       Cardiovascular  Rhythm: regular, Rate: normal,     Pulmonary  Breath sounds clear to auscultation,     Other Findings        Anesthesia Plan  ASA Score- 3     Anesthesia Type- IV sedation with anesthesia with ASA Monitors  Additional Monitors:   Airway Plan:           Plan Factors-    Chart reviewed  Patient is not a current smoker  Induction- intravenous  Postoperative Plan-     Informed Consent- Anesthetic plan and risks discussed with patient  I personally reviewed this patient with the CRNA  Discussed and agreed on the Anesthesia Plan with the CRNA  Elsie Mcarthur

## 2021-08-23 ENCOUNTER — HOSPITAL ENCOUNTER (OUTPATIENT)
Facility: AMBULARY SURGERY CENTER | Age: 71
Setting detail: OUTPATIENT SURGERY
Discharge: HOME | End: 2021-08-23
Attending: OPHTHALMOLOGY | Admitting: OPHTHALMOLOGY
Payer: MEDICARE

## 2021-08-23 ENCOUNTER — ANESTHESIA (OUTPATIENT)
Dept: PERIOP | Facility: AMBULARY SURGERY CENTER | Age: 71
End: 2021-08-23
Payer: MEDICARE

## 2021-08-23 VITALS
RESPIRATION RATE: 18 BRPM | DIASTOLIC BLOOD PRESSURE: 85 MMHG | HEART RATE: 82 BPM | HEIGHT: 64 IN | TEMPERATURE: 98.3 F | WEIGHT: 200 LBS | SYSTOLIC BLOOD PRESSURE: 163 MMHG | OXYGEN SATURATION: 96 % | BODY MASS INDEX: 34.15 KG/M2

## 2021-08-23 DIAGNOSIS — H25.11 AGE-RELATED NUCLEAR CATARACT OF RIGHT EYE: Primary | ICD-10-CM

## 2021-08-23 PROCEDURE — V2632 POST CHMBR INTRAOCULAR LENS: HCPCS | Performed by: OPHTHALMOLOGY

## 2021-08-23 DEVICE — ACRYSOF(R) IQ ASPHERIC NATURAL IOL, SINGLE-PIECE ACRYLIC FOLDABLE PCL, UV WITH BLUE LIGHTFILTER, 13.0MM LENGTH, 6.0MM ANTERIORASYMMETRIC BICONVEX OPTIC, PLANAR HAPTICS.
Type: IMPLANTABLE DEVICE | Site: EYE | Status: FUNCTIONAL
Brand: ACRYSOF®

## 2021-08-23 RX ORDER — TETRACAINE HYDROCHLORIDE 5 MG/ML
SOLUTION OPHTHALMIC AS NEEDED
Status: DISCONTINUED | OUTPATIENT
Start: 2021-08-23 | End: 2021-08-23 | Stop reason: HOSPADM

## 2021-08-23 RX ORDER — LIDOCAINE HYDROCHLORIDE 10 MG/ML
INJECTION, SOLUTION EPIDURAL; INFILTRATION; INTRACAUDAL; PERINEURAL AS NEEDED
Status: DISCONTINUED | OUTPATIENT
Start: 2021-08-23 | End: 2021-08-23 | Stop reason: HOSPADM

## 2021-08-23 RX ORDER — GATIFLOXACIN 5 MG/ML
1 SOLUTION/ DROPS OPHTHALMIC 2 TIMES DAILY
Qty: 3 ML | Refills: 0
Start: 2021-08-23 | End: 2021-11-08

## 2021-08-23 RX ORDER — LIDOCAINE HYDROCHLORIDE 20 MG/ML
1 JELLY TOPICAL
Status: COMPLETED | OUTPATIENT
Start: 2021-08-23 | End: 2021-08-23

## 2021-08-23 RX ORDER — CYCLOPENTOLATE HYDROCHLORIDE 10 MG/ML
1 SOLUTION/ DROPS OPHTHALMIC
Status: COMPLETED | OUTPATIENT
Start: 2021-08-23 | End: 2021-08-23

## 2021-08-23 RX ORDER — TETRACAINE HYDROCHLORIDE 5 MG/ML
1 SOLUTION OPHTHALMIC ONCE
Status: COMPLETED | OUTPATIENT
Start: 2021-08-23 | End: 2021-08-23

## 2021-08-23 RX ORDER — KETOROLAC TROMETHAMINE 5 MG/ML
1 SOLUTION OPHTHALMIC 4 TIMES DAILY
Qty: 5 ML | Refills: 0
Start: 2021-08-23 | End: 2021-11-08

## 2021-08-23 RX ORDER — BALANCED SALT SOLUTION 6.4; .75; .48; .3; 3.9; 1.7 MG/ML; MG/ML; MG/ML; MG/ML; MG/ML; MG/ML
SOLUTION OPHTHALMIC AS NEEDED
Status: DISCONTINUED | OUTPATIENT
Start: 2021-08-23 | End: 2021-08-23 | Stop reason: HOSPADM

## 2021-08-23 RX ORDER — KETOROLAC TROMETHAMINE 5 MG/ML
1 SOLUTION OPHTHALMIC
Status: COMPLETED | OUTPATIENT
Start: 2021-08-23 | End: 2021-08-23

## 2021-08-23 RX ORDER — MIDAZOLAM HYDROCHLORIDE 2 MG/2ML
INJECTION, SOLUTION INTRAMUSCULAR; INTRAVENOUS AS NEEDED
Status: DISCONTINUED | OUTPATIENT
Start: 2021-08-23 | End: 2021-08-23

## 2021-08-23 RX ORDER — GATIFLOXACIN 5 MG/ML
SOLUTION/ DROPS OPHTHALMIC AS NEEDED
Status: DISCONTINUED | OUTPATIENT
Start: 2021-08-23 | End: 2021-08-23 | Stop reason: HOSPADM

## 2021-08-23 RX ORDER — PHENYLEPHRINE HCL 2.5 %
1 DROPS OPHTHALMIC (EYE)
Status: COMPLETED | OUTPATIENT
Start: 2021-08-23 | End: 2021-08-23

## 2021-08-23 RX ADMIN — KETOROLAC TROMETHAMINE 1 DROP: 5 SOLUTION OPHTHALMIC at 14:02

## 2021-08-23 RX ADMIN — PHENYLEPHRINE HYDROCHLORIDE 1 DROP: 25 SOLUTION/ DROPS OPHTHALMIC at 14:14

## 2021-08-23 RX ADMIN — MIDAZOLAM 2 MG: 1 INJECTION INTRAMUSCULAR; INTRAVENOUS at 14:17

## 2021-08-23 RX ADMIN — TETRACAINE HYDROCHLORIDE 1 DROP: 5 SOLUTION OPHTHALMIC at 13:30

## 2021-08-23 RX ADMIN — CYCLOPENTOLATE HYDROCHLORIDE 1 DROP: 10 SOLUTION/ DROPS OPHTHALMIC at 13:30

## 2021-08-23 RX ADMIN — LIDOCAINE HYDROCHLORIDE 1 APPLICATION: 20 JELLY TOPICAL at 13:45

## 2021-08-23 RX ADMIN — CYCLOPENTOLATE HYDROCHLORIDE 1 DROP: 10 SOLUTION/ DROPS OPHTHALMIC at 14:14

## 2021-08-23 RX ADMIN — CYCLOPENTOLATE HYDROCHLORIDE 1 DROP: 10 SOLUTION/ DROPS OPHTHALMIC at 14:02

## 2021-08-23 RX ADMIN — PHENYLEPHRINE HYDROCHLORIDE 1 DROP: 25 SOLUTION/ DROPS OPHTHALMIC at 13:30

## 2021-08-23 RX ADMIN — CYCLOPENTOLATE HYDROCHLORIDE 1 DROP: 10 SOLUTION/ DROPS OPHTHALMIC at 13:45

## 2021-08-23 RX ADMIN — PHENYLEPHRINE HYDROCHLORIDE 1 DROP: 25 SOLUTION/ DROPS OPHTHALMIC at 14:02

## 2021-08-23 RX ADMIN — KETOROLAC TROMETHAMINE 1 DROP: 5 SOLUTION OPHTHALMIC at 13:45

## 2021-08-23 RX ADMIN — PHENYLEPHRINE HYDROCHLORIDE 1 DROP: 25 SOLUTION/ DROPS OPHTHALMIC at 13:45

## 2021-08-23 RX ADMIN — LIDOCAINE HYDROCHLORIDE 1 APPLICATION: 20 JELLY TOPICAL at 13:30

## 2021-08-23 RX ADMIN — LIDOCAINE HYDROCHLORIDE 1 APPLICATION: 20 JELLY TOPICAL at 14:02

## 2021-08-23 RX ADMIN — KETOROLAC TROMETHAMINE 1 DROP: 5 SOLUTION OPHTHALMIC at 13:30

## 2021-08-23 RX ADMIN — KETOROLAC TROMETHAMINE 1 DROP: 5 SOLUTION OPHTHALMIC at 14:14

## 2021-08-23 NOTE — OP NOTE
OPERATIVE REPORT    PATIENT NAME: Luann Garcia    :  1950  MRN: [id number]  Pt Location: [facility] OR ROOM 01    Surgery Date: 2021    Surgeon(s) and Role:     * Daxa Carrasco MD - Primary    Age-related nuclear cataract, right eye [H25 11]    Post-Op Diagnosis Codes:     * Age-related nuclear cataract, right eye [H25 11]    Procedure(s):  EXTRACTION EXTRACAPSULAR CATARACT PHACO INTRAOCULAR LENS (IOL)    Anesthesia Type:   IV Sedation with Anesthesia    Operative Indications:  Age-related nuclear cataract, right eye [H25 11]  Decreased vision to 20/80  With problems driving  Pt requested cataract sx the right eye    Procedure and Technique:    Procedure Details     The patient was brought in the OR in stable condition and placed on the operative table  The right eye was prepped and draped in the usual sterile manner  Attention was directed to the right eye where a lid speculum was placed  A 2 4 mm clear corneal incision was made temperally  1/2 cc of 1% MPF Lidocaine was irrigated into the anterior chamber followed by viscoat  The side port incision was placed superiorly  The capsularrhexis was made and the nucleus was hydrodissected with BSS  The nucleus was then removed with the phaco handpiece followed by removal of the cortical material with the I/A handpiece  The capsular bag was then filled with Provisc  The IOL was folded and placed in to the capsular bag and centered well  The remaining Provisc was removed from the eye with the I/A  The wounds were hydrated with BSS and found to be water tight  The lid speculum was removed and 2 drops of Gatifloxicin were placed over the cornea  A protective eye shield was taped over the eye and the patient went to PACU in stable condition  I will see the patient in the office tomorrow and the expected post op period is a few weeks         Complications: None        Disposition: PACU   Condition: Stable    SIGNATURE: Daxa Carrasco MD  DATE:  2021  TIME: 2:37 PM

## 2021-08-23 NOTE — DISCHARGE INSTRUCTIONS
Dr Nino Benz Cataract Instructions    Activity:     1  No Driving until instructed   2  Keep shield on until seen tomorrow except when administering drops   3  No heavy lifting   4  No water in eye     Diet:     1  Resume normal diet    Normal Symptoms:     1  Mild Headache   2  Scratchy or picky feeling around eye    Call the office if:     1  You have any questions or concerns   2  If eye pain is not relieved by extra strength tylenol    Office phone number:  [phone]      Next appointment:     1  See Dr Nino Benz at his office tomorrow as scheduled   ___8:00am_________   2  Bring blue eye kit with you and eyedrops to the office    A new set of comprehensive instructions will be given and reviewed with you during your office visit tomorrow

## 2021-08-23 NOTE — ANESTHESIA POSTPROCEDURE EVALUATION
Post-Op Assessment Note    CV Status:  Stable  Pain Score: 0    Pain management: adequate     Mental Status:  Alert   Hydration Status:  Stable   PONV Controlled:  None   Airway Patency:  Patent   Two or more mitigation strategies used for obstructive sleep apnea   Post Op Vitals Reviewed: Yes      Staff: CRNA         No complications documented      BP   137/87   Temp 97   Pulse 75   Resp 16   SpO2 99

## 2021-09-02 ENCOUNTER — OFFICE VISIT (OUTPATIENT)
Dept: DERMATOLOGY | Age: 71
End: 2021-09-02
Payer: MEDICARE

## 2021-09-02 VITALS — BODY MASS INDEX: 36.73 KG/M2 | TEMPERATURE: 97.7 F | WEIGHT: 214 LBS

## 2021-09-02 DIAGNOSIS — D23.9 DERMATOFIBROMA: ICD-10-CM

## 2021-09-02 DIAGNOSIS — D17.21 LIPOMA OF RIGHT UPPER EXTREMITY: ICD-10-CM

## 2021-09-02 DIAGNOSIS — L82.1 SEBORRHEIC KERATOSIS: ICD-10-CM

## 2021-09-02 DIAGNOSIS — D22.5 MULTIPLE BENIGN MELANOCYTIC NEVI OF UPPER AND LOWER EXTREMITIES AND TRUNK: ICD-10-CM

## 2021-09-02 DIAGNOSIS — D22.70 MULTIPLE BENIGN MELANOCYTIC NEVI OF UPPER AND LOWER EXTREMITIES AND TRUNK: ICD-10-CM

## 2021-09-02 DIAGNOSIS — D22.60 MULTIPLE BENIGN MELANOCYTIC NEVI OF UPPER AND LOWER EXTREMITIES AND TRUNK: ICD-10-CM

## 2021-09-02 DIAGNOSIS — D18.01 CHERRY ANGIOMA: Primary | ICD-10-CM

## 2021-09-02 PROCEDURE — 99203 OFFICE O/P NEW LOW 30 MIN: CPT | Performed by: DERMATOLOGY

## 2021-09-02 RX ORDER — PREDNISOLONE ACETATE 10 MG/ML
SUSPENSION/ DROPS OPHTHALMIC
COMMUNITY
Start: 2021-08-17 | End: 2021-11-08

## 2021-09-02 NOTE — PATIENT INSTRUCTIONS
Assessment and Plan:  Based on a thorough discussion of this condition and the management approach to it (including a comprehensive discussion of the known risks, side effects and potential benefits of treatment), the patient (family) agrees to implement the following specific plan:   Reassured benign     Assessment and Plan:    Cherry angioma, also known as Thomes Cruel spots, are benign vascular skin lesions  A "cherry angioma" is a firm red, blue or purple papule, 0 1-1 cm in diameter  When thrombosed, they can appear black in colour until evaluated with a dermatoscope when the red or purple colour is more easily seen  Cherry angioma may develop on any part of the body but most often appear on the scalp, face, lips and trunk  An angioma is due to proliferating endothelial cells; these are the cells that line the inside of a blood vessel  Angiomas can arise in early life or later in life; the most common type of angioma is a cherry angioma  Cherry angiomas are very common in males and females of any age or race  They are more noticeable in white skin than in skin of colour  They markedly increase in number from about the age of 36  There may be a family history of similar lesions  Eruptive cherry angiomas have been rarely reported to be associated with internal malignancy  The cause of angiomas is unknown  Genetic analysis of cherry angiomas has shown that they frequently carry specific somatic missense mutations in the GNAQ and GNA11 (Q209H) genes, which are involved in other vascular and melanocytic proliferations  Cherry angioma is usually diagnosed clinically and no investigations are necessary for the majority of lesions  It has a characteristic red-clod or lobular pattern on dermatoscopy (called lacunar pattern using conventional pattern analysis)  When there is uncertainty about the diagnosis, a biopsy may be performed  The angioma is composed of venules in a thickened papillary dermis  Collagen bundles may be prominent between the lobules  Cherry angiomas are harmless, so they do not usually have to be treated  Occasionally, they are removed to exclude a malignant skin lesion such as a nodular melanoma or because they are irritated or bleeding (and a subsequent risk for infection)  To decrease friction over the lesions, we recommend Neutrogena Daily Defense SPF 50+ at least 3 times a day  Assessment and Plan:  Based on a thorough discussion of this condition and the management approach to it (including a comprehensive discussion of the known risks, side effects and potential benefits of treatment), the patient (family) agrees to implement the following specific plan:   Monitor for changes   When outside we recommend using a wide brim hat, sunglasses, long sleeve and pants, sunscreen with SPF 25+ with reapplication every 2 hours, or SPF specific clothing    Recommend yearly skin exam      Melanocytic Nevi  Melanocytic nevi ("moles") are tan or brown, raised or flat areas of the skin which have an increased number of melanocytes  Melanocytes are the cells in our body which make pigment and account for skin color  Some moles are present at birth (I e , "congenital nevi"), while others come up later in life (i e , "acquired nevi")  The sun can stimulate the body to make more moles  Sunburns are not the only thing that triggers more moles  Chronic sun exposure can do it too  Clinically distinguishing a healthy mole from melanoma may be difficult, even for experienced dermatologists  The "ABCDE's" of moles have been suggested as a means of helping to alert a person to a suspicious mole and the possible increased risk of melanoma  The suggestions for raising alert are as follows:    Asymmetry: Healthy moles tend to be symmetric, while melanomas are often asymmetric    Asymmetry means if you draw a line through the mole, the two halves do not match in color, size, shape, or surface texture  Asymmetry can be a result of rapid enlargement of a mole, the development of a raised area on a previously flat lesion, scaling, ulceration, bleeding or scabbing within the mole  Any mole that starts to demonstrate "asymmetry" should be examined promptly by a board certified dermatologist      Border: Healthy moles tend to have discrete, even borders  The border of a melanoma often blends into the normal skin and does not sharply delineate the mole from normal skin  Any mole that starts to demonstrate "uneven borders" should be examined promptly by a board certified dermatologist      Color: Healthy moles tend to be one color throughout  Melanomas tend to be made up of different colors ranging from dark black, blue, white, or red  Any mole that demonstrates a color change should be examined promptly by a board certified dermatologist      Diameter: Healthy moles tend to be smaller than 0 6 cm in size; an exception are "congenital nevi" that can be larger  Melanomas tend to grow and can often be greater than 0 6 cm (1/4 of an inch, or the size of a pencil eraser)  This is only a guideline, and many normal moles may be larger than 0 6 cm without being unhealthy  Any mole that starts to change in size (small to bigger or bigger to smaller) should be examined promptly by a board certified dermatologist      Evolving: Healthy moles tend to "stay the same "  Melanomas may often show signs of change or evolution such as a change in size, shape, color, or elevation  Any mole that starts to itch, bleed, crust, burn, hurt, or ulcerate or demonstrate a change or evolution should be examined promptly by a board certified dermatologist       Dysplastic Nevi  Dysplastic moles are moles that fit the ABCDE rules of melanoma but are not identified as melanomas when examined under the microscope  They may indicate an increased risk of melanoma in that person   If there is a family history of melanoma, most experts agree that the person may be at an increased risk for developing a melanoma  Experts still do not agree on what dysplastic moles mean in patients without a personal or family history of melanoma  Dysplastic moles are usually larger than common moles and have different colors within it with irregular borders  The appearance can be very similar to a melanoma  Biopsies of dysplastic moles may show abnormalities which are different from a regular mole  Melanoma  Malignant melanoma is a type of skin cancer that can be deadly if it spreads throughout the body  The incidence of melanoma in the [place] is growing faster than any other cancer  Melanoma usually grows near the surface of the skin for a period of time, and then begins to grow deeper into the skin  Once it grows deeper into the skin, the risk of spread to other organs greatly increases  Therefore, early detection and removal of a malignant melanoma may result in a better chance at a complete cure; removal after the tumor has spread may not be as effective, leading to worse clinical outcomes such as death  The true rate of nevus transformation into a melanoma is unknown  It has been estimated that the lifetime risk for any acquired melanocytic nevus on any 21year-old individual transforming into melanoma by age [de-identified] is 0 03% (1 in 3,164) for men and 0 009% (1 in 10,800) for women  The appearance of a "new mole" remains one of the most reliable methods for identifying a malignant melanoma  Occasionally, melanomas appear as rapidly growing, blue-black, dome-shaped bumps within a previous mole or previous area of normal skin  Other times, melanomas are suspected when a mole suddenly appears or changes  Itching, burning, or pain in a pigmented lesion should increase suspicion, but most patients with early melanoma have no skin discomfort whatsoever  Melanoma can occur anywhere on the skin, including areas that are difficult for self-examination  Many melanomas are first noticed by other family members  Suspicious-looking moles may be removed for microscopic examination  You may be able to prevent death from melanoma by doing two simple things:    1  Try to avoid unnecessary sun exposure and protect your skin when it is exposed to the sun  People who live near the equator, people who have intermittent exposures to large amounts of sun, and people who have had sunburns in childhood or adolescence have an increased risk for melanoma  Sun sense and vigilant sun protection may be keys to helping to prevent melanoma  We recommend wearing UPF-rated sun protective clothing and sunglasses whenever possible and applying a moisturizer-sunscreen combination product (SPF 50+) such as Neutrogena Daily Defense to sun exposed areas of skin at least three times a day  2  Have your moles regularly examined by a board certified dermatologist AND by yourself or a family member/friend at home  We recommend that you have your moles examined at least once a year by a board certified dermatologist   Use your birthday as an annual reminder to have your "Birthday Suit" (I e , your skin) examined; it is a nice birthday gift to yourself to know that your skin is healthy appearing! Additionally, at-home self examinations may be helpful for detecting a possible melanoma  Use the ABCDEs we discussed and check your moles once a month at home  Assessment and Plan:  Based on a thorough discussion of this condition and the management approach to it (including a comprehensive discussion of the known risks, side effects and potential benefits of treatment), the patient (family) agrees to implement the following specific plan:   Reassured benign     Seborrheic Keratosis  A seborrheic keratosis is a harmless warty spot that appears during adult life as a common sign of skin aging    Seborrheic keratoses can arise on any area of skin, covered or uncovered, with the usual exception of the palms and soles  They do not arise from mucous membranes  Seborrheic keratoses can have highly variable appearance  Seborrheic keratoses are extremely common  It has been estimated that over 90% of adults over the age of 61 years have one or more of them  They occur in males and females of all races, typically beginning to erupt in the 35s or 45s  They are uncommon under the age of 21 years  The precise cause of seborrhoeic keratoses is not known  Seborrhoeic keratoses are considered degenerative in nature  As time goes by, seborrheic keratoses tend to become more numerous  Some people inherit a tendency to develop a very large number of them; some people may have hundreds of them  The name "seborrheic keratosis" is misleading, because these lesions are not limited to a seborrhoeic distribution (scalp, mid-face, chest, upper back), nor are they formed from sebaceous glands, nor are they associated with sebum -- which is greasy  Seborrheic keratosis may also be called "SK," "Seb K," "basal cell papilloma," "senile wart," or "barnacle "      Researchers have noted:   Eruptive seborrhoeic keratoses can follow sunburn or dermatitis   Skin friction may be the reason they appear in body folds   Viral cause (e g , human papillomavirus) seems unlikely   Stable and clonal mutations or activation of FRFR3, PIK3CA, TALI, AKT1 and EGFR genes are found in seborrhoeic keratoses   Seborrhoeic keratosis can arise from solar lentigo   FRFR3 mutations also arise in solar lentigines  These mutations are associated with increased age and location on the head and neck, suggesting a role of ultraviolet radiation in these lesions   Seborrheic keratoses do not harbour tumour suppressor gene mutations   Epidermal growth factor receptor inhibitors, which are used to treat some cancers, often result in an increase in verrucal (warty) keratoses      There is no easy way to remove multiple lesions on a single occasion  Unless a specific lesion is "inflamed" and is causing pain or stinging/burning or is bleeding, most insurance companies do not authorize treatment  Assessment and Plan:  Based on a thorough discussion of this condition and the management approach to it (including a comprehensive discussion of the known risks, side effects and potential benefits of treatment), the patient (family) agrees to implement the following specific plan:    Pt reassured of benign diagnosis, no treatment required    Pt aware that if lesions ever have concerning changes such as rapid enlargement, start to become non healing areas or frequently traumatized, please call to let us know as these changes may require biopsy or we can discuss more definitve removal     DERMATOFIBROMA, PATIENT INFORMATION:  A dermatofibroma is a common benign fibrous nodule that most often arises on the skin of the lower legs  A dermatofibroma is also called a "cutaneous fibrous histiocytoma "  Dermatofibromas occur at all ages and in people of every ethnicity  They are more common in women than in men  It is not clear if dermatofibroma is a reactive process or if it is a neoplasm  The lesions are made up of proliferating fibroblasts  Histiocytes may also be involved  They are sometimes attributed to an insect bite or ingrownhair or local trauma, but not consistently  They may be more numerous in patients with altered immunity  Dermatofibromas most often occur on the legs and arms, but may also arise on the trunk or any site of the body  Typical clinical features include the following:   People may have 1 or up to 15 lesions   Size varies from 0 5-1 5 cm diameter; most lesions are 7-10 mm diameter   They are firm nodules tethered to the skin surface and mobile over subcutaneous tissue   The skin "dimples" on pinching the lesion     Color may be pink to light brown in white skin, and dark brown to black in dark skin; some appear paler in the center   They do not usually cause symptoms, but they are sometimes painful or itchy   Because they are often raised lesions, they may be traumatized, for example by a razor   Occasionally dozens may erupt within a few months, usually in the setting of immunosuppression (for example autoimmune disease, cancer or certain medications)   Dermatofibroma does not give rise to cancer  However, occasionally, it may be mistaken for dermatofibrosarcoma or desmoplastic melanoma  A dermatofibroma is harmless and seldom causes any symptoms  Usually, only reassurance is needed  If it is nuisance or causing concern, the lesion can be removed surgically, resulting in a scar that is, by definition, usually longer in diameter than the widest portion of the dermatofibroma  Cryotherapy, shave biopsy and laser surgery are rarely completely successful  Skin punch biopsy or incisional biopsy may be undertaken if there is an atypical feature such as recent enlargement, ulceration, or asymmetrical structures and colours on dermatoscopy  Assessment and Plan:  Based on a thorough discussion of this condition and the management approach to it (including a comprehensive discussion of the known risks, side effects and potential benefits of treatment), the patient (family) agrees to implement the following specific plan:   If changes in size can be removed via Excision  Lipoma  A lipoma is a non-cancerous tumor that is made up of fat cells  It slowly grows under the skin in the subcutaneous tissue  A person may have a single lipoma or may have many lipomas  They are very common  Lipomas can occur in people of all ages, however, they tend to develop in adulthood and are most noticeable during middle age  They affect both sexes equally, although solitary lipomas are more common in women whilst multiple lipomas occur more frequently in men  The cause of lipomas is unknown   It is possible there may be genetic involvement as many patients with lipomas come from a family with a history of these tumors  Sometimes an injury such as a blunt blow to part of the body may trigger growth of a lipoma  People are often unaware of lipomas until they have grown large enough to become visible and palpable  This growth occurs slowly over several years  Some features of lipomas include:   A dome-shaped or egg-shaped lump about 2-10 cm in diameter (some may grow even larger)    It feels soft and smooth and is easily moved under the skin with the fingers    Some have a rubbery or doughy consistency    They are most common on the shoulders, neck, trunk and arms, but they can occur anywhere on the body where fat tissue is present  Most lipomas are symptomless, but some are painful on applying pressure  Lipomas that are tender or painful are usually angiolipomas  This means the lipoma has an increased number of small blood vessels  Painful lipomas are also a feature of adiposis dolorosa or Dercum disease  Diagnosis of lipoma is usually made clinically by finding a soft lump under the skin  However, if there is any doubt, a deep skin biopsy can be performed which will show typical histopathological features of lipoma and its variants  Most lipomas require no treatment  Most lipomas eventually stop growing and remain indefinitely without causing any problems   Occasionally, lipomas that interfere with the movement of adjacent muscles may require surgical removal  Several methods are available:   Simple surgical excision    Squeeze technique (a small incision is made over the lipoma and the fatty tissue is squeezed through the hole)    Liposuction

## 2021-09-02 NOTE — PROGRESS NOTES
[facility] Note     Patient Name: Stephanie Okeefe  Encounter Date: 09/02/21       Have you been cared for by a [facility] Dermatologist in the last 3 years and, if so, which one? No    · Have you traveled outside of the [address] [place] [address] in the past 3 months or outside of the [facility] in the last 2 weeks? No     May we call your Preferred Phone number to discuss your specific medical information? Yes     May we leave a detailed message that includes your specific medical information? Yes      Today's Chief Concerns:   Concern #1:  Skin Exam     Past Medical History:  Have you personally ever had or currently have any of the following? · Skin cancer (such as Melanoma, Basal Cell Carcinoma, Squamous Cell Carcinoma? (If Yes, please provide more detail)- No  · Eczema: No  · Psoriasis: No  · HIV/AIDS: No  · Hepatitis B or C: No  · Tuberculosis: No  · Systemic Immunosuppression such as Diabetes, Biologic or Immunotherapy, Chemotherapy, Organ Transplantation, Bone Marrow Transplantation (If YES, please provide more detail): No  · Radiation Treatment (If YES, please provide more detail): No  · Any other major medical conditions/concerns? (If Yes, which types)- YES, Hypertension; CKD    Social History:     What is/was your primary occupation? Retired     What are your hobbies/past-times? Art and crafts    Family History:  Have any of your "first degree relatives" (parent, brother, sister, or child) had any of the following       · Skin cancer such as Melanoma or Merkel Cell Carcinoma or Pancreatic Cancer? YES, Mother-- unknown kind   · Eczema, Asthma, Hay Fever or Seasonal Allergies: No  · Psoriasis or Psoriatic Arthritis: No  · Do any other medical conditions seem to run in your family? If Yes, what condition and which relatives?   No    Current Medications:       Current Outpatient Medications:     atorvastatin (LIPITOR) 20 mg tablet, TAKE 1 TABLET BY MOUTH DAILY AT BEDTIME, Disp: 90 tablet, Rfl: 3    bumetanide (BUMEX) 1 mg tablet, TAKE 1 TABLET BY MOUTH  DAILY, Disp: 90 tablet, Rfl: 3    Calcium Carb-Cholecalciferol 600-1000 MG-UNIT CAPS, Take 1 capsule by mouth every morning , Disp: , Rfl:     Diclofenac Sodium (VOLTAREN) 1 %, Apply 4 g topically 4 (four) times a day, Disp: 480 g, Rfl: 0    gatifloxacin (ZYMAXID) 0 5 %, Administer 1 drop to the right eye 2 (two) times a day, Disp: 3 mL, Rfl: 0    ketorolac (ACULAR) 0 5 % ophthalmic solution, Administer 1 drop to the right eye 4 (four) times a day, Disp: 5 mL, Rfl: 0    levothyroxine 50 mcg tablet, TAKE 1 TABLET BY MOUTH  DAILY, Disp: 90 tablet, Rfl: 3    losartan (COZAAR) 25 mg tablet, TAKE 1 TABLET BY MOUTH  DAILY, Disp: 90 tablet, Rfl: 3    omeprazole (PriLOSEC) 40 MG capsule, Take 1 capsule (40 mg total) by mouth 2 (two) times a day, Disp: 180 capsule, Rfl: 2    potassium chloride (Klor-Con) 10 mEq tablet, TAKE 1 TABLET BY MOUTH  DAILY, Disp: 90 tablet, Rfl: 3    prednisoLONE acetate (PRED FORTE) 1 % ophthalmic suspension, instill 1 drop into right eye four times a day, Disp: , Rfl:       Review of Systems:  Have you recently had or currently have any of the following? If YES, what are you doing for the problem? · Fever, chills or unintended weight loss: No  · Sudden loss or change in your vision: No  · Nausea, vomiting or blood in your stool: No  · Painful or swollen joints: No  · Wheezing or cough: No  · Changing mole or non-healing wound: No  · Nosebleeds: No  · Excessive sweating: No  · Easy or prolonged bleeding? No  · Over the last 2 weeks, how often have you been bothered by the following problems? · Taking little interest or pleasure in doing things: 1 - Not at All  · Feeling down, depressed, or hopeless: 1 - Not at All  · Rapid heartbeat with epinephrine:  No    · FEMALES ONLY:    · Are you pregnant or planning to become pregnant?  No  · Are you currently or planning to be nursing or breast feeding? No    · Any known allergies? Allergies   Allergen Reactions    Orange (Diagnostic) - Food Allergy Anaphylaxis     Throat closes    Pantoprazole Headache    Penicillins Other (See Comments)     During allergy testing instructed to NEVER take PCN    Gluten Meal - Food Allergy      Following a gluten free diet on her own    Lactose - Food Allergy Diarrhea   ·       Physical Exam:     Was a chaperone (Derm Clinical Assistant) present throughout the entire Physical Exam? Yes     Did the Dermatology Team specifically  the patient on the importance of a Full Skin Exam to be sure that nothing is missed clinically? Yes  o Did the patient ultimately request or accept a Full Skin Exam?  Yes  o Did the patient specifically refuse to have the areas "under-the-bra" examined by the Dermatologist? No  o Did the patient specifically refuse to have the areas "under-the-underwear" examined by the Dermatologist? No    CONSTITUTIONAL:   There were no vitals filed for this visit      PSYCH: Normal mood and affect  EYES: Normal conjunctiva  ENT: Normal lips and oral mucosa  CARDIOVASCULAR: No edema  RESPIRATORY: Normal respirations    SKIN:  FULL ORGAN SYSTEM EXAM  Hair, Scalp, Ears, Face Normal except as noted below in Assessment   Neck, Cervical Chain Nodes Normal except as noted below in Assessment   Right Arm/Hand/Fingers Normal except as noted below in Assessment   Left Arm/Hand/Fingers Normal except as noted below in Assessment   Chest/Breasts/Axillae Viewed areas Normal except as noted below in Assessment   Abdomen, Umbilicus Normal except as noted below in Assessment   Back/Spine Normal except as noted below in Assessment   Buttocks Normal except as noted below in Assessment   Right Leg, Foot, Toes Normal except as noted below in Assessment   Left Leg, Foot, Toes Normal except as noted below in Assessment        Assessment and Plan by Diagnosis:    History of Present Condition:     Patient is present for routine skin exam  No personal hx of skin cancer; Mother did have a skin cancer of unknown kind  CHERRY ANGIOMAS    Physical Exam:   Anatomic Location Affected:  Scalp, trunk, and extremities   Morphological Description:  Scattered cherry red, 1-4 mm papules    Additional History of Present Condition:  Discovered upon skin exam       Assessment and Plan:  Based on a thorough discussion of this condition and the management approach to it (including a comprehensive discussion of the known risks, side effects and potential benefits of treatment), the patient (family) agrees to implement the following specific plan:   Reassured benign     Assessment and Plan:    Cherry angioma, also known as [employer] spots, are benign vascular skin lesions  A "cherry angioma" is a firm red, blue or purple papule, 0 1-1 cm in diameter  When thrombosed, they can appear black in colour until evaluated with a dermatoscope when the red or purple colour is more easily seen  Cherry angioma may develop on any part of the body but most often appear on the scalp, face, lips and trunk  An angioma is due to proliferating endothelial cells; these are the cells that line the inside of a blood vessel  Angiomas can arise in early life or later in life; the most common type of angioma is a cherry angioma  Cherry angiomas are very common in males and females of any age or race  They are more noticeable in white skin than in skin of colour  They markedly increase in number from about the age of 36  There may be a family history of similar lesions  Eruptive cherry angiomas have been rarely reported to be associated with internal malignancy  The cause of angiomas is unknown  Genetic analysis of cherry angiomas has shown that they frequently carry specific somatic missense mutations in the GNAQ and GNA11 (Q209H) genes, which are involved in other vascular and melanocytic proliferations      Cherry angioma is usually diagnosed clinically and no investigations are necessary for the majority of lesions  It has a characteristic red-clod or lobular pattern on dermatoscopy (called lacunar pattern using conventional pattern analysis)  When there is uncertainty about the diagnosis, a biopsy may be performed  The angioma is composed of venules in a thickened papillary dermis  Collagen bundles may be prominent between the lobules  Cherry angiomas are harmless, so they do not usually have to be treated  Occasionally, they are removed to exclude a malignant skin lesion such as a nodular melanoma or because they are irritated or bleeding (and a subsequent risk for infection)  To decrease friction over the lesions, we recommend Neutrogena Daily Defense SPF 50+ at least 3 times a day  MELANOCYTIC NEVI ("Moles")    Physical Exam:   Anatomic Location Affected:   Mostly on sun-exposed areas of the Trunk, arms, legs   Morphological Description:  Scattered, 1-4mm round to ovoid, symmetrical-appearing, even bordered, skin colored to dark brown macules/papules, mostly in sun-exposed areas    Additional History of Present Condition:  Discovered upon skin exam     Assessment and Plan:  Based on a thorough discussion of this condition and the management approach to it (including a comprehensive discussion of the known risks, side effects and potential benefits of treatment), the patient (family) agrees to implement the following specific plan:   Monitor for changes   When outside we recommend using a wide brim hat, sunglasses, long sleeve and pants, sunscreen with SPF 51+ with reapplication every 2 hours, or SPF specific clothing    Recommend yearly skin exam      Melanocytic Nevi  Melanocytic nevi ("moles") are tan or brown, raised or flat areas of the skin which have an increased number of melanocytes  Melanocytes are the cells in our body which make pigment and account for skin color      Some moles are present at birth (I e , "congenital nevi"), while others come up later in life (i e , "acquired nevi")  The sun can stimulate the body to make more moles  Sunburns are not the only thing that triggers more moles  Chronic sun exposure can do it too  Clinically distinguishing a healthy mole from melanoma may be difficult, even for experienced dermatologists  The "ABCDE's" of moles have been suggested as a means of helping to alert a person to a suspicious mole and the possible increased risk of melanoma  The suggestions for raising alert are as follows:    Asymmetry: Healthy moles tend to be symmetric, while melanomas are often asymmetric  Asymmetry means if you draw a line through the mole, the two halves do not match in color, size, shape, or surface texture  Asymmetry can be a result of rapid enlargement of a mole, the development of a raised area on a previously flat lesion, scaling, ulceration, bleeding or scabbing within the mole  Any mole that starts to demonstrate "asymmetry" should be examined promptly by a board certified dermatologist      Border: Healthy moles tend to have discrete, even borders  The border of a melanoma often blends into the normal skin and does not sharply delineate the mole from normal skin  Any mole that starts to demonstrate "uneven borders" should be examined promptly by a board certified dermatologist      Color: Healthy moles tend to be one color throughout  Melanomas tend to be made up of different colors ranging from dark black, blue, white, or red  Any mole that demonstrates a color change should be examined promptly by a board certified dermatologist      Diameter: Healthy moles tend to be smaller than 0 6 cm in size; an exception are "congenital nevi" that can be larger  Melanomas tend to grow and can often be greater than 0 6 cm (1/4 of an inch, or the size of a pencil eraser)  This is only a guideline, and many normal moles may be larger than 0 6 cm without being unhealthy    Any mole that starts to change in size (small to bigger or bigger to smaller) should be examined promptly by a board certified dermatologist      Evolving: Healthy moles tend to "stay the same "  Melanomas may often show signs of change or evolution such as a change in size, shape, color, or elevation  Any mole that starts to itch, bleed, crust, burn, hurt, or ulcerate or demonstrate a change or evolution should be examined promptly by a board certified dermatologist       Dysplastic Nevi  Dysplastic moles are moles that fit the ABCDE rules of melanoma but are not identified as melanomas when examined under the microscope  They may indicate an increased risk of melanoma in that person  If there is a family history of melanoma, most experts agree that the person may be at an increased risk for developing a melanoma  Experts still do not agree on what dysplastic moles mean in patients without a personal or family history of melanoma  Dysplastic moles are usually larger than common moles and have different colors within it with irregular borders  The appearance can be very similar to a melanoma  Biopsies of dysplastic moles may show abnormalities which are different from a regular mole  Melanoma  Malignant melanoma is a type of skin cancer that can be deadly if it spreads throughout the body  The incidence of melanoma in the [place] is growing faster than any other cancer  Melanoma usually grows near the surface of the skin for a period of time, and then begins to grow deeper into the skin  Once it grows deeper into the skin, the risk of spread to other organs greatly increases  Therefore, early detection and removal of a malignant melanoma may result in a better chance at a complete cure; removal after the tumor has spread may not be as effective, leading to worse clinical outcomes such as death  The true rate of nevus transformation into a melanoma is unknown   It has been estimated that the lifetime risk for any acquired melanocytic nevus on any 21year-old individual transforming into melanoma by age [de-identified] is 0 03% (1 in 3,164) for men and 0 009% (1 in 10,800) for women  The appearance of a "new mole" remains one of the most reliable methods for identifying a malignant melanoma  Occasionally, melanomas appear as rapidly growing, blue-black, dome-shaped bumps within a previous mole or previous area of normal skin  Other times, melanomas are suspected when a mole suddenly appears or changes  Itching, burning, or pain in a pigmented lesion should increase suspicion, but most patients with early melanoma have no skin discomfort whatsoever  Melanoma can occur anywhere on the skin, including areas that are difficult for self-examination  Many melanomas are first noticed by other family members  Suspicious-looking moles may be removed for microscopic examination  You may be able to prevent death from melanoma by doing two simple things:    1  Try to avoid unnecessary sun exposure and protect your skin when it is exposed to the sun  People who live near the equator, people who have intermittent exposures to large amounts of sun, and people who have had sunburns in childhood or adolescence have an increased risk for melanoma  Sun sense and vigilant sun protection may be keys to helping to prevent melanoma  We recommend wearing UPF-rated sun protective clothing and sunglasses whenever possible and applying a moisturizer-sunscreen combination product (SPF 50+) such as Neutrogena Daily Defense to sun exposed areas of skin at least three times a day  2  Have your moles regularly examined by a board certified dermatologist AND by yourself or a family member/friend at home    We recommend that you have your moles examined at least once a year by a board certified dermatologist   Use your birthday as an annual reminder to have your "Birthday Suit" (I e , your skin) examined; it is a nice birthday gift to yourself to know that your skin is healthy appearing! Additionally, at-home self examinations may be helpful for detecting a possible melanoma  Use the ABCDEs we discussed and check your moles once a month at home  SEBORRHEIC KERATOSIS; NON-INFLAMED    Physical Exam:   Anatomic Location Affected:  Trunk and extremities    Morphological Description:  Flat and raised, waxy, smooth to warty textured, yellow to brownish-grey to dark brown to blackish, discrete, "stuck-on" appearing papules  Additional History of Present Condition:  Patient reports new bumps on the skin  Denies itch, burn, pain, bleeding or ulceration  Present constantly; nothing seems to make it worse or better  No prior treatment  Assessment and Plan:  Based on a thorough discussion of this condition and the management approach to it (including a comprehensive discussion of the known risks, side effects and potential benefits of treatment), the patient (family) agrees to implement the following specific plan:   Reassured benign     Seborrheic Keratosis  A seborrheic keratosis is a harmless warty spot that appears during adult life as a common sign of skin aging  Seborrheic keratoses can arise on any area of skin, covered or uncovered, with the usual exception of the palms and soles  They do not arise from mucous membranes  Seborrheic keratoses can have highly variable appearance  Seborrheic keratoses are extremely common  It has been estimated that over 90% of adults over the age of 61 years have one or more of them  They occur in males and females of all races, typically beginning to erupt in the 35s or 45s  They are uncommon under the age of 21 years  The precise cause of seborrhoeic keratoses is not known  Seborrhoeic keratoses are considered degenerative in nature  As time goes by, seborrheic keratoses tend to become more numerous   Some people inherit a tendency to develop a very large number of them; some people may have hundreds of them     The name "seborrheic keratosis" is misleading, because these lesions are not limited to a seborrhoeic distribution (scalp, mid-face, chest, upper back), nor are they formed from sebaceous glands, nor are they associated with sebum -- which is greasy  Seborrheic keratosis may also be called "SK," "Seb K," "basal cell papilloma," "senile wart," or "barnacle "      Researchers have noted:   Eruptive seborrhoeic keratoses can follow sunburn or dermatitis   Skin friction may be the reason they appear in body folds   Viral cause (e g , human papillomavirus) seems unlikely   Stable and clonal mutations or activation of FRFR3, PIK3CA, TALI, AKT1 and EGFR genes are found in seborrhoeic keratoses   Seborrhoeic keratosis can arise from solar lentigo   FRFR3 mutations also arise in solar lentigines  These mutations are associated with increased age and location on the head and neck, suggesting a role of ultraviolet radiation in these lesions   Seborrheic keratoses do not harbour tumour suppressor gene mutations   Epidermal growth factor receptor inhibitors, which are used to treat some cancers, often result in an increase in verrucal (warty) keratoses  There is no easy way to remove multiple lesions on a single occasion  Unless a specific lesion is "inflamed" and is causing pain or stinging/burning or is bleeding, most insurance companies do not authorize treatment      DERMATOFIBROMA    Physical Exam:   Anatomic Location Affected & Description: pink firm papule, indent with lateral pressure on the left anterior shin    Additional History of Present Condition:  Discovered upon skin exam     Assessment and Plan:  Based on a thorough discussion of this condition and the management approach to it (including a comprehensive discussion of the known risks, side effects and potential benefits of treatment), the patient (family) agrees to implement the following specific plan:    Pt reassured of benign diagnosis, no treatment required    Pt aware that if lesions ever have concerning changes such as rapid enlargement, start to become non  healing areas or frequently traumatized, please call to let us know as these changes may require biopsy or  we can discuss more definitve removal     DERMATOFIBROMA, PATIENT INFORMATION:  A dermatofibroma is a common benign fibrous nodule that most often arises on the skin of the lower legs  A dermatofibroma is also called a "cutaneous fibrous histiocytoma "  Dermatofibromas occur at all ages and in people of every ethnicity  They are more common in women than in men  It is not clear if dermatofibroma is a reactive process or if it is a neoplasm  The lesions are made up of proliferating fibroblasts  Histiocytes may also be involved  They are sometimes attributed to an insect bite or ingrownhair or local trauma, but not consistently  They may be more numerous in patients with altered immunity  Dermatofibromas most often occur on the legs and arms, but may also arise on the trunk or any site of the body  Typical clinical features include the following:   People may have 1 or up to 15 lesions   Size varies from 0 5-1 5 cm diameter; most lesions are 7-10 mm diameter   They are firm nodules tethered to the skin surface and mobile over subcutaneous tissue   The skin "dimples" on pinching the lesion   Color may be pink to light brown in white skin, and dark brown to black in dark skin; some appear paler in the center   They do not usually cause symptoms, but they are sometimes painful or itchy   Because they are often raised lesions, they may be traumatized, for example by a razor   Occasionally dozens may erupt within a few months, usually in the setting of immunosuppression (for example autoimmune disease, cancer or certain medications)   Dermatofibroma does not give rise to cancer   However, occasionally, it may be mistaken for dermatofibrosarcoma or desmoplastic melanoma  A dermatofibroma is harmless and seldom causes any symptoms  Usually, only reassurance is needed  If it is nuisance or causing concern, the lesion can be removed surgically, resulting in a scar that is, by definition, usually longer in diameter than the widest portion of the dermatofibroma  Cryotherapy, shave biopsy and laser surgery are rarely completely successful  Skin punch biopsy or incisional biopsy may be undertaken if there is an atypical feature such as recent enlargement, ulceration, or asymmetrical structures and colours on dermatoscopy  PROBABLE LIPOMA    Physical Exam:   Anatomic Location Affected:  Right Upper Arm    Morphological Description:  1 5 cm mobile subcutaneous nodule     Additional History of Present Condition:  Patient reports it is growing in size would like it removed if it continues to get bigger  Has had one removed in past      Assessment and Plan:  Based on a thorough discussion of this condition and the management approach to it (including a comprehensive discussion of the known risks, side effects and potential benefits of treatment), the patient (family) agrees to implement the following specific plan:   If changes in size can be removed via excision  Lipoma  A lipoma is a non-cancerous tumor that is made up of fat cells  It slowly grows under the skin in the subcutaneous tissue  A person may have a single lipoma or may have many lipomas  They are very common  Lipomas can occur in people of all ages, however, they tend to develop in adulthood and are most noticeable during middle age  They affect both sexes equally, although solitary lipomas are more common in women whilst multiple lipomas occur more frequently in men  The cause of lipomas is unknown  It is possible there may be genetic involvement as many patients with lipomas come from a family with a history of these tumors   Sometimes an injury such as a blunt blow to part of the body may trigger growth of a lipoma  People are often unaware of lipomas until they have grown large enough to become visible and palpable  This growth occurs slowly over several years  Some features of lipomas include:   A dome-shaped or egg-shaped lump about 2-10 cm in diameter (some may grow even larger)    It feels soft and smooth and is easily moved under the skin with the fingers    Some have a rubbery or doughy consistency    They are most common on the shoulders, neck, trunk and arms, but they can occur anywhere on the body where fat tissue is present  Most lipomas are symptomless, but some are painful on applying pressure  Lipomas that are tender or painful are usually angiolipomas  This means the lipoma has an increased number of small blood vessels  Painful lipomas are also a feature of adiposis dolorosa or Dercum disease  Diagnosis of lipoma is usually made clinically by finding a soft lump under the skin  However, if there is any doubt, a deep skin biopsy can be performed which will show typical histopathological features of lipoma and its variants  Most lipomas require no treatment  Most lipomas eventually stop growing and remain indefinitely without causing any problems  Occasionally, lipomas that interfere with the movement of adjacent muscles may require surgical removal  Several methods are available:   Simple surgical excision    Squeeze technique (a small incision is made over the lipoma and the fatty tissue is squeezed through the hole)    Liposuction      Scribe Attestation    I,:  Tre Cardona MA am acting as a scribe while in the presence of the attending physician :       I,:  Amy Charles MD personally performed the services described in this documentation    as scribed in my presence :         The patient was seen and discussed with Dr Josh Fabian       RTC: Once yearly for a full body skin exam     Amy Charles  Dermatology PGY-3 Resident Physician

## 2021-09-13 DIAGNOSIS — I10 BENIGN HYPERTENSION: ICD-10-CM

## 2021-09-14 RX ORDER — BUMETANIDE 1 MG/1
TABLET ORAL
Qty: 90 TABLET | Refills: 3 | Status: SHIPPED | OUTPATIENT
Start: 2021-09-14

## 2021-10-18 ENCOUNTER — OFFICE VISIT (OUTPATIENT)
Dept: OTOLARYNGOLOGY | Facility: CLINIC | Age: 71
End: 2021-10-18
Payer: MEDICARE

## 2021-10-18 VITALS — BODY MASS INDEX: 34.15 KG/M2 | TEMPERATURE: 98.9 F | WEIGHT: 200 LBS | HEIGHT: 64 IN

## 2021-10-18 DIAGNOSIS — H92.03 OTALGIA, BILATERAL: ICD-10-CM

## 2021-10-18 DIAGNOSIS — K11.7 XEROSTOMIA: ICD-10-CM

## 2021-10-18 DIAGNOSIS — K11.8 PAROTID MASS: Primary | ICD-10-CM

## 2021-10-18 PROCEDURE — 99214 OFFICE O/P EST MOD 30 MIN: CPT | Performed by: SPECIALIST

## 2021-11-01 ENCOUNTER — TELEPHONE (OUTPATIENT)
Dept: FAMILY MEDICINE CLINIC | Facility: CLINIC | Age: 71
End: 2021-11-01

## 2021-11-01 ENCOUNTER — HOSPITAL ENCOUNTER (OUTPATIENT)
Dept: RADIOLOGY | Facility: HOSPITAL | Age: 71
Discharge: HOME | End: 2021-11-01
Attending: FAMILY MEDICINE
Payer: MEDICARE

## 2021-11-01 VITALS — WEIGHT: 200 LBS | BODY MASS INDEX: 33.32 KG/M2 | HEIGHT: 65 IN

## 2021-11-01 DIAGNOSIS — M85.80 OSTEOPENIA, UNSPECIFIED LOCATION: ICD-10-CM

## 2021-11-01 DIAGNOSIS — Z78.0 POST-MENOPAUSAL: ICD-10-CM

## 2021-11-01 DIAGNOSIS — Z12.31 SCREENING MAMMOGRAM FOR HIGH-RISK PATIENT: ICD-10-CM

## 2021-11-01 PROCEDURE — 77067 SCR MAMMO BI INCL CAD: CPT

## 2021-11-01 PROCEDURE — 77063 BREAST TOMOSYNTHESIS BI: CPT

## 2021-11-01 PROCEDURE — 77080 DXA BONE DENSITY AXIAL: CPT

## 2021-11-14 ENCOUNTER — ANESTHESIA EVENT (OUTPATIENT)
Dept: PERIOP | Facility: AMBULARY SURGERY CENTER | Age: 71
End: 2021-11-14
Payer: MEDICARE

## 2021-11-14 PROBLEM — J45.909 ASTHMA: Status: ACTIVE | Noted: 2021-11-14

## 2021-11-15 ENCOUNTER — ANESTHESIA (OUTPATIENT)
Dept: PERIOP | Facility: AMBULARY SURGERY CENTER | Age: 71
End: 2021-11-15
Payer: MEDICARE

## 2021-11-15 ENCOUNTER — HOSPITAL ENCOUNTER (OUTPATIENT)
Facility: AMBULARY SURGERY CENTER | Age: 71
Setting detail: OUTPATIENT SURGERY
Discharge: HOME | End: 2021-11-15
Attending: OPHTHALMOLOGY | Admitting: OPHTHALMOLOGY
Payer: MEDICARE

## 2021-11-15 VITALS
RESPIRATION RATE: 16 BRPM | WEIGHT: 200 LBS | OXYGEN SATURATION: 98 % | BODY MASS INDEX: 33.28 KG/M2 | DIASTOLIC BLOOD PRESSURE: 95 MMHG | SYSTOLIC BLOOD PRESSURE: 157 MMHG | HEART RATE: 86 BPM | TEMPERATURE: 97.5 F

## 2021-11-15 DIAGNOSIS — H25.12 AGE-RELATED NUCLEAR CATARACT OF LEFT EYE: Primary | ICD-10-CM

## 2021-11-15 PROBLEM — D51.0 ANEMIA, PERNICIOUS: Status: RESOLVED | Noted: 2017-10-04 | Resolved: 2021-11-15

## 2021-11-15 PROCEDURE — V2632 POST CHMBR INTRAOCULAR LENS: HCPCS | Performed by: OPHTHALMOLOGY

## 2021-11-15 DEVICE — ACRYSOF(R) IQ ASPHERIC NATURAL IOL, SINGLE-PIECE ACRYLIC FOLDABLE PCL, UV WITH BLUE LIGHTFILTER, 13.0MM LENGTH, 6.0MM ANTERIORASYMMETRIC BICONVEX OPTIC, PLANAR HAPTICS.
Type: IMPLANTABLE DEVICE | Site: EYE | Status: FUNCTIONAL
Brand: ACRYSOF®

## 2021-11-15 RX ORDER — TETRACAINE HYDROCHLORIDE 5 MG/ML
1 SOLUTION OPHTHALMIC ONCE
Status: COMPLETED | OUTPATIENT
Start: 2021-11-15 | End: 2021-11-15

## 2021-11-15 RX ORDER — TETRACAINE HYDROCHLORIDE 5 MG/ML
SOLUTION OPHTHALMIC AS NEEDED
Status: DISCONTINUED | OUTPATIENT
Start: 2021-11-15 | End: 2021-11-15 | Stop reason: HOSPADM

## 2021-11-15 RX ORDER — KETOROLAC TROMETHAMINE 5 MG/ML
1 SOLUTION OPHTHALMIC
Status: ACTIVE | OUTPATIENT
Start: 2021-11-15 | End: 2021-11-15

## 2021-11-15 RX ORDER — LIDOCAINE HYDROCHLORIDE 20 MG/ML
1 JELLY TOPICAL
Status: COMPLETED | OUTPATIENT
Start: 2021-11-15 | End: 2021-11-15

## 2021-11-15 RX ORDER — BALANCED SALT SOLUTION 6.4; .75; .48; .3; 3.9; 1.7 MG/ML; MG/ML; MG/ML; MG/ML; MG/ML; MG/ML
SOLUTION OPHTHALMIC AS NEEDED
Status: DISCONTINUED | OUTPATIENT
Start: 2021-11-15 | End: 2021-11-15 | Stop reason: HOSPADM

## 2021-11-15 RX ORDER — CYCLOPENTOLATE HYDROCHLORIDE 10 MG/ML
1 SOLUTION/ DROPS OPHTHALMIC
Status: ACTIVE | OUTPATIENT
Start: 2021-11-15 | End: 2021-11-15

## 2021-11-15 RX ORDER — LIDOCAINE HYDROCHLORIDE 10 MG/ML
INJECTION, SOLUTION EPIDURAL; INFILTRATION; INTRACAUDAL; PERINEURAL AS NEEDED
Status: DISCONTINUED | OUTPATIENT
Start: 2021-11-15 | End: 2021-11-15 | Stop reason: HOSPADM

## 2021-11-15 RX ORDER — KETOROLAC TROMETHAMINE 5 MG/ML
1 SOLUTION OPHTHALMIC 4 TIMES DAILY
Qty: 5 ML | Refills: 0
Start: 2021-11-15 | End: 2022-02-18

## 2021-11-15 RX ORDER — GATIFLOXACIN 5 MG/ML
SOLUTION/ DROPS OPHTHALMIC AS NEEDED
Status: DISCONTINUED | OUTPATIENT
Start: 2021-11-15 | End: 2021-11-15 | Stop reason: HOSPADM

## 2021-11-15 RX ORDER — PHENYLEPHRINE HCL 2.5 %
1 DROPS OPHTHALMIC (EYE)
Status: ACTIVE | OUTPATIENT
Start: 2021-11-15 | End: 2021-11-15

## 2021-11-15 RX ORDER — MIDAZOLAM HYDROCHLORIDE 2 MG/2ML
INJECTION, SOLUTION INTRAMUSCULAR; INTRAVENOUS AS NEEDED
Status: DISCONTINUED | OUTPATIENT
Start: 2021-11-15 | End: 2021-11-15

## 2021-11-15 RX ORDER — GATIFLOXACIN 5 MG/ML
1 SOLUTION/ DROPS OPHTHALMIC 2 TIMES DAILY
Qty: 3 ML | Refills: 0
Start: 2021-11-15 | End: 2022-02-18

## 2021-11-15 RX ADMIN — LIDOCAINE HYDROCHLORIDE 1 APPLICATION: 20 JELLY TOPICAL at 12:45

## 2021-11-15 RX ADMIN — PHENYLEPHRINE HYDROCHLORIDE 1 DROP: 25 SOLUTION/ DROPS OPHTHALMIC at 12:30

## 2021-11-15 RX ADMIN — MIDAZOLAM 2 MG: 1 INJECTION INTRAMUSCULAR; INTRAVENOUS at 12:43

## 2021-11-15 RX ADMIN — PHENYLEPHRINE HYDROCHLORIDE 1 DROP: 25 SOLUTION/ DROPS OPHTHALMIC at 12:45

## 2021-11-15 RX ADMIN — LIDOCAINE HYDROCHLORIDE 1 APPLICATION: 20 JELLY TOPICAL at 12:30

## 2021-11-15 RX ADMIN — KETOROLAC TROMETHAMINE 1 DROP: 5 SOLUTION OPHTHALMIC at 12:45

## 2021-11-15 RX ADMIN — TETRACAINE HYDROCHLORIDE 1 DROP: 5 SOLUTION OPHTHALMIC at 12:15

## 2021-11-15 RX ADMIN — LIDOCAINE HYDROCHLORIDE 1 APPLICATION: 20 JELLY TOPICAL at 12:15

## 2021-11-15 RX ADMIN — PHENYLEPHRINE HYDROCHLORIDE 1 DROP: 25 SOLUTION/ DROPS OPHTHALMIC at 12:15

## 2021-11-15 RX ADMIN — KETOROLAC TROMETHAMINE 1 DROP: 5 SOLUTION OPHTHALMIC at 12:15

## 2021-11-15 RX ADMIN — CYCLOPENTOLATE HYDROCHLORIDE 1 DROP: 10 SOLUTION/ DROPS OPHTHALMIC at 12:45

## 2021-11-15 RX ADMIN — CYCLOPENTOLATE HYDROCHLORIDE 1 DROP: 10 SOLUTION/ DROPS OPHTHALMIC at 12:30

## 2021-11-15 RX ADMIN — KETOROLAC TROMETHAMINE 1 DROP: 5 SOLUTION OPHTHALMIC at 12:30

## 2021-11-15 RX ADMIN — CYCLOPENTOLATE HYDROCHLORIDE 1 DROP: 10 SOLUTION/ DROPS OPHTHALMIC at 12:15

## 2021-12-02 DIAGNOSIS — I10 ESSENTIAL HYPERTENSION: ICD-10-CM

## 2021-12-03 RX ORDER — POTASSIUM CHLORIDE 750 MG/1
TABLET, FILM COATED, EXTENDED RELEASE ORAL
Qty: 90 TABLET | Refills: 1 | Status: SHIPPED | OUTPATIENT
Start: 2021-12-03 | End: 2022-05-19

## 2021-12-07 ENCOUNTER — IMMUNIZATIONS (OUTPATIENT)
Dept: FAMILY MEDICINE CLINIC | Facility: HOSPITAL | Age: 71
End: 2021-12-07

## 2021-12-07 DIAGNOSIS — Z23 ENCOUNTER FOR IMMUNIZATION: Primary | ICD-10-CM

## 2021-12-07 PROCEDURE — 0064A COVID-19 MODERNA VACC 0.25 ML BOOSTER: CPT

## 2021-12-07 PROCEDURE — 91306 COVID-19 MODERNA VACC 0.25 ML BOOSTER: CPT

## 2021-12-20 ENCOUNTER — OFFICE VISIT (OUTPATIENT)
Dept: GASTROENTEROLOGY | Facility: CLINIC | Age: 71
End: 2021-12-20
Payer: MEDICARE

## 2021-12-20 VITALS
DIASTOLIC BLOOD PRESSURE: 84 MMHG | WEIGHT: 211 LBS | SYSTOLIC BLOOD PRESSURE: 130 MMHG | HEART RATE: 94 BPM | BODY MASS INDEX: 35.16 KG/M2 | TEMPERATURE: 97.6 F | HEIGHT: 65 IN

## 2021-12-20 DIAGNOSIS — R10.13 EPIGASTRIC PAIN: ICD-10-CM

## 2021-12-20 DIAGNOSIS — K58.0 IRRITABLE BOWEL SYNDROME WITH DIARRHEA: Primary | ICD-10-CM

## 2021-12-20 DIAGNOSIS — R13.19 ESOPHAGEAL DYSPHAGIA: ICD-10-CM

## 2021-12-20 PROCEDURE — 99213 OFFICE O/P EST LOW 20 MIN: CPT | Performed by: INTERNAL MEDICINE

## 2022-01-06 ENCOUNTER — OFFICE VISIT (OUTPATIENT)
Dept: FAMILY MEDICINE CLINIC | Facility: CLINIC | Age: 72
End: 2022-01-06
Payer: MEDICARE

## 2022-01-06 VITALS
RESPIRATION RATE: 17 BRPM | DIASTOLIC BLOOD PRESSURE: 80 MMHG | HEART RATE: 82 BPM | TEMPERATURE: 98.2 F | HEIGHT: 65 IN | BODY MASS INDEX: 35.12 KG/M2 | SYSTOLIC BLOOD PRESSURE: 128 MMHG | OXYGEN SATURATION: 98 % | WEIGHT: 210.8 LBS

## 2022-01-06 DIAGNOSIS — I48.91 NEW ONSET ATRIAL FIBRILLATION (HCC): Primary | ICD-10-CM

## 2022-01-06 PROCEDURE — 99214 OFFICE O/P EST MOD 30 MIN: CPT | Performed by: FAMILY MEDICINE

## 2022-01-06 NOTE — PROGRESS NOTES
Assessment/Plan:    1  New onset atrial fibrillation [facility])  -     Ambulatory referral to Cardiology; Future  -     apixaban (Eliquis) 5 mg; Take 1 tablet (5 mg total) by mouth 2 (two) times a day    Pt educated on A fib  Will see cardio she has seen in the past  Meds started    EKF - irreg irreg - a fib        There are no Patient Instructions on file for this visit  No follow-ups on file  Subjective:      Patient ID: Maude Talley is a 66 y o  female  Chief Complaint   Patient presents with    Atrial Fibrillation     her watch is telling her she is in possible afib nm lpn       Pt states she has a macine on her phone that stated she has afib  Pt is here to eval  Pt states she feels minor palps - unsure when it started  The machine was a Think[employer] Thing      The following portions of the patient's history were reviewed and updated as appropriate: allergies, current medications, past family history, past medical history, past social history, past surgical history and problem list     Review of Systems   Cardiovascular: Positive for palpitations  Negative for chest pain and leg swelling           Current Outpatient Medications   Medication Sig Dispense Refill    atorvastatin (LIPITOR) 20 mg tablet TAKE 1 TABLET BY MOUTH  DAILY AT BEDTIME 90 tablet 3    bumetanide (BUMEX) 1 mg tablet TAKE 1 TABLET BY MOUTH  DAILY 90 tablet 3    Calcium Carb-Cholecalciferol 600-1000 MG-UNIT CAPS Take 1 capsule by mouth every morning       levothyroxine 50 mcg tablet TAKE 1 TABLET BY MOUTH  DAILY 90 tablet 3    losartan (COZAAR) 25 mg tablet TAKE 1 TABLET BY MOUTH  DAILY 90 tablet 3    omeprazole (PriLOSEC) 40 MG capsule Take 1 capsule (40 mg total) by mouth 2 (two) times a day 180 capsule 2    potassium chloride (Klor-Con) 10 mEq tablet TAKE 1 TABLET BY MOUTH  DAILY 90 tablet 1    apixaban (Eliquis) 5 mg Take 1 tablet (5 mg total) by mouth 2 (two) times a day 180 tablet 1    gatifloxacin (ZYMAXID) 0 5 % Administer 1 drop into the left eye 2 (two) times a day (Patient not taking: Reported on 12/20/2021 ) 3 mL 0    ketorolac (ACULAR) 0 5 % ophthalmic solution Administer 1 drop into the left eye 4 (four) times a day (Patient not taking: Reported on 12/20/2021 ) 5 mL 0     No current facility-administered medications for this visit  Objective:    /80   Pulse 82   Temp 98 2 °F (36 8 °C)   Resp 17   Ht 5' 5" (1 651 m)   Wt 95 6 kg (210 lb 12 8 oz)   SpO2 98%   BMI 35 08 kg/m²        Physical Exam  Cardiovascular:      Rate and Rhythm: Rhythm irregular                  Bebe Mayen DO

## 2022-01-07 NOTE — SOCIAL WORK
Spoke to pt at bedside and explained that PT is recommending home PT pending progress  Pt states she is agreeable to referral and choice offered and referral made to [facility]  no

## 2022-01-10 ENCOUNTER — OFFICE VISIT (OUTPATIENT)
Dept: CARDIOLOGY CLINIC | Facility: CLINIC | Age: 72
End: 2022-01-10
Payer: MEDICARE

## 2022-01-10 VITALS
OXYGEN SATURATION: 98 % | BODY MASS INDEX: 35.65 KG/M2 | WEIGHT: 214 LBS | DIASTOLIC BLOOD PRESSURE: 90 MMHG | HEIGHT: 65 IN | SYSTOLIC BLOOD PRESSURE: 138 MMHG | HEART RATE: 91 BPM

## 2022-01-10 DIAGNOSIS — I48.91 NEW ONSET ATRIAL FIBRILLATION (HCC): Primary | ICD-10-CM

## 2022-01-10 DIAGNOSIS — E78.2 MIXED HYPERLIPIDEMIA: ICD-10-CM

## 2022-01-10 DIAGNOSIS — I10 BENIGN HYPERTENSION: ICD-10-CM

## 2022-01-10 PROBLEM — I48.19 PERSISTENT ATRIAL FIBRILLATION (HCC): Status: ACTIVE | Noted: 2022-01-10

## 2022-01-10 PROBLEM — I25.10 CORONARY ARTERY DISEASE INVOLVING NATIVE CORONARY ARTERY OF NATIVE HEART WITHOUT ANGINA PECTORIS: Status: RESOLVED | Noted: 2017-05-16 | Resolved: 2022-01-10

## 2022-01-10 PROBLEM — I77.89 CORONARY ARTERY ECTASIA (HCC): Status: RESOLVED | Noted: 2017-06-09 | Resolved: 2022-01-10

## 2022-01-10 PROCEDURE — 93000 ELECTROCARDIOGRAM COMPLETE: CPT | Performed by: INTERNAL MEDICINE

## 2022-01-10 PROCEDURE — 99204 OFFICE O/P NEW MOD 45 MIN: CPT | Performed by: INTERNAL MEDICINE

## 2022-01-10 RX ORDER — METOPROLOL SUCCINATE 25 MG/1
25 TABLET, EXTENDED RELEASE ORAL DAILY
Qty: 30 TABLET | Refills: 6 | Status: SHIPPED | OUTPATIENT
Start: 2022-01-10 | End: 2022-01-27 | Stop reason: SDUPTHER

## 2022-01-10 NOTE — PATIENT INSTRUCTIONS
A-fib (Atrial Fibrillation)   AMBULATORY CARE:   Atrial fibrillation (A-fib)  is an irregular heartbeat  It reduces your heart's ability to pump blood through your body  A-fib may come and go, or it may be a long-term condition  A-fib can cause life-threatening blood clots, stroke, or heart failure  It is important to treat and manage A-fib to help prevent these problems  Common signs and symptoms include the following:   · A heartbeat that races, pounds, or flutters    · Weakness, severe tiredness, or confusion    · Feeling lightheaded, sweaty, dizzy, or faint    · Shortness of breath or anxiety    · Chest pain or pressure    Call your local emergency number (911 in the [address] [facility]) or have someone call if:   · You have any of the following signs of a heart attack:      ? Squeezing, pressure, or pain in your chest    ? You may  also have any of the following:     § Discomfort or pain in your back, neck, jaw, stomach, or arm    § Shortness of breath    § Nausea or vomiting    § Lightheadedness or a sudden cold sweat    · You have any of the following signs of a stroke:      ? Numbness or drooping on one side of your face     ? Weakness in an arm or leg    ? Confusion or difficulty speaking    ? Dizziness, a severe headache, or vision loss    Call your doctor or cardiologist if:   · Your arm or leg feels warm, tender, and painful  It may look swollen and red  · Your heart rate is more than 110 beats per minute  · You have new or worsening swelling in your legs, feet, ankles, or abdomen  · You are short of breath, even at rest     · You have questions or concerns about your condition or care  Treatment for A-fib:  Conditions that cause A-fib, such as thyroid disease, will be treated  You may also need any of the following:  · Heart medicines  help control your heart rate or rhythm  You may need more than one medicine to treat your symptoms      · Antiplatelet or blood thinner medicines  help prevent blood clots and stroke  · Cardioversion  is a procedure to return your heart rate and rhythm to normal  It can be done using medicines or electric shock  · A-fib ablation  is a procedure that uses energy to burn a small area of heart tissue  This creates scar tissue and prevents electrical signals that cause A-fib  You may need this procedure more than once  Ask for more information on A-fib ablation  · A pacemaker  may be inserted into your heart  A pacemaker is a device that controls your heartbeat  A pacemaker may be inserted during an ablation procedure or surgery  Ask your healthcare provider for more information on pacemakers  · Surgery  may be needed if other procedures do not work  During surgery your healthcare provider will make cuts in the upper part of your heart  The provider will stitch the cuts together to create scar tissue  The scar tissue will prevent electrical signals that cause A-fib  Manage A-fib:   · Know your target heart rate  Learn how to check your pulse and monitor your heart rate  · Know the risks if you choose to drink alcohol  Alcohol can increase your risk for A-fib or make A-fib harder to manage  Ask your healthcare provider if it is okay for you to drink any alcohol  He or she can help you set limits for the number of drinks you have in 24 hours and in a week  A drink of alcohol is 12 ounces of beer, 5 ounces of wine, or 1½ ounces of liquor  · Do not smoke  Nicotine can cause heart damage and make it more difficult to manage your A-fib  Do not use e-cigarettes or smokeless tobacco in place of cigarettes or to help you quit  They still contain nicotine  Ask your healthcare provider for information if you currently smoke and need help quitting  · Eat heart-healthy foods  Heart healthy foods will help keep your cholesterol low  These include fruits, vegetables, whole-grain breads, low-fat dairy products, beans, lean meats, and fish   Replace butter and margarine with heart-healthy oils such as olive oil and canola oil  · Maintain a healthy weight  Ask your healthcare provider what a healthy weight is for you  Ask him or her to help you create a safe weight loss plan if you are overweight  Even a small goal of a 10% weight loss can improve your heart health  · Get regular physical activity  Physical activity helps improve your heart health  Get at least 150 minutes of moderate aerobic physical activity each week  Your healthcare provider can help you create an activity plan  · Manage other health conditions  This includes high blood pressure or cholesterol, sleep apnea, diabetes, and other heart conditions  Take medicine as directed and follow your treatment plan  Your healthcare provider may need to change a medicine you are taking if it is causing your A-fib  Do not  stop taking any medicine unless directed by your provider  Follow up with your doctor or cardiologist as directed: You will need regular blood tests and monitoring  Write down your questions so you remember to ask them during your visits  © Copyright [employer] 2021 Information is for End User's use only and may not be sold, redistributed or otherwise used for commercial purposes  All illustrations and images included in CareNotes® are the copyrighted property of A D A M , Inc  or [address] [place], [place] [address]cira   The above information is an  only  It is not intended as medical advice for individual conditions or treatments  Talk to your doctor, nurse or pharmacist before following any medical regimen to see if it is safe and effective for you

## 2022-01-10 NOTE — PROGRESS NOTES
Cardiology Consultation     Galindo Nash  [id number]  1950  [facility]  [address] [facility]  [address] [facility] [id number]    1  Persistent atrial fibrillation ([address] )     2  New onset atrial fibrillation [facility])  Ambulatory referral to Cardiology   3  Coronary artery disease involving native coronary artery of native heart without angina pectoris         Discussion/Summary:    1  New onset atrial fibrillation - Breanna Smith was found to be in atrial fibrillation under PCPs office last week  She has been having on and off shortness of breath and palpitations since September  She was placed on Eliquis for stroke prevention, and for better rate control and help with her symptoms we will change losartan over to Toprol-XL  We ordered an echocardiogram   I will have her back in 3 months to discuss possible cardioversion  It is noted that she does need to have an esophageal stricture dilated next month  2   Hypertension - Controlled on losartan  She also takes Bumex for intermittent lower extremity edema  We did order updated blood work today  She should periodically check her blood pressure at home  3   Hyperlipidemia - On atorvastatin 20 mg daily which has kept her LDL at goal       HPI:    Mrs Abrahan Carter comes in for a consultation to discuss new onset atrial fibrillation that was found last week at her PCPs office  Breanna Smith carries a history of mild CAD and 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 by a remote cardiac catheterization  Through those years she had on and off atypical chest pain that turn out to be GI related  She had a repeat cardiac catheterization in July of 2017 that reported no significant CAD  Around that time she also had a bleeding ulcer and required hospitalization and blood transfusion  Her she is treated for hypertension and hyperlipidemia    She does have an esophageal stricture that she is going to have dilated next month  Swetha but a heart rate/rhythm monitor that suggested atrial fibrillation  Last week she contacted her PCP who brought her in for an ECG which did show atrial fibrillation  It is unclear how long she has been in it but retrospectively recalls on and off palpitations and exercise intolerance since Labor Day  She goes for walks regularly and noticed over the last few months that her stamina was decreased in that she would get short of breath particularly with going up hill  She has also noticed on and off palpitations  No chest pain or any symptoms of angina  No signs or symptoms of CHF  She does get unilateral edema on the left that she takes Bumex for  She denies lightheadedness or syncope        Patient Active Problem List   Diagnosis    Hiatal hernia    Gastroesophageal reflux disease    GERD with esophagitis    Hypokalemia    Benign hypertension    Mixed hyperlipidemia    Congenital hypothyroidism with diffuse goiter    Liver lesion, left lobe    Coronary artery disease involving native coronary artery of native heart without angina pectoris    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HCC)    Hemorrhoids    IBS (irritable bowel syndrome)    Osteopenia    Stress incontinence, female    Abnormal CT of liver    Class 2 severe obesity due to excess calories with serious comorbidity and body mass index (BMI) of 35 0 to 35 9 in adult (HCC)    BMI 35 0-35 9,adult    Dysphagia    Constipation    Anxiety    Chronic kidney disease    Rheumatoid arthritis (HCC)    Vertigo    History of hysterectomy    Parotid mass    Xerostomia    Epigastric pain    Otalgia, bilateral    Asthma    Persistent atrial fibrillation (HCC)     Past Medical History:   Diagnosis Date    Anxiety     resolved 10/4/17    Asthma     seasonal    Chronic kidney disease     kidney stone    Colon polyp     Disease of thyroid gland     Dysphagia     GERD (gastroesophageal reflux disease)     Goiter, nodular     partial thyroidectomy    Hiatal hernia     Hiatal hernia with GERD without esophagitis 04/2018    surgical correction    History of esophageal varices with bleeding     History of pernicious anemia     History of transfusion     x1 after bleeding ulcer    Hyperlipidemia     Hypertension     Irritable bowel syndrome     Neck mass     2 small areas left side by jawline and midneck  scheduled 0820/21    RA (rheumatoid arthritis) ([facility] [address]     Seasonal allergies     Vertigo     Wears glasses      Social History     Socioeconomic History    Marital status: /Civil Union     Spouse name: Not on file    Number of children: Not on file    Years of education: Not on file    Highest education level: Not on file   Occupational History    Not on file   Tobacco Use    Smoking status: Never Smoker    Smokeless tobacco: Never Used   Vaping Use    Vaping Use: Never used   Substance and Sexual Activity    Alcohol use: Yes     Comment: occ    Drug use: Never    Sexual activity: Not on file   Other Topics Concern    Not on file   Social History Narrative    Feels safe at home     Social Determinants of Health     Financial Resource Strain: Not on file   Food Insecurity: Not on file   Transportation Needs: Not on file   Physical Activity: Not on file   Stress: Not on file   Social Connections: Not on file   Intimate Partner Violence: Not on file   Housing Stability: Not on file      Family History   Problem Relation Age of Onset    Alzheimer's disease Mother     Cancer Mother         skin     Thyroid disease Mother     Ulcerative colitis Mother     Cancer Father         prostate    Stroke Father     Hypertension Father     Prostate cancer Father     Thyroid disease Sister     Ulcerative colitis Sister     Other Sister         open heart surgery    Heart disease Sister     Hyperlipidemia Brother     No Known Problems Son     No Known Problems Son     No Known Problems Maternal Grandmother     No Known Problems Paternal Grandmother     Mental illness Neg Hx      Past Surgical History:   Procedure Laterality Date    BREAST BIOPSY Bilateral     negative    CARDIAC CATHETERIZATION      x2 secondary to exertional chest pain, due to lung issues, possible mild COPD    CATARACT EXTRACTION, BILATERAL  2021    CHOLECYSTECTOMY  2015    lap - last assessed 10/4/17    COLONOSCOPY      complete    ESOPHAGOGASTRODUODENOSCOPY N/A 1/23/2018    Procedure: ESOPHAGOGASTRODUODENOSCOPY (EGD); Surgeon: Seun Tom MD;  Location: [facility] GI LAB; Service: Gastroenterology    HYSTERECTOMY      partial - ovaries remain    LIPOMA RESECTION      right thigh    MD ESOPHAGOSCOPY FLEXIBLE TRANSORAL WITH BIOPSY N/A 4/11/2018    Procedure: ESOPHAGOGASTRODUODENOSCOPY (EGD); Surgeon: Lynn Block MD;  Location:  MAIN OR;  Service: Thoracic    MD LAP, REPAIR PARAESOPHAGEAL HERNIA, INCL FUNDOPLASTY W/ MESH N/A 4/11/2018    Procedure: REPAIR HERNIA PARAESOPHAGEAL  LAPAROSCOPIC,ELEONORA GASTROPLASTY, [place] FUNDOPLICATION;  Surgeon: Lynn Block MD;  Location:  MAIN OR;  Service: Thoracic    MD XCAPSL CTRC RMVL INSJ IO LENS PROSTH W/O ECP Right 8/23/2021    Procedure: EXTRACTION EXTRACAPSULAR CATARACT PHACO INTRAOCULAR LENS (IOL); Surgeon: Curry Christie MD;  Location: [facility] MAIN OR;  Service: Ophthalmology    MD XCAPSL CTRC RMVL INSJ IO LENS PROSTH W/O ECP Left 11/15/2021    Procedure: EXTRACTION EXTRACAPSULAR CATARACT PHACO INTRAOCULAR LENS (IOL);   Surgeon: Curry Christie MD;  Location: [facility] OR;  Service: Ophthalmology    SKIN BIOPSY Right     lump benign - last assessed 10/4/17    THYROIDECTOMY, PARTIAL  1977    TONSILLECTOMY         Current Outpatient Medications:     apixaban (Eliquis) 5 mg, Take 1 tablet (5 mg total) by mouth 2 (two) times a day, Disp: 180 tablet, Rfl: 1    atorvastatin (LIPITOR) 20 mg tablet, TAKE 1 TABLET BY MOUTH  DAILY AT BEDTIME, Disp: 90 tablet, Rfl: 3    bumetanide (BUMEX) 1 mg tablet, TAKE 1 TABLET BY MOUTH  DAILY, Disp: 90 tablet, Rfl: 3    Calcium Carb-Cholecalciferol 600-1000 MG-UNIT CAPS, Take 1 capsule by mouth every morning , Disp: , Rfl:     levothyroxine 50 mcg tablet, TAKE 1 TABLET BY MOUTH  DAILY, Disp: 90 tablet, Rfl: 3    losartan (COZAAR) 25 mg tablet, TAKE 1 TABLET BY MOUTH  DAILY, Disp: 90 tablet, Rfl: 3    omeprazole (PriLOSEC) 40 MG capsule, Take 1 capsule (40 mg total) by mouth 2 (two) times a day, Disp: 180 capsule, Rfl: 2    potassium chloride (Klor-Con) 10 mEq tablet, TAKE 1 TABLET BY MOUTH  DAILY, Disp: 90 tablet, Rfl: 1    gatifloxacin (ZYMAXID) 0 5 %, Administer 1 drop into the left eye 2 (two) times a day (Patient not taking: Reported on 12/20/2021 ), Disp: 3 mL, Rfl: 0    ketorolac (ACULAR) 0 5 % ophthalmic solution, Administer 1 drop into the left eye 4 (four) times a day (Patient not taking: Reported on 12/20/2021 ), Disp: 5 mL, Rfl: 0  Allergies   Allergen Reactions    Orange (Diagnostic) - Food Allergy Anaphylaxis     Throat closes    Pantoprazole Headache    Penicillins Other (See Comments)     During allergy testing instructed to NEVER take PCN    Gluten Meal - Food Allergy      Following a gluten free diet on her own    Lactose - Food Allergy Diarrhea     Vitals:    01/10/22 0921   BP: 138/90   Pulse: 91   SpO2: 98%   Weight: 97 1 kg (214 lb)   Height: 5' 5" (1 651 m)       Labs:  Lab Results   Component Value Date     09/01/2016    K 3 7 08/11/2021    K 4 1 09/01/2016     08/11/2021     09/01/2016    CO2 28 08/11/2021    CO2 27 09/01/2016    BUN 11 08/11/2021    BUN 10 09/01/2016    CREATININE 0 73 08/11/2021    CREATININE 0 69 09/01/2016    CALCIUM 8 6 08/11/2021    CALCIUM 9 2 09/01/2016     Lab Results   Component Value Date    WBC 4 33 08/11/2021    WBC 4 8 07/20/2017    HGB 14 4 08/11/2021    HGB 9 6 (L) 07/20/2017    HCT 44 6 08/11/2021    HCT 29 2 (L) 07/20/2017    MCV 91 08/11/2021    MCV 84 9 07/20/2017     08/11/2021     07/20/2017     Lab Results   Component Value Date    CHOL 144 09/01/2016    TRIG 87 08/11/2021    TRIG 92 09/01/2016    HDL 55 08/11/2021    HDL 57 09/01/2016     Imaging:  ECG today shows atrial fibrillation with nonspecific T-wave changes  Review of Systems:  Review of Systems   Constitutional: Negative  HENT: Negative  Eyes: Negative  Respiratory: Positive for shortness of breath  Cardiovascular: Positive for palpitations and leg swelling  Gastrointestinal: Negative  Musculoskeletal: Negative  Skin: Negative  Allergic/Immunologic: Negative  Neurological: Negative  Hematological: Negative  Psychiatric/Behavioral: Negative  All other systems reviewed and are negative  Vitals:    01/10/22 0921   BP: 138/90   Pulse: 91   SpO2: 98%   Weight: 97 1 kg (214 lb)   Height: 5' 5" (1 651 m)       Physical Exam:  Physical Exam  Vitals and nursing note reviewed  Constitutional:       Appearance: She is well-developed  HENT:      Head: Normocephalic and atraumatic  Eyes:      General: No scleral icterus  Right eye: No discharge  Left eye: No discharge  Pupils: Pupils are equal, round, and reactive to light  Neck:      Thyroid: No thyromegaly  Vascular: No JVD  Cardiovascular:      Rate and Rhythm: Normal rate  Rhythm irregularly irregular  Pulses: Normal pulses  No decreased pulses  Heart sounds: Normal heart sounds, S1 normal and S2 normal  No murmur heard  No friction rub  No gallop  Pulmonary:      Effort: Pulmonary effort is normal  No respiratory distress  Breath sounds: Normal breath sounds  No wheezing, rhonchi or rales  Abdominal:      General: Bowel sounds are normal  There is no distension  Palpations: Abdomen is soft  Tenderness: There is no abdominal tenderness  Musculoskeletal:         General: No tenderness or deformity   Normal range of motion  Cervical back: Normal range of motion and neck supple  Skin:     General: Skin is warm and dry  Findings: No rash  Neurological:      Mental Status: She is alert and oriented to person, place, and time  Cranial Nerves: No cranial nerve deficit  Psychiatric:         Thought Content: Thought content normal          Judgment: Judgment normal        Counseling / Coordination of Care  Total office time spent today 40 minutes  Greater than 50% of total time was spent with the patient and / or family counseling and / or coordination of care

## 2022-01-12 ENCOUNTER — HOSPITAL ENCOUNTER (OUTPATIENT)
Dept: NON INVASIVE DIAGNOSTICS | Facility: HOSPITAL | Age: 72
Discharge: HOME | End: 2022-01-12
Payer: MEDICARE

## 2022-01-12 VITALS
HEART RATE: 93 BPM | BODY MASS INDEX: 35.65 KG/M2 | WEIGHT: 214 LBS | DIASTOLIC BLOOD PRESSURE: 90 MMHG | HEIGHT: 65 IN | SYSTOLIC BLOOD PRESSURE: 138 MMHG

## 2022-01-12 DIAGNOSIS — I48.91 NEW ONSET ATRIAL FIBRILLATION (HCC): ICD-10-CM

## 2022-01-12 LAB
AORTIC ROOT: 3.6 CM
APICAL FOUR CHAMBER EJECTION FRACTION: 58 %
ASCENDING AORTA: 3.8 CM
E WAVE DECELERATION TIME: 180 MS
FRACTIONAL SHORTENING: 30 % (ref 28–44)
INTERVENTRICULAR SEPTUM IN DIASTOLE (PARASTERNAL SHORT AXIS VIEW): 0.9 CM
LEFT ATRIUM AREA SYSTOLE SINGLE PLANE A4C: 25.7 CM2
LEFT ATRIUM SIZE: 4.1 CM
LEFT INTERNAL DIMENSION IN SYSTOLE: 3.5 CM (ref 2.1–4)
LEFT VENTRICULAR INTERNAL DIMENSION IN DIASTOLE: 5 CM (ref 5.72–8.52)
LEFT VENTRICULAR POSTERIOR WALL IN END DIASTOLE: 1 CM
LEFT VENTRICULAR STROKE VOLUME: 66 ML
MV E'TISSUE VEL-SEP: 9 CM/S
MV PEAK A VEL: 0.27 M/S
MV PEAK E VEL: 72 CM/S
RIGHT ATRIUM AREA SYSTOLE A4C: 19.1 CM2
RIGHT VENTRICLE ID DIMENSION: 3.5 CM
SL CV PED ECHO LEFT VENTRICLE DIASTOLIC VOLUME (MOD BIPLANE) 2D: 117 ML
SL CV PED ECHO LEFT VENTRICLE SYSTOLIC VOLUME (MOD BIPLANE) 2D: 52 ML
TRICUSPID VALVE PEAK REGURGITATION VELOCITY: 3.29 M/S
TV PEAK GRADIENT: 43 MMHG
Z-SCORE OF LEFT VENTRICULAR DIMENSION IN END SYSTOLE: -3.33

## 2022-01-12 PROCEDURE — 93306 TTE W/DOPPLER COMPLETE: CPT | Performed by: INTERNAL MEDICINE

## 2022-01-12 PROCEDURE — 93306 TTE W/DOPPLER COMPLETE: CPT

## 2022-01-27 DIAGNOSIS — I48.91 NEW ONSET ATRIAL FIBRILLATION (HCC): ICD-10-CM

## 2022-01-27 DIAGNOSIS — I10 BENIGN HYPERTENSION: ICD-10-CM

## 2022-01-27 RX ORDER — METOPROLOL SUCCINATE 25 MG/1
25 TABLET, EXTENDED RELEASE ORAL DAILY
Qty: 30 TABLET | Refills: 11 | Status: SHIPPED | OUTPATIENT
Start: 2022-01-27 | End: 2022-06-02 | Stop reason: SDUPTHER

## 2022-01-31 ENCOUNTER — APPOINTMENT (OUTPATIENT)
Dept: LAB | Facility: HOSPITAL | Age: 72
End: 2022-01-31
Payer: MEDICARE

## 2022-01-31 DIAGNOSIS — I48.91 NEW ONSET ATRIAL FIBRILLATION (HCC): ICD-10-CM

## 2022-01-31 LAB
ALBUMIN SERPL BCP-MCNC: 3.5 G/DL (ref 3.5–5)
ALP SERPL-CCNC: 68 U/L (ref 46–116)
ALT SERPL W P-5'-P-CCNC: 27 U/L (ref 12–78)
ANION GAP SERPL CALCULATED.3IONS-SCNC: 10 MMOL/L (ref 4–13)
AST SERPL W P-5'-P-CCNC: 23 U/L (ref 5–45)
BASOPHILS # BLD AUTO: 0.02 THOUSANDS/ΜL (ref 0–0.1)
BASOPHILS NFR BLD AUTO: 0 % (ref 0–1)
BILIRUB SERPL-MCNC: 0.86 MG/DL (ref 0.2–1)
BUN SERPL-MCNC: 10 MG/DL (ref 5–25)
CALCIUM SERPL-MCNC: 8.6 MG/DL (ref 8.3–10.1)
CHLORIDE SERPL-SCNC: 105 MMOL/L (ref 100–108)
CO2 SERPL-SCNC: 28 MMOL/L (ref 21–32)
CREAT SERPL-MCNC: 0.78 MG/DL (ref 0.6–1.3)
EOSINOPHIL # BLD AUTO: 0.12 THOUSAND/ΜL (ref 0–0.61)
EOSINOPHIL NFR BLD AUTO: 3 % (ref 0–6)
ERYTHROCYTE [DISTWIDTH] IN BLOOD BY AUTOMATED COUNT: 12.7 % (ref 11.6–15.1)
GFR SERPL CREATININE-BSD FRML MDRD: 76 ML/MIN/1.73SQ M
GLUCOSE P FAST SERPL-MCNC: 104 MG/DL (ref 65–99)
HCT VFR BLD AUTO: 44.8 % (ref 34.8–46.1)
HGB BLD-MCNC: 14.9 G/DL (ref 11.5–15.4)
IMM GRANULOCYTES # BLD AUTO: 0.02 THOUSAND/UL (ref 0–0.2)
IMM GRANULOCYTES NFR BLD AUTO: 0 % (ref 0–2)
LYMPHOCYTES # BLD AUTO: 1.27 THOUSANDS/ΜL (ref 0.6–4.47)
LYMPHOCYTES NFR BLD AUTO: 27 % (ref 14–44)
MAGNESIUM SERPL-MCNC: 1.9 MG/DL (ref 1.6–2.6)
MCH RBC QN AUTO: 30.7 PG (ref 26.8–34.3)
MCHC RBC AUTO-ENTMCNC: 33.3 G/DL (ref 31.4–37.4)
MCV RBC AUTO: 92 FL (ref 82–98)
MONOCYTES # BLD AUTO: 0.35 THOUSAND/ΜL (ref 0.17–1.22)
MONOCYTES NFR BLD AUTO: 8 % (ref 4–12)
NEUTROPHILS # BLD AUTO: 2.85 THOUSANDS/ΜL (ref 1.85–7.62)
NEUTS SEG NFR BLD AUTO: 62 % (ref 43–75)
NRBC BLD AUTO-RTO: 0 /100 WBCS
PLATELET # BLD AUTO: 201 THOUSANDS/UL (ref 149–390)
PMV BLD AUTO: 10.5 FL (ref 8.9–12.7)
POTASSIUM SERPL-SCNC: 3.6 MMOL/L (ref 3.5–5.3)
PROT SERPL-MCNC: 6.8 G/DL (ref 6.4–8.2)
RBC # BLD AUTO: 4.86 MILLION/UL (ref 3.81–5.12)
SODIUM SERPL-SCNC: 143 MMOL/L (ref 136–145)
WBC # BLD AUTO: 4.63 THOUSAND/UL (ref 4.31–10.16)

## 2022-01-31 PROCEDURE — 83735 ASSAY OF MAGNESIUM: CPT

## 2022-01-31 PROCEDURE — 85025 COMPLETE CBC W/AUTO DIFF WBC: CPT | Performed by: INTERNAL MEDICINE

## 2022-01-31 PROCEDURE — 80053 COMPREHEN METABOLIC PANEL: CPT | Performed by: INTERNAL MEDICINE

## 2022-01-31 PROCEDURE — 36415 COLL VENOUS BLD VENIPUNCTURE: CPT | Performed by: INTERNAL MEDICINE

## 2022-02-17 ENCOUNTER — TELEPHONE (OUTPATIENT)
Dept: GASTROENTEROLOGY | Facility: CLINIC | Age: 72
End: 2022-02-17

## 2022-02-17 ENCOUNTER — TELEPHONE (OUTPATIENT)
Dept: CARDIOLOGY CLINIC | Facility: CLINIC | Age: 72
End: 2022-02-17

## 2022-02-17 NOTE — TELEPHONE ENCOUNTER
Our mutual patient is scheduled for procedure: EGD    On: February 24 , 2022     With: Dr hCino Parsons MD    He/She is taking the following blood thinner: Eliquis (Apixaban)    Can this be stopped  2 days prior to the procedure    Physician Approving clearance:

## 2022-02-17 NOTE — TELEPHONE ENCOUNTER
From  GI:       Patient is scheduled for procedure: EGD     On:  February 24 , 2022      With: Dr Quirino Mcwilliams MD     He/She is taking the following blood thinner: Eliquis (Apixaban)     Can this be stopped  2 days prior to the procedure

## 2022-02-18 ENCOUNTER — TELEPHONE (OUTPATIENT)
Dept: PREADMISSION TESTING | Facility: HOSPITAL | Age: 72
End: 2022-02-18

## 2022-02-18 VITALS — HEIGHT: 65 IN | WEIGHT: 214 LBS | BODY MASS INDEX: 35.65 KG/M2

## 2022-02-18 NOTE — PRE-PROCEDURE INSTRUCTIONS
Pre-Surgery Instructions:   Medication Instructions    apixaban (Eliquis) 5 mg Patient was instructed by Physician and understands   atorvastatin (LIPITOR) 20 mg tablet Instructed patient per Anesthesia Guidelines   bumetanide (BUMEX) 1 mg tablet Instructed patient per Anesthesia Guidelines   Calcium Carb-Cholecalciferol 600-1000 MG-UNIT CAPS Instructed patient per Anesthesia Guidelines   levothyroxine 50 mcg tablet Take the am of the procedure    metoprolol succinate (TOPROL-XL) 25 mg 24 hr tablet Take the am of the procedure    omeprazole (PriLOSEC) 40 MG capsule Take the am of the procedure    potassium chloride (Klor-Con) 10 mEq tablet Instructed patient per Anesthesia Guidelines  Pt to follow Dr Marlen Gallegos instructions  Pt NPO at 12 mn on 2/23/22  Pt to take levothyroxine, metoprolol succinate, omeprazole with up to 6 oz of water at least 2 hours prior to arrival time   Keyla Posada

## 2022-02-23 RX ORDER — SODIUM CHLORIDE, SODIUM LACTATE, POTASSIUM CHLORIDE, CALCIUM CHLORIDE 600; 310; 30; 20 MG/100ML; MG/100ML; MG/100ML; MG/100ML
125 INJECTION, SOLUTION INTRAVENOUS CONTINUOUS
Status: CANCELLED | OUTPATIENT
Start: 2022-02-23

## 2022-02-24 ENCOUNTER — ANESTHESIA EVENT (OUTPATIENT)
Dept: GASTROENTEROLOGY | Facility: AMBULARY SURGERY CENTER | Age: 72
End: 2022-02-24

## 2022-02-24 ENCOUNTER — ANESTHESIA (OUTPATIENT)
Dept: GASTROENTEROLOGY | Facility: AMBULARY SURGERY CENTER | Age: 72
End: 2022-02-24

## 2022-02-24 ENCOUNTER — HOSPITAL ENCOUNTER (OUTPATIENT)
Dept: GASTROENTEROLOGY | Facility: AMBULARY SURGERY CENTER | Age: 72
Setting detail: OUTPATIENT SURGERY
Discharge: HOME | End: 2022-02-24
Attending: INTERNAL MEDICINE | Admitting: INTERNAL MEDICINE
Payer: MEDICARE

## 2022-02-24 VITALS
WEIGHT: 200 LBS | OXYGEN SATURATION: 96 % | HEIGHT: 65 IN | TEMPERATURE: 97.3 F | DIASTOLIC BLOOD PRESSURE: 93 MMHG | SYSTOLIC BLOOD PRESSURE: 162 MMHG | HEART RATE: 62 BPM | RESPIRATION RATE: 18 BRPM | BODY MASS INDEX: 33.32 KG/M2

## 2022-02-24 DIAGNOSIS — R13.19 ESOPHAGEAL DYSPHAGIA: ICD-10-CM

## 2022-02-24 DIAGNOSIS — R10.13 EPIGASTRIC PAIN: ICD-10-CM

## 2022-02-24 PROCEDURE — 88313 SPECIAL STAINS GROUP 2: CPT | Performed by: PATHOLOGY

## 2022-02-24 PROCEDURE — 43239 EGD BIOPSY SINGLE/MULTIPLE: CPT | Performed by: INTERNAL MEDICINE

## 2022-02-24 PROCEDURE — 88305 TISSUE EXAM BY PATHOLOGIST: CPT | Performed by: PATHOLOGY

## 2022-02-24 PROCEDURE — C1726 CATH, BAL DIL, NON-VASCULAR: HCPCS

## 2022-02-24 PROCEDURE — 88342 IMHCHEM/IMCYTCHM 1ST ANTB: CPT | Performed by: PATHOLOGY

## 2022-02-24 PROCEDURE — 43249 ESOPH EGD DILATION <30 MM: CPT | Performed by: INTERNAL MEDICINE

## 2022-02-24 RX ORDER — LIDOCAINE HYDROCHLORIDE 10 MG/ML
INJECTION, SOLUTION EPIDURAL; INFILTRATION; INTRACAUDAL; PERINEURAL AS NEEDED
Status: DISCONTINUED | OUTPATIENT
Start: 2022-02-24 | End: 2022-02-24

## 2022-02-24 RX ORDER — SODIUM CHLORIDE, SODIUM LACTATE, POTASSIUM CHLORIDE, CALCIUM CHLORIDE 600; 310; 30; 20 MG/100ML; MG/100ML; MG/100ML; MG/100ML
125 INJECTION, SOLUTION INTRAVENOUS CONTINUOUS
Status: DISCONTINUED | OUTPATIENT
Start: 2022-02-24 | End: 2022-02-28 | Stop reason: HOSPADM

## 2022-02-24 RX ORDER — SODIUM CHLORIDE, SODIUM LACTATE, POTASSIUM CHLORIDE, CALCIUM CHLORIDE 600; 310; 30; 20 MG/100ML; MG/100ML; MG/100ML; MG/100ML
INJECTION, SOLUTION INTRAVENOUS CONTINUOUS PRN
Status: DISCONTINUED | OUTPATIENT
Start: 2022-02-24 | End: 2022-02-24

## 2022-02-24 RX ORDER — PROPOFOL 10 MG/ML
INJECTION, EMULSION INTRAVENOUS AS NEEDED
Status: DISCONTINUED | OUTPATIENT
Start: 2022-02-24 | End: 2022-02-24

## 2022-02-24 RX ADMIN — LIDOCAINE HYDROCHLORIDE 50 MG: 10 INJECTION, SOLUTION EPIDURAL; INFILTRATION; INTRACAUDAL; PERINEURAL at 08:11

## 2022-02-24 RX ADMIN — PROPOFOL 50 MG: 10 INJECTION, EMULSION INTRAVENOUS at 08:15

## 2022-02-24 RX ADMIN — PROPOFOL 20 MG: 10 INJECTION, EMULSION INTRAVENOUS at 08:19

## 2022-02-24 RX ADMIN — SODIUM CHLORIDE, SODIUM LACTATE, POTASSIUM CHLORIDE, AND CALCIUM CHLORIDE 125 ML/HR: .6; .31; .03; .02 INJECTION, SOLUTION INTRAVENOUS at 07:43

## 2022-02-24 RX ADMIN — SODIUM CHLORIDE, SODIUM LACTATE, POTASSIUM CHLORIDE, AND CALCIUM CHLORIDE: .6; .31; .03; .02 INJECTION, SOLUTION INTRAVENOUS at 08:09

## 2022-02-24 RX ADMIN — PROPOFOL 100 MG: 10 INJECTION, EMULSION INTRAVENOUS at 08:11

## 2022-02-24 RX ADMIN — PROPOFOL 50 MG: 10 INJECTION, EMULSION INTRAVENOUS at 08:13

## 2022-02-24 NOTE — PERIOPERATIVE NURSING NOTE
Pt awake & alert & expressing understanding to discharge instructions  Pt tolerated sip of apple juice  Pt dressed & waiting for Kutty  Compression applied to R hand by Caro

## 2022-02-24 NOTE — ANESTHESIA POSTPROCEDURE EVALUATION
Post-Op Assessment Note    CV Status:  Stable  Pain Score: 0    Pain management: adequate     Mental Status:  Arousable   Hydration Status:  Euvolemic   PONV Controlled:  Controlled   Airway Patency:  Patent      Post Op Vitals Reviewed: Yes      Staff: CRNA         No complications documented      BP   159/99   Temp     Pulse 83   Resp 14   SpO2 95

## 2022-02-24 NOTE — PERIOPERATIVE NURSING NOTE
Right hand infiltration re-examined by Dr Aida Callaway prior to discharge  Right hand slightly less swollen at this time  New warm compress applied by Dr Aida Callaway  Patient requests to leave compress on until she gets home

## 2022-02-24 NOTE — PERIOPERATIVE NURSING NOTE
Pt R hand swollen @ IV site  Caro notified & at bedside  IV site removed  Warm compress applied to site

## 2022-02-24 NOTE — ANESTHESIA PREPROCEDURE EVALUATION
Procedure:  EGD    Relevant Problems   CARDIO   (+) Benign hypertension   (+) Mixed hyperlipidemia   (+) Persistent atrial fibrillation (HCC)      ENDO   (+) Congenital hypothyroidism with diffuse goiter      GI/HEPATIC   (+) Dysphagia   (+) Gastroesophageal reflux disease   (+) Hiatal hernia      /RENAL   (+) Chronic kidney disease      GYN   (+) History of hysterectomy      MUSCULOSKELETAL   (+) Rheumatoid arthritis (HCC)      NEURO/PSYCH   (+) Anxiety   (+) Vertigo      PULMONARY   (+) Asthma        Physical Exam    Airway    Mallampati score: II  TM Distance: >3 FB       Dental   No notable dental hx     Cardiovascular      Pulmonary      Other Findings        Anesthesia Plan  ASA Score- 2     Anesthesia Type- IV sedation with anesthesia with ASA Monitors  Additional Monitors:   Airway Plan:           Plan Factors-Exercise tolerance (METS): >4 METS  Chart reviewed  Existing labs reviewed  Patient summary reviewed  Patient is not a current smoker  Induction- intravenous  Postoperative Plan- Plan for postoperative opioid use  Informed Consent- Anesthetic plan and risks discussed with patient  I personally reviewed this patient with the CRNA  Discussed and agreed on the Anesthesia Plan with the CRNA Gerhardt Sep

## 2022-02-24 NOTE — H&P
History and Physical -  [facility]  Mike Francis 66 y o  female MRN: [id number]    HPI: Mike Francis is a 66y o  year old female who presents with esophageal dysphagia  Review of Systems    Historical Information   Past Medical History:   Diagnosis Date    Anxiety     resolved 10/4/17    Asthma     seasonal    Atrial fibrillation ([facility] [address] ) 01/2022    newly diagnosed on eliquis    Chronic kidney disease     kidney stone    Colon polyp     Disease of thyroid gland     Dysphagia     GERD (gastroesophageal reflux disease)     Goiter, nodular     partial thyroidectomy    Hiatal hernia     repaired 2018    Hiatal hernia with GERD without esophagitis 04/2018    surgical correction    History of esophageal varices with bleeding     History of pernicious anemia     History of transfusion     x1 after bleeding ulcer    Hyperlipidemia     Hypertension     Irritable bowel syndrome     Neck mass     2 small areas left side by jawline and midneck US scheduled 0820/21    RA (rheumatoid arthritis) ([facility] [address] )     Seasonal allergies     Vertigo     Wears glasses     for reading     Past Surgical History:   Procedure Laterality Date    BREAST BIOPSY Bilateral     negative    CARDIAC CATHETERIZATION      x2 secondary to exertional chest pain, due to lung issues, possible mild COPD    CATARACT EXTRACTION Bilateral     CHOLECYSTECTOMY  2015    lap - last assessed 10/4/17    COLONOSCOPY      complete    ESOPHAGOGASTRODUODENOSCOPY N/A 1/23/2018    Procedure: ESOPHAGOGASTRODUODENOSCOPY (EGD); Surgeon: Edwin Reyes MD;  Location: [facility] GI LAB; Service: Gastroenterology    HYSTERECTOMY      partial - ovaries remain    LIPOMA RESECTION      right thigh    LA ESOPHAGOSCOPY FLEXIBLE TRANSORAL WITH BIOPSY N/A 4/11/2018    Procedure: ESOPHAGOGASTRODUODENOSCOPY (EGD);   Surgeon: Alf Luis MD;  [facility];  Service: Thoracic    LA LAP, REPAIR PARAESOPHAGEAL HERNIA, INCL FUNDOPLASTY W/ MESH N/A 4/11/2018    Procedure: REPAIR HERNIA PARAESOPHAGEAL  LAPAROSCOPIC,ELEONORA GASTROPLASTY, TUEPET FUNDOPLICATION;  Surgeon: Gurjit Pitts MD;  Location:  MAIN OR;  Service: Thoracic    KS XCAPSL CTRC RMVL INSJ IO LENS PROSTH W/O ECP Right 8/23/2021    Procedure: EXTRACTION EXTRACAPSULAR CATARACT PHACO INTRAOCULAR LENS (IOL); Surgeon: Fabiana Mckeon MD;  Location: [facility] MAIN OR;  Service: Ophthalmology    KS XCAPSL CTRC RMVL INSJ IO LENS PROSTH W/O ECP Left 11/15/2021    Procedure: EXTRACTION EXTRACAPSULAR CATARACT PHACO INTRAOCULAR LENS (IOL);   Surgeon: Fabiana Mckeon MD;  Location: [facility] MAIN OR;  Service: Ophthalmology    SKIN BIOPSY Right     lump benign - last assessed 10/4/17    THYROIDECTOMY, PARTIAL  1977    TONSILLECTOMY       Social History   Social History     Substance and Sexual Activity   Alcohol Use Yes    Comment: seldom     Social History     Substance and Sexual Activity   Drug Use Never     Social History     Tobacco Use   Smoking Status Never Smoker   Smokeless Tobacco Never Used     Family History   Problem Relation Age of Onset    Alzheimer's disease Mother     Cancer Mother         skin     Thyroid disease Mother     Ulcerative colitis Mother     Cancer Father         prostate    Stroke Father     Hypertension Father     Prostate cancer Father     Thyroid disease Sister     Ulcerative colitis Sister     Other Sister         open heart surgery    Heart disease Sister     Hyperlipidemia Brother     No Known Problems Son     No Known Problems Son     No Known Problems Maternal Grandmother     No Known Problems Paternal Grandmother     Mental illness Neg Hx        Meds/Allergies     (Not in a hospital admission)      Allergies   Allergen Reactions    Orange (Diagnostic) - Food Allergy Anaphylaxis     Throat closes    Pantoprazole Headache    Penicillins Other (See Comments)     During allergy testing instructed to NEVER take PCN    Gluten Meal - Food Allergy      Following a gluten free diet on her own    Lactose - Food Allergy Diarrhea       Objective     /91   Pulse 82   Temp (!) 97 3 °F (36 3 °C) (Temporal)   Resp 16   Ht 5' 5" (1 651 m)   Wt 90 7 kg (200 lb)   SpO2 98%   BMI 33 28 kg/m²       PHYSICAL EXAM    Gen: NAD  CV: RRR  CHEST: Clear  ABD: soft, NT/ND  EXT: no edema  Neuro: AAO      ASSESSMENT/PLAN:  This is a 66y o  year old female here for esophageal dysphagia       PLAN:   Procedure: Mariela Newman

## 2022-02-28 ENCOUNTER — TELEPHONE (OUTPATIENT)
Dept: CARDIOLOGY CLINIC | Facility: CLINIC | Age: 72
End: 2022-02-28

## 2022-02-28 DIAGNOSIS — E78.2 MIXED HYPERLIPIDEMIA: Primary | ICD-10-CM

## 2022-03-02 NOTE — TELEPHONE ENCOUNTER
I was not aware of the EGD results  The echocardiogram does not see the aortic arch  The ascending aorta was just top-normal in size  What I would do then is do a CTA of the chest and aorta, to look at and visualize her entire aorta  Thank you

## 2022-03-02 NOTE — TELEPHONE ENCOUNTER
Patient had an EGD done last Thursday she is concerned about the findings in her aortic arch   The findings read: Also notable proximal esophagus from 16 to 18 cm extrinsic compression, suspect this is aortic arch which may be contributing to dysphagia as well

## 2022-03-02 NOTE — TELEPHONE ENCOUNTER
I reviewed her echocardiogram   She has normal LV systolic function  She does have left atrial enlargement which is common in atrial fibrillation  Her aorta was reported as enlarged, but the measurements really are top normal for her body size and age  She does have mild pulmonary hypertension, which is commonly seen in obstructive sleep apnea which is also something that travels with atrial fibrillation  I would suggest a sleep study if she had not been through 1  Thank you

## 2022-03-09 ENCOUNTER — HOSPITAL ENCOUNTER (OUTPATIENT)
Dept: RADIOLOGY | Facility: HOSPITAL | Age: 72
Discharge: HOME | End: 2022-03-09
Payer: MEDICARE

## 2022-03-09 DIAGNOSIS — E78.2 MIXED HYPERLIPIDEMIA: ICD-10-CM

## 2022-03-09 PROCEDURE — 71275 CT ANGIOGRAPHY CHEST: CPT

## 2022-03-09 PROCEDURE — G1004 CDSM NDSC: HCPCS

## 2022-03-09 RX ADMIN — IOHEXOL 85 ML: 350 INJECTION, SOLUTION INTRAVENOUS at 08:14

## 2022-03-18 NOTE — TELEPHONE ENCOUNTER
Addended by: ARIANA EPPERSON on: 3/18/2022 09:47 AM     Modules accepted: Orders     Patient called very concerned about her echo results and would like a call back to discuss, please contact her at [phone]

## 2022-04-14 ENCOUNTER — OFFICE VISIT (OUTPATIENT)
Dept: CARDIOLOGY CLINIC | Facility: CLINIC | Age: 72
End: 2022-04-14
Payer: MEDICARE

## 2022-04-14 VITALS
BODY MASS INDEX: 35.79 KG/M2 | DIASTOLIC BLOOD PRESSURE: 96 MMHG | SYSTOLIC BLOOD PRESSURE: 136 MMHG | WEIGHT: 214.8 LBS | HEIGHT: 65 IN | HEART RATE: 69 BPM

## 2022-04-14 DIAGNOSIS — R07.89 CHEST TIGHTNESS: ICD-10-CM

## 2022-04-14 DIAGNOSIS — E78.00 HYPERCHOLESTEROLEMIA: ICD-10-CM

## 2022-04-14 DIAGNOSIS — R06.00 DYSPNEA ON EXERTION: ICD-10-CM

## 2022-04-14 DIAGNOSIS — I10 BENIGN HYPERTENSION: ICD-10-CM

## 2022-04-14 DIAGNOSIS — I48.19 PERSISTENT ATRIAL FIBRILLATION (HCC): Primary | ICD-10-CM

## 2022-04-14 PROCEDURE — 99214 OFFICE O/P EST MOD 30 MIN: CPT | Performed by: INTERNAL MEDICINE

## 2022-04-14 PROCEDURE — 93000 ELECTROCARDIOGRAM COMPLETE: CPT | Performed by: INTERNAL MEDICINE

## 2022-04-14 RX ORDER — LOSARTAN POTASSIUM 25 MG/1
25 TABLET ORAL DAILY
Qty: 90 TABLET | Refills: 3 | Status: SHIPPED | OUTPATIENT
Start: 2022-04-14

## 2022-04-14 NOTE — PROGRESS NOTES
Cardiology Follow-up    Masoud Garrison  [id number]  1950  [facility] [place]  [address] [id number] [facility] [address]    1  Persistent atrial fibrillation (HCC)  POCT ECG    NM myocardial perfusion spect (stress and/or rest)   2  Benign hypertension  losartan (COZAAR) 25 mg tablet   3  Hypercholesterolemia     4  Chest tightness  NM myocardial perfusion spect (stress and/or rest)   5  Dyspnea on exertion  NM myocardial perfusion spect (stress and/or rest)       Discussion/Summary:    1  Persistent atrial fibrillation - Marisel Campos was found to be in atrial fibrillation under PCPs office a few months back  She was placed on Eliquis and we started Toprol-XL, stopping her losartan  She is still symptomatic from this and I suggested a cardioversion  She wanted to think about this and get her blood pressure under control 1st   I am also going to have her undergo a stress nuclear study given her symptoms and probable need for antiarrhythmic therapy given her left atrial dilation  She will be back to see us in 3 months  2   Hypertension - This has been uncontrolled since stopping the losartan and starting Toprol-XL  We will add back the losartan at 25 mg daily  She will continue to check her blood pressure home  3   Hyperlipidemia - On atorvastatin 20 mg daily which has kept her LDL at goal     4   Exertional chest tightness and shortness of breath - I feel this is symptomatic atrial fibrillation  However given the symptoms and the likely need for antiarrhythmic therapy, which could be flecainide, she will do a stress nuclear study  HPI:    Mrs Ulysses Rosette comes in for follow-up given her persistent atrial fibrillation that was found the week prior to our consultation a few months back, at her PCPs office    Marisel Campos carries a history of mild CAD and 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 by a remote cardiac catheterization  Through those years she had on and off atypical chest pain that turn out to be GI related  She had a repeat cardiac catheterization in July of 2017 that reported no significant CAD  Around that time she also had a bleeding ulcer and required hospitalization and blood transfusion  Her she is treated for hypertension and hyperlipidemia  She does have an esophageal stricture that she is going to have dilated next month  Aj Meyer has a heart rate/rhythm monitor that suggested atrial fibrillation  Prior to our consultation she contacted her PCP who brought her in for an ECG which did show atrial fibrillation  It is unclear how long she has been in it but retrospectively recalls on and off palpitations and exercise intolerance since Labor Day  She goes for walks regularly and noticed over the last few months that her stamina was decreased in that she would get short of breath particularly with going up hill  At that point I set her up for an echocardiogram that showed normal LV systolic function and biatrial enlargement, particularly severe left atrial dilation  She also had moderate tricuspid regurgitation  At our consultation we started Toprol-XL and stopped her losartan  She was also started on Eliquis  Since then she has noticed her blood pressure has been elevated, particularly her diastolic pressure  She also has been following with GI, and had a stricture dilated in her esophagus recently  She had a CT scan that suggested compression from the aortic arch, but the aortic arch was not enlarged  Her ascending aorta is at most mildly dilated  Aj Meyer does continue to notice shortness of breath and even chest tightness when out walking, particularly going up a hill  She will also get some on and off atypical chest tightness with anxiety  She continues to have her GI related reflux symptoms  She has also noticed on and off palpitations    No chest pain or any symptoms of angina  No signs or symptoms of CHF  She does get unilateral edema on the left that she takes Bumex for  She denies lightheadedness or syncope        Patient Active Problem List   Diagnosis    Hiatal hernia    Gastroesophageal reflux disease    GERD with esophagitis    Hypokalemia    Benign hypertension    Mixed hyperlipidemia    Congenital hypothyroidism with diffuse goiter    Liver lesion, left lobe    Hemorrhoids    IBS (irritable bowel syndrome)    Osteopenia    Stress incontinence, female    Abnormal CT of liver    Class 2 severe obesity due to excess calories with serious comorbidity and body mass index (BMI) of 35 0 to 35 9 in adult (HCC)    BMI 35 0-35 9,adult    Dysphagia    Constipation    Anxiety    Chronic kidney disease    Rheumatoid arthritis (HCC)    Vertigo    History of hysterectomy    Parotid mass    Xerostomia    Epigastric pain    Otalgia, bilateral    Asthma    Persistent atrial fibrillation ([address] )    Hypercholesterolemia     Past Medical History:   Diagnosis Date    Anxiety     resolved 10/4/17    Asthma     seasonal    Atrial fibrillation ([facility] [address] ) 01/2022    newly diagnosed on eliquis    Chronic kidney disease     kidney stone    Colon polyp     Disease of thyroid gland     Dysphagia     GERD (gastroesophageal reflux disease)     Goiter, nodular     partial thyroidectomy    Hiatal hernia     repaired 2018    Hiatal hernia with GERD without esophagitis 04/2018    surgical correction    History of esophageal varices with bleeding     History of pernicious anemia     History of transfusion     x1 after bleeding ulcer    Hyperlipidemia     Hypertension     Irritable bowel syndrome     Neck mass     2 small areas left side by jawline and midneck US scheduled 0820/21    RA (rheumatoid arthritis) (HCC)     Seasonal allergies     Vertigo     Wears glasses     for reading     Social History     Socioeconomic History    Marital status: /Civil [employer]     Spouse name: Not on file    Number of children: Not on file    Years of education: Not on file    Highest education level: Not on file   Occupational History    Not on file   Tobacco Use    Smoking status: Never Smoker    Smokeless tobacco: Never Used   Vaping Use    Vaping Use: Never used   Substance and Sexual Activity    Alcohol use: Yes     Comment: seldom    Drug use: Never    Sexual activity: Not on file   Other Topics Concern    Not on file   Social History Narrative    Feels safe at home     Social Determinants of Health     Financial Resource Strain: Not on file   Food Insecurity: Not on file   Transportation Needs: Not on file   Physical Activity: Not on file   Stress: Not on file   Social Connections: Not on file   Intimate Partner Violence: Not on file   Housing Stability: Not on file      Family History   Problem Relation Age of Onset    Alzheimer's disease Mother     Cancer Mother         skin     Thyroid disease Mother     Ulcerative colitis Mother     Cancer Father         prostate    Stroke Father     Hypertension Father     Prostate cancer Father     Thyroid disease Sister     Ulcerative colitis Sister     Other Sister         open heart surgery    Heart disease Sister     Hyperlipidemia Brother     No Known Problems Son     No Known Problems Son     No Known Problems Maternal Grandmother     No Known Problems Paternal Grandmother     Mental illness Neg Hx      Past Surgical History:   Procedure Laterality Date    BREAST BIOPSY Bilateral     negative    CARDIAC CATHETERIZATION      x2 secondary to exertional chest pain, due to lung issues, possible mild COPD    CATARACT EXTRACTION Bilateral     CHOLECYSTECTOMY  2015    lap - last assessed 10/4/17    COLONOSCOPY      complete    ESOPHAGOGASTRODUODENOSCOPY N/A 1/23/2018    Procedure: ESOPHAGOGASTRODUODENOSCOPY (EGD); Surgeon: Neetu Gonzalez MD;  Location: [facility] GI LAB;   Service: Gastroenterology    HYSTERECTOMY      partial - ovaries remain    LIPOMA RESECTION      right thigh    MO ESOPHAGOSCOPY FLEXIBLE TRANSORAL WITH BIOPSY N/A 4/11/2018    Procedure: ESOPHAGOGASTRODUODENOSCOPY (EGD); Surgeon: Mary Vital MD;  [facility];  Service: Thoracic    MO LAP, REPAIR PARAESOPHAGEAL HERNIA, INCL FUNDOPLASTY W/ MESH N/A 4/11/2018    Procedure: REPAIR HERNIA PARAESOPHAGEAL  LAPAROSCOPIC,ELEONORA GASTROPLASTY, Nolan Gang FUNDOPLICATION;  Surgeon: Mary Vital MD;  Location:  MAIN OR;  Service: Thoracic    MO XCAPSL CTRC RMVL INSJ IO LENS PROSTH W/O ECP Right 8/23/2021    Procedure: EXTRACTION EXTRACAPSULAR CATARACT PHACO INTRAOCULAR LENS (IOL); Surgeon: Letty Hernandez MD;  Location: [facility] MAIN OR;  Service: Ophthalmology    MO XCAPSL CTRC RMVL INSJ IO LENS PROSTH W/O ECP Left 11/15/2021    Procedure: EXTRACTION EXTRACAPSULAR CATARACT PHACO INTRAOCULAR LENS (IOL);   Surgeon: Letty Hernandez MD;  Location: [facility] MAIN OR;  Service: Ophthalmology    SKIN BIOPSY Right     lump benign - last assessed 10/4/17    THYROIDECTOMY, PARTIAL  1977    TONSILLECTOMY         Current Outpatient Medications:     apixaban (Eliquis) 5 mg, Take 1 tablet (5 mg total) by mouth 2 (two) times a day (Patient taking differently: Take 5 mg by mouth 2 (two) times a day Last dose 2/21/22 ), Disp: 180 tablet, Rfl: 3    atorvastatin (LIPITOR) 20 mg tablet, TAKE 1 TABLET BY MOUTH  DAILY AT BEDTIME, Disp: 90 tablet, Rfl: 3    bumetanide (BUMEX) 1 mg tablet, TAKE 1 TABLET BY MOUTH  DAILY (Patient taking differently: 1 mg every morning  ), Disp: 90 tablet, Rfl: 3    Calcium Carb-Cholecalciferol 600-1000 MG-UNIT CAPS, Take by mouth every morning gummy , Disp: , Rfl:     levothyroxine 50 mcg tablet, TAKE 1 TABLET BY MOUTH  DAILY (Patient taking differently: 50 mcg every morning  ), Disp: 90 tablet, Rfl: 3    metoprolol succinate (TOPROL-XL) 25 mg 24 hr tablet, Take 1 tablet (25 mg total) by mouth daily (Patient taking differently: Take 25 mg by mouth every morning  ), Disp: 30 tablet, Rfl: 11    omeprazole (PriLOSEC) 40 MG capsule, Take 1 capsule (40 mg total) by mouth 2 (two) times a day, Disp: 180 capsule, Rfl: 2    potassium chloride (Klor-Con) 10 mEq tablet, TAKE 1 TABLET BY MOUTH  DAILY (Patient taking differently: 10 mEq every morning  ), Disp: 90 tablet, Rfl: 1    losartan (COZAAR) 25 mg tablet, Take 1 tablet (25 mg total) by mouth daily, Disp: 90 tablet, Rfl: 3  Allergies   Allergen Reactions    Orange (Diagnostic) - Food Allergy Anaphylaxis     Throat closes    Pantoprazole Headache    Penicillins Other (See Comments)     During allergy testing instructed to NEVER take PCN    Gluten Meal - Food Allergy      Following a gluten free diet on her own    Lactose - Food Allergy Diarrhea     Vitals:    04/14/22 0824   BP: 136/96   BP Location: Left arm   Patient Position: Sitting   Cuff Size: Large   Pulse: 69   Weight: 97 4 kg (214 lb 12 8 oz)   Height: 5' 5" (1 651 m)       Labs:  Lab Results   Component Value Date     09/01/2016    K 3 6 01/31/2022    K 4 1 09/01/2016     01/31/2022     09/01/2016    CO2 28 01/31/2022    CO2 27 09/01/2016    BUN 10 01/31/2022    BUN 10 09/01/2016    CREATININE 0 78 01/31/2022    CREATININE 0 69 09/01/2016    CALCIUM 8 6 01/31/2022    CALCIUM 9 2 09/01/2016     Lab Results   Component Value Date    WBC 4 63 01/31/2022    WBC 4 8 07/20/2017    HGB 14 9 01/31/2022    HGB 9 6 (L) 07/20/2017    HCT 44 8 01/31/2022    HCT 29 2 (L) 07/20/2017    MCV 92 01/31/2022    MCV 84 9 07/20/2017     01/31/2022     07/20/2017     Lab Results   Component Value Date    CHOL 144 09/01/2016    TRIG 87 08/11/2021    TRIG 92 09/01/2016    HDL 55 08/11/2021    HDL 57 09/01/2016     Imaging:  ECG today shows atrial fibrillation with nonspecific T-wave changes  ECHO (1/2022):       Left Ventricle: Left ventricular cavity size is normal  Systolic function is normal   Estimated ejection fraction is 50-55%  Wall motion is normal  Wall thickness is mildly increased  Unable to assess diastolic function due to atrial fibrillation    Left Atrium: The atrium is severely dilated    Right Atrium: The atrium is mild to moderately dilated    Aortic Valve: There is trace regurgitation    Mitral Valve: There is mild regurgitation    Tricuspid Valve: There is moderate regurgitation  The right ventricular systolic pressure is mildly elevated at 46 mm Hg    Pulmonic Valve: There is mild regurgitation    Aorta: Proximal ascending aorta is mildly dilated with a diameter of 3 8 cm  (indexed to BSA= 1 8 cm/m2) Consider other imaging modalities, including CTA for more accurate estimation and to rule out other aortic pathologies          Review of Systems:  Review of Systems   Constitutional: Negative  HENT: Negative  Eyes: Negative  Respiratory: Positive for chest tightness and shortness of breath  Cardiovascular: Positive for palpitations and leg swelling  Gastrointestinal: Negative  Musculoskeletal: Negative  Skin: Negative  Allergic/Immunologic: Negative  Neurological: Negative  Hematological: Negative  Psychiatric/Behavioral: Negative  All other systems reviewed and are negative  Vitals:    04/14/22 0824   BP: 136/96   BP Location: Left arm   Patient Position: Sitting   Cuff Size: Large   Pulse: 69   Weight: 97 4 kg (214 lb 12 8 oz)   Height: 5' 5" (1 651 m)       Physical Exam:  Physical Exam  Vitals and nursing note reviewed  Constitutional:       Appearance: She is well-developed  HENT:      Head: Normocephalic and atraumatic  Eyes:      General: No scleral icterus  Right eye: No discharge  Left eye: No discharge  Pupils: Pupils are equal, round, and reactive to light  Neck:      Thyroid: No thyromegaly  Vascular: No JVD  Cardiovascular:      Rate and Rhythm: Normal rate   Rhythm irregularly irregular  Pulses: Normal pulses  No decreased pulses  Heart sounds: Normal heart sounds, S1 normal and S2 normal  No murmur heard  No friction rub  No gallop  Pulmonary:      Effort: Pulmonary effort is normal  No respiratory distress  Breath sounds: Normal breath sounds  No wheezing, rhonchi or rales  Abdominal:      General: Bowel sounds are normal  There is no distension  Palpations: Abdomen is soft  Tenderness: There is no abdominal tenderness  Musculoskeletal:         General: No tenderness or deformity  Normal range of motion  Cervical back: Normal range of motion and neck supple  Skin:     General: Skin is warm and dry  Findings: No rash  Neurological:      Mental Status: She is alert and oriented to person, place, and time  Cranial Nerves: No cranial nerve deficit  Psychiatric:         Thought Content: Thought content normal          Judgment: Judgment normal        Counseling / Coordination of Care  Total office time spent today 40 minutes  Greater than 50% of total time was spent with the patient and / or family counseling and / or coordination of care

## 2022-04-14 NOTE — PATIENT INSTRUCTIONS
A-fib (Atrial Fibrillation)   AMBULATORY CARE:   Atrial fibrillation (A-fib)  is an irregular heartbeat  It reduces your heart's ability to pump blood through your body  A-fib may come and go, or it may be a long-term condition  A-fib can cause life-threatening blood clots, stroke, or heart failure  It is important to treat and manage A-fib to help prevent these problems  Common signs and symptoms include the following:   · A heartbeat that races, pounds, or flutters    · Weakness, severe tiredness, or confusion    · Feeling lightheaded, sweaty, dizzy, or faint    · Shortness of breath or anxiety    · Chest pain or pressure    Call your local emergency number (911 in the [address] [facility]) or have someone call if:   · You have any of the following signs of a heart attack:      ? Squeezing, pressure, or pain in your chest    ? You may  also have any of the following:     § Discomfort or pain in your back, neck, jaw, stomach, or arm    § Shortness of breath    § Nausea or vomiting    § Lightheadedness or a sudden cold sweat    · You have any of the following signs of a stroke:      ? Numbness or drooping on one side of your face     ? Weakness in an arm or leg    ? Confusion or difficulty speaking    ? Dizziness, a severe headache, or vision loss    Call your doctor or cardiologist if:   · Your arm or leg feels warm, tender, and painful  It may look swollen and red  · Your heart rate is more than 110 beats per minute  · You have new or worsening swelling in your legs, feet, ankles, or abdomen  · You are short of breath, even at rest     · You have questions or concerns about your condition or care  Treatment for A-fib:  Conditions that cause A-fib, such as thyroid disease, will be treated  You may also need any of the following:  · Heart medicines  help control your heart rate or rhythm  You may need more than one medicine to treat your symptoms      · Antiplatelet or blood thinner medicines  help prevent blood clots and stroke  · Cardioversion  is a procedure to return your heart rate and rhythm to normal  It can be done using medicines or electric shock  · A-fib ablation  is a procedure that uses energy to burn a small area of heart tissue  This creates scar tissue and prevents electrical signals that cause A-fib  You may need this procedure more than once  Ask for more information on A-fib ablation  · A pacemaker  may be inserted into your heart  A pacemaker is a device that controls your heartbeat  A pacemaker may be inserted during an ablation procedure or surgery  Ask your healthcare provider for more information on pacemakers  · Surgery  may be needed if other procedures do not work  During surgery your healthcare provider will make cuts in the upper part of your heart  The provider will stitch the cuts together to create scar tissue  The scar tissue will prevent electrical signals that cause A-fib  Manage A-fib:   · Know your target heart rate  Learn how to check your pulse and monitor your heart rate  · Know the risks if you choose to drink alcohol  Alcohol can increase your risk for A-fib or make A-fib harder to manage  Ask your healthcare provider if it is okay for you to drink any alcohol  He or she can help you set limits for the number of drinks you have in 24 hours and in a week  A drink of alcohol is 12 ounces of beer, 5 ounces of wine, or 1½ ounces of liquor  · Do not smoke  Nicotine can cause heart damage and make it more difficult to manage your A-fib  Do not use e-cigarettes or smokeless tobacco in place of cigarettes or to help you quit  They still contain nicotine  Ask your healthcare provider for information if you currently smoke and need help quitting  · Eat heart-healthy foods  Heart healthy foods will help keep your cholesterol low  These include fruits, vegetables, whole-grain breads, low-fat dairy products, beans, lean meats, and fish   Replace butter and margarine with heart-healthy oils such as olive oil and canola oil  · Maintain a healthy weight  Ask your healthcare provider what a healthy weight is for you  Ask him or her to help you create a safe weight loss plan if you are overweight  Even a small goal of a 10% weight loss can improve your heart health  · Get regular physical activity  Physical activity helps improve your heart health  Get at least 150 minutes of moderate aerobic physical activity each week  Your healthcare provider can help you create an activity plan  · Manage other health conditions  This includes high blood pressure or cholesterol, sleep apnea, diabetes, and other heart conditions  Take medicine as directed and follow your treatment plan  Your healthcare provider may need to change a medicine you are taking if it is causing your A-fib  Do not  stop taking any medicine unless directed by your provider  Follow up with your doctor or cardiologist as directed: You will need regular blood tests and monitoring  Write down your questions so you remember to ask them during your visits  © [employer] 2022 Information is for End User's use only and may not be sold, redistributed or otherwise used for commercial purposes  All illustrations and images included in CareNotes® are the copyrighted property of A D A M , Inc  or [address] [place], [place] [address]   The above information is an  only  It is not intended as medical advice for individual conditions or treatments  Talk to your doctor, nurse or pharmacist before following any medical regimen to see if it is safe and effective for you

## 2022-04-21 ENCOUNTER — HOSPITAL ENCOUNTER (OUTPATIENT)
Dept: NON INVASIVE DIAGNOSTICS | Facility: HOSPITAL | Age: 72
Discharge: HOME | End: 2022-04-21
Payer: MEDICARE

## 2022-04-21 ENCOUNTER — HOSPITAL ENCOUNTER (OUTPATIENT)
Dept: RADIOLOGY | Facility: HOSPITAL | Age: 72
Discharge: HOME | End: 2022-04-21
Payer: MEDICARE

## 2022-04-21 DIAGNOSIS — I48.19 PERSISTENT ATRIAL FIBRILLATION (HCC): ICD-10-CM

## 2022-04-21 DIAGNOSIS — R06.00 DYSPNEA ON EXERTION: ICD-10-CM

## 2022-04-21 DIAGNOSIS — R07.89 CHEST TIGHTNESS: ICD-10-CM

## 2022-04-21 LAB
CHEST PAIN STATEMENT: NORMAL
MAX DIASTOLIC BP: 104 MMHG
MAX HEART RATE: 151 BPM
MAX HR PERCENT: 102 %
MAX HR: 148 BPM
MAX PREDICTED HEART RATE: 148 BPM
MAX. SYSTOLIC BP: 180 MMHG
NUC STRESS EJECTION FRACTION: 59 %
PROTOCOL NAME: NORMAL
RATE PRESSURE PRODUCT: NORMAL
SL CV REST NUCLEAR ISOTOPE DOSE: 11 MCI
SL CV STRESS NUCLEAR ISOTOPE DOSE: 33 MCI
SL CV STRESS RECOVERY BP: NORMAL MMHG
SL CV STRESS RECOVERY HR: 85 BPM
SL CV STRESS RECOVERY O2 SAT: 98 %
SL CV STRESS STAGE REACHED: 2
STRESS ANGINA INDEX: 0
STRESS BASELINE BP: NORMAL MMHG
STRESS BASELINE HR: 83 BPM
STRESS O2 SAT REST: 98 %
STRESS PEAK HR: 151 BPM
STRESS PERCENT HR: 102 %
STRESS POST ESTIMATED WORKLOAD: 7 METS
STRESS POST EXERCISE DUR MIN: 4 MIN
STRESS POST EXERCISE DUR SEC: 5 SEC
STRESS POST O2 SAT PEAK: 98 %
STRESS POST PEAK BP: 180 MMHG
STRESS TARGET HR: 151 BPM
STRESS/REST PERFUSION RATIO: 1
TARGET HR FORMULA: NORMAL
TIME IN EXERCISE PHASE: NORMAL

## 2022-04-21 PROCEDURE — 78452 HT MUSCLE IMAGE SPECT MULT: CPT | Performed by: INTERNAL MEDICINE

## 2022-04-21 PROCEDURE — 93018 CV STRESS TEST I&R ONLY: CPT | Performed by: INTERNAL MEDICINE

## 2022-04-21 PROCEDURE — 93016 CV STRESS TEST SUPVJ ONLY: CPT | Performed by: INTERNAL MEDICINE

## 2022-04-21 PROCEDURE — 93017 CV STRESS TEST TRACING ONLY: CPT

## 2022-04-21 PROCEDURE — A9502 TC99M TETROFOSMIN: HCPCS

## 2022-04-21 PROCEDURE — 78452 HT MUSCLE IMAGE SPECT MULT: CPT

## 2022-05-18 DIAGNOSIS — I10 ESSENTIAL HYPERTENSION: ICD-10-CM

## 2022-05-19 RX ORDER — POTASSIUM CHLORIDE 750 MG/1
TABLET, FILM COATED, EXTENDED RELEASE ORAL
Qty: 90 TABLET | Refills: 1 | Status: SHIPPED | OUTPATIENT
Start: 2022-05-19

## 2022-05-29 DIAGNOSIS — K44.9 HIATAL HERNIA WITH GERD WITHOUT ESOPHAGITIS: ICD-10-CM

## 2022-05-29 DIAGNOSIS — R13.19 ESOPHAGEAL DYSPHAGIA: ICD-10-CM

## 2022-05-29 DIAGNOSIS — K21.9 HIATAL HERNIA WITH GERD WITHOUT ESOPHAGITIS: ICD-10-CM

## 2022-05-30 RX ORDER — OMEPRAZOLE 40 MG/1
CAPSULE, DELAYED RELEASE ORAL
Qty: 180 CAPSULE | Refills: 3 | Status: SHIPPED | OUTPATIENT
Start: 2022-05-30

## 2022-06-01 DIAGNOSIS — E78.2 MIXED HYPERLIPIDEMIA: ICD-10-CM

## 2022-06-01 DIAGNOSIS — E03.0 CONGENITAL HYPOTHYROIDISM WITH DIFFUSE GOITER: ICD-10-CM

## 2022-06-02 DIAGNOSIS — I10 BENIGN HYPERTENSION: ICD-10-CM

## 2022-06-02 DIAGNOSIS — I48.91 NEW ONSET ATRIAL FIBRILLATION (HCC): ICD-10-CM

## 2022-06-02 RX ORDER — LEVOTHYROXINE SODIUM 0.05 MG/1
TABLET ORAL
Qty: 90 TABLET | Refills: 1 | Status: SHIPPED | OUTPATIENT
Start: 2022-06-02

## 2022-06-02 RX ORDER — METOPROLOL SUCCINATE 25 MG/1
25 TABLET, EXTENDED RELEASE ORAL DAILY
Qty: 90 TABLET | Refills: 2 | Status: SHIPPED | OUTPATIENT
Start: 2022-06-02

## 2022-06-02 RX ORDER — ATORVASTATIN CALCIUM 20 MG/1
TABLET, FILM COATED ORAL
Qty: 90 TABLET | Refills: 1 | Status: SHIPPED | OUTPATIENT
Start: 2022-06-02

## 2022-07-02 ENCOUNTER — HOSPITAL ENCOUNTER (EMERGENCY)
Facility: HOSPITAL | Age: 72
Discharge: HOME | End: 2022-07-02
Attending: EMERGENCY MEDICINE
Payer: MEDICARE

## 2022-07-02 ENCOUNTER — APPOINTMENT (EMERGENCY)
Dept: RADIOLOGY | Facility: HOSPITAL | Age: 72
End: 2022-07-02
Payer: MEDICARE

## 2022-07-02 VITALS
TEMPERATURE: 97.3 F | RESPIRATION RATE: 16 BRPM | OXYGEN SATURATION: 96 % | HEART RATE: 79 BPM | SYSTOLIC BLOOD PRESSURE: 154 MMHG | DIASTOLIC BLOOD PRESSURE: 99 MMHG

## 2022-07-02 DIAGNOSIS — S82.899A ANKLE FRACTURE: Primary | ICD-10-CM

## 2022-07-02 PROCEDURE — 99283 EMERGENCY DEPT VISIT LOW MDM: CPT

## 2022-07-02 PROCEDURE — 99284 EMERGENCY DEPT VISIT MOD MDM: CPT | Performed by: PHYSICIAN ASSISTANT

## 2022-07-02 PROCEDURE — 29515 APPLICATION SHORT LEG SPLINT: CPT | Performed by: PHYSICIAN ASSISTANT

## 2022-07-02 PROCEDURE — 73610 X-RAY EXAM OF ANKLE: CPT

## 2022-07-02 RX ORDER — ACETAMINOPHEN 325 MG/1
325 TABLET ORAL ONCE
Status: COMPLETED | OUTPATIENT
Start: 2022-07-02 | End: 2022-07-02

## 2022-07-02 RX ORDER — ACETAMINOPHEN 325 MG/1
650 TABLET ORAL ONCE
Status: COMPLETED | OUTPATIENT
Start: 2022-07-02 | End: 2022-07-02

## 2022-07-02 RX ADMIN — ACETAMINOPHEN 325 MG: 325 TABLET ORAL at 21:28

## 2022-07-02 RX ADMIN — ACETAMINOPHEN 650 MG: 325 TABLET ORAL at 17:46

## 2022-07-02 NOTE — ED PROVIDER NOTES
History  Chief Complaint   Patient presents with    Ankle Injury     Left ankle  Mechanical fall going up stairs  The patient is a 69-year-old female who presents to the emergency department for evaluation of left ankle injury  The patient states she was walking upstairs when she tripped  She states that her ankle bent back, and she heard a popping sound  She fell to her hands and knees  She adamantly denies striking her head or losing consciousness  She is on blood thinners  She states she has been having pain and swelling in her left ankle since  She was given Tylenol during triage, but she has not taken anything else for pain  She is having difficulty ambulating because of pain  She denies any further injuries at this time  History provided by:  Patient   used: No    Ankle Injury  Associated symptoms: no abdominal pain, no chest pain, no cough, no diarrhea, no ear pain, no fever, no headaches, no nausea, no rash, no shortness of breath, no sore throat and no vomiting        Prior to Admission Medications   Prescriptions Last Dose Informant Patient Reported? Taking?    Calcium Carb-Cholecalciferol 600-1000 MG-UNIT CAPS  Self Yes No   Sig: Take by mouth every morning gummy    apixaban (Eliquis) 5 mg  Self No No   Sig: Take 1 tablet (5 mg total) by mouth 2 (two) times a day   Patient taking differently: Take 5 mg by mouth 2 (two) times a day Last dose 2/21/22    atorvastatin (LIPITOR) 20 mg tablet   No No   Sig: TAKE 1 TABLET BY MOUTH  DAILY AT BEDTIME   bumetanide (BUMEX) 1 mg tablet  Self No No   Sig: TAKE 1 TABLET BY MOUTH  DAILY   Patient taking differently: 1 mg every morning     levothyroxine 50 mcg tablet   No No   Sig: TAKE 1 TABLET BY MOUTH  DAILY   losartan (COZAAR) 25 mg tablet   No No   Sig: Take 1 tablet (25 mg total) by mouth daily   metoprolol succinate (TOPROL-XL) 25 mg 24 hr tablet   No No   Sig: Take 1 tablet (25 mg total) by mouth daily   omeprazole (PriLOSEC) 40 MG capsule   No No   Sig: TAKE 1 CAPSULE BY MOUTH  TWICE DAILY   potassium chloride (Klor-Con) 10 mEq tablet   No No   Sig: TAKE TAKE 1 TABLET BY MOUTH DAILY      Facility-Administered Medications: None       Past Medical History:   Diagnosis Date    Anxiety     resolved 10/4/17    Asthma     seasonal    Atrial fibrillation ([facility] ) 01/2022    newly diagnosed on eliquis    Chronic kidney disease     kidney stone    Colon polyp     Disease of thyroid gland     Dysphagia     GERD (gastroesophageal reflux disease)     Goiter, nodular     partial thyroidectomy    Hiatal hernia     repaired 2018    Hiatal hernia with GERD without esophagitis 04/2018    surgical correction    History of esophageal varices with bleeding     History of pernicious anemia     History of transfusion     x1 after bleeding ulcer    Hyperlipidemia     Hypertension     Irritable bowel syndrome     Neck mass     2 small areas left side by jawline and midneck US scheduled 0820/21    [facility]     Seasonal allergies     Vertigo     Wears glasses     for reading       Past Surgical History:   Procedure Laterality Date    BREAST BIOPSY Bilateral     negative    CARDIAC CATHETERIZATION      x2 secondary to exertional chest pain, due to lung issues, possible mild COPD    CATARACT EXTRACTION Bilateral     CHOLECYSTECTOMY  2015    lap - last assessed 10/4/17    COLONOSCOPY      complete    ESOPHAGOGASTRODUODENOSCOPY N/A 1/23/2018    Procedure: ESOPHAGOGASTRODUODENOSCOPY (EGD); Surgeon: Maryuri Munguia MD;  Location: [facility] GI LAB; Service: Gastroenterology    HYSTERECTOMY      partial - ovaries remain    LIPOMA RESECTION      right thigh    SD ESOPHAGOSCOPY FLEXIBLE TRANSORAL WITH BIOPSY N/A 4/11/2018    Procedure: ESOPHAGOGASTRODUODENOSCOPY (EGD);   Surgeon: Tonya Burrows MD;  [facility] MAIN [facility];  Service: Thoracic    SD LAP, REPAIR PARAESOPHAGEAL HERNIA, INCL FUNDOPLASTY W/ MESH N/A 4/11/2018    Procedure: REPAIR HERNIA PARAESOPHAGEAL  LAPAROSCOPIC,ELEONORA GASTROPLASTY, TUEPET FUNDOPLICATION;  Surgeon: Jerome Barragan MD;  Location:  MAIN OR;  Service: Thoracic    SC XCAPSL CTRC RMVL INSJ IO LENS PROSTH W/O ECP Right 8/23/2021    Procedure: EXTRACTION EXTRACAPSULAR CATARACT PHACO INTRAOCULAR LENS (IOL); Surgeon: Consuella Brunner, MD;  Location: [facility] MAIN OR;  Service: Ophthalmology    SC XCAPSL CTRC RMVL INSJ IO LENS PROSTH W/O ECP Left 11/15/2021    Procedure: EXTRACTION EXTRACAPSULAR CATARACT PHACO INTRAOCULAR LENS (IOL); Surgeon: Consuella Brunner, MD;  Location: [facility] MAIN OR;  Service: Ophthalmology    SKIN BIOPSY Right     lump benign - last assessed 10/4/17    THYROIDECTOMY, PARTIAL  1977    TONSILLECTOMY         Family History   Problem Relation Age of Onset    Alzheimer's disease Mother     Cancer Mother         skin     Thyroid disease Mother     Ulcerative colitis Mother     Cancer Father         prostate    Stroke Father     Hypertension Father     Prostate cancer Father     Thyroid disease Sister     Ulcerative colitis Sister     Other Sister         open heart surgery    Heart disease Sister     Hyperlipidemia Brother     No Known Problems Son     No Known Problems Son     No Known Problems Maternal Grandmother     No Known Problems Paternal Grandmother     Mental illness Neg Hx      I have reviewed and agree with the history as documented  E-Cigarette/Vaping    E-Cigarette Use Never User      E-Cigarette/Vaping Substances    Nicotine No     THC No     CBD No     Flavoring No     Other No     Unknown No      Social History     Tobacco Use    Smoking status: Never Smoker    Smokeless tobacco: Never Used   Vaping Use    Vaping Use: Never used   Substance Use Topics    Alcohol use: Yes     Comment: seldom    Drug use: Never       Review of Systems   Constitutional: Negative for chills and fever  HENT: Negative for ear pain and sore throat  Eyes: Negative for redness and visual disturbance  Respiratory: Negative for cough and shortness of breath  Cardiovascular: Negative for chest pain  Gastrointestinal: Negative for abdominal pain, diarrhea, nausea and vomiting  Genitourinary: Negative for dysuria and hematuria  Musculoskeletal: Positive for arthralgias and joint swelling  Negative for back pain, neck pain and neck stiffness  Skin: Negative for color change and rash  Neurological: Negative for dizziness, light-headedness and headaches  All other systems reviewed and are negative  Physical Exam  Physical Exam  Constitutional:       Appearance: Normal appearance  HENT:      Head: Normocephalic and atraumatic  Nose: Nose normal    Eyes:      Extraocular Movements: Extraocular movements intact  Conjunctiva/sclera: Conjunctivae normal    Pulmonary:      Effort: Pulmonary effort is normal  No respiratory distress  Musculoskeletal:      Cervical back: Normal range of motion and neck supple  Left knee: Normal       Left ankle: Swelling present  Tenderness present over the lateral malleolus  Decreased range of motion  Normal pulse  Left foot: Normal  Normal pulse  Skin:     General: Skin is warm and dry  Neurological:      General: No focal deficit present  Mental Status: She is alert and oriented to person, place, and time           Vital Signs  ED Triage Vitals   Temperature Pulse Respirations Blood Pressure SpO2   07/02/22 1742 07/02/22 1742 07/02/22 1742 07/02/22 1743 07/02/22 1742   (!) 97 3 °F (36 3 °C) 79 16 154/99 96 %      Temp Source Heart Rate Source Patient Position - Orthostatic VS BP Location FiO2 (%)   07/02/22 1742 07/02/22 1742 07/02/22 1742 07/02/22 1742 --   Oral Monitor Sitting Left arm       Pain Score       07/02/22 2128       7           Vitals:    07/02/22 1742 07/02/22 1743   BP:  154/99   Pulse: 79    Patient Position - Orthostatic VS: Sitting          Visual Acuity      ED Medications  Medications   acetaminophen (TYLENOL) tablet 650 mg (650 mg Oral Given 7/2/22 1746)   acetaminophen (TYLENOL) tablet 325 mg (325 mg Oral Given 7/2/22 2128)       Diagnostic Studies  Results Reviewed     None                 XR ankle 3+ views LEFT   ED Interpretation by Dangelo Painting PA-C (07/02 2022)   Fracture distal fibula? Final Result by Kassidy Andino MD (07/02 2025)      Nondisplaced distal fibular fracture  Workstation performed: [id number]                    Procedures  Orthopedic injury treatment    Date/Time: 7/2/2022 10:00 PM  Performed by: Dangelo Painting PA-C  Authorized by: Dangelo Painting PA-C     Patient Location:  ED  [place] Protocol:  Risks and benefits: risks, benefits and alternatives were discussed  Consent given by: patient  Patient understanding: patient states understanding of the procedure being performed  Patient identity confirmed: verbally with patient      Injury location:  Lower leg  Location details:  Left lower leg  Injury type:  Fracture  Fracture type: lateral malleolus    Fracture type: lateral malleolus    Neurovascular status: Neurovascularly intact    Manipulation performed?: No    Immobilization:  Splint and other (comment) (Walker)  Splint type: Posterior short-leg with stirrup  Supplies used:  Ortho-Glass  Neurovascular status: Neurovascularly intact    Patient tolerance:  Patient tolerated the procedure well with no immediate complications             ED Course                                             MDM  Number of Diagnoses or Management Options  Ankle fracture: new and requires workup  Diagnosis management comments: Patient presents for evaluation of left ankle injury after tripping and falling while walking upstairs  Differential includes but is not limited to sprain versus fracture versus dislocation versus contusion  X-ray of left ankle ordered and reviewed  Distal fibula fracture noted    Results were discussed with the patient  Patient was placed in a short-leg posterior splint with stirrup  Patient was given walker to assist with ambulation  I advised her that she needs to be nonweightbearing as much as possible  Did offer to discuss admission for PT OT [place], however she declined  She states that she wishes to go home with a walker  She states that she feels safe doing so  Patient's  is with her, and he states he can assist her  Patient was provided with information for orthopedics, and I advised her to call 1st thing Tuesday  I advised that she keep her leg ambulated as much as possible  I advised she continue Tylenol as needed for pain  Patient stable for discharge  Amount and/or Complexity of Data Reviewed  Tests in the radiology section of CPT®: reviewed and ordered  Decide to obtain previous medical records or to obtain history from someone other than the patient: yes  Review and summarize past medical records: yes    Risk of Complications, Morbidity, and/or Mortality  Presenting problems: low  Diagnostic procedures: low  Management options: low    Patient Progress  Patient progress: stable      Disposition  Final diagnoses: Ankle fracture     Time reflects when diagnosis was documented in both MDM as applicable and the Disposition within this note     Time User Action Codes Description Comment    7/2/2022  9:35 PM [address] [[id number]] Ankle fracture       ED Disposition     ED Disposition   Discharge    Condition   Stable    Date/Time   Sat Jul 2, 2022  9:35 PM    Comment   Maude Talley discharge to home/self care                 Follow-up Information     Follow up With Specialties Details Why Contact Info Additional [address] [facility] Call in 2 days  [address] [facility] [phone]  [address] [place] [address] [address] [facility], [address], [address] [facility], [place], [place], [address] [facility] Emergency Department Emergency Medicine  If symptoms worsen [address] [facility] [address] [facility] Emergency Department, [address], [place], [place], [address]          Discharge Medication List as of 7/2/2022  9:36 PM      CONTINUE these medications which have NOT CHANGED    Details   apixaban (Eliquis) 5 mg Take 1 tablet (5 mg total) by mouth 2 (two) times a day, Starting Mon 1/10/2022, Until Sat 7/9/2022, Normal      atorvastatin (LIPITOR) 20 mg tablet TAKE 1 TABLET BY MOUTH  DAILY AT BEDTIME, Normal      bumetanide (BUMEX) 1 mg tablet TAKE 1 TABLET BY MOUTH  DAILY, Normal      Calcium Carb-Cholecalciferol 600-1000 MG-UNIT CAPS Take by mouth every morning gummy , Historical Med      levothyroxine 50 mcg tablet TAKE 1 TABLET BY MOUTH  DAILY, Normal      losartan (COZAAR) 25 mg tablet Take 1 tablet (25 mg total) by mouth daily, Starting Thu 4/14/2022, Normal      metoprolol succinate (TOPROL-XL) 25 mg 24 hr tablet Take 1 tablet (25 mg total) by mouth daily, Starting Thu 6/2/2022, Normal      omeprazole (PriLOSEC) 40 MG capsule TAKE 1 CAPSULE BY MOUTH  TWICE DAILY, Normal      potassium chloride (Klor-Con) 10 mEq tablet TAKE TAKE 1 TABLET BY MOUTH DAILY, Normal             No discharge procedures on file      PDMP Review     None          ED Provider  Electronically Signed by           Chidi Subramanian PA-C  07/02/22 8945

## 2022-07-05 ENCOUNTER — OFFICE VISIT (OUTPATIENT)
Dept: OBGYN CLINIC | Facility: CLINIC | Age: 72
End: 2022-07-05
Payer: MEDICARE

## 2022-07-05 VITALS
WEIGHT: 210 LBS | RESPIRATION RATE: 19 BRPM | HEART RATE: 78 BPM | BODY MASS INDEX: 34.99 KG/M2 | DIASTOLIC BLOOD PRESSURE: 90 MMHG | SYSTOLIC BLOOD PRESSURE: 140 MMHG | TEMPERATURE: 98.2 F | HEIGHT: 65 IN

## 2022-07-05 DIAGNOSIS — M25.572 ACUTE LEFT ANKLE PAIN: Primary | ICD-10-CM

## 2022-07-05 DIAGNOSIS — S82.832A OTHER CLOSED FRACTURE OF DISTAL END OF LEFT FIBULA, INITIAL ENCOUNTER: ICD-10-CM

## 2022-07-05 PROCEDURE — 99203 OFFICE O/P NEW LOW 30 MIN: CPT | Performed by: PHYSICIAN ASSISTANT

## 2022-07-05 NOTE — PATIENT INSTRUCTIONS
P  R I C E  Treatment   WHAT YOU NEED TO KNOW:   What is P R I C E  treatment? P R I C E  treatment is a 5-step process used to decrease swelling and pain caused by an injury  P R I C E  stands for protect, rest, ice, compress, and elevate  Start P R I C E  within 24 to 48 hours of an injury  How do I use P R I C E  treatment? Protect  your injury from more damage  Support the injured area with a brace or splint  Your healthcare provider will tell you how long to use the brace or splint  Rest  your injured area as directed  You may need to stop using, or keep weight off, the injury for 48 hours or longer  Your healthcare provider may recommend crutches or another device  Return to your usual activities as directed  Apply ice  on your injured area for 15 to 20 minutes every 4 hours or as directed  Use an ice pack, or put crushed ice in a plastic bag  Cover the bag with a towel before you apply it to your skin  Ice helps prevent tissue damage and decreases swelling and pain  Compress  (keep pressure on) the injured area  Compression will help decrease swelling and support the injured area  Use an elastic bandage, air stirrup, splint, or sling as directed  If you use an elastic bandage, make sure the bandage is not too tight  You should be able to slip 2 fingers between the bandage and your skin  Elevate  the injured area above the level of your heart as often as you can  This will help decrease swelling and pain  Prop the injured area on pillows or blankets to keep it elevated comfortably  When should I seek immediate care? Your pain is severe  You have severe swelling or deformity  You have numbness in the injured area  When should I call my doctor? Your pain and swelling do not go away after a few days  You have questions or concerns about your condition or care  CARE AGREEMENT:   You have the right to help plan your care   Learn about your health condition and how it may be treated  Discuss treatment options with your healthcare providers to decide what care you want to receive  You always have the right to refuse treatment  The above information is an  only  It is not intended as medical advice for individual conditions or treatments  Talk to your doctor, nurse or pharmacist before following any medical regimen to see if it is safe and effective for you  © Copyright [employer] 2022 Information is for End User's use only and may not be sold, redistributed or otherwise used for commercial purposes   All illustrations and images included in CareNotes® are the copyrighted property of A [employer]  or [address] [place] [address] [place]

## 2022-07-05 NOTE — PROGRESS NOTES
Orthopaedic Surgery - Office Note  Vasile Sullivan (89 y o  female)   : 1950   MRN: [id number]  Encounter Date: 2022    Chief Complaint   Patient presents with    Left Ankle - Pain, Follow-up         Assessment/Plan  Diagnoses and all orders for this visit:    Acute left ankle pain  -     Durable Medical Equipment    Nondisplaced closed fracture of distal end of left fibula, initial encounter  -     [address] sustained a acute distal fibula fracture on 2022 that should not require surgical intervention  She will be started with immobilization in a boot with crutches to avoid limping and protect her weight bearing  She is already on Eliquis which will work for DVT prophylaxis  She will ice and elevate the ankle for edema control  She may remove the boot to shower hygiene purposes only  Return 1-2 weeks with Dr Laquita Menjivar with repeat xray  History of Present Illness  Deb Solomon is a new patient who injured her left ankle on 2022 when she was walking up the stairs and tripped  She reports she the ankle bent awkwardly and she heard a popping sound  She had immediate pain and discomfort and proceeded to the emergency department where she had x-rays taken  She is on Eliquis chronically for AFib  Currently all of her pain is located in the lateral ankle  She has been in a posterior splint from the emergency department and use lies in a wheelchair primarily as she had trouble tolerating the walker  No new traumas are reported since the initial injury    Review of Systems  Pertinent items are noted in HPI  All other systems were reviewed and are negative  Physical Exam  /90   Pulse 78   Temp 98 2 °F (36 8 °C)   Resp 19   Ht 5' 5" (1 651 m)   Wt 95 3 kg (210 lb)   BMI 34 95 kg/m²   Cons: Appears well  No apparent distress  Psych: Alert  Oriented x3  Mood and affect normal   Eyes: PERRLA, EOMI  Resp: Normal effort    No audible wheezing or stridor  CV: Palpable pulse  No discernable arrhythmia  Lymph:  No palpable cervical, axillary, or inguinal lymphadenopathy  Skin: Warm  No palpable masses  No visible lesions  Neuro: Normal muscle tone  Normal and symmetric DTR's  Left ankle has no skin breakdown lesions or signs of infection  She is tender to palpation of the distal fibula  She is nontender over the medial ankle including the medial malleolus and deltoid  Patient is nontender to palpation throughout the midfoot and there is no soft tissue edema  Dorsalis pedis and posterior tibial pulses are +2  Achilles is intact with a negative Pride  There is no calf tenderness and a negative Homans  Range of motion and strength are not assessed due to known fracture  Repeat x-ray not needed at this time  Studies Reviewed  Study Result    Narrative & Impression   LEFT ANKLE     INDICATION:   injury'      COMPARISON:  None     VIEWS:  XR ANKLE 3+ VW LEFT         FINDINGS:     Nondisplaced distal fibular fracture with overlying soft tissue swelling  Ankle mortise is symmetric      No significant degenerative changes      No lytic or blastic osseous lesion      There is soft tissue swelling over the lateral malleolus      IMPRESSION:     Nondisplaced distal fibular fracture            Workstation performed: [id number]        X-ray images as well as reports were reviewed by myself in the office today  Emergency department notes from 07/02/2022 were reviewed by myself in the office today  Procedures  No procedures today  Medical, Surgical, Family, and Social History  The patient's medical history, family history, and social history, were reviewed and updated as appropriate      Past Medical History:   Diagnosis Date    Anxiety     resolved 10/4/17    Asthma     seasonal    Atrial fibrillation ([facility] [address] ) 01/2022    newly diagnosed on eliquis    Chronic kidney disease     kidney stone    Colon polyp     Disease of thyroid gland     Dysphagia     GERD (gastroesophageal reflux disease)     Goiter, nodular     partial thyroidectomy    Hiatal hernia     repaired 2018    Hiatal hernia with GERD without esophagitis 04/2018    surgical correction    History of esophageal varices with bleeding     History of pernicious anemia     History of transfusion     x1 after bleeding ulcer    Hyperlipidemia     Hypertension     Irritable bowel syndrome     Neck mass     2 small areas left side by jawline and midneck US scheduled 0820/21    RA (rheumatoid arthritis) ([address] )     Seasonal allergies     Vertigo     Wears glasses     for reading       Past Surgical History:   Procedure Laterality Date    BREAST BIOPSY Bilateral     negative    CARDIAC CATHETERIZATION      x2 secondary to exertional chest pain, due to lung issues, possible mild COPD    CATARACT EXTRACTION Bilateral     CHOLECYSTECTOMY  2015    lap - last assessed 10/4/17    COLONOSCOPY      complete    ESOPHAGOGASTRODUODENOSCOPY N/A 1/23/2018    Procedure: ESOPHAGOGASTRODUODENOSCOPY (EGD); Surgeon: Tova Espinal MD;  Location: [facility] GI LAB; Service: Gastroenterology    HYSTERECTOMY      partial - ovaries remain    LIPOMA RESECTION      right thigh    CT ESOPHAGOSCOPY FLEXIBLE TRANSORAL WITH BIOPSY N/A 4/11/2018    Procedure: ESOPHAGOGASTRODUODENOSCOPY (EGD); Surgeon: Sandi Salcedo MD;  Location: [facility];  Service: Thoracic    CT LAP, REPAIR PARAESOPHAGEAL HERNIA, INCL FUNDOPLASTY W/ MESH N/A 4/11/2018    Procedure: REPAIR HERNIA PARAESOPHAGEAL  LAPAROSCOPIC,ELEONORA GASTROPLASTY, Debra Hamming FUNDOPLICATION;  Surgeon: Sandi Salcedo MD;  Location: [facility];  Service: Thoracic    CT XCAPSL CTRC RMVL INSJ IO LENS PROSTH W/O ECP Right 8/23/2021    Procedure: EXTRACTION EXTRACAPSULAR CATARACT PHACO INTRAOCULAR LENS (IOL);   Surgeon: Cee Ornelas MD;  Location: [facility] MAIN OR;  Service: Ophthalmology    CT XCAPSL CTRC RMVL INSJ IO LENS PROSTH W/O ECP Left 11/15/2021    Procedure: EXTRACTION EXTRACAPSULAR CATARACT PHACO INTRAOCULAR LENS (IOL);   Surgeon: Angeltio Kothari MD;  Location: [facility] MAIN OR;  Service: Ophthalmology    SKIN BIOPSY Right     lump benign - last assessed 10/4/17    THYROIDECTOMY, PARTIAL  1977    TONSILLECTOMY         Family History   Problem Relation Age of Onset    Alzheimer's disease Mother     Cancer Mother         skin     Thyroid disease Mother     Ulcerative colitis Mother     Cancer Father         prostate    Stroke Father     Hypertension Father     Prostate cancer Father     Thyroid disease Sister     Ulcerative colitis Sister     Other Sister         open heart surgery    Heart disease Sister     Hyperlipidemia Brother     No Known Problems Son     No Known Problems Son     No Known Problems Maternal Grandmother     No Known Problems Paternal Grandmother     Mental illness Neg Hx        Social History     Occupational History    Not on file   Tobacco Use    Smoking status: Never Smoker    Smokeless tobacco: Never Used   Vaping Use    Vaping Use: Never used   Substance and Sexual Activity    Alcohol use: Yes     Comment: seldom    Drug use: Never    Sexual activity: Not on file       Allergies   Allergen Reactions    Orange (Diagnostic) - Food Allergy Anaphylaxis     Throat closes    Pantoprazole Headache    Penicillins Other (See Comments)     During allergy testing instructed to NEVER take PCN    Gluten Meal - Food Allergy      Following a gluten free diet on her own    Lactose - Food Allergy Diarrhea         Current Outpatient Medications:     apixaban (Eliquis) 5 mg, Take 1 tablet (5 mg total) by mouth 2 (two) times a day (Patient taking differently: Take 5 mg by mouth 2 (two) times a day Last dose 2/21/22), Disp: 180 tablet, Rfl: 3    atorvastatin (LIPITOR) 20 mg tablet, TAKE 1 TABLET BY MOUTH  DAILY AT BEDTIME, Disp: 90 tablet, Rfl: 1    bumetanide (BUMEX) 1 mg tablet, TAKE 1 TABLET BY MOUTH DAILY (Patient taking differently: 1 mg every morning), Disp: 90 tablet, Rfl: 3    Calcium Carb-Cholecalciferol 600-1000 MG-UNIT CAPS, Take by mouth every morning gummy , Disp: , Rfl:     levothyroxine 50 mcg tablet, TAKE 1 TABLET BY MOUTH  DAILY, Disp: 90 tablet, Rfl: 1    losartan (COZAAR) 25 mg tablet, Take 1 tablet (25 mg total) by mouth daily, Disp: 90 tablet, Rfl: 3    metoprolol succinate (TOPROL-XL) 25 mg 24 hr tablet, Take 1 tablet (25 mg total) by mouth daily, Disp: 90 tablet, Rfl: 2    omeprazole (PriLOSEC) 40 MG capsule, TAKE 1 CAPSULE BY MOUTH  TWICE DAILY, Disp: 180 capsule, Rfl: 3    potassium chloride (Klor-Con) 10 mEq tablet, TAKE TAKE 1 TABLET BY MOUTH DAILY, Disp: 90 tablet, Rfl: 1      Den Cameron PA-C

## 2022-07-06 ENCOUNTER — TELEPHONE (OUTPATIENT)
Dept: OBGYN CLINIC | Facility: OTHER | Age: 72
End: 2022-07-06

## 2022-07-06 NOTE — TELEPHONE ENCOUNTER
email sent to [facility]!!    I have a patient that has a Consult (ref by jordy barclay) with Dr Pastrana Brought on , they cannot do that time , they are requesting the afternoon that day    Patient is :  Geoffrey Qiu   : [birth year]  MRN : [id number]  C/b # [phone]  Reason for Appointment : Consult with Dr Zeina Knutson, PM on   Requested Doctor & Location : Dr Zeina Knutson on     Thank you    Britany Couch

## 2022-07-07 NOTE — TELEPHONE ENCOUNTER
Patient's  calling to check on status of request  He also states they are willing to travel to [facility] to see a different provider if need be       # [phone]

## 2022-07-11 ENCOUNTER — OFFICE VISIT (OUTPATIENT)
Dept: FAMILY MEDICINE CLINIC | Facility: CLINIC | Age: 72
End: 2022-07-11
Payer: MEDICARE

## 2022-07-11 ENCOUNTER — TELEPHONE (OUTPATIENT)
Dept: OBGYN CLINIC | Facility: CLINIC | Age: 72
End: 2022-07-11

## 2022-07-11 VITALS
SYSTOLIC BLOOD PRESSURE: 142 MMHG | TEMPERATURE: 98 F | DIASTOLIC BLOOD PRESSURE: 88 MMHG | OXYGEN SATURATION: 98 % | RESPIRATION RATE: 17 BRPM | HEART RATE: 71 BPM

## 2022-07-11 DIAGNOSIS — L03.116 CELLULITIS OF LEFT LOWER EXTREMITY: ICD-10-CM

## 2022-07-11 DIAGNOSIS — S82.892D CLOSED FRACTURE OF LEFT ANKLE WITH ROUTINE HEALING, SUBSEQUENT ENCOUNTER: Primary | ICD-10-CM

## 2022-07-11 DIAGNOSIS — M06.9 RHEUMATOID ARTHRITIS, INVOLVING UNSPECIFIED SITE, UNSPECIFIED WHETHER RHEUMATOID FACTOR PRESENT (HCC): ICD-10-CM

## 2022-07-11 PROBLEM — N18.9 CHRONIC KIDNEY DISEASE: Status: RESOLVED | Noted: 2020-09-21 | Resolved: 2022-07-11

## 2022-07-11 PROBLEM — S82.892A CLOSED FRACTURE OF LEFT ANKLE: Status: ACTIVE | Noted: 2022-07-11

## 2022-07-11 PROCEDURE — 99213 OFFICE O/P EST LOW 20 MIN: CPT | Performed by: INTERNAL MEDICINE

## 2022-07-11 RX ORDER — SULFAMETHOXAZOLE AND TRIMETHOPRIM 800; 160 MG/1; MG/1
1 TABLET ORAL EVERY 12 HOURS SCHEDULED
Qty: 14 TABLET | Refills: 0 | Status: SHIPPED | OUTPATIENT
Start: 2022-07-11 | End: 2022-07-18

## 2022-07-11 RX ORDER — TRAMADOL HYDROCHLORIDE 50 MG/1
50 TABLET ORAL EVERY 6 HOURS PRN
Qty: 20 TABLET | Refills: 0 | Status: SHIPPED | OUTPATIENT
Start: 2022-07-11 | End: 2022-08-29

## 2022-07-11 NOTE — ASSESSMENT & PLAN NOTE
She has follow up with ortho tomorrow  I did give her a small amount of tramadol with precautions, for her pain   was checked and is appropriate  She was counselled about possible side effects  Patient is aware this is a one time rx only

## 2022-07-11 NOTE — TELEPHONE ENCOUNTER
Adrianne Peacock PA-C  RE: Increase pain         Patients  called in , wife has increase pain and he demanded her be seen @ any location today  I did advise I have no openings and need to send a request and he did not want that and wanted to speak with someone now  Was able to get clinical staff from [facility] on line to hopefully assist with patients concerns at this time  Warm transferred to office

## 2022-07-11 NOTE — PROGRESS NOTES
Assessment/Plan:    1  Closed fracture of left ankle with routine healing, subsequent encounter  Assessment & Plan:  She has follow up with ortho tomorrow  I did give her a small amount of tramadol with precautions, for her pain   was checked and is appropriate  She was counselled about possible side effects  Patient is aware this is a one time rx only  Orders:  -     sulfamethoxazole-trimethoprim (BACTRIM DS) 800-160 mg per tablet; Take 1 tablet by mouth every 12 (twelve) hours for 7 days  -     traMADol (Ultram) 50 mg tablet; Take 1 tablet (50 mg total) by mouth every 6 (six) hours as needed for moderate pain    2  Cellulitis of left lower extremity  Comments:  Rx as above, advised probiotics and f/u if not improving  Orders:  -     sulfamethoxazole-trimethoprim (BACTRIM DS) 800-160 mg per tablet; Take 1 tablet by mouth every 12 (twelve) hours for 7 days  -     traMADol (Ultram) 50 mg tablet; Take 1 tablet (50 mg total) by mouth every 6 (six) hours as needed for moderate pain    3  Rheumatoid arthritis, involving unspecified site, unspecified whether rheumatoid factor present [facility])  Assessment & Plan:  Managed by rheumatology  There are no Patient Instructions on file for this visit  No follow-ups on file  Subjective:      Patient ID: Deepa Soliz is a 66 y o  female  Chief Complaint   Patient presents with    broken ankle     Broke left ankle, was in a lot of pain over the weekend, big red reji that is hot now on ankle nm  lpn       She broke her L distal fib on 7/2, was doing okay with pain initially but over the weekend started having a lot of pain  Did not sleep last night due to pain  Has appointment with ortho tomorrow  There is a bright red "hot spot" on lateral distal shin  She denies fever or other constitutional symptoms  Denies chest pain or dyspnea  Taking tylenol without relief        The following portions of the patient's history were reviewed and updated as appropriate: allergies, current medications, past family history, past medical history, past social history, past surgical history and problem list     Review of Systems   Constitutional: Negative  Respiratory: Negative  Cardiovascular: Negative  Musculoskeletal:        Difficulty with ambulation, in a boot   Skin: Positive for color change  Current Outpatient Medications   Medication Sig Dispense Refill    apixaban (Eliquis) 5 mg Take 1 tablet (5 mg total) by mouth 2 (two) times a day (Patient taking differently: Take 5 mg by mouth 2 (two) times a day Last dose 2/21/22) 180 tablet 3    atorvastatin (LIPITOR) 20 mg tablet TAKE 1 TABLET BY MOUTH  DAILY AT BEDTIME 90 tablet 1    bumetanide (BUMEX) 1 mg tablet TAKE 1 TABLET BY MOUTH  DAILY (Patient taking differently: 1 mg every morning) 90 tablet 3    Calcium Carb-Cholecalciferol 600-1000 MG-UNIT CAPS Take by mouth every morning gummy       levothyroxine 50 mcg tablet TAKE 1 TABLET BY MOUTH  DAILY 90 tablet 1    losartan (COZAAR) 25 mg tablet Take 1 tablet (25 mg total) by mouth daily 90 tablet 3    metoprolol succinate (TOPROL-XL) 25 mg 24 hr tablet Take 1 tablet (25 mg total) by mouth daily 90 tablet 2    omeprazole (PriLOSEC) 40 MG capsule TAKE 1 CAPSULE BY MOUTH  TWICE DAILY 180 capsule 3    potassium chloride (Klor-Con) 10 mEq tablet TAKE TAKE 1 TABLET BY MOUTH DAILY 90 tablet 1    sulfamethoxazole-trimethoprim (BACTRIM DS) 800-160 mg per tablet Take 1 tablet by mouth every 12 (twelve) hours for 7 days 14 tablet 0    traMADol (Ultram) 50 mg tablet Take 1 tablet (50 mg total) by mouth every 6 (six) hours as needed for moderate pain 20 tablet 0     No current facility-administered medications for this visit  Objective:    /88   Pulse 71   Temp 98 °F (36 7 °C)   Resp 17   SpO2 98%        Physical Exam  Constitutional:       Appearance: Normal appearance  She is well-developed     Eyes:      Conjunctiva/sclera: Conjunctivae normal    Neck:      Thyroid: No thyromegaly  Vascular: No JVD  Cardiovascular:      Rate and Rhythm: Normal rate and regular rhythm  Heart sounds: Normal heart sounds  No murmur heard  No friction rub  No gallop  Pulmonary:      Effort: Pulmonary effort is normal       Breath sounds: Normal breath sounds  No wheezing or rales  Musculoskeletal:      Cervical back: Neck supple  Comments: In a wheelchair, L walking boot in place  Once removed, L lateral distal shin with erythema, warmth, tenderness  No fluctuance  Neurological:      Mental Status: She is alert                  Baljit Blanco MD

## 2022-07-12 ENCOUNTER — APPOINTMENT (OUTPATIENT)
Dept: RADIOLOGY | Facility: AMBULARY SURGERY CENTER | Age: 72
End: 2022-07-12
Attending: ORTHOPAEDIC SURGERY
Payer: MEDICARE

## 2022-07-12 ENCOUNTER — CONSULT (OUTPATIENT)
Dept: OBGYN CLINIC | Facility: CLINIC | Age: 72
End: 2022-07-12
Payer: MEDICARE

## 2022-07-12 VITALS
BODY MASS INDEX: 34.99 KG/M2 | SYSTOLIC BLOOD PRESSURE: 140 MMHG | DIASTOLIC BLOOD PRESSURE: 97 MMHG | WEIGHT: 210 LBS | HEIGHT: 65 IN | HEART RATE: 94 BPM

## 2022-07-12 DIAGNOSIS — S82.832A OTHER CLOSED FRACTURE OF DISTAL END OF LEFT FIBULA, INITIAL ENCOUNTER: Primary | ICD-10-CM

## 2022-07-12 DIAGNOSIS — S82.832A OTHER CLOSED FRACTURE OF DISTAL END OF LEFT FIBULA, INITIAL ENCOUNTER: ICD-10-CM

## 2022-07-12 PROCEDURE — 73610 X-RAY EXAM OF ANKLE: CPT

## 2022-07-12 PROCEDURE — 99214 OFFICE O/P EST MOD 30 MIN: CPT | Performed by: ORTHOPAEDIC SURGERY

## 2022-07-12 NOTE — PROGRESS NOTES
SREEDHAR Baker  Attending, Orthopaedic Surgery  [facility] [address] [address] [facility]      ORTHOPAEDIC FOOT AND ANKLE CLINIC VISIT     Assessment:     Encounter Diagnosis   Name Primary?  Other closed fracture of distal end of left fibula, initial encounter Yes            Plan:   · The patient verbalized understanding of exam findings and treatment plan  We engaged in the shared decision-making process and treatment options were discussed at length with the patient  Surgical and conservative management discussed today along with risks and benefits  · Her fracture is stable on x-rays, due to his she may WBAT in a CAM boot, AVSS provided   · Discussed how to control swelling, AVSS provided   · New x-rays of the left ankle, weightbearing, at the next visit   · Consider starting PT at the next visit  · Continue antibiotic prescribed by her PCP for the cellulitis  It may likely be reaction to how swollen she is  We recommended she double her efforts with elevation and ice to alleviate this swelling  Return in about 2 weeks (around 7/26/2022)  Repeat WB Xrays at that time  History of Present Illness:   Chief Complaint:   Chief Complaint   Patient presents with    Left Ankle - Pain, Fracture     Geoffrey Qiu is a 66 y o  female who is being seen for left ankle  Patient tripped up the stairs 10 days ago and sustained a left ankle injury  She was previously seen in the ED as well as by VIRGIE Ashraf  Due to increasing pain over the weekend she was also seen by her family doctor for concern of cellulitis  She notes no new injury over the weekend  She has been on antibiotics for her cellulitis  Pain is localized at lateral ankle with minimal radiating and described as sharp and severe  She does have a Cam walking boot but has not been weight-bearing  She has been relying on a wheelchair as well as a walker  Patient denies numbness, tingling or radicular pain    Denies history of neuropathy  Patient does not smoke, does not have diabetes and does take blood thinners  Patient denies family history of anesthesia complications and has not had any complications with anesthesia  Pain/symptom timing:  Worse during the day when active  Pain/symptom context:  Worse with activites and work  Pain/symptom modifying factors:  Rest makes better, activities make worse  Pain/symptom associated signs/symptoms: none    Prior treatment   · NSAIDsNo   · Injections No   · Bracing/Orthotics Yes    · Physical Therapy No     Orthopedic Surgical History:   None     Past Medical, Surgical and Social History:  Past Medical History:  has a past medical history of Anxiety, Asthma, Atrial fibrillation ([facility] [address] ) (01/2022), Chronic kidney disease, Colon polyp, Disease of thyroid gland, Dysphagia, GERD (gastroesophageal reflux disease), Goiter, nodular, Hiatal hernia, Hiatal hernia with GERD without esophagitis (04/2018), History of esophageal varices with bleeding, History of pernicious anemia, History of transfusion, Hyperlipidemia, Hypertension, Irritable bowel syndrome, Neck mass, RA (rheumatoid arthritis) ([facility] ), Seasonal allergies, Vertigo, and Wears glasses  Problem List: does not have any pertinent problems on file  Past Surgical History:  has a past surgical history that includes Skin biopsy (Right); Tonsillectomy; Cholecystectomy (2015); Esophagogastroduodenoscopy (N/A, 1/23/2018); pr lap, repair paraesophageal hernia, incl fundoplasty w/ mesh (N/A, 4/11/2018); pr esophagoscopy flexible transoral with biopsy (N/A, 4/11/2018); Colonoscopy; Lipoma resection; Breast biopsy (Bilateral); Hysterectomy; Thyroidectomy, partial (1977); Cardiac catheterization; pr xcapsl ctrc rmvl insj io lens prosth w/o ecp (Right, 8/23/2021); pr xcapsl ctrc rmvl insj io lens prosth w/o ecp (Left, 11/15/2021); and Cataract extraction (Bilateral)    Family History: family history includes Alzheimer's disease in her mother; Cancer in her father and mother; Heart disease in her sister; Hyperlipidemia in her brother; Hypertension in her father; No Known Problems in her maternal grandmother, paternal grandmother, son, and son; Other in her sister; Prostate cancer in her father; Stroke in her father; Thyroid disease in her mother and sister; Ulcerative colitis in her mother and sister  Social History:  reports that she has never smoked  She has never used smokeless tobacco  She reports current alcohol use  She reports that she does not use drugs  Current Medications: has a current medication list which includes the following prescription(s): apixaban, atorvastatin, bumetanide, calcium carb-cholecalciferol, levothyroxine, losartan, metoprolol succinate, omeprazole, potassium chloride, sulfamethoxazole-trimethoprim, and tramadol  Allergies: is allergic to orange (diagnostic) - food allergy, pantoprazole, penicillins, gluten meal - food allergy, and lactose - food allergy  Review of Systems:  General- denies fever/chills  HEENT- denies hearing loss or sore throat  Eyes- denies eye pain or visual disturbances, denies red eyes  Respiratory- denies cough or SOB  Cardio- denies chest pain or palpitations  GI- denies abdominal pain  Endocrine- denies urinary frequency  Urinary- denies pain with urination  Musculoskeletal- Negative except noted above  Skin- denies rashes or wounds  Neurological- denies dizziness or headache  Psychiatric- denies anxiety or difficulty concentrating    Physical Exam:   /97 (BP Location: Left arm, Patient Position: Sitting, Cuff Size: Adult)   Pulse 94   Ht 5' 5" (1 651 m)   Wt 95 3 kg (210 lb)   BMI 34 95 kg/m²   General/Constitutional: No apparent distress: well-nourished and well developed    Eyes: normal ocular motion  Cardio: RRR, Normal S1S2, No m/r/g  Lymphatic: No appreciable lymphadenopathy  Respiratory: Non-labored breathing, CTA b/l no w/c/r  Vascular: No edema, swelling or tenderness, except as noted in detailed exam   Integumentary: No impressive skin lesions present, except as noted in detailed exam   Neuro: No ataxia or tremors noted  Psych: Normal mood and affect, oriented to person, place and time  Appropriate affect  Musculoskeletal: Normal, except as noted in detailed exam and in HPI  Examination    Left    Gait       using wheelchair for NWB   Musculoskeletal Tender to palpation at distal fibula     Skin Mild redness, pitting edema noted 2+      Nails Normal    Range of Motion  Limited secondary to pain and swelling    Stability Stable    Muscle Strength 5/5 tibialis anterior  5/5 gastrocnemius-soleus  5/5 posterior tibialis  5/5 peroneal/eversion strength  5/5 EHL  5/5 FHL    Neurologic Normal    Sensation Intact to light touch throughout sural, saphenous, superficial peroneal, deep peroneal and medial/lateral plantar nerve distributions  Casper-Brandon 5 07 filament (10g) testing deferred  Cardiovascular Brisk capillary refill < 2 seconds,intact DP and PT pulses    Special Tests Negative homans test       Imaging Studies:   3 views of the left ankle were taken, reviewed and interpreted independently that demonstrate stable alignment of Guerra B fibular fx  Reviewed by me personally  Jay Rower Lachman, MD  Foot & Ankle Surgery   Department of [address] [place] [address]      I personally performed the service  Jay Rower Lachman, MD    Scribe Attestation    I,:  Luz Palmer am acting as a scribe while in the presence of the attending physician :       I,:  Tessie Upton MD personally performed the services described in this documentation    as scribed in my presence :

## 2022-07-12 NOTE — PATIENT INSTRUCTIONS
Weightbearing as tolerated in CAM boot  Do not need to wear the boot for sleep or showering but should wear it any time you are walking on it  Recommend taking the following supplements: Vitamin D3- 4000 units per day and Calcium 1200 mg per day  This will help with bone healing  Avoid NSAIDs like Motrin/Aleve/Ibuprofen  Avoid steroids/nicotine products/ rheumatoid medications like DMARDs if possible  Eliquis as prescribed for blood clot prevention    Toes above the nose elevation     Knee high compression stocking 20/30mm HG of pressure

## 2022-07-19 ENCOUNTER — OFFICE VISIT (OUTPATIENT)
Dept: CARDIOLOGY CLINIC | Facility: CLINIC | Age: 72
End: 2022-07-19
Payer: MEDICARE

## 2022-07-19 VITALS
HEIGHT: 65 IN | WEIGHT: 215 LBS | SYSTOLIC BLOOD PRESSURE: 110 MMHG | HEART RATE: 78 BPM | BODY MASS INDEX: 35.82 KG/M2 | DIASTOLIC BLOOD PRESSURE: 72 MMHG | OXYGEN SATURATION: 98 %

## 2022-07-19 DIAGNOSIS — I10 BENIGN HYPERTENSION: ICD-10-CM

## 2022-07-19 DIAGNOSIS — E78.2 MIXED HYPERLIPIDEMIA: ICD-10-CM

## 2022-07-19 DIAGNOSIS — I48.19 PERSISTENT ATRIAL FIBRILLATION (HCC): Primary | ICD-10-CM

## 2022-07-19 PROCEDURE — 99214 OFFICE O/P EST MOD 30 MIN: CPT | Performed by: INTERNAL MEDICINE

## 2022-07-19 PROCEDURE — 93000 ELECTROCARDIOGRAM COMPLETE: CPT | Performed by: INTERNAL MEDICINE

## 2022-07-19 NOTE — PROGRESS NOTES
Cardiology Follow-up    Evalee Kawasaki  [id number]  1950  [facility] [place]  [address] [address]    1  Persistent atrial fibrillation (HCC)  POCT ECG    Cardioversion   2  Benign hypertension     3  Mixed hyperlipidemia         Discussion/Summary:    1  Persistent atrial fibrillation - Boy Jacobsen was found to be in atrial fibrillation under PCPs earlier this year  She was placed on Eliquis and we started Toprol-XL  She continues to seem symptomatic at times, particularly with exertion  At our last visit I did recommend a cardioversion but she wanted to put this off  We talked about this again today, and I did recommend this  She has agreed and this will help determine what degree of symptoms are coming from her atrial fibrillation  She otherwise does not feel this on the Toprol-XL  She remains compliant on Eliquis  2   Hypertension - This this became uncontrolled with stopping losartan at the time of starting Toprol-XL  At our last visit we added back the losartan and now her blood pressure is under good control  It is recommended that she continue to check her blood pressure home  3   Hyperlipidemia - On atorvastatin 20 mg daily which has kept her LDL at goal     4   Exertional chest tightness and shortness of breath - I feel this is symptomatic atrial fibrillation  We did do a stress nuclear study after our last visit which was normal   We are going to pursue a cardioversion, and then discuss the need for antiarrhythmic therapy like flecainide  HPI:    Mrs Artem Garza comes in for follow-up given her persistent atrial fibrillation that was found the week prior to our consultation earlier this year, at her PCPs office  Boy Jacobsen carries a history of mild CAD and 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 by a remote cardiac catheterization    Through those years she had on and off atypical chest pain that turn out to be GI related  She had a repeat cardiac catheterization in July of 2017 that reported no significant CAD  Around that time she also had a bleeding ulcer and required hospitalization and blood transfusion  Her she is treated for hypertension and hyperlipidemia  She does have an esophageal stricture that she is going to have dilated next month  Lulvia Alexis has a heart Kardia rate/rhythm monitor that suggested atrial fibrillation  Prior to our consultation she contacted her PCP who brought her in for an ECG which did show atrial fibrillation  It is unclear how long she has been in it but retrospectively recalls on and off palpitations and exercise intolerance since Labor Day 2021  She goes for walks regularly and noticed over the last few months that her stamina was decreased in that she would get short of breath particularly with going up hill  At that point I set her up for an echocardiogram that showed normal LV systolic function and biatrial enlargement, particularly severe left atrial dilation  She also had moderate tricuspid regurgitation  At our consultation we started Toprol-XL and stopped her losartan  She was also started on Eliquis  She also has been following with GI, and had a stricture dilated in her esophagus recently  She had a CT scan that suggested compression from the aortic arch, but the aortic arch was not enlarged  Her ascending aorta is at most mildly dilated  She was having some chest tightness with anxiety and exertion at our last visit, and I set her up for a stress nuclear study which was normal   Her blood pressure was also running high since stopping the losartan, but with adding this back in addition to her Toprol-XL her blood pressure is now under good control  Lluvia Alexis does continue to notice shortness of breath with exertion, but she still is active    Recently she did trip on her steps and fractured her ankle and is in a orthopedic boot  She continues to get some on and off atypical chest tightness with anxiety  She continues to have her GI related reflux symptoms  She has also noticed on and off palpitations  No chest pain or any symptoms of angina  No signs or symptoms of CHF  She has not had any palpitations recently since starting Toprol-XL  No lightheadedness or syncope        Patient Active Problem List   Diagnosis    Hiatal hernia    Gastroesophageal reflux disease    GERD with esophagitis    Hypokalemia    Benign hypertension    Mixed hyperlipidemia    Congenital hypothyroidism with diffuse goiter    Liver lesion, left lobe    Hemorrhoids    IBS (irritable bowel syndrome)    Osteopenia    Stress incontinence, female    Abnormal CT of liver    Class 2 severe obesity due to excess calories with serious comorbidity and body mass index (BMI) of 35 0 to 35 9 in adult (HCC)    BMI 35 0-35 9,adult    Dysphagia    Constipation    Anxiety    Rheumatoid arthritis (HCC)    Vertigo    History of hysterectomy    Parotid mass    Xerostomia    Epigastric pain    Otalgia, bilateral    Asthma    Persistent atrial fibrillation (HCC)    Hypercholesterolemia    Closed fracture of left ankle     Past Medical History:   Diagnosis Date    Anxiety     resolved 10/4/17    Asthma     seasonal    Atrial fibrillation ([facility] [address] ) 01/2022    newly diagnosed on eliquis    Chronic kidney disease     kidney stone    Colon polyp     Disease of thyroid gland     Dysphagia     GERD (gastroesophageal reflux disease)     Goiter, nodular     partial thyroidectomy    Hiatal hernia     repaired 2018    Hiatal hernia with GERD without esophagitis 04/2018    surgical correction    History of esophageal varices with bleeding     History of pernicious anemia     History of transfusion     x1 after bleeding ulcer    Hyperlipidemia     Hypertension     Irritable bowel syndrome     Neck mass     2 small areas [facility]    RA (rheumatoid arthritis) ([facility] [address] )     Seasonal allergies     Vertigo     Wears glasses     for reading     Social History     Socioeconomic History    Marital status: /Civil Union     Spouse name: Not on file    Number of children: Not on file    Years of education: Not on file    Highest education level: Not on file   Occupational History    Not on file   Tobacco Use    Smoking status: Never Smoker    Smokeless tobacco: Never Used   Vaping Use    Vaping Use: Never used   Substance and Sexual Activity    Alcohol use: Yes     Comment: seldom    Drug use: Never    Sexual activity: Not on file   Other Topics Concern    Not on file   Social History Narrative    Feels safe at home     Social Determinants of Health     Financial Resource Strain: Not on file   Food Insecurity: Not on file   Transportation Needs: Not on file   Physical Activity: Not on file   Stress: Not on file   Social Connections: Not on file   Intimate Partner Violence: Not on file   Housing Stability: Not on file      Family History   Problem Relation Age of Onset    Alzheimer's disease Mother     Cancer Mother         skin     Thyroid disease Mother     Ulcerative colitis Mother     Cancer Father         prostate    Stroke Father     Hypertension Father     Prostate cancer Father     Thyroid disease Sister     Ulcerative colitis Sister     Other Sister         open heart surgery    Heart disease Sister     Hyperlipidemia Brother     No Known Problems Son     No Known Problems Son     No Known Problems Maternal Grandmother     No Known Problems Paternal Grandmother     Mental illness Neg Hx      Past Surgical History:   Procedure Laterality Date    BREAST BIOPSY Bilateral     negative    CARDIAC CATHETERIZATION      x2 secondary to exertional chest pain, due to lung issues, possible mild COPD    CATARACT EXTRACTION Bilateral     CHOLECYSTECTOMY  2015 lap - last assessed 10/4/17    COLONOSCOPY      complete    ESOPHAGOGASTRODUODENOSCOPY N/A 1/23/2018    Procedure: ESOPHAGOGASTRODUODENOSCOPY (EGD); Surgeon: Dawn Huggins MD;  Location: [facility] GI LAB; Service: Gastroenterology    HYSTERECTOMY      partial - ovaries remain    LIPOMA RESECTION      right thigh    [place] ESOPHAGOSCOPY FLEXIBLE TRANSORAL WITH BIOPSY N/A 4/11/2018    Procedure: ESOPHAGOGASTRODUODENOSCOPY (EGD); Surgeon: Gareth Gustafson MD;  Location:  MAIN OR;  Service: Thoracic    NY LAP, REPAIR PARAESOPHAGEAL HERNIA, INCL FUNDOPLASTY W/ MESH N/A 4/11/2018    Procedure: REPAIR HERNIA PARAESOPHAGEAL  LAPAROSCOPIC,ELEONORA GASTROPLASTY, Jeanna Aakash FUNDOPLICATION;  Surgeon: Gareth Gustafson MD;  Location:  MAIN OR;  Service: Thoracic    NY XCAPSL CTRC RMVL INSJ IO LENS PROSTH W/O ECP Right 8/23/2021    Procedure: EXTRACTION EXTRACAPSULAR CATARACT PHACO INTRAOCULAR LENS (IOL); Surgeon: Emily Bazzi MD;  Location: [facility] MAIN OR;  Service: Ophthalmology    [place] XCAPSL CTRC RMVL INSJ IO LENS PROSTH W/O ECP Left 11/15/2021    Procedure: EXTRACTION EXTRACAPSULAR CATARACT PHACO INTRAOCULAR LENS (IOL);   Surgeon: Emily Bazzi MD;  Location: [facility] MAIN OR;  Service: Ophthalmology    SKIN BIOPSY Right     lump benign - last assessed 10/4/17    THYROIDECTOMY, PARTIAL  1977    TONSILLECTOMY         Current Outpatient Medications:     apixaban (Eliquis) 5 mg, Take 1 tablet (5 mg total) by mouth 2 (two) times a day (Patient taking differently: Take 5 mg by mouth 2 (two) times a day Last dose 2/21/22), Disp: 180 tablet, Rfl: 3    atorvastatin (LIPITOR) 20 mg tablet, TAKE 1 TABLET BY MOUTH  DAILY AT BEDTIME, Disp: 90 tablet, Rfl: 1    Calcium Carb-Cholecalciferol 600-1000 MG-UNIT CAPS, Take by mouth every morning gummy , Disp: , Rfl:     levothyroxine 50 mcg tablet, TAKE 1 TABLET BY MOUTH  DAILY, Disp: 90 tablet, Rfl: 1    losartan (COZAAR) 25 mg tablet, Take 1 tablet (25 mg total) by mouth daily, Disp: 90 tablet, Rfl: 3    metoprolol succinate (TOPROL-XL) 25 mg 24 hr tablet, Take 1 tablet (25 mg total) by mouth daily, Disp: 90 tablet, Rfl: 2    omeprazole (PriLOSEC) 40 MG capsule, TAKE 1 CAPSULE BY MOUTH  TWICE DAILY, Disp: 180 capsule, Rfl: 3    potassium chloride (Klor-Con) 10 mEq tablet, TAKE TAKE 1 TABLET BY MOUTH DAILY, Disp: 90 tablet, Rfl: 1    bumetanide (BUMEX) 1 mg tablet, TAKE 1 TABLET BY MOUTH  DAILY (Patient not taking: No sig reported), Disp: 90 tablet, Rfl: 3    traMADol (Ultram) 50 mg tablet, Take 1 tablet (50 mg total) by mouth every 6 (six) hours as needed for moderate pain (Patient not taking: Reported on 7/19/2022), Disp: 20 tablet, Rfl: 0  Allergies   Allergen Reactions    Orange (Diagnostic) - Food Allergy Anaphylaxis     Throat closes    Pantoprazole Headache    Penicillins Other (See Comments)     During allergy testing instructed to NEVER take PCN    Gluten Meal - Food Allergy      Following a gluten free diet on her own    Lactose - Food Allergy Diarrhea     Vitals:    07/19/22 0924   BP: 110/72   BP Location: Left arm   Patient Position: Sitting   Cuff Size: Standard   Pulse: 78   SpO2: 98%   Weight: 97 5 kg (215 lb)   Height: 5' 5" (1 651 m)       Labs:  Lab Results   Component Value Date     09/01/2016    K 3 6 01/31/2022    K 4 1 09/01/2016     01/31/2022     09/01/2016    CO2 28 01/31/2022    CO2 27 09/01/2016    BUN 10 01/31/2022    BUN 10 09/01/2016    CREATININE 0 78 01/31/2022    CREATININE 0 69 09/01/2016    CALCIUM 8 6 01/31/2022    CALCIUM 9 2 09/01/2016     Lab Results   Component Value Date    WBC 4 63 01/31/2022    WBC 4 8 07/20/2017    HGB 14 9 01/31/2022    HGB 9 6 (L) 07/20/2017    HCT 44 8 01/31/2022    HCT 29 2 (L) 07/20/2017    MCV 92 01/31/2022    MCV 84 9 07/20/2017     01/31/2022     07/20/2017     Lab Results   Component Value Date    CHOL 144 09/01/2016    TRIG 87 08/11/2021    TRIG 92 09/01/2016    HDL 55 08/11/2021    HDL 57 09/01/2016     Imaging:  ECG today shows atrial fibrillation with nonspecific T-wave changes  STRESS NUCLEAR (4/21/22):   Stress ECG: Arrhythmias during recovery: rare PVCs  The ECG was equivocal for ischemia  The ECG was not diagnostic due to pharmacological (vasodilator) stress  The stress ECG is equivocal for ischemia after pharmacologic vasodilation    Stress Function: Left ventricular function post-stress is normal  Post-stress ejection fraction is 59 %    Perfusion: There are no perfusion defects    Stress ECG: A Miguel protocol stress test was performed  The patient reached stage 2 0 of the protocol after exercising for 4 min and 5 sec and had a maximal HR of 151 bpm (102 % of MPHR) and 7 0 METS  The patient experienced no angina during the test  The test was stopped because the patient experienced dyspnea  The patient achieved the target heart rate  The patient reported dyspnea during the stress test  Onset of symptoms occurred at stage 2 of the protocol  Symptoms ended during recovery  Blood pressure demonstrated a normal response and heart rate demonstrated a normal response to stress  The patient's heart rate recovery was normal      Negative study for evidence of pharmacological induced ischemia or prior infarction  No major symptoms with vasodilation  Elevated baseline blood pressure of 150/100 noted          ECHO (1/2022):   Left Ventricle: Left ventricular cavity size is normal  Systolic function is normal   Estimated ejection fraction is 50-55%  Wall motion is normal  Wall thickness is mildly increased  Unable to assess diastolic function due to atrial fibrillation    Left Atrium: The atrium is severely dilated    Right Atrium: The atrium is mild to moderately dilated    Aortic Valve: There is trace regurgitation    Mitral Valve: There is mild regurgitation    Tricuspid Valve: There is moderate regurgitation   The right ventricular systolic pressure is mildly elevated at 46 mm Hg     Pulmonic Valve: There is mild regurgitation    Aorta: Proximal ascending aorta is mildly dilated with a diameter of 3 8 cm  (indexed to BSA= 1 8 cm/m2) Consider other imaging modalities, including CTA for more accurate estimation and to rule out other aortic pathologies        Review of Systems:  Review of Systems   Constitutional: Negative  HENT: Negative  Eyes: Negative  Respiratory: Positive for chest tightness and shortness of breath  Cardiovascular: Positive for palpitations and leg swelling  Gastrointestinal: Negative  Musculoskeletal: Negative  Skin: Negative  Allergic/Immunologic: Negative  Neurological: Negative  Hematological: Negative  Psychiatric/Behavioral: Negative  All other systems reviewed and are negative  Vitals:    07/19/22 0924   BP: 110/72   BP Location: Left arm   Patient Position: Sitting   Cuff Size: Standard   Pulse: 78   SpO2: 98%   Weight: 97 5 kg (215 lb)   Height: 5' 5" (1 651 m)       Physical Exam:  Physical Exam  Vitals and nursing note reviewed  Constitutional:       Appearance: She is well-developed  HENT:      Head: Normocephalic and atraumatic  Eyes:      General: No scleral icterus  Right eye: No discharge  Left eye: No discharge  Pupils: Pupils are equal, round, and reactive to light  Neck:      Thyroid: No thyromegaly  Vascular: No JVD  Cardiovascular:      Rate and Rhythm: Normal rate  Rhythm irregularly irregular  Pulses: Normal pulses  No decreased pulses  Heart sounds: Normal heart sounds, S1 normal and S2 normal  No murmur heard  No friction rub  No gallop  Pulmonary:      Effort: Pulmonary effort is normal  No respiratory distress  Breath sounds: Normal breath sounds  No wheezing, rhonchi or rales  Abdominal:      General: Bowel sounds are normal  There is no distension  Palpations: Abdomen is soft  Tenderness: There is no abdominal tenderness  Musculoskeletal:         General: No tenderness or deformity  Normal range of motion  Cervical back: Normal range of motion and neck supple  Skin:     General: Skin is warm and dry  Findings: No rash  Neurological:      Mental Status: She is alert and oriented to person, place, and time  Cranial Nerves: No cranial nerve deficit  Psychiatric:         Thought Content: Thought content normal          Judgment: Judgment normal        Counseling / Coordination of Care  Total office time spent today 40 minutes  Greater than 50% of total time was spent with the patient and / or family counseling and / or coordination of care

## 2022-07-19 NOTE — PATIENT INSTRUCTIONS
A-fib (Atrial Fibrillation)   AMBULATORY CARE:   Atrial fibrillation (A-fib)  is an irregular heartbeat  It reduces your heart's ability to pump blood through your body  A-fib may come and go, or it may be a long-term condition  A-fib can cause life-threatening blood clots, stroke, or heart failure  It is important to treat and manage A-fib to help prevent these problems  Common signs and symptoms include the following:   A heartbeat that races, pounds, or flutters    Weakness, severe tiredness, or confusion    Feeling lightheaded, sweaty, dizzy, or faint    Shortness of breath or anxiety    Chest pain or pressure    Call your local emergency number (911 in the [address] [facility]) or have someone call if:   You have any of the following signs of a heart attack:      Squeezing, pressure, or pain in your chest    You may  also have any of the following:     Discomfort or pain in your back, neck, jaw, stomach, or arm    Shortness of breath    Nausea or vomiting    Lightheadedness or a sudden cold sweat    You have any of the following signs of a stroke:      Numbness or drooping on one side of your face     Weakness in an arm or leg    Confusion or difficulty speaking    Dizziness, a severe headache, or vision loss    Call your doctor or cardiologist if:   Your arm or leg feels warm, tender, and painful  It may look swollen and red  Your heart rate is more than 110 beats per minute  You have new or worsening swelling in your legs, feet, ankles, or abdomen  You are short of breath, even at rest     You have questions or concerns about your condition or care  Treatment for A-fib:  Conditions that cause A-fib, such as thyroid disease, will be treated  You may also need any of the following:  Heart medicines  help control your heart rate or rhythm  You may need more than one medicine to treat your symptoms  Antiplatelet or blood thinner medicines  help prevent blood clots and stroke      Cardioversion  is a procedure to return your heart rate and rhythm to normal  It can be done using medicines or electric shock  A-fib ablation  is a procedure that uses energy to burn a small area of heart tissue  This creates scar tissue and prevents electrical signals that cause A-fib  You may need this procedure more than once  Ask for more information on A-fib ablation  A pacemaker  may be inserted into your heart  A pacemaker is a device that controls your heartbeat  A pacemaker may be inserted during an ablation procedure or surgery  Ask your healthcare provider for more information on pacemakers  Surgery  may be needed if other procedures do not work  During surgery your healthcare provider will make cuts in the upper part of your heart  The provider will stitch the cuts together to create scar tissue  The scar tissue will prevent electrical signals that cause A-fib  Manage A-fib:   Know your target heart rate  Learn how to check your pulse and monitor your heart rate  Know the risks if you choose to drink alcohol  Alcohol can increase your risk for A-fib or make A-fib harder to manage  Ask your healthcare provider if it is okay for you to drink any alcohol  He or she can help you set limits for the number of drinks you have in 24 hours and in a week  A drink of alcohol is 12 ounces of beer, 5 ounces of wine, or 1½ ounces of liquor  Do not smoke  Nicotine can cause heart damage and make it more difficult to manage your A-fib  Do not use e-cigarettes or smokeless tobacco in place of cigarettes or to help you quit  They still contain nicotine  Ask your healthcare provider for information if you currently smoke and need help quitting  Eat heart-healthy foods  Heart healthy foods will help keep your cholesterol low  These include fruits, vegetables, whole-grain breads, low-fat dairy products, beans, lean meats, and fish  Replace butter and margarine with heart-healthy oils such as olive oil and canola oil  Maintain a healthy weight  Ask your healthcare provider what a healthy weight is for you  Ask him or her to help you create a safe weight loss plan if you are overweight  Even a small goal of a 10% weight loss can improve your heart health  Get regular physical activity  Physical activity helps improve your heart health  Get at least 150 minutes of moderate aerobic physical activity each week  Your healthcare provider can help you create an activity plan  Manage other health conditions  This includes high blood pressure or cholesterol, sleep apnea, diabetes, and other heart conditions  Take medicine as directed and follow your treatment plan  Your healthcare provider may need to change a medicine you are taking if it is causing your A-fib  Do not  stop taking any medicine unless directed by your provider  Follow up with your doctor or cardiologist as directed: You will need regular blood tests and monitoring  Write down your questions so you remember to ask them during your visits  © Copyright [address] 2022 Information is for End User's use only and may not be sold, redistributed or otherwise used for commercial purposes  All illustrations and images included in CareNotes® are the copyrighted property of A D A M , Inc  or [facility] [employer]   The above information is an  only  It is not intended as medical advice for individual conditions or treatments  Talk to your doctor, nurse or pharmacist before following any medical regimen to see if it is safe and effective for you

## 2022-07-27 ENCOUNTER — OFFICE VISIT (OUTPATIENT)
Dept: OBGYN CLINIC | Facility: CLINIC | Age: 72
End: 2022-07-27
Payer: MEDICARE

## 2022-07-27 ENCOUNTER — APPOINTMENT (OUTPATIENT)
Dept: RADIOLOGY | Facility: AMBULARY SURGERY CENTER | Age: 72
End: 2022-07-27
Attending: ORTHOPAEDIC SURGERY
Payer: MEDICARE

## 2022-07-27 VITALS — HEIGHT: 65 IN | BODY MASS INDEX: 35.82 KG/M2 | WEIGHT: 215 LBS

## 2022-07-27 DIAGNOSIS — S82.832A OTHER CLOSED FRACTURE OF DISTAL END OF LEFT FIBULA, INITIAL ENCOUNTER: ICD-10-CM

## 2022-07-27 DIAGNOSIS — S82.832A OTHER CLOSED FRACTURE OF DISTAL END OF LEFT FIBULA, INITIAL ENCOUNTER: Primary | ICD-10-CM

## 2022-07-27 PROCEDURE — 73610 X-RAY EXAM OF ANKLE: CPT

## 2022-07-27 PROCEDURE — 99213 OFFICE O/P EST LOW 20 MIN: CPT | Performed by: ORTHOPAEDIC SURGERY

## 2022-07-27 NOTE — PATIENT INSTRUCTIONS
Weightbearing as tolerated in CAM boot  Do not need to wear the boot for sleep or showering but should wear it any time you are walking on it  Recommend taking the following supplements: Vitamin D3- 4000 units per day and Calcium 1200 mg per day  This will help with bone healing  Avoid NSAIDs like Motrin/Aleve/Ibuprofen  Avoid steroids/nicotine products/ rheumatoid medications like DMARDs if possible      Continue Eliquis as prescribed by Cardiology    Knee high compression stocking 20-30mm HG of pressure

## 2022-07-27 NOTE — PROGRESS NOTES
SREEDHAR Hope  Attending, Orthopaedic Surgery  [facility] and [address] [facility] [address] [facility]      ORTHOPAEDIC FOOT AND ANKLE CLINIC VISIT     Assessment:     Encounter Diagnosis   Name Primary?  Other closed fracture of distal end of left fibula, initial encounter Yes     DOI 7/2/22       Plan:   · The patient verbalized understanding of exam findings and treatment plan  We engaged in the shared decision-making process and treatment options were discussed at length with the patient  Surgical and conservative management discussed today along with risks and benefits  · She is 4 weeks out from her injury, XR is stable  · She will continue the boot, WBAT  · She is on eliquis  · Continue compression stocking, ice and elevation  · Continue calcium and vitamin D  Return in about 3 weeks (around 8/17/2022)  WB XR left ankle      History of Present Illness:   Chief Complaint:   Chief Complaint   Patient presents with    Left Ankle - Follow-up     Hardeep Moseley is a 67 y o  female who is being seen in follow-up for left ankle lateral malleolus fracture  When we last saw she we recommended WBAT in CAM boot  Pain has improved  Residual pain is localized at lateral ankle with minimal radiating and described as sharp and severe        Pain/symptom timing:  Worse during the day when active  Pain/symptom context:  Worse with activites and work  Pain/symptom modifying factors:  Rest makes better, activities make worse  Pain/symptom associated signs/symptoms: none    Prior treatment   · NSAIDsNo   · Injections No   · Bracing/Orthotics Yes    · Physical Therapy No     Orthopedic Surgical History:   None    Past Medical, Surgical and Social History:  Past Medical History:  has a past medical history of Anxiety, Asthma, Atrial fibrillation ([facility] [address] ) (01/2022), Chronic kidney disease, Colon polyp, Disease of thyroid gland, Dysphagia, GERD (gastroesophageal reflux disease), Goiter, nodular, Hiatal hernia, Hiatal hernia with GERD without esophagitis (04/2018), History of esophageal varices with bleeding, History of pernicious anemia, History of transfusion, Hyperlipidemia, Hypertension, Irritable bowel syndrome, Neck mass, RA (rheumatoid arthritis) ([facility] [address] ), Seasonal allergies, Vertigo, and Wears glasses  Problem List: does not have any pertinent problems on file  Past Surgical History:  has a past surgical history that includes Skin biopsy (Right); Tonsillectomy; Cholecystectomy (2015); Esophagogastroduodenoscopy (N/A, 1/23/2018); pr lap, repair paraesophageal hernia, incl fundoplasty w/ mesh (N/A, 4/11/2018); pr esophagoscopy flexible transoral with biopsy (N/A, 4/11/2018); Colonoscopy; Lipoma resection; Breast biopsy (Bilateral); Hysterectomy; Thyroidectomy, partial (1977); Cardiac catheterization; pr xcapsl ctrc rmvl insj io lens prosth w/o ecp (Right, 8/23/2021); pr xcapsl ctrc rmvl insj io lens prosth w/o ecp (Left, 11/15/2021); and Cataract extraction (Bilateral)  Family History: family history includes Alzheimer's disease in her mother; Cancer in her father and mother; Heart disease in her sister; Hyperlipidemia in her brother; Hypertension in her father; No Known Problems in her maternal grandmother, paternal grandmother, son, and son; Other in her sister; Prostate cancer in her father; Stroke in her father; Thyroid disease in her mother and sister; Ulcerative colitis in her mother and sister  Social History:  reports that she has never smoked  She has never used smokeless tobacco  She reports current alcohol use  She reports that she does not use drugs  Current Medications: has a current medication list which includes the following prescription(s): atorvastatin, calcium carb-cholecalciferol, levothyroxine, losartan, metoprolol succinate, omeprazole, potassium chloride, apixaban, bumetanide, and tramadol    Allergies: is allergic to orange (diagnostic) - food allergy, pantoprazole, penicillins, gluten meal - food allergy, and lactose - food allergy  Review of Systems:  General- denies fever/chills  HEENT- denies hearing loss or sore throat  Eyes- denies eye pain or visual disturbances, denies red eyes  Respiratory- denies cough or SOB  Cardio- denies chest pain or palpitations  GI- denies abdominal pain  Endocrine- denies urinary frequency  Urinary- denies pain with urination  Musculoskeletal- Negative except noted above  Skin- denies rashes or wounds  Neurological- denies dizziness or headache  Psychiatric- denies anxiety or difficulty concentrating    Physical Exam:   Ht 5' 5" (1 651 m)   Wt 97 5 kg (215 lb)   BMI 35 78 kg/m²   General/Constitutional: No apparent distress: well-nourished and well developed  Eyes: normal ocular motion  Lymphatic: No appreciable lymphadenopathy  Respiratory: Non-labored breathing  Vascular: No edema, swelling or tenderness, except as noted in detailed exam   Integumentary: No impressive skin lesions present, except as noted in detailed exam   Neuro: No ataxia or tremors noted  Psych: Normal mood and affect, oriented to person, place and time  Appropriate affect  Musculoskeletal: Normal, except as noted in detailed exam and in HPI  Examination    Left    Gait Antalgic   Musculoskeletal Tender to palpation at lateral malleolus    Skin Normal       Nails Normal    Range of Motion  Not assessed due to fracture    Stability Stable    Muscle Strength   5/5 EHL  5/5 FHL    Neurologic Normal    Sensation  Intact to light touch throughout sural, saphenous, superficial peroneal, deep peroneal and medial/lateral plantar nerve distributions  [place]-Brandon 5 07 filament (10g) testing  deferred  Cardiovascular Brisk capillary refill < 2 seconds,intact DP and PT pulses    Special Tests None      Imaging Studies:       3 views of the Left ankle were obtained, reviewed and interpreted independently which demonstrate stable alignment of the fracture without displacement   Reviewed by me personally  Scribe Attestation    I,:  Patricia Abbott PA-C am acting as a scribe while in the presence of the attending physician :       I,:  Stephanie Plunkett MD personally performed the services described in this documentation    as scribed in my presence :               Celedonio Lemmings Lachman, MD  Foot & Ankle Surgery   Department of [address] [place] [address]      I personally performed the service  Celedonio Lemmings Lachman, MD

## 2022-08-08 ENCOUNTER — TELEPHONE (OUTPATIENT)
Dept: FAMILY MEDICINE CLINIC | Facility: CLINIC | Age: 72
End: 2022-08-08

## 2022-08-08 DIAGNOSIS — E78.2 MIXED HYPERLIPIDEMIA: ICD-10-CM

## 2022-08-08 DIAGNOSIS — I10 BENIGN HYPERTENSION: Primary | ICD-10-CM

## 2022-08-08 DIAGNOSIS — E03.0 CONGENITAL HYPOTHYROIDISM WITH DIFFUSE GOITER: ICD-10-CM

## 2022-08-08 DIAGNOSIS — I48.19 PERSISTENT ATRIAL FIBRILLATION (HCC): ICD-10-CM

## 2022-08-08 DIAGNOSIS — Z13.6 SCREENING FOR CARDIOVASCULAR CONDITION: ICD-10-CM

## 2022-08-16 DIAGNOSIS — I10 BENIGN HYPERTENSION: ICD-10-CM

## 2022-08-17 RX ORDER — BUMETANIDE 1 MG/1
TABLET ORAL
Qty: 90 TABLET | Refills: 1 | Status: SHIPPED | OUTPATIENT
Start: 2022-08-17

## 2022-08-18 ENCOUNTER — EVALUATION (OUTPATIENT)
Dept: PHYSICAL THERAPY | Facility: CLINIC | Age: 72
End: 2022-08-18
Payer: MEDICARE

## 2022-08-18 DIAGNOSIS — S82.832D CLOSED FRACTURE OF DISTAL END OF LEFT FIBULA WITH ROUTINE HEALING, UNSPECIFIED FRACTURE MORPHOLOGY, SUBSEQUENT ENCOUNTER: Primary | ICD-10-CM

## 2022-08-18 PROCEDURE — 97161 PT EVAL LOW COMPLEX 20 MIN: CPT | Performed by: PHYSICAL THERAPIST

## 2022-08-18 NOTE — PROGRESS NOTES
PT Evaluation     Today's date: 2022  Patient name: Aldo Wu  : 1950  MRN: [id number]  Referring provider: Tammy Gaitan PA-C  Dx:   Encounter Diagnosis     ICD-10-CM    1  Closed fracture of distal end of left fibula with routine healing, unspecified fracture morphology, subsequent encounter  [id number]                   Assessment  Assessment details: Aldo Wu is a 67 y o  female who presents to physical therapy with pain, decreased LE range of motion, decreased LE strength, impaired function, decreased activity tolerance, poor balance, swelling  and poor body mechanics  Patient's clinical presentation is consistent with their referring diagnosis of Closed fracture of distal end of left fibula with routine healing, unspecified fracture morphology, subsequent encounter  (primary encounter diagnosis)  The pt presents with functional limitations of ADLs, recreational activities, participation in social activities, ambulation, performing household chores and stair negotiation  Pt would benefit from physical therapy services to address these limitations and maximize function  Pt was instructed and educated on gait training and home exercise program today and demonstrates understanding  Impairments: abnormal gait, abnormal muscle firing, abnormal muscle tone, abnormal or restricted ROM, abnormal movement, activity intolerance, impaired balance, impaired physical strength, lacks appropriate home exercise program, pain with function, weight-bearing intolerance, poor posture  and poor body mechanics  Understanding of Dx/Px/POC: good   Prognosis: good    Goals  Short term goals  (3 weeks)  1  Patient will have no pain left ankle  2   Patient will have full range of motion left ankle  3  Patient will report a 50% improvement with activities of daily living   4  Patient will decrease girth of left ankle by 1 cm  Long term goals - (6 weeks)  1    Patient will be independent with home exercise program  2  Patient will have no gait deviations ambulating on level surfaces  3   Patient will report 80 % improvement with activity of daily living function  4   Patient will negotiate stairs up and down pain free with use of one railing  5  Patient SLS to be equal bilaterally      Plan  Patient would benefit from: PT eval and skilled physical therapy  Planned modality interventions: thermotherapy: hydrocollator packs and cryotherapy  Planned therapy interventions: ADL training, balance/weight bearing training, joint mobilization, manual therapy, massage, neuromuscular re-education, patient education, postural training, strengthening, stretching, functional ROM exercises, therapeutic activities, therapeutic exercise, gait training and home exercise program  Frequency: 2x week  Duration in visits: 12  Duration in weeks: 6  Treatment plan discussed with: patient        Subjective Evaluation    History of Present Illness  Date of surgery: 2022  Mechanism of injury: In early July she fell going up stairs  She went to the ER  An x-ray revealed distal fibula fracture  She followed up with Dr Angel Rivero  She was given a cam boot  She has been referred to PT  Pain  Current pain ratin  At best pain ratin  At worst pain rating: 10  Quality: burning, sharp and dull ache  Relieving factors: ice and medications  Aggravating factors: standing and walking    Social Support    Employment status: not working  Exercise history: House work    Patient Goals  Patient goals for therapy: decreased pain and independence with ADLs/IADLs  Patient's goals regarding treatment: walk again normally  Objective     Palpation   Left   Hypertonic in the anterior tibialis, lateral gastrocnemius, medial gastrocnemius, peroneus, posterior tibialis and soleus  Tenderness of the anterior tibialis, lateral gastrocnemius, medial gastrocnemius, peroneus, posterior tibialis and soleus       Tenderness   Left Ankle/Foot Tenderness in the lateral malleolus  Active Range of Motion   Left Ankle/Foot   Dorsiflexion (ke): -22 degrees   Plantar flexion: 24 degrees   Inversion: 10 degrees   Eversion: 8 degrees     Right Ankle/Foot   Dorsiflexion (ke): -25 degrees   Plantar flexion: 51 degrees   Inversion: 36 degrees   Eversion: 11 degrees     Strength/Myotome Testing     Left Ankle/Foot   Dorsiflexion: 3-  Plantar flexion: 3-  Inversion: 3-  Eversion: 3-    Right Ankle/Foot   Dorsiflexion: 5  Plantar flexion: 5  Inversion: 5  Eversion: 5    Swelling   Left Ankle/Foot   Figure 8: 55 8 cm    Right Ankle/Foot   Figure 8: 56 7 cm    Ambulation   Weight-Bearing Status   Weight-Bearing Status (Left): weight-bearing as tolerated   Assistive device used: front-wheeled walker and single point cane    Additional Weight-Bearing Status Details  She is WBAT with cam boot on  Observational Gait   Gait: antalgic   Decreased left stance time  Eval/ Re-eval Auth #/ Referral # Total visits Start date  Expiration date Total active units  Total manual units  PT only or  PT+OT?    8/18/22                                     Date: 8/18          Total units authorized  Active:            Manual:           Total units remaining  Active:               Manual:                Date:           Total units authorized  Active:            Manual:           Total units remaining  Active:               Manual:               Precautions: Asthma, Osteopenia, Afib    Specialty Daily Treatment Diary       Manuals 8/18/22       Visit # 1       STM Gastroc/ soleus/ Tib post        PROM ankle        Stretch soleus gastroc                Warm-up        NuStep        Neuro Re-Ed        Wt shift        Towel stretch        ABCs                                        Ther Ex        Knee ext w df        Rockerboard        Towel scrunch        HR/ TR seated        4 way AROM        SLR                Ther Activity                        Gait Training        w cane                Modalities        CP

## 2022-08-22 ENCOUNTER — OFFICE VISIT (OUTPATIENT)
Dept: PHYSICAL THERAPY | Facility: CLINIC | Age: 72
End: 2022-08-22
Payer: MEDICARE

## 2022-08-22 DIAGNOSIS — S82.832D CLOSED FRACTURE OF DISTAL END OF LEFT FIBULA WITH ROUTINE HEALING, UNSPECIFIED FRACTURE MORPHOLOGY, SUBSEQUENT ENCOUNTER: Primary | ICD-10-CM

## 2022-08-22 PROCEDURE — 97140 MANUAL THERAPY 1/> REGIONS: CPT | Performed by: PHYSICAL THERAPIST

## 2022-08-22 PROCEDURE — 97110 THERAPEUTIC EXERCISES: CPT | Performed by: PHYSICAL THERAPIST

## 2022-08-22 NOTE — PROGRESS NOTES
Daily Note     Today's date: 2022  Patient name: Tre Miramontes  : 1950  MRN: [id number]  Referring provider: Isaak Munoz PA-C  Dx:   Encounter Diagnosis     ICD-10-CM    1  Closed fracture of distal end of left fibula with routine healing, unspecified fracture morphology, subsequent encounter  S85 [id number]                   Subjective:  Pain left ankle is 2/10  Objective: See treatment diary below      Assessment: Tolerated treatment well  Patient would benefit from continued PT  Morene Esdras has some increased soreness with active or passive inversion  Plan: Continue per plan of care  Eval/ Re-eval Auth #/ Referral # Total visits Start date  Expiration date Total active units  Total manual units  PT only or  PT+OT? 22                                     Date:          Total units authorized  Active:            Manual:           Total units remaining  Active:               Manual:                Date:           Total units authorized  Active:            Manual:           Total units remaining  Active:               Manual:               Precautions: Asthma, Osteopenia, Afib    Specialty Daily Treatment Diary       Manuals 22      Visit # 1       STM Gastroc/ soleus/ Tib post        PROM ankle  5'      Stretch soleus gastroc  2'              Warm-up        NuStep  6 min      Neuro Re-Ed        Wt shift        Towel stretch  Digit flex / ext AROM  20      ABCs                                        Ther Ex        Knee ext w df  20      Rockerboard  20 ea      Towel scrunch        HR/ TR seated  20 TR      4 way AROM  20 ea      SLR  20              Ther Activity                        Gait Training        w cane                Modalities        CP  5 min                 Access Code: [email or web address]  URL: [email or web address]  Date: 2022  Prepared by:  Marilin Early    Exercises  · Seated Toe Curl - 1 x daily - 2 sets - 10 reps  · Supine Ankle Pumps - 1 x daily - 2 sets - 10 reps  · Supine Ankle Inversion Eversion AROM - 1 x daily - 2 sets - 10 reps  · Seated Toe Raise - 1 x daily - 2 sets - 10 reps  · Seated Long Arc Quad - 1 x daily - 2 sets - 10 reps  · Active Straight Leg Raise with Quad Set - 1 x daily - 21 sets - 10 reps

## 2022-08-23 ENCOUNTER — LAB (OUTPATIENT)
Dept: LAB | Facility: HOSPITAL | Age: 72
End: 2022-08-23
Payer: MEDICARE

## 2022-08-23 DIAGNOSIS — Z13.6 SCREENING FOR CARDIOVASCULAR CONDITION: ICD-10-CM

## 2022-08-23 DIAGNOSIS — I10 BENIGN HYPERTENSION: ICD-10-CM

## 2022-08-23 DIAGNOSIS — E03.0 CONGENITAL HYPOTHYROIDISM WITH DIFFUSE GOITER: ICD-10-CM

## 2022-08-23 LAB
ALBUMIN SERPL BCP-MCNC: 3.7 G/DL (ref 3.5–5)
ALP SERPL-CCNC: 57 U/L (ref 46–116)
ALT SERPL W P-5'-P-CCNC: 22 U/L (ref 12–78)
ANION GAP SERPL CALCULATED.3IONS-SCNC: 9 MMOL/L (ref 4–13)
AST SERPL W P-5'-P-CCNC: 23 U/L (ref 5–45)
BILIRUB SERPL-MCNC: 0.98 MG/DL (ref 0.2–1)
BUN SERPL-MCNC: 13 MG/DL (ref 5–25)
CALCIUM SERPL-MCNC: 8.8 MG/DL (ref 8.3–10.1)
CHLORIDE SERPL-SCNC: 104 MMOL/L (ref 96–108)
CHOLEST SERPL-MCNC: 145 MG/DL
CO2 SERPL-SCNC: 30 MMOL/L (ref 21–32)
CREAT SERPL-MCNC: 0.81 MG/DL (ref 0.6–1.3)
GFR SERPL CREATININE-BSD FRML MDRD: 72 ML/MIN/1.73SQ M
GLUCOSE P FAST SERPL-MCNC: 99 MG/DL (ref 65–99)
HDLC SERPL-MCNC: 60 MG/DL
LDLC SERPL CALC-MCNC: 71 MG/DL (ref 0–100)
POTASSIUM SERPL-SCNC: 3.9 MMOL/L (ref 3.5–5.3)
PROT SERPL-MCNC: 6.9 G/DL (ref 6.4–8.4)
SODIUM SERPL-SCNC: 143 MMOL/L (ref 135–147)
TRIGL SERPL-MCNC: 71 MG/DL
TSH SERPL DL<=0.05 MIU/L-ACNC: 0.76 UIU/ML (ref 0.45–4.5)

## 2022-08-23 PROCEDURE — 80053 COMPREHEN METABOLIC PANEL: CPT

## 2022-08-23 PROCEDURE — 84443 ASSAY THYROID STIM HORMONE: CPT

## 2022-08-23 PROCEDURE — 36415 COLL VENOUS BLD VENIPUNCTURE: CPT

## 2022-08-23 PROCEDURE — 80061 LIPID PANEL: CPT

## 2022-08-24 ENCOUNTER — OFFICE VISIT (OUTPATIENT)
Dept: OBGYN CLINIC | Facility: CLINIC | Age: 72
End: 2022-08-24
Payer: MEDICARE

## 2022-08-24 ENCOUNTER — APPOINTMENT (OUTPATIENT)
Dept: RADIOLOGY | Facility: AMBULARY SURGERY CENTER | Age: 72
End: 2022-08-24
Attending: ORTHOPAEDIC SURGERY
Payer: MEDICARE

## 2022-08-24 VITALS
HEIGHT: 65 IN | WEIGHT: 215 LBS | BODY MASS INDEX: 35.82 KG/M2 | SYSTOLIC BLOOD PRESSURE: 121 MMHG | DIASTOLIC BLOOD PRESSURE: 88 MMHG | HEART RATE: 66 BPM

## 2022-08-24 DIAGNOSIS — S82.832A OTHER CLOSED FRACTURE OF DISTAL END OF LEFT FIBULA, INITIAL ENCOUNTER: Primary | ICD-10-CM

## 2022-08-24 DIAGNOSIS — S82.832A OTHER CLOSED FRACTURE OF DISTAL END OF LEFT FIBULA, INITIAL ENCOUNTER: ICD-10-CM

## 2022-08-24 PROCEDURE — 73610 X-RAY EXAM OF ANKLE: CPT

## 2022-08-24 PROCEDURE — 99213 OFFICE O/P EST LOW 20 MIN: CPT | Performed by: ORTHOPAEDIC SURGERY

## 2022-08-24 NOTE — PROGRESS NOTES
SREEDHAR Jade  Attending, Orthopaedic Surgery  Foot [facility] [address] [facility] [address] [facility]      ORTHOPAEDIC FOOT AND ANKLE CLINIC VISIT     Assessment:     Encounter Diagnosis   Name Primary?  Other closed fracture of distal end of left fibula, initial encounter Yes       DOI 7/2/22     Plan:   · The patient verbalized understanding of exam findings and treatment plan  We engaged in the shared decision-making process and treatment options were discussed at length with the patient  Surgical and conservative management discussed today along with risks and benefits  · Her XR shows healing and is stable  · She is on eliquis  · She will wean the boot, protocol given  · Continue PT  · Continue vitamin D and calcium  · We will see her back in 2 months with repeat XR  Return in about 2 months (around 10/24/2022)  History of Present Illness:   Chief Complaint:   Chief Complaint   Patient presents with    Left Ankle - Follow-up     Shabana Abbasi is a 67 y o  female who is being seen in follow-up for left lateral malleolus  When we last saw she we recommended WBAT in the CAM boot  Pain has  improved  She is able to ambulate in the boot   She is on elequis     Pain/symptom timing:  Worse during the day when active  Pain/symptom context:  Worse with activites and work  Pain/symptom modifying factors:  Rest makes better, activities make worse  Pain/symptom associated signs/symptoms: none    Prior treatment   · NSAIDsNo   · Injections No   · Bracing/Orthotics Yes    · Physical Therapy No     Orthopedic Surgical History:   See below    Past Medical, Surgical and Social History:  Past Medical History:  has a past medical history of Anxiety, Asthma, Atrial fibrillation ([facility] [address] ) (01/2022), Chronic kidney disease, Colon polyp, Disease of thyroid gland, Dysphagia, GERD (gastroesophageal reflux disease), Goiter, nodular, Hiatal hernia, Hiatal hernia with GERD without esophagitis (04/2018), History of esophageal varices with bleeding, History of pernicious anemia, History of transfusion, Hyperlipidemia, Hypertension, Irritable bowel syndrome, Neck mass, RA (rheumatoid arthritis) ([facility] [address] ), Seasonal allergies, Vertigo, and Wears glasses  Problem List: does not have any pertinent problems on file  Past Surgical History:  has a past surgical history that includes Skin biopsy (Right); Tonsillectomy; Cholecystectomy (2015); Esophagogastroduodenoscopy (N/A, 1/23/2018); pr lap, repair paraesophageal hernia, incl fundoplasty w/ mesh (N/A, 4/11/2018); pr esophagoscopy flexible transoral with biopsy (N/A, 4/11/2018); Colonoscopy; Lipoma resection; Breast biopsy (Bilateral); Hysterectomy; Thyroidectomy, partial (1977); Cardiac catheterization; pr xcapsl ctrc rmvl insj io lens prosth w/o ecp (Right, 8/23/2021); pr xcapsl ctrc rmvl insj io lens prosth w/o ecp (Left, 11/15/2021); and Cataract extraction (Bilateral)  Family History: family history includes Alzheimer's disease in her mother; Cancer in her father and mother; Heart disease in her sister; Hyperlipidemia in her brother; Hypertension in her father; No Known Problems in her maternal grandmother, paternal grandmother, son, and son; Other in her sister; Prostate cancer in her father; Stroke in her father; Thyroid disease in her mother and sister; Ulcerative colitis in her mother and sister  Social History:  reports that she has never smoked  She has never used smokeless tobacco  She reports current alcohol use  She reports that she does not use drugs  Current Medications: has a current medication list which includes the following prescription(s): atorvastatin, bumetanide, calcium carb-cholecalciferol, levothyroxine, losartan, metoprolol succinate, omeprazole, potassium chloride, tramadol, and apixaban  Allergies: is allergic to orange (diagnostic) - food allergy, pantoprazole, penicillins, gluten meal - food allergy, and lactose - food allergy       Review of Systems:  General- denies fever/chills  HEENT- denies hearing loss or sore throat  Eyes- denies eye pain or visual disturbances, denies red eyes  Respiratory- denies cough or SOB  Cardio- denies chest pain or palpitations  GI- denies abdominal pain  Endocrine- denies urinary frequency  Urinary- denies pain with urination  Musculoskeletal- Negative except noted above  Skin- denies rashes or wounds  Neurological- denies dizziness or headache  Psychiatric- denies anxiety or difficulty concentrating    Physical Exam:   /88 (BP Location: Left arm, Patient Position: Sitting, Cuff Size: Adult)   Pulse 66   Ht 5' 5" (1 651 m)   Wt 97 5 kg (215 lb)   BMI 35 78 kg/m²   General/Constitutional: No apparent distress: well-nourished and well developed  Eyes: normal ocular motion  Lymphatic: No appreciable lymphadenopathy  Respiratory: Non-labored breathing  Vascular: No edema, swelling or tenderness, except as noted in detailed exam   Integumentary: No impressive skin lesions present, except as noted in detailed exam   Neuro: No ataxia or tremors noted  Psych: Normal mood and affect, oriented to person, place and time  Appropriate affect  Musculoskeletal: Normal, except as noted in detailed exam and in HPI  Examination    Left    Gait Not assessed   Musculoskeletal No ttp    Skin Normal       Nails Normal    Range of Motion  10 degrees dorsiflexion, 30 degrees plantarflexion  Subtalar motion: not assessed    Stability Stable    Muscle Strength   5/5 EHL  5/5 FHL    Neurologic Normal    Sensation  Intact to light touch throughout sural, saphenous, superficial peroneal, deep peroneal and medial/lateral plantar nerve distributions  [place]-Brandon 5 07 filament (10g) testing  deferred      Cardiovascular Brisk capillary refill < 2 seconds,intact DP and PT pulses    Special Tests None      Imaging Studies:     3 views of the Left ankle were obtained, reviewed and interpreted independently which demonstrate stable alignment of the fracture with healing at the fracture site  Reviewed by me personally  Scribe Attestation    I,:  Gisela Oliva PA-C am acting as a scribe while in the presence of the attending physician :       I,:  Lexi Moreno MD personally performed the services described in this documentation    as scribed in my presence :               Shelvy Auer Lachman, MD  Foot & Ankle Surgery   Department of [address] [place] [address]      I personally performed the service  Shelvy Auer Lachman, MD

## 2022-08-24 NOTE — PATIENT INSTRUCTIONS
You may begin weaning your boot and transitioning to a sneaker (today)  It is important to do this gradually to avoid aggravating the healing process  Today, you may come out of the boot into a sneaker for 1 hours  2  Tomorrow, you may come out of the boot into a sneaker for 2 hours,  3  The next day, you may come out of the boot into a sneaker for 3 hours  4  Continue this (adding 2 hours per day) as you tolerate  For example, if you do 3 hours out of the boot into a sneaker and your foot swells more than usual at night and it is difficult to control the discomfort, do not advance to 4 hours the next day, stay at 3 hours until you are able to tolerate it  It is essential to follow this protocol and not modify this  No matter when you begin weaning the boot, it will be difficult and there will be swelling and soreness  If you spend more time than is recommended in the boot, this process becomes longer and more painful  Elevation, Ice and tylenol and staying off of it at night will be important to aide in this transition out of the boot  Swelling and soreness are normal as you begin to do more with the injured leg  Continue PT  Compression stocking (Knee high, 20-30mm Hg) to be worn at all times while awake  Recommend taking the following supplements: Vitamin D 4000 units per day and Calcium 1200 mg per day  This will help with bone healing

## 2022-08-29 ENCOUNTER — OFFICE VISIT (OUTPATIENT)
Dept: FAMILY MEDICINE CLINIC | Facility: CLINIC | Age: 72
End: 2022-08-29
Payer: MEDICARE

## 2022-08-29 ENCOUNTER — OFFICE VISIT (OUTPATIENT)
Dept: PHYSICAL THERAPY | Facility: CLINIC | Age: 72
End: 2022-08-29
Payer: MEDICARE

## 2022-08-29 VITALS
TEMPERATURE: 98 F | BODY MASS INDEX: 35.65 KG/M2 | WEIGHT: 214 LBS | RESPIRATION RATE: 16 BRPM | HEART RATE: 72 BPM | HEIGHT: 65 IN | DIASTOLIC BLOOD PRESSURE: 60 MMHG | SYSTOLIC BLOOD PRESSURE: 100 MMHG | OXYGEN SATURATION: 96 %

## 2022-08-29 DIAGNOSIS — E66.01 SEVERE OBESITY (BMI 35.0-39.9) WITH COMORBIDITY (HCC): ICD-10-CM

## 2022-08-29 DIAGNOSIS — S82.832D CLOSED FRACTURE OF DISTAL END OF LEFT FIBULA WITH ROUTINE HEALING, UNSPECIFIED FRACTURE MORPHOLOGY, SUBSEQUENT ENCOUNTER: Primary | ICD-10-CM

## 2022-08-29 DIAGNOSIS — E78.2 MIXED HYPERLIPIDEMIA: ICD-10-CM

## 2022-08-29 DIAGNOSIS — Z00.00 MEDICARE ANNUAL WELLNESS VISIT, SUBSEQUENT: Primary | ICD-10-CM

## 2022-08-29 DIAGNOSIS — E03.0 CONGENITAL HYPOTHYROIDISM WITH DIFFUSE GOITER: ICD-10-CM

## 2022-08-29 DIAGNOSIS — I48.19 PERSISTENT ATRIAL FIBRILLATION (HCC): ICD-10-CM

## 2022-08-29 DIAGNOSIS — Z12.31 SCREENING MAMMOGRAM FOR HIGH-RISK PATIENT: ICD-10-CM

## 2022-08-29 DIAGNOSIS — I10 BENIGN HYPERTENSION: ICD-10-CM

## 2022-08-29 DIAGNOSIS — G70.9 NEUROMUSCULAR DISEASE (HCC): ICD-10-CM

## 2022-08-29 DIAGNOSIS — B35.3 ATHLETE'S FOOT ON LEFT: ICD-10-CM

## 2022-08-29 PROBLEM — H92.03 OTALGIA, BILATERAL: Status: RESOLVED | Noted: 2021-10-18 | Resolved: 2022-08-29

## 2022-08-29 PROCEDURE — 97110 THERAPEUTIC EXERCISES: CPT | Performed by: PHYSICAL THERAPIST

## 2022-08-29 PROCEDURE — G0439 PPPS, SUBSEQ VISIT: HCPCS | Performed by: FAMILY MEDICINE

## 2022-08-29 RX ORDER — CLOTRIMAZOLE AND BETAMETHASONE DIPROPIONATE 10; .64 MG/G; MG/G
CREAM TOPICAL 2 TIMES DAILY
Qty: 45 G | Refills: 0 | Status: SHIPPED | OUTPATIENT
Start: 2022-08-29

## 2022-08-29 NOTE — PROGRESS NOTES
Daily Note     Today's date: 2022  Patient name: Eulogio Blair  : 1950  MRN: [id number]  Referring provider: Charly Brown PA-C  Dx:   Encounter Diagnosis     ICD-10-CM    1  Closed fracture of distal end of left fibula with routine healing, unspecified fracture morphology, subsequent encounter  S87 [id number]                   Subjective:  She had a follow up with Dr Dayanara Negron  She had a repeat x-ray which showed continued healing  She is now supposed to begin weaning from boot as per the schedule she was given  Objective: See treatment diary below      Assessment: Tolerated treatment well  Patient would benefit from continued PT  Spent 5 minutes in the beginning of the session to answer her questions regarding HEP, traveling and weaning from the boot  She has anterior ankle soreness with AROM  She would benefit from continue ROM progression with attention to the fact that the fracture is still healing  Plan: Continue per plan of care  Re-Eval and FOTO next session       Eval/ Re-eval Auth #/ Referral # Total visits Start date  Expiration date Total active units  Total manual units  PT only or  PT+OT?    22                                     Date: 22        Total units authorized  Active:            Manual:           Total units remaining  Active:               Manual:                Date:           Total units authorized  Active:            Manual:           Total units remaining  Active:               Manual:               Precautions: Asthma, Osteopenia, Afib    Specialty Daily Treatment Diary       Manuals 22     Visit # 1  3 4 - FOTO    STM Gastroc/ soleus/ Tib post        PROM ankle  5' 6'     Stretch soleus gastroc  2' 3'             Warm-up        NuStep  6 min --     Neuro Re-Ed        Wt shift        Towel stretch  Digit flex / ext AROM  20 Digit flex / ext AROM  20     ABCs                                        Ther Ex        Knee ext w df  20 20     Rockerboard  20 ea 20     Towel scrunch        HR/ TR seated  20 TR 20     4 way AROM  20 ea 20 ea     SLR  20 20             Ther Activity                        Gait Training        w cane   During session             Modalities        CP  5 min 5 min                Access Code: [email or web address]  URL: [email or web address]  Date: 08/22/2022  Prepared by:  Mckenzie Harman    Exercises  · Seated Toe Curl - 1 x daily - 2 sets - 10 reps  · Supine Ankle Pumps - 1 x daily - 2 sets - 10 reps  · Supine Ankle Inversion Eversion AROM - 1 x daily - 2 sets - 10 reps  · Seated Toe Raise - 1 x daily - 2 sets - 10 reps  · Seated Long Arc Quad - 1 x daily - 2 sets - 10 reps  · Active Straight Leg Raise with Quad Set - 1 x daily - 21 sets - 10 reps

## 2022-08-29 NOTE — PATIENT INSTRUCTIONS
Medicare Preventive Visit Patient Instructions  Thank you for completing your Welcome to Medicare Visit or Medicare Annual Wellness Visit today  Your next wellness visit will be due in one year (8/30/2023)  The screening/preventive services that you may require over the next 5-10 years are detailed below  Some tests may not apply to you based off risk factors and/or age  Screening tests ordered at today's visit but not completed yet may show as past due  Also, please note that scanned in results may not display below  Preventive Screenings:  Service Recommendations Previous Testing/Comments   Colorectal Cancer Screening  * Colonoscopy    * Fecal Occult Blood Test (FOBT)/Fecal Immunochemical Test (FIT)  * Fecal DNA/Cologuard Test  * Flexible Sigmoidoscopy Age: 39-70 years old   Colonoscopy: every 10 years (may be performed more frequently if at higher risk)  OR  FOBT/FIT: every 1 year  OR  Cologuard: every 3 years  OR  Sigmoidoscopy: every 5 years  Screening may be recommended earlier than age 39 if at higher risk for colorectal cancer  Also, an individualized decision between you and your healthcare provider will decide whether screening between the ages of 74-80 would be appropriate  Colonoscopy: 07/28/2020  FOBT/FIT: Not on file  Cologuard: Not on file  Sigmoidoscopy: Not on file    Screening Current     Breast Cancer Screening Age: 36 years old  Frequency: every 1-2 years  Not required if history of left and right mastectomy Mammogram: 11/01/2021    Screening Current   Cervical Cancer Screening Between the ages of 21-29, pap smear recommended once every 3 years  Between the ages of 33-67, can perform pap smear with HPV co-testing every 5 years     Recommendations may differ for women with a history of total hysterectomy, cervical cancer, or abnormal pap smears in past  Pap Smear: Not on file    Screening Not Indicated   Hepatitis C Screening Once for adults born between 1945 and 1965  More frequently in patients at high risk for Hepatitis C Hep C Antibody: 08/02/2019    Screening Current   Diabetes Screening 1-2 times per year if you're at risk for diabetes or have pre-diabetes Fasting glucose: 99 mg/dL (8/23/2022)  A1C: No results in last 5 years (No results in last 5 years)  Screening Current   Cholesterol Screening Once every 5 years if you don't have a lipid disorder  May order more often based on risk factors  Lipid panel: 08/23/2022    Screening Not Indicated  History Lipid Disorder     Other Preventive Screenings Covered by Medicare:  1  Abdominal Aortic Aneurysm (AAA) Screening: covered once if your at risk  You're considered to be at risk if you have a family history of AAA  2  Lung Cancer Screening: covers low dose CT scan once per year if you meet all of the following conditions: (1) Age 50-69; (2) No signs or symptoms of lung cancer; (3) Current smoker or have quit smoking within the last 15 years; (4) You have a tobacco smoking history of at least 20 pack years (packs per day multiplied by number of years you smoked); (5) You get a written order from a healthcare provider  3  Glaucoma Screening: covered annually if you're considered high risk: (1) You have diabetes OR (2) Family history of glaucoma OR (3)  aged 48 and older OR (3)  American aged 72 and older  3  Osteoporosis Screening: covered every 2 years if you meet one of the following conditions: (1) You're estrogen deficient and at risk for osteoporosis based off medical history and other findings; (2) Have a vertebral abnormality; (3) On glucocorticoid therapy for more than 3 months; (4) Have primary hyperparathyroidism; (5) On osteoporosis medications and need to assess response to drug therapy  · Last bone density test (DXA Scan): 11/01/2021   5  HIV Screening: covered annually if you're between the age of 15-65  Also covered annually if you are younger than 13 and older than 72 with risk factors for HIV infection  For pregnant patients, it is covered up to 3 times per pregnancy  Immunizations:  Immunization Recommendations   Influenza Vaccine Annual influenza vaccination during flu season is recommended for all persons aged >= 6 months who do not have contraindications   Pneumococcal Vaccine   * Pneumococcal conjugate vaccine = PCV13 (Prevnar 13), PCV15 (Vaxneuvance), PCV20 (Prevnar 20)  * Pneumococcal polysaccharide vaccine = PPSV23 (Pneumovax) Adults 25-60 years old: 1-3 doses may be recommended based on certain risk factors  Adults 72 years old: 1-2 doses may be recommended based off what pneumonia vaccine you previously received   Hepatitis B Vaccine 3 dose series if at intermediate or high risk (ex: diabetes, end stage renal disease, liver disease)   Tetanus (Td) Vaccine - COST NOT COVERED BY MEDICARE PART B Following completion of primary series, a booster dose should be given every 10 years to maintain immunity against tetanus  Td may also be given as tetanus wound prophylaxis  Tdap Vaccine - COST NOT COVERED BY MEDICARE PART B Recommended at least once for all adults  For pregnant patients, recommended with each pregnancy  Shingles Vaccine (Shingrix) - COST NOT COVERED BY MEDICARE PART B  2 shot series recommended in those aged 48 and above     Health Maintenance Due:      Topic Date Due    Breast Cancer Screening: Mammogram  11/01/2022    Colorectal Cancer Screening  07/28/2025    Hepatitis C Screening  Completed     Immunizations Due:      Topic Date Due    Influenza Vaccine (1) 09/01/2022     Advance Directives   What are advance directives? Advance directives are legal documents that state your wishes and plans for medical care  These plans are made ahead of time in case you lose your ability to make decisions for yourself  Advance directives can apply to any medical decision, such as the treatments you want, and if you want to donate organs  What are the types of advance directives?   There are many types of advance directives, and each state has rules about how to use them  You may choose a combination of any of the following:  · Living will: This is a written record of the treatment you want  You can also choose which treatments you do not want, which to limit, and which to stop at a certain time  This includes surgery, medicine, IV fluid, and tube feedings  · Durable power of  for healthcare [facility]): This is a written record that states who you want to make healthcare choices for you when you are unable to make them for yourself  This person, called a proxy, is usually a family member or a friend  You may choose more than 1 proxy  · Do not resuscitate (DNR) order:  A DNR order is used in case your heart stops beating or you stop breathing  It is a request not to have certain forms of treatment, such as CPR  A DNR order may be included in other types of advance directives  · Medical directive: This covers the care that you want if you are in a coma, near death, or unable to make decisions for yourself  You can list the treatments you want for each condition  Treatment may include pain medicine, surgery, blood transfusions, dialysis, IV or tube feedings, and a ventilator (breathing machine)  · Values history: This document has questions about your views, beliefs, and how you feel and think about life  This information can help others choose the care that you would choose  Why are advance directives important? An advance directive helps you control your care  Although spoken wishes may be used, it is better to have your wishes written down  Spoken wishes can be misunderstood, or not followed  Treatments may be given even if you do not want them  An advance directive may make it easier for your family to make difficult choices about your care  Fall Prevention    Fall prevention  includes ways to make your home and other areas safer   It also includes ways you can move more carefully to prevent a fall  Health conditions that cause changes in your blood pressure, vision, or muscle strength and coordination may increase your risk for falls  Medicines may also increase your risk for falls if they make you dizzy, weak, or sleepy  Fall prevention tips:   · Stand or sit up slowly  · Use assistive devices as directed  · Wear shoes that fit well and have soles that   · Wear a personal alarm  · Stay active  · Manage your medical conditions  Home Safety Tips:  · Add items to prevent falls in the bathroom  · Keep paths clear  · Install bright lights in your home  · Keep items you use often on shelves within reach  · Paint or place reflective tape on the edges of your stairs  Weight Management   Why it is important to manage your weight:  Being overweight increases your risk of health conditions such as heart disease, high blood pressure, type 2 diabetes, and certain types of cancer  It can also increase your risk for osteoarthritis, sleep apnea, and other respiratory problems  Aim for a slow, steady weight loss  Even a small amount of weight loss can lower your risk of health problems  How to lose weight safely:  A safe and healthy way to lose weight is to eat fewer calories and get regular exercise  You can lose up about 1 pound a week by decreasing the number of calories you eat by 500 calories each day  Healthy meal plan for weight management:  A healthy meal plan includes a variety of foods, contains fewer calories, and helps you stay healthy  A healthy meal plan includes the following:  · Eat whole-grain foods more often  A healthy meal plan should contain fiber  Fiber is the part of grains, fruits, and vegetables that is not broken down by your body  Whole-grain foods are healthy and provide extra fiber in your diet  Some examples of whole-grain foods are whole-wheat breads and pastas, oatmeal, brown rice, and bulgur  · Eat a variety of vegetables every day  Include dark, leafy greens such as spinach, kale, otto greens, and mustard greens  Eat yellow and orange vegetables such as carrots, sweet potatoes, and winter squash  · Eat a variety of fruits every day  Choose fresh or canned fruit (canned in its own juice or light syrup) instead of juice  Fruit juice has very little or no fiber  · Eat low-fat dairy foods  Drink fat-free (skim) milk or 1% milk  Eat fat-free yogurt and low-fat cottage cheese  Try low-fat cheeses such as mozzarella and other reduced-fat cheeses  · Choose meat and other protein foods that are low in fat  Choose beans or other legumes such as split peas or lentils  Choose fish, skinless poultry (chicken or turkey), or lean cuts of red meat (beef or pork)  Before you cook meat or poultry, cut off any visible fat  · Use less fat and oil  Try baking foods instead of frying them  Add less fat, such as margarine, sour cream, regular salad dressing and mayonnaise to foods  Eat fewer high-fat foods  Some examples of high-fat foods include french fries, doughnuts, ice cream, and cakes  · Eat fewer sweets  Limit foods and drinks that are high in sugar  This includes candy, cookies, regular soda, and sweetened drinks  Exercise:  Exercise at least 30 minutes per day on most days of the week  Some examples of exercise include walking, biking, dancing, and swimming  You can also fit in more physical activity by taking the stairs instead of the elevator or parking farther away from stores  Ask your healthcare provider about the best exercise plan for you  © Copyright [employer] 2018 Information is for End User's use only and may not be sold, redistributed or otherwise used for commercial purposes  All illustrations and images included in CareNotes® are the copyrighted property of A D A M , Inc  or [facility] Ghulam Boykin    Obesity   AMBULATORY CARE:   Obesity  means your body mass index (BMI) is greater than 30   Your healthcare provider will use your height and weight to measure your BMI  The risks of obesity include  many health problems, including injuries or physical disability  · Diabetes (high blood sugar level)    · High blood pressure or high cholesterol    · Heart disease    · Stroke    · Gallbladder or liver disease    · Cancer of the colon, breast, prostate, liver, or kidney    · Sleep apnea    · Arthritis or gout    Screening  is done to check for health conditions before you have signs or symptoms  If you are 28to 79years old, your blood sugar level may be checked every 3 years for signs of prediabetes or diabetes  Your healthcare provider will check your blood pressure at each visit  High blood pressure can lead to a stroke or other problems  Your provider may check for signs of heart disease, cancer, or other health problems  Seek care immediately if:   · You have a severe headache, confusion, or difficulty speaking  · You have weakness on one side of your body  · You have chest pain, sweating, or shortness of breath  Call your doctor if:   · You have symptoms of gallbladder or liver disease, such as pain in your upper abdomen  · You have knee or hip pain and discomfort while walking  · You have symptoms of diabetes, such as intense hunger and thirst, and frequent urination  · You have symptoms of sleep apnea, such as snoring or daytime sleepiness  · You have questions or concerns about your condition or care  Treatment for obesity  focuses on helping you lose weight to improve your health  Even a small decrease in BMI can reduce the risk for many health problems  Your healthcare provider will help you set a weight-loss goal   · Lifestyle changes  are the first step in treating obesity  These include making healthy food choices and getting regular physical activity  Your healthcare provider may suggest a weight-loss program that involves coaching, education, and therapy      · Medicine  may help you lose weight when it is used with a healthy foods and physical activity  · Surgery  can help you lose weight if you are very obese and have other health problems  There are several types of weight-loss surgery  Ask your healthcare provider for more information  Tips for safe weight loss:   · Set small, realistic goals  An example of a small goal is to walk for 20 minutes 5 days a week  Anther goal is to lose 5% of your body weight  · Tell friends, family members, and coworkers about your goals  and ask for their support  Ask a friend to lose weight with you, or join a weight-loss support group  · Identify foods or triggers that may cause you to overeat , and find ways to avoid them  Remove tempting high-calorie foods from your home and workplace  Place a bowl of fresh fruit on your kitchen counter  If stress causes you to eat, then find other ways to cope with stress  A counselor or therapist may be able to help you  · Keep a diary to track what you eat and drink  Also write down how many minutes of physical activity you do each day  Weigh yourself once a week and record it in your diary  Eating changes: You will need to eat 500 to 1,000 fewer calories each day than you currently eat to lose 1 to 2 pounds a week  The following changes will help you cut calories:  · Eat smaller portions  Use small plates, no larger than 9 inches in diameter  Fill your plate half full of fruits and vegetables  Measure your food using measuring cups until you know what a serving size looks like  · Eat 3 meals and 1 or 2 snacks each day  Plan your meals in advance  Viky Hewitt and eat at home most of the time  Eat slowly  Do not skip meals  Skipping meals can lead to overeating later in the day  This can make it harder for you to lose weight  Talk with a dietitian to help you make a meal plan and schedule that is right for you      · Eat fruits and vegetables at every meal   They are low in calories and high in fiber, which makes you feel full  Do not add butter, margarine, or cream sauce to vegetables  Use herbs to season steamed vegetables  · Eat less fat and fewer fried foods  Eat more baked or grilled chicken and fish  These protein sources are lower in calories and fat than red meat  Limit fast food  Dress your salads with olive oil and vinegar instead of bottled dressing  · Limit the amount of sugar you eat  Do not drink sugary beverages  Limit alcohol  Activity changes:  Physical activity is good for your body in many ways  It helps you burn calories and build strong muscles  It decreases stress and depression, and improves your mood  It can also help you sleep better  Talk to your healthcare provider before you begin an exercise program   · Exercise for at least 30 minutes 5 days a week  Start slowly  Set aside time each day for physical activity that you enjoy and that is convenient for you  It is best to do both weight training and an activity that increases your heart rate, such as walking, bicycling, or swimming  · Find ways to be more active  Do yard work and housecleaning  Walk up the stairs instead of using elevators  Spend your leisure time going to events that require walking, such as outdoor festivals or fairs  This extra physical activity can help you lose weight and keep it off  Follow up with your doctor as directed: You may need to meet with a dietitian  Write down your questions so you remember to ask them during your visits  © Copyright [employer] 2022 Information is for End User's use only and may not be sold, redistributed or otherwise used for commercial purposes  All illustrations and images included in CareNotes® are the copyrighted property of [employer]  or [employer] Bingham   The above information is an  only  It is not intended as medical advice for individual conditions or treatments   Talk to your doctor, nurse or pharmacist before following any medical regimen to see if it is safe and effective for you  Fall Prevention   AMBULATORY CARE:   Fall prevention  includes ways to make your home and other areas safer  It also includes ways you can move more carefully to prevent a fall  Health conditions that cause changes in your blood pressure, vision, or muscle strength and coordination may increase your risk for falls  Medicines may also increase your risk for falls if they make you dizzy, weak, or sleepy  Call 911 or have someone else call if:   · You have fallen and are unconscious  · You have fallen and cannot move part of your body  Contact your healthcare provider if:   · You have fallen and have pain or a headache  · You have questions or concerns about your condition or care  Fall prevention tips:   · Stand or sit up slowly  This may help you keep your balance and prevent falls  · Use assistive devices as directed  Your healthcare provider may suggest that you use a cane or walker to help you keep your balance  You may need to have grab bars put in your bathroom near the toilet or in the shower  · Wear shoes that fit well and have soles that   Wear shoes both inside and outside  Use slippers with good   Do not wear shoes with high heels  · Wear a personal alarm  This is a device that allows you to call 911 if you fall and need help  Ask your healthcare provider for more information  · Stay active  Exercise can help strengthen your muscles and improve your balance  Your healthcare provider may recommend water aerobics or walking  He or she may also recommend physical therapy to improve your coordination  Never start an exercise program without talking to your healthcare provider first          · Manage your medical conditions  Keep all appointments with your healthcare providers  Visit your eye doctor as directed  Home safety tips:       · Add items to prevent falls in the bathroom    Put nonslip strips on your bath or shower floor to prevent you from slipping  Use a bath mat if you do not have carpet in the bathroom  This will prevent you from falling when you step out of the bath or shower  Use a shower seat so you do not need to stand while you shower  Sit on the toilet or a chair in your bathroom to dry yourself and put on clothing  This will prevent you from losing your balance from drying or dressing yourself while you are standing  · Keep paths clear  Remove books, shoes, and other objects from walkways and stairs  Place cords for telephones and lamps out of the way so that you do not need to walk over them  Tape them down if you cannot move them  Remove small rugs  If you cannot remove a rug, secure it with double-sided tape  This will prevent you from tripping  · Install bright lights in your home  Use night lights to help light paths to the bathroom or kitchen  Always turn on the light before you start walking  · Keep items you use often on shelves within reach  Do not use a step stool to help you reach an item  · Paint or place reflective tape on the edges of your stairs  This will help you see the stairs better  Follow up with your doctor as directed:  Write down your questions so you remember to ask them during your visits  © Copyright [employer] 2022 Information is for End User's use only and may not be sold, redistributed or otherwise used for commercial purposes  All illustrations and images included in CareNotes® are the copyrighted property of A D A M , Inc  or An[employer] Bingham   The above information is an  only  It is not intended as medical advice for individual conditions or treatments  Talk to your doctor, nurse or pharmacist before following any medical regimen to see if it is safe and effective for you

## 2022-08-29 NOTE — PROGRESS NOTES
Assessment and Plan:     Problem List Items Addressed This Visit        Endocrine    Congenital hypothyroidism with diffuse goiter    Relevant Orders    TSH, 3rd generation       Cardiovascular and Mediastinum    Benign hypertension     Has data from home recent BP checks are good         Relevant Orders    Comprehensive metabolic panel    CBC    Persistent atrial fibrillation (HCC)       Nervous and Auditory    Vertigo    Relevant Orders    CBC       Other    Mixed hyperlipidemia    Relevant Orders    Lipid Panel with Direct LDL reflex    BMI 35 0-35 9,adult    Relevant Orders    CBC      Other Visit Diagnoses     Medicare annual wellness visit, subsequent    -  Primary    Relevant Orders    CBC    Severe obesity (BMI 35 0-39  9) with comorbidity ([address] )        Relevant Orders    CBC    Screening mammogram for high-risk patient        Relevant Orders    Mammo screening bilateral w 3d & cad    CBC    Athlete's foot on left        Relevant Medications    clotrimazole-betamethasone (LOTRISONE) 1-0 05 % cream        BMI Counseling: Body mass index is 35 61 kg/m²  The BMI is above normal  Nutrition recommendations include decreasing portion sizes  Exercise recommendations include moderate physical activity 150 minutes/week  No pharmacotherapy was ordered  Rationale for BMI follow-up plan is due to patient being overweight or obese  Depression Screening and Follow-up Plan: Patient was screened for depression during today's encounter  They screened negative with a PHQ-2 score of 0  Preventive health issues were discussed with patient, and age appropriate screening tests were ordered as noted in patient's After Visit Summary  Personalized health advice and appropriate referrals for health education or preventive services given if needed, as noted in patient's After Visit Summary       History of Present Illness:     Patient presents for a Medicare Wellness Visit    Pt is sched for an AWV    Pt had one fall with an injurty - broke her ankle - came from ophysical therapy    Cardiology as per pt wants to do a cardioversion  Pt would like to have this explained    Pt states when she hurt her foot initially she was dx with cellulitis placed on abx  Apparently advised they did not agree with dx  Pt state she still has the rahs on the left foot and would lik frantz to look at it         Patient Care Team:  Areli Raymundo DO as PCP - General (Family Medicine)  Carlos Palacios MD as Consulting Physician (Gastroenterology)  Nena Diaz MD as Consulting Physician (Gastroenterology)  Louise Mccollum MD as Consulting Physician (Cardiology)     Review of Systems:     Review of Systems     Problem List:     Patient Active Problem List   Diagnosis    Hiatal hernia    Gastroesophageal reflux disease    GERD with esophagitis    Hypokalemia    Benign hypertension    Mixed hyperlipidemia    Congenital hypothyroidism with diffuse goiter    Liver lesion, left lobe    Hemorrhoids    IBS (irritable bowel syndrome)    Osteopenia    Stress incontinence, female    Abnormal CT of liver    Class 2 severe obesity due to excess calories with serious comorbidity and body mass index (BMI) of 35 0 to 35 9 in adult ([facility] )    BMI 35 0-35 9,adult    Dysphagia    Constipation    Anxiety    Rheumatoid arthritis (HCC)    Vertigo    History of hysterectomy    Parotid mass    Xerostomia    Epigastric pain    Asthma    Persistent atrial fibrillation ([facility] )    Hypercholesterolemia    Closed fracture of left ankle      Past Medical and Surgical History:     Past Medical History:   Diagnosis Date    Anxiety     resolved 10/4/17    Asthma     seasonal    Atrial fibrillation ([facility] ) 01/2022    newly diagnosed on eliquis    Chronic kidney disease     kidney stone    Colon polyp     Disease of thyroid gland     Dysphagia     GERD (gastroesophageal reflux disease)     Goiter, nodular     partial thyroidectomy    Hiatal hernia     repaired 2018    Hiatal hernia with GERD without esophagitis 04/2018    surgical correction    History of esophageal varices with bleeding     History of pernicious anemia     History of transfusion     x1 after bleeding ulcer    Hyperlipidemia     Hypertension     Irritable bowel syndrome     Neck mass     2 small areas left side by jawline and midneck US scheduled 0820/21    RA (rheumatoid arthritis) ([address] )     Seasonal allergies     Vertigo     Wears glasses     for reading     Past Surgical History:   Procedure Laterality Date    BREAST BIOPSY Bilateral     negative    CARDIAC CATHETERIZATION      x2 secondary to exertional chest pain, due to lung issues, possible mild COPD    CATARACT EXTRACTION Bilateral     CHOLECYSTECTOMY  2015    lap - last assessed 10/4/17    COLONOSCOPY      complete    ESOPHAGOGASTRODUODENOSCOPY N/A 1/23/2018    Procedure: ESOPHAGOGASTRODUODENOSCOPY (EGD); Surgeon: Akash Carreno MD;  Location: [facility] GI LAB; Service: Gastroenterology    HYSTERECTOMY      partial - ovaries remain    LIPOMA RESECTION      right thigh    MD ESOPHAGOSCOPY FLEXIBLE TRANSORAL WITH BIOPSY N/A 4/11/2018    Procedure: ESOPHAGOGASTRODUODENOSCOPY (EGD); Surgeon: Hammad Vivar MD;  Location:  MAIN OR;  Service: Thoracic    MD LAP, REPAIR PARAESOPHAGEAL HERNIA, INCL FUNDOPLASTY W/ MESH N/A 4/11/2018    Procedure: REPAIR HERNIA PARAESOPHAGEAL  LAPAROSCOPIC,ELEONORA GASTROPLASTY, Citlalli Burrs FUNDOPLICATION;  Surgeon: Hammad Vivar MD;  Location:  MAIN OR;  Service: Thoracic    MD XCAPSL CTRC RMVL INSJ IO LENS PROSTH W/O ECP Right 8/23/2021    Procedure: EXTRACTION EXTRACAPSULAR CATARACT PHACO INTRAOCULAR LENS (IOL); Surgeon: Reyes Mejia MD;  Location: [facility] MAIN OR;  Service: Ophthalmology    MD XCAPSL CTRC RMVL INSJ IO LENS PROSTH W/O ECP Left 11/15/2021    Procedure: EXTRACTION EXTRACAPSULAR CATARACT PHACO INTRAOCULAR LENS (IOL);   Surgeon: Reyes Mejia MD;  Location: [facility] MAIN OR; Service: Ophthalmology    SKIN BIOPSY Right     lump benign - last assessed 10/4/17    THYROIDECTOMY, PARTIAL  1977    TONSILLECTOMY        Family History:     Family History   Problem Relation Age of Onset    Alzheimer's disease Mother     Cancer Mother         skin     Thyroid disease Mother     Ulcerative colitis Mother     Cancer Father         prostate    Stroke Father     Hypertension Father     Prostate cancer Father     Thyroid disease Sister     Ulcerative colitis Sister     Other Sister         open heart surgery    Heart disease Sister     Hyperlipidemia Brother     No Known Problems Son     No Known Problems Son     No Known Problems Maternal Grandmother     No Known Problems Paternal Grandmother     Mental illness Neg Hx       Social History:     Social History     Socioeconomic History    Marital status: /Civil Union     Spouse name: None    Number of children: None    Years of education: None    Highest education level: None   Occupational History    None   Tobacco Use    Smoking status: Never Smoker    Smokeless tobacco: Never Used   Vaping Use    Vaping Use: Never used   Substance and Sexual Activity    Alcohol use: Yes     Comment: seldom    Drug use: Never    Sexual activity: None   Other Topics Concern    None   Social History Narrative    Feels safe at home     Social Determinants of Health     Financial Resource Strain: Low Risk     Difficulty of Paying Living Expenses: Not hard at all   Food Insecurity: Not on file   Transportation Needs: No Transportation Needs    Lack of Transportation (Medical): No    Lack of Transportation (Non-Medical):  No   Physical Activity: Not on file   Stress: Not on file   Social Connections: Not on file   Intimate Partner Violence: Not on file   Housing Stability: Not on file      Medications and Allergies:     Current Outpatient Medications   Medication Sig Dispense Refill    apixaban (Eliquis) 5 mg Take 1 tablet (5 mg total) by mouth 2 (two) times a day (Patient taking differently: Take 5 mg by mouth 2 (two) times a day Last dose 2/21/22) 180 tablet 3    atorvastatin (LIPITOR) 20 mg tablet TAKE 1 TABLET BY MOUTH  DAILY AT BEDTIME 90 tablet 1    bumetanide (BUMEX) 1 mg tablet TAKE 1 TABLET BY MOUTH  DAILY 90 tablet 1    Calcium Carb-Cholecalciferol 600-1000 MG-UNIT CAPS Take by mouth every morning gummy       clotrimazole-betamethasone (LOTRISONE) 1-0 05 % cream Apply topically 2 (two) times a day 45 g 0    Lactobacillus (PROBIOTIC ACIDOPHILUS PO) Take by mouth      levothyroxine 50 mcg tablet TAKE 1 TABLET BY MOUTH  DAILY 90 tablet 1    losartan (COZAAR) 25 mg tablet Take 1 tablet (25 mg total) by mouth daily 90 tablet 3    metoprolol succinate (TOPROL-XL) 25 mg 24 hr tablet Take 1 tablet (25 mg total) by mouth daily 90 tablet 2    omeprazole (PriLOSEC) 40 MG capsule TAKE 1 CAPSULE BY MOUTH  TWICE DAILY 180 capsule 3    potassium chloride (Klor-Con) 10 mEq tablet TAKE TAKE 1 TABLET BY MOUTH DAILY 90 tablet 1     No current facility-administered medications for this visit       Allergies   Allergen Reactions    Orange (Diagnostic) - Food Allergy Anaphylaxis     Throat closes    Pantoprazole Headache    Penicillins Other (See Comments)     During allergy testing instructed to NEVER take PCN    Gluten Meal - Food Allergy      Following a gluten free diet on her own    Lactose - Food Allergy Diarrhea      Immunizations:     Immunization History   Administered Date(s) Administered    COVID-19 MODERNA VACC 0 25 ML IM BOOSTER 12/07/2021    COVID-19 MODERNA VACC 0 5 ML IM 02/23/2021, 03/26/2021, 05/18/2022    INFLUENZA 10/19/2010, 09/30/2011, 10/19/2012, 11/05/2013, 10/01/2014, 09/30/2020    Influenza Split High Dose Preservative Free IM 11/02/2015, 10/27/2016, 10/04/2017    Influenza, high dose seasonal 0 7 mL 09/26/2018, 10/17/2019, 09/28/2021    Pneumococcal Conjugate 13-Valent 08/10/2016    Pneumococcal Polysaccharide PPV23 10/04/2017    Tdap 07/30/2015      Health Maintenance:         Topic Date Due    Breast Cancer Screening: Mammogram  11/01/2022    Colorectal Cancer Screening  07/28/2025    Hepatitis C Screening  Completed         Topic Date Due    Influenza Vaccine (1) 09/01/2022      Medicare Screening Tests and Risk Assessments:     Slim Sutton is here for her Subsequent Wellness visit  Last Medicare Wellness visit information reviewed, patient interviewed, no change since last AWV  Health Risk Assessment:   Patient rates overall health as good  Patient feels that their physical health rating is same  Patient is satisfied with their life  Eyesight was rated as same  Hearing was rated as same  Patient feels that their emotional and mental health rating is same  Patients states they are never, rarely angry  Patient states they are never, rarely unusually tired/fatigued  Pain experienced in the last 7 days has been some  Patient's pain rating has been 4/10  Patient states that she has experienced no weight loss or gain in last 6 months  Depression Screening:   PHQ-2 Score: 0      Fall Risk Screening: In the past year, patient has experienced: history of falling in past year    Number of falls: 1  Injured during fall?: Yes    Feels unsteady when standing or walking?: Yes    Worried about falling?: Yes      Urinary Incontinence Screening:   Patient has not leaked urine accidently in the last six months  Home Safety:  Patient has working smoke alarms and has working carbon monoxide detector  Home safety hazards include: none  Nutrition:   Current diet is Regular  Gluten free    Medications:   Patient is currently taking over-the-counter supplements  OTC medications include: see medication list  Patient is able to manage medications       Activities of Daily Living (ADLs)/Instrumental Activities of Daily Living (IADLs):   Walk and transfer into and out of bed and chair?: Yes  Dress and groom yourself?: Yes    Bathe or shower yourself?: Yes    Feed yourself?  Yes  Do your laundry/housekeeping?: Yes  Manage your money, pay your bills and track your expenses?: Yes  Make your own meals?: Yes    Do your own shopping?: Yes    ADL comments: Assistance with adls due to recent foot fx     Previous Hospitalizations:   Any hospitalizations or ED visits within the last 12 months?: Yes    How many hospitalizations have you had in the last year?: 1-2    Advance Care Planning:   Living will: Yes    Advanced directive: Yes      Cognitive Screening:   Provider or family/friend/caregiver concerned regarding cognition?: No    PREVENTIVE SCREENINGS      Cardiovascular Screening:    General: Screening Not Indicated, History Lipid Disorder and Risks and Benefits Discussed    Due for: Lipid Panel      Diabetes Screening:     General: Screening Current and Risks and Benefits Discussed    Due for: Blood Glucose      Colorectal Cancer Screening:     General: Screening Current      Breast Cancer Screening:     General: Screening Current and Risks and Benefits Discussed    Due for: Mammogram        Cervical Cancer Screening:    General: Screening Not Indicated      Osteoporosis Screening:    General: Risks and Benefits Discussed and Screening Current      Abdominal Aortic Aneurysm (AAA) Screening:        General: Screening Not Indicated      Lung Cancer Screening:     General: Screening Not Indicated      Hepatitis C Screening:    General: Screening Current    Screening, Brief Intervention, and Referral to Treatment (SBIRT)    Screening      AUDIT-C Screenin) How often did you have a drink containing alcohol in the past year? never  2) How many drinks did you have on a typical day when you were drinking in the past year? 0  3) How often did you have 6 or more drinks on one occasion in the past year? never    AUDIT-C Score: 0  Interpretation: Score 0-2 (female): Negative screen for alcohol misuse    Single Item Drug Screening:  How often have you used an illegal drug (including marijuana) or a prescription medication for non-medical reasons in the past year? never    Single Item Drug Screen Score: 0  Interpretation: Negative screen for possible drug use disorder    Brief Intervention  Alcohol & drug use screenings were reviewed  No concerns regarding substance use disorder identified  No exam data present     Physical Exam:     /60   Pulse 72   Temp 98 °F (36 7 °C)   Resp 16   Ht 5' 5" (1 651 m)   Wt 97 1 kg (214 lb)   SpO2 96%   BMI 35 61 kg/m²     Physical Exam     Recent Results (from the past 672 hour(s))   Comprehensive metabolic panel    Collection Time: 08/23/22  9:05 AM   Result Value Ref Range    Sodium 143 135 - 147 mmol/L    Potassium 3 9 3 5 - 5 3 mmol/L    Chloride 104 96 - 108 mmol/L    CO2 30 21 - 32 mmol/L    ANION GAP 9 4 - 13 mmol/L    BUN 13 5 - 25 mg/dL    Creatinine 0 81 0 60 - 1 30 mg/dL    Glucose, Fasting 99 65 - 99 mg/dL    Calcium 8 8 8 3 - 10 1 mg/dL    AST 23 5 - 45 U/L    ALT 22 12 - 78 U/L    Alkaline Phosphatase 57 46 - 116 U/L    Total Protein 6 9 6 4 - 8 4 g/dL    Albumin 3 7 3 5 - 5 0 g/dL    Total Bilirubin 0 98 0 20 - 1 00 mg/dL    eGFR 72 ml/min/1 73sq m   Lipid Panel with Direct LDL reflex    Collection Time: 08/23/22  9:05 AM   Result Value Ref Range    Cholesterol 145 See Comment mg/dL    Triglycerides 71 See Comment mg/dL    HDL, Direct 60 >=50 mg/dL    LDL Calculated 71 0 - 100 mg/dL   TSH, 3rd generation    Collection Time: 08/23/22  9:05 AM   Result Value Ref Range    TSH 3RD GENERATON 0 758 0 450 - 4 500 uIU/mL         Sven Perez DO  BMI Counseling: Body mass index is 35 61 kg/m²  The BMI is above normal  Nutrition recommendations include reducing portion sizes  Falls Plan of Care: Patient referred to physical therapy

## 2022-09-07 ENCOUNTER — EVALUATION (OUTPATIENT)
Dept: PHYSICAL THERAPY | Facility: CLINIC | Age: 72
End: 2022-09-07
Payer: MEDICARE

## 2022-09-07 DIAGNOSIS — S82.832D CLOSED FRACTURE OF DISTAL END OF LEFT FIBULA WITH ROUTINE HEALING, UNSPECIFIED FRACTURE MORPHOLOGY, SUBSEQUENT ENCOUNTER: Primary | ICD-10-CM

## 2022-09-07 PROCEDURE — 97110 THERAPEUTIC EXERCISES: CPT | Performed by: PHYSICAL THERAPIST

## 2022-09-07 PROCEDURE — 97140 MANUAL THERAPY 1/> REGIONS: CPT | Performed by: PHYSICAL THERAPIST

## 2022-09-07 NOTE — PROGRESS NOTES
PT Re-Evaluation     Today's date: 2022  Patient name: Mark Remy  : 1950  MRN: [id number]  Referring provider: Edil Sousa PA-C  Dx:   Encounter Diagnosis     ICD-10-CM    1  Closed fracture of distal end of left fibula with routine healing, unspecified fracture morphology, subsequent encounter  [id number]                   Assessment  Assessment details: Mark Remy has remaining pain, decreased LE range of motion, decreased LE strength, impaired function, decreased activity tolerance, poor balance, swelling  and poor body mechanics  Patient's clinical presentation is consistent with their referring diagnosis of Closed fracture of distal end of left fibula with routine healing, unspecified fracture morphology, subsequent encounter  The pt presents with functional limitations of ADLs, recreational activities, participation in social activities, ambulation, performing household chores and stair negotiation  Pt would benefit from continued physical therapy services to address these limitations and maximize function  Impairments: abnormal gait, abnormal muscle firing, abnormal muscle tone, abnormal or restricted ROM, abnormal movement, activity intolerance, impaired balance, impaired physical strength, lacks appropriate home exercise program, pain with function, weight-bearing intolerance, poor posture  and poor body mechanics  Understanding of Dx/Px/POC: good   Prognosis: good    Goals  Short term goals  (3 weeks)  1  Patient will have no pain left ankle  -  PARTIALLY MET  2   Patient will have full range of motion left ankle  -  NOT MET  3  Patient will report a 50% improvement with activities of daily living --  MET  4  Patient will decrease girth of left ankle by 1 cm  --  MET    Long term goals - (6 weeks)  1  Patient will be independent with home exercise program  --  PARTIALLY MET  2  Patient will have no gait deviations ambulating on level surfaces  --  PARTIALLY MET  3  Patient will report 80 % improvement with activity of daily living function  --  NOT MET  4  Patient will negotiate stairs up and down pain free with use of one railing  --  NOT MET  5  Patient SLS to be equal bilaterally  --  NOT MET      Plan  Patient would benefit from: skilled physical therapy  Planned modality interventions: thermotherapy: hydrocollator packs and cryotherapy  Planned therapy interventions: ADL training, balance/weight bearing training, joint mobilization, manual therapy, massage, neuromuscular re-education, patient education, postural training, strengthening, stretching, functional ROM exercises, therapeutic activities, therapeutic exercise, gait training and home exercise program  Frequency: 2x week  Duration in visits: 8  Duration in weeks: 4  Treatment plan discussed with: patient        Subjective Evaluation    History of Present Illness  Date of surgery: 2022  Mechanism of injury: She notes increased symptoms including pain and swelling with prolonged weight bearing  Overall improvement is noted  She is no longer wearing a cam boot which was recently weaned off of  She agreed to use it for short periods as needed for support / symptom relief  Pain  Current pain ratin  At best pain ratin  At worst pain ratin  Quality: burning, sharp and dull ache  Relieving factors: ice and medications  Aggravating factors: standing and walking    Social Support    Employment status: not working  Exercise history: House work    Patient Goals  Patient goals for therapy: decreased pain and independence with ADLs/IADLs  Patient's goals regarding treatment: walk again normally  Objective     Palpation   Left   Hypertonic in the lateral gastrocnemius, medial gastrocnemius, peroneus and posterior tibialis  Tenderness of the lateral gastrocnemius, medial gastrocnemius, peroneus and posterior tibialis  Tenderness   Left Ankle/Foot   Tenderness in the lateral malleolus       Active Range of Motion   Left Ankle/Foot   Dorsiflexion (ke): 0 degrees   Plantar flexion: 35 degrees   Inversion: 14 degrees   Eversion: 10 degrees     Right Ankle/Foot   Dorsiflexion (ke): -25 degrees   Plantar flexion: 51 degrees   Inversion: 36 degrees   Eversion: 11 degrees     Strength/Myotome Testing     Left Ankle/Foot   Dorsiflexion: 3+  Plantar flexion: 3+  Inversion: 3+  Eversion: 3+    Right Ankle/Foot   Dorsiflexion: 5  Plantar flexion: 5  Inversion: 5  Eversion: 5    Swelling   Left Ankle/Foot   Figure 8: 55 8 cm    Right Ankle/Foot   Figure 8: 56 7 cm    Ambulation   Weight-Bearing Status   Weight-Bearing Status (Left): weight-bearing as tolerated   Assistive device used: single point cane    Observational Gait   Gait: antalgic   Decreased left stance time                    Eval/ Re-eval Auth #/ Referral # Total visits Start date  Expiration date Total active units  Total manual units  PT only or  PT+OT?    8/18/22                                                                Date: 8/18 8/22 8/29/22 9/7           Total units authorized  Active:                     Manual:                   Total units remaining  Active:                         Manual:                           Date:                   Total units authorized  Active:                     Manual:                   Total units remaining  Active:                         Manual:                         Precautions: Asthma, Osteopenia, Afib     Specialty Daily Treatment Diary         Manuals 8/18/22 8/22/22 8/29/22 9/7/22     Visit # 1   3 4 - FOTO     STM Gastroc/ soleus/ Tib post             PROM ankle   5' 6'  6'     Stretch soleus gastroc   2' 3'  4'                   Warm-up             NuStep   6 min --  8 min     Neuro Re-Ed             Wt shift        30     Towel stretch   Digit flex / ext AROM  20 Digit flex / ext AROM  20       ABCs                                                                     Ther Ex             Knee ext w df   20 20  20     Rockerboard   20 ea 20  30     Towel scrunch             HR/ TR seated   20 TR 20  20     4 way AROM   20 ea 20 ea  20  YTB     SLR   20 20  20                   Ther Activity                                         Gait Training             w cane     During session       Sit to stand tech        5 min     Modalities             CP   5 min 5 min                        Access Code: [email or web address]  URL: [email or web address]  Date: 08/22/2022  Prepared by:  Paula Moses    Exercises  · Seated Toe Curl - 1 x daily - 2 sets - 10 reps  · Supine Ankle Pumps - 1 x daily - 2 sets - 10 reps  · Supine Ankle Inversion Eversion AROM - 1 x daily - 2 sets - 10 reps  · Seated Toe Raise - 1 x daily - 2 sets - 10 reps  · Seated Long Arc Quad - 1 x daily - 2 sets - 10 reps  · Active Straight Leg Raise with Quad Set - 1 x daily - 21 sets - 10 reps

## 2022-09-12 ENCOUNTER — OFFICE VISIT (OUTPATIENT)
Dept: PHYSICAL THERAPY | Facility: CLINIC | Age: 72
End: 2022-09-12
Payer: MEDICARE

## 2022-09-12 DIAGNOSIS — S82.832D CLOSED FRACTURE OF DISTAL END OF LEFT FIBULA WITH ROUTINE HEALING, UNSPECIFIED FRACTURE MORPHOLOGY, SUBSEQUENT ENCOUNTER: Primary | ICD-10-CM

## 2022-09-12 PROCEDURE — 97110 THERAPEUTIC EXERCISES: CPT | Performed by: PHYSICAL THERAPIST

## 2022-09-12 PROCEDURE — 97140 MANUAL THERAPY 1/> REGIONS: CPT | Performed by: PHYSICAL THERAPIST

## 2022-09-12 NOTE — PROGRESS NOTES
Daily Note     Today's date: 2022  Patient name: Shane Da Silva  : 1950  MRN: [id number]  Referring provider: Bita Mccann PA-C  Dx:   Encounter Diagnosis     ICD-10-CM    1  Closed fracture of distal end of left fibula with routine healing, unspecified fracture morphology, subsequent encounter  [id number]                   Subjective:  Swelling and lateral left ankle / foot soreness continues  Objective: See treatment diary below      Assessment: Tolerated treatment well  Patient would benefit from continued PT  Patient has a trip to [place] to attend a    She knows the [place] [employer] and has brought a brace with her  Helped fit the brace correctly this session  Plan: Continue per plan of care  Progress treatment as tolerated           Eval/ Re-eval Auth #/ Referral # Total visits Start date  Expiration date Total active units  Total manual units  PT only or  PT+OT?    22                                                                Date: 22         Total units authorized  Active:                     Manual:                   Total units remaining  Active:                         Manual:                           Date:                   Total units authorized  Active:                     Manual:                   Total units remaining  Active:                         Manual:                         Precautions: Asthma, Osteopenia, Afib     Specialty Daily Treatment Diary         Manuals 22   Visit # 1   3 4 - FOTO  5   STM Gastroc/ soleus/ Tib post             PROM ankle   5' 6'  6'  6'   Stretch soleus gastroc   2' 3'  4'  4'                 Warm-up             NuStep   6 min --  8 min  8 min   Neuro Re-Ed             Wt shift        30     Towel stretch   Digit flex / ext AROM  20 Digit flex / ext AROM  20    --   ABCs          2x                                                           Ther Ex             Knee ext w df   20 20  20  20   Rockerboard   20 ea 20  30  30 ea   Towel scrunch          30   HR/ TR seated   20 TR 20  20  20   4 way AROM   20 ea 20 ea  20  YTB  20  YTB   SLR   20 20  20 20                 Ther Activity                                         Gait Training             w cane     During session       Sit to stand tech        5 min     Modalities             CP   5 min 5 min    5 min                    Access Code: [email or web address]  URL: [email or web address]  Date: 08/22/2022  Prepared by:  Marilin Early    Exercises  · Seated Toe Curl - 1 x daily - 2 sets - 10 reps  · Supine Ankle Pumps - 1 x daily - 2 sets - 10 reps  · Supine Ankle Inversion Eversion AROM - 1 x daily - 2 sets - 10 reps  · Seated Toe Raise - 1 x daily - 2 sets - 10 reps  · Seated Long Arc Quad - 1 x daily - 2 sets - 10 reps  · Active Straight Leg Raise with Quad Set - 1 x daily - 21 sets - 10 reps

## 2022-09-21 ENCOUNTER — APPOINTMENT (OUTPATIENT)
Dept: PHYSICAL THERAPY | Facility: CLINIC | Age: 72
End: 2022-09-21
Payer: MEDICARE

## 2022-09-23 ENCOUNTER — OFFICE VISIT (OUTPATIENT)
Dept: PHYSICAL THERAPY | Facility: CLINIC | Age: 72
End: 2022-09-23
Payer: MEDICARE

## 2022-09-23 DIAGNOSIS — S82.832D CLOSED FRACTURE OF DISTAL END OF LEFT FIBULA WITH ROUTINE HEALING, UNSPECIFIED FRACTURE MORPHOLOGY, SUBSEQUENT ENCOUNTER: Primary | ICD-10-CM

## 2022-09-23 PROCEDURE — 97110 THERAPEUTIC EXERCISES: CPT | Performed by: PHYSICAL THERAPIST

## 2022-09-23 PROCEDURE — 97140 MANUAL THERAPY 1/> REGIONS: CPT | Performed by: PHYSICAL THERAPIST

## 2022-09-23 PROCEDURE — 97112 NEUROMUSCULAR REEDUCATION: CPT | Performed by: PHYSICAL THERAPIST

## 2022-09-23 NOTE — PROGRESS NOTES
Daily Note     Today's date: 2022  Patient name: Fay Montalvo  : 1950  MRN: [id number]  Referring provider: María Elena Gambino PA-C  Dx:   Encounter Diagnosis     ICD-10-CM    1  Closed fracture of distal end of left fibula with routine healing, unspecified fracture morphology, subsequent encounter  S85 [id number]                   Subjective:  She completed a drive to and from [place]  Since last session  She had expectant soreness and stiffness but was able to manage it with multiple stops during the trip  Objective: See treatment diary below      Assessment: Tolerated treatment well  Patient would benefit from continued PT  Had a discussion with patient regarding healing time for the injury and that being more aggressive with exercise is not going to speed the healing process  She was agreeable to this  We did add leg press and had no adverse effects  Plan: Continue per plan of care  Progress treatment as tolerated  Add ankle DF/ PF stretch at stair        Eval/ Re-eval Auth #/ Referral # Total visits Start date  Expiration date Total active units  Total manual units  PT only or  PT+OT?    22                                                                Date: 22       Total units authorized  Active:                     Manual:                   Total units remaining  Active:                         Manual:                           Date:                   Total units authorized  Active:                     Manual:                   Total units remaining  Active:                         Manual:                         Precautions: Asthma, Osteopenia, Afib     Specialty Daily Treatment Diary         Manuals 22    Visit # 3 4 - FOTO  5 6    STM Gastroc/ soleus/ Tib post           PROM ankle 6'  6'  6' 6'    Stretch soleus gastroc 3'  4'  4' 4'                Warm-up           NuStep --  8 min  8 min 8 min    Neuro Re-Ed           Wt shift    30       Towel stretch Digit flex / ext AROM  20    --     ABCs      2x 2x     Leg press       20   55#     Ankle stretch at stair                                   Ther Ex           Knee ext w df 20  20  20 20    Rockerboard 20  30  30 ea 30 ea    Towel scrunch      30 --    HR/ TR seated 20  20  20 20    4 way AROM 20 ea  20  YTB  20  YTB 20 ea   YTB    SLR 20  20 20                 Ther Activity                                   Gait Training           w cane During session     No cane at times during session    Sit to stand tech    5 min       Modalities           CP 5 min    5 min 5 min                   Access Code: [email or web address]  URL: [email or web address]  Date: 08/22/2022  Prepared by:  Sidonie Dubin    Exercises  · Seated Toe Curl - 1 x daily - 2 sets - 10 reps  · Supine Ankle Pumps - 1 x daily - 2 sets - 10 reps  · Supine Ankle Inversion Eversion AROM - 1 x daily - 2 sets - 10 reps  · Seated Toe Raise - 1 x daily - 2 sets - 10 reps  · Seated Long Arc Quad - 1 x daily - 2 sets - 10 reps  · Active Straight Leg Raise with Quad Set - 1 x daily - 21 sets - 10 reps

## 2022-09-30 ENCOUNTER — APPOINTMENT (OUTPATIENT)
Dept: PHYSICAL THERAPY | Facility: CLINIC | Age: 72
End: 2022-09-30
Payer: MEDICARE

## 2022-10-01 ENCOUNTER — TELEPHONE (OUTPATIENT)
Dept: FAMILY MEDICINE CLINIC | Facility: CLINIC | Age: 72
End: 2022-10-01

## 2022-10-01 NOTE — TELEPHONE ENCOUNTER
Sw pt and   Has cough  Chest congestion  Advised if very sob or tachycardia then ER  Declines paxlovid due to eliquis (does not want to stop)  Otc suggested  Appt if needed

## 2022-10-01 NOTE — TELEPHONE ENCOUNTER
Patients  called and stated that his wife tested positve for COVID 2 days ago  He wants to know what she can take being that she has A-fib  I told him we do not have anymore appointments for today, where he responded with " you never do"   I explained we only have one doctor on for Saturdays and he stated " well then you have that doctor take 2 minutes out of his morning to call me"      Samira Epstein-  [phone]

## 2022-10-13 ENCOUNTER — ANESTHESIA (OUTPATIENT)
Dept: NON INVASIVE DIAGNOSTICS | Facility: HOSPITAL | Age: 72
End: 2022-10-13

## 2022-10-13 ENCOUNTER — HOSPITAL ENCOUNTER (OUTPATIENT)
Dept: NON INVASIVE DIAGNOSTICS | Facility: HOSPITAL | Age: 72
Discharge: HOME | End: 2022-10-13
Attending: INTERNAL MEDICINE
Payer: MEDICARE

## 2022-10-13 ENCOUNTER — ANESTHESIA EVENT (OUTPATIENT)
Dept: NON INVASIVE DIAGNOSTICS | Facility: HOSPITAL | Age: 72
End: 2022-10-13

## 2022-10-13 VITALS
HEIGHT: 65 IN | RESPIRATION RATE: 17 BRPM | OXYGEN SATURATION: 96 % | HEART RATE: 70 BPM | BODY MASS INDEX: 33.32 KG/M2 | WEIGHT: 200 LBS | DIASTOLIC BLOOD PRESSURE: 69 MMHG | SYSTOLIC BLOOD PRESSURE: 142 MMHG | TEMPERATURE: 97.7 F

## 2022-10-13 DIAGNOSIS — I48.19 PERSISTENT ATRIAL FIBRILLATION (HCC): ICD-10-CM

## 2022-10-13 LAB
ATRIAL RATE: 108 BPM
ATRIAL RATE: 59 BPM
ATRIAL RATE: 64 BPM
P AXIS: 64 DEGREES
P AXIS: 82 DEGREES
PR INTERVAL: 208 MS
PR INTERVAL: 208 MS
QRS AXIS: -10 DEGREES
QRS AXIS: 3 DEGREES
QRS AXIS: 3 DEGREES
QRSD INTERVAL: 102 MS
QRSD INTERVAL: 102 MS
QRSD INTERVAL: 96 MS
QT INTERVAL: 416 MS
QT INTERVAL: 596 MS
QT INTERVAL: 734 MS
QTC INTERVAL: 500 MS
QTC INTERVAL: 590 MS
QTC INTERVAL: 757 MS
T WAVE AXIS: -21 DEGREES
T WAVE AXIS: 22 DEGREES
T WAVE AXIS: 23 DEGREES
VENTRICULAR RATE: 59 BPM
VENTRICULAR RATE: 64 BPM
VENTRICULAR RATE: 87 BPM

## 2022-10-13 PROCEDURE — 93010 ELECTROCARDIOGRAM REPORT: CPT | Performed by: INTERNAL MEDICINE

## 2022-10-13 PROCEDURE — 92960 CARDIOVERSION ELECTRIC EXT: CPT

## 2022-10-13 PROCEDURE — 93005 ELECTROCARDIOGRAM TRACING: CPT

## 2022-10-13 PROCEDURE — 92960 CARDIOVERSION ELECTRIC EXT: CPT | Performed by: INTERNAL MEDICINE

## 2022-10-13 RX ORDER — SODIUM CHLORIDE 9 MG/ML
100 INJECTION, SOLUTION INTRAVENOUS CONTINUOUS
Status: CANCELLED | OUTPATIENT
Start: 2022-10-13

## 2022-10-13 RX ORDER — PROPOFOL 10 MG/ML
INJECTION, EMULSION INTRAVENOUS AS NEEDED
Status: DISCONTINUED | OUTPATIENT
Start: 2022-10-13 | End: 2022-10-13

## 2022-10-13 RX ORDER — SODIUM CHLORIDE 9 MG/ML
INJECTION, SOLUTION INTRAVENOUS CONTINUOUS PRN
Status: DISCONTINUED | OUTPATIENT
Start: 2022-10-13 | End: 2022-10-13

## 2022-10-13 RX ADMIN — SODIUM CHLORIDE: 0.9 INJECTION, SOLUTION INTRAVENOUS at 12:09

## 2022-10-13 RX ADMIN — PROPOFOL 100 MG: 10 INJECTION, EMULSION INTRAVENOUS at 12:43

## 2022-10-13 NOTE — ANESTHESIA POSTPROCEDURE EVALUATION
Post-Op Assessment Note    CV Status:  Stable  Pain Score: 0    Pain management: adequate     Mental Status:  Alert and awake   Hydration Status:  Euvolemic   PONV Controlled:  Controlled   Airway Patency:  Patent      Post Op Vitals Reviewed: Yes      Staff: CRNA         No complications documented      BP   107/78   Temp      Pulse  61   Resp   16   SpO2   96

## 2022-10-13 NOTE — ANESTHESIA PREPROCEDURE EVALUATION
Procedure:  CARDIOVERSION    Relevant Problems   CARDIO   (+) Benign hypertension   (+) Hypercholesterolemia   (+) Mixed hyperlipidemia   (+) Persistent atrial fibrillation (HCC)      ENDO   (+) Congenital hypothyroidism with diffuse goiter      GI/HEPATIC   (+) Dysphagia   (+) Gastroesophageal reflux disease   (+) Hiatal hernia      GYN   (+) History of hysterectomy      MUSCULOSKELETAL   (+) Rheumatoid arthritis (HCC)      NEURO/PSYCH   (+) Anxiety   (+) Vertigo      PULMONARY   (+) Asthma        Physical Exam    Airway    Mallampati score: III  TM Distance: >3 FB  Neck ROM: full     Dental   No notable dental hx     Cardiovascular  Cardiovascular exam normal    Pulmonary  Pulmonary exam normal     Other Findings        Anesthesia Plan  ASA Score- 3     Anesthesia Type- IV sedation with anesthesia with ASA Monitors  Additional Monitors:   Airway Plan:           Plan Factors-Exercise tolerance (METS): >4 METS  Chart reviewed  Patient summary reviewed  Patient is not a current smoker  Induction- intravenous  Postoperative Plan-     Informed Consent- Anesthetic plan and risks discussed with patient  I personally reviewed this patient with the CRNA  Discussed and agreed on the Anesthesia Plan with the CRNA  [employer]

## 2022-10-14 ENCOUNTER — TELEPHONE (OUTPATIENT)
Dept: NON INVASIVE DIAGNOSTICS | Facility: HOSPITAL | Age: 72
End: 2022-10-14

## 2022-10-20 ENCOUNTER — OFFICE VISIT (OUTPATIENT)
Dept: CARDIOLOGY CLINIC | Facility: CLINIC | Age: 72
End: 2022-10-20
Payer: MEDICARE

## 2022-10-20 VITALS
SYSTOLIC BLOOD PRESSURE: 120 MMHG | DIASTOLIC BLOOD PRESSURE: 90 MMHG | BODY MASS INDEX: 35.33 KG/M2 | HEART RATE: 78 BPM | WEIGHT: 212.3 LBS

## 2022-10-20 DIAGNOSIS — I10 BENIGN HYPERTENSION: ICD-10-CM

## 2022-10-20 DIAGNOSIS — E78.2 MIXED HYPERLIPIDEMIA: ICD-10-CM

## 2022-10-20 DIAGNOSIS — I48.19 PERSISTENT ATRIAL FIBRILLATION (HCC): Primary | ICD-10-CM

## 2022-10-20 PROCEDURE — 99214 OFFICE O/P EST MOD 30 MIN: CPT | Performed by: INTERNAL MEDICINE

## 2022-10-20 PROCEDURE — 93000 ELECTROCARDIOGRAM COMPLETE: CPT | Performed by: INTERNAL MEDICINE

## 2022-10-20 RX ORDER — METOPROLOL SUCCINATE 25 MG/1
25 TABLET, EXTENDED RELEASE ORAL 2 TIMES DAILY
Qty: 180 TABLET | Refills: 3 | Status: SHIPPED | OUTPATIENT
Start: 2022-10-20

## 2022-10-20 RX ORDER — FLECAINIDE ACETATE 100 MG/1
100 TABLET ORAL 2 TIMES DAILY
Qty: 180 TABLET | Refills: 3 | Status: SHIPPED | OUTPATIENT
Start: 2022-10-20

## 2022-10-20 NOTE — PROGRESS NOTES
Cardiology Follow-up    Davida Charles  [id number]  1950  [facility] [place] [facility] [place]  [address] [facility] [id number]    1  Persistent atrial fibrillation (HCC)  POCT ECG    flecainide (TAMBOCOR) 100 mg tablet    Cardioversion   2  Benign hypertension  POCT ECG    metoprolol succinate (TOPROL-XL) 25 mg 24 hr tablet   3  Mixed hyperlipidemia         Discussion/Summary:    1  Persistent atrial fibrillation - Shai Kerr was found to be in atrial fibrillation under PCPs earlier this year  She was placed on Eliquis and we started Toprol-XL  She continues to seem symptomatic at times, particularly with exertion  She had a cardioversion earlier this month that was only transiently successful  I am going to increase Toprol XL to twice daily and add flecainide 100 mg twice daily  We will set her up for another cardioversion in the next few weeks  2   Hypertension - This this been controlled on the combination of losartan and Toprol-XL  As stated above we are increasing Toprol-XL to twice daily  She should periodically check her blood pressure at home  3   Hyperlipidemia - On atorvastatin 20 mg daily which has kept her LDL at goal       HPI:    Mrs Beni Tovar comes in for follow-up given her persistent atrial fibrillation  This was new onset earlier this year in January at her PCPs office  Shai Kerr carries a history of mild CAD and 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 by a remote cardiac catheterization  Through those years she had on and off atypical chest pain that turn out to be GI related  She had a repeat cardiac catheterization in July of 2017 that reported no significant CAD  Around that time she also had a bleeding ulcer and required hospitalization and blood transfusion  Her she is treated for hypertension and hyperlipidemia  She also carries a history of an esophageal stricture      Shai Kerr has a heart [employer] rate/rhythm monitor that suggested atrial fibrillation in January  Prior to our consultation she contacted her PCP who brought her in for an ECG which did show atrial fibrillation  It is unclear how long she has been in it but retrospectively recalls on and off palpitations and exercise intolerance since Labor Day 2021  At that point I set her up for an echocardiogram that showed normal LV systolic function and biatrial enlargement, particularly severe left atrial dilation  She also had moderate tricuspid regurgitation  At our consultation we started Toprol-XL and stopped her losartan  She was also started on Eliquis  She also has been following with GI, and had a stricture dilated in her esophagus recently  She had a CT scan that suggested compression from the aortic arch, but the aortic arch was not enlarged  Her ascending aorta is at most mildly dilated  She was having some chest tightness with anxiety and exertion in follow-up, and I set her up for a stress nuclear study which was normal   Her blood pressure was also running high since stopping the losartan, but with adding this back in addition to her Toprol-XL her blood pressure is now under good control  At that time I recommended a cardioversion but she wanted to put off  Williams Rigo continued to have symptoms of shortness of breath, exercise intolerance and chest tightness that appeared to be symptomatic atrial fibrillation at our last visit  Therefore I set her up for a cardioversion which was performed earlier this month  She was successfully converted to sinus rhythm but was only temporary  She felt she was back in atrial fibrillation within 24 hours  ECG today confirms atrial fibrillation  She continues to have the same symptoms of fatigue, shortness of breath and generally feeling unwell  She notices exercise intolerance when she goes for walks  After her cardioversion she did feel well        Patient Active Problem List   Diagnosis   • Hiatal hernia   • Gastroesophageal reflux disease   • GERD with esophagitis   • Hypokalemia   • Benign hypertension   • Mixed hyperlipidemia   • Congenital hypothyroidism with diffuse goiter   • Liver lesion, left lobe   • Hemorrhoids   • IBS (irritable bowel syndrome)   • Osteopenia   • Stress incontinence, female   • Abnormal CT of liver   • Class 2 severe obesity due to excess calories with serious comorbidity and body mass index (BMI) of 35 0 to 35 9 in adult [facility])   • BMI 35 0-35 9,adult   • Dysphagia   • Constipation   • Anxiety   • Rheumatoid arthritis (HCC)   • Vertigo   • History of hysterectomy   • Parotid mass   • Xerostomia   • Epigastric pain   • Asthma   • Persistent atrial fibrillation (HCC)   • Hypercholesterolemia   • Closed fracture of left ankle     Past Medical History:   Diagnosis Date   • Anxiety     resolved 10/4/17   • Asthma     seasonal   • Atrial fibrillation ([facility] [address] ) 01/2022    newly diagnosed on eliquis   • Chronic kidney disease     kidney stone   • Colon polyp    • Disease of thyroid gland    • Dysphagia    • GERD (gastroesophageal reflux disease)    • Goiter, nodular     partial thyroidectomy   • Hiatal hernia     repaired 2018   • Hiatal hernia with GERD without esophagitis 04/2018    surgical correction   • History of esophageal varices with bleeding    • History of pernicious anemia    • History of transfusion     x1 after bleeding ulcer   • Hyperlipidemia    • Hypertension    • Irritable bowel syndrome    • Neck mass     2 small areas left side by jawline and midneck  scheduled 0820/21   • RA (rheumatoid arthritis) ([facility] [address] )    • Seasonal allergies    • Vertigo    • Wears glasses     for reading     Social History     Socioeconomic History   • Marital status: /Civil Union     Spouse name: Not on file   • Number of children: Not on file   • Years of education: Not on file   • Highest education level: Not on file   Occupational History   • Not on file Tobacco Use   • Smoking status: Never Smoker   • Smokeless tobacco: Never Used   Vaping Use   • Vaping Use: Never used   Substance and Sexual Activity   • Alcohol use: Yes     Comment: seldom   • Drug use: Never   • Sexual activity: Not on file   Other Topics Concern   • Not on file   Social History Narrative    Feels safe at home     Social Determinants of Health     Financial Resource Strain: Low Risk    • Difficulty of Paying Living Expenses: Not hard at all   Food Insecurity: Not on file   Transportation Needs: No Transportation Needs   • Lack of Transportation (Medical): No   • Lack of Transportation (Non-Medical): No   Physical Activity: Not on file   Stress: Not on file   Social Connections: Not on file   Intimate Partner Violence: Not on file   Housing Stability: Not on file      Family History   Problem Relation Age of Onset   • Alzheimer's disease Mother    • Cancer Mother         skin    • Thyroid disease Mother    • Ulcerative colitis Mother    • Cancer Father         prostate   • Stroke Father    • Hypertension Father    • Prostate cancer Father    • Thyroid disease Sister    • Ulcerative colitis Sister    • Other Sister         open heart surgery   • Heart disease Sister    • Hyperlipidemia Brother    • No Known Problems Son    • No Known Problems Son    • No Known Problems Maternal Grandmother    • No Known Problems Paternal Grandmother    • Mental illness Neg Hx      Past Surgical History:   Procedure Laterality Date   • BREAST BIOPSY Bilateral     negative   • CARDIAC CATHETERIZATION      x2 secondary to exertional chest pain, due to lung issues, possible mild COPD   • CATARACT EXTRACTION Bilateral    • CHOLECYSTECTOMY  2015    lap - last assessed 10/4/17   • COLONOSCOPY      complete   • ESOPHAGOGASTRODUODENOSCOPY N/A 1/23/2018    Procedure: ESOPHAGOGASTRODUODENOSCOPY (EGD); Surgeon: Summer Arndt MD;  Location: [facility] GI LAB;   Service: Gastroenterology   • HYSTERECTOMY      partial - ovaries remain   • LIPOMA RESECTION      right thigh   • OH ESOPHAGOSCOPY FLEXIBLE TRANSORAL WITH BIOPSY N/A 4/11/2018    Procedure: ESOPHAGOGASTRODUODENOSCOPY (EGD); Surgeon: Radha Serrano MD;  Location:  MAIN OR;  Service: Thoracic   • OH LAP, REPAIR PARAESOPHAGEAL HERNIA, INCL FUNDOPLASTY W/ MESH N/A 4/11/2018    Procedure: REPAIR HERNIA PARAESOPHAGEAL  LAPAROSCOPIC,ELEONORA GASTROPLASTY, Brie Officer FUNDOPLICATION;  Surgeon: Radha Serrano MD;  Location:  MAIN OR;  Service: Thoracic   • OH XCAPSL CTRC RMVL INSJ IO LENS PROSTH W/O ECP Right 8/23/2021    Procedure: EXTRACTION EXTRACAPSULAR CATARACT PHACO INTRAOCULAR LENS (IOL); Surgeon: Shabnam Pickard MD;  Location: [facility] MAIN OR;  Service: Ophthalmology   • OH XCAPSL CTRC RMVL INSJ IO LENS PROSTH W/O ECP Left 11/15/2021    Procedure: EXTRACTION EXTRACAPSULAR CATARACT PHACO INTRAOCULAR LENS (IOL);   Surgeon: Shabnam Pickard MD;  Location: [facility] MAIN OR;  Service: Ophthalmology   • SKIN BIOPSY Right     lump benign - last assessed 10/4/17   • THYROIDECTOMY, PARTIAL  1977   • TONSILLECTOMY         Current Outpatient Medications:   •  apixaban (Eliquis) 5 mg, Take 1 tablet (5 mg total) by mouth 2 (two) times a day, Disp: 180 tablet, Rfl: 3  •  atorvastatin (LIPITOR) 20 mg tablet, TAKE 1 TABLET BY MOUTH  DAILY AT BEDTIME, Disp: 90 tablet, Rfl: 1  •  bumetanide (BUMEX) 1 mg tablet, TAKE 1 TABLET BY MOUTH  DAILY, Disp: 90 tablet, Rfl: 1  •  Calcium Carb-Cholecalciferol 600-1000 MG-UNIT CAPS, Take by mouth every morning gummy , Disp: , Rfl:   •  clotrimazole-betamethasone (LOTRISONE) 1-0 05 % cream, Apply topically 2 (two) times a day, Disp: 45 g, Rfl: 0  •  flecainide (TAMBOCOR) 100 mg tablet, Take 1 tablet (100 mg total) by mouth 2 (two) times a day, Disp: 180 tablet, Rfl: 3  •  Lactobacillus (PROBIOTIC ACIDOPHILUS PO), Take by mouth, Disp: , Rfl:   •  levothyroxine 50 mcg tablet, TAKE 1 TABLET BY MOUTH  DAILY, Disp: 90 tablet, Rfl: 1  •  losartan (COZAAR) 25 mg tablet, Take 1 tablet (25 mg total) by mouth daily, Disp: 90 tablet, Rfl: 3  •  metoprolol succinate (TOPROL-XL) 25 mg 24 hr tablet, Take 1 tablet (25 mg total) by mouth 2 (two) times a day, Disp: 180 tablet, Rfl: 3  •  omeprazole (PriLOSEC) 40 MG capsule, TAKE 1 CAPSULE BY MOUTH  TWICE DAILY, Disp: 180 capsule, Rfl: 3  •  potassium chloride (Klor-Con) 10 mEq tablet, TAKE TAKE 1 TABLET BY MOUTH DAILY, Disp: 90 tablet, Rfl: 1  Allergies   Allergen Reactions   • Orange (Diagnostic) - Food Allergy Anaphylaxis     Throat closes   • Pantoprazole Headache   • Penicillins Other (See Comments)     During allergy testing instructed to NEVER take PCN   • Gluten Meal - Food Allergy      Following a gluten free diet on her own   • Lactose - Food Allergy Diarrhea     Vitals:    10/20/22 1019   BP: 120/90   BP Location: Right arm   Patient Position: Sitting   Cuff Size: Standard   Pulse: 78   Weight: 96 3 kg (212 lb 4 8 oz)       Labs:  Lab Results   Component Value Date     09/01/2016    K 3 9 08/23/2022    K 4 1 09/01/2016     08/23/2022     09/01/2016    CO2 30 08/23/2022    CO2 27 09/01/2016    BUN 13 08/23/2022    BUN 10 09/01/2016    CREATININE 0 81 08/23/2022    CREATININE 0 69 09/01/2016    CALCIUM 8 8 08/23/2022    CALCIUM 9 2 09/01/2016     Lab Results   Component Value Date    WBC 4 63 01/31/2022    WBC 4 8 07/20/2017    HGB 14 9 01/31/2022    HGB 9 6 (L) 07/20/2017    HCT 44 8 01/31/2022    HCT 29 2 (L) 07/20/2017    MCV 92 01/31/2022    MCV 84 9 07/20/2017     01/31/2022     07/20/2017     Lab Results   Component Value Date    CHOL 144 09/01/2016    TRIG 71 08/23/2022    TRIG 92 09/01/2016    HDL 60 08/23/2022    HDL 57 09/01/2016     Imaging:  ECG today shows atrial fibrillation with PVCs  STRESS NUCLEAR (4/21/22): •  Stress ECG: Arrhythmias during recovery: rare PVCs  The ECG was equivocal for ischemia  The ECG was not diagnostic due to pharmacological (vasodilator) stress   The stress ECG is equivocal for ischemia after pharmacologic vasodilation  •  Stress Function: Left ventricular function post-stress is normal  Post-stress ejection fraction is 59 %  •  Perfusion: There are no perfusion defects  •  Stress ECG: A Miguel protocol stress test was performed  The patient reached stage 2 0 of the protocol after exercising for 4 min and 5 sec and had a maximal HR of 151 bpm (102 % of MPHR) and 7 0 METS  The patient experienced no angina during the test  The test was stopped because the patient experienced dyspnea  The patient achieved the target heart rate  The patient reported dyspnea during the stress test  Onset of symptoms occurred at stage 2 of the protocol  Symptoms ended during recovery  Blood pressure demonstrated a normal response and heart rate demonstrated a normal response to stress  The patient's heart rate recovery was normal      Negative study for evidence of pharmacological induced ischemia or prior infarction  No major symptoms with vasodilation  Elevated baseline blood pressure of 150/100 noted          ECHO (1/2022): •  Left Ventricle: Left ventricular cavity size is normal  Systolic function is normal   Estimated ejection fraction is 50-55%  Wall motion is normal  Wall thickness is mildly increased  Unable to assess diastolic function due to atrial fibrillation  •  Left Atrium: The atrium is severely dilated  •  Right Atrium: The atrium is mild to moderately dilated  •  Aortic Valve: There is trace regurgitation  •  Mitral Valve: There is mild regurgitation  •  Tricuspid Valve: There is moderate regurgitation  The right ventricular systolic pressure is mildly elevated at 46 mm Hg  •  Pulmonic Valve: There is mild regurgitation    •  Aorta: Proximal ascending aorta is mildly dilated with a diameter of 3 8 cm  (indexed to BSA= 1 8 cm/m2) Consider other imaging modalities, including CTA for more accurate estimation and to rule out other aortic pathologies        Review of Systems:  Review of Systems   Constitutional: Negative  HENT: Negative  Eyes: Negative  Respiratory: Positive for chest tightness and shortness of breath  Cardiovascular: Positive for palpitations and leg swelling  Gastrointestinal: Negative  Musculoskeletal: Negative  Skin: Negative  Allergic/Immunologic: Negative  Neurological: Negative  Hematological: Negative  Psychiatric/Behavioral: Negative  All other systems reviewed and are negative  Vitals:    10/20/22 1019   BP: 120/90   BP Location: Right arm   Patient Position: Sitting   Cuff Size: Standard   Pulse: 78   Weight: 96 3 kg (212 lb 4 8 oz)       Physical Exam:  Physical Exam  Vitals and nursing note reviewed  Constitutional:       Appearance: She is well-developed  HENT:      Head: Normocephalic and atraumatic  Eyes:      General: No scleral icterus  Right eye: No discharge  Left eye: No discharge  Pupils: Pupils are equal, round, and reactive to light  Neck:      Thyroid: No thyromegaly  Vascular: No JVD  Cardiovascular:      Rate and Rhythm: Normal rate  Rhythm irregularly irregular  Pulses: Normal pulses  No decreased pulses  Heart sounds: Normal heart sounds, S1 normal and S2 normal  No murmur heard  No friction rub  No gallop  Pulmonary:      Effort: Pulmonary effort is normal  No respiratory distress  Breath sounds: Normal breath sounds  No wheezing, rhonchi or rales  Abdominal:      General: Bowel sounds are normal  There is no distension  Palpations: Abdomen is soft  Tenderness: There is no abdominal tenderness  Musculoskeletal:         General: No tenderness or deformity  Normal range of motion  Cervical back: Normal range of motion and neck supple  Skin:     General: Skin is warm and dry  Findings: No rash  Neurological:      Mental Status: She is alert and oriented to person, place, and time        Cranial Nerves: No cranial nerve deficit  Psychiatric:         Thought Content: Thought content normal          Judgment: Judgment normal        Counseling / Coordination of Care  Total office time spent today 40 minutes  Greater than 50% of total time was spent with the patient and / or family counseling and / or coordination of care

## 2022-10-20 NOTE — PATIENT INSTRUCTIONS
A-fib (Atrial Fibrillation)   AMBULATORY CARE:   Atrial fibrillation (A-fib)  is an irregular heartbeat  It reduces your heart's ability to pump blood through your body  A-fib may come and go, or it may be a long-term condition  A-fib can cause life-threatening blood clots, stroke, or heart failure  It is important to treat and manage A-fib to help prevent these problems  Common signs and symptoms include the following:   A heartbeat that races, pounds, or flutters    Weakness, severe tiredness, or confusion    Feeling lightheaded, sweaty, dizzy, or faint    Shortness of breath or anxiety    Chest pain or pressure    Call your local emergency number (911 in the [address]) or have someone call if:   You have any of the following signs of a heart attack:      Squeezing, pressure, or pain in your chest    You may  also have any of the following:     Discomfort or pain in your back, neck, jaw, stomach, or arm    Shortness of breath    Nausea or vomiting    Lightheadedness or a sudden cold sweat    You have any of the following signs of a stroke:      Numbness or drooping on one side of your face     Weakness in an arm or leg    Confusion or difficulty speaking    Dizziness, a severe headache, or vision loss    Call your doctor or cardiologist if:   Your arm or leg feels warm, tender, and painful  It may look swollen and red  Your heart rate is more than 110 beats per minute  You have new or worsening swelling in your legs, feet, ankles, or abdomen  You are short of breath, even at rest     You have questions or concerns about your condition or care  Treatment for A-fib:  Conditions that cause A-fib, such as thyroid disease, will be treated  You may also need any of the following:  Heart medicines  help control your heart rate or rhythm  You may need more than one medicine to treat your symptoms  Antiplatelet or blood thinner medicines  help prevent blood clots and stroke      Cardioversion  is a procedure to return your heart rate and rhythm to normal  It can be done using medicines or electric shock  A-fib ablation  is a procedure that uses energy to burn a small area of heart tissue  This creates scar tissue and prevents electrical signals that cause A-fib  You may need this procedure more than once  Ask for more information on A-fib ablation  A pacemaker  may be inserted into your heart  A pacemaker is a device that controls your heartbeat  A pacemaker may be inserted during an ablation procedure or surgery  Ask your healthcare provider for more information on pacemakers  Surgery  may be needed if other procedures do not work  During surgery your healthcare provider will make cuts in the upper part of your heart  The provider will stitch the cuts together to create scar tissue  The scar tissue will prevent electrical signals that cause A-fib  Manage A-fib:   Know your target heart rate  Learn how to check your pulse and monitor your heart rate  Know the risks if you choose to drink alcohol  Alcohol can increase your risk for A-fib or make A-fib harder to manage  Ask your healthcare provider if it is okay for you to drink any alcohol  He or she can help you set limits for the number of drinks you have in 24 hours and in a week  A drink of alcohol is 12 ounces of beer, 5 ounces of wine, or 1½ ounces of liquor  Do not smoke  Nicotine can cause heart damage and make it more difficult to manage your A-fib  Do not use e-cigarettes or smokeless tobacco in place of cigarettes or to help you quit  They still contain nicotine  Ask your healthcare provider for information if you currently smoke and need help quitting  Eat heart-healthy foods  Heart healthy foods will help keep your cholesterol low  These include fruits, vegetables, whole-grain breads, low-fat dairy products, beans, lean meats, and fish  Replace butter and margarine with heart-healthy oils such as olive oil and canola oil  Maintain a healthy weight  Ask your healthcare provider what a healthy weight is for you  Ask him or her to help you create a safe weight loss plan if you are overweight  Even a small goal of a 10% weight loss can improve your heart health  Get regular physical activity  Physical activity helps improve your heart health  Get at least 150 minutes of moderate aerobic physical activity each week  Your healthcare provider can help you create an activity plan  Manage other health conditions  This includes high blood pressure or cholesterol, sleep apnea, diabetes, and other heart conditions  Take medicine as directed and follow your treatment plan  Your healthcare provider may need to change a medicine you are taking if it is causing your A-fib  Do not  stop taking any medicine unless directed by your provider  Follow up with your doctor or cardiologist as directed: You will need regular blood tests and monitoring  Write down your questions so you remember to ask them during your visits  © Copyright [employer] 2022 Information is for End User's use only and may not be sold, redistributed or otherwise used for commercial purposes  All illustrations and images included in CareNotes® are the copyrighted property of A D A M , Inc  or [facility] Ghulam Bingham   The above information is an  only  It is not intended as medical advice for individual conditions or treatments  Talk to your doctor, nurse or pharmacist before following any medical regimen to see if it is safe and effective for you

## 2022-10-25 ENCOUNTER — TELEPHONE (OUTPATIENT)
Dept: CARDIOLOGY CLINIC | Facility: CLINIC | Age: 72
End: 2022-10-25

## 2022-10-25 DIAGNOSIS — I48.19 PERSISTENT ATRIAL FIBRILLATION (HCC): Primary | ICD-10-CM

## 2022-10-25 NOTE — TELEPHONE ENCOUNTER
Patient scheduled for cardioversion on 11/3/22 at [facility]  Patient aware cardioversion instructions and labs required

## 2022-10-26 ENCOUNTER — OFFICE VISIT (OUTPATIENT)
Dept: OBGYN CLINIC | Facility: CLINIC | Age: 72
End: 2022-10-26
Payer: MEDICARE

## 2022-10-26 ENCOUNTER — APPOINTMENT (OUTPATIENT)
Dept: RADIOLOGY | Facility: AMBULARY SURGERY CENTER | Age: 72
End: 2022-10-26
Attending: ORTHOPAEDIC SURGERY

## 2022-10-26 VITALS
HEART RATE: 76 BPM | BODY MASS INDEX: 34.99 KG/M2 | HEIGHT: 65 IN | DIASTOLIC BLOOD PRESSURE: 78 MMHG | SYSTOLIC BLOOD PRESSURE: 114 MMHG | WEIGHT: 210 LBS

## 2022-10-26 DIAGNOSIS — S82.832A OTHER CLOSED FRACTURE OF DISTAL END OF LEFT FIBULA, INITIAL ENCOUNTER: Primary | ICD-10-CM

## 2022-10-26 DIAGNOSIS — S82.832A OTHER CLOSED FRACTURE OF DISTAL END OF LEFT FIBULA, INITIAL ENCOUNTER: ICD-10-CM

## 2022-10-26 PROCEDURE — 99213 OFFICE O/P EST LOW 20 MIN: CPT | Performed by: ORTHOPAEDIC SURGERY

## 2022-10-26 NOTE — PROGRESS NOTES
SREEDHAR Toure  Attending, Orthopaedic Surgery  Foot and [address] [facility] [address] [facility]      ORTHOPAEDIC FOOT AND ANKLE CLINIC VISIT     Assessment:     Encounter Diagnosis   Name Primary? • Other closed fracture of distal end of left fibula, initial encounter Yes            Plan:   · The patient verbalized understanding of exam findings and treatment plan  We engaged in the shared decision-making process and treatment options were discussed at length with the patient  Surgical and conservative management discussed today along with risks and benefits  · She is 4 months out from a left lateral malleolus fracture treated non-operatively  She is doing well  · She takes eliquis chronically  · Continue PT as needed  May transition to HEP from our standpoint  · Continue vitamin D and calcium  Return if symptoms worsen or fail to improve  History of Present Illness:   Chief Complaint:   Chief Complaint   Patient presents with   • Left Ankle - Follow-up     Erin Arenas is a 67 y o  female who is being seen in follow-up for left lateral malleolus fx  When we last saw she we recommended wean boot, continue PT  Pain has  improved  Residual pain is localized at fx site with minimal radiating and described as sharp and severe        Pain/symptom timing:  Worse during the day when active  Pain/symptom context:  Worse with activites and work  Pain/symptom modifying factors:  Rest makes better, activities make worse  Pain/symptom associated signs/symptoms: none    Prior treatment   · NSAIDsNo   · Injections No   · Bracing/Orthotics Yes    · Physical Therapy Yes     Orthopedic Surgical History:   See below    Past Medical, Surgical and Social History:  Past Medical History:  has a past medical history of Anxiety, Asthma, Atrial fibrillation ([facility] [address] ) (01/2022), Chronic kidney disease, Colon polyp, Disease of thyroid gland, Dysphagia, GERD (gastroesophageal reflux disease), Goiter, nodular, Hiatal hernia, Hiatal hernia with GERD without esophagitis (04/2018), History of esophageal varices with bleeding, History of pernicious anemia, History of transfusion, Hyperlipidemia, Hypertension, Irritable bowel syndrome, Neck mass, RA (rheumatoid arthritis) ([facility] [address] ), Seasonal allergies, Vertigo, and Wears glasses  Problem List: does not have any pertinent problems on file  Past Surgical History:  has a past surgical history that includes Skin biopsy (Right); Tonsillectomy; Cholecystectomy (2015); Esophagogastroduodenoscopy (N/A, 1/23/2018); pr lap, repair paraesophageal hernia, incl fundoplasty w/ mesh (N/A, 4/11/2018); pr esophagoscopy flexible transoral with biopsy (N/A, 4/11/2018); Colonoscopy; Lipoma resection; Breast biopsy (Bilateral); Hysterectomy; Thyroidectomy, partial (1977); Cardiac catheterization; pr xcapsl ctrc rmvl insj io lens prosth w/o ecp (Right, 8/23/2021); pr xcapsl ctrc rmvl insj io lens prosth w/o ecp (Left, 11/15/2021); and Cataract extraction (Bilateral)  Family History: family history includes Alzheimer's disease in her mother; Cancer in her father and mother; Heart disease in her sister; Hyperlipidemia in her brother; Hypertension in her father; No Known Problems in her maternal grandmother, paternal grandmother, son, and son; Other in her sister; Prostate cancer in her father; Stroke in her father; Thyroid disease in her mother and sister; Ulcerative colitis in her mother and sister  Social History:  reports that she has never smoked  She has never used smokeless tobacco  She reports current alcohol use  She reports that she does not use drugs  Current Medications: has a current medication list which includes the following prescription(s): apixaban, atorvastatin, bumetanide, calcium carb-cholecalciferol, clotrimazole-betamethasone, flecainide, lactobacillus, levothyroxine, losartan, metoprolol succinate, omeprazole, and potassium chloride    Allergies: is allergic to orange (diagnostic) - food allergy, pantoprazole, penicillins, gluten meal - food allergy, and lactose - food allergy  Review of Systems:  General- denies fever/chills  HEENT- denies hearing loss or sore throat  Eyes- denies eye pain or visual disturbances, denies red eyes  Respiratory- denies cough or SOB  Cardio- denies chest pain or palpitations  GI- denies abdominal pain  Endocrine- denies urinary frequency  Urinary- denies pain with urination  Musculoskeletal- Negative except noted above  Skin- denies rashes or wounds  Neurological- denies dizziness or headache  Psychiatric- denies anxiety or difficulty concentrating    Physical Exam:   /78   Pulse 76   Ht 5' 5" (1 651 m)   Wt 95 3 kg (210 lb)   BMI 34 95 kg/m²   General/Constitutional: No apparent distress: well-nourished and well developed  Eyes: normal ocular motion  Lymphatic: No appreciable lymphadenopathy  Respiratory: Non-labored breathing  Vascular: No edema, swelling or tenderness, except as noted in detailed exam   Integumentary: No impressive skin lesions present, except as noted in detailed exam   Neuro: No ataxia or tremors noted  Psych: Normal mood and affect, oriented to person, place and time  Appropriate affect  Musculoskeletal: Normal, except as noted in detailed exam and in HPI  Examination    Left    Gait Normal   Musculoskeletal NTTP    Skin Normal      Nails Normal    Range of Motion  10 degrees dorsiflexion, 30 degrees plantarflexion  Subtalar motion: not assessed    Stability Stable    Muscle Strength 5/5 tibialis anterior  5/5 gastrocnemius-soleus  5/5 posterior tibialis  5/5 peroneal/eversion strength  5/5 EHL  5/5 FHL    Neurologic Normal    Sensation  Intact to light touch throughout sural, saphenous, superficial peroneal, deep peroneal and medial/lateral plantar nerve distributions  [place] 5 07 filament (10g) testing  deferred      Cardiovascular Brisk capillary refill < 2 seconds,intact DP and PT pulses    Special Tests None Imaging Studies:     3 views of the Left ankle were obtained, reviewed and interpreted independently which demonstrate stable alignment of the fracture with healing at the fracture site  Reviewed by me personally  Scribe Attestation    I,:  Mike Howe PA-C am acting as a scribe while in the presence of the attending physician :       I,:  Grecia Rock MD personally performed the services described in this documentation    as scribed in my presence :             Murtis Rang Lachman, MD  Foot & Ankle Surgery   Department of [address] [place] [address]      I personally performed the service  Murtis Rang Lachman, MD

## 2022-10-31 ENCOUNTER — APPOINTMENT (OUTPATIENT)
Dept: LAB | Facility: HOSPITAL | Age: 72
End: 2022-10-31

## 2022-10-31 LAB
ALBUMIN SERPL BCP-MCNC: 3.4 G/DL (ref 3.5–5)
ALP SERPL-CCNC: 60 U/L (ref 46–116)
ALT SERPL W P-5'-P-CCNC: 23 U/L (ref 12–78)
ANION GAP SERPL CALCULATED.3IONS-SCNC: 3 MMOL/L (ref 4–13)
AST SERPL W P-5'-P-CCNC: 17 U/L (ref 5–45)
BASOPHILS # BLD AUTO: 0.01 THOUSANDS/ÂΜL (ref 0–0.1)
BASOPHILS NFR BLD AUTO: 0 % (ref 0–1)
BILIRUB SERPL-MCNC: 0.72 MG/DL (ref 0.2–1)
BUN SERPL-MCNC: 9 MG/DL (ref 5–25)
CALCIUM ALBUM COR SERPL-MCNC: 9.6 MG/DL (ref 8.3–10.1)
CALCIUM SERPL-MCNC: 9.1 MG/DL (ref 8.3–10.1)
CHLORIDE SERPL-SCNC: 106 MMOL/L (ref 96–108)
CO2 SERPL-SCNC: 30 MMOL/L (ref 21–32)
CREAT SERPL-MCNC: 0.76 MG/DL (ref 0.6–1.3)
EOSINOPHIL # BLD AUTO: 0.07 THOUSAND/ÂΜL (ref 0–0.61)
EOSINOPHIL NFR BLD AUTO: 2 % (ref 0–6)
ERYTHROCYTE [DISTWIDTH] IN BLOOD BY AUTOMATED COUNT: 13.2 % (ref 11.6–15.1)
GFR SERPL CREATININE-BSD FRML MDRD: 78 ML/MIN/1.73SQ M
GLUCOSE P FAST SERPL-MCNC: 103 MG/DL (ref 65–99)
HCT VFR BLD AUTO: 43.4 % (ref 34.8–46.1)
HGB BLD-MCNC: 14 G/DL (ref 11.5–15.4)
IMM GRANULOCYTES # BLD AUTO: 0.01 THOUSAND/UL (ref 0–0.2)
IMM GRANULOCYTES NFR BLD AUTO: 0 % (ref 0–2)
LYMPHOCYTES # BLD AUTO: 1.28 THOUSANDS/ÂΜL (ref 0.6–4.47)
LYMPHOCYTES NFR BLD AUTO: 32 % (ref 14–44)
MCH RBC QN AUTO: 29.9 PG (ref 26.8–34.3)
MCHC RBC AUTO-ENTMCNC: 32.3 G/DL (ref 31.4–37.4)
MCV RBC AUTO: 93 FL (ref 82–98)
MONOCYTES # BLD AUTO: 0.28 THOUSAND/ÂΜL (ref 0.17–1.22)
MONOCYTES NFR BLD AUTO: 7 % (ref 4–12)
NEUTROPHILS # BLD AUTO: 2.38 THOUSANDS/ÂΜL (ref 1.85–7.62)
NEUTS SEG NFR BLD AUTO: 59 % (ref 43–75)
NRBC BLD AUTO-RTO: 0 /100 WBCS
PLATELET # BLD AUTO: 180 THOUSANDS/UL (ref 149–390)
PMV BLD AUTO: 10.5 FL (ref 8.9–12.7)
POTASSIUM SERPL-SCNC: 3.7 MMOL/L (ref 3.5–5.3)
PROT SERPL-MCNC: 6.6 G/DL (ref 6.4–8.4)
RBC # BLD AUTO: 4.69 MILLION/UL (ref 3.81–5.12)
SODIUM SERPL-SCNC: 139 MMOL/L (ref 135–147)
WBC # BLD AUTO: 4.03 THOUSAND/UL (ref 4.31–10.16)

## 2022-11-01 ENCOUNTER — OFFICE VISIT (OUTPATIENT)
Dept: DERMATOLOGY | Age: 72
End: 2022-11-01

## 2022-11-01 VITALS — WEIGHT: 213.4 LBS | HEIGHT: 65 IN | BODY MASS INDEX: 35.56 KG/M2

## 2022-11-01 DIAGNOSIS — L81.4 SOLAR LENTIGO: ICD-10-CM

## 2022-11-01 DIAGNOSIS — I10 ESSENTIAL HYPERTENSION: ICD-10-CM

## 2022-11-01 DIAGNOSIS — D18.01 CHERRY ANGIOMA: ICD-10-CM

## 2022-11-01 DIAGNOSIS — D69.2 PIGMENTED PURPURA (HCC): ICD-10-CM

## 2022-11-01 DIAGNOSIS — D22.70 MULTIPLE BENIGN MELANOCYTIC NEVI OF UPPER AND LOWER EXTREMITIES AND TRUNK: Primary | ICD-10-CM

## 2022-11-01 DIAGNOSIS — D22.5 MULTIPLE BENIGN MELANOCYTIC NEVI OF UPPER AND LOWER EXTREMITIES AND TRUNK: Primary | ICD-10-CM

## 2022-11-01 DIAGNOSIS — D22.60 MULTIPLE BENIGN MELANOCYTIC NEVI OF UPPER AND LOWER EXTREMITIES AND TRUNK: Primary | ICD-10-CM

## 2022-11-01 DIAGNOSIS — L82.1 SEBORRHEIC KERATOSIS: ICD-10-CM

## 2022-11-01 NOTE — PROGRESS NOTES
[facility]     Patient Name: Kathi Lowry  Encounter Date: 11/1/22     Have you been cared for by a [place] [id number] Dermatologist in the last 3 years and, if so, which description applies to you? Yes  I have been here within the last 3 years, and my medical history has NOT changed since that time  I am FEMALE/of child-bearing potential     REVIEW OF SYSTEMS:  Have you recently had or currently have any of the following? · No changes in my recent health  PAST MEDICAL HISTORY:  Have you personally ever had or currently have any of the following? If "YES," then please provide more detail  · No changes in my medical history  FAMILY HISTORY:  Any "first degree relatives" (parent, brother, sister, or child) with the following? • No changes in my family's known health  PATIENT EXPERIENCE:    • Do you want the Dermatologist to perform a COMPLETE skin exam today including a clinical examination under the "bra and underwear" areas? Yes  • If necessary, do we have your permission to call and leave a detailed message on your Preferred Phone number that includes your specific medical information?   Yes      Allergies   Allergen Reactions   • Orange (Diagnostic) - Food Allergy Anaphylaxis     Throat closes   • Pantoprazole Headache   • Penicillins Other (See Comments)     During allergy testing instructed to NEVER take PCN   • Gluten Meal - Food Allergy      Following a gluten free diet on her own   • Lactose - Food Allergy Diarrhea      Current Outpatient Medications:   •  atorvastatin (LIPITOR) 20 mg tablet, TAKE 1 TABLET BY MOUTH  DAILY AT BEDTIME, Disp: 90 tablet, Rfl: 1  •  bumetanide (BUMEX) 1 mg tablet, TAKE 1 TABLET BY MOUTH  DAILY, Disp: 90 tablet, Rfl: 1  •  Calcium Carb-Cholecalciferol 600-1000 MG-UNIT CAPS, Take by mouth every morning gummy , Disp: , Rfl:   •  clotrimazole-betamethasone (LOTRISONE) 1-0 05 % cream, Apply topically 2 (two) times a day, Disp: 45 g, Rfl: 0  • flecainide (TAMBOCOR) 100 mg tablet, Take 1 tablet (100 mg total) by mouth 2 (two) times a day, Disp: 180 tablet, Rfl: 3  •  Lactobacillus (PROBIOTIC ACIDOPHILUS PO), Take by mouth, Disp: , Rfl:   •  levothyroxine 50 mcg tablet, TAKE 1 TABLET BY MOUTH  DAILY, Disp: 90 tablet, Rfl: 1  •  losartan (COZAAR) 25 mg tablet, Take 1 tablet (25 mg total) by mouth daily, Disp: 90 tablet, Rfl: 3  •  metoprolol succinate (TOPROL-XL) 25 mg 24 hr tablet, Take 1 tablet (25 mg total) by mouth 2 (two) times a day, Disp: 180 tablet, Rfl: 3  •  omeprazole (PriLOSEC) 40 MG capsule, TAKE 1 CAPSULE BY MOUTH  TWICE DAILY, Disp: 180 capsule, Rfl: 3  •  potassium chloride (Klor-Con) 10 mEq tablet, TAKE TAKE 1 TABLET BY MOUTH DAILY, Disp: 90 tablet, Rfl: 1  •  apixaban (Eliquis) 5 mg, Take 1 tablet (5 mg total) by mouth 2 (two) times a day, Disp: 180 tablet, Rfl: 3          • Whom besides the patient is providing clinical information about today's encounter?   o NO ADDITIONAL HISTORIAN (patient alone provided history)    Physical Exam and Assessment/Plan by Diagnosis:    MELANOCYTIC NEVI ("Moles")    Physical Exam:  • Anatomic Location Affected:   Mostly on sun-exposed areas of the trunk and extremities  • Morphological Description:  Scattered, 1-4mm round to ovoid, symmetrical-appearing, even bordered, skin colored to dark brown macules/papules, mostly in sun-exposed areas  • Pertinent Positives:  • Pertinent Negatives:     Additional History of Present Condition:      Assessment and Plan:  Based on a thorough discussion of this condition and the management approach to it (including a comprehensive discussion of the known risks, side effects and potential benefits of treatment), the patient (family) agrees to implement the following specific plan:  • When outside we recommend using a wide brim hat, sunglasses, long sleeve and pants, sunscreen with SPF 90+ with reapplication every 2 hours, or SPF specific clothing   • Benign, reassured  • Annual skin check     Melanocytic Nevi  Melanocytic nevi ("moles") are tan or brown, raised or flat areas of the skin which have an increased number of melanocytes  Melanocytes are the cells in our body which make pigment and account for skin color  Some moles are present at birth (I e , "congenital nevi"), while others come up later in life (i e , "acquired nevi")  The sun can stimulate the body to make more moles  Sunburns are not the only thing that triggers more moles  Chronic sun exposure can do it too  Clinically distinguishing a healthy mole from melanoma may be difficult, even for experienced dermatologists  The "ABCDE's" of moles have been suggested as a means of helping to alert a person to a suspicious mole and the possible increased risk of melanoma  The suggestions for raising alert are as follows:    Asymmetry: Healthy moles tend to be symmetric, while melanomas are often asymmetric  Asymmetry means if you draw a line through the mole, the two halves do not match in color, size, shape, or surface texture  Asymmetry can be a result of rapid enlargement of a mole, the development of a raised area on a previously flat lesion, scaling, ulceration, bleeding or scabbing within the mole  Any mole that starts to demonstrate "asymmetry" should be examined promptly by a board certified dermatologist      Border: Healthy moles tend to have discrete, even borders  The border of a melanoma often blends into the normal skin and does not sharply delineate the mole from normal skin  Any mole that starts to demonstrate "uneven borders" should be examined promptly by a board certified dermatologist      Color: Healthy moles tend to be one color throughout  Melanomas tend to be made up of different colors ranging from dark black, blue, white, or red    Any mole that demonstrates a color change should be examined promptly by a board certified dermatologist      Diameter: Healthy moles tend to be smaller than 0 6 cm in size; an exception are "congenital nevi" that can be larger  Melanomas tend to grow and can often be greater than 0 6 cm (1/4 of an inch, or the size of a pencil eraser)  This is only a guideline, and many normal moles may be larger than 0 6 cm without being unhealthy  Any mole that starts to change in size (small to bigger or bigger to smaller) should be examined promptly by a board certified dermatologist      Evolving: Healthy moles tend to "stay the same "  Melanomas may often show signs of change or evolution such as a change in size, shape, color, or elevation  Any mole that starts to itch, bleed, crust, burn, hurt, or ulcerate or demonstrate a change or evolution should be examined promptly by a board certified dermatologist         LENTIGO    Physical Exam:  • Anatomic Location Affected:  Mostly sun exposed areas  • Morphological Description:  Light brown macules  • Pertinent Positives:  • Pertinent Negatives: Additional History of Present Condition:      Assessment and Plan:  Based on a thorough discussion of this condition and the management approach to it (including a comprehensive discussion of the known risks, side effects and potential benefits of treatment), the patient (family) agrees to implement the following specific plan:  • When outside we recommend using a wide brim hat, sunglasses, long sleeve and pants, sunscreen with SPF 76+ with reapplication every 2 hours, or SPF specific clothing       What is a lentigo? A lentigo is a pigmented flat or slightly raised lesion with a clearly defined edge  Unlike an ephelis (freckle), it does not fade in the winter months  There are several kinds of lentigo  The name lentigo originally referred to its appearance resembling a small lentil  The plural of lentigo is lentigines, although “lentigos” is also in common use  Who gets lentigines? Lentigines can affect males and females of all ages and races   Solar lentigines are especially prevalent in fair skinned adults  Lentigines associated with syndromes are present at birth or arise during childhood  What causes lentigines? Common forms of lentigo are due to exposure to ultraviolet radiation:  • Sun damage including sunburn   • Indoor tanning   • Phototherapy, especially photochemotherapy (PUVA)    Ionizing radiation, eg radiation therapy, can also cause lentigines  Several familial syndromes associated with widespread lentigines originate from mutations in Pepe-MAP kinase, mTOR signaling and PTEN pathways  What is the treatment for lentigines? Most lentigines are left alone  Attempts to lighten them may not be successful  The following approaches are used:  • SPF 50+ broad-spectrum sunscreen   • Hydroquinone bleaching cream   • Alpha hydroxy acids   • Vitamin C   • Retinoids   • Azelaic acid   • Chemical peels  Individual lesions can be permanently removed using:  • Cryotherapy   • Intense pulsed light   • Pigment lasers    How can lentigines be prevented? Lentigines associated with exposure ultraviolet radiation can be prevented by very careful sun protection  Clothing is more successful at preventing new lentigines than are sunscreens  What is the outlook for lentigines? Lentigines usually persist  They may increase in number with age and sun exposure  Some in sun-protected sites may fade and disappear  PEARSON ANGIOMAS    Physical Exam:  • Anatomic Location Affected:  trunk  • Morphological Description:  Scattered cherry red, 1-4 mm papules  • Pertinent Positives:  • Pertinent Negatives:     Additional History of Present Condition:      Assessment and Plan:  Based on a thorough discussion of this condition and the management approach to it (including a comprehensive discussion of the known risks, side effects and potential benefits of treatment), the patient (family) agrees to implement the following specific plan:  • Monitor for changes  • Benign, reassured  •     Assessment and Plan:    Cherry angioma, also known as [employer] spots, are benign vascular skin lesions  A "cherry angioma" is a firm red, blue or purple papule, 0 1-1 cm in diameter  When thrombosed, they can appear black in colour until evaluated with a dermatoscope when the red or purple colour is more easily seen  Cherry angioma may develop on any part of the body but most often appear on the scalp, face, lips and trunk  An angioma is due to proliferating endothelial cells; these are the cells that line the inside of a blood vessel  Angiomas can arise in early life or later in life; the most common type of angioma is a cherry angioma  Cherry angiomas are very common in males and females of any age or race  They are more noticeable in white skin than in skin of colour  They markedly increase in number from about the age of 36  There may be a family history of similar lesions  Eruptive cherry angiomas have been rarely reported to be associated with internal malignancy  The cause of angiomas is unknown  Genetic analysis of cherry angiomas has shown that they frequently carry specific somatic missense mutations in the GNAQ and GNA11 (Q209H) genes, which are involved in other vascular and melanocytic proliferations  SEBORRHEIC KERATOSIS; NON-INFLAMED    Physical Exam:  • Anatomic Location Affected:  trunk  • Morphological Description:  Flat and raised, waxy, smooth to warty textured, yellow to brownish-grey to dark brown to blackish, discrete, "stuck-on" appearing papules  • Pertinent Positives:  • Pertinent Negatives:     Additional History of Present Condition:      Assessment and Plan:  Based on a thorough discussion of this condition and the management approach to it (including a comprehensive discussion of the known risks, side effects and potential benefits of treatment), the patient (family) agrees to implement the following specific plan:  • Monitor for changes  • Benign, reassured  •     Seborrheic Keratosis  A seborrheic keratosis is a harmless warty spot that appears during adult life as a common sign of skin aging  Seborrheic keratoses can arise on any area of skin, covered or uncovered, with the usual exception of the palms and soles  They do not arise from mucous membranes  Seborrheic keratoses can have highly variable appearance  Seborrheic keratoses are extremely common  It has been estimated that over 90% of adults over the age of 61 years have one or more of them  They occur in males and females of all races, typically beginning to erupt in the 35s or 45s  They are uncommon under the age of 21 years  The precise cause of seborrhoeic keratoses is not known  Seborrhoeic keratoses are considered degenerative in nature  As time goes by, seborrheic keratoses tend to become more numerous  Some people inherit a tendency to develop a very large number of them; some people may have hundreds of them  There is no easy way to remove multiple lesions on a single occasion  Unless a specific lesion is "inflamed" and is causing pain or stinging/burning or is bleeding, most insurance companies do not authorize treatment  MELANOCYTIC NEVI ("Moles")    Physical Exam:  • Anatomic Location Affected:   Mostly on sun-exposed areas of the trunk and extremities  • Morphological Description:  Scattered, 1-4mm round to ovoid, symmetrical-appearing, even bordered, skin colored to dark brown macules/papules, mostly in sun-exposed areas  • Pertinent Positives:  • Pertinent Negatives:     Additional History of Present Condition:      Assessment and Plan:  Based on a thorough discussion of this condition and the management approach to it (including a comprehensive discussion of the known risks, side effects and potential benefits of treatment), the patient (family) agrees to implement the following specific plan:  • When outside we recommend using a wide brim hat, sunglasses, long sleeve and pants, sunscreen with SPF 44+ with reapplication every 2 hours, or SPF specific clothing   • Benign, reassured  • Annual skin check     Melanocytic Nevi  Melanocytic nevi ("moles") are tan or brown, raised or flat areas of the skin which have an increased number of melanocytes  Melanocytes are the cells in our body which make pigment and account for skin color  Some moles are present at birth (I e , "congenital nevi"), while others come up later in life (i e , "acquired nevi")  The sun can stimulate the body to make more moles  Sunburns are not the only thing that triggers more moles  Chronic sun exposure can do it too  Clinically distinguishing a healthy mole from melanoma may be difficult, even for experienced dermatologists  The "ABCDE's" of moles have been suggested as a means of helping to alert a person to a suspicious mole and the possible increased risk of melanoma  The suggestions for raising alert are as follows:    Asymmetry: Healthy moles tend to be symmetric, while melanomas are often asymmetric  Asymmetry means if you draw a line through the mole, the two halves do not match in color, size, shape, or surface texture  Asymmetry can be a result of rapid enlargement of a mole, the development of a raised area on a previously flat lesion, scaling, ulceration, bleeding or scabbing within the mole  Any mole that starts to demonstrate "asymmetry" should be examined promptly by a board certified dermatologist      Border: Healthy moles tend to have discrete, even borders  The border of a melanoma often blends into the normal skin and does not sharply delineate the mole from normal skin  Any mole that starts to demonstrate "uneven borders" should be examined promptly by a board certified dermatologist      Color: Healthy moles tend to be one color throughout  Melanomas tend to be made up of different colors ranging from dark black, blue, white, or red    Any mole that demonstrates a color change should be examined promptly by a board certified dermatologist      Diameter: Healthy moles tend to be smaller than 0 6 cm in size; an exception are "congenital nevi" that can be larger  Melanomas tend to grow and can often be greater than 0 6 cm (1/4 of an inch, or the size of a pencil eraser)  This is only a guideline, and many normal moles may be larger than 0 6 cm without being unhealthy  Any mole that starts to change in size (small to bigger or bigger to smaller) should be examined promptly by a board certified dermatologist      Evolving: Healthy moles tend to "stay the same "  Melanomas may often show signs of change or evolution such as a change in size, shape, color, or elevation  Any mole that starts to itch, bleed, crust, burn, hurt, or ulcerate or demonstrate a change or evolution should be examined promptly by a board certified dermatologist         LENTIGO    Physical Exam:  • Anatomic Location Affected:  Mostly sun exposed areas  • Morphological Description:  Light brown macules  • Pertinent Positives:  • Pertinent Negatives: Additional History of Present Condition:      Assessment and Plan:  Based on a thorough discussion of this condition and the management approach to it (including a comprehensive discussion of the known risks, side effects and potential benefits of treatment), the patient (family) agrees to implement the following specific plan:  • When outside we recommend using a wide brim hat, sunglasses, long sleeve and pants, sunscreen with SPF 89+ with reapplication every 2 hours, or SPF specific clothing       What is a lentigo? A lentigo is a pigmented flat or slightly raised lesion with a clearly defined edge  Unlike an ephelis (freckle), it does not fade in the winter months  There are several kinds of lentigo  The name lentigo originally referred to its appearance resembling a small lentil   The plural of lentigo is lentigines, although “lentigos” is also in common use     Who gets lentigines? Lentigines can affect males and females of all ages and races  Solar lentigines are especially prevalent in fair skinned adults  Lentigines associated with syndromes are present at birth or arise during childhood  What causes lentigines? Common forms of lentigo are due to exposure to ultraviolet radiation:  • Sun damage including sunburn   • Indoor tanning   • Phototherapy, especially photochemotherapy (PUVA)    Ionizing radiation, eg radiation therapy, can also cause lentigines  Several familial syndromes associated with widespread lentigines originate from mutations in Pepe-MAP kinase, mTOR signaling and PTEN pathways  What is the treatment for lentigines? Most lentigines are left alone  Attempts to lighten them may not be successful  The following approaches are used:  • SPF 50+ broad-spectrum sunscreen   • Hydroquinone bleaching cream   • Alpha hydroxy acids   • Vitamin C   • Retinoids   • Azelaic acid   • Chemical peels  Individual lesions can be permanently removed using:  • Cryotherapy   • Intense pulsed light   • Pigment lasers    How can lentigines be prevented? Lentigines associated with exposure ultraviolet radiation can be prevented by very careful sun protection  Clothing is more successful at preventing new lentigines than are sunscreens  What is the outlook for lentigines? Lentigines usually persist  They may increase in number with age and sun exposure  Some in sun-protected sites may fade and disappear  PEARSON ANGIOMAS    Physical Exam:  • Anatomic Location Affected:  trunk  • Morphological Description:  Scattered cherry red, 1-4 mm papules  • Pertinent Positives:  • Pertinent Negatives:     Additional History of Present Condition:      Assessment and Plan:  Based on a thorough discussion of this condition and the management approach to it (including a comprehensive discussion of the known risks, side effects and potential benefits of treatment), the patient (family) agrees to implement the following specific plan:  • Monitor for changes  • Benign, reassured  •     Assessment and Plan:    Cherry angioma, also known as [employer] spots, are benign vascular skin lesions  A "cherry angioma" is a firm red, blue or purple papule, 0 1-1 cm in diameter  When thrombosed, they can appear black in colour until evaluated with a dermatoscope when the red or purple colour is more easily seen  Cherry angioma may develop on any part of the body but most often appear on the scalp, face, lips and trunk  An angioma is due to proliferating endothelial cells; these are the cells that line the inside of a blood vessel  Angiomas can arise in early life or later in life; the most common type of angioma is a cherry angioma  Cherry angiomas are very common in males and females of any age or race  They are more noticeable in white skin than in skin of colour  They markedly increase in number from about the age of 36  There may be a family history of similar lesions  Eruptive cherry angiomas have been rarely reported to be associated with internal malignancy  The cause of angiomas is unknown  Genetic analysis of cherry angiomas has shown that they frequently carry specific somatic missense mutations in the GNAQ and GNA11 (Q209H) genes, which are involved in other vascular and melanocytic proliferations  SEBORRHEIC KERATOSIS; NON-INFLAMED    Physical Exam:  • Anatomic Location Affected:  trunk  • Morphological Description:  Flat and raised, waxy, smooth to warty textured, yellow to brownish-grey to dark brown to blackish, discrete, "stuck-on" appearing papules  • Pertinent Positives:  • Pertinent Negatives:     Additional History of Present Condition:      Assessment and Plan:  Based on a thorough discussion of this condition and the management approach to it (including a comprehensive discussion of the known risks, side effects and potential benefits of treatment), the patient (family) agrees to implement the following specific plan:  • Monitor for changes  • Benign, reassured  •     Seborrheic Keratosis  A seborrheic keratosis is a harmless warty spot that appears during adult life as a common sign of skin aging  Seborrheic keratoses can arise on any area of skin, covered or uncovered, with the usual exception of the palms and soles  They do not arise from mucous membranes  Seborrheic keratoses can have highly variable appearance  Seborrheic keratoses are extremely common  It has been estimated that over 90% of adults over the age of 61 years have one or more of them  They occur in males and females of all races, typically beginning to erupt in the 35s or 45s  They are uncommon under the age of 21 years  The precise cause of seborrhoeic keratoses is not known  Seborrhoeic keratoses are considered degenerative in nature  As time goes by, seborrheic keratoses tend to become more numerous  Some people inherit a tendency to develop a very large number of them; some people may have hundreds of them  There is no easy way to remove multiple lesions on a single occasion  Unless a specific lesion is "inflamed" and is causing pain or stinging/burning or is bleeding, most insurance companies do not authorize treatment        Scribe Attestation    I,:  Rodolfo Epstein am acting as a scribe while in the presence of the attending physician :       I,:  Alix Betancourt MD personally performed the services described in this documentation    as scribed in my presence :

## 2022-11-01 NOTE — PATIENT INSTRUCTIONS
MELANOCYTIC NEVI ("Moles")    Physical Exam:  Anatomic Location Affected:   Mostly on sun-exposed areas of the trunk and extremities  Morphological Description:  Scattered, 1-4mm round to ovoid, symmetrical-appearing, even bordered, skin colored to dark brown macules/papules, mostly in sun-exposed areas  Pertinent Positives:  Pertinent Negatives: Additional History of Present Condition:      Assessment and Plan:  Based on a thorough discussion of this condition and the management approach to it (including a comprehensive discussion of the known risks, side effects and potential benefits of treatment), the patient (family) agrees to implement the following specific plan:  When outside we recommend using a wide brim hat, sunglasses, long sleeve and pants, sunscreen with SPF 72+ with reapplication every 2 hours, or SPF specific clothing   Benign, reassured  Annual skin check     Melanocytic Nevi  Melanocytic nevi ("moles") are tan or brown, raised or flat areas of the skin which have an increased number of melanocytes  Melanocytes are the cells in our body which make pigment and account for skin color  Some moles are present at birth (I e , "congenital nevi"), while others come up later in life (i e , "acquired nevi")  The sun can stimulate the body to make more moles  Sunburns are not the only thing that triggers more moles  Chronic sun exposure can do it too  Clinically distinguishing a healthy mole from melanoma may be difficult, even for experienced dermatologists  The "ABCDE's" of moles have been suggested as a means of helping to alert a person to a suspicious mole and the possible increased risk of melanoma  The suggestions for raising alert are as follows:    Asymmetry: Healthy moles tend to be symmetric, while melanomas are often asymmetric  Asymmetry means if you draw a line through the mole, the two halves do not match in color, size, shape, or surface texture   Asymmetry can be a result of rapid enlargement of a mole, the development of a raised area on a previously flat lesion, scaling, ulceration, bleeding or scabbing within the mole  Any mole that starts to demonstrate "asymmetry" should be examined promptly by a board certified dermatologist      Border: Healthy moles tend to have discrete, even borders  The border of a melanoma often blends into the normal skin and does not sharply delineate the mole from normal skin  Any mole that starts to demonstrate "uneven borders" should be examined promptly by a board certified dermatologist      Color: Healthy moles tend to be one color throughout  Melanomas tend to be made up of different colors ranging from dark black, blue, white, or red  Any mole that demonstrates a color change should be examined promptly by a board certified dermatologist      Diameter: Healthy moles tend to be smaller than 0 6 cm in size; an exception are "congenital nevi" that can be larger  Melanomas tend to grow and can often be greater than 0 6 cm (1/4 of an inch, or the size of a pencil eraser)  This is only a guideline, and many normal moles may be larger than 0 6 cm without being unhealthy  Any mole that starts to change in size (small to bigger or bigger to smaller) should be examined promptly by a board certified dermatologist      Evolving: Healthy moles tend to "stay the same "  Melanomas may often show signs of change or evolution such as a change in size, shape, color, or elevation  Any mole that starts to itch, bleed, crust, burn, hurt, or ulcerate or demonstrate a change or evolution should be examined promptly by a board certified dermatologist         LENTIGO    Physical Exam:  Anatomic Location Affected:  Mostly sun exposed areas  Morphological Description:  Light brown macules  Pertinent Positives:  Pertinent Negatives:     Additional History of Present Condition:      Assessment and Plan:  Based on a thorough discussion of this condition and the management approach to it (including a comprehensive discussion of the known risks, side effects and potential benefits of treatment), the patient (family) agrees to implement the following specific plan:  When outside we recommend using a wide brim hat, sunglasses, long sleeve and pants, sunscreen with SPF 55+ with reapplication every 2 hours, or SPF specific clothing       What is a lentigo? A lentigo is a pigmented flat or slightly raised lesion with a clearly defined edge  Unlike an ephelis (freckle), it does not fade in the winter months  There are several kinds of lentigo  The name lentigo originally referred to its appearance resembling a small lentil  The plural of lentigo is lentigines, although “lentigos” is also in common use  Who gets lentigines? Lentigines can affect males and females of all ages and races  Solar lentigines are especially prevalent in fair skinned adults  Lentigines associated with syndromes are present at birth or arise during childhood  What causes lentigines? Common forms of lentigo are due to exposure to ultraviolet radiation:  Sun damage including sunburn   Indoor tanning   Phototherapy, especially photochemotherapy (PUVA)    Ionizing radiation, eg radiation therapy, can also cause lentigines  Several familial syndromes associated with widespread lentigines originate from mutations in Pepe-MAP kinase, mTOR signaling and PTEN pathways  What is the treatment for lentigines? Most lentigines are left alone  Attempts to lighten them may not be successful  The following approaches are used:  SPF 50+ broad-spectrum sunscreen   Hydroquinone bleaching cream   Alpha hydroxy acids   Vitamin C   Retinoids   Azelaic acid   Chemical peels  Individual lesions can be permanently removed using:  Cryotherapy   Intense pulsed light   Pigment lasers    How can lentigines be prevented?   Lentigines associated with exposure ultraviolet radiation can be prevented by very careful sun protection  Clothing is more successful at preventing new lentigines than are sunscreens  What is the outlook for lentigines? Lentigines usually persist  They may increase in number with age and sun exposure  Some in sun-protected sites may fade and disappear  PEARSON ANGIOMAS    Assessment and Plan:  Based on a thorough discussion of this condition and the management approach to it (including a comprehensive discussion of the known risks, side effects and potential benefits of treatment), the patient (family) agrees to implement the following specific plan:  Monitor for changes  Benign, reassured      Assessment and Plan:    Cherry angioma, also known as [place] Lint spots, are benign vascular skin lesions  A "cherry angioma" is a firm red, blue or purple papule, 0 1-1 cm in diameter  When thrombosed, they can appear black in colour until evaluated with a dermatoscope when the red or purple colour is more easily seen  Cherry angioma may develop on any part of the body but most often appear on the scalp, face, lips and trunk  An angioma is due to proliferating endothelial cells; these are the cells that line the inside of a blood vessel  Angiomas can arise in early life or later in life; the most common type of angioma is a cherry angioma  Cherry angiomas are very common in males and females of any age or race  They are more noticeable in white skin than in skin of colour  They markedly increase in number from about the age of 36  There may be a family history of similar lesions  Eruptive cherry angiomas have been rarely reported to be associated with internal malignancy  The cause of angiomas is unknown  Genetic analysis of cherry angiomas has shown that they frequently carry specific somatic missense mutations in the GNAQ and GNA11 (Q209H) genes, which are involved in other vascular and melanocytic proliferations        SEBORRHEIC KERATOSIS; NON-INFLAMED    Assessment and Plan:  Based on a thorough discussion of this condition and the management approach to it (including a comprehensive discussion of the known risks, side effects and potential benefits of treatment), the patient (family) agrees to implement the following specific plan:  Monitor for changes  Benign, reassured      Seborrheic Keratosis  A seborrheic keratosis is a harmless warty spot that appears during adult life as a common sign of skin aging  Seborrheic keratoses can arise on any area of skin, covered or uncovered, with the usual exception of the palms and soles  They do not arise from mucous membranes  Seborrheic keratoses can have highly variable appearance  Seborrheic keratoses are extremely common  It has been estimated that over 90% of adults over the age of 61 years have one or more of them  They occur in males and females of all races, typically beginning to erupt in the 35s or 45s  They are uncommon under the age of 21 years  The precise cause of seborrhoeic keratoses is not known  Seborrhoeic keratoses are considered degenerative in nature  As time goes by, seborrheic keratoses tend to become more numerous  Some people inherit a tendency to develop a very large number of them; some people may have hundreds of them  There is no easy way to remove multiple lesions on a single occasion  Unless a specific lesion is "inflamed" and is causing pain or stinging/burning or is bleeding, most insurance companies do not authorize treatment  MELANOCYTIC NEVI ("Moles")    Physical Exam:  Anatomic Location Affected:   Mostly on sun-exposed areas of the trunk and extremities  Morphological Description:  Scattered, 1-4mm round to ovoid, symmetrical-appearing, even bordered, skin colored to dark brown macules/papules, mostly in sun-exposed areas  Pertinent Positives:  Pertinent Negatives:     Additional History of Present Condition:      Assessment and Plan:  Based on a thorough discussion of this condition and the management approach to it (including a comprehensive discussion of the known risks, side effects and potential benefits of treatment), the patient (family) agrees to implement the following specific plan:  When outside we recommend using a wide brim hat, sunglasses, long sleeve and pants, sunscreen with SPF 51+ with reapplication every 2 hours, or SPF specific clothing   Benign, reassured  Annual skin check     Melanocytic Nevi  Melanocytic nevi ("moles") are tan or brown, raised or flat areas of the skin which have an increased number of melanocytes  Melanocytes are the cells in our body which make pigment and account for skin color  Some moles are present at birth (I e , "congenital nevi"), while others come up later in life (i e , "acquired nevi")  The sun can stimulate the body to make more moles  Sunburns are not the only thing that triggers more moles  Chronic sun exposure can do it too  Clinically distinguishing a healthy mole from melanoma may be difficult, even for experienced dermatologists  The "ABCDE's" of moles have been suggested as a means of helping to alert a person to a suspicious mole and the possible increased risk of melanoma  The suggestions for raising alert are as follows:    Asymmetry: Healthy moles tend to be symmetric, while melanomas are often asymmetric  Asymmetry means if you draw a line through the mole, the two halves do not match in color, size, shape, or surface texture  Asymmetry can be a result of rapid enlargement of a mole, the development of a raised area on a previously flat lesion, scaling, ulceration, bleeding or scabbing within the mole  Any mole that starts to demonstrate "asymmetry" should be examined promptly by a board certified dermatologist      Border: Healthy moles tend to have discrete, even borders  The border of a melanoma often blends into the normal skin and does not sharply delineate the mole from normal skin    Any mole that starts to demonstrate "uneven borders" should be examined promptly by a board certified dermatologist      Color: Healthy moles tend to be one color throughout  Melanomas tend to be made up of different colors ranging from dark black, blue, white, or red  Any mole that demonstrates a color change should be examined promptly by a board certified dermatologist      Diameter: Healthy moles tend to be smaller than 0 6 cm in size; an exception are "congenital nevi" that can be larger  Melanomas tend to grow and can often be greater than 0 6 cm (1/4 of an inch, or the size of a pencil eraser)  This is only a guideline, and many normal moles may be larger than 0 6 cm without being unhealthy  Any mole that starts to change in size (small to bigger or bigger to smaller) should be examined promptly by a board certified dermatologist      Evolving: Healthy moles tend to "stay the same "  Melanomas may often show signs of change or evolution such as a change in size, shape, color, or elevation  Any mole that starts to itch, bleed, crust, burn, hurt, or ulcerate or demonstrate a change or evolution should be examined promptly by a board certified dermatologist         LENTIGO    Physical Exam:  Anatomic Location Affected:  Mostly sun exposed areas  Morphological Description:  Light brown macules  Pertinent Positives:  Pertinent Negatives: Additional History of Present Condition:      Assessment and Plan:  Based on a thorough discussion of this condition and the management approach to it (including a comprehensive discussion of the known risks, side effects and potential benefits of treatment), the patient (family) agrees to implement the following specific plan:  When outside we recommend using a wide brim hat, sunglasses, long sleeve and pants, sunscreen with SPF 90+ with reapplication every 2 hours, or SPF specific clothing       What is a lentigo? A lentigo is a pigmented flat or slightly raised lesion with a clearly defined edge   Unlike an ephelis (freckle), it does not fade in the winter months  There are several kinds of lentigo  The name lentigo originally referred to its appearance resembling a small lentil  The plural of lentigo is lentigines, although “lentigos” is also in common use  Who gets lentigines? Lentigines can affect males and females of all ages and races  Solar lentigines are especially prevalent in fair skinned adults  Lentigines associated with syndromes are present at birth or arise during childhood  What causes lentigines? Common forms of lentigo are due to exposure to ultraviolet radiation:  Sun damage including sunburn   Indoor tanning   Phototherapy, especially photochemotherapy (PUVA)    Ionizing radiation, eg radiation therapy, can also cause lentigines  Several familial syndromes associated with widespread lentigines originate from mutations in Pepe-MAP kinase, mTOR signaling and PTEN pathways  What is the treatment for lentigines? Most lentigines are left alone  Attempts to lighten them may not be successful  The following approaches are used:  SPF 50+ broad-spectrum sunscreen   Hydroquinone bleaching cream   Alpha hydroxy acids   Vitamin C   Retinoids   Azelaic acid   Chemical peels  Individual lesions can be permanently removed using:  Cryotherapy   Intense pulsed light   Pigment lasers    How can lentigines be prevented? Lentigines associated with exposure ultraviolet radiation can be prevented by very careful sun protection  Clothing is more successful at preventing new lentigines than are sunscreens  What is the outlook for lentigines? Lentigines usually persist  They may increase in number with age and sun exposure  Some in sun-protected sites may fade and disappear  PEARSON ANGIOMAS    Physical Exam:  Anatomic Location Affected:  trunk  Morphological Description:  Scattered cherry red, 1-4 mm papules  Pertinent Positives:  Pertinent Negatives:     Additional History of Present Condition: Assessment and Plan:  Based on a thorough discussion of this condition and the management approach to it (including a comprehensive discussion of the known risks, side effects and potential benefits of treatment), the patient (family) agrees to implement the following specific plan:  Monitor for changes  Benign, reassured      Assessment and Plan:    Cherry angioma, also known as [place] Males spots, are benign vascular skin lesions  A "cherry angioma" is a firm red, blue or purple papule, 0 1-1 cm in diameter  When thrombosed, they can appear black in colour until evaluated with a dermatoscope when the red or purple colour is more easily seen  Cherry angioma may develop on any part of the body but most often appear on the scalp, face, lips and trunk  An angioma is due to proliferating endothelial cells; these are the cells that line the inside of a blood vessel  Angiomas can arise in early life or later in life; the most common type of angioma is a cherry angioma  Cherry angiomas are very common in males and females of any age or race  They are more noticeable in white skin than in skin of colour  They markedly increase in number from about the age of 36  There may be a family history of similar lesions  Eruptive cherry angiomas have been rarely reported to be associated with internal malignancy  The cause of angiomas is unknown  Genetic analysis of cherry angiomas has shown that they frequently carry specific somatic missense mutations in the GNAQ and GNA11 (Q209H) genes, which are involved in other vascular and melanocytic proliferations        SEBORRHEIC KERATOSIS; NON-INFLAMED      Assessment and Plan:  Based on a thorough discussion of this condition and the management approach to it (including a comprehensive discussion of the known risks, side effects and potential benefits of treatment), the patient (family) agrees to implement the following specific plan:  Monitor for changes  Benign, reassured      Seborrheic Keratosis  A seborrheic keratosis is a harmless warty spot that appears during adult life as a common sign of skin aging  Seborrheic keratoses can arise on any area of skin, covered or uncovered, with the usual exception of the palms and soles  They do not arise from mucous membranes  Seborrheic keratoses can have highly variable appearance  Seborrheic keratoses are extremely common  It has been estimated that over 90% of adults over the age of 61 years have one or more of them  They occur in males and females of all races, typically beginning to erupt in the 35s or 45s  They are uncommon under the age of 21 years  The precise cause of seborrhoeic keratoses is not known  Seborrhoeic keratoses are considered degenerative in nature  As time goes by, seborrheic keratoses tend to become more numerous  Some people inherit a tendency to develop a very large number of them; some people may have hundreds of them  There is no easy way to remove multiple lesions on a single occasion  Unless a specific lesion is "inflamed" and is causing pain or stinging/burning or is bleeding, most insurance companies do not authorize treatment

## 2022-11-02 ENCOUNTER — EVALUATION (OUTPATIENT)
Dept: PHYSICAL THERAPY | Facility: CLINIC | Age: 72
End: 2022-11-02

## 2022-11-02 ENCOUNTER — HOSPITAL ENCOUNTER (OUTPATIENT)
Dept: RADIOLOGY | Facility: HOSPITAL | Age: 72
Discharge: HOME | End: 2022-11-02
Attending: FAMILY MEDICINE

## 2022-11-02 VITALS — BODY MASS INDEX: 35.49 KG/M2 | WEIGHT: 213 LBS | HEIGHT: 65 IN

## 2022-11-02 DIAGNOSIS — Z12.31 SCREENING MAMMOGRAM FOR HIGH-RISK PATIENT: ICD-10-CM

## 2022-11-02 DIAGNOSIS — S82.832D OTHER CLOSED FRACTURE OF DISTAL END OF LEFT FIBULA WITH ROUTINE HEALING, SUBSEQUENT ENCOUNTER: Primary | ICD-10-CM

## 2022-11-02 RX ORDER — POTASSIUM CHLORIDE 750 MG/1
TABLET, FILM COATED, EXTENDED RELEASE ORAL
Qty: 90 TABLET | Refills: 1 | Status: SHIPPED | OUTPATIENT
Start: 2022-11-02

## 2022-11-02 NOTE — PROGRESS NOTES
PT Re-Evaluation     Today's date: 2022  Patient name: Pola Sandifer  : 1950  MRN: [id number]  Referring provider: Maryuri Lovelace PA-C  Dx:   Encounter Diagnosis     ICD-10-CM    1  Other closed fracture of distal end of left fibula with routine healing, subsequent encounter  U65 [id number]                   Assessment  Assessment details: Pola Sandifer has remaining pain, decreased LE range of motion, decreased LE strength, impaired function, decreased activity tolerance, poor balance, swelling  and poor body mechanics  Patient's clinical presentation is consistent with their referring diagnosis of Closed fracture of distal end of left fibula with routine healing, unspecified fracture morphology, subsequent encounter  The pt presents with functional limitations of ADLs, recreational activities, participation in social activities, ambulation, performing household chores and stair negotiation  Pt would benefit from continued physical therapy services to address these limitations and maximize function  Impairments: abnormal gait, abnormal muscle firing, abnormal muscle tone, abnormal or restricted ROM, abnormal movement, activity intolerance, impaired balance, impaired physical strength, lacks appropriate home exercise program, pain with function, weight-bearing intolerance, poor posture  and poor body mechanics  Understanding of Dx/Px/POC: good   Prognosis: good    Goals  Short term goals  (3 weeks)  1  Patient will have no pain left ankle  -  PARTIALLY MET  2   Patient will have full range of motion left ankle  -  NOT MET  3  Patient will report a 50% improvement with activities of daily living --  MET  4  Patient will decrease girth of left ankle by 1 cm  --  MET    Long term goals - (6 weeks)  1  Patient will be independent with home exercise program  --  PARTIALLY MET  2  Patient will have no gait deviations ambulating on level surfaces  --  PARTIALLY MET  3    Patient will report 80 % improvement with activity of daily living function  --  NOT MET  4  Patient will negotiate stairs up and down pain free with use of one railing  --  NOT MET  5  Patient SLS to be equal bilaterally  --  NOT MET      Plan  Patient would benefit from: skilled physical therapy  Planned modality interventions: thermotherapy: hydrocollator packs and cryotherapy  Planned therapy interventions: ADL training, balance/weight bearing training, joint mobilization, manual therapy, massage, neuromuscular re-education, patient education, postural training, strengthening, stretching, functional ROM exercises, therapeutic activities, therapeutic exercise, gait training and home exercise program  Frequency: 2x week  Duration in visits: 8  Duration in weeks: 4  Treatment plan discussed with: patient        Subjective Evaluation    History of Present Illness  Date of surgery: 2022  Mechanism of injury: She reports that she still has noted swelling left ankle and foot  Overall she feels improvement but has not been able to attend PT or perform HEP as aften recently due to a death in the family and then catching Covid  Her largest deficits include swelling , some soreness and anterior ankle tightness  Pain  Current pain ratin  At best pain ratin  At worst pain ratin  Quality: burning, sharp and dull ache  Relieving factors: ice and medications  Aggravating factors: standing and walking    Social Support    Employment status: not working  Exercise history: House work    Patient Goals  Patient goals for therapy: decreased pain and independence with ADLs/IADLs  Patient's goals regarding treatment: walk again normally  Objective     Palpation   Left   Hypertonic in the lateral gastrocnemius and medial gastrocnemius  Tenderness of the lateral gastrocnemius and medial gastrocnemius  Tenderness   Left Ankle/Foot   Tenderness in the lateral malleolus       Active Range of Motion   Left Ankle/Foot   Dorsiflexion (ke): 8 degrees   Plantar flexion: 40 degrees   Inversion: 20 degrees   Eversion: 10 degrees     Right Ankle/Foot   Dorsiflexion (ke): 10 degrees   Plantar flexion: 51 degrees   Inversion: 36 degrees   Eversion: 11 degrees     Strength/Myotome Testing     Left Ankle/Foot   Dorsiflexion: 4-  Plantar flexion: 4-  Inversion: 4-  Eversion: 4-    Right Ankle/Foot   Dorsiflexion: 5  Plantar flexion: 5  Inversion: 5  Eversion: 5    Ambulation   Weight-Bearing Status   Weight-Bearing Status (Left): weight-bearing as tolerated     Observational Gait   Gait: antalgic   Decreased left stance time  Additional Observational Gait Details  Mild antalgic pattern with initial walking                    Eval/ Re-eval Auth #/ Referral # Total visits Start date  Expiration date Total active units  Total manual units  PT only or  PT+OT?    8/18/22                                                                Date: 8/18 8/22 8/29/22 9/7 9/12 9/23 11/2     Total units authorized  Active:                     Manual:                   Total units remaining  Active:                         Manual:                           Date:                   Total units authorized  Active:                     Manual:                   Total units remaining  Active:                         Manual:                         Precautions: Asthma, Osteopenia, Afib     Specialty Daily Treatment Diary         Manuals 8/29/22 9/7/22 9/12/22 9/23/22 11/2/22   Visit # 3 4 - FOTO  5 6  7   STM Gastroc/ soleus/ Tib post             PROM ankle 6'  6'  6' 6'  6'   Stretch soleus gastroc 3'  4'  4' 4'  4'                 Warm-up             NuStep --  8 min  8 min 8 min  6 min   Neuro Re-Ed             Wt shift    30         Towel stretch Digit flex / ext AROM  20    --       ABCs      2x 2x      Leg press       20   55#  20  65# bilat  10   35# uni    Ankle stretch at stair          10x w left on wedge                               Ther Ex             Knee ext w df 20  20  20 20  20   Rockerboard 20  30  30 ea 30 ea  30   Towel scrunch      30 --     HR/ TR seated 20  20  20 20     4 way AROM 20 ea  20  YTB  20  YTB 20 ea   YTB     SLR 20  20 20                     Ther Activity              Side step          4 x 10 ft    Step up          4"  10x   Gait Training             w cane During session     No cane at times during session     Sit to stand tech    5 min         Modalities             CP 5 min    5 min 5 min                        Access Code: [email or web address]  URL: [email or web address]  Date: 11/02/2022  Prepared by: Juany Boles    Exercises  · Forward Step Up - 1 x daily - 2 sets - 10 reps  · Standing Ankle Dorsiflexion Stretch on Chair - 1 x daily - 2 sets - 10 reps  · Side Stepping with Counter Support - 1 x daily - 1 sets - 10 reps        Access Code: [email or web address]  URL: [email or web address]  Date: 08/22/2022  Prepared by:  Juany Byfabien    Exercises  · Seated Toe Curl - 1 x daily - 2 sets - 10 reps  · Supine Ankle Pumps - 1 x daily - 2 sets - 10 reps  · Supine Ankle Inversion Eversion AROM - 1 x daily - 2 sets - 10 reps  · Seated Toe Raise - 1 x daily - 2 sets - 10 reps  · Seated Long Arc Quad - 1 x daily - 2 sets - 10 reps  · Active Straight Leg Raise with Quad Set - 1 x daily - 21 sets - 10 reps

## 2022-11-03 ENCOUNTER — ANESTHESIA EVENT (OUTPATIENT)
Dept: NON INVASIVE DIAGNOSTICS | Facility: HOSPITAL | Age: 72
End: 2022-11-03

## 2022-11-03 ENCOUNTER — HOSPITAL ENCOUNTER (OUTPATIENT)
Dept: NON INVASIVE DIAGNOSTICS | Facility: HOSPITAL | Age: 72
Discharge: HOME | End: 2022-11-03

## 2022-11-03 ENCOUNTER — ANESTHESIA (OUTPATIENT)
Dept: NON INVASIVE DIAGNOSTICS | Facility: HOSPITAL | Age: 72
End: 2022-11-03

## 2022-11-03 DIAGNOSIS — I48.19 PERSISTENT ATRIAL FIBRILLATION (HCC): ICD-10-CM

## 2022-11-03 LAB
ATRIAL RATE: 54 BPM
P AXIS: 64 DEGREES
PR INTERVAL: 242 MS
QRS AXIS: -3 DEGREES
QRS AXIS: 3 DEGREES
QRSD INTERVAL: 118 MS
QRSD INTERVAL: 122 MS
QT INTERVAL: 418 MS
QT INTERVAL: 500 MS
QTC INTERVAL: 463 MS
QTC INTERVAL: 474 MS
T WAVE AXIS: 18 DEGREES
T WAVE AXIS: 6 DEGREES
VENTRICULAR RATE: 54 BPM
VENTRICULAR RATE: 74 BPM

## 2022-11-03 RX ORDER — PROPOFOL 10 MG/ML
INJECTION, EMULSION INTRAVENOUS AS NEEDED
Status: DISCONTINUED | OUTPATIENT
Start: 2022-11-03 | End: 2022-11-03

## 2022-11-03 RX ORDER — SODIUM CHLORIDE 9 MG/ML
INJECTION, SOLUTION INTRAVENOUS CONTINUOUS PRN
Status: DISCONTINUED | OUTPATIENT
Start: 2022-11-03 | End: 2022-11-03

## 2022-11-03 RX ADMIN — PROPOFOL 80 MG: 10 INJECTION, EMULSION INTRAVENOUS at 12:50

## 2022-11-03 RX ADMIN — SODIUM CHLORIDE: 0.9 INJECTION, SOLUTION INTRAVENOUS at 12:35

## 2022-11-03 NOTE — NURSING NOTE
D/c instructions reviewed with patient and her  all questions answered  Patient discharged via wheel chair   laura

## 2022-11-03 NOTE — ANESTHESIA POSTPROCEDURE EVALUATION
Post-Op Assessment Note    CV Status:  Stable  Pain Score: 0    Pain management: adequate     Mental Status:  Alert   Hydration Status:  Stable   PONV Controlled:  Controlled   Airway Patency:  Patent      Post Op Vitals Reviewed: Yes      Staff: Anesthesiologist, CRNA         No complications documented      BP   100/60   Temp      Pulse  55   Resp 18   SpO2 100

## 2022-11-03 NOTE — ANESTHESIA PREPROCEDURE EVALUATION
Procedure:  CARDIOVERSION    Relevant Problems   CARDIO   (+) Benign hypertension   (+) Hypercholesterolemia   (+) Mixed hyperlipidemia   (+) Persistent atrial fibrillation (HCC)      ENDO   (+) Congenital hypothyroidism with diffuse goiter      GI/HEPATIC   (+) Dysphagia   (+) Gastroesophageal reflux disease   (+) Hiatal hernia      GYN   (+) History of hysterectomy      MUSCULOSKELETAL   (+) Rheumatoid arthritis (HCC)      NEURO/PSYCH   (+) Anxiety   (+) Vertigo      PULMONARY   (+) Asthma             Anesthesia Plan  ASA Score- 4     Anesthesia Type- general with ASA Monitors  Additional Monitors:   Airway Plan:           Plan Factors-Exercise tolerance (METS): <4 METS  Chart reviewed  EKG reviewed  Patient summary reviewed  Patient is not a current smoker  Obstructive sleep apnea risk education given perioperatively  Induction-     Postoperative Plan-     Informed Consent- Anesthetic plan and risks discussed with patient  I personally reviewed this patient with the CRNA  Discussed and agreed on the Anesthesia Plan with the CRNA  Edward Ambrose

## 2022-11-04 ENCOUNTER — TELEPHONE (OUTPATIENT)
Dept: NON INVASIVE DIAGNOSTICS | Facility: HOSPITAL | Age: 72
End: 2022-11-04

## 2022-11-07 ENCOUNTER — OFFICE VISIT (OUTPATIENT)
Dept: PHYSICAL THERAPY | Facility: CLINIC | Age: 72
End: 2022-11-07

## 2022-11-07 ENCOUNTER — TELEPHONE (OUTPATIENT)
Dept: CARDIOLOGY CLINIC | Facility: CLINIC | Age: 72
End: 2022-11-07

## 2022-11-07 DIAGNOSIS — S82.832D OTHER CLOSED FRACTURE OF DISTAL END OF LEFT FIBULA WITH ROUTINE HEALING, SUBSEQUENT ENCOUNTER: Primary | ICD-10-CM

## 2022-11-07 NOTE — TELEPHONE ENCOUNTER
Rafat Jaziel called to let you know that she is back in A Fib  She was cardioverted on 11/3, and said the normal rhythm lasted about 18 hours  Her Kardia monitor let her know she is back in A Fib  HR 60s-70s  Asymptomatic  Please advise

## 2022-11-07 NOTE — PROGRESS NOTES
Daily Note     Today's date: 2022  Patient name: Masoud Garrison  : 1950  MRN: [id number]  Referring provider: Araseli Mcdonnell PA-C  Dx:   Encounter Diagnosis     ICD-10-CM    1  Other closed fracture of distal end of left fibula with routine healing, subsequent encounter  H20 527P                   Subjective:  She notes having increased soreness and through the weekend following last appointment  Objective: See treatment diary below      Assessment: Tolerated treatment well  Patient would benefit from continued PT  Marisel Campos has improved ROM left ankle  DF had the largest restriction and it is normalizing well  She does have some anterior joint soreness with attempts at stretching DF ROM  Plan: Continue per plan of care  Progress treatment as tolerated           Eval/ Re-eval Auth #/ Referral # Total visits Start date  Expiration date Total active units  Total manual units  PT only or  PT+OT?    22                                                                Date: 22   Total units authorized  Active:                     Manual:                   Total units remaining  Active:                         Manual:                           Date:                   Total units authorized  Active:                     Manual:                   Total units remaining  Active:                         Manual:                         Precautions: Asthma, Osteopenia, Afib     Specialty Daily Treatment Diary         Manuals  22   Visit # 4 - FOTO  5 6  7 8   STM Gastroc/ soleus/ Tib post            PROM ankle  6'  6' 6'  6' 6'   Stretch soleus gastroc  4'  4' 4'  4' 4'                Warm-up            NuStep  8 min  8 min 8 min  6 min 5 min   Neuro Re-Ed            ABCs    2x 2x   2x    Leg press     20   55#  20  65# bilat  10   35# uni 2x10 55#    Ankle stretch DF at stair        10x w left on wedge 10x on wedge                             Ther Ex            Knee ext w df  20  20 20  20 20   Rockerboard  30  30 ea 30 ea  30 30 ea   Towel scrunch    30 --  --   HR/ TR seated  20  20 20   --   4 way AROM  20  YTB  20  YTB 20 ea   YTB   20 ea YTB   SLR  20 20     --                Ther Activity             Side step        4 x 10 ft 4 x 10 ft    Step up        4"  10x 10x  4"    Gait Training            w cane     No cane at times during session      Sit to stand tech  5 min          Modalities            CP    5 min 5 min   5 min                     Access Code: [email or web address]  URL: [email or web address]  Date: 11/02/2022  Prepared by: Kamila Seton    Exercises  · Forward Step Up - 1 x daily - 2 sets - 10 reps  · Standing Ankle Dorsiflexion Stretch on Chair - 1 x daily - 2 sets - 10 reps  · Side Stepping with Counter Support - 1 x daily - 1 sets - 10 reps        Access Code: [email or web address]  URL: [email or web address]  Date: 08/22/2022  Prepared by:  Kamila Seton    Exercises  · Seated Toe Curl - 1 x daily - 2 sets - 10 reps  · Supine Ankle Pumps - 1 x daily - 2 sets - 10 reps  · Supine Ankle Inversion Eversion AROM - 1 x daily - 2 sets - 10 reps  · Seated Toe Raise - 1 x daily - 2 sets - 10 reps  · Seated Long Arc Quad - 1 x daily - 2 sets - 10 reps  · Active Straight Leg Raise with Quad Set - 1 x daily - 21 sets - 10 reps

## 2022-11-07 NOTE — TELEPHONE ENCOUNTER
Per Dr Yolanda Culp, pt should come in to [address] office this week for a nurse visit for an ekg, to confirm A Fib and will then send her to [facility] scheduled for nurse visit on 11/9 at 10 AM

## 2022-11-08 ENCOUNTER — TELEPHONE (OUTPATIENT)
Dept: CARDIOLOGY CLINIC | Facility: CLINIC | Age: 72
End: 2022-11-08

## 2022-11-09 ENCOUNTER — CLINICAL SUPPORT (OUTPATIENT)
Dept: CARDIOLOGY CLINIC | Facility: CLINIC | Age: 72
End: 2022-11-09

## 2022-11-09 VITALS
HEIGHT: 65 IN | BODY MASS INDEX: 35.99 KG/M2 | DIASTOLIC BLOOD PRESSURE: 84 MMHG | SYSTOLIC BLOOD PRESSURE: 132 MMHG | WEIGHT: 216 LBS

## 2022-11-09 DIAGNOSIS — I48.19 PERSISTENT ATRIAL FIBRILLATION (HCC): Primary | ICD-10-CM

## 2022-11-09 NOTE — PROGRESS NOTES
Pt is here today under the direction of Dr Sadiq Rivera for an ekg  Pt has been having palpations and a rapid HR  Pt has had 2 cardioversion's and suspected a-fib again  Dr Marjan Ring reviewed ekg and agreed she is in a-fib again  Per note by Karen Del Rosario, RN, pt will be referred to EP if in a-fib  Spoke to schedulers in EP and scheduled pt to be seen by Dr Brianda Alvarado, December 1st at 3 pm      BP- 132/84 P- 61    Reviewed medications with pt

## 2022-11-14 ENCOUNTER — OFFICE VISIT (OUTPATIENT)
Dept: PHYSICAL THERAPY | Facility: CLINIC | Age: 72
End: 2022-11-14

## 2022-11-14 DIAGNOSIS — S82.832D OTHER CLOSED FRACTURE OF DISTAL END OF LEFT FIBULA WITH ROUTINE HEALING, SUBSEQUENT ENCOUNTER: Primary | ICD-10-CM

## 2022-11-14 NOTE — PROGRESS NOTES
Daily Note     Today's date: 2022  Patient name: Alona Hay  : 1950  MRN: [id number]  Referring provider: Jamie Barnhart PA-C  Dx:   Encounter Diagnosis     ICD-10-CM    1  Other closed fracture of distal end of left fibula with routine healing, subsequent encounter  [id number]                   Subjective:  She notes over all improvement and can make it until about 5 pm before ankle soreness sets in  She still notes activity based swelling still occurs  Objective: See treatment diary below      Assessment: Tolerated treatment well  Patient would benefit from continued PT  She needed bilateral hand holds during wt shift on foam to feel secure  Plan: Continue per plan of care  Progress treatment as tolerated  Attempt uni lateral upper extremity support next visit during wt shifts         Eval/ Re-eval Auth #/ Referral # Total visits Start date  Expiration date Total active units  Total manual units  PT only or  PT+OT?    22                                                                Date: 22   Total units authorized  Active:                     Manual:                   Total units remaining  Active:                         Manual:                           Date:                   Total units authorized  Active:                     Manual:                   Total units remaining  Active:                         Manual:                         Precautions: Asthma, Osteopenia, Afib     Specialty Daily Treatment Diary         Manuals  22   Visit #  5 6  7 8 9   STM Gastroc/ soleus/ Tib post           PROM ankle  6' 6'  6' 6' 6'   Stretch soleus gastroc  4' 4'  4' 4' 4'               Warm-up           NuStep  8 min 8 min  6 min 5 min 5 min   Neuro Re-Ed           ABCs  2x 2x   2x 2x    Leg press   20   55#  20  65# bilat  10   35# uni 2x10 55# 2x10  55#    Ankle stretch DF at stair      10x w left on wedge 10x on wedge 20x on stair    Wt shift on foam        20x               Ther Ex           Knee ext w df  20 20  20 20 20   Rockerboard  30 ea 30 ea  30 30 ea 30 ea   Towel scrunch  30 --  --    HR/ TR seated  20 20   --    4 way AROM  20  YTB 20 ea   YTB   20 ea YTB 20 ea  YTB   SLR 20     --                Ther Activity            Side step      4 x 10 ft 4 x 10 ft 4 x 10 ft    Step up      4"  10x 10x  4"  10x  4"   Gait Training           w cane   No cane at times during session       Sit to stand tech           Modalities           CP  5 min 5 min   5 min --                    Access Code: [email or web address]  URL: [email or web address]  Date: 11/02/2022  Prepared by: Nickolas Constant    Exercises  · Forward Step Up - 1 x daily - 2 sets - 10 reps  · Standing Ankle Dorsiflexion Stretch on Chair - 1 x daily - 2 sets - 10 reps  · Side Stepping with Counter Support - 1 x daily - 1 sets - 10 reps        Access Code: [email or web address]  URL: [email or web address]  Date: 08/22/2022  Prepared by:  Nickolas Constant    Exercises  · Seated Toe Curl - 1 x daily - 2 sets - 10 reps  · Supine Ankle Pumps - 1 x daily - 2 sets - 10 reps  · Supine Ankle Inversion Eversion AROM - 1 x daily - 2 sets - 10 reps  · Seated Toe Raise - 1 x daily - 2 sets - 10 reps  · Seated Long Arc Quad - 1 x daily - 2 sets - 10 reps  · Active Straight Leg Raise with Quad Set - 1 x daily - 21 sets - 10 reps

## 2022-11-15 ENCOUNTER — CLINICAL SUPPORT (OUTPATIENT)
Dept: FAMILY MEDICINE CLINIC | Facility: CLINIC | Age: 72
End: 2022-11-15

## 2022-11-15 DIAGNOSIS — E78.2 MIXED HYPERLIPIDEMIA: ICD-10-CM

## 2022-11-15 DIAGNOSIS — Z23 NEED FOR VACCINATION: Primary | ICD-10-CM

## 2022-11-15 DIAGNOSIS — E03.0 CONGENITAL HYPOTHYROIDISM WITH DIFFUSE GOITER: ICD-10-CM

## 2022-11-16 RX ORDER — ATORVASTATIN CALCIUM 20 MG/1
TABLET, FILM COATED ORAL
Qty: 90 TABLET | Refills: 1 | Status: SHIPPED | OUTPATIENT
Start: 2022-11-16

## 2022-11-16 RX ORDER — LEVOTHYROXINE SODIUM 0.05 MG/1
TABLET ORAL
Qty: 90 TABLET | Refills: 1 | Status: SHIPPED | OUTPATIENT
Start: 2022-11-16

## 2022-11-21 ENCOUNTER — OFFICE VISIT (OUTPATIENT)
Dept: PHYSICAL THERAPY | Facility: CLINIC | Age: 72
End: 2022-11-21

## 2022-11-21 DIAGNOSIS — S82.832D OTHER CLOSED FRACTURE OF DISTAL END OF LEFT FIBULA WITH ROUTINE HEALING, SUBSEQUENT ENCOUNTER: Primary | ICD-10-CM

## 2022-11-21 NOTE — PROGRESS NOTES
Daily Note     Today's date: 2022  Patient name: Marcela Kaufman  : 1950  MRN: [id number]  Referring provider: Cem Reeves PA-C  Dx:   Encounter Diagnosis     ICD-10-CM    1  Other closed fracture of distal end of left fibula with routine healing, subsequent encounter  S89 302C                      Subjective:  She has no significant pain currently  She has pain occur with sit to stand after prolonged sitting or with quick direction changes      Objective: See treatment diary below      Assessment: Tolerated treatment well  Patient would benefit from continued PT  She competed weight shift on foam with a single upper extremity holding for support  Added quarter turn direction changes without causing left ankle pain  Plan: Continue per plan of care  Progress treatment as tolerated         Eval/ Re-eval Auth #/ Referral # Total visits Start date  Expiration date Total active units  Total manual units  PT only or  PT+OT?    22                                                                Date: 22   Total units authorized  Active:                     Manual:                   Total units remaining  Active:                         Manual:                           Date:               Total units authorized  Active:                     Manual:                   Total units remaining  Active:                         Manual:                         Precautions: Asthma, Osteopenia, Afib     Specialty Daily Treatment Diary         Manuals  22   Visit #  7 8 9 10 11   STM Gastroc/ soleus/ Tib post         PROM ankle  6' 6' 6'  6'   Stretch soleus gastroc  4' 4' 4'  4'             Warm-up         NuStep  6 min 5 min 5 min 6 min 6 min   Neuro Re-Ed         ABCs   2x 2x  3x    Leg press  20  65# bilat  10   35# uni 2x10 55# 2x10  55# 2x10  65#  1x10  35# 2x10  65#  1x10  35#    Ankle stretch DF at stair  10x w left on wedge 10x on wedge 20x on stair 20x on stair 20 on 8" step up    Wt shift on foam    20x  20 one arm support             Ther Ex         Knee ext w df  20 20 20  20   Rockerboard  30 30 ea 30 ea     Towel scrunch  --   1/4 turn w one foot planted 10 ea dir - UniUE support   HR/ TR seated   --      4 way AROM   20 ea YTB 20 ea  YTB  20  RTB   SLR   --                Ther Activity          Side step  4 x 10 ft 4 x 10 ft 4 x 10 ft      Step up  4"  10x 10x  4"  10x  4"     Gait Training         w cane         Sit to stand tech         Modalities         CP   5 min --  5 min                  Access Code: [email or web address]  URL: [email or web address]  Date: 11/02/2022  Prepared by: Alexandre Sow    Exercises  · Forward Step Up - 1 x daily - 2 sets - 10 reps  · Standing Ankle Dorsiflexion Stretch on Chair - 1 x daily - 2 sets - 10 reps  · Side Stepping with Counter Support - 1 x daily - 1 sets - 10 reps        Access Code: [email or web address]  URL: [email or web address]  Date: 08/22/2022  Prepared by:  Jerelene Sow    Exercises  · Seated Toe Curl - 1 x daily - 2 sets - 10 reps  · Supine Ankle Pumps - 1 x daily - 2 sets - 10 reps  · Supine Ankle Inversion Eversion AROM - 1 x daily - 2 sets - 10 reps  · Seated Toe Raise - 1 x daily - 2 sets - 10 reps  · Seated Long Arc Quad - 1 x daily - 2 sets - 10 reps  · Active Straight Leg Raise with Quad Set - 1 x daily - 21 sets - 10 reps

## 2022-11-22 DIAGNOSIS — I48.91 NEW ONSET ATRIAL FIBRILLATION (HCC): ICD-10-CM

## 2022-11-23 RX ORDER — APIXABAN 5 MG/1
TABLET, FILM COATED ORAL
Qty: 180 TABLET | Refills: 3 | Status: SHIPPED | OUTPATIENT
Start: 2022-11-23

## 2022-12-01 ENCOUNTER — CONSULT (OUTPATIENT)
Dept: CARDIOLOGY CLINIC | Facility: CLINIC | Age: 72
End: 2022-12-01

## 2022-12-01 VITALS
SYSTOLIC BLOOD PRESSURE: 104 MMHG | BODY MASS INDEX: 35.99 KG/M2 | WEIGHT: 216 LBS | HEART RATE: 73 BPM | DIASTOLIC BLOOD PRESSURE: 70 MMHG | HEIGHT: 65 IN

## 2022-12-01 DIAGNOSIS — I48.19 PERSISTENT ATRIAL FIBRILLATION (HCC): Primary | ICD-10-CM

## 2022-12-01 NOTE — PATIENT INSTRUCTIONS
Continue with metoprolol    Decrease flecainide to 50 mg twice daily     Will have our staff reach out to you to schedule ablation procedure

## 2022-12-01 NOTE — PROGRESS NOTES
EPS Consultation/New Patient Evaluation - Sarita Winchester 67 y o  female MRN: [id number]       Referring:Dr Carson Ennis    CC/HPI:   It was a pleasure to see Sarita Winchester in our arrhythmia clinic at [facility]  As you know she is a 67 y o  woman with persistent atrial fibrillation, HTN, HLD, anxiety, GERD, hiatal hernia, hypertension, hyperlipidemia, IBS, rheumatoid arthritis who presents to discuss management of atrial arrhythmias  She was diagnosed with atrial fibrillation in January at the PCP office  She has mild coronary artery disease but no stents  She had a repeat catheterization in 2017 which showed no significant coronary disease  She has had history of bleeding ulcer and required hospitalization in the past needing blood transfusion  She had used Funderbeam mobile device in January which had suggested atrial fibrillation  She has been having on and off palpitations and exercise intolerance since September 2021  Echocardiogram has shown severe left atrial dilation  She was started on Toprol XL and Eliquis  As she continued to have symptoms with exertion cardioversion was recommended however she declined the option in April  Option was again recommended in July office visit and this time she agreed to proceed with it  She also had exertional chest tightness and stress test was performed which was negative  A cardioversion in October was only transiently successful  Her metoprolol dose was increased in October and flecainide is 100 mg twice daily was started  She underwent repeat cardioversion on November 3rd which was successful  However she was back in atrial fibrillation after 18 hours  EKG performed on November 9, 2022 confirm she was back in atrial fibrillation  Now she presents to discuss further options  She presents with her  who also provides further history  Patient does report having fatigue when having atrial fibrillation    She did note palpitations when she went back into atrial fibrillation after recent cardioversion  She denies any significant caffeine or alcohol use  She denies any drug use  She reports compliance with medications  She feels significant lightheadedness/dizziness since being on flecainide and is afraid to drive a car    She notes symptoms happening even at rest     Past Medical History:  Past Medical History:   Diagnosis Date   • Anxiety     resolved 10/4/17   • Asthma     seasonal   • [facility] fibrillation [facility] ) 01/2022    newly diagnosed on eliquis   • Chronic kidney disease     kidney stone   • Colon polyp    • Disease of thyroid gland    • Dysphagia    • GERD (gastroesophageal reflux disease)    • Goiter, nodular     partial thyroidectomy   • Hiatal hernia     repaired 2018   • Hiatal hernia with GERD without esophagitis 04/2018    surgical correction   • History of esophageal varices with bleeding    • History of pernicious anemia    • History of transfusion     x1 after bleeding ulcer   • Hyperlipidemia    • Hypertension    • Irritable bowel syndrome    • Neck mass     2 small areas left side by jawline and midneck  scheduled 0820/21   • RA (rheumatoid arthritis) ([facility] )    • Seasonal allergies    • Vertigo    • Wears glasses     for reading       Medications:      Current Outpatient Medications:   •  atorvastatin (LIPITOR) 20 mg tablet, TAKE 1 TABLET BY MOUTH  DAILY AT BEDTIME, Disp: 90 tablet, Rfl: 1  •  bumetanide (BUMEX) 1 mg tablet, TAKE 1 TABLET BY MOUTH  DAILY, Disp: 90 tablet, Rfl: 1  •  Calcium Carb-Cholecalciferol 600-1000 MG-UNIT CAPS, Take by mouth every morning gummy , Disp: , Rfl:   •  Eliquis 5 MG, TAKE 1 TABLET BY MOUTH  TWICE DAILY, Disp: 180 tablet, Rfl: 3  •  flecainide (TAMBOCOR) 100 mg tablet, Take 1 tablet (100 mg total) by mouth 2 (two) times a day, Disp: 180 tablet, Rfl: 3  •  Lactobacillus (PROBIOTIC ACIDOPHILUS PO), Take by mouth, Disp: , Rfl:   •  levothyroxine 50 mcg tablet, TAKE 1 TABLET BY MOUTH  DAILY, Disp: 90 tablet, Rfl: 1  •  losartan (COZAAR) 25 mg tablet, Take 1 tablet (25 mg total) by mouth daily, Disp: 90 tablet, Rfl: 3  •  metoprolol succinate (TOPROL-XL) 25 mg 24 hr tablet, Take 1 tablet (25 mg total) by mouth 2 (two) times a day, Disp: 180 tablet, Rfl: 3  •  omeprazole (PriLOSEC) 40 MG capsule, TAKE 1 CAPSULE BY MOUTH  TWICE DAILY, Disp: 180 capsule, Rfl: 3  •  potassium chloride (Klor-Con) 10 mEq tablet, TAKE 1 TABLET BY MOUTH  DAILY, Disp: 90 tablet, Rfl: 1  •  clotrimazole-betamethasone (LOTRISONE) 1-0 05 % cream, Apply topically 2 (two) times a day (Patient not taking: Reported on 11/1/2022), Disp: 45 g, Rfl: 0     Family History   Problem Relation Age of Onset   • Alzheimer's disease Mother    • Cancer Mother         skin    • Thyroid disease Mother    • Ulcerative colitis Mother    • Cancer Father         prostate   • Stroke Father    • Hypertension Father    • Prostate cancer Father    • Thyroid disease Sister    • Ulcerative colitis Sister    • Other Sister         open heart surgery   • Heart disease Sister    • Hyperlipidemia Brother    • No Known Problems Son    • No Known Problems Son    • No Known Problems Maternal Grandmother    • No Known Problems Paternal Grandmother    • Mental illness Neg Hx      Social History     Socioeconomic History   • Marital status: /Civil Union     Spouse name: Not on file   • Number of children: Not on file   • Years of education: Not on file   • Highest education level: Not on file   Occupational History   • Not on file   Tobacco Use   • Smoking status: Never   • Smokeless tobacco: Never   Vaping Use   • Vaping Use: Never used   Substance and Sexual Activity   • Alcohol use: Yes     Comment: seldom   • Drug use: Never   • Sexual activity: Not on file   Other Topics Concern   • Not on file   Social History Narrative    Feels safe at home     Social Determinants of Health     Financial Resource Strain: Low Risk    • Difficulty of Paying Living Expenses: Not hard at all   Food Insecurity: Not on file   Transportation Needs: No Transportation Needs   • Lack of Transportation (Medical): No   • Lack of Transportation (Non-Medical): No   Physical Activity: Not on file   Stress: Not on file   Social Connections: Not on file   Intimate Partner Violence: Not on file   Housing Stability: Not on file     Social History     Tobacco Use   Smoking Status Never   Smokeless Tobacco Never     Social History     Substance and Sexual Activity   Alcohol Use Yes    Comment: seldom       Review of Systems   Constitutional: Positive for malaise/fatigue  Negative for chills and fever  HENT: Negative  Eyes: Negative for blurred vision and double vision  Cardiovascular: Negative for chest pain, dyspnea on exertion, leg swelling, near-syncope, orthopnea, palpitations, paroxysmal nocturnal dyspnea and syncope  Respiratory: Negative for cough and sputum production  Endocrine: Negative  Skin: Negative  Negative for rash  Musculoskeletal: Positive for arthritis  Negative for joint pain  Gastrointestinal: Negative for abdominal pain, nausea and vomiting  Genitourinary: Negative  Neurological: Positive for dizziness and light-headedness  Psychiatric/Behavioral: Negative  The patient is not nervous/anxious  Objective:     Vitals: Blood pressure 104/70, pulse 73, height 5' 5" (1 651 m), weight 98 kg (216 lb), not currently breastfeeding , Body mass index is 35 94 kg/m²  ,        Physical Exam:    GEN: Maude Barker appears well, alert and oriented x 3, pleasant and cooperative   HEENT: pupils equal, round, and reactive to light; extraocular muscles intact  NECK: supple, no carotid bruits   HEART:  Irregularly irregular rhythm, normal S1 and S2, no murmurs, clicks, gallops or rubs   LUNGS: clear to auscultation bilaterally; no wheezes, rales, or rhonchi   ABDOMEN: normal bowel sounds, soft, no tenderness, no distention  EXTREMITIES: peripheral pulses normal; no clubbing, cyanosis, or edema  NEURO: no focal findings   SKIN: normal without suspicious lesions on exposed skin      Labs & Results:  Below is the patient's most recent value for Albumin, ALT, AST, BUN, Calcium, Chloride, Cholesterol, CO2, Creatinine, GFR, Glucose, HDL, Hematocrit, Hemoglobin, Hemoglobin A1C, LDL, Magnesium, Phosphorus, Platelets, Potassium, PSA, Sodium, Triglycerides, and WBC  Lab Results   Component Value Date    ALT 23 10/31/2022    AST 17 10/31/2022    BUN 9 10/31/2022    CALCIUM 9 1 10/31/2022     10/31/2022    CHOL 144 09/01/2016    CO2 30 10/31/2022    CREATININE 0 76 10/31/2022    HDL 60 08/23/2022    HCT 43 4 10/31/2022    HGB 14 0 10/31/2022    MG 1 9 01/31/2022    PHOS 3 3 04/17/2018     10/31/2022    K 3 7 10/31/2022     09/01/2016    TRIG 71 08/23/2022    WBC 4 03 (L) 10/31/2022     Note: for a comprehensive list of the patient's lab results, access the Results Review activity  Cardiac testing:     I personally reviewed the ECG performed in the clinic on 12/01/22  It reveals atrial fibrillation, coarse at 73 beats per minute    Echocardiograms:  No results found for this or any previous visit  No results found for this or any previous visit  Catheterizations:   No results found for this or any previous visit  Stress Tests:  Results for orders placed during the hospital encounter of 04/21/22    NM myocardial perfusion spect (stress and/or rest)    Interpretation Summary  •  Stress ECG: Arrhythmias during recovery: rare PVCs  The ECG was equivocal for ischemia  The ECG was not diagnostic due to pharmacological (vasodilator) stress  The stress ECG is equivocal for ischemia after pharmacologic vasodilation  •  Stress Function: Left ventricular function post-stress is normal  Post-stress ejection fraction is 59 %  •  Perfusion: There are no perfusion defects  •  Stress ECG: A Miguel protocol stress test was performed   The patient reached stage 2 0 of the protocol after exercising for 4 min and 5 sec and had a maximal HR of 151 bpm (102 % of MPHR) and 7 0 METS  The patient experienced no angina during the test  The test was stopped because the patient experienced dyspnea  The patient achieved the target heart rate  The patient reported dyspnea during the stress test  Onset of symptoms occurred at stage 2 of the protocol  Symptoms ended during recovery  Blood pressure demonstrated a normal response and heart rate demonstrated a normal response to stress  The patient's heart rate recovery was normal     Negative study for evidence of pharmacological induced ischemia or prior infarction  No major symptoms with vasodilation  Elevated baseline blood pressure of 150/100 noted  Holter monitor -   No results found for this or any previous visit  No results found for this or any previous visit  ASSESSMENT/PLAN:  1  Persistent atrial fibrillation  Patient was diagnosed with atrial fibrillation in January 2021  She then started to have on of palpitations  She was started on metoprolol and Eliquis  Cardioversion was performed in October 2022 however it was unsuccessful  She was started on flecainide 100 mg twice daily and metoprolol dose was increased  She had repeat cardioversion on November 3, 2022 which was successful in restoring sinus rhythm however it only lasted 18 hours  We discuss next step in management option including alternative rhythm medication versus ablation procedure  After answering all the questions she opted to proceed with ablation procedure  Our office will be in touch with her to schedule the procedure  Given fatigue and dizziness will reduce flecainide dose to 50 mg twice daily  Continue metoprolol and Eliquis    2  Hypertension  Patient is currently maintained on losartan  Normotensive in the office    3  Hyperlipidemia   Maintained on atorvastatin 20 mg daily    4   Hypothyroidism  Maintained on levothyroxine 50 mcg daily    Follow-up after ablation

## 2022-12-01 NOTE — H&P (VIEW-ONLY)
Chief Complaint  Fever (101+), Cough, and Nasal Congestion    Subjective          You have chosen to receive care through a telephone visit. Do you consent to use a telephone visit for your medical care today? Yes    Mariola Antoine presents to [facility]  History of Present Illness  Patient's guardian (Grandmother) states he went to school yesterday and was asymptomatic when he returned home; however, about 8pm last night he had runny nose, sneezing and coughing.  He awoke during the night with 101.8 temp and chills.  Complained of body aches.  Still had 101.6 at 6 am.  Treated again with anti-pyretic.  Child is sleeping now.  No known exposure.  No vaccination.  Objective   Vital Signs:   There were no vitals taken for this visit.    Physical Exam   No physical exam in telephonic encounter.  Result Review :                 Assessment and Plan    Diagnoses and all orders for this visit:    1. Fever and chills (Primary)  -     COVID-19,[facility] ROUTINE, NP/OP SWAB IN TRANSPORT MEDIA OR ESWAB 72 HR TAT - Swab, Oropharynx; Future  -     QUESTIONNAIRE SERIES  -     Cancel: POCT Influenza A/B    2. URI with cough and congestion  -     guaiFENesin 200 MG tablet; Take 1 tablet by mouth Every 8 (Eight) Hours As Needed for Cough or Congestion.  Dispense: 30 tablet; Refill: 0  -     COVID-19,[facility] ROUTINE, NP/OP SWAB IN TRANSPORT MEDIA OR ESWAB 72 HR TAT - Swab, Oropharynx; Future  -     QUESTIONNAIRE SERIES  -     Cancel: POCT Influenza A/B    3. Generalized body aches in pediatric patient  -     COVID-19,[facility] ROUTINE, NP/OP SWAB IN TRANSPORT MEDIA OR ESWAB 72 HR TAT - Swab, Oropharynx; Future  -     QUESTIONNAIRE SERIES  -     Cancel: POCT Influenza A/B    Further instructions when results are available.    I spent 15 minutes caring for Mariola on this date of service. This time includes time spent by me in the following activities:preparing for the visit, obtaining and/or reviewing a  EPS Consultation/New Patient Evaluation - Vanessa Tiwari 67 y o  female MRN: [id number]       Referring:Dr Doria Schilder    CC/HPI:   It was a pleasure to see Vanessa Tiwari in our arrhythmia clinic at [place] [address]  As you know she is a 67 y o  woman with persistent atrial fibrillation, HTN, HLD, anxiety, GERD, hiatal hernia, hypertension, hyperlipidemia, IBS, rheumatoid arthritis who presents to discuss management of atrial arrhythmias  She was diagnosed with atrial fibrillation in January at the PCP office  She has mild coronary artery disease but no stents  She had a repeat catheterization in 2017 which showed no significant coronary disease  She has had history of bleeding ulcer and required hospitalization in the past needing blood transfusion  She had used [employer] mobile device in January which had suggested atrial fibrillation  She has been having on and off palpitations and exercise intolerance since September 2021  Echocardiogram has shown severe left atrial dilation  She was started on Toprol XL and Eliquis  As she continued to have symptoms with exertion cardioversion was recommended however she declined the option in April  Option was again recommended in July office visit and this time she agreed to proceed with it  She also had exertional chest tightness and stress test was performed which was negative  A cardioversion in October was only transiently successful  Her metoprolol dose was increased in October and flecainide is 100 mg twice daily was started  She underwent repeat cardioversion on November 3rd which was successful  However she was back in atrial fibrillation after 18 hours  EKG performed on November 9, 2022 confirm she was back in atrial fibrillation  Now she presents to discuss further options  She presents with her  who also provides further history  Patient does report having fatigue when having atrial fibrillation    She did note palpitations when she went back into atrial fibrillation after recent cardioversion  She denies any significant caffeine or alcohol use  She denies any drug use  She reports compliance with medications  She feels significant lightheadedness/dizziness since being on flecainide and is afraid to drive a car    She notes symptoms happening even at rest     Past Medical History:  Past Medical History:   Diagnosis Date   • Anxiety     resolved 10/4/17   • Asthma     seasonal   • Atrial fibrillation ([facility] ) 01/2022    newly diagnosed on eliquis   • Chronic kidney disease     kidney stone   • Colon polyp    • Disease of thyroid gland    • Dysphagia    • GERD (gastroesophageal reflux disease)    • Goiter, nodular     partial thyroidectomy   • Hiatal hernia     repaired 2018   • Hiatal hernia with GERD without esophagitis 04/2018    surgical correction   • History of esophageal varices with bleeding    • History of pernicious anemia    • History of transfusion     x1 after bleeding ulcer   • Hyperlipidemia    • Hypertension    • Irritable bowel syndrome    • Neck mass     2 small areas left side by jawline and midneck  scheduled 0820/21   • RA (rheumatoid arthritis) ([facility] )    • Seasonal allergies    • Vertigo    • Wears glasses     for reading       Medications:      Current Outpatient Medications:   •  atorvastatin (LIPITOR) 20 mg tablet, TAKE 1 TABLET BY MOUTH  DAILY AT BEDTIME, Disp: 90 tablet, Rfl: 1  •  bumetanide (BUMEX) 1 mg tablet, TAKE 1 TABLET BY MOUTH  DAILY, Disp: 90 tablet, Rfl: 1  •  Calcium Carb-Cholecalciferol 600-1000 MG-UNIT CAPS, Take by mouth every morning gummy , Disp: , Rfl:   •  Eliquis 5 MG, TAKE 1 TABLET BY MOUTH  TWICE DAILY, Disp: 180 tablet, Rfl: 3  •  flecainide (TAMBOCOR) 100 mg tablet, Take 1 tablet (100 mg total) by mouth 2 (two) times a day, Disp: 180 tablet, Rfl: 3  •  Lactobacillus (PROBIOTIC ACIDOPHILUS PO), Take by mouth, Disp: , Rfl:   •  levothyroxine 50 mcg tablet, TAKE 1 TABLET BY separately obtained history, performing a medically appropriate examination and/or evaluation , counseling and educating the patient/family/caregiver, ordering medications, tests, or procedures and documenting information in the medical record  Follow Up   Return if symptoms worsen or fail to improve.  Patient was given instructions and counseling regarding his condition or for health maintenance advice. Please see specific information pulled into the AVS if appropriate.        MOUTH  DAILY, Disp: 90 tablet, Rfl: 1  •  losartan (COZAAR) 25 mg tablet, Take 1 tablet (25 mg total) by mouth daily, Disp: 90 tablet, Rfl: 3  •  metoprolol succinate (TOPROL-XL) 25 mg 24 hr tablet, Take 1 tablet (25 mg total) by mouth 2 (two) times a day, Disp: 180 tablet, Rfl: 3  •  omeprazole (PriLOSEC) 40 MG capsule, TAKE 1 CAPSULE BY MOUTH  TWICE DAILY, Disp: 180 capsule, Rfl: 3  •  potassium chloride (Klor-Con) 10 mEq tablet, TAKE 1 TABLET BY MOUTH  DAILY, Disp: 90 tablet, Rfl: 1  •  clotrimazole-betamethasone (LOTRISONE) 1-0 05 % cream, Apply topically 2 (two) times a day (Patient not taking: Reported on 11/1/2022), Disp: 45 g, Rfl: 0     Family History   Problem Relation Age of Onset   • Alzheimer's disease Mother    • Cancer Mother         skin    • Thyroid disease Mother    • Ulcerative colitis Mother    • Cancer Father         prostate   • Stroke Father    • Hypertension Father    • Prostate cancer Father    • Thyroid disease Sister    • Ulcerative colitis Sister    • Other Sister         open heart surgery   • Heart disease Sister    • Hyperlipidemia Brother    • No Known Problems Son    • No Known Problems Son    • No Known Problems Maternal Grandmother    • No Known Problems Paternal Grandmother    • Mental illness Neg Hx      Social History     Socioeconomic History   • Marital status: /Civil Union     Spouse name: Not on file   • Number of children: Not on file   • Years of education: Not on file   • Highest education level: Not on file   Occupational History   • Not on file   Tobacco Use   • Smoking status: Never   • Smokeless tobacco: Never   Vaping Use   • Vaping Use: Never used   Substance and Sexual Activity   • Alcohol use: Yes     Comment: seldom   • Drug use: Never   • Sexual activity: Not on file   Other Topics Concern   • Not on file   Social History Narrative    Feels safe at home     Social Determinants of Health     Financial Resource Strain: Low Risk    • Difficulty of Paying Living Expenses: Not hard at all   Food Insecurity: Not on file   Transportation Needs: No Transportation Needs   • Lack of Transportation (Medical): No   • Lack of Transportation (Non-Medical): No   Physical Activity: Not on file   Stress: Not on file   Social Connections: Not on file   Intimate Partner Violence: Not on file   Housing Stability: Not on file     Social History     Tobacco Use   Smoking Status Never   Smokeless Tobacco Never     Social History     Substance and Sexual Activity   Alcohol Use Yes    Comment: seldom       Review of Systems   Constitutional: Positive for malaise/fatigue  Negative for chills and fever  HENT: Negative  Eyes: Negative for blurred vision and double vision  Cardiovascular: Negative for chest pain, dyspnea on exertion, leg swelling, near-syncope, orthopnea, palpitations, paroxysmal nocturnal dyspnea and syncope  Respiratory: Negative for cough and sputum production  Endocrine: Negative  Skin: Negative  Negative for rash  Musculoskeletal: Positive for arthritis  Negative for joint pain  Gastrointestinal: Negative for abdominal pain, nausea and vomiting  Genitourinary: Negative  Neurological: Positive for dizziness and light-headedness  Psychiatric/Behavioral: Negative  The patient is not nervous/anxious  Objective:     Vitals: Blood pressure 104/70, pulse 73, height 5' 5" (1 651 m), weight 98 kg (216 lb), not currently breastfeeding , Body mass index is 35 94 kg/m²  ,        Physical Exam:    GEN: Vamshi Cabrera appears well, alert and oriented x 3, pleasant and cooperative   HEENT: pupils equal, round, and reactive to light; extraocular muscles intact  NECK: supple, no carotid bruits   HEART:  Irregularly irregular rhythm, normal S1 and S2, no murmurs, clicks, gallops or rubs   LUNGS: clear to auscultation bilaterally; no wheezes, rales, or rhonchi   ABDOMEN: normal bowel sounds, soft, no tenderness, no distention  EXTREMITIES: peripheral pulses normal; no clubbing, cyanosis, or edema  NEURO: no focal findings   SKIN: normal without suspicious lesions on exposed skin      Labs & Results:  Below is the patient's most recent value for Albumin, ALT, AST, BUN, Calcium, Chloride, Cholesterol, CO2, Creatinine, GFR, Glucose, HDL, Hematocrit, Hemoglobin, Hemoglobin A1C, LDL, Magnesium, Phosphorus, Platelets, Potassium, PSA, Sodium, Triglycerides, and WBC  Lab Results   Component Value Date    ALT 23 10/31/2022    AST 17 10/31/2022    BUN 9 10/31/2022    CALCIUM 9 1 10/31/2022     10/31/2022    CHOL 144 09/01/2016    CO2 30 10/31/2022    CREATININE 0 76 10/31/2022    HDL 60 08/23/2022    HCT 43 4 10/31/2022    HGB 14 0 10/31/2022    MG 1 9 01/31/2022    PHOS 3 3 04/17/2018     10/31/2022    K 3 7 10/31/2022     09/01/2016    TRIG 71 08/23/2022    WBC 4 03 (L) 10/31/2022     Note: for a comprehensive list of the patient's lab results, access the Results Review activity  Cardiac testing:     I personally reviewed the ECG performed in the clinic on 12/01/22  It reveals atrial fibrillation, coarse at 73 beats per minute    Echocardiograms:  No results found for this or any previous visit  No results found for this or any previous visit  Catheterizations:   No results found for this or any previous visit  Stress Tests:  Results for orders placed during the hospital encounter of 04/21/22    NM myocardial perfusion spect (stress and/or rest)    Interpretation Summary  •  Stress ECG: Arrhythmias during recovery: rare PVCs  The ECG was equivocal for ischemia  The ECG was not diagnostic due to pharmacological (vasodilator) stress  The stress ECG is equivocal for ischemia after pharmacologic vasodilation  •  Stress Function: Left ventricular function post-stress is normal  Post-stress ejection fraction is 59 %  •  Perfusion: There are no perfusion defects  •  Stress ECG: A Miguel protocol stress test was performed   The patient reached stage 2 0 of the protocol after exercising for 4 min and 5 sec and had a maximal HR of 151 bpm (102 % of MPHR) and 7 0 METS  The patient experienced no angina during the test  The test was stopped because the patient experienced dyspnea  The patient achieved the target heart rate  The patient reported dyspnea during the stress test  Onset of symptoms occurred at stage 2 of the protocol  Symptoms ended during recovery  Blood pressure demonstrated a normal response and heart rate demonstrated a normal response to stress  The patient's heart rate recovery was normal     Negative study for evidence of pharmacological induced ischemia or prior infarction  No major symptoms with vasodilation  Elevated baseline blood pressure of 150/100 noted  Holter monitor -   No results found for this or any previous visit  No results found for this or any previous visit  ASSESSMENT/PLAN:  1  Persistent atrial fibrillation  Patient was diagnosed with atrial fibrillation in January 2021  She then started to have on of palpitations  She was started on metoprolol and Eliquis  Cardioversion was performed in October 2022 however it was unsuccessful  She was started on flecainide 100 mg twice daily and metoprolol dose was increased  She had repeat cardioversion on November 3, 2022 which was successful in restoring sinus rhythm however it only lasted 18 hours  We discuss next step in management option including alternative rhythm medication versus ablation procedure  After answering all the questions she opted to proceed with ablation procedure  Our office will be in touch with her to schedule the procedure  Given fatigue and dizziness will reduce flecainide dose to 50 mg twice daily  Continue metoprolol and Eliquis    2  Hypertension  Patient is currently maintained on losartan  Normotensive in the office    3  Hyperlipidemia   Maintained on atorvastatin 20 mg daily    4   Hypothyroidism  Maintained on levothyroxine 50 mcg daily    Follow-up after ablation

## 2022-12-02 ENCOUNTER — TELEPHONE (OUTPATIENT)
Dept: CARDIOLOGY CLINIC | Facility: CLINIC | Age: 72
End: 2022-12-02

## 2022-12-02 DIAGNOSIS — I48.19 PERSISTENT ATRIAL FIBRILLATION (HCC): Primary | ICD-10-CM

## 2022-12-02 DIAGNOSIS — I48.91 NEW ONSET ATRIAL FIBRILLATION (HCC): ICD-10-CM

## 2022-12-02 NOTE — TELEPHONE ENCOUNTER
----- Message from Batsheva Altman MD sent at 12/1/2022  5:58 PM EST -----  Regarding: afib ablation  Zeinab Molina/Evelyn/Subha,    Please schedule this patient for JOSE/atrial fibrillation/flutter ablation  If there is a cancellation for any of my AFib patient's in December please schedule this patient in the slot       Routine labs    Hold medication:  None    System/Device company:  [employer] - please put in case request      Thank you

## 2022-12-02 NOTE — TELEPHONE ENCOUNTER
Patient scheduled for JOSE/A fib/flutter at [facility] on 01/17/23 with Dr Eubanks  Patient aware of general instructions, labs test required    No meds holds    Patient cover under medicare    Can we please have the case

## 2022-12-15 ENCOUNTER — APPOINTMENT (OUTPATIENT)
Dept: LAB | Facility: HOSPITAL | Age: 72
End: 2022-12-15

## 2022-12-15 LAB
ALBUMIN SERPL BCP-MCNC: 3.5 G/DL (ref 3.5–5)
ALP SERPL-CCNC: 61 U/L (ref 46–116)
ALT SERPL W P-5'-P-CCNC: 25 U/L (ref 12–78)
ANION GAP SERPL CALCULATED.3IONS-SCNC: 6 MMOL/L (ref 4–13)
AST SERPL W P-5'-P-CCNC: 20 U/L (ref 5–45)
BASOPHILS # BLD AUTO: 0.03 THOUSANDS/ÂΜL (ref 0–0.1)
BASOPHILS NFR BLD AUTO: 1 % (ref 0–1)
BILIRUB SERPL-MCNC: 0.86 MG/DL (ref 0.2–1)
BUN SERPL-MCNC: 12 MG/DL (ref 5–25)
CALCIUM SERPL-MCNC: 8.9 MG/DL (ref 8.3–10.1)
CHLORIDE SERPL-SCNC: 106 MMOL/L (ref 96–108)
CO2 SERPL-SCNC: 29 MMOL/L (ref 21–32)
CREAT SERPL-MCNC: 0.75 MG/DL (ref 0.6–1.3)
EOSINOPHIL # BLD AUTO: 0.11 THOUSAND/ÂΜL (ref 0–0.61)
EOSINOPHIL NFR BLD AUTO: 2 % (ref 0–6)
ERYTHROCYTE [DISTWIDTH] IN BLOOD BY AUTOMATED COUNT: 13.8 % (ref 11.6–15.1)
GFR SERPL CREATININE-BSD FRML MDRD: 79 ML/MIN/1.73SQ M
GLUCOSE P FAST SERPL-MCNC: 105 MG/DL (ref 65–99)
HCT VFR BLD AUTO: 43.3 % (ref 34.8–46.1)
HGB BLD-MCNC: 14.2 G/DL (ref 11.5–15.4)
IMM GRANULOCYTES # BLD AUTO: 0.02 THOUSAND/UL (ref 0–0.2)
IMM GRANULOCYTES NFR BLD AUTO: 0 % (ref 0–2)
LYMPHOCYTES # BLD AUTO: 1.61 THOUSANDS/ÂΜL (ref 0.6–4.47)
LYMPHOCYTES NFR BLD AUTO: 31 % (ref 14–44)
MCH RBC QN AUTO: 30.7 PG (ref 26.8–34.3)
MCHC RBC AUTO-ENTMCNC: 32.8 G/DL (ref 31.4–37.4)
MCV RBC AUTO: 94 FL (ref 82–98)
MONOCYTES # BLD AUTO: 0.43 THOUSAND/ÂΜL (ref 0.17–1.22)
MONOCYTES NFR BLD AUTO: 8 % (ref 4–12)
NEUTROPHILS # BLD AUTO: 2.94 THOUSANDS/ÂΜL (ref 1.85–7.62)
NEUTS SEG NFR BLD AUTO: 58 % (ref 43–75)
NRBC BLD AUTO-RTO: 0 /100 WBCS
PLATELET # BLD AUTO: 195 THOUSANDS/UL (ref 149–390)
PMV BLD AUTO: 10.6 FL (ref 8.9–12.7)
POTASSIUM SERPL-SCNC: 3.5 MMOL/L (ref 3.5–5.3)
PROT SERPL-MCNC: 6.7 G/DL (ref 6.4–8.4)
RBC # BLD AUTO: 4.62 MILLION/UL (ref 3.81–5.12)
SODIUM SERPL-SCNC: 141 MMOL/L (ref 135–147)
WBC # BLD AUTO: 5.14 THOUSAND/UL (ref 4.31–10.16)

## 2022-12-19 ENCOUNTER — TELEPHONE (OUTPATIENT)
Dept: CARDIOLOGY CLINIC | Facility: CLINIC | Age: 72
End: 2022-12-19

## 2022-12-19 NOTE — TELEPHONE ENCOUNTER
----- Message from Bk Baker MD sent at 12/15/2022  7:41 PM EST -----  Please call the patient about normal lab results  Thank you

## 2022-12-21 ENCOUNTER — PREP FOR PROCEDURE (OUTPATIENT)
Dept: CARDIOLOGY CLINIC | Facility: CLINIC | Age: 72
End: 2022-12-21

## 2022-12-21 DIAGNOSIS — I48.91 NEW ONSET ATRIAL FIBRILLATION (HCC): ICD-10-CM

## 2022-12-21 DIAGNOSIS — I48.19 PERSISTENT ATRIAL FIBRILLATION (HCC): Primary | ICD-10-CM

## 2022-12-22 NOTE — PROGRESS NOTES
Patient would prefer- right leg only as she broke her left ankle and it still causes issue for her, thanks

## 2022-12-23 ENCOUNTER — ANESTHESIA EVENT (OUTPATIENT)
Dept: NON INVASIVE DIAGNOSTICS | Facility: HOSPITAL | Age: 72
End: 2022-12-23

## 2022-12-23 ENCOUNTER — HOSPITAL ENCOUNTER (INPATIENT)
Facility: HOSPITAL | Age: 72
LOS: 1 days | Discharge: HOME | End: 2022-12-25
Attending: INTERNAL MEDICINE | Admitting: INTERNAL MEDICINE

## 2022-12-23 ENCOUNTER — ANESTHESIA (OUTPATIENT)
Dept: NON INVASIVE DIAGNOSTICS | Facility: HOSPITAL | Age: 72
End: 2022-12-23

## 2022-12-23 ENCOUNTER — APPOINTMENT (OUTPATIENT)
Dept: NON INVASIVE DIAGNOSTICS | Facility: HOSPITAL | Age: 72
End: 2022-12-23
Attending: INTERNAL MEDICINE

## 2022-12-23 DIAGNOSIS — I10 BENIGN HYPERTENSION: ICD-10-CM

## 2022-12-23 DIAGNOSIS — I48.91 NEW ONSET ATRIAL FIBRILLATION (HCC): ICD-10-CM

## 2022-12-23 DIAGNOSIS — H53.9 VISUAL DISTURBANCE: Primary | ICD-10-CM

## 2022-12-23 DIAGNOSIS — I48.19 PERSISTENT ATRIAL FIBRILLATION (HCC): ICD-10-CM

## 2022-12-23 LAB
ALBUMIN SERPL BCP-MCNC: 3.6 G/DL (ref 3.5–5)
ALP SERPL-CCNC: 69 U/L (ref 46–116)
ALT SERPL W P-5'-P-CCNC: 26 U/L (ref 12–78)
ANION GAP SERPL CALCULATED.3IONS-SCNC: 7 MMOL/L (ref 4–13)
AORTIC ROOT: 3.5 CM
ASCENDING AORTA: 3.9 CM
AST SERPL W P-5'-P-CCNC: 24 U/L (ref 5–45)
BILIRUB SERPL-MCNC: 0.8 MG/DL (ref 0.2–1)
BUN SERPL-MCNC: 10 MG/DL (ref 5–25)
CALCIUM SERPL-MCNC: 9.2 MG/DL (ref 8.3–10.1)
CHLORIDE SERPL-SCNC: 109 MMOL/L (ref 96–108)
CO2 SERPL-SCNC: 26 MMOL/L (ref 21–32)
CREAT SERPL-MCNC: 0.76 MG/DL (ref 0.6–1.3)
ERYTHROCYTE [DISTWIDTH] IN BLOOD BY AUTOMATED COUNT: 13.8 % (ref 11.6–15.1)
GFR SERPL CREATININE-BSD FRML MDRD: 78 ML/MIN/1.73SQ M
GLUCOSE P FAST SERPL-MCNC: 116 MG/DL (ref 65–99)
GLUCOSE SERPL-MCNC: 116 MG/DL (ref 65–140)
HCT VFR BLD AUTO: 43.1 % (ref 34.8–46.1)
HGB BLD-MCNC: 14.1 G/DL (ref 11.5–15.4)
INR PPP: 1.24 (ref 0.84–1.19)
KCT BLD-ACNC: 306 SEC (ref 89–137)
KCT BLD-ACNC: 327 SEC (ref 89–137)
KCT BLD-ACNC: 341 SEC (ref 89–137)
KCT BLD-ACNC: 355 SEC (ref 89–137)
KCT BLD-ACNC: 362 SEC (ref 89–137)
KCT BLD-ACNC: 369 SEC (ref 89–137)
KCT BLD-ACNC: 377 SEC (ref 89–137)
KCT BLD-ACNC: 377 SEC (ref 89–137)
KCT BLD-ACNC: 384 SEC (ref 89–137)
KCT BLD-ACNC: 384 SEC (ref 89–137)
KCT BLD-ACNC: 405 SEC (ref 89–137)
MCH RBC QN AUTO: 30.5 PG (ref 26.8–34.3)
MCHC RBC AUTO-ENTMCNC: 32.7 G/DL (ref 31.4–37.4)
MCV RBC AUTO: 93 FL (ref 82–98)
PLATELET # BLD AUTO: 209 THOUSANDS/UL (ref 149–390)
PMV BLD AUTO: 10.3 FL (ref 8.9–12.7)
POTASSIUM SERPL-SCNC: 3.6 MMOL/L (ref 3.5–5.3)
PROT SERPL-MCNC: 7.2 G/DL (ref 6.4–8.4)
PROTHROMBIN TIME: 15.8 SECONDS (ref 11.6–14.5)
RBC # BLD AUTO: 4.63 MILLION/UL (ref 3.81–5.12)
SL CV LV EF: 55
SODIUM SERPL-SCNC: 142 MMOL/L (ref 135–147)
SPECIMEN SOURCE: ABNORMAL
WBC # BLD AUTO: 5.14 THOUSAND/UL (ref 4.31–10.16)

## 2022-12-23 PROCEDURE — 02K83ZZ MAP CONDUCTION MECHANISM, PERCUTANEOUS APPROACH: ICD-10-PCS | Performed by: INTERNAL MEDICINE

## 2022-12-23 PROCEDURE — 4A0234Z MEASUREMENT OF CARDIAC ELECTRICAL ACTIVITY, PERCUTANEOUS APPROACH: ICD-10-PCS | Performed by: INTERNAL MEDICINE

## 2022-12-23 PROCEDURE — 02583ZZ DESTRUCTION OF CONDUCTION MECHANISM, PERCUTANEOUS APPROACH: ICD-10-PCS | Performed by: INTERNAL MEDICINE

## 2022-12-23 PROCEDURE — B24BZZ4 ULTRASONOGRAPHY OF HEART WITH AORTA, TRANSESOPHAGEAL: ICD-10-PCS | Performed by: INTERNAL MEDICINE

## 2022-12-23 RX ORDER — HEPARIN SODIUM 10000 [USP'U]/100ML
INJECTION, SOLUTION INTRAVENOUS
Status: DISCONTINUED | OUTPATIENT
Start: 2022-12-23 | End: 2022-12-23 | Stop reason: HOSPADM

## 2022-12-23 RX ORDER — LOSARTAN POTASSIUM 25 MG/1
25 TABLET ORAL DAILY
Status: DISCONTINUED | OUTPATIENT
Start: 2022-12-24 | End: 2022-12-24

## 2022-12-23 RX ORDER — CEFAZOLIN SODIUM 2 G/50ML
SOLUTION INTRAVENOUS AS NEEDED
Status: DISCONTINUED | OUTPATIENT
Start: 2022-12-23 | End: 2022-12-23

## 2022-12-23 RX ORDER — DOCUSATE SODIUM 100 MG/1
100 CAPSULE, LIQUID FILLED ORAL 2 TIMES DAILY PRN
Status: DISCONTINUED | OUTPATIENT
Start: 2022-12-23 | End: 2022-12-25 | Stop reason: HOSPADM

## 2022-12-23 RX ORDER — FLECAINIDE ACETATE 50 MG/1
50 TABLET ORAL 2 TIMES DAILY
Status: DISCONTINUED | OUTPATIENT
Start: 2022-12-23 | End: 2022-12-24

## 2022-12-23 RX ORDER — NEOSTIGMINE METHYLSULFATE 1 MG/ML
INJECTION INTRAVENOUS AS NEEDED
Status: DISCONTINUED | OUTPATIENT
Start: 2022-12-23 | End: 2022-12-23

## 2022-12-23 RX ORDER — POTASSIUM CHLORIDE 20 MEQ/1
40 TABLET, EXTENDED RELEASE ORAL ONCE
Status: DISCONTINUED | OUTPATIENT
Start: 2022-12-23 | End: 2022-12-25 | Stop reason: HOSPADM

## 2022-12-23 RX ORDER — ATORVASTATIN CALCIUM 20 MG/1
20 TABLET, FILM COATED ORAL
Status: DISCONTINUED | OUTPATIENT
Start: 2022-12-23 | End: 2022-12-24

## 2022-12-23 RX ORDER — ACETAMINOPHEN 325 MG/1
650 TABLET ORAL EVERY 4 HOURS PRN
Status: DISCONTINUED | OUTPATIENT
Start: 2022-12-23 | End: 2022-12-25 | Stop reason: HOSPADM

## 2022-12-23 RX ORDER — GLYCOPYRROLATE 0.2 MG/ML
INJECTION INTRAMUSCULAR; INTRAVENOUS AS NEEDED
Status: DISCONTINUED | OUTPATIENT
Start: 2022-12-23 | End: 2022-12-23

## 2022-12-23 RX ORDER — SODIUM CHLORIDE, SODIUM LACTATE, POTASSIUM CHLORIDE, CALCIUM CHLORIDE 600; 310; 30; 20 MG/100ML; MG/100ML; MG/100ML; MG/100ML
20 INJECTION, SOLUTION INTRAVENOUS CONTINUOUS
Status: DISCONTINUED | OUTPATIENT
Start: 2022-12-23 | End: 2022-12-23

## 2022-12-23 RX ORDER — DEXAMETHASONE SODIUM PHOSPHATE 10 MG/ML
INJECTION, SOLUTION INTRAMUSCULAR; INTRAVENOUS AS NEEDED
Status: DISCONTINUED | OUTPATIENT
Start: 2022-12-23 | End: 2022-12-23

## 2022-12-23 RX ORDER — HEPARIN SODIUM 1000 [USP'U]/ML
INJECTION, SOLUTION INTRAVENOUS; SUBCUTANEOUS CODE/TRAUMA/SEDATION MEDICATION
Status: DISCONTINUED | OUTPATIENT
Start: 2022-12-23 | End: 2022-12-23 | Stop reason: HOSPADM

## 2022-12-23 RX ORDER — FAMOTIDINE 10 MG/ML
20 INJECTION, SOLUTION INTRAVENOUS ONCE
Status: DISCONTINUED | OUTPATIENT
Start: 2022-12-23 | End: 2022-12-23 | Stop reason: HOSPADM

## 2022-12-23 RX ORDER — LEVOTHYROXINE SODIUM 0.05 MG/1
50 TABLET ORAL
Status: DISCONTINUED | OUTPATIENT
Start: 2022-12-24 | End: 2022-12-25 | Stop reason: HOSPADM

## 2022-12-23 RX ORDER — FENTANYL CITRATE/PF 50 MCG/ML
25 SYRINGE (ML) INJECTION
Status: DISCONTINUED | OUTPATIENT
Start: 2022-12-23 | End: 2022-12-23 | Stop reason: HOSPADM

## 2022-12-23 RX ORDER — ADENOSINE 3 MG/ML
INJECTION INTRAVENOUS CODE/TRAUMA/SEDATION MEDICATION
Status: DISCONTINUED | OUTPATIENT
Start: 2022-12-23 | End: 2022-12-23 | Stop reason: HOSPADM

## 2022-12-23 RX ORDER — HYDROXYZINE 50 MG/1
50 TABLET, FILM COATED ORAL EVERY 6 HOURS PRN
Status: DISCONTINUED | OUTPATIENT
Start: 2022-12-23 | End: 2022-12-23

## 2022-12-23 RX ORDER — ONDANSETRON 2 MG/ML
4 INJECTION INTRAMUSCULAR; INTRAVENOUS ONCE AS NEEDED
Status: DISCONTINUED | OUTPATIENT
Start: 2022-12-23 | End: 2022-12-23 | Stop reason: HOSPADM

## 2022-12-23 RX ORDER — ONDANSETRON 2 MG/ML
INJECTION INTRAMUSCULAR; INTRAVENOUS AS NEEDED
Status: DISCONTINUED | OUTPATIENT
Start: 2022-12-23 | End: 2022-12-23

## 2022-12-23 RX ORDER — HYDROMORPHONE HCL IN WATER/PF 6 MG/30 ML
0.2 PATIENT CONTROLLED ANALGESIA SYRINGE INTRAVENOUS
Status: DISCONTINUED | OUTPATIENT
Start: 2022-12-23 | End: 2022-12-23 | Stop reason: HOSPADM

## 2022-12-23 RX ORDER — LIDOCAINE HYDROCHLORIDE 10 MG/ML
INJECTION, SOLUTION EPIDURAL; INFILTRATION; INTRACAUDAL; PERINEURAL AS NEEDED
Status: DISCONTINUED | OUTPATIENT
Start: 2022-12-23 | End: 2022-12-23

## 2022-12-23 RX ORDER — ROCURONIUM BROMIDE 10 MG/ML
INJECTION, SOLUTION INTRAVENOUS AS NEEDED
Status: DISCONTINUED | OUTPATIENT
Start: 2022-12-23 | End: 2022-12-23

## 2022-12-23 RX ORDER — ONDANSETRON 2 MG/ML
4 INJECTION INTRAMUSCULAR; INTRAVENOUS EVERY 6 HOURS PRN
Status: DISCONTINUED | OUTPATIENT
Start: 2022-12-23 | End: 2022-12-25 | Stop reason: HOSPADM

## 2022-12-23 RX ORDER — BUMETANIDE 1 MG/1
1 TABLET ORAL DAILY
Status: DISCONTINUED | OUTPATIENT
Start: 2022-12-24 | End: 2022-12-24

## 2022-12-23 RX ORDER — SODIUM CHLORIDE 9 MG/ML
INJECTION, SOLUTION INTRAVENOUS CONTINUOUS PRN
Status: DISCONTINUED | OUTPATIENT
Start: 2022-12-23 | End: 2022-12-23

## 2022-12-23 RX ORDER — PROTAMINE SULFATE 10 MG/ML
INJECTION, SOLUTION INTRAVENOUS AS NEEDED
Status: DISCONTINUED | OUTPATIENT
Start: 2022-12-23 | End: 2022-12-23

## 2022-12-23 RX ORDER — BUMETANIDE 0.25 MG/ML
1 INJECTION, SOLUTION INTRAMUSCULAR; INTRAVENOUS ONCE
Status: COMPLETED | OUTPATIENT
Start: 2022-12-23 | End: 2022-12-23

## 2022-12-23 RX ORDER — LANOLIN ALCOHOL/MO/W.PET/CERES
3 CREAM (GRAM) TOPICAL
Status: DISCONTINUED | OUTPATIENT
Start: 2022-12-23 | End: 2022-12-25 | Stop reason: HOSPADM

## 2022-12-23 RX ORDER — POTASSIUM CHLORIDE 750 MG/1
10 TABLET, EXTENDED RELEASE ORAL DAILY
Status: DISCONTINUED | OUTPATIENT
Start: 2022-12-24 | End: 2022-12-25 | Stop reason: HOSPADM

## 2022-12-23 RX ORDER — METOPROLOL SUCCINATE 25 MG/1
25 TABLET, EXTENDED RELEASE ORAL 2 TIMES DAILY
Status: DISCONTINUED | OUTPATIENT
Start: 2022-12-23 | End: 2022-12-24

## 2022-12-23 RX ORDER — FUROSEMIDE 10 MG/ML
INJECTION INTRAMUSCULAR; INTRAVENOUS AS NEEDED
Status: DISCONTINUED | OUTPATIENT
Start: 2022-12-23 | End: 2022-12-23

## 2022-12-23 RX ORDER — PROPOFOL 10 MG/ML
INJECTION, EMULSION INTRAVENOUS AS NEEDED
Status: DISCONTINUED | OUTPATIENT
Start: 2022-12-23 | End: 2022-12-23

## 2022-12-23 RX ORDER — FENTANYL CITRATE 50 UG/ML
INJECTION, SOLUTION INTRAMUSCULAR; INTRAVENOUS AS NEEDED
Status: DISCONTINUED | OUTPATIENT
Start: 2022-12-23 | End: 2022-12-23

## 2022-12-23 RX ADMIN — APIXABAN 5 MG: 5 TABLET, FILM COATED ORAL at 18:46

## 2022-12-23 RX ADMIN — FENTANYL CITRATE 25 MCG: 50 INJECTION, SOLUTION INTRAMUSCULAR; INTRAVENOUS at 08:35

## 2022-12-23 RX ADMIN — ONDANSETRON 4 MG: 2 INJECTION INTRAMUSCULAR; INTRAVENOUS at 13:56

## 2022-12-23 RX ADMIN — FENTANYL CITRATE 50 MCG: 50 INJECTION, SOLUTION INTRAMUSCULAR; INTRAVENOUS at 14:36

## 2022-12-23 RX ADMIN — DEXAMETHASONE SODIUM PHOSPHATE 5 MG: 10 INJECTION, SOLUTION INTRAMUSCULAR; INTRAVENOUS at 11:25

## 2022-12-23 RX ADMIN — CEFAZOLIN SODIUM 2000 MG: 2 SOLUTION INTRAVENOUS at 09:41

## 2022-12-23 RX ADMIN — PROTAMINE SULFATE 60 MG: 10 INJECTION, SOLUTION INTRAVENOUS at 13:53

## 2022-12-23 RX ADMIN — FENTANYL CITRATE 25 MCG: 50 INJECTION, SOLUTION INTRAMUSCULAR; INTRAVENOUS at 14:30

## 2022-12-23 RX ADMIN — ATORVASTATIN CALCIUM 20 MG: 20 TABLET, FILM COATED ORAL at 21:54

## 2022-12-23 RX ADMIN — SODIUM CHLORIDE: 0.9 INJECTION, SOLUTION INTRAVENOUS at 10:35

## 2022-12-23 RX ADMIN — METOPROLOL SUCCINATE 25 MG: 25 TABLET, EXTENDED RELEASE ORAL at 18:46

## 2022-12-23 RX ADMIN — SODIUM CHLORIDE: 0.9 INJECTION, SOLUTION INTRAVENOUS at 08:18

## 2022-12-23 RX ADMIN — GLYCOPYRROLATE 0.4 MG: 0.2 INJECTION, SOLUTION INTRAMUSCULAR; INTRAVENOUS at 14:28

## 2022-12-23 RX ADMIN — ROCURONIUM BROMIDE 50 MG: 50 INJECTION INTRAVENOUS at 08:35

## 2022-12-23 RX ADMIN — PROPOFOL 140 MG: 10 INJECTION, EMULSION INTRAVENOUS at 08:35

## 2022-12-23 RX ADMIN — LIDOCAINE HYDROCHLORIDE 50 MG: 10 INJECTION, SOLUTION EPIDURAL; INFILTRATION; INTRACAUDAL; PERINEURAL at 08:35

## 2022-12-23 RX ADMIN — ACETAMINOPHEN 650 MG: 325 TABLET, FILM COATED ORAL at 20:23

## 2022-12-23 RX ADMIN — BUMETANIDE 1 MG: 0.25 INJECTION INTRAMUSCULAR; INTRAVENOUS at 18:46

## 2022-12-23 RX ADMIN — FUROSEMIDE 10 MG: 10 INJECTION, SOLUTION INTRAMUSCULAR; INTRAVENOUS at 13:56

## 2022-12-23 RX ADMIN — FLECAINIDE ACETATE 50 MG: 50 TABLET ORAL at 18:46

## 2022-12-23 RX ADMIN — NEOSTIGMINE METHYLSULFATE 1.5 MG: 1 INJECTION INTRAVENOUS at 14:28

## 2022-12-23 NOTE — INTERVAL H&P NOTE
H&P reviewed  After examining the patient I find no changes in the patients condition since the H&P had been written  Please refer to Dr Thad Casper full consultation from 12/1/2022 for further detail  OTIS is a 21-year-old female with early persistent atrial fibrillation anticoagulated with Eliquis, essential hypertension, hyperlipidemia, hiatal hernia, GERD, and rheumatoid arthritis  She first was diagnosed with atrial fibrillation in January of this year in her PCPs office and was symptomatic with palpitations and shortness of breath  She was put on rate control and anticoagulation of Eliquis  Though at first hesitant patient did subsequently undergo cardioversion which did not hold in October  The following month after the addition of flecainide with nuclear stress test ruling out any structural heart disease, her second cardioversion did not last either  Therefore, she was seen in consultation by Dr Laurita Dupree at which point ablation was recommended and she was agreeable  She presents today for this and reports that there has been no change in her symptoms since being seen in the office      Vitals:    12/23/22 0730   Pulse: 74   Temp: 97 9 °F (36 6 °C)   SpO2: 96%

## 2022-12-23 NOTE — DISCHARGE INSTR - AVS FIRST PAGE
MEDICATION CHANGES:  Please CONTINUE flecainide 50mg daily (the lower dose that Dr Irene Parra put you on) - this the medication that will help you stay in sinus (normal) rhythm  Please DECREASE metoprolol to 12 5mg (a half a tablet) daily, restart this tomorrow  Please HOLD your losartan - this will allow your blood pressure to come up  Please CONTINUE Eliquis 5mg twice daily - it is important to stay on your blood thinner after an ablation  Please CONTINUE Bumex with potassium supplement tomorrow  RESTRICTIONS:   No heavy lifting or strenuous activity for one week  No soaking in a bath tub/hot tub/swimming pool for one week or until groin heals  You may shower - please let soap and water run over the groins, no scrubbing, and pat the area dry  Please place band-aid on groins daily for up to five days, but you may remove sooner if no issues are noted  If you notice ongoing bleeding, swelling, or firm lumps in groin near ablation incision, please contact Dr Piter Torres office - [phone]  If you need assistance over the weekend, can reach on call cardiologist at [phone]  Bella, the physician assistant, will reach out to your primary care doctor concerning need for thyroid ultrasound  Neurology will be reaching out to set up follow up in about 8 weeks

## 2022-12-23 NOTE — ANESTHESIA PREPROCEDURE EVALUATION
Procedure:  Cardiac eps/afib ablation (Chest)  Cardiac eps/aflutter ablation (Chest)    Relevant Problems   CARDIO   (+) Benign hypertension   (+) Hypercholesterolemia   (+) Mixed hyperlipidemia   (+) Persistent atrial fibrillation (HCC)      ENDO   (+) Congenital hypothyroidism with diffuse goiter      GI/HEPATIC   (+) Dysphagia   (+) Gastroesophageal reflux disease   (+) Hiatal hernia      GYN   (+) History of hysterectomy      MUSCULOSKELETAL   (+) Rheumatoid arthritis (HCC)      NEURO/PSYCH   (+) Anxiety   (+) Vertigo      PULMONARY   (+) Asthma     CAD/PCI/MI/CHF -- denies  COPD/ASTHMA/KEVON -- denies  PROBS WITH PRIOR ANESTHESIA -- denies  NPO STATUS CONFIRMED    Lab Results   Component Value Date    SODIUM 141 12/15/2022    K 3 5 12/15/2022    BUN 12 12/15/2022    CREATININE 0 75 12/15/2022    EGFR 79 12/15/2022     No results found for: HGBA1C    Lab Results   Component Value Date    HGB 14 2 12/15/2022    HGB 14 0 10/31/2022    HGB 14 9 01/31/2022     12/15/2022     10/31/2022     01/31/2022     Lab Results   Component Value Date    WBC 5 14 12/15/2022       Lab Results   Component Value Date    CREATININE 0 75 12/15/2022    CREATININE 0 76 10/31/2022    CREATININE 0 81 08/23/2022       Lab Results   Component Value Date    INR 1 08 04/17/2018    INR 1 02 03/31/2018     Lab Results   Component Value Date    PTT 27 04/17/2018    PTT 28 03/31/2018       No results found for: LACTATE      Type and Screen  O    History    GERD; Hypertension; Chronic Kidney Disease     Interpretation Summary       •  Left Ventricle: Left ventricular cavity size is normal  Systolic function is normal   Estimated ejection fraction is 50-55%  Wall motion is normal  Wall thickness is mildly increased  Unable to assess diastolic function due to atrial fibrillation  •  Left Atrium: The atrium is severely dilated  •  Right Atrium: The atrium is mild to moderately dilated  •  Aortic Valve:  There is trace regurgitation  •  Mitral Valve: There is mild regurgitation  •  Tricuspid Valve: There is moderate regurgitation  The right ventricular systolic pressure is mildly elevated at 46 mm Hg  •  Pulmonic Valve: There is mild regurgitation  •  Aorta: Proximal ascending aorta is mildly dilated with a diameter of 3 8 cm  (indexed to BSA= 1 8 cm/m2) Consider other imaging modalities, including CTA for more accurate estimation and to rule out other aortic pathologies  Physical Exam    Airway    Mallampati score: II         Dental   No notable dental hx     Cardiovascular      Pulmonary      Other Findings        Anesthesia Plan  ASA Score- 3     Anesthesia Type- general with ASA Monitors  Additional Monitors: arterial line  Airway Plan: ETT  Comment:  SREEDHAR Brito , have personally seen and evaluated the patient prior to anesthetic care  I have reviewed the pre-anesthetic record, and other medical records if appropriate to the anesthetic care  If a CRNA is involved in the case, I have reviewed the CRNA assessment, if present, and agree  Risks/benefits and alternatives discussed with patient including possible PONV, sore throat, and possibility of rare anesthetic and surgical emergencies          Plan Factors-    Chart reviewed  Existing labs reviewed  Patient summary reviewed  Patient instructed to abstain from smoking on day of procedure  There is medical exclusion for perioperative obstructive sleep apnea risk education  Induction- intravenous  Postoperative Plan- Plan for postoperative opioid use  Planned trial extubation    Informed Consent- Anesthetic plan and risks discussed with patient  I personally reviewed this patient with the CRNA  Discussed and agreed on the Anesthesia Plan with the CRNA  Mattie Broussard

## 2022-12-23 NOTE — ANESTHESIA POSTPROCEDURE EVALUATION
Post-Op Assessment Note    CV Status:  Stable    Pain management: adequate     Mental Status:  Alert and awake   Hydration Status:  Euvolemic   PONV Controlled:  Controlled   Airway Patency:  Patent      Post Op Vitals Reviewed: Yes      Staff: CRNA         No notable events documented      /63 (12/23/22 1445)    Temp 97 9 °F (36 6 °C) (12/23/22 1445)    Pulse 76 (12/23/22 1445)   Resp 21 (12/23/22 1445)    SpO2   94

## 2022-12-24 ENCOUNTER — APPOINTMENT (OUTPATIENT)
Dept: RADIOLOGY | Facility: HOSPITAL | Age: 72
End: 2022-12-24

## 2022-12-24 ENCOUNTER — APPOINTMENT (OUTPATIENT)
Dept: NON INVASIVE DIAGNOSTICS | Facility: HOSPITAL | Age: 72
End: 2022-12-24

## 2022-12-24 PROBLEM — H53.451 PERIPHERAL VISION LOSS, RIGHT: Status: ACTIVE | Noted: 2022-12-24

## 2022-12-24 LAB
ANION GAP SERPL CALCULATED.3IONS-SCNC: 9 MMOL/L (ref 4–13)
AORTIC VALVE MEAN VELOCITY: 7.8 M/S
APICAL FOUR CHAMBER EJECTION FRACTION: 55 %
AV LVOT MEAN GRADIENT: 1 MMHG
AV LVOT PEAK GRADIENT: 2 MMHG
AV MEAN GRADIENT: 3 MMHG
AV PEAK GRADIENT: 5 MMHG
AV VELOCITY RATIO: 0.62
BUN SERPL-MCNC: 14 MG/DL (ref 5–25)
CALCIUM SERPL-MCNC: 7.6 MG/DL (ref 8.3–10.1)
CHLORIDE SERPL-SCNC: 111 MMOL/L (ref 96–108)
CO2 SERPL-SCNC: 24 MMOL/L (ref 21–32)
CREAT SERPL-MCNC: 0.78 MG/DL (ref 0.6–1.3)
DOP CALC AO PEAK VEL: 1.14 M/S
DOP CALC AO VTI: 25.45 CM
DOP CALC LVOT PEAK VEL VTI: 19.38 CM
DOP CALC LVOT PEAK VEL: 0.71 M/S
ERYTHROCYTE [DISTWIDTH] IN BLOOD BY AUTOMATED COUNT: 14.3 % (ref 11.6–15.1)
GFR SERPL CREATININE-BSD FRML MDRD: 75 ML/MIN/1.73SQ M
GLUCOSE SERPL-MCNC: 129 MG/DL (ref 65–140)
HCT VFR BLD AUTO: 35.5 % (ref 34.8–46.1)
HGB BLD-MCNC: 11.8 G/DL (ref 11.5–15.4)
MCH RBC QN AUTO: 30.9 PG (ref 26.8–34.3)
MCHC RBC AUTO-ENTMCNC: 33.2 G/DL (ref 31.4–37.4)
MCV RBC AUTO: 93 FL (ref 82–98)
PLATELET # BLD AUTO: 178 THOUSANDS/UL (ref 149–390)
PMV BLD AUTO: 10.2 FL (ref 8.9–12.7)
POTASSIUM SERPL-SCNC: 3.5 MMOL/L (ref 3.5–5.3)
RA PRESSURE ESTIMATED: 15 MMHG
RBC # BLD AUTO: 3.82 MILLION/UL (ref 3.81–5.12)
RV PSP: 58 MMHG
SL CV LV EF: 60
SODIUM SERPL-SCNC: 144 MMOL/L (ref 135–147)
TR MAX PG: 43 MMHG
TR PEAK VELOCITY: 3.3 M/S
TRICUSPID VALVE PEAK REGURGITATION VELOCITY: 3.27 M/S
WBC # BLD AUTO: 7.81 THOUSAND/UL (ref 4.31–10.16)

## 2022-12-24 RX ORDER — KETOROLAC TROMETHAMINE 30 MG/ML
15 INJECTION, SOLUTION INTRAMUSCULAR; INTRAVENOUS ONCE
Status: COMPLETED | OUTPATIENT
Start: 2022-12-24 | End: 2022-12-24

## 2022-12-24 RX ORDER — ATORVASTATIN CALCIUM 40 MG/1
40 TABLET, FILM COATED ORAL
Status: DISCONTINUED | OUTPATIENT
Start: 2022-12-24 | End: 2022-12-25 | Stop reason: HOSPADM

## 2022-12-24 RX ADMIN — LOSARTAN POTASSIUM 25 MG: 25 TABLET, FILM COATED ORAL at 09:11

## 2022-12-24 RX ADMIN — KETOROLAC TROMETHAMINE 15 MG: 30 INJECTION, SOLUTION INTRAMUSCULAR at 15:52

## 2022-12-24 RX ADMIN — APIXABAN 5 MG: 5 TABLET, FILM COATED ORAL at 18:16

## 2022-12-24 RX ADMIN — METOPROLOL SUCCINATE 25 MG: 25 TABLET, EXTENDED RELEASE ORAL at 09:11

## 2022-12-24 RX ADMIN — APIXABAN 5 MG: 5 TABLET, FILM COATED ORAL at 09:11

## 2022-12-24 RX ADMIN — POTASSIUM CHLORIDE 10 MEQ: 750 TABLET, EXTENDED RELEASE ORAL at 09:11

## 2022-12-24 RX ADMIN — SODIUM CHLORIDE, POTASSIUM CHLORIDE, SODIUM LACTATE AND CALCIUM CHLORIDE 500 ML: 600; 310; 30; 20 INJECTION, SOLUTION INTRAVENOUS at 00:16

## 2022-12-24 RX ADMIN — FLECAINIDE ACETATE 50 MG: 50 TABLET ORAL at 09:10

## 2022-12-24 RX ADMIN — ACETAMINOPHEN 650 MG: 325 TABLET, FILM COATED ORAL at 21:23

## 2022-12-24 RX ADMIN — BUMETANIDE 1 MG: 1 TABLET ORAL at 09:11

## 2022-12-24 RX ADMIN — ACETAMINOPHEN 650 MG: 325 TABLET, FILM COATED ORAL at 13:41

## 2022-12-24 RX ADMIN — ACETAMINOPHEN 650 MG: 325 TABLET, FILM COATED ORAL at 00:23

## 2022-12-24 RX ADMIN — IOHEXOL 85 ML: 350 INJECTION, SOLUTION INTRAVENOUS at 10:59

## 2022-12-24 RX ADMIN — ATORVASTATIN CALCIUM 40 MG: 40 TABLET, FILM COATED ORAL at 21:22

## 2022-12-24 RX ADMIN — LEVOTHYROXINE SODIUM 50 MCG: 50 TABLET ORAL at 05:27

## 2022-12-24 NOTE — PLAN OF CARE
Problem: SAFETY ADULT  Goal: Patient will remain free of falls  Description: INTERVENTIONS:  - Educate patient/family on patient safety including physical limitations  - Instruct patient to call for assistance with activity   - Consult OT/PT to assist with strengthening/mobility   - Keep Call bell within reach  - Keep bed low and locked with side rails adjusted as appropriate  - Keep care items and personal belongings within reach  - Initiate and maintain comfort rounds  - Make Fall Risk Sign visible to staff  - Offer Toileting every  Hours, in advance of need  - Initiate/Maintainalarm  - Obtain necessary fall risk management equipment:   - Apply yellow socks and bracelet for high fall risk patients  - Consider moving patient to room near nurses station  Outcome: Progressing  Goal: Maintain or return to baseline ADL function  Description: INTERVENTIONS:  -  Assess patient's ability to carry out ADLs; assess patient's baseline for ADL function and identify physical deficits which impact ability to perform ADLs (bathing, care of mouth/teeth, toileting, grooming, dressing, etc )  - Assess/evaluate cause of self-care deficits   - Assess range of motion  - Assess patient's mobility; develop plan if impaired  - Assess patient's need for assistive devices and provide as appropriate  - Encourage maximum independence but intervene and supervise when necessary  - Involve family in performance of ADLs  - Assess for home care needs following discharge   - Consider OT consult to assist with ADL evaluation and planning for discharge  - Provide patient education as appropriate  Outcome: Progressing  Goal: Maintains/Returns to pre admission functional level  Description: INTERVENTIONS:  - Perform BMAT or MOVE assessment daily    - Set and communicate daily mobility goal to care team and patient/family/caregiver     - Collaborate with rehabilitation services on mobility goals if consulted  - Perform Range of Motion  times a day   - Reposition patient every  hours    - Dangle patient  times a day  - Stand patient  times a day  - Ambulate patient  times a day  - Out of bed to chair  times a day   - Out of bed for meals  times a day  - Out of bed for toileting  - Record patient progress and toleration of activity level   Outcome: Progressing

## 2022-12-24 NOTE — CONSULTS
Consultation - Neurology   Leanna Faustin 67 y o  female MRN: [id number]  Unit/Bed#: [facility] 404-01 Encounter: [id number]      Assessment/Plan   Peripheral vision loss, right  Assessment & Plan  67year old female with history of atrial fibrillation on Eliquis, hypertension, hyperlipidemia, RA  Presented on 12/23 for elective ablation of atrial fibrillation, performed by EP yesterday  Neurology consulted this morning for acute episodes of right lower quadrant visual field loss  NIHSS 0; visual fields fully intact on formal neuro exam this morning  Neuro exam otherwise non-focal     Will work-up for PCA territory infarct/cerebrovascular event versus TIA; if so likely in the setting of atrial fibrillation ablation yesterday/embolic phenomenon  Plan:  - Checking STAT CT head and CTA head/neck to evaluate vasculature   -as NIHSS 0, stroke alert not activated  - Likely plan for formal stroke pathway placement:   -MRI brain without contrast   -follow up 2D echo this morning pending  - Continuing Eliquis 5 mg twice daily; no clear need for AP initiation at this juncture (will re-determine after CTA reviewed)  -  Continue lipitor 20 mg QHS  - Check hemoglobin A1C, lipid panel  - Telemetry monitoring  - Neuro checks  - Provide stroke education       * Persistent atrial fibrillation (HCC)  Assessment & Plan  - Status post ablation yesterday by ED (12/23)  - Continuing Eliquis 5 mg twice daily  - Continuing Metoprolol, flecainide  - Telemetry monitoring     Discussed plan of care with attending neurologist      Recommendations for outpatient neurological follow up have yet to be determined  History of Present Illness     Reason for Consult / Principal Problem: Intermittent right lower quadrant visual field loss, status post a-fib ablation    HPI: Leanna Faustin is a 67 y o  female with history as mentioned above in assessment who neurology is asked to evaluate in regards to the above       Per chart review and discussion with patient; she presented electively for a-fib ablation yesterday performed by EP; she is also maintained on Eliquis given her cardio-embolic risk factors  This morning, patient was able to see without difficulty at approximately 6 AM; following this however, she began to notice bouts of diminished vision in her right lower visual fields (she provided the example of looking at the board in her room and noticing that the last letters and words were " tapering off"); this visual loss has been intermittent throughout the morning per patient  Given this change, EP was notified and subsequently neurology consulted  Patient denies other neuro symptoms (besides headache from laying in bed, no issues with speech nor any focal motor/sensory deficits)  No history of similar visual symptoms (nor any stroke history) before  At time of neuro exam this morning with attending, visual fields are full/intact per patient  Inpatient consult to Neurology  Consult performed by: Darylene Schooner, PA-C  Consult ordered by: Lyla Valerio PA-C          Review of Systems   Constitutional: Negative  HENT: Negative  Eyes: Positive for visual disturbance  Respiratory: Negative  Cardiovascular: Negative  Gastrointestinal: Negative  Musculoskeletal: Negative  Neurological: Negative  All other ROS reviewed and negative        Historical Information   Past Medical History:   Diagnosis Date   • Anxiety     resolved 10/4/17   • Asthma     seasonal   • Atrial fibrillation ([facility] [address] ) 01/2022    newly diagnosed on eliquis   • Chronic kidney disease     kidney stone   • Colon polyp    • Disease of thyroid gland    • Dysphagia    • GERD (gastroesophageal reflux disease)    • Goiter, nodular     partial thyroidectomy   • Hiatal hernia     repaired 2018   • Hiatal hernia with GERD without esophagitis 04/2018    surgical correction   • History of esophageal varices with bleeding    • History of pernicious anemia    • History of transfusion     x1 after bleeding ulcer   • Hyperlipidemia    • Hypertension    • Irritable bowel syndrome    • Neck mass     2 small areas left side by jawline and [facility] scheduled 0820/21   • RA (rheumatoid arthritis) ([address] )    • Seasonal allergies    • Vertigo    • Wears glasses     for reading     Past Surgical History:   Procedure Laterality Date   • BREAST BIOPSY Bilateral     negative   • CARDIAC CATHETERIZATION      x2 secondary to exertional chest pain, due to lung issues, possible mild COPD   • CATARACT EXTRACTION Bilateral    • CHOLECYSTECTOMY  2015    lap - last assessed 10/4/17   • COLONOSCOPY      complete   • ESOPHAGOGASTRODUODENOSCOPY N/A 1/23/2018    Procedure: ESOPHAGOGASTRODUODENOSCOPY (EGD); Surgeon: Paris Mahmood MD;  Location: [facility] GI LAB; Service: Gastroenterology   • HYSTERECTOMY      partial - ovaries remain   • LIPOMA RESECTION      right thigh   • MI ESOPHAGOSCOPY FLEXIBLE TRANSORAL WITH BIOPSY N/A 4/11/2018    Procedure: ESOPHAGOGASTRODUODENOSCOPY (EGD); Surgeon: Ju Devi MD;  Location: [facility] MAIN OR;  Service: Thoracic   • MI LAPS RPR PARAESPHGL HRNA INCL FUNDPLSTY [facility] N/A 4/11/2018    Procedure: REPAIR HERNIA PARAESOPHAGEAL  LAPAROSCOPIC,ELEONORA GASTROPLASTY, Carolene Rayray FUNDOPLICATION;  Surgeon: Ju Devi MD;  Location: [facility] MA[facility];  Service: Thoracic   • MI XCAPSL CTRC RMVL INSJ IO LENS PROSTH W/O ECP Right 8/23/2021    Procedure: EXTRACTION EXTRACAPSULAR CATARACT PHACO INTRAOCULAR LENS (IOL); Surgeon: Rosalee Donato MD;  Location: [facility] MAIN OR;  Service: Ophthalmology   • MI XCAPSL CTRC RMVL INSJ IO LENS PROSTH W/O ECP Left 11/15/2021    Procedure: EXTRACTION EXTRACAPSULAR CATARACT PHACO INTRAOCULAR LENS (IOL);   Surgeon: Rosalee Donato MD;  Location: [facility] MAIN OR;  Service: Ophthalmology   • SKIN BIOPSY Right     lump benign - last assessed 10/4/17   • THYROIDECTOMY, PARTIAL  1977   • TONSILLECTOMY       Social History   Social History     Substance and Sexual Activity   Alcohol Use Yes    Comment: seldom     Social History     Substance and Sexual Activity   Drug Use Never     E-Cigarette/Vaping   • E-Cigarette Use Never User      E-Cigarette/Vaping Substances   • Nicotine No    • THC No    • CBD No    • Flavoring No    • Other No    • Unknown No      Social History     Tobacco Use   Smoking Status Never   Smokeless Tobacco Never     Family History:   Family History   Problem Relation Age of Onset   • Alzheimer's disease Mother    • Cancer Mother         skin    • Thyroid disease Mother    • Ulcerative colitis Mother    • Cancer Father         prostate   • Stroke Father    • Hypertension Father    • Prostate cancer Father    • Thyroid disease Sister    • Ulcerative colitis Sister    • Other Sister         open heart surgery   • Heart disease Sister    • Hyperlipidemia Brother    • No Known Problems Son    • No Known Problems Son    • No Known Problems Maternal Grandmother    • No Known Problems Paternal Grandmother    • Mental illness Neg Hx        Review of previous medical records was completed       Meds/Allergies   current meds:   Current Facility-Administered Medications   Medication Dose Route Frequency   • acetaminophen (TYLENOL) tablet 650 mg  650 mg Oral Q4H PRN   • apixaban (ELIQUIS) tablet 5 mg  5 mg Oral BID   • atorvastatin (LIPITOR) tablet 20 mg  20 mg Oral HS   • bumetanide (BUMEX) tablet 1 mg  1 mg Oral Daily   • docusate sodium (COLACE) capsule 100 mg  100 mg Oral BID PRN   • flecainide (TAMBOCOR) tablet 50 mg  50 mg Oral BID   • levothyroxine tablet 50 mcg  50 mcg Oral Early Morning   • losartan (COZAAR) tablet 25 mg  25 mg Oral Daily   • melatonin tablet 3 mg  3 mg Oral HS PRN   • metoprolol succinate (TOPROL-XL) 24 hr tablet 25 mg  25 mg Oral BID   • ondansetron (ZOFRAN) injection 4 mg  4 mg Intravenous Q6H PRN   • potassium chloride (K-DUR,KLOR-CON) CR tablet 10 mEq  10 mEq Oral Daily   • potassium chloride (K-DUR,KLOR-CON) CR tablet 40 mEq 40 mEq Oral Once    and PTA meds:   Prior to Admission Medications   Prescriptions Last Dose Informant Patient Reported? Taking?    Calcium Carb-Cholecalciferol 600-1000 MG-UNIT CAPS 12/22/2022  Yes Yes   Sig: Take by mouth every morning gummy    Eliquis 5 MG 12/23/2022 at 0530  No Yes   Sig: TAKE 1 TABLET BY MOUTH  TWICE DAILY   Lactobacillus (PROBIOTIC ACIDOPHILUS PO) More than a month  Yes No   Sig: Take by mouth   atorvastatin (LIPITOR) 20 mg tablet 12/22/2022  No Yes   Sig: TAKE 1 TABLET BY MOUTH  DAILY AT BEDTIME   bumetanide (BUMEX) 1 mg tablet 12/22/2022  No Yes   Sig: TAKE 1 TABLET BY MOUTH  DAILY   clotrimazole-betamethasone (LOTRISONE) 1-0 05 % cream   No No   Sig: Apply topically 2 (two) times a day   Patient not taking: Reported on 11/1/2022   flecainide (TAMBOCOR) 100 mg tablet 12/23/2022 at 0530  No Yes   Sig: Take 1 tablet (100 mg total) by mouth 2 (two) times a day   Patient taking differently: Take 50 mg by mouth 2 (two) times a day   levothyroxine 50 mcg tablet 12/23/2022 at 0530  No Yes   Sig: TAKE 1 TABLET BY MOUTH  DAILY   losartan (COZAAR) 25 mg tablet 12/23/2022 at 0530  No Yes   Sig: Take 1 tablet (25 mg total) by mouth daily   metoprolol succinate (TOPROL-XL) 25 mg 24 hr tablet 12/23/2022 at 0530  No Yes   Sig: Take 1 tablet (25 mg total) by mouth 2 (two) times a day   omeprazole (PriLOSEC) 40 MG capsule 12/23/2022 at 0530  No Yes   Sig: TAKE 1 CAPSULE BY MOUTH  TWICE DAILY   potassium chloride (Klor-Con) 10 mEq tablet 12/22/2022  No Yes   Sig: TAKE 1 TABLET BY MOUTH  DAILY      Facility-Administered Medications: None       Allergies   Allergen Reactions   • Orange (Diagnostic) - Food Allergy Anaphylaxis     Throat closes   • Pantoprazole Headache   • Penicillins Other (See Comments)     During allergy testing instructed to NEVER take PCN   • Gluten Meal - Food Allergy      Following a gluten free diet on her own   • Lactose - Food Allergy Diarrhea       Objective   Vitals:Blood pressure 135/87, pulse 64, temperature 97 5 °F (36 4 °C), temperature source Oral, resp  rate 20, height 5' 5" (1 651 m), weight 98 kg (216 lb), SpO2 97 %, not currently breastfeeding  ,Body mass index is 35 94 kg/m²  Intake/Output Summary (Last 24 hours) at 12/24/2022 1030  Last data filed at 12/24/2022 0330  Gross per 24 hour   Intake 2980 ml   Output 2625 ml   Net 355 ml       Invasive Devices: Invasive Devices     Peripheral Intravenous Line  Duration           Peripheral IV 12/23/22 Dorsal (posterior); Left Forearm 1 day              Examined alongside Dr Vita Tierney    Physical Exam  Constitutional:       Appearance: Normal appearance  HENT:      Head: Normocephalic and atraumatic  Eyes:      Extraocular Movements: Extraocular movements intact and EOM normal       Conjunctiva/sclera: Conjunctivae normal       Pupils: Pupils are equal, round, and reactive to light  Cardiovascular:      Rate and Rhythm: Normal rate  Pulmonary:      Effort: Pulmonary effort is normal    Abdominal:      General: There is no distension  Musculoskeletal:      Cervical back: Normal range of motion and neck supple  Neurological:      Mental Status: She is alert and oriented to person, place, and time  Coordination: Finger-Nose-Finger Test and Heel to Shin Test normal        Neurologic Exam     Mental Status   Oriented to person, place, and time  Follows 2 step commands  Attention: normal  Concentration: normal    Normal comprehension  Cranial Nerves     CN II   Visual fields full to confrontation  CN III, IV, VI   Pupils are equal, round, and reactive to light  Extraocular motions are normal      CN V   Facial sensation intact  CN VII   Facial expression full, symmetric  CN VIII   CN VIII normal      CN IX, X   CN IX normal    CN X normal      CN XI   CN XI normal      CN XII   CN XII normal      Visual fields intact with confrontation/finger counting       Motor Exam   Muscle bulk: normal  Overall muscle tone: normal  Right arm pronator drift: absent  Left arm pronator drift: absent  Full appearing strength throughout UE/LE during NIHSS testing; no focal deficit nor laterality  Sensory Exam   Light touch normal      Gait, Coordination, and Reflexes     Coordination   Finger to nose coordination: normal  Heel to shin coordination: normal    Tremor   Resting tremor: absent  Intention tremor: absent  Able to stand by edge of bed unassisted, no truncal instability nor lateral sway/retropulsion, romberg negative  Lab Results:   CBC:   Results from last 7 days   Lab Units 12/24/22  0338 12/23/22  0709   WBC Thousand/uL 7 81 5 14   RBC Million/uL 3 82 4 63   HEMOGLOBIN g/dL 11 8 14 1   HEMATOCRIT % 35 5 43 1   MCV fL 93 93   PLATELETS Thousands/uL 178 209   , BMP/CMP:   Results from last 7 days   Lab Units 12/24/22  0338 12/23/22  0709   SODIUM mmol/L 144 142   POTASSIUM mmol/L 3 5 3 6   CHLORIDE mmol/L 111* 109*   CO2 mmol/L 24 26   BUN mg/dL 14 10   CREATININE mg/dL 0 78 0 76   CALCIUM mg/dL 7 6* 9 2   AST U/L  --  24   ALT U/L  --  26   ALK PHOS U/L  --  69   EGFR ml/min/1 73sq m 75 78   , Vitamin B12:   , HgBA1C:   , TSH:   , Coagulation:   Results from last 7 days   Lab Units 12/23/22  0709   INR  1 24*   , Lipid Profile:   , Ammonia:   , Urinalysis:       Invalid input(s): URIBILINOGEN, Drug Screen:   , Medication Drug Levels:       Invalid input(s): CARBAMAZEPINE,  PHENOBARB, LACOSAMIDE, OXCARBAZEPINE  Imaging Studies: I have personally reviewed pertinent films in PACS   CTA head and neck w wo contrast    (Results Pending)       EKG, Pathology, and Other Studies: I have personally reviewed pertinent reports  VTE Prophylaxis: Sequential compression device (Venodyne)  and Eliquis    Code Status: Level 1 - Full Code    Total time spent today 40 minutes

## 2022-12-24 NOTE — DISCHARGE SUMMARY
Discharge Summary - Vanessa Tiwari 67 y o  female MRN: [id number]    Unit/Bed#: [facility] 404-01 Encounter: [id number]      Admission Date: 12/23/2022   Discharge Date: 12/25/2022    Discharge Diagnosis:   1  Early persistent atrial fibrillation  - anticoagulated with Eliquis / [id number] score of 3 (age, sex, HTN)  - rate control: metoprolol 12 5mg daily  - antiarrhythmic therapy: flecainide 50mg twice daily  - prior cardioversion: x2  - now status post ablation of afib with PVI and posterior wall isolation and flutter with CTI line by Dr Eddie Pisano on 12/23/2022  2  Essential hypertension  - antihypertensives now on hold to allow for permissive hypertension  3  Hyperlipidemia  4  GERD  5  Rheumatoid arthritis  6  Hypothryoidism  - stats post hemithyroidectomy - non-emergent thyroid ultrasound recommended  7  Peripheral vision disturbance    - statu CT head and MRI brain showing no acute issues    Procedures Performed:   -Atrial fibrillation ablation  -Intracardiac echo  -Transseptal puncture  -3D mapping (Carto)  -Posterior wall isolation  -CTI line placement   Orders Placed This Encounter   Procedures   • Cardiac ep lab eps/ablations     Consultants: None    HPI: Please refer to the initial history and physical as well as procedure notes for full details  Briefly, Vanessa Tiwari is a 67y o  year old female with early persistent atrial fibrillation anticoagulated with Eliquis, essential hypertension, hyperlipidemia, hiatal hernia, GERD, and rheumatoid arthritis  She first was diagnosed with atrial fibrillation in January of this year in her PCPs office and was symptomatic with palpitations and shortness of breath  She was put on rate control and anticoagulation of Eliquis  Though at first hesitant patient did subsequently undergo cardioversion which did not hold in October    The following month after the addition of flecainide with nuclear stress test ruling out any structural heart disease, her second cardioversion did not last either  Therefore, she was seen in consultation by Dr Shar Caraballo at which point ablation was recommended and she was agreeable  She presented this hospital admission to undergo this procedure  Hospital Course: Collette Mason presented at her baseline state of health  After the procedure was explained in detail and consent was obtained, she underwent extensive ablation of atrial fibrillation without complications  She tolerated the procedure well  She was then monitored overnight for further observation  There were no acute issues or events overnight  The following morning She denied all cardiac complaints, including chest pain/pressure, dyspnea, palpitations, dizziness, lightheadedness, or syncope  Her vital signs were reviewed and labs are stable  Telemetry showed sinus rhythm with some PACs  Unfortunately, she did report some visual changes in her right eye  He did become nauseous and this lasted for 20 minutes  Then when evaluated by myself she had a recurrence of this  Therefore, stat CT scan was ordered with neurology consult  CT showed no hemodynamically significant stenosis of major arteries, no intracranial aneurysm, and microangiopathic changes without hemorrhage  This was followed up with MRI of the brain that again showed no acute infarction, intracranial hemorrhage, or mass-effect with just mild chronic microangiopathy  As she did also have some hypotension, echo was ordered that showed no effusion  Her antihypertensives were held along with her diuretic  The following day, pressures did appear to still remain soft but did improve by midday  Case was reviewed with neurology and as patient did not have any further symptoms of visual changes, it was felt that she would be stable for discharge  Groin sutures were removed without incidence with groins soft without signs of ecchymosis or hematoma   Physical exam on the day of discharge was as follows:  GEN: NAD, alert and oriented, well appearing  SKIN: dry without significant lesions or rashes  HEENT: NCAT, PERRL, EOMs intact  NECK: No JVD or carotid bruits appreciated  CARDIOVASCULAR: RRR, normal S1, S2 without murmurs, rubs, or gallops appreciated  LUNGS: Clear to auscultation bilaterally without wheezes, rhonchi, or rales  ABDOMEN: Soft, nontender, nondistended  EXTREMITIES/VASCULAR: perfused without clubbing, cyanosis; mild pitting of lower extremities  PSYCH: Normal mood and affect  NEURO: CN ll-Xll grossly intact    She was given routine post ablation discharge instructions and restrictions, and these were explained in detail  She was given a follow up appointment with Dr Haresh Doran, and she was instructed to follow up with her primary cardiologist as previously instructed  As there was incidental findings of thyroid nodules, will reach out to primary physician concerning follow-up for this  Neurology will also reach out to schedule a follow-up in 8 weeks  In terms of her medications, was advised to hold her losartan and her metoprolol was decreased to 12 5 mg daily  She was advised to continue flecainide 50 mg twice daily  She did report significant issues with aspirin in the past and therefore this will not be utilized post ablation  She was already on PPI prior to hospitalization  She is stable for discharge at this time with all questions answered  She was discussed in detail with Dr Angelica Fabian who is in agreement with this discharge summary  Discharge Medications:  See after visit summary for reconciled discharge medications provided to patient and family      Medications Prior to Admission   Medication   • atorvastatin (LIPITOR) 20 mg tablet   • bumetanide (BUMEX) 1 mg tablet   • Calcium Carb-Cholecalciferol 600-1000 MG-UNIT CAPS   • Eliquis 5 MG   • flecainide (TAMBOCOR) 100 mg tablet   • levothyroxine 50 mcg tablet   • losartan (COZAAR) 25 mg tablet   • metoprolol succinate (TOPROL-XL) 25 mg 24 hr tablet   • omeprazole (PriLOSEC) 40 MG capsule   • potassium chloride (Klor-Con) 10 mEq tablet   • clotrimazole-betamethasone (LOTRISONE) 1-0 05 % cream   • Lactobacillus (PROBIOTIC ACIDOPHILUS PO)         Pertininet Labs/diagnostics:  CBC with diff:   Results from last 7 days   Lab Units 12/23/22  0709   WBC Thousand/uL 5 14   HEMOGLOBIN g/dL 14 1   HEMATOCRIT % 43 1   MCV fL 93   PLATELETS Thousands/uL 209   MCH pg 30 5   MCHC g/dL 32 7   RDW % 13 8   MPV fL 10 3       BMP:  Results from last 7 days   Lab Units 12/23/22  0709   POTASSIUM mmol/L 3 6   CHLORIDE mmol/L 109*   CO2 mmol/L 26   BUN mg/dL 10   CREATININE mg/dL 0 76   CALCIUM mg/dL 9 2       Magnesium:       Coags:   Results from last 7 days   Lab Units 12/23/22  0709   INR  8 90*         Complications: none    Condition at Discharge: good     Discharge instructions/Information to patient and family:   See after visit summary for information provided to patient and family  Provisions for Follow-Up Care:  See after visit summary for information related to follow-up care and any pertinent home health orders  Disposition: Home    Planned Readmission: No    Discharge Statement   I spent 45 minutes minutes discharging the patient  This time was spent on the day of discharge  I had direct contact with the patient on the day of discharge  Additional documentation is required if more than 30 minutes were spent on discharge   Evaluating the incision, discussing discharge instructions and restrictions, arranging follow up appointments, discussing medications

## 2022-12-24 NOTE — ASSESSMENT & PLAN NOTE
67year old female with history of atrial fibrillation on Eliquis, hypertension, hyperlipidemia, RA  Presented on 12/23 for elective ablation of atrial fibrillation, performed by EP  Neurology consulted on morning of 12/24 for acute episodes of right lower quadrant visual field loss (intermittent)  NIHSS 0; visual fields fully intact on formal neuro exam  Neuro exam otherwise non-focal     MRI brain overnight negative for acute PCA infarct nor other intracranial pathology      Plan:  -Stroke work-up initiated:  -CTA head/neck yesterday with no acute intracranial pathology nor any significant vascular normalities/significant stenosis   -as NIHSS 0, stroke alert not activated  -MRI brain overnight negative for acute PCA infarct nor other intracranial pathology  -Follow-up 2D echocardiogram yesterday withEF 60%, no regional wall motion abnormalities, bilateral atrial dilation, moderate to severe tricuspid valve regurgitation  - Continuing Eliquis 5 mg twice daily; no clear need for AP initiation at this juncture   - Continue lipitor 40 mg QHS  - Hemoglobin A1c pending, lipid panel with LDL of 36  - Telemetry monitoring  - Neuro checks  - Provide stroke education

## 2022-12-24 NOTE — PROGRESS NOTES
Progress Note - Electrophysiology  Barbara Gibbons 67 y o  female MRN: [id number]  Unit/Bed#: [facility] 404-01 Encounter: [id number]      Assessment:  1  Early persistent atrial fibrillation  - anticoagulated with Eliquis / [id number] score of 3 (age, sex, HTN)  - rate control: metoprolol  - antiarrhythmic therapy: flecainide  - prior cardioversion: x2  - now status post ablation of afib with PVI and posterior wall isolation and flutter with CTI line by Dr Zeeshan Perez on 12/23/2022  2  Essential hypertension  3  Hyperlipidemia  4  GERD  5  RA  6  Peripheral vision disturbance    Plan:  Patient is stable from a rhythm standpoint at this time  However, more pressing is her visual disturbances  There are vision changes in her right eye with neuro exam showing no fixed pupil, nystagmus, and extraocular movements are intact  She does have issues with her peripheral vision  Did put in a stat CT of her head and have reached out to neurology to evaluate the patient as well  Did receive Eliquis last night and this morning  We will hold further blood pressure medications for the time being  Subjective/Objective   Subjective: Patient reports that she did have some lightheadedness overnight  She was status post atrial fibrillation ablation did receive IV Bumex last night as she did receive fluids during the procedure  She reports that this morning she did feel better with improvement of blood pressures after a small bolus of lactated Ringer's  Echo still was performed and ruled out effusion  However, patient reported to nursing staff that during her breakfast, for about 20 minutes her vision did change in her right eye  She reports that the images that she saw were cut in half  She was nauseous with this  When nursing staff reached out to me, when seen on at 855 she reports that it started again      Objective:  Vitals: /87   Pulse (!) 48   Temp 97 5 °F (36 4 °C) (Oral)   Resp 20   Ht 5' 5" (1 651 m)   Wt 98 kg (216 lb)   SpO2 97%   BMI 35 94 kg/m²     Vitals:    12/23/22 0850 12/24/22 0830   Weight: 98 kg (216 lb) 98 kg (216 lb)     Orthostatic Blood Pressures    Flowsheet Row Most Recent Value   Blood Pressure 135/87 filed at 12/24/2022 0910   Patient Position - Orthostatic VS Lying filed at 12/24/2022 0532            Intake/Output Summary (Last 24 hours) at 12/24/2022 8563  Last data filed at 12/24/2022 0330  Gross per 24 hour   Intake 2980 ml   Output 2625 ml   Net 355 ml       Invasive Devices     Peripheral Intravenous Line  Duration           Peripheral IV 12/23/22 Dorsal (posterior); Left Forearm 1 day                          Scheduled Meds:  Current Facility-Administered Medications   Medication Dose Route Frequency Provider Last Rate   • acetaminophen  650 mg Oral Q4H PRN Bella Sodelvi, PA-C     • apixaban  5 mg Oral BID Bella Soden, PA-WILBER     • atorvastatin  20 mg Oral HS Bella Soden, DOTTY     • bumetanide  1 mg Oral Daily Bella SodenDOTTY     • docusate sodium  100 mg Oral BID PRN Bella Soden, PA-C     • flecainide  50 mg Oral BID Pitleoncio Valerio PA-C     • levothyroxine  50 mcg Oral Early Morning Bella SodenDOTTY     • losartan  25 mg Oral Daily Bella Soden, PA-WILBER     • melatonin  3 mg Oral HS PRN Bella Soden, PA-C     • metoprolol succinate  25 mg Oral BID Bella Soden, PA-C     • ondansetron  4 mg Intravenous Q6H PRN Bella Soden, PA-C     • potassium chloride  10 mEq Oral Daily Bella Soden, PA-WILBER     • potassium chloride  40 mEq Oral Once Lyla Valerio PA-C       Continuous Infusions:   PRN Meds: •  acetaminophen  •  docusate sodium  •  melatonin  •  ondansetron    Review of Systems:  Review of Systems   Constitutional: Negative for chills, diaphoresis, fever and malaise/fatigue  Eyes: Positive for blurred vision     Cardiovascular: Negative for chest pain, claudication, cyanosis, dyspnea on exertion, irregular heartbeat, leg swelling, near-syncope, orthopnea, palpitations, paroxysmal nocturnal dyspnea and syncope  Respiratory: Negative for shortness of breath and sleep disturbances due to breathing  Neurological: Positive for light-headedness  Negative for dizziness  All other systems reviewed and are negative  ROS as noted above, otherwise 12 point review of systems was performed and is negative  Physical Exam:   Physical Exam  Vitals and nursing note reviewed  Constitutional:       General: She is not in acute distress  Appearance: She is well-developed  She is not diaphoretic  HENT:      Head: Normocephalic and atraumatic  Eyes:      Extraocular Movements: Extraocular movements intact  Pupils: Pupils are equal, round, and reactive to light  Comments: No nystagmus or pupil dilation noted   Neck:      Vascular: No JVD  Cardiovascular:      Rate and Rhythm: Normal rate and regular rhythm  Heart sounds: No murmur heard  No friction rub  No gallop  Pulmonary:      Effort: Pulmonary effort is normal  No respiratory distress  Breath sounds: Normal breath sounds  No wheezing  Abdominal:      Palpations: Abdomen is soft  Musculoskeletal:         General: Normal range of motion  Cervical back: Normal range of motion  Skin:     General: Skin is warm and dry  Neurological:      Mental Status: She is alert and oriented to person, place, and time  Lab Results: I have personally reviewed pertinent lab results  Results from last 7 days   Lab Units 12/24/22  0338 12/23/22  0709   WBC Thousand/uL 7 81 5 14   HEMOGLOBIN g/dL 11 8 14 1   HEMATOCRIT % 35 5 43 1   PLATELETS Thousands/uL 178 209     Results from last 7 days   Lab Units 12/24/22  0338 12/23/22  0709   POTASSIUM mmol/L 3 5 3 6   CHLORIDE mmol/L 111* 109*   CO2 mmol/L 24 26   BUN mg/dL 14 10   CREATININE mg/dL 0 78 0 76   CALCIUM mg/dL 7 6* 9 2     Results from last 7 days   Lab Units 12/23/22  0709   INR  1 24*           Imaging: I have personally reviewed pertinent reports  No results found for this or any previous visit        VTE Pharmacologic Prophylaxis: Eliquis  VTE Mechanical Prophylaxis: sequential compression device

## 2022-12-24 NOTE — ASSESSMENT & PLAN NOTE
- Status post ablation yesterday by ED (12/23)  - Continuing Eliquis 5 mg twice daily  - Continuing Metoprolol, flecainide  - Telemetry monitoring

## 2022-12-25 VITALS
SYSTOLIC BLOOD PRESSURE: 118 MMHG | BODY MASS INDEX: 35.99 KG/M2 | DIASTOLIC BLOOD PRESSURE: 75 MMHG | RESPIRATION RATE: 16 BRPM | OXYGEN SATURATION: 97 % | WEIGHT: 216 LBS | HEIGHT: 65 IN | HEART RATE: 79 BPM | TEMPERATURE: 97.4 F

## 2022-12-25 LAB
ANION GAP SERPL CALCULATED.3IONS-SCNC: 6 MMOL/L (ref 4–13)
ATRIAL RATE: 308 BPM
ATRIAL RATE: 68 BPM
BASOPHILS # BLD AUTO: 0.02 THOUSANDS/ÂΜL (ref 0–0.1)
BASOPHILS NFR BLD AUTO: 0 % (ref 0–1)
BUN SERPL-MCNC: 17 MG/DL (ref 5–25)
CALCIUM SERPL-MCNC: 8.1 MG/DL (ref 8.3–10.1)
CHLORIDE SERPL-SCNC: 107 MMOL/L (ref 96–108)
CHOLEST SERPL-MCNC: 102 MG/DL
CO2 SERPL-SCNC: 25 MMOL/L (ref 21–32)
CREAT SERPL-MCNC: 0.78 MG/DL (ref 0.6–1.3)
EOSINOPHIL # BLD AUTO: 0.02 THOUSAND/ÂΜL (ref 0–0.61)
EOSINOPHIL NFR BLD AUTO: 0 % (ref 0–6)
ERYTHROCYTE [DISTWIDTH] IN BLOOD BY AUTOMATED COUNT: 14.5 % (ref 11.6–15.1)
GFR SERPL CREATININE-BSD FRML MDRD: 75 ML/MIN/1.73SQ M
GLUCOSE SERPL-MCNC: 107 MG/DL (ref 65–140)
HCT VFR BLD AUTO: 34.7 % (ref 34.8–46.1)
HDLC SERPL-MCNC: 52 MG/DL
HGB BLD-MCNC: 10.9 G/DL (ref 11.5–15.4)
IMM GRANULOCYTES # BLD AUTO: 0.03 THOUSAND/UL (ref 0–0.2)
IMM GRANULOCYTES NFR BLD AUTO: 1 % (ref 0–2)
LDLC SERPL CALC-MCNC: 36 MG/DL (ref 0–100)
LYMPHOCYTES # BLD AUTO: 1.13 THOUSANDS/ÂΜL (ref 0.6–4.47)
LYMPHOCYTES NFR BLD AUTO: 18 % (ref 14–44)
MCH RBC QN AUTO: 30 PG (ref 26.8–34.3)
MCHC RBC AUTO-ENTMCNC: 31.4 G/DL (ref 31.4–37.4)
MCV RBC AUTO: 96 FL (ref 82–98)
MONOCYTES # BLD AUTO: 0.76 THOUSAND/ÂΜL (ref 0.17–1.22)
MONOCYTES NFR BLD AUTO: 12 % (ref 4–12)
NEUTROPHILS # BLD AUTO: 4.47 THOUSANDS/ÂΜL (ref 1.85–7.62)
NEUTS SEG NFR BLD AUTO: 69 % (ref 43–75)
NRBC BLD AUTO-RTO: 0 /100 WBCS
P AXIS: 60 DEGREES
PLATELET # BLD AUTO: 139 THOUSANDS/UL (ref 149–390)
PMV BLD AUTO: 10.5 FL (ref 8.9–12.7)
POTASSIUM SERPL-SCNC: 3.3 MMOL/L (ref 3.5–5.3)
PR INTERVAL: 190 MS
QRS AXIS: -1 DEGREES
QRS AXIS: 45 DEGREES
QRSD INTERVAL: 106 MS
QRSD INTERVAL: 114 MS
QT INTERVAL: 426 MS
QT INTERVAL: 498 MS
QTC INTERVAL: 462 MS
QTC INTERVAL: 529 MS
RBC # BLD AUTO: 3.63 MILLION/UL (ref 3.81–5.12)
SODIUM SERPL-SCNC: 138 MMOL/L (ref 135–147)
T WAVE AXIS: 19 DEGREES
T WAVE AXIS: 236 DEGREES
TRIGL SERPL-MCNC: 70 MG/DL
VENTRICULAR RATE: 68 BPM
VENTRICULAR RATE: 71 BPM
WBC # BLD AUTO: 6.43 THOUSAND/UL (ref 4.31–10.16)

## 2022-12-25 RX ORDER — FLECAINIDE ACETATE 100 MG/1
50 TABLET ORAL 2 TIMES DAILY
Start: 2022-12-25

## 2022-12-25 RX ORDER — METOPROLOL SUCCINATE 25 MG/1
12.5 TABLET, EXTENDED RELEASE ORAL DAILY
Qty: 180 TABLET | Refills: 0
Start: 2022-12-25

## 2022-12-25 RX ADMIN — APIXABAN 5 MG: 5 TABLET, FILM COATED ORAL at 09:12

## 2022-12-25 RX ADMIN — LEVOTHYROXINE SODIUM 50 MCG: 50 TABLET ORAL at 06:07

## 2022-12-25 RX ADMIN — POTASSIUM CHLORIDE 10 MEQ: 750 TABLET, EXTENDED RELEASE ORAL at 09:13

## 2022-12-25 NOTE — INCIDENTAL FINDINGS
The following findings require follow up:  Radiographic finding   Finding: Multiple nodules in the left lobe of thyroid gland   Follow up required: non-emergent ultrasound   Follow up should be done non-urgently  Our team will be reaching out to PCP about follow up

## 2022-12-25 NOTE — QUICK NOTE
Please see my/Dr Snell's consult note yesterday (stroke work-up following A  fib ablation on 12/23 with few episodes of right lower quadrant visual field loss):    MRI brain overnight was negative for any acute intracranial pathology nor any infarct  CTA head/neck yesterday was also negative for any significant stenosis/flow restrictive disease  Will treat as TIA, potentially embolic/oneyda-procedurally in the setting of A-fib ablation on 12/23  Recommend continuing anticoagulation with full dose Eliquis, statin therapy as well  Otherwise patient is okay from neurologic standpoint for discharge  Discussed with attending neurologist     Will arrange for outpatient stroke clinic/neurovascular follow-up in approximately 8 weeks      (For scheduling team: Please arrange with attending and/or advanced practitioner; will not require outpatient testing prior to follow-up )

## 2022-12-27 ENCOUNTER — TELEPHONE (OUTPATIENT)
Dept: CARDIOLOGY CLINIC | Facility: CLINIC | Age: 72
End: 2022-12-27

## 2022-12-27 ENCOUNTER — TRANSITIONAL CARE MANAGEMENT (OUTPATIENT)
Dept: FAMILY MEDICINE CLINIC | Facility: CLINIC | Age: 72
End: 2022-12-27

## 2022-12-27 LAB
ATRIAL RATE: 70 BPM
EST. AVERAGE GLUCOSE BLD GHB EST-MCNC: 111 MG/DL
HBA1C MFR BLD: 5.5 %
P AXIS: 57 DEGREES
PR INTERVAL: 200 MS
QRS AXIS: 67 DEGREES
QRSD INTERVAL: 108 MS
QT INTERVAL: 454 MS
QTC INTERVAL: 490 MS
T WAVE AXIS: 232 DEGREES
VENTRICULAR RATE: 70 BPM

## 2022-12-27 NOTE — TELEPHONE ENCOUNTER
Hi, this is Raymond Rehman  It's V as in Michelle Hever had an ablation done on the 23rd  Bella told me to call if I have any questions, and I do  I've been taking my blood pressure and it seems to be quite high, so I need to know if she wants to change the medicine around  My number here is [phone]  Thank you  [Mother] : mother

## 2022-12-27 NOTE — TELEPHONE ENCOUNTER
Patient is s/p ablation on 12/23  She's calling with concerns of high BP since the procedure  On 12/25, BP was 140/84 in the am and slowly went down to 118/83  On 12/26 it was 118/81  Today, pt woke up with a BP of 170/114  After medications, pt's BP was 124/85  Pt keeps track of BP daily  No symptoms other than some brain fog  Pt takes metoprolol 12 5mg daily and states she was instructed to hold her losartan  Also takes flecainide and eliquis

## 2023-01-05 ENCOUNTER — OFFICE VISIT (OUTPATIENT)
Dept: FAMILY MEDICINE CLINIC | Facility: CLINIC | Age: 73
End: 2023-01-05

## 2023-01-05 VITALS
HEIGHT: 65 IN | WEIGHT: 214 LBS | RESPIRATION RATE: 16 BRPM | TEMPERATURE: 97.7 F | HEART RATE: 86 BPM | OXYGEN SATURATION: 97 % | BODY MASS INDEX: 35.65 KG/M2 | SYSTOLIC BLOOD PRESSURE: 120 MMHG | DIASTOLIC BLOOD PRESSURE: 90 MMHG

## 2023-01-05 DIAGNOSIS — E66.01 SEVERE OBESITY (BMI 35.0-39.9) WITH COMORBIDITY (HCC): ICD-10-CM

## 2023-01-05 DIAGNOSIS — D64.9 ANEMIA, UNSPECIFIED TYPE: ICD-10-CM

## 2023-01-05 DIAGNOSIS — E04.1 THYROID NODULE: ICD-10-CM

## 2023-01-05 DIAGNOSIS — G70.9 NEUROMUSCULAR DISEASE (HCC): ICD-10-CM

## 2023-01-05 DIAGNOSIS — M25.531 RIGHT WRIST PAIN: ICD-10-CM

## 2023-01-05 DIAGNOSIS — I48.19 PERSISTENT ATRIAL FIBRILLATION (HCC): ICD-10-CM

## 2023-01-05 DIAGNOSIS — I48.19 OTHER PERSISTENT ATRIAL FIBRILLATION (HCC): Primary | ICD-10-CM

## 2023-01-05 DIAGNOSIS — E03.0 CONGENITAL HYPOTHYROIDISM WITH DIFFUSE GOITER: ICD-10-CM

## 2023-01-05 DIAGNOSIS — I10 BENIGN HYPERTENSION: ICD-10-CM

## 2023-01-05 DIAGNOSIS — M06.9 RHEUMATOID ARTHRITIS, INVOLVING UNSPECIFIED SITE, UNSPECIFIED WHETHER RHEUMATOID FACTOR PRESENT (HCC): ICD-10-CM

## 2023-01-05 DIAGNOSIS — D69.2 PIGMENTED PURPURA (HCC): ICD-10-CM

## 2023-01-05 NOTE — PROGRESS NOTES
Assessment & Plan     1  Other persistent atrial fibrillation ([facility] [address] )    2  Thyroid nodule  -     US thyroid; Future; Expected date: 01/05/2023    3  Anemia, unspecified type  -     CBC and differential; Future    4  Pigmented purpura ([facility] [address] )  Assessment & Plan:  Managed by derm  5  Rheumatoid arthritis, involving unspecified site, unspecified whether rheumatoid factor present [facility])  Assessment & Plan:  Managed by rheumatology  6  Persistent atrial fibrillation [facility])  Assessment & Plan:  Now s/p ablation, no further symptoms  Has f/u scheduled with cardiology  7  Benign hypertension  Assessment & Plan:  Well controlled  Doubt this is the reason for her unsteadiness as she is not having any low readings concurrent with her symptoms, and it has not changed at all with medication adjustment  She was asked to continue to monitor bp at home and check bp with any lightheadedness  8  Congenital hypothyroidism with diffuse goiter  Assessment & Plan:  Was again seen on CT neck in the hospital, will check US to further evaluate  9  Vertigo  Assessment & Plan:  Offered consult with the [address] [place] [address] but she defers at this time  10  Right wrist pain  Comments:  Suspect tendinitis, advised moist heat, voltaren gel, wrist splint at night  11  Severe obesity (BMI 35 0-39  9) with comorbidity [facility])       Subjective     Transitional Care Management Review:   Davida Charles is a 67 y o  female here for TCM follow up       During the TCM phone call patient stated:  TCM Call     Date and time call was made  12/27/2022  3:32 PM    Hospital care reviewed  Records reviewed    Patient was hospitialized at  [facility]    Date of Admission  12/23/22    Date of discharge  12/25/22    Diagnosis  Persistent atrial fibrillation    Disposition  Home    Current Symptoms  Dizziness    Dizziness severity  Moderate    Quality Character  Lightheadedness    Episode pattern  Constant    Cause  --  high blood pressure      TCM Call     Post hospital issues  None    Should patient be enrolled in anticoag monitoring? No    Scheduled for follow up? Yes    Patients specialists  Cardiologist; Neurologist    Did you obtain your prescribed medications  No    Why were you unable to obtain your medications  They did not prescribe any new medications    Do you need help managing your prescriptions or medications  No    Is transportation to your appointment needed  No    I have advised the patient to call PCP with any new or worsening symptoms  Caleb Hinson LPN    Living Arrangements  Spouse or Significiant other    Support System  Family; Partner    The type of support provided  Emotional; Physical    Do you have social support  Yes, as much as I need    Are you recieving any outpatient services  No    Are you recieving home care services  No    Are you using any community resources  No    Current waiver services  No    Have you fallen in the last 12 months  Yes  tripped going up stairs and broke her ankle    How many times  1    Interperter language line needed  No    Counseling  Patient    Counseling topics  Activities of daily living; Importance of RX compliance        She feels unsteady and lightheaded, will bump in to walls  BP after meds yesterday 112/78  She does not think there is a difference between when she was put on the losartan as opposed to prior to this  Hb 10 9 on discharge, Is asking for a recheck to make sure things don't get worse       Review of Systems    Objective     /90   Pulse 86   Temp 97 7 °F (36 5 °C)   Resp 16   Ht 5' 5" (1 651 m)   Wt 97 1 kg (214 lb)   SpO2 97%   BMI 35 61 kg/m²      Physical Exam  Medications have been reviewed by provider in current encounter    Lanny Chacko MD

## 2023-01-06 ENCOUNTER — APPOINTMENT (OUTPATIENT)
Dept: LAB | Facility: HOSPITAL | Age: 73
End: 2023-01-06

## 2023-01-06 DIAGNOSIS — D64.9 ANEMIA, UNSPECIFIED TYPE: ICD-10-CM

## 2023-01-06 LAB
BASOPHILS # BLD AUTO: 0.03 THOUSANDS/ÂΜL (ref 0–0.1)
BASOPHILS NFR BLD AUTO: 1 % (ref 0–1)
EOSINOPHIL # BLD AUTO: 0.11 THOUSAND/ÂΜL (ref 0–0.61)
EOSINOPHIL NFR BLD AUTO: 2 % (ref 0–6)
ERYTHROCYTE [DISTWIDTH] IN BLOOD BY AUTOMATED COUNT: 13.2 % (ref 11.6–15.1)
HCT VFR BLD AUTO: 42.5 % (ref 34.8–46.1)
HGB BLD-MCNC: 13.7 G/DL (ref 11.5–15.4)
IMM GRANULOCYTES # BLD AUTO: 0.01 THOUSAND/UL (ref 0–0.2)
IMM GRANULOCYTES NFR BLD AUTO: 0 % (ref 0–2)
LYMPHOCYTES # BLD AUTO: 1.38 THOUSANDS/ÂΜL (ref 0.6–4.47)
LYMPHOCYTES NFR BLD AUTO: 24 % (ref 14–44)
MCH RBC QN AUTO: 30 PG (ref 26.8–34.3)
MCHC RBC AUTO-ENTMCNC: 32.2 G/DL (ref 31.4–37.4)
MCV RBC AUTO: 93 FL (ref 82–98)
MONOCYTES # BLD AUTO: 0.47 THOUSAND/ÂΜL (ref 0.17–1.22)
MONOCYTES NFR BLD AUTO: 8 % (ref 4–12)
NEUTROPHILS # BLD AUTO: 3.85 THOUSANDS/ÂΜL (ref 1.85–7.62)
NEUTS SEG NFR BLD AUTO: 65 % (ref 43–75)
NRBC BLD AUTO-RTO: 0 /100 WBCS
PLATELET # BLD AUTO: 218 THOUSANDS/UL (ref 149–390)
PMV BLD AUTO: 10 FL (ref 8.9–12.7)
RBC # BLD AUTO: 4.56 MILLION/UL (ref 3.81–5.12)
WBC # BLD AUTO: 5.85 THOUSAND/UL (ref 4.31–10.16)

## 2023-01-06 NOTE — ASSESSMENT & PLAN NOTE
Well controlled  Doubt this is the reason for her unsteadiness as she is not having any low readings concurrent with her symptoms, and it has not changed at all with medication adjustment  She was asked to continue to monitor bp at home and check bp with any lightheadedness

## 2023-01-09 ENCOUNTER — TELEPHONE (OUTPATIENT)
Dept: NEUROLOGY | Facility: CLINIC | Age: 73
End: 2023-01-09

## 2023-01-09 ENCOUNTER — HOSPITAL ENCOUNTER (OUTPATIENT)
Dept: RADIOLOGY | Facility: HOSPITAL | Age: 73
Discharge: HOME | End: 2023-01-09
Attending: INTERNAL MEDICINE

## 2023-01-09 DIAGNOSIS — E04.1 THYROID NODULE: ICD-10-CM

## 2023-01-09 NOTE — TELEPHONE ENCOUNTER
SCHEDULED: Thur, 2/23/23 at 9:45am w/ Eloisa Gandhi in [place]  Called and spoke w/ patient  Scheduled HFU, relayed appt date/time/address & phone number           HFU/SLB/PERIPHERAL VISION LOSS, RIGHT        NOTES:Will arrange for outpatient stroke clinic/neurovascular follow-up in approximately 8 weeks      (For scheduling team: Please arrange with attending and/or advanced practitioner; will not require outpatient testing prior to follow-up )

## 2023-01-16 ENCOUNTER — TELEPHONE (OUTPATIENT)
Dept: FAMILY MEDICINE CLINIC | Facility: CLINIC | Age: 73
End: 2023-01-16

## 2023-01-16 ENCOUNTER — OFFICE VISIT (OUTPATIENT)
Dept: CARDIOLOGY CLINIC | Facility: CLINIC | Age: 73
End: 2023-01-16

## 2023-01-16 VITALS
SYSTOLIC BLOOD PRESSURE: 126 MMHG | DIASTOLIC BLOOD PRESSURE: 88 MMHG | WEIGHT: 212.6 LBS | HEART RATE: 76 BPM | HEIGHT: 65 IN | OXYGEN SATURATION: 98 % | BODY MASS INDEX: 35.42 KG/M2

## 2023-01-16 DIAGNOSIS — I10 BENIGN HYPERTENSION: ICD-10-CM

## 2023-01-16 DIAGNOSIS — E04.2 MULTIPLE THYROID NODULES: Primary | ICD-10-CM

## 2023-01-16 DIAGNOSIS — I48.0 PAROXYSMAL ATRIAL FIBRILLATION (HCC): Primary | ICD-10-CM

## 2023-01-16 DIAGNOSIS — I27.20 PULMONARY HYPERTENSION (HCC): ICD-10-CM

## 2023-01-16 DIAGNOSIS — E78.2 MIXED HYPERLIPIDEMIA: ICD-10-CM

## 2023-01-16 RX ORDER — METOPROLOL SUCCINATE 25 MG/1
25 TABLET, EXTENDED RELEASE ORAL DAILY
Qty: 90 TABLET | Refills: 3 | Status: SHIPPED | OUTPATIENT
Start: 2023-01-16

## 2023-01-16 RX ORDER — FLECAINIDE ACETATE 50 MG/1
50 TABLET ORAL 2 TIMES DAILY
Qty: 180 TABLET | Refills: 3 | Status: SHIPPED | OUTPATIENT
Start: 2023-01-16

## 2023-01-16 NOTE — PROGRESS NOTES
Cardiology Follow-up    Vamshi Cabrera  [id number]  1950  [facility]  [address] [facility]  [facility] [address] [id number]    1  Paroxysmal atrial fibrillation (HCC)  POCT ECG    metoprolol succinate (TOPROL-XL) 25 mg 24 hr tablet    flecainide (TAMBOCOR) 50 mg tablet      2  Benign hypertension  metoprolol succinate (TOPROL-XL) 25 mg 24 hr tablet    Comprehensive metabolic panel      3  Pulmonary hypertension ([address] )        4  Mixed hyperlipidemia            Discussion/Summary:    1  Paroxysmal atrial fibrillation - Jas Callahan was found to be in atrial fibrillation under PCPs earlier last year  She was placed on Eliquis and we started Toprol-XL  She has gone through 2 cardioversions which were only transiently successful, even while on flecainide  She then saw electrophysiology and is now status post atrial fibrillation ablation  They reduced her flecainide to 50 mg twice daily and she will continue on Toprol-XL  She is on Eliquis for stroke prevention  She follows up with them later this month  2   Hypertension - This this been controlled and even running a little bit low since her procedure  Her Toprol-XL was dropped to 12 5 mg daily  I am going to stop the losartan and increase the metoprolol to 25 mg daily to consolidate  She will continue to check her blood pressure at home  3   Hyperlipidemia - On atorvastatin 20 mg daily which has kept her LDL at goal     4   Pulmonary hypertension - This appears in the moderate range  She also has moderate tricuspid regurgitation  She is on Bumex daily  I did order updated blood work as her potassium was a little low at the time of her seizure  We will see her back in 6 months  HPI:    Mrs Blue Keller comes in for follow-up given her persistent atrial fibrillation  This was new onset earlier this year in January at her PCPs office    Jas Callahan carries a history of mild CAD and 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 by a remote cardiac catheterization  Through those years she had on and off atypical chest pain that turn out to be GI related  She had a repeat cardiac catheterization in July of 2017 that reported no significant CAD  Around that time she also had a bleeding ulcer and required hospitalization and blood transfusion  Her she is treated for hypertension and hyperlipidemia  She also carries a history of an esophageal stricture  Kavita Goddard has a heart Kardia rate/rhythm monitor that suggested atrial fibrillation in January  Prior to our consultation she contacted her PCP who brought her in for an ECG which did show atrial fibrillation  It is unclear how long she has been in it but retrospectively recalls on and off palpitations and exercise intolerance since Labor Day 2021  At that point I set her up for an echocardiogram that showed normal LV systolic function and biatrial enlargement, particularly severe left atrial dilation  She also had moderate tricuspid regurgitation  At our consultation we started Toprol-XL and stopped her losartan  She was also started on Eliquis  She also has been following with GI, and had a stricture dilated in her esophagus recently  She had a CT scan that suggested compression from the aortic arch, but the aortic arch was not enlarged  Her ascending aorta is at most mildly dilated  She was having some chest tightness with anxiety and exertion in follow-up, and I set her up for a stress nuclear study which was normal   Her blood pressure was also running high since stopping the losartan, but with adding this back in addition to her Toprol-XL her blood pressure is now under good control  At that time I recommended a cardioversion but she wanted to put off  We set her up for another cardioversion in early November which was successful    At our last visit we had added flecainide 100 mg twice daily in addition to her metoprolol  She did go back into atrial fibrillation not long after this EP cardioversion  I then set her up for a consultation with electrophysiology  She then had an ablation on 12/23/2022 which was successful  Post procedure she had some peripheral visual loss that was suspicious for amaurosis fugax  CT a and MRI of the head were unremarkable  She did have an echocardiogram that shows no change  He does have normal LV systolic function, biatrial enlargement and at least moderate tricuspid regurgitation with moderate pulmonary hypertension  Her visual disturbance resolved  He has not returned  Nancy Bruno remains in sinus rhythm today  Nancy Bruno was very symptomatic while in atrial fibrillation  She had symptoms of shortness of breath, exercise intolerance and chest tightness  She generally did not feel well  After her cardioversion when she was in sinus rhythm she felt well and now she feels better post ablation in sinus rhythm  She denies any current shortness of breath or chest tightness  Having some lightheadedness and her blood pressures have been running a bit on the low side  Her Toprol-XL dose was decreased to 12 5 mg daily  No near-syncope or syncope  Her edema is controlled on Bumex  No other signs or symptoms of CHF  She denies chest pain or any symptoms of angina        Patient Active Problem List   Diagnosis   • Hiatal hernia   • Gastroesophageal reflux disease   • GERD with esophagitis   • Hypokalemia   • Benign hypertension   • Mixed hyperlipidemia   • Congenital hypothyroidism with diffuse goiter   • Liver lesion, left lobe   • Hemorrhoids   • IBS (irritable bowel syndrome)   • Osteopenia   • Stress incontinence, female   • Abnormal CT of liver   • Class 2 severe obesity due to excess calories with serious comorbidity and body mass index (BMI) of 35 0 to 35 9 in adult [facility])   • BMI 35 0-35 9,adult   • Dysphagia   • Constipation   • Anxiety   • Rheumatoid arthritis ([address] )   • Vertigo   • History of hysterectomy   • Parotid mass   • Xerostomia   • Epigastric pain   • Asthma   • Persistent atrial fibrillation (HCC)   • Hypercholesterolemia   • Closed fracture of left ankle   • Peripheral vision loss, right   • Pigmented purpura (HCC)   • Pulmonary hypertension (HCC)     Past Medical History:   Diagnosis Date   • Anxiety     resolved 10/4/17   • Asthma     seasonal   • Atrial fibrillation [address] [address] ) 01/2022    newly diagnosed on eliquis   • Chronic kidney disease     kidney stone   • Colon polyp    • Disease of thyroid gland    • Dysphagia    • GERD (gastroesophageal reflux disease)    • Goiter, nodular     partial thyroidectomy   • Hiatal hernia     repaired 2018   • Hiatal hernia with GERD without esophagitis 04/2018    surgical correction   • History of esophageal varices with bleeding    • History of pernicious anemia    • History of transfusion     x1 after bleeding ulcer   • Hyperlipidemia    • Hypertension    • Irritable bowel syndrome    • Neck mass     2 small areas left side by jawline and midneck  scheduled 0820/21   • RA (rheumatoid arthritis) ([facility])    • Seasonal allergies    • Vertigo    • Wears glasses     for reading     Social History     Socioeconomic History   • Marital status: /Civil Union     Spouse name: Not on file   • Number of children: Not on file   • Years of education: Not on file   • Highest education level: Not on file   Occupational History   • Not on file   Tobacco Use   • Smoking status: Never   • Smokeless tobacco: Never   Vaping Use   • Vaping Use: Never used   Substance and Sexual Activity   • Alcohol use: Yes     Comment: seldom   • Drug use: Never   • Sexual activity: Not on file   Other Topics Concern   • Not on file   Social History Narrative    Feels safe at home     Social Determinants of Health     Financial Resource Strain: Low Risk    • Difficulty of Paying Living Expenses: Not hard at all   Food Insecurity: Not on file   Transportation Needs: No Transportation Needs   • Lack of Transportation (Medical): No   • Lack of Transportation (Non-Medical): No   Physical Activity: Not on file   Stress: Not on file   Social Connections: Not on file   Intimate Partner Violence: Not on file   Housing Stability: Not on file      Family History   Problem Relation Age of Onset   • Alzheimer's disease Mother    • Cancer Mother         skin    • Thyroid disease Mother    • Ulcerative colitis Mother    • Cancer Father         prostate   • Stroke Father    • Hypertension Father    • Prostate cancer Father    • Thyroid disease Sister    • Ulcerative colitis Sister    • Other Sister         open heart surgery   • Heart disease Sister    • Hyperlipidemia Brother    • No Known Problems Son    • No Known Problems Son    • No Known Problems Maternal Grandmother    • No Known Problems Paternal Grandmother    • Mental illness Neg Hx      Past Surgical History:   Procedure Laterality Date   • BREAST BIOPSY Bilateral     negative   • CARDIAC CATHETERIZATION      x2 secondary to exertional chest pain, due to lung issues, possible mild COPD   • CARDIAC ELECTROPHYSIOLOGY PROCEDURE N/A 12/23/2022    Procedure: Cardiac eps/afib ablation;  Surgeon: Yennifer Weiss MD;  Location: [facility]; Service: Cardiology   • CARDIAC ELECTROPHYSIOLOGY PROCEDURE N/A 12/23/2022    Procedure: Cardiac eps/aflutter ablation;  Surgeon: Yennifer Weiss MD;  Location: [facility]; Service: Cardiology   • CATARACT EXTRACTION Bilateral    • CHOLECYSTECTOMY  2015    lap - last assessed 10/4/17   • COLONOSCOPY      complete   • ESOPHAGOGASTRODUODENOSCOPY N/A 1/23/2018    Procedure: ESOPHAGOGASTRODUODENOSCOPY (EGD); Surgeon: Binh Kurtz MD;  Location: [facility] GI LAB;   Service: Gastroenterology   • HYSTERECTOMY      partial - ovaries remain   • LIPOMA RESECTION      right thigh   • CO ESOPHAGOSCOPY FLEXIBLE TRANSORAL WITH BIOPSY N/A 4/11/2018    Procedure: ESOPHAGOGASTRODUODENOSCOPY (EGD); Surgeon: Eloina Lacy MD;  Location: [facility] MAIN OR;  Service: Thoracic   • MO LAPS RPR PARAESPHGL HRNA INCL FUNDPLSTY [address] N/A 4/11/2018    Procedure: REPAIR HERNIA PARAESOPHAGEAL  LAPAROSCOPIC,ELEONORA GASTROPLASTY, Wolm Lemons FUNDOPLICATION;  Surgeon: Eloina Lacy MD;  Location: [facility];  Service: Thoracic   • MO XCAPSL CTRC RMVL INSJ IO LENS PROSTH W/O ECP Right 8/23/2021    Procedure: EXTRACTION EXTRACAPSULAR CATARACT PHACO INTRAOCULAR LENS (IOL); Surgeon: Mike Dos Santos MD;  Location: [facility] MAIN OR;  Service: Ophthalmology   • MO XCAPSL CTRC RMVL INSJ IO LENS PROSTH W/O ECP Left 11/15/2021    Procedure: EXTRACTION EXTRACAPSULAR CATARACT PHACO INTRAOCULAR LENS (IOL);   Surgeon: Mike Dos Santos MD;  Location: [facility] MAIN OR;  Service: Ophthalmology   • SKIN BIOPSY Right     lump benign - last assessed 10/4/17   • THYROIDECTOMY, PARTIAL  1977   • TONSILLECTOMY         Current Outpatient Medications:   •  atorvastatin (LIPITOR) 20 mg tablet, TAKE 1 TABLET BY MOUTH  DAILY AT BEDTIME, Disp: 90 tablet, Rfl: 1  •  bumetanide (BUMEX) 1 mg tablet, TAKE 1 TABLET BY MOUTH  DAILY, Disp: 90 tablet, Rfl: 1  •  Calcium Carb-Cholecalciferol 600-1000 MG-UNIT CAPS, Take by mouth every morning gummy , Disp: , Rfl:   •  Eliquis 5 MG, TAKE 1 TABLET BY MOUTH  TWICE DAILY, Disp: 180 tablet, Rfl: 3  •  flecainide (TAMBOCOR) 50 mg tablet, Take 1 tablet (50 mg total) by mouth 2 (two) times a day, Disp: 180 tablet, Rfl: 3  •  levothyroxine 50 mcg tablet, TAKE 1 TABLET BY MOUTH  DAILY, Disp: 90 tablet, Rfl: 1  •  metoprolol succinate (TOPROL-XL) 25 mg 24 hr tablet, Take 1 tablet (25 mg total) by mouth daily, Disp: 90 tablet, Rfl: 3  •  omeprazole (PriLOSEC) 40 MG capsule, TAKE 1 CAPSULE BY MOUTH  TWICE DAILY, Disp: 180 capsule, Rfl: 3  •  potassium chloride (Klor-Con) 10 mEq tablet, TAKE 1 TABLET BY MOUTH  DAILY, Disp: 90 tablet, Rfl: 1  Allergies   Allergen Reactions   • Orange (Diagnostic) - Food Allergy Anaphylaxis     Throat closes   • Pantoprazole Headache   • Penicillins Other (See Comments)     During allergy testing instructed to NEVER take PCN   • Gluten Meal - Food Allergy      Following a gluten free diet on her own   • Lactose - Food Allergy Diarrhea     Vitals:    01/16/23 0847   BP: 126/88   BP Location: Left arm   Patient Position: Sitting   Cuff Size: Large   Pulse: 76   SpO2: 98%   Weight: 96 4 kg (212 lb 9 6 oz)   Height: 5' 5" (1 651 m)       Labs:  Lab Results   Component Value Date     09/01/2016    K 3 3 (L) 12/25/2022    K 4 1 09/01/2016     12/25/2022     09/01/2016    CO2 25 12/25/2022    CO2 27 09/01/2016    BUN 17 12/25/2022    BUN 10 09/01/2016    CREATININE 0 78 12/25/2022    CREATININE 0 69 09/01/2016    CALCIUM 8 1 (L) 12/25/2022    CALCIUM 9 2 09/01/2016     Lab Results   Component Value Date    WBC 5 85 01/06/2023    WBC 4 8 07/20/2017    HGB 13 7 01/06/2023    HGB 9 6 (L) 07/20/2017    HCT 42 5 01/06/2023    HCT 29 2 (L) 07/20/2017    MCV 93 01/06/2023    MCV 84 9 07/20/2017     01/06/2023     07/20/2017     Lab Results   Component Value Date    CHOL 144 09/01/2016    TRIG 70 12/25/2022    TRIG 92 09/01/2016    HDL 52 12/25/2022    HDL 57 09/01/2016     Imaging:  ECG today shows sinus rhythm with first-degree AV block, right atrial enlargement, otherwise normal ECG  STRESS NUCLEAR (4/21/22): •  Stress ECG: Arrhythmias during recovery: rare PVCs  The ECG was equivocal for ischemia  The ECG was not diagnostic due to pharmacological (vasodilator) stress  The stress ECG is equivocal for ischemia after pharmacologic vasodilation  •  Stress Function: Left ventricular function post-stress is normal  Post-stress ejection fraction is 59 %  •  Perfusion: There are no perfusion defects  •  Stress ECG: A Miguel protocol stress test was performed   The patient reached stage 2 0 of the protocol after exercising for 4 min and 5 sec and had a maximal HR of 151 bpm (102 % of MPHR) and 7  0 METS  The patient experienced no angina during the test  The test was stopped because the patient experienced dyspnea  The patient achieved the target heart rate  The patient reported dyspnea during the stress test  Onset of symptoms occurred at stage 2 of the protocol  Symptoms ended during recovery  Blood pressure demonstrated a normal response and heart rate demonstrated a normal response to stress  The patient's heart rate recovery was normal      Negative study for evidence of pharmacological induced ischemia or prior infarction  No major symptoms with vasodilation  Elevated baseline blood pressure of 150/100 noted          ECHO (1/2022): •  Left Ventricle: Left ventricular cavity size is normal  Systolic function is normal   Estimated ejection fraction is 50-55%  Wall motion is normal  Wall thickness is mildly increased  Unable to assess diastolic function due to atrial fibrillation  •  Left Atrium: The atrium is severely dilated  •  Right Atrium: The atrium is mild to moderately dilated  •  Aortic Valve: There is trace regurgitation  •  Mitral Valve: There is mild regurgitation  •  Tricuspid Valve: There is moderate regurgitation  The right ventricular systolic pressure is mildly elevated at 46 mm Hg  •  Pulmonic Valve: There is mild regurgitation  •  Aorta: Proximal ascending aorta is mildly dilated with a diameter of 3 8 cm  (indexed to BSA= 1 8 cm/m2) Consider other imaging modalities, including CTA for more accurate estimation and to rule out other aortic pathologies        Review of Systems:  Review of Systems   Constitutional: Negative  HENT: Negative  Eyes: Negative  Respiratory: Positive for chest tightness and shortness of breath  Cardiovascular: Positive for palpitations and leg swelling  Gastrointestinal: Negative  Musculoskeletal: Negative  Skin: Negative  Allergic/Immunologic: Negative  Neurological: Negative  Hematological: Negative  Psychiatric/Behavioral: Negative  All other systems reviewed and are negative  Vitals:    01/16/23 0847   BP: 126/88   BP Location: Left arm   Patient Position: Sitting   Cuff Size: Large   Pulse: 76   SpO2: 98%   Weight: 96 4 kg (212 lb 9 6 oz)   Height: 5' 5" (1 651 m)       Physical Exam:  Physical Exam  Vitals and nursing note reviewed  Constitutional:       Appearance: She is well-developed  HENT:      Head: Normocephalic and atraumatic  Eyes:      General: No scleral icterus  Right eye: No discharge  Left eye: No discharge  Pupils: Pupils are equal, round, and reactive to light  Neck:      Thyroid: No thyromegaly  Vascular: No JVD  Cardiovascular:      Rate and Rhythm: Normal rate  Rhythm irregularly irregular  Pulses: Normal pulses  No decreased pulses  Heart sounds: Normal heart sounds, S1 normal and S2 normal  No murmur heard  No friction rub  No gallop  Pulmonary:      Effort: Pulmonary effort is normal  No respiratory distress  Breath sounds: Normal breath sounds  No wheezing, rhonchi or rales  Abdominal:      General: Bowel sounds are normal  There is no distension  Palpations: Abdomen is soft  Tenderness: There is no abdominal tenderness  Musculoskeletal:         General: No tenderness or deformity  Normal range of motion  Cervical back: Normal range of motion and neck supple  Skin:     General: Skin is warm and dry  Findings: No rash  Neurological:      Mental Status: She is alert and oriented to person, place, and time  Cranial Nerves: No cranial nerve deficit  Psychiatric:         Thought Content: Thought content normal          Judgment: Judgment normal        Counseling / Coordination of Care  Total office time spent today 40 minutes  Greater than 50% of total time was spent with the patient and / or family counseling and / or coordination of care

## 2023-01-16 NOTE — TELEPHONE ENCOUNTER
I called patient regarding her thyroid US, radiologist is recommending Bx of 2 largest nodules  I discussed with patient and she will schedule, order is in the chart  No further action needed

## 2023-01-18 ENCOUNTER — LAB (OUTPATIENT)
Dept: LAB | Facility: HOSPITAL | Age: 73
End: 2023-01-18

## 2023-01-18 DIAGNOSIS — E66.01 SEVERE OBESITY (BMI 35.0-39.9) WITH COMORBIDITY (HCC): ICD-10-CM

## 2023-01-18 DIAGNOSIS — Z12.31 SCREENING MAMMOGRAM FOR HIGH-RISK PATIENT: ICD-10-CM

## 2023-01-18 DIAGNOSIS — I10 BENIGN HYPERTENSION: ICD-10-CM

## 2023-01-18 DIAGNOSIS — Z00.00 MEDICARE ANNUAL WELLNESS VISIT, SUBSEQUENT: ICD-10-CM

## 2023-01-18 DIAGNOSIS — G70.9 NEUROMUSCULAR DISEASE (HCC): ICD-10-CM

## 2023-01-18 DIAGNOSIS — E03.0 CONGENITAL HYPOTHYROIDISM WITH DIFFUSE GOITER: ICD-10-CM

## 2023-01-18 DIAGNOSIS — E78.2 MIXED HYPERLIPIDEMIA: ICD-10-CM

## 2023-01-18 LAB
ALBUMIN SERPL BCP-MCNC: 3.5 G/DL (ref 3.5–5)
ALP SERPL-CCNC: 65 U/L (ref 46–116)
ALT SERPL W P-5'-P-CCNC: 25 U/L (ref 12–78)
ANION GAP SERPL CALCULATED.3IONS-SCNC: 9 MMOL/L (ref 4–13)
AST SERPL W P-5'-P-CCNC: 25 U/L (ref 5–45)
BILIRUB SERPL-MCNC: 0.77 MG/DL (ref 0.2–1)
BUN SERPL-MCNC: 10 MG/DL (ref 5–25)
CALCIUM SERPL-MCNC: 8.9 MG/DL (ref 8.3–10.1)
CHLORIDE SERPL-SCNC: 103 MMOL/L (ref 96–108)
CHOLEST SERPL-MCNC: 149 MG/DL
CO2 SERPL-SCNC: 28 MMOL/L (ref 21–32)
CREAT SERPL-MCNC: 0.79 MG/DL (ref 0.6–1.3)
ERYTHROCYTE [DISTWIDTH] IN BLOOD BY AUTOMATED COUNT: 12.6 % (ref 11.6–15.1)
GFR SERPL CREATININE-BSD FRML MDRD: 74 ML/MIN/1.73SQ M
GLUCOSE P FAST SERPL-MCNC: 112 MG/DL (ref 65–99)
HCT VFR BLD AUTO: 43.2 % (ref 34.8–46.1)
HDLC SERPL-MCNC: 62 MG/DL
HGB BLD-MCNC: 14.2 G/DL (ref 11.5–15.4)
LDLC SERPL CALC-MCNC: 70 MG/DL (ref 0–100)
MCH RBC QN AUTO: 30.4 PG (ref 26.8–34.3)
MCHC RBC AUTO-ENTMCNC: 32.9 G/DL (ref 31.4–37.4)
MCV RBC AUTO: 93 FL (ref 82–98)
PLATELET # BLD AUTO: 187 THOUSANDS/UL (ref 149–390)
PMV BLD AUTO: 10.4 FL (ref 8.9–12.7)
POTASSIUM SERPL-SCNC: 3.9 MMOL/L (ref 3.5–5.3)
PROT SERPL-MCNC: 6.7 G/DL (ref 6.4–8.4)
RBC # BLD AUTO: 4.67 MILLION/UL (ref 3.81–5.12)
SODIUM SERPL-SCNC: 140 MMOL/L (ref 135–147)
TRIGL SERPL-MCNC: 83 MG/DL
TSH SERPL DL<=0.05 MIU/L-ACNC: 0.46 UIU/ML (ref 0.45–4.5)
WBC # BLD AUTO: 3.98 THOUSAND/UL (ref 4.31–10.16)

## 2023-01-22 NOTE — PROGRESS NOTES
EPS follow-up patient Evaluation - Claire Robles 67 y o  female MRN: [id number]       Referring:Dr Jaky Gallegos    CC/HPI:   It was a pleasure to see Claire Robles in our arrhythmia clinic at [place] [address]  As you know she is a 67 y o  woman with persistent atrial fibrillation, HTN, HLD, anxiety, GERD, hiatal hernia, hypertension, hyperlipidemia, IBS, rheumatoid arthritis who presented 12/1/2022 to discuss management of atrial arrhythmias  She was diagnosed with atrial fibrillation in January at the PCP office  She has mild coronary artery disease but no stents  She had a repeat catheterization in 2017 which showed no significant coronary disease  She has had history of bleeding ulcer and required hospitalization in the past needing blood transfusion  She had used [employer] mobile device in January which had suggested atrial fibrillation  She has been having on and off palpitations and exercise intolerance since September 2021  Echocardiogram has shown severe left atrial dilation  She was started on Toprol XL and Eliquis  As she continued to have symptoms with exertion cardioversion was recommended however she declined the option in April  Option was again recommended in July office visit and this time she agreed to proceed with it  She also had exertional chest tightness and stress test was performed which was negative  A cardioversion in October was only transiently successful  Her metoprolol dose was increased in October and flecainide is 100 mg twice daily was started  She underwent repeat cardioversion on November 3rd which was successful  However she was back in atrial fibrillation after 18 hours  EKG performed on November 9, 2022 confirm she was back in atrial fibrillation  Now she presents to discuss further options  She presented with her  who also provided further history  Patient did report having fatigue when having atrial fibrillation    She did note palpitations when she went back into atrial fibrillation after recent cardioversion  She denied any significant caffeine or alcohol use  She denied any drug use  She felt significant lightheadedness/dizziness since being on flecainide and is afraid to drive a car  She noted symptoms happening even at rest     Interval history:  After discussing options she opted to proceed with ablation procedure  Patient had pulmonary vein isolation, posterior wall isolation and empiric CTI line placement on December 23, 2022  She now presents for a follow-up visit  She is maintained on flecainide 50 mg twice daily and Toprol-XL 25 mg daily  Her blood pressure was elevated afterwards and losartan 25 mg was restarted  She reports feeling well  She denies any palpitations  She did have loss of vision in the right eye after ablation  Neurology team was consulted and brain MRI was done  She was diagnosed with TIA  Her vision recovered spontaneously  She was not placed on aspirin after the procedure to hx of GI bleeding       Past Medical History:  Past Medical History:   Diagnosis Date   • Anxiety     resolved 10/4/17   • Asthma     seasonal   • [facility] 01/2022    newly diagnosed on eliquis   • Chronic kidney disease     kidney stone   • Colon polyp    • Disease of thyroid gland    • Dysphagia    • GERD (gastroesophageal reflux disease)    • Goiter, nodular     partial thyroidectomy   • Hiatal hernia     repaired 2018   • Hiatal hernia with GERD without esophagitis 04/2018    surgical correction   • History of esophageal varices with bleeding    • History of pernicious anemia    • History of transfusion     x1 after bleeding ulcer   • Hyperlipidemia    • Hypertension    • Irritable bowel syndrome    • Neck mass     2 small areas left side by jawline and midneck  scheduled 0820/21   • RA (rheumatoid arthritis) ([facility] )    • Seasonal allergies    • Vertigo    • Wears glasses     for reading       Medications: Current Outpatient Medications:   •  atorvastatin (LIPITOR) 20 mg tablet, TAKE 1 TABLET BY MOUTH  DAILY AT BEDTIME, Disp: 90 tablet, Rfl: 1  •  bumetanide (BUMEX) 1 mg tablet, TAKE 1 TABLET BY MOUTH  DAILY, Disp: 90 tablet, Rfl: 1  •  Calcium Carb-Cholecalciferol 600-1000 MG-UNIT CAPS, Take by mouth every morning gummy , Disp: , Rfl:   •  Eliquis 5 MG, TAKE 1 TABLET BY MOUTH  TWICE DAILY, Disp: 180 tablet, Rfl: 3  •  flecainide (TAMBOCOR) 50 mg tablet, Take 1 tablet (50 mg total) by mouth 2 (two) times a day, Disp: 180 tablet, Rfl: 3  •  levothyroxine 50 mcg tablet, TAKE 1 TABLET BY MOUTH  DAILY, Disp: 90 tablet, Rfl: 1  •  metoprolol succinate (TOPROL-XL) 25 mg 24 hr tablet, Take 1 tablet (25 mg total) by mouth daily, Disp: 90 tablet, Rfl: 3  •  omeprazole (PriLOSEC) 40 MG capsule, TAKE 1 CAPSULE BY MOUTH  TWICE DAILY, Disp: 180 capsule, Rfl: 3  •  potassium chloride (Klor-Con) 10 mEq tablet, TAKE 1 TABLET BY MOUTH  DAILY, Disp: 90 tablet, Rfl: 1     Family History   Problem Relation Age of Onset   • Alzheimer's disease Mother    • Cancer Mother         skin    • Thyroid disease Mother    • Ulcerative colitis Mother    • Cancer Father         prostate   • Stroke Father    • Hypertension Father    • Prostate cancer Father    • Thyroid disease Sister    • Ulcerative colitis Sister    • Other Sister         open heart surgery   • Heart disease Sister    • Hyperlipidemia Brother    • No Known Problems Son    • No Known Problems Son    • No Known Problems Maternal Grandmother    • No Known Problems Paternal Grandmother    • Mental illness Neg Hx      Social History     Socioeconomic History   • Marital status: /Civil Union     Spouse name: Not on file   • Number of children: Not on file   • Years of education: Not on file   • Highest education level: Not on file   Occupational History   • Not on file   Tobacco Use   • Smoking status: Never   • Smokeless tobacco: Never   Vaping Use   • Vaping Use: Never used Substance and Sexual Activity   • Alcohol use: Yes     Comment: seldom   • Drug use: Never   • Sexual activity: Not on file   Other Topics Concern   • Not on file   Social History Narrative    Feels safe at home     Social Determinants of Health     Financial Resource Strain: Low Risk    • Difficulty of Paying Living Expenses: Not hard at all   Food Insecurity: Not on file   Transportation Needs: No Transportation Needs   • Lack of Transportation (Medical): No   • Lack of Transportation (Non-Medical): No   Physical Activity: Not on file   Stress: Not on file   Social Connections: Not on file   Intimate Partner Violence: Not on file   Housing Stability: Not on file     Social History     Tobacco Use   Smoking Status Never   Smokeless Tobacco Never     Social History     Substance and Sexual Activity   Alcohol Use Yes    Comment: seldom       Review of Systems   Constitutional: Negative for chills, fever and malaise/fatigue  HENT: Negative  Eyes: Negative for blurred vision and double vision  Cardiovascular: Negative for chest pain, dyspnea on exertion, leg swelling, near-syncope, orthopnea, palpitations, paroxysmal nocturnal dyspnea and syncope  Respiratory: Negative for cough and sputum production  Endocrine: Negative  Skin: Negative  Negative for rash  Musculoskeletal: Positive for arthritis  Negative for joint pain  Gastrointestinal: Negative for abdominal pain, nausea and vomiting  Genitourinary: Negative  Neurological: Negative for dizziness and light-headedness  Psychiatric/Behavioral: Negative  The patient is not nervous/anxious  Objective:     Vitals: Blood pressure 122/80, pulse 68, height 5' 5" (1 651 m), weight 96 2 kg (212 lb), not currently breastfeeding , Body mass index is 35 28 kg/m²  ,        Physical Exam:    GEN: Davide Tarango appears well, alert and oriented x 3, pleasant and cooperative; obese   HEENT: pupils equal, round, and reactive to light; extraocular muscles intact  NECK: supple, no carotid bruits   HEART: Regular rate and rhythm, normal S1 and S2, no murmurs, clicks, gallops or rubs   LUNGS: clear to auscultation bilaterally; no wheezes, rales, or rhonchi   ABDOMEN: normal bowel sounds, soft, no tenderness, no distention  EXTREMITIES: peripheral pulses normal; no clubbing, cyanosis, or edema  NEURO: no focal findings   SKIN: normal without suspicious lesions on exposed skin      Labs & Results:  Below is the patient's most recent value for Albumin, ALT, AST, BUN, Calcium, Chloride, Cholesterol, CO2, Creatinine, GFR, Glucose, HDL, Hematocrit, Hemoglobin, Hemoglobin A1C, LDL, Magnesium, Phosphorus, Platelets, Potassium, PSA, Sodium, Triglycerides, and WBC  Lab Results   Component Value Date    ALT 25 01/18/2023    AST 25 01/18/2023    BUN 10 01/18/2023    CALCIUM 8 9 01/18/2023     01/18/2023    CHOL 144 09/01/2016    CO2 28 01/18/2023    CREATININE 0 79 01/18/2023    HDL 62 01/18/2023    HCT 43 2 01/18/2023    HGB 14 2 01/18/2023    HGBA1C 5 5 12/25/2022    MG 1 9 01/31/2022    PHOS 3 3 04/17/2018     01/18/2023    K 3 9 01/18/2023     09/01/2016    TRIG 83 01/18/2023    WBC 3 98 (L) 01/18/2023     Note: for a comprehensive list of the patient's lab results, access the Results Review activity  Cardiac testing:     I personally reviewed the ECG performed in the clinic on 01/23/23  It reveals normal sinus rhythm at 68 bpm     Echocardiograms:  No results found for this or any previous visit  No results found for this or any previous visit  Catheterizations:   No results found for this or any previous visit  Stress Tests:  Results for orders placed during the hospital encounter of 04/21/22    NM myocardial perfusion spect (stress and/or rest)    Interpretation Summary  •  Stress ECG: Arrhythmias during recovery: rare PVCs  The ECG was equivocal for ischemia   The ECG was not diagnostic due to pharmacological (vasodilator) stress  The stress ECG is equivocal for ischemia after pharmacologic vasodilation  •  Stress Function: Left ventricular function post-stress is normal  Post-stress ejection fraction is 59 %  •  Perfusion: There are no perfusion defects  •  Stress ECG: A Miguel protocol stress test was performed  The patient reached stage 2 0 of the protocol after exercising for 4 min and 5 sec and had a maximal HR of 151 bpm (102 % of MPHR) and 7 0 METS  The patient experienced no angina during the test  The test was stopped because the patient experienced dyspnea  The patient achieved the target heart rate  The patient reported dyspnea during the stress test  Onset of symptoms occurred at stage 2 of the protocol  Symptoms ended during recovery  Blood pressure demonstrated a normal response and heart rate demonstrated a normal response to stress  The patient's heart rate recovery was normal     Negative study for evidence of pharmacological induced ischemia or prior infarction  No major symptoms with vasodilation  Elevated baseline blood pressure of 150/100 noted  Holter monitor -   No results found for this or any previous visit  No results found for this or any previous visit  ASSESSMENT/PLAN:  1   Persistent atrial fibrillation  Patient was diagnosed with atrial fibrillation in January 2021  She then started to have on of palpitations  She was started on metoprolol and Eliquis  Cardioversion was performed in October 2022 however it was unsuccessful  She was started on flecainide 100 mg twice daily and metoprolol dose was increased  She had repeat cardioversion on November 3, 2022 which was successful in restoring sinus rhythm however it only lasted 18 hours  We discuss next step in management option including alternative rhythm medication versus ablation procedure  After answering all the questions she opted to proceed with ablation procedure  She is s/p pulmonary vein isolation, posterior wall isolation and empiric CTI line placement on December 23, 2022  She had TIA next day during which she lost vision in the right eye  Brain MRI was negative for stroke  Neurology team was consulted and diagnosed her with TIA  She is currently maintained on flecainide 50 mg twice daily and metoprolol 12 5 mg daily  She will need to be maintained on Eliquis due to TIA  Metoprolol dose was increased to 25 mg daily  She feels well and checks her rhythm on [employer] mobile  Continue current therapy    2  Hypertension  Patient was maintained on losartan  Normotensive in the office  Losartan was restarted as she continued to have high blood pressure after the ablation  However blood pressure has become low and losartan has been discontinued    3  Hyperlipidemia   Maintained on atorvastatin 20 mg daily    4   Hypothyroidism  Maintained on levothyroxine 50 mcg daily    Follow-up in 6 months

## 2023-01-23 ENCOUNTER — OFFICE VISIT (OUTPATIENT)
Dept: CARDIOLOGY CLINIC | Facility: CLINIC | Age: 73
End: 2023-01-23

## 2023-01-23 VITALS
BODY MASS INDEX: 35.32 KG/M2 | HEART RATE: 68 BPM | HEIGHT: 65 IN | DIASTOLIC BLOOD PRESSURE: 80 MMHG | WEIGHT: 212 LBS | SYSTOLIC BLOOD PRESSURE: 122 MMHG

## 2023-01-23 DIAGNOSIS — I48.0 PAROXYSMAL ATRIAL FIBRILLATION (HCC): Primary | ICD-10-CM

## 2023-02-03 DIAGNOSIS — I10 BENIGN HYPERTENSION: ICD-10-CM

## 2023-02-04 RX ORDER — BUMETANIDE 1 MG/1
TABLET ORAL
Qty: 90 TABLET | Refills: 3 | Status: SHIPPED | OUTPATIENT
Start: 2023-02-04

## 2023-02-15 ENCOUNTER — TELEPHONE (OUTPATIENT)
Dept: NEUROLOGY | Facility: CLINIC | Age: 73
End: 2023-02-15

## 2023-02-21 ENCOUNTER — HOSPITAL ENCOUNTER (OUTPATIENT)
Dept: RADIOLOGY | Facility: HOSPITAL | Age: 73
Discharge: HOME | End: 2023-02-21
Attending: INTERNAL MEDICINE

## 2023-02-21 DIAGNOSIS — E04.2 MULTIPLE THYROID NODULES: ICD-10-CM

## 2023-02-21 RX ORDER — LIDOCAINE HYDROCHLORIDE 10 MG/ML
5 INJECTION, SOLUTION EPIDURAL; INFILTRATION; INTRACAUDAL; PERINEURAL ONCE
Status: COMPLETED | OUTPATIENT
Start: 2023-02-21 | End: 2023-02-21

## 2023-02-21 RX ADMIN — LIDOCAINE HYDROCHLORIDE 3 ML: 10 INJECTION, SOLUTION EPIDURAL; INFILTRATION; INTRACAUDAL; PERINEURAL at 13:38

## 2023-02-23 ENCOUNTER — OFFICE VISIT (OUTPATIENT)
Dept: NEUROLOGY | Facility: CLINIC | Age: 73
End: 2023-02-23

## 2023-02-23 VITALS
OXYGEN SATURATION: 95 % | SYSTOLIC BLOOD PRESSURE: 124 MMHG | WEIGHT: 214.9 LBS | HEART RATE: 75 BPM | TEMPERATURE: 99 F | RESPIRATION RATE: 16 BRPM | DIASTOLIC BLOOD PRESSURE: 78 MMHG | BODY MASS INDEX: 35.76 KG/M2

## 2023-02-23 DIAGNOSIS — I10 BENIGN HYPERTENSION: ICD-10-CM

## 2023-02-23 DIAGNOSIS — R42 VERTIGO: ICD-10-CM

## 2023-02-23 DIAGNOSIS — H53.451 PERIPHERAL VISION LOSS, RIGHT: ICD-10-CM

## 2023-02-23 DIAGNOSIS — I48.19 PERSISTENT ATRIAL FIBRILLATION (HCC): ICD-10-CM

## 2023-02-23 DIAGNOSIS — E78.2 MIXED HYPERLIPIDEMIA: ICD-10-CM

## 2023-02-23 DIAGNOSIS — Z86.73 HISTORY OF TIA (TRANSIENT ISCHEMIC ATTACK): Primary | ICD-10-CM

## 2023-02-23 NOTE — PROGRESS NOTES
Patient ID: Ivone Smith is a 67 y o  female who presents to the [facility]  Assessment/Plan:    Dizziness, suspected vertigo due to inner ear pathology:  I had the pleasure of Swetha at [place] [facility] [id number] [facility] today in [place]  She is presenting here for a hospital follow-up for her recent right-sided peripheral vision loss, which was suspected to be related to a TIA at this time  This had occurred in the setting of of post ablation for the patient's atrial fibrillation  It is possible that this could have been due to some small embolic phenomenon  Patient had a CTA which was not concerning for any significant stenosis of the neck or intracranial arteries at this time  No aneurysm was detected  MRI brain without contrast indicated that there was no acute stroke  Patient reports at today's visit that she is having some dizziness/vertigo  She reports that these episodes come and go  They can last anywhere from a few seconds to minutes  They mostly occur when the patient initially lays down and then will shortly resolved afterwards  They are not persistent episodes of dizziness or vertigo and will subside  She does not know that when she stands up or sitting upwards that she has any bouts of ago  We did do a HINTS exam and a [place]-Hallpike exam in the office today  However, due to the patient's most recent thyroid biopsy her neck was significantly tender and stiff  It was difficult to manipulate the patient's neck on both maneuvers and the patient could not get comfortable during the [place]-Hallpike examination  Although, patient did have significant dizziness as soon as she laid down  Could not note any beats of horizontal or vertical nystagmus on exam     -Due to the suspicion of inner ear pathology, I am going to go ahead and refer the patient to physical therapy and more specifically vestibular therapy    For the vertigo she had back she did some vestibular therapy and had an Epley's maneuver done several times  This has significantly helped her vertigo and passed  We will go ahead and try this and see if it provides benefit to the patient at this time   -The patient does not have any benefit from therapy and has any concerns with dizziness/vertigo at a follow-up appointment night may be a good idea to go ahead and send a referral to ENT and have the patient seen by someone who specializes in vertigo of the inner ear  History of TIA:    - For ongoing stroke prevention continue: Eliquis 5 mg twice daily, atorvastatin 20 mg once daily  -Continue to follow with primary care and cardiology regarding blood pressure medication recommendations  Would encourage the patient to continue to take her blood pressure  If her readings start to get in the 303W or 771F systolic then she should go ahead and let either primary care or cardiologist know at this time   -Patient has well-controlled LDL at this time  No concern for a repeat lipid panel at this time  Defer to primary care for monitoring of LDL and hemoglobin A1c at this time   -Encouraged the patient to continue to to partake in physical activity as much as possible   -Educated and counseled the patient on dietary recommendations for stroke prevention   - Discussed the importance of antiplatelet management with the patient to prevent future strokes  - Recommend to check blood pressure occasionally away from the doctor's office to make sure that those numbers are typically less than 130/80    If they are frequently higher than that, we recommend checking a little more often and to follow up with primary care team   - Will defer to primary care team for monitoring of cholesterol panel and blood sugar numbers with target LDL cholesterol of less than 70 and hemoglobin A1c less than 7%  - Recommend following a low salt, mediterranean diet   - Recommend routine physical exercise as tolerated     We will plan for her to return to the office in 6 months time to see on of the APPs or Dr Lloyd Ricardo but would be happy to see her sooner if the need should arise  If she has any symptoms concerning for TIA or stroke including sudden painless loss of vision or double vision, difficulty speaking or swallowing, vertigo/room spinning that does not quickly resolve, or weakness/numbness/loss of coordination affecting 1 side of the face or body she should proceed by ambulance to the nearest emergency room immediately  Subjective:    HPI      For Review/Hospital Course:  Figueroa Wagner is a 67 y o  female who presents for follow up in regard to her recent stroke-like symptoms  12/24/2022:     "67year old female with history of atrial fibrillation on Eliquis, hypertension, hyperlipidemia, RA      Presented on 12/23 for elective ablation of atrial fibrillation, performed by EP yesterday      Neurology consulted this morning for acute episodes of right lower quadrant visual field loss  NIHSS 0; visual fields fully intact on formal neuro exam this morning  Neuro exam otherwise non-focal      Will work-up for PCA territory infarct/cerebrovascular event versus TIA; if so likely in the setting of atrial fibrillation ablation yesterday/embolic phenomenon "-per Delia Hand PA-C      Stat CT head without contrast, no acute abnormality  CTA H/N no LVO or hemodynamically significant stenosis  12/25/2022:    "MRI brain overnight was negative for any acute intracranial pathology nor any infarct  CTA head/neck yesterday was also negative for any significant stenosis/flow restrictive disease       Will treat as TIA, potentially embolic/oneyda-procedurally in the setting of A-fib ablation on 12/23  Recommend continuing anticoagulation with full dose Eliquis, statin therapy as well      Otherwise patient is okay from neurologic standpoint for discharge    Discussed with attending neurologist      Will arrange for outpatient stroke clinic/neurovascular follow-up in approximately 8 weeks  " -per Savage Desai PA-C    Residual symptoms include: None    Current treatment including stroke ppx:  Eliquis (apixiban)  and Appropriately dosed statin yes    Stroke risk factors were evaluated including: hypertension, hyperlipidemia, persistent atrial fibrillation s/p ablation     Interval History:    Patient endorses no new strokelike symptoms at this time  Patient does not have any residual peripheral vision loss  She states that this had resolved shortly after it had initially occurred in the hospital   When the patient was evaluated the next morning by her neurologist, she did not have any right-sided peripheral vision loss anymore  Patient reports a new onset of dizziness, which seems to resemble vertigo  Patient states that this will usually occur when she lays down, will persist for a few seconds to a few minutes, and then she will be fine  She does report it is a whole room is spinning around her type sensation however does not have same level as vertigo few years ago  It will take her a minute or 2 after sitting up to not have a whole world spinning sensation anymore  Patient states that this will usually always occur to some extent when she does lay down  She does not have any bouts of vertigo while she is sitting up or standing up  She does not have persistent vertigo/dizziness throughout the entire day regardless of positional change  She states that the last time that she had vertigo, physical therapy seem to really help her  Patient is still currently taking Eliquis 5 mg twice daily for embolic stroke prevention from recent atrial fibrillation  Patient reports that she has ablations in the past for atrial fibrillation, her most recent one was the day before she started to have the right-sided peripheral vision loss  She is currently taking her medication without any issues at this moment in time    She is still taking atorvastatin 20 mg once daily   She is taking metoprolol for antihypertensive therapy  She states that she is able to check her blood pressure at home, and will continue to monitor for any concerning readings  Patient reports that she is trying to sustain physical activity  She had previously broken her left ankle, so it has been hard for her to stay physically active  Patient reports to eating a relatively well-balanced diet  No issues falling asleep or staying asleep  She did report some jerking movement in her sleep that wakes her up at night  No sleep apnea or periods of apneic breathing reported  Patient reports that she was a little bit anxious about her thyroid biopsy  She thinks that this anxiousness/stress could have contributed to her jerking movements in her sleep and waking up  Patient is working to keep her mind active with reading and crossword puzzles at the moment  Patient just also had a recent thyroid nodule biopsy for a suspicious nodule that was picked up on CTA      Lab Results   Component Value Date/Time    CHOLESTEROL 149 01/18/2023 09:33 AM     Lab Results   Component Value Date/Time    TRIG 83 01/18/2023 09:33 AM    TRIG 92 09/01/2016 09:10 AM     Lab Results   Component Value Date/Time    HDL 62 01/18/2023 09:33 AM    HDL 57 09/01/2016 09:10 AM     Lab Results   Component Value Date/Time    LDLCALC 70 01/18/2023 09:33 AM       Lab Results   Component Value Date/Time    HGBA1C 5 5 12/25/2022 04:59 AM     Lab Results   Component Value Date/Time     12/25/2022 04:59 AM           Past Medical History:   Diagnosis Date   • Anxiety     resolved 10/4/17   • Asthma     seasonal   • Atrial fibrillation ([facility] [address] ) 01/2022    newly diagnosed on eliquis   • Chronic kidney disease     kidney stone   • Colon polyp    • Disease of thyroid gland    • Dysphagia    • GERD (gastroesophageal reflux disease)    • Goiter, nodular     partial thyroidectomy   • Hiatal hernia     repaired 2018   • Hiatal hernia with GERD without esophagitis 04/2018    surgical correction   • History of esophageal varices with bleeding    • History of pernicious anemia    • History of transfusion     x1 after bleeding ulcer   • Hyperlipidemia    • Hypertension    • Irritable bowel syndrome    • Neck mass     2 small areas left side by jawline and midneck  scheduled 0820/21   • RA (rheumatoid arthritis) (HCC)    • Seasonal allergies    • Vertigo    • Wears glasses     for reading       Current Outpatient Medications:   •  atorvastatin (LIPITOR) 20 mg tablet, TAKE 1 TABLET BY MOUTH  DAILY AT BEDTIME, Disp: 90 tablet, Rfl: 1  •  bumetanide (BUMEX) 1 mg tablet, TAKE 1 TABLET BY MOUTH  DAILY, Disp: 90 tablet, Rfl: 3  •  Calcium Carb-Cholecalciferol 600-1000 MG-UNIT CAPS, Take by mouth every morning gummy , Disp: , Rfl:   •  Eliquis 5 MG, TAKE 1 TABLET BY MOUTH  TWICE DAILY, Disp: 180 tablet, Rfl: 3  •  flecainide (TAMBOCOR) 50 mg tablet, Take 1 tablet (50 mg total) by mouth 2 (two) times a day, Disp: 180 tablet, Rfl: 3  •  levothyroxine 50 mcg tablet, TAKE 1 TABLET BY MOUTH  DAILY, Disp: 90 tablet, Rfl: 1  •  metoprolol succinate (TOPROL-XL) 25 mg 24 hr tablet, Take 1 tablet (25 mg total) by mouth daily, Disp: 90 tablet, Rfl: 3  •  omeprazole (PriLOSEC) 40 MG capsule, TAKE 1 CAPSULE BY MOUTH  TWICE DAILY, Disp: 180 capsule, Rfl: 3  •  potassium chloride (Klor-Con) 10 mEq tablet, TAKE 1 TABLET BY MOUTH  DAILY, Disp: 90 tablet, Rfl: 1     Objective:    Physical Exam:                                                                 Vitals:            Constitutional:    /78 (BP Location: Left arm, Patient Position: Sitting, Cuff Size: Standard)   Pulse 75   Temp 99 °F (37 2 °C) (Temporal)   Resp 16   Wt 97 5 kg (214 lb 14 4 oz)   SpO2 95%   BMI 35 76 kg/m²   BP Readings from Last 3 Encounters:   02/23/23 124/78   01/23/23 122/80   01/16/23 126/88     Pulse Readings from Last 3 Encounters:   02/23/23 75   01/23/23 68   01/16/23 76         Well developed, well nourished, well groomed  No dysmorphic features  Psychiatric:  Normal behavior and appropriate affect        Sen-Hallpike: Negative exam for vertical or horizontal nystagmus  Did report vertigo once laying down  No additional vertigo reported with manipulation of the head  Difficult exam due to patient's neck stiffness and tenderness from biopsy  HINTS Exam: No beats of horizontal or vertical nystagmus  Difficult exam due to limited manipulation of the head due to neck tenderness and stiffness  Neurological Examination:     Mental status/cognitive function:   Orientated to time, place and person  Recent and remote memory intact  Attention span and concentration as well as fund of knowledge are appropriate for age  Normal language and spontaneous speech  Cranial Nerves:  II-visual fields full  III, IV, VI-Pupils were equal, round, and reactive to light and accomodation  Extraocular movements were full and conjugate without nystagmus  Conjugate gaze, normal smooth pursuits, normal saccades   V-facial sensation symmetric  VII-facial expression symmetric, intact forehead wrinkle, strong eye closure, symmetric smile    VIII-hearing grossly intact bilaterally   IX, X-palate elevation symmetric, no dysarthria  XI-shoulder shrug strength intact    XII-tongue protrusion midline  Motor Exam: symmetric bulk and tone throughout, no pronator drift  Power/strength 5/5 bilateral upper and lower extremities, no atrophy, fasciculations or abnormal movements noted  Sensory: grossly intact light touch in all extremities  Reflexes: brachioradialis 3+, biceps 3+, knee 3+ bilaterally  Coordination: Finger nose finger intact bilaterally, no apparent dysmetria, ataxia or tremor noted  Gait: steady casual and tandem gait  ROS:    Review of Systems   Constitutional: Negative  Negative for appetite change and fever  HENT: Negative    Negative for hearing loss, tinnitus, trouble swallowing and voice change  Eyes: Negative  Negative for photophobia, pain and visual disturbance  Respiratory: Negative  Negative for shortness of breath  Cardiovascular: Negative  Negative for palpitations  Gastrointestinal: Negative  Negative for nausea and vomiting  Endocrine: Negative  Negative for cold intolerance  Genitourinary: Negative  Negative for dysuria, frequency and urgency  Musculoskeletal: Negative  Negative for gait problem, myalgias and neck pain  Skin: Negative  Negative for rash  Allergic/Immunologic: Negative  Neurological: Negative  Negative for dizziness, tremors, seizures, syncope, facial asymmetry, speech difficulty, weakness, light-headedness, numbness and headaches  Hematological: Negative  Does not bruise/bleed easily  Psychiatric/Behavioral: Negative  Negative for confusion, hallucinations and sleep disturbance           I have spent 40 minutes today on this case including chart review, performing history and exam, patient counseling, and documentation/communication      Dunia Harvey PA-C  02/23/2023

## 2023-02-23 NOTE — PATIENT INSTRUCTIONS
Dizziness, suspected vertigo due to inner ear pathology:  I had the pleasure of Swetha at [facility] [address] [facility] today in [place]  She is presenting here for a hospital follow-up for her recent right-sided peripheral vision loss, which was suspected to be related to a TIA at this time  This had occurred in the setting of of post ablation for the patient's atrial fibrillation  It is possible that this could have been due to some small embolic phenomenon  Patient had a CTA which was not concerning for any significant stenosis of the neck or intracranial arteries at this time  No aneurysm was detected  MRI brain without contrast indicated that there was no acute stroke  Patient reports at today's visit that she is having some dizziness/vertigo  She reports that these episodes come and go  They can last anywhere from a few seconds to minutes  They mostly occur when the patient initially lays down and then will shortly resolved afterwards  They are not persistent episodes of dizziness or vertigo and will subside  She does not know that when she stands up or sitting upwards that she has any bouts of ago  We did do a HINTS exam and a Sen-Hallpike exam in the office today  However, due to the patient's most recent thyroid biopsy her neck was significantly tender and stiff  It was difficult to manipulate the patient's neck on both maneuvers and the patient could not get comfortable during the [place]-Hallpike examination  Although, patient did have significant dizziness as soon as she laid down  Could not note any beats of horizontal or vertical nystagmus on exam     -Due to the suspicion of inner ear pathology, I am going to go ahead and refer the patient to physical therapy and more specifically vestibular therapy  For the vertigo she had back she did some vestibular therapy and had an Epley's maneuver done several times  This has significantly helped her vertigo and passed    We will go ahead and try this and see if it provides benefit to the patient at this time   -The patient does not have any benefit from therapy and has any concerns with dizziness/vertigo at a follow-up appointment night may be a good idea to go ahead and send a referral to ENT and have the patient seen by someone who specializes in vertigo of the inner ear  History of TIA:    - For ongoing stroke prevention continue: Eliquis 5 mg twice daily, atorvastatin 20 mg once daily  -Continue to follow with primary care and cardiology regarding blood pressure medication recommendations  Would encourage the patient to continue to take her blood pressure  If her readings start to get in the 584Q or 847A systolic then she should go ahead and let either primary care or cardiologist know at this time   -Patient has well-controlled LDL at this time  No concern for a repeat lipid panel at this time  Defer to primary care for monitoring of LDL and hemoglobin A1c at this time   -Encouraged the patient to continue to to partake in physical activity as much as possible   -Educated and counseled the patient on dietary recommendations for stroke prevention   - Discussed the importance of antiplatelet management with the patient to prevent future strokes  - Recommend to check blood pressure occasionally away from the doctor's office to make sure that those numbers are typically less than 130/80    If they are frequently higher than that, we recommend checking a little more often and to follow up with primary care team   - Will defer to primary care team for monitoring of cholesterol panel and blood sugar numbers with target LDL cholesterol of less than 70 and hemoglobin A1c less than 7%  - Recommend following a low salt, mediterranean diet   - Recommend routine physical exercise as tolerated     We will plan for her to return to the office in 6 months time to see on of the APPs or Dr Heather Bui but would be happy to see her sooner if the need should arise   If she has any symptoms concerning for TIA or stroke including sudden painless loss of vision or double vision, difficulty speaking or swallowing, vertigo/room spinning that does not quickly resolve, or weakness/numbness/loss of coordination affecting 1 side of the face or body she should proceed by ambulance to the nearest emergency room immediately

## 2023-02-27 ENCOUNTER — TELEPHONE (OUTPATIENT)
Dept: FAMILY MEDICINE CLINIC | Facility: CLINIC | Age: 73
End: 2023-02-27

## 2023-02-27 DIAGNOSIS — E03.0 CONGENITAL HYPOTHYROIDISM WITH DIFFUSE GOITER: Primary | ICD-10-CM

## 2023-02-27 DIAGNOSIS — R89.9 ABNORMAL THYROID BIOPSY: ICD-10-CM

## 2023-02-27 NOTE — TELEPHONE ENCOUNTER
I called patient regarding her results  She will see endocrinologist for follow up due to abnormal thyroid biopsy result

## 2023-03-08 ENCOUNTER — OFFICE VISIT (OUTPATIENT)
Dept: PODIATRY | Facility: CLINIC | Age: 73
End: 2023-03-08

## 2023-03-08 VITALS
SYSTOLIC BLOOD PRESSURE: 124 MMHG | WEIGHT: 214 LBS | HEIGHT: 65 IN | BODY MASS INDEX: 35.65 KG/M2 | RESPIRATION RATE: 17 BRPM | DIASTOLIC BLOOD PRESSURE: 78 MMHG

## 2023-03-08 DIAGNOSIS — L02.611 ABSCESS OF TOE, RIGHT: ICD-10-CM

## 2023-03-08 DIAGNOSIS — M79.671 RIGHT FOOT PAIN: ICD-10-CM

## 2023-03-08 DIAGNOSIS — L03.031 PARONYCHIA OF TOENAIL OF RIGHT FOOT: ICD-10-CM

## 2023-03-08 DIAGNOSIS — L60.0 INGROWN TOENAIL: ICD-10-CM

## 2023-03-08 DIAGNOSIS — L03.031 CELLULITIS OF TOE, RIGHT: Primary | ICD-10-CM

## 2023-03-08 RX ORDER — SULFAMETHOXAZOLE AND TRIMETHOPRIM 800; 160 MG/1; MG/1
1 TABLET ORAL EVERY 12 HOURS SCHEDULED
Qty: 20 TABLET | Refills: 0 | Status: SHIPPED | OUTPATIENT
Start: 2023-03-08 | End: 2023-03-18

## 2023-03-08 NOTE — PROGRESS NOTES
Assessment/Plan: Abscess and cellulitis ambulatory aspect right hallux  Ingrown toenail  Pain  Plan  Foot exam performed  Chart reviewed  Patient advised on condition  Today partial nail avulsion done  After achieving anesthesia with digital nerve block of 60 cc 2% lidocaine plain right hallux and partial nail avulsion done under aseptic technique  Gentian violet dry sterile dressing applied  Patient be started on Bactrim  Patient advised on aftercare  Diagnoses and all orders for this visit:    Abscess of toe, right    Cellulitis of toe, right  -     sulfamethoxazole-trimethoprim (BACTRIM DS) 800-160 mg per tablet; Take 1 tablet by mouth every 12 (twelve) hours for 10 days    Right foot pain    Ingrown toenail    Paronychia of toenail of right foot          Subjective: Patient has pain  She has pain in her right big toe  She recently cut the nail now has pain      Allergies   Allergen Reactions   • Orange (Diagnostic) - Food Allergy Anaphylaxis     Throat closes   • Pantoprazole Headache   • Penicillins Other (See Comments)     During allergy testing instructed to NEVER take PCN   • Gluten Meal - Food Allergy      Following a gluten free diet on her own   • Lactose - Food Allergy Diarrhea         Current Outpatient Medications:   •  sulfamethoxazole-trimethoprim (BACTRIM DS) 800-160 mg per tablet, Take 1 tablet by mouth every 12 (twelve) hours for 10 days, Disp: 20 tablet, Rfl: 0  •  atorvastatin (LIPITOR) 20 mg tablet, TAKE 1 TABLET BY MOUTH  DAILY AT BEDTIME, Disp: 90 tablet, Rfl: 1  •  bumetanide (BUMEX) 1 mg tablet, TAKE 1 TABLET BY MOUTH  DAILY, Disp: 90 tablet, Rfl: 3  •  Calcium Carb-Cholecalciferol 600-1000 MG-UNIT CAPS, Take by mouth every morning gummy , Disp: , Rfl:   •  Eliquis 5 MG, TAKE 1 TABLET BY MOUTH  TWICE DAILY, Disp: 180 tablet, Rfl: 3  •  flecainide (TAMBOCOR) 50 mg tablet, Take 1 tablet (50 mg total) by mouth 2 (two) times a day, Disp: 180 tablet, Rfl: 3  • levothyroxine 50 mcg tablet, TAKE 1 TABLET BY MOUTH  DAILY, Disp: 90 tablet, Rfl: 1  •  metoprolol succinate (TOPROL-XL) 25 mg 24 hr tablet, Take 1 tablet (25 mg total) by mouth daily, Disp: 90 tablet, Rfl: 3  •  omeprazole (PriLOSEC) 40 MG capsule, TAKE 1 CAPSULE BY MOUTH  TWICE DAILY, Disp: 180 capsule, Rfl: 3  •  potassium chloride (Klor-Con) 10 mEq tablet, TAKE 1 TABLET BY MOUTH  DAILY, Disp: 90 tablet, Rfl: 1    Patient Active Problem List   Diagnosis   • Hiatal hernia   • Gastroesophageal reflux disease   • GERD with esophagitis   • Hypokalemia   • Benign hypertension   • Mixed hyperlipidemia   • Congenital hypothyroidism with diffuse goiter   • Liver lesion, left lobe   • Hemorrhoids   • IBS (irritable bowel syndrome)   • Osteopenia   • Stress incontinence, female   • Abnormal CT of liver   • Class 2 severe obesity due to excess calories with serious comorbidity and body mass index (BMI) of 35 0 to 35 9 in adult (HCC)   • BMI 35 0-35 9,adult   • Dysphagia   • Constipation   • Anxiety   • Rheumatoid arthritis (HCC)   • Vertigo   • History of hysterectomy   • Parotid mass   • Xerostomia   • Epigastric pain   • Asthma   • Persistent atrial fibrillation (HCC)   • Hypercholesterolemia   • Closed fracture of left ankle   • Peripheral vision loss, right   • Pigmented purpura (HCC)   • Pulmonary hypertension (HCC)   • History of TIA (transient ischemic attack)          Patient ID: Billy Vickers is a 67 y o  female  HPI    The following portions of the patient's history were reviewed and updated as appropriate:     family history includes Alzheimer's disease in her mother; Cancer in her father and mother; Heart disease in her sister; Hyperlipidemia in her brother; Hypertension in her father; No Known Problems in her maternal grandmother, paternal grandmother, son, and son; Other in her sister; Prostate cancer in her father; Stroke in her father;  Thyroid disease in her mother and sister; Ulcerative colitis in her mother and sister  reports that she has never smoked  She has never used smokeless tobacco  She reports current alcohol use  She reports that she does not use drugs  Vitals:    03/08/23 1525   BP: 124/78   Resp: 17       Review of Systems      Objective:  Patient's shoes and socks removed  Foot ExamPhysical Exam  Vitals and nursing note reviewed  Constitutional:       Appearance: Normal appearance  Cardiovascular:      Rate and Rhythm: Normal rate and regular rhythm  Skin:     Capillary Refill: Capillary refill takes less than 2 seconds  Comments: Right hallux demonstrates cellulitis of the fibular aspect of the toe  Positive abscess noted within nail groove  Positive ingrown toenail noted  All fibular aspect  Neurological:      Mental Status: She is alert  Psychiatric:         Mood and Affect: Mood normal          Thought Content:  Thought content normal          Judgment: Judgment normal

## 2023-03-14 ENCOUNTER — TELEPHONE (OUTPATIENT)
Dept: FAMILY MEDICINE CLINIC | Facility: CLINIC | Age: 73
End: 2023-03-14

## 2023-03-14 NOTE — TELEPHONE ENCOUNTER
There is a note about Dr Ivon Cuello discussing the biopsy with Tiny Client  Tiny Client has some questions about the test being inconclusive and being sent to [place]? She is aware Dr Ivon Cuello will see this tomorrow  Please call patient     Rachel Sharma MA

## 2023-03-14 NOTE — TELEPHONE ENCOUNTER
DR ELLSWORTH   Please call patient back about the biopsy result that was being sent out     Please call patient back

## 2023-03-20 NOTE — TELEPHONE ENCOUNTER
3/20/2023 3:05 PM Called Swetha regarding her biopsy Affirm results  I let her know it was benign and low risk of cancer  She would like a copy of the result        Clerical- please mail the pathology report from 3/16/23 to patient  Thank you,  Hussain Zaragoza, DO

## 2023-03-20 NOTE — TELEPHONE ENCOUNTER
Beck Danika left a message on 3/20/23 regarding her thyroid biopsy  This was sent out to [place] [facility] for further testing  She hasn't gotten the results of the second round  She has received information from the lab, regarding billing but no results as of yet  She states that she knows they received her sample Call back number is [phone]  I do see the result in her chart  [facility]   Would you please review this message in Dr Danielle's absence?  She states that she has been playing phone tag   [facility]

## 2023-03-31 DIAGNOSIS — K44.9 HIATAL HERNIA WITH GERD WITHOUT ESOPHAGITIS: ICD-10-CM

## 2023-03-31 DIAGNOSIS — R13.19 ESOPHAGEAL DYSPHAGIA: ICD-10-CM

## 2023-03-31 DIAGNOSIS — K21.9 HIATAL HERNIA WITH GERD WITHOUT ESOPHAGITIS: ICD-10-CM

## 2023-04-01 RX ORDER — OMEPRAZOLE 40 MG/1
CAPSULE, DELAYED RELEASE ORAL
Qty: 180 CAPSULE | Refills: 3 | Status: SHIPPED | OUTPATIENT
Start: 2023-04-01

## 2023-05-04 ENCOUNTER — OFFICE VISIT (OUTPATIENT)
Dept: FAMILY MEDICINE CLINIC | Facility: CLINIC | Age: 73
End: 2023-05-04

## 2023-05-04 VITALS
HEIGHT: 65 IN | HEART RATE: 74 BPM | WEIGHT: 214 LBS | DIASTOLIC BLOOD PRESSURE: 60 MMHG | SYSTOLIC BLOOD PRESSURE: 120 MMHG | RESPIRATION RATE: 16 BRPM | BODY MASS INDEX: 35.65 KG/M2 | TEMPERATURE: 97.6 F | OXYGEN SATURATION: 98 %

## 2023-05-04 DIAGNOSIS — Z12.39 SCREENING BREAST EXAMINATION: ICD-10-CM

## 2023-05-04 DIAGNOSIS — I48.19 PERSISTENT ATRIAL FIBRILLATION (HCC): ICD-10-CM

## 2023-05-04 DIAGNOSIS — K11.8 PAROTID MASS: ICD-10-CM

## 2023-05-04 DIAGNOSIS — Z12.31 ENCOUNTER FOR SCREENING MAMMOGRAM FOR MALIGNANT NEOPLASM OF BREAST: ICD-10-CM

## 2023-05-04 DIAGNOSIS — E03.0 CONGENITAL HYPOTHYROIDISM WITH DIFFUSE GOITER: Primary | ICD-10-CM

## 2023-05-04 PROBLEM — S82.892A CLOSED FRACTURE OF LEFT ANKLE: Status: RESOLVED | Noted: 2022-07-11 | Resolved: 2023-05-04

## 2023-05-04 PROBLEM — R13.10 DYSPHAGIA: Status: RESOLVED | Noted: 2020-03-13 | Resolved: 2023-05-04

## 2023-05-04 NOTE — ASSESSMENT & PLAN NOTE
Reviewed pathology and [facility] report with patient, this appears benign but she has an appointment with endocrinology next week

## 2023-05-04 NOTE — PROGRESS NOTES
Name: James Goldsmith      : 1950      MRN: [id number]  Encounter Provider: Romy Hurt MD  Encounter Date: 2023   Encounter department: [facility]     1  Congenital hypothyroidism with diffuse goiter  Assessment & Plan:  Reviewed pathology and [facility] report with patient, this appears benign but she has an appointment with endocrinology next week  2  Persistent atrial fibrillation [facility])  Assessment & Plan:  Managed by cardiology and is rate controlled, on eliquis  3  Parotid mass  Assessment & Plan:  Likely intermittent parotid stone, as this is not present currently and will come and go  Recommended increased hydration, sour candies, warm compresses, and massage of parotid if this recurs  4  Screening breast examination  -     Mammo screening bilateral w 3d & cad; Future; Expected date: 2023    5  Encounter for screening mammogram for malignant neoplasm of breast  -     Mammo screening bilateral w 3d & cad; Future; Expected date: 2023           Subjective      Here for a follow up visit  She sees endocrinology next week  Continues to follow up with cardiology and does cardia mobile at home to monitor her rhythm  She has been having intermittent swelling of her parotid gland, currently this is normal but has been up and down  She does suffer from xerostomia  Review of Systems   Constitutional: Negative  HENT: Positive for facial swelling  Respiratory: Negative  Cardiovascular: Negative  Endocrine: Negative          Current Outpatient Medications on File Prior to Visit   Medication Sig    atorvastatin (LIPITOR) 20 mg tablet TAKE 1 TABLET BY MOUTH DAILY AT  BEDTIME    bumetanide (BUMEX) 1 mg tablet TAKE 1 TABLET BY MOUTH  DAILY    Calcium Carb-Cholecalciferol 600-1000 MG-UNIT CAPS Take by mouth every morning gummy     Eliquis 5 MG TAKE 1 TABLET BY MOUTH  TWICE DAILY    flecainide (TAMBOCOR) 50 mg tablet Take 1 tablet "(50 mg total) by mouth 2 (two) times a day    levothyroxine 50 mcg tablet TAKE 1 TABLET BY MOUTH DAILY    metoprolol succinate (TOPROL-XL) 25 mg 24 hr tablet Take 1 tablet (25 mg total) by mouth daily    omeprazole (PriLOSEC) 40 MG capsule TAKE 1 CAPSULE BY MOUTH  TWICE DAILY    potassium chloride (Klor-Con) 10 mEq tablet TAKE 1 TABLET BY MOUTH DAILY       Objective     /60 (BP Location: Left arm, Patient Position: Sitting, Cuff Size: Large)   Pulse 74   Temp 97 6 °F (36 4 °C) (Temporal)   Resp 16   Ht 5' 5\" (1 651 m)   Wt 97 1 kg (214 lb)   SpO2 98%   BMI 35 61 kg/m²     Physical Exam  Constitutional:       Appearance: She is well-developed  HENT:      Head:      Comments: Bilateral parotid gland without mass or swelling  No neck MARQUEZ  Eyes:      Conjunctiva/sclera: Conjunctivae normal    Neck:      Thyroid: No thyromegaly  Vascular: No JVD  Cardiovascular:      Rate and Rhythm: Normal rate and regular rhythm  Heart sounds: Normal heart sounds  No murmur heard  No friction rub  No gallop  Pulmonary:      Effort: Pulmonary effort is normal       Breath sounds: Normal breath sounds  No wheezing or rales  Abdominal:      General: Bowel sounds are normal  There is no distension  Palpations: Abdomen is soft  Tenderness: There is no abdominal tenderness  Musculoskeletal:      Cervical back: Neck supple         Solo Juan MD  "

## 2023-05-04 NOTE — ASSESSMENT & PLAN NOTE
Likely intermittent parotid stone, as this is not present currently and will come and go  Recommended increased hydration, sour candies, warm compresses, and massage of parotid if this recurs

## 2023-06-07 ENCOUNTER — CONSULT (OUTPATIENT)
Dept: ENDOCRINOLOGY | Facility: CLINIC | Age: 73
End: 2023-06-07
Payer: MEDICARE

## 2023-06-07 VITALS
HEART RATE: 65 BPM | HEIGHT: 65 IN | SYSTOLIC BLOOD PRESSURE: 130 MMHG | DIASTOLIC BLOOD PRESSURE: 80 MMHG | WEIGHT: 219 LBS | BODY MASS INDEX: 36.49 KG/M2

## 2023-06-07 DIAGNOSIS — E55.9 VITAMIN D DEFICIENCY: ICD-10-CM

## 2023-06-07 DIAGNOSIS — E03.9 ACQUIRED HYPOTHYROIDISM: Primary | ICD-10-CM

## 2023-06-07 DIAGNOSIS — M85.89 OSTEOPENIA OF MULTIPLE SITES: ICD-10-CM

## 2023-06-07 DIAGNOSIS — E04.2 MULTIPLE THYROID NODULES: ICD-10-CM

## 2023-06-07 DIAGNOSIS — R89.9 ABNORMAL THYROID BIOPSY: ICD-10-CM

## 2023-06-07 PROCEDURE — 99205 OFFICE O/P NEW HI 60 MIN: CPT | Performed by: INTERNAL MEDICINE

## 2023-06-07 NOTE — PROGRESS NOTES
Lawanda Zimmerman 67 y o  female MRN: [id number]    Encounter: [id number]    New pt progress note  Hx of Thyroid nodules s/p R partial Thyroidectomy  Post op Hypothyroidism  Multinodular Goiter in left Lobe  Hx of A fib    Assessment: This is a 67y o -year-old female with pmhx of Multinodular Goiter s/p R partial Thyroidectomy, Post-op Hypothyroidism, A fib  Presents to the clinic for a consult on Multinodular goiter and Hypothyroidism      Hypothyroidism/Multinodular Goiter  TSH: 0  4  S/p Thyroid biopsy of 2 nodules in left lobe ( 2/2023): benign results   Home regimen: Levothyroxine 50 mcg daily  We explained to the patient that we need to obtain a repeated TSH and a free t4  Based on that will decide on adjusting medication  We mentioned to the patient that most likely we will lower the dose of levothyroxine, as her TSH level has been in the lower range and She has Hx of A fib  Osteopenia  DXA scan demonstrated Osteopenia with a FRAX score of 4 2% in the hip and 19% total  We explained to the patient that we will need to obtain another DXA scan in November and will decide if she needs treatment       CC: Post-op Hypothyroidism/multinodular Goiter       HPI:  68 yo F with pmhx of Multinodular Goiter s/p R partial Thyroidectomy at age of 33 y o Hx of Hypothyroidism, presents to the clinic for a consult  The patient reports that when she was 33years old she was diagnosed with a big nodule in her R thyroid and underwent surgery  The patient reports that since then she started taking levothyroxine, and currently denies any symptoms associated with Hyper or Hypothyroidism  The patient reports that was diagnosed with Nodules in the Left lobe and underwent biopsy results were benign (2/2023)  The pt reports family history of Thyroid nodules ( Sister and mother)     Review of Systems   Constitutional: Negative for activity change and appetite change  HENT: Negative for congestion and facial swelling      Eyes: Negative for photophobia and discharge  Respiratory: Negative for apnea, shortness of breath and wheezing  Cardiovascular: Negative for chest pain and palpitations  Gastrointestinal: Negative for abdominal distention and abdominal pain  Endocrine: Negative for cold intolerance, heat intolerance and polydipsia  Musculoskeletal: Negative for arthralgias and back pain  Skin: Negative for color change and wound  Neurological: Negative for tremors, weakness and headaches  Psychiatric/Behavioral: Negative for agitation, behavioral problems and confusion  Historical Information   Past Medical History:   Diagnosis Date   • Anxiety     resolved 10/4/17   • Asthma     seasonal   • [facility] 01/2022    newly diagnosed on eliquis   • Chronic kidney disease     kidney stone   • Colon polyp    • Disease of thyroid gland    • Dysphagia    • GERD (gastroesophageal reflux disease)    • Goiter, nodular     partial thyroidectomy   • Hiatal hernia     repaired 2018   • Hiatal hernia with GERD without esophagitis 04/2018    surgical correction   • History of esophageal varices with bleeding    • History of pernicious anemia    • History of transfusion     x1 after bleeding ulcer   • Hyperlipidemia    • Hypertension    • Irritable bowel syndrome    • Neck mass     2 small areas left side by jawline and midneck US scheduled 0820/21   • RA (rheumatoid arthritis) ([facility] )    • Seasonal allergies    • Vertigo    • Wears glasses     for reading     Past Surgical History:   Procedure Laterality Date   • BREAST BIOPSY Bilateral     negative   • CARDIAC CATHETERIZATION      x2 secondary to exertional chest pain, due to lung issues, possible mild COPD   • CARDIAC ELECTROPHYSIOLOGY PROCEDURE N/A 12/23/2022    Procedure: Cardiac eps/afib ablation;  Surgeon: Sophia Zheng MD;  [facility]: [facility];   Service: Cardiology   • CARDIAC ELECTROPHYSIOLOGY PROCEDURE N/A 12/23/2022    Procedure: Cardiac eps/aflutter ablation;  Surgeon: Eliud Burgos MD;  Location: [facility]; Service: Cardiology   • CATARACT EXTRACTION Bilateral    • CHOLECYSTECTOMY  2015    lap - last assessed 10/4/17   • COLONOSCOPY      complete   • ESOPHAGOGASTRODUODENOSCOPY N/A 1/23/2018    Procedure: ESOPHAGOGASTRODUODENOSCOPY (EGD); Surgeon: Waddell Favre, MD;  Location: [facility] GI LAB; Service: Gastroenterology   • HYSTERECTOMY      partial - ovaries remain   • LIPOMA RESECTION      right thigh   • MS ESOPHAGOSCOPY FLEXIBLE TRANSORAL WITH BIOPSY N/A 4/11/2018    Procedure: ESOPHAGOGASTRODUODENOSCOPY (EGD); Surgeon: Darryl Farrar MD;  Location:  MAIN OR;  Service: Thoracic   • MS LAPS RPR PARAESPHGL HRNA INCL FUNDPLSTY [address] [place] [address] N/A 4/11/2018    Procedure: REPAIR HERNIA PARAESOPHAGEAL  LAPAROSCOPIC,ELEONORA GASTROPLASTY, Evon Piper FUNDOPLICATION;  Surgeon: Darryl Farrar MD;  Location:  MAIN OR;  Service: Thoracic   • MS XCAPSL CTRC RMVL INSJ IO LENS PROSTH W/O ECP Right 8/23/2021    Procedure: EXTRACTION EXTRACAPSULAR CATARACT PHACO INTRAOCULAR LENS (IOL); Surgeon: Princess Gaming MD;  Location: [facility] MAIN OR;  Service: Ophthalmology   • MS XCAPSL CTRC RMVL INSJ IO LENS PROSTH W/O ECP Left 11/15/2021    Procedure: EXTRACTION EXTRACAPSULAR CATARACT PHACO INTRAOCULAR LENS (IOL);   Surgeon: Princess Gaming MD;  Location: [facility] MAIN OR;  Service: Ophthalmology   • SKIN BIOPSY Right     lump benign - last assessed 10/4/17   • THYROIDECTOMY, PARTIAL  1977   • TONSILLECTOMY     • US GUIDED THYROID BIOPSY  2/21/2023     Social History   Social History     Substance and Sexual Activity   Alcohol Use Yes    Comment: seldom     Social History     Substance and Sexual Activity   Drug Use Never     Social History     Tobacco Use   Smoking Status Never   • Passive exposure: Never   Smokeless Tobacco Never     Family History:   Family History   Problem Relation Age of Onset   • Alzheimer's disease Mother    • Cancer Mother         skin "  • Thyroid disease Mother    • Ulcerative colitis Mother    • Cancer Father         prostate   • Stroke Father    • Hypertension Father    • Prostate cancer Father    • Thyroid disease Sister    • Ulcerative colitis Sister    • Other Sister         open heart surgery   • Heart disease Sister    • Hyperlipidemia Brother    • No Known Problems Son    • No Known Problems Son    • No Known Problems Maternal Grandmother    • No Known Problems Paternal Grandmother    • Mental illness Neg Hx        Meds/Allergies   Current Outpatient Medications   Medication Sig Dispense Refill   • atorvastatin (LIPITOR) 20 mg tablet TAKE 1 TABLET BY MOUTH DAILY AT  BEDTIME 90 tablet 1   • bumetanide (BUMEX) 1 mg tablet TAKE 1 TABLET BY MOUTH  DAILY 90 tablet 3   • Calcium Carb-Cholecalciferol 600-1000 MG-UNIT CAPS Take by mouth every morning gummy      • Eliquis 5 MG TAKE 1 TABLET BY MOUTH  TWICE DAILY 180 tablet 3   • flecainide (TAMBOCOR) 50 mg tablet Take 1 tablet (50 mg total) by mouth 2 (two) times a day 180 tablet 3   • levothyroxine 50 mcg tablet TAKE 1 TABLET BY MOUTH DAILY 90 tablet 1   • metoprolol succinate (TOPROL-XL) 25 mg 24 hr tablet Take 1 tablet (25 mg total) by mouth daily 90 tablet 3   • omeprazole (PriLOSEC) 40 MG capsule TAKE 1 CAPSULE BY MOUTH  TWICE DAILY 180 capsule 3   • potassium chloride (Klor-Con) 10 mEq tablet TAKE 1 TABLET BY MOUTH DAILY 90 tablet 1     No current facility-administered medications for this visit       Allergies   Allergen Reactions   • Orange (Diagnostic) - Food Allergy Anaphylaxis     Throat closes   • Pantoprazole Headache   • Penicillins Other (See Comments)     During allergy testing instructed to NEVER take PCN   • Gluten Meal - Food Allergy      Following a gluten free diet on her own   • Lactose - Food Allergy Diarrhea   • Sulfa Antibiotics Rash       Objective   Vitals: Blood pressure 130/80, pulse 65, height 5' 5\" (1 651 m), weight 99 3 kg (219 lb), not currently " breastfeeding  Physical Exam  Constitutional:       Appearance: Normal appearance  HENT:      Nose: No congestion or rhinorrhea  Mouth/Throat:      Pharynx: No oropharyngeal exudate or posterior oropharyngeal erythema  Eyes:      Conjunctiva/sclera: Conjunctivae normal       Pupils: Pupils are equal, round, and reactive to light  Neck:      Comments: Absent R lobe  Cardiovascular:      Rate and Rhythm: Normal rate and regular rhythm  Pulses: Normal pulses  Heart sounds: No murmur heard  No friction rub  Pulmonary:      Effort: No respiratory distress  Breath sounds: No stridor  No wheezing  Abdominal:      General: There is no distension  Palpations: Abdomen is soft  Tenderness: There is no abdominal tenderness  Musculoskeletal:         General: No swelling  Cervical back: No rigidity or tenderness  Skin:     Coloration: Skin is not jaundiced  Findings: No bruising  Neurological:      General: No focal deficit present  Mental Status: She is alert and oriented to person, place, and time  Motor: No weakness  Psychiatric:         Mood and Affect: Mood normal          Thought Content: Thought content normal          Judgment: Judgment normal              Lab Results:   Lab Results   Component Value Date/Time    TSH 3RD GENERATON 0 462 01/18/2023 09:33 AM    TSH 3RD GENERATON 0 758 08/23/2022 09:05 AM       Imaging Studies:   Results for orders placed during the hospital encounter of 01/09/23    US thyroid    Impression  Multinodular thyroid gland  Evaluation is limited as there is no comparison imaging  The following meet current ACR criteria for recommending ultrasound guided biopsy (please note that although more than 2 nodules meet criteria, current ACR guidelines state no more than 2 nodules should be biopsied, with primary priority based on highest  TIRADS point total rather than size): The 1 7 cm left upper gland nodule   (Image number 58) (CRITERIA: TR 4, Moderately suspicious  FNA if > 1 5 cm  The 1 5 cm left posterior mid gland nodule  (Image number 46/47) (CRITERIA: TR 4, Moderately suspicious  FNA if > 1 5 cm  Annual follow-up is recommended for additional nodules  Reference: ACR Thyroid Imaging, Reporting and Data System (TI-RADS): White Paper of the [employer]  J AM Elmer Radiol 3825;67:463-501  (Additional recommendations based on American Thyroid Association 2015 guidelines )    The study was marked in EPIC for significant notification  The study was marked in Epic for follow-up  Workstation performed: [id number]    Thyroid pathology    Case Report   Non-gynecologic Cytology                          Case: [id number]                                   Authorizing Provider: Ren Mast MD         Collected:           02/21/2023 1343               Ordering Location:     [address] [place] [address] Received:            02/21/2023 1518                                      Ultrasound                                                                    Pathologist:           Brent Arreguin MD                                                     Specimens:   A) - Thyroid, Left, Upper Pole                                                                       B) - Thyroid, Left, Upper Pole                                                                       C) - Thyroid, Left, Posterior Mid Pole                                                               D) - Thyroid, Left, Posterior Mid Pole                                                     Final Diagnosis   A -B  Thyroid, Left, Upper (Thin-prep and smear preparations):   Benign ([place] Category II) - See note  Follicular cells in small groups and flat sheets  Cyst-lining cells  Numerous hemosiderin-laden macrophages  Thin colloid      Satisfactory for evaluation      C -D    Thyroid, Left, Posterior Mid (Thin-prep and smear preparations): "  Atypia of undetermined significance ([place] Category III) - See note  Predominantly benign follicular cells in small groups and flat sheets  Minor population of histiocytoid cells with markedly enlarged nuclei, nucleoli and irregular nuclear membranes  Numerous hemosiderin-laden macrophages  Thin colloid      Satisfactory for evaluation      Note (Thyroid, Left Upper):  As reported in the [address] [place] [address] for Reporting Thyroid Cytopathology*, the diagnostic category of \"benign/negative for malignancy\" carries a 0-3% risk of malignancy being found in subsequent resections (in the few studies of patients with benign FNA results that were followed long-term, a false negative rate of 0-3% was reported), with the usual management being clinical follow-up supplemented by ultrasonography (US) as indicated  Repeat FNA may be indicated for nodules showing significant growth or developing US abnormalities  Ultimately, clinical/imaging correlation for this patient is needed in arriving at the actual management plan      *The [place] System for Reporting Thyroid Cytopathology, Nicole Jimenez Starch Vahid Newman ), 2018 (2nd ed )     Note (Thyroid, Left, Posterior Mid):  (1) As reported in the [address] [place] [address] for Reporting Thyroid Cytopathology*, this diagnostic category has demonstrated anywhere from 10-30% risk of malignancy being found in subsequent resections and/or FNA  This risk of malignancy is expected to change due to the usage of the surgical pathology diagnosis of “non-invasive follicular thyroid neoplasm with papillary-like nuclear features (NIFTP)  ”  The anticipated risk of malignancy secondary to NIFTP is 6-18%  The manual reports that the usual management following this diagnosis is repeat FNA, molecular testing, or lobectomy  Ultimately, clinical/imaging correlation for this patient is needed in arriving at the actual management plan             Portions of the record may have been created with voice " "recognition software  Occasional wrong word or \"sound a like\" substitutions may have occurred due to the inherent limitations of voice recognition software  Read the chart carefully and recognize, using context, where substitutions have occurred    "

## 2023-06-08 PROBLEM — E55.9 VITAMIN D DEFICIENCY: Status: ACTIVE | Noted: 2023-06-08

## 2023-06-08 PROBLEM — M80.80XA LOCALIZED OSTEOPOROSIS WITH CURRENT PATHOLOGICAL FRACTURE: Status: ACTIVE | Noted: 2023-06-08

## 2023-06-08 PROBLEM — R89.9 ABNORMAL THYROID BIOPSY: Status: ACTIVE | Noted: 2023-06-08

## 2023-06-14 ENCOUNTER — APPOINTMENT (OUTPATIENT)
Dept: LAB | Facility: HOSPITAL | Age: 73
End: 2023-06-14
Payer: MEDICARE

## 2023-06-14 DIAGNOSIS — M85.89 OSTEOPENIA OF MULTIPLE SITES: ICD-10-CM

## 2023-06-14 DIAGNOSIS — E03.9 ACQUIRED HYPOTHYROIDISM: ICD-10-CM

## 2023-06-14 LAB
25(OH)D3 SERPL-MCNC: 32.4 NG/ML (ref 30–100)
PTH-INTACT SERPL-MCNC: 56.2 PG/ML (ref 12–88)
T4 FREE SERPL-MCNC: 1.07 NG/DL (ref 0.61–1.12)
TSH SERPL DL<=0.05 MIU/L-ACNC: 0.93 UIU/ML (ref 0.45–4.5)

## 2023-06-14 PROCEDURE — 36415 COLL VENOUS BLD VENIPUNCTURE: CPT

## 2023-06-14 PROCEDURE — 84443 ASSAY THYROID STIM HORMONE: CPT

## 2023-06-14 PROCEDURE — 82306 VITAMIN D 25 HYDROXY: CPT

## 2023-06-14 PROCEDURE — 84439 ASSAY OF FREE THYROXINE: CPT

## 2023-06-14 PROCEDURE — 83970 ASSAY OF PARATHORMONE: CPT

## 2023-06-19 DIAGNOSIS — E03.9 ACQUIRED HYPOTHYROIDISM: Primary | ICD-10-CM

## 2023-08-04 ENCOUNTER — TELEPHONE (OUTPATIENT)
Dept: CARDIOLOGY CLINIC | Facility: CLINIC | Age: 73
End: 2023-08-04

## 2023-08-04 DIAGNOSIS — I10 BENIGN HYPERTENSION: Primary | ICD-10-CM

## 2023-08-04 DIAGNOSIS — I48.19 PERSISTENT ATRIAL FIBRILLATION (HCC): ICD-10-CM

## 2023-08-04 NOTE — TELEPHONE ENCOUNTER
Patient called, has been recording HR's over the last few days in the 30's at times, both on GlucoVistadia mobile and pulse ox. She is symptomatic with lightheadedness and fluttering. Her HR's during the day today went into 50's and 60's but this morning and last night was 34 and 31 bpm.    She has an appointment with a PA at the end of August.      She has history of afib ablation 12/22/22, states she went into afib in June, called office and made an appointment but was never given a nurse to speak with. She has been in afib since June. She is currently on Flecainide 50mg bid and metoprolol 25mg bid. Can we decrease any of these meds until Dr. Brooke Matson can respond or should she present to ED?

## 2023-08-04 NOTE — TELEPHONE ENCOUNTER
Called patient to discuss symptoms further. She has felt lightheadedness when her heart rate is in the 30s, when they are in the 50s she is okay. She reports that her Jose Alfredo Pretty has shown that she has been in atrial fibrillation 90% of the time since June. Unfortunately, she called the office in June and was not sent to [facility]. She has been taking both metoprolol and flecainide as previously prescribed. We discussed different options. I advised her that if she is extremely symptomatic right now, she should go to the ER. Otherwise, we can hold both metoprolol and flecainide over the weekend and see how she feels off of these medications. I advised her to continue to monitor her heart rates to ensure that they do not reach above 110 consistently. We will touch bases on Monday to determine how to proceed, Dr. Venessa Ruiz is not back until Tuesday. If she becomes tachycardic as she has been taken off antiarrhythmic and rate control and she is symptomatic with this, she will also come to the ER. She knows to go to the [address] [place] [address] as this is where  is located. She has been taking Eliquis faithfully.

## 2023-08-08 NOTE — TELEPHONE ENCOUNTER
LATE ENTRY:  Patient called around 3:30 PM today. Follow-up from stopping rate control and antiarrhythmic therapy over the weekend. Patient reports that she is actually feeling pretty well today with her heart rates being more so in the 50s and 60s. She reports that on Saturday, she had been in normal rhythm. Sunday, her cardia told her she was in atrial fibrillation. Today she has been 50/50. Blood pressures have come up and highest reading today was 138/95, this was the highest reading and has been closer to the 120s over 80s. I discussed with her that we may be able to take a wait and watch approach. She has an appointment on 8/28 in our office and should Dr. Fritz Ambriz agree, we can have a ZIO monitor placed between now and then to determine her atrial fibrillation burden and overall rates. I will attach the office to this note to see if we can start prior authorization process for this if Dr. Fritz Ambriz agrees. I also did discuss with the patient that we may need to add back antihypertensives if her blood pressures continue to creep up. She does have losartan 25 mg tablets still at home from when she was on it prior, she did not have any issues with this medication.

## 2023-08-10 ENCOUNTER — CLINICAL SUPPORT (OUTPATIENT)
Dept: CARDIOLOGY CLINIC | Facility: CLINIC | Age: 73
End: 2023-08-10
Payer: MEDICARE

## 2023-08-10 DIAGNOSIS — I48.19 PERSISTENT ATRIAL FIBRILLATION (HCC): Primary | ICD-10-CM

## 2023-08-10 PROCEDURE — 99211 OFF/OP EST MAY X REQ PHY/QHP: CPT | Performed by: INTERNAL MEDICINE

## 2023-08-10 NOTE — PROGRESS NOTES
Pt presents to the office under the direction of Bella Foster PA-C and Dr. Taty Roe for a 1 week Zio AT. Patch applied and all instructions given. Patient verbalized understanding.

## 2023-08-13 NOTE — DISCHARGE SUMMARY
Discharge Summary - Thoracic Surgery   Emmy Medley 75 y o  female MRN: [id number]  Unit/Bed#: [facility] 502-62 Encounter: [id number]    Admission Date: 4/11/2018     Discharge Date: 4/14/2018    Admitting Diagnosis: Hiatal hernia with GERD without esophagitis [K44 9, K21 9]    Discharge Diagnosis: paraesophageal hernia    Attending: Dr Katelynn Sawant Physician(s): none    Procedures Performed:   4/11 Procedure(s):  REPAIR HERNIA PARAESOPHAGEAL  LAPAROSCOPIC,ELEONORA GASTROPLASTY, TUEPET FUNDOPLICATION  ESOPHAGOGASTRODUODENOSCOPY (EGD)    Pathology: none    Hospital Course: 76 yo F with known paraesophageal hernia presents for planned repair  She was admitted postoperatively for monitoring  By postoperative day 1, pain was controlled  Swallow study demonstrated no leak, so she was advanced to a clear liquid diet  By postoperative day 2, she was advanced to a full liquid diet but continued to have nausea and poor PO intake  By postoperative day 3, she was tolerating adequate PO, and pain was well controlled  Patient was discharged home with instructions to follow up with Dr Keyla Gipson in the office, and to obtain a CXR prior to her appointment  Condition at Discharge: good     Discharge instructions/Information to patient and family:   See after visit summary for information provided to patient and family  Provisions for Follow-Up Care:  See after visit summary for information related to follow-up care and any pertinent home health orders  Disposition: Home    Planned Readmission: No    Discharge Statement   I spent 20 minutes discharging the patient  This time was spent on the day of discharge  I had direct contact with the patient on the day of discharge  Additional documentation is required if more than 30 minutes were spent on discharge  Discharge Medications:  See after visit summary for reconciled discharge medications provided to patient and family 
ASA PS 1: A normal healthy patient
Abrams Catheter Care, Adult    A Abrams catheter is a soft, flexible tube that is placed into the bladder to drain urine. A Abrams catheter may be inserted if:    You leak urine or are not able to control when you urinate (urinary incontinence).  You are not able to urinate when you need to (urinary retention).   You had prostate surgery or surgery on the genitals.  You have certain medical conditions, such as multiple sclerosis, dementia, or a spinal cord injury.    If you are going home with a Abrams catheter in place, follow the instructions below.    TAKING CARE OF THE CATHETER   Wash your hands with soap and water.   Using mild soap and warm water on a clean washcloth:    Clean the area on your body closest to the catheter insertion site using a circular motion, moving away from the catheter. Never wipe toward the catheter because this could sweep bacteria up into the urethra and cause infection.   Remove all traces of soap. Pat the area dry with a clean towel. For males, reposition the foreskin.    Attach the catheter to your leg so there is no tension on the catheter. Use adhesive tape or a leg strap. If you are using adhesive tape, remove any sticky residue left behind by the previous tape you used.   Keep the drainage bag below the level of the bladder, but keep it off the floor.   Check throughout the day to be sure the catheter is working and urine is draining freely. Make sure the tubing does not become kinked.  Do not pull on the catheter or try to remove it. Pulling could damage internal tissues.    TAKING CARE OF THE DRAINAGE BAGS  You will be given two drainage bags to take home. One is a large overnight drainage bag, and the other is a smaller leg bag that fits underneath clothing. You may wear the overnight bag at any time, but you should never wear the smaller leg bag at night. Follow the instructions below for how to empty, change, and clean your drainage bags.    Emptying the Drainage Bag    You must empty your drainage bag when it is ?–½ full or at least 2–3 times a day.     Wash your hands with soap and water.   Keep the drainage bag below your hips, below the level of your bladder. This stops urine from going back into the tubing and into your bladder.    Hold the dirty bag over the toilet or a clean container.   Open the pour spout at the bottom of the bag and empty the urine into the toilet or container. Do not let the pour spout touch the toilet, container, or any other surface. Doing so can place bacteria on the bag, which can cause an infection.   Clean the pour spout with a gauze pad or cotton ball that has rubbing alcohol on it.   Close the pour spout.   Attach the bag to your leg with adhesive tape or a leg strap.   Wash your hands well.    Changing the Drainage Bag    Change your drainage bag once a month or sooner if it starts to smell bad or look dirty. Below are steps to follow when changing the drainage bag.     Wash your hands with soap and water.   Pinch off the rubber catheter so that urine does not spill out.   Disconnect the catheter tube from the drainage tube at the connection valve. Do not let the tubes touch any surface.    Clean the end of the catheter tube with an alcohol wipe. Use a different alcohol wipe to clean the end of the drainage tube.   Connect the catheter tube to the drainage tube of the clean drainage bag.   Attach the new bag to the leg with adhesive tape or a leg strap. Avoid attaching the new bag too tightly.    Wash your hands well.    Cleaning the Drainage Bag     Wash your hands with soap and water.   Wash the bag in warm, soapy water.   Rinse the bag thoroughly with warm water.   Fill the bag with a solution of white vinegar and water (1 cup vinegar to 1 qt warm water [.2 L vinegar to 1 L warm water]). Close the bag and soak it for 30 minutes in the solution.    Rinse the bag with warm water.    Hang the bag to dry with the pour spout open and hanging downward.   Store the clean bag (once it is dry) in a clean plastic bag.   Wash your hands well.     PREVENTING INFECTION  Wash your hands before and after handling your catheter.  Take showers daily and wash the area where the catheter enters your body. Do not take baths. Replace wet leg straps with dry ones, if this applies.  Do not use powders, sprays, or lotions on the genital area. Only use creams, lotions, or ointments as directed by your caregiver.  For females, wipe from front to back after each bowel movement.   Drink enough fluids to keep your urine clear or pale yellow unless you have a fluid restriction.   Do not let the drainage bag or tubing touch or lie on the floor.   Wear cotton underwear to absorb moisture and to keep your .    SEEK MEDICAL CARE IF:  Your urine is cloudy or smells unusually bad.  Your catheter becomes clogged.  You are not draining urine into the bag or your bladder feels full.  Your catheter starts to leak.    SEEK IMMEDIATE MEDICAL CARE IF:  You have pain, swelling, redness, or pus where the catheter enters the body.  You have pain in the abdomen, legs, lower back, or bladder.  You have a fever.  You see blood fill the catheter, or your urine is pink or red.  You have nausea, vomiting, or chills.  Your catheter gets pulled out.    MAKE SURE YOU:  Understand these instructions.  Will watch your condition.  Will get help right away if you are not doing well or get worse.    ADDITIONAL NOTES AND INSTRUCTIONS    Please follow up with your Primary MD in 24-48 hr.  Seek immediate medical care for any new/worsening signs or symptoms.

## 2023-08-23 ENCOUNTER — OFFICE VISIT (OUTPATIENT)
Dept: NEUROLOGY | Facility: CLINIC | Age: 73
End: 2023-08-23

## 2023-08-23 VITALS
HEART RATE: 63 BPM | OXYGEN SATURATION: 97 % | DIASTOLIC BLOOD PRESSURE: 86 MMHG | HEIGHT: 65 IN | BODY MASS INDEX: 35.99 KG/M2 | TEMPERATURE: 98 F | SYSTOLIC BLOOD PRESSURE: 128 MMHG | RESPIRATION RATE: 20 BRPM | WEIGHT: 216 LBS

## 2023-08-23 DIAGNOSIS — Z86.73 HISTORY OF TIA (TRANSIENT ISCHEMIC ATTACK): Primary | ICD-10-CM

## 2023-08-23 NOTE — PATIENT INSTRUCTIONS
Dizziness/vertigo:    Patient notes that since the last appointment when we performed a Holliday-Hallpike maneuver in the office that a few days later she noticed she was not having much vertigo anymore. It is certainly possible that when we were doing the Holliday-Hallpike maneuver in the office, that one of the crystals that was causing the patient to have vertigo had seem to fall back into place and was now no longer causing her to have vertigo or a BPPV type inner ear vertigo. She never ended up going to physical therapy to have the Epley maneuver because it was resolved. She does have some instances where she will feel some slight vertigo from time to time but usually seems to resolve very quickly. -Stated to the patient that if she felt as though she would need to go to physical therapy/vestibular therapy for the Epley maneuver in the future then she certainly can. She just asked to let me know if she is experiencing any new onset vertigo and we can certainly have the patient referred over again at this time.  -Informed for the patient to remember that a sudden onset type vertigo that is persistent through positional changes and would last a significant amount of time without resolution, this could be concerning for a potential stroke. It is important for the patient to know the difference between the 2. If this is vertigo that seems to come and go but does not have any lasting effects and the severity it seems to be better at times than others, then this is most likely related to an inner ear vertigo. Again, if it is very sudden onset, persistent, and last throughout positional changes then again this could be concerning for a strokelike finding and should be taken seriously as such. History of TIA:    I had the pleasure of seeing Joann Miller today in the office at [facility], [place], operated by [facility] in [facility]. She is presenting today for a follow-up in regard to her history of TIA/strokelike symptoms.   At this time not noting any new strokelike symptoms or deficits. Not having any deficits from the previous TIA that the patient had in the past.  She is still following with cardiology in regard to her atrial fibrillation. She has not noticed that since her last appointment the atrial fibrillation has returned and due to the patient's significantly low heart rate at this time they have removed her flecainide metoprolol from her medication list.  Her heart rate has seemed to improve since then, her heart rate was 63 bpm in the office today. Overall the patient states as well she is feeling less tired and fatigued.    -Continue to follow with cardiology in regards to the management and treatment of atrial fibrillation. If the patient needs any further ablations then she should also follow along with them. Any recommendations in regard to her medications for atrial fibrillation are certainly fine with me as long as she continues to take Eliquis 5 mg twice daily.  -Continue to try and stay as physically active as she possibly can, she is currently has restrictions on walking from cardiology. However, once her atrial fibrillation is under control and she is able to walk again outside I would encourage it. For right now she can continue with just light activity.  -Continue with current dietary restrictions and well-balanced healthy diet at this time. - For ongoing stroke prevention continue: Eliquis 5 mg twice daily, Lipitor 20 mg once daily  - Discussed the importance of antiplatelet management with the patient to prevent future strokes. - Recommend to check blood pressure occasionally away from the doctor's office to make sure that those numbers are typically less than 130/80.   If they are frequently higher than that, we recommend checking a little more often and to follow up with primary care team   - Will defer to primary care team for monitoring of cholesterol panel and blood sugar numbers with target LDL cholesterol of less than 70 and hemoglobin A1c less than 7%  - Recommend following a low salt, mediterranean diet   - Recommend routine physical exercise as tolerated     We will plan for her to return to the office in 1 year time to see on of the APPs or Dr. Jeni Benson but would be happy to see her sooner if the need should arise. If she has any symptoms concerning for TIA or stroke including sudden painless loss of vision or double vision, difficulty speaking or swallowing, vertigo/room spinning that does not quickly resolve, or weakness/numbness/loss of coordination affecting 1 side of the face or body she should proceed by ambulance to the nearest emergency room immediately.

## 2023-08-23 NOTE — PROGRESS NOTES
Patient ID: Anika Fitzpatrick is a 75 y.o. female who presents to the [address] [place] [address]. Assessment/Plan:    Dizziness/vertigo:    Patient notes that since the last appointment when we performed a Sen-Hallpike maneuver in the office that a few days later she noticed she was not having much vertigo anymore. It is certainly possible that when we were doing the [place]-Hallpike maneuver in the office, that one of the crystals that was causing the patient to have vertigo had seem to fall back into place and was now no longer causing her to have vertigo or a BPPV type inner ear vertigo. She never ended up going to physical therapy to have the Epley maneuver because it was resolved. She does have some instances where she will feel some slight vertigo from time to time but usually seems to resolve very quickly. -Stated to the patient that if she felt as though she would need to go to physical therapy/vestibular therapy for the Epley maneuver in the future then she certainly can. She just asked to let me know if she is experiencing any new onset vertigo and we can certainly have the patient referred over again at this time.  -Informed for the patient to remember that a sudden onset type vertigo that is persistent through positional changes and would last a significant amount of time without resolution, this could be concerning for a potential stroke. It is important for the patient to know the difference between the 2. If this is vertigo that seems to come and go but does not have any lasting effects and the severity it seems to be better at times than others, then this is most likely related to an inner ear vertigo. Again, if it is very sudden onset, persistent, and last throughout positional changes then again this could be concerning for a strokelike finding and should be taken seriously as such.     History of TIA:    I had the pleasure of seeing Young Austin today in the office at [facility] in [facility]. She is presenting today for a follow-up in regard to her history of TIA/strokelike symptoms. At this time not noting any new strokelike symptoms or deficits. Not having any deficits from the previous TIA that the patient had in the past.  She is still following with cardiology in regard to her atrial fibrillation. She has not noticed that since her last appointment the atrial fibrillation has returned and due to the patient's significantly low heart rate at this time they have removed her flecainide and metoprolol from her medication list.  Her heart rate has seemed to improve since then, her heart rate was 63 bpm in the office today. Overall the patient states as well she is feeling less tired and fatigued.    -Continue to follow with cardiology in regards to the management and treatment of atrial fibrillation. If the patient needs any further ablations then she should also follow along with them. Any recommendations in regard to her medications for atrial fibrillation are certainly fine with me as long as she continues to take Eliquis 5 mg twice daily.  -Continue to try and stay as physically active as she possibly can, she is currently has restrictions on walking from cardiology. However, once her atrial fibrillation is under control and she is able to walk again outside I would encourage it. For right now she can continue with just light activity.  -Continue with current dietary restrictions and well-balanced healthy diet at this time. - For ongoing stroke prevention continue: Eliquis 5 mg twice daily, Lipitor 20 mg once daily  - Discussed the importance of antiplatelet management with the patient to prevent future strokes. - Recommend to check blood pressure occasionally away from the doctor's office to make sure that those numbers are typically less than 130/80.   If they are frequently higher than that, we recommend checking a little more often and to follow up with primary care team   - Will defer to primary care team for monitoring of cholesterol panel and blood sugar numbers with target LDL cholesterol of less than 70 and hemoglobin A1c less than 7%  - Recommend following a low salt, mediterranean diet   - Recommend routine physical exercise as tolerated     We will plan for her to return to the office in 1 year time to see on of the APPs or Dr. Donal Fabry but would be happy to see her sooner if the need should arise. If she has any symptoms concerning for TIA or stroke including sudden painless loss of vision or double vision, difficulty speaking or swallowing, vertigo/room spinning that does not quickly resolve, or weakness/numbness/loss of coordination affecting 1 side of the face or body she should proceed by ambulance to the nearest emergency room immediately. Subjective:    HPI      For Review:    Shruthi Geiger was last seen in the office on 02/23/2023 by myself. At that time she was presenting as a hospital follow-up in regard to her recent right-sided peripheral vision loss, which was suspected to be related to a TIA. This had occurred in the setting of post ablation of the patient's atrial fibrillation. It was possible that this could have been related to a small embolic phenomenon due to the ablation. Patient had a CTA which was not concerning for any significant stenosis of the neck or intracranial aneurysms. MRI brain without contrast indicated that there was no acute stroke. Patient reported at the last visit that she was experiencing dizziness/vertigo. Was reporting that these episodes seem to come and go and can last anywhere from few seconds to few minutes at a time. Most of them were occurring when the patient had laid down and then had resolved shortly afterwards. No persistent or long-lasting episodes of dizziness or vertigo, and they would usually subside after some time. We did do a hints exam and a Paisley-Hallpike exam in the office.   Patient recently had a thyroid biopsy of her neck so she was very tender and stiff and difficult to manipulate her neck. Although the patient did have significant dizziness the moment that she laid down. Did not note any beats of horizontal or vertical nystagmus. Suspected that this was due to some type of inner ear pathology such as BPPV. Send the patient for vestibular therapy to see if they can help teach her the Epley maneuver and perform a few Epley maneuvers procedures on the patient. If the patient did not have any significant relief from the vestibular therapy with physical therapy then we would consider ENT referral at the next visit. As far as the patient's suspected TIA or strokelike symptoms ago, patient did not have any new strokelike symptoms and no residual peripheral vision loss. Patient was recommended to continue on Eliquis 5 mg twice daily for atrial fibrillation and continue on atorvastatin 20 mg once daily for secondary stroke prevention. Recommended that the patient continue to follow with primary care and cardiology and regards to her blood pressure recommendations. Due to well-controlled LDL, no concern at this time the last visit to repeat a lipid panel. Interval History:      New stroke symptoms/residual symptoms:    Any new, sudden onset weakness, numbness, facial droop, slurred speech, difficulty speaking, trouble swallowing, persistent vertigo, or sudden double vision or vision loss? No new stroke like symptoms    Residual symptoms include: None    Stroke Etiology and Risk Factor modification:    Stroke risk factors were evaluated including: hypertension, hyperlipidemia, persistent atrial fibrillation s/p ablation    AP/AC therapy: Eliquis 5 mg twice daily. No bleeding or bruising     Statin therapy: Atorvastatin 20 once daily at night    Blood pressure today and as of late: 128/86, since the ablation blood pressure has been much lower. After metoprolol it's closer 110-120. Most recent LDL: 70 mg/dL    Most recent hemoglobin A1C: 5.5%    Cardiology evaluation? Any cardiac monitoring required?: Following with cardiology in regards to atrial fibrillation s/p cardiac ablation. Recently discontinued off Flecainide and metoprolol at this time due to the patient's heart rate dropping below 60 bpm. Not feeling as tired or fatigued at this moment in time. Endocrinology evaluation? Following proper glycemic treatment/diet? None    Lifestyle history/modifications:    Diet/Exercise regimen: Pretty well balanced diet, doing gluten free at this time. Still active around the house, not going for walks at the moment per cardiology. Any physical therapy, occupational therapy or speech therapy performed/required at this time?:    Any difficulty with sleep? No sleep issues at this time     Any history of sleep apnea apnea? CPAP compliance?: no issues with sleep apnea or snoring at this time    Post-stroke depression/anxiety? No depression/anxiety     Any history of smoking? No smoking     Additional History:    Patient reports to not having as many episodes of vertigo as she had previously. Patient states that after our Sen-Hallpike testing in the office the patient went home and after few days she had felt fine and was not having any bouts of vertigo. She was referred to vestibular therapy but did not go due to the fact her vertigo had seem to resolve from the Bullard-Hallpike testing. She does get some bouts of vertigo now and again but they are not as severe and seem to go away relatively quickly.     Lab Results   Component Value Date/Time    CHOLESTEROL 149 01/18/2023 09:33 AM     Lab Results   Component Value Date/Time    TRIG 83 01/18/2023 09:33 AM    TRIG 92 09/01/2016 09:10 AM     Lab Results   Component Value Date/Time    HDL 62 01/18/2023 09:33 AM    HDL 57 09/01/2016 09:10 AM     Lab Results   Component Value Date/Time    LDLCALC 70 01/18/2023 09:33 AM       Lab Results   Component Value Date/Time    HGBA1C 5.5 12/25/2022 04:59 AM     Lab Results   Component Value Date/Time     12/25/2022 04:59 AM           Past Medical History:   Diagnosis Date   • Anxiety     resolved 10/4/17   • Asthma     seasonal   • Atrial fibrillation ([address]) 01/2022    newly diagnosed on eliquis   • Chronic kidney disease     kidney stone   • Colon polyp    • Disease of thyroid gland    • Dysphagia    • GERD (gastroesophageal reflux disease)    • Goiter, nodular     partial thyroidectomy   • Hiatal hernia     repaired 2018   • Hiatal hernia with GERD without esophagitis 04/2018    surgical correction   • History of esophageal varices with bleeding    • History of pernicious anemia    • History of transfusion     x1 after bleeding ulcer   • Hyperlipidemia    • Hypertension    • Irritable bowel syndrome    • Neck mass     2 small areas left side by jawline and midneck US scheduled 0820/21   • RA (rheumatoid arthritis) ([address])    • Seasonal allergies    • Vertigo    • Wears glasses     for reading       Current Outpatient Medications:   •  atorvastatin (LIPITOR) 20 mg tablet, TAKE 1 TABLET BY MOUTH DAILY AT  BEDTIME, Disp: 90 tablet, Rfl: 1  •  bumetanide (BUMEX) 1 mg tablet, TAKE 1 TABLET BY MOUTH  DAILY, Disp: 90 tablet, Rfl: 3  •  Calcium Carb-Cholecalciferol 600-1000 MG-UNIT CAPS, Take by mouth every morning gummy , Disp: , Rfl:   •  Eliquis 5 MG, TAKE 1 TABLET BY MOUTH  TWICE DAILY, Disp: 180 tablet, Rfl: 3  •  levothyroxine 50 mcg tablet, TAKE 1 TABLET BY MOUTH DAILY, Disp: 90 tablet, Rfl: 1  •  omeprazole (PriLOSEC) 40 MG capsule, TAKE 1 CAPSULE BY MOUTH  TWICE DAILY, Disp: 180 capsule, Rfl: 3  •  potassium chloride (Klor-Con) 10 mEq tablet, TAKE 1 TABLET BY MOUTH DAILY, Disp: 90 tablet, Rfl: 1  •  flecainide (TAMBOCOR) 50 mg tablet, Take 1 tablet (50 mg total) by mouth 2 (two) times a day (Patient not taking: Reported on 8/23/2023), Disp: 180 tablet, Rfl: 3  •  metoprolol succinate (TOPROL-XL) 25 mg 24 hr tablet, Take 1 tablet (25 mg total) by mouth daily (Patient not taking: Reported on 8/23/2023), Disp: 90 tablet, Rfl: 3     Objective:    Physical Exam:                                                                 Vitals:            Constitutional:    /86 (BP Location: Left arm, Patient Position: Sitting, Cuff Size: Standard)   Pulse 63   Temp 98 °F (36.7 °C) (Temporal)   Resp 20   Ht 5' 5" (1.651 m)   Wt 98 kg (216 lb)   SpO2 97%   BMI 35.94 kg/m²   BP Readings from Last 3 Encounters:   08/23/23 128/86   06/07/23 130/80   05/04/23 120/60     Pulse Readings from Last 3 Encounters:   08/23/23 63   06/07/23 65   05/04/23 74         Well developed, well nourished, well groomed. No dysmorphic features. Psychiatric:  Normal behavior and appropriate affect        Neurological Examination:     Mental status/cognitive function:   Orientated to time, place and person. Recent and remote memory intact. Attention span and concentration as well as fund of knowledge are appropriate for age. Normal language and spontaneous speech. Cranial Nerves:  II-visual fields full. III, IV, VI-Pupils were equal, round, and reactive to light and accomodation. Extraocular movements were full and conjugate without nystagmus. Conjugate gaze, normal smooth pursuits, normal saccades   V-facial sensation symmetric. VII-facial expression symmetric, intact forehead wrinkle, strong eye closure, symmetric smile    VIII-hearing grossly intact bilaterally   IX, X-palate elevation symmetric, no dysarthria. XI-shoulder shrug strength intact    XII-tongue protrusion midline. Motor Exam: symmetric bulk and tone throughout, no pronator drift. Power/strength 5/5 bilateral upper and lower extremities, no atrophy, fasciculations or abnormal movements noted. Sensory: grossly intact light touch in all extremities. Reflexes: brachioradialis 3+, biceps 3+, knee 3+, bilaterally. Hyperreflexic throughout.   Coordination: Finger nose finger intact bilaterally, no apparent dysmetria, ataxia or tremor noted  Gait: steady casual and tandem gait. ROS:    Review of Systems   Constitutional: Negative for appetite change, fatigue and fever. HENT: Negative. Negative for hearing loss, tinnitus, trouble swallowing and voice change. Eyes: Negative. Negative for photophobia, pain and visual disturbance. Respiratory: Negative. Negative for shortness of breath. Cardiovascular: Negative. Negative for palpitations. Gastrointestinal: Negative. Negative for nausea and vomiting. Endocrine: Negative. Negative for cold intolerance. Genitourinary: Negative. Negative for dysuria, frequency and urgency. Musculoskeletal: Negative for back pain, gait problem, myalgias and neck pain. Skin: Negative. Negative for rash. Allergic/Immunologic: Negative. Neurological: Negative. Negative for dizziness, tremors, seizures, syncope, facial asymmetry, speech difficulty, weakness, light-headedness, numbness and headaches. Hematological: Negative. Does not bruise/bleed easily. Psychiatric/Behavioral: Negative. Negative for confusion, hallucinations and sleep disturbance. All other systems reviewed and are negative.       I have spent 30 minutes today on this case including chart review, performing history and exam, patient counseling, and documentation/communication      Alicia Hernandez PA-C  8/23/2023 11:39 AM

## 2023-08-29 ENCOUNTER — OFFICE VISIT (OUTPATIENT)
Dept: CARDIOLOGY CLINIC | Facility: CLINIC | Age: 73
End: 2023-08-29
Payer: MEDICARE

## 2023-08-29 ENCOUNTER — CLINICAL SUPPORT (OUTPATIENT)
Dept: CARDIOLOGY CLINIC | Facility: CLINIC | Age: 73
End: 2023-08-29
Payer: MEDICARE

## 2023-08-29 VITALS
HEIGHT: 65 IN | WEIGHT: 216.2 LBS | HEART RATE: 67 BPM | SYSTOLIC BLOOD PRESSURE: 136 MMHG | DIASTOLIC BLOOD PRESSURE: 82 MMHG | BODY MASS INDEX: 36.02 KG/M2

## 2023-08-29 DIAGNOSIS — I48.19 PERSISTENT ATRIAL FIBRILLATION (HCC): ICD-10-CM

## 2023-08-29 DIAGNOSIS — I48.19 PERSISTENT ATRIAL FIBRILLATION (HCC): Primary | ICD-10-CM

## 2023-08-29 DIAGNOSIS — I49.1 PAC (PREMATURE ATRIAL CONTRACTION): ICD-10-CM

## 2023-08-29 PROCEDURE — 93228 REMOTE 30 DAY ECG REV/REPORT: CPT | Performed by: INTERNAL MEDICINE

## 2023-08-29 PROCEDURE — 99213 OFFICE O/P EST LOW 20 MIN: CPT | Performed by: PHYSICIAN ASSISTANT

## 2023-08-29 PROCEDURE — 93000 ELECTROCARDIOGRAM COMPLETE: CPT | Performed by: PHYSICIAN ASSISTANT

## 2023-08-29 RX ORDER — DILTIAZEM HYDROCHLORIDE 120 MG/1
120 CAPSULE, COATED, EXTENDED RELEASE ORAL DAILY
Qty: 30 CAPSULE | Refills: 3 | Status: SHIPPED | OUTPATIENT
Start: 2023-08-29 | End: 2023-08-31

## 2023-08-29 NOTE — Clinical Note
Hi everyone,  I saw this patient in the office today, but needed to talk to Dr. Aung Ochoa before I gave final recommendations. We are starting Cardizem, but he would like her to have another 1 week non-live event monitor. Since she just had one on, she says she would be fine if it is mailed to her home. I placed the order, can someone please start this process? Otherwise, we would like to see her again in 3 months. Can we please put her on the recall list for 3 months? Thank you!  Rick Stringer

## 2023-08-29 NOTE — PROGRESS NOTES
Electrophysiology Follow Up    [address]   [facility] [address]  : 1950  MRN: [id number]    Date of Visit: 2023       Assessment/Plan     1. Persistent atrial fibrillation (HCC)  POCT ECG    diltiazem (CARDIZEM CD) 120 mg 24 hr capsule    AMB extended holter monitor      2. PAC (premature atrial contraction)  diltiazem (CARDIZEM CD) 120 mg 24 hr capsule    AMB extended holter monitor          ASSESSMENT:  1. Symptomatic persistent atrial fibrillation, status post prior RF ablation with pulmonary vein isolation, posterior wall isolation, and typical flutter line 2022   A.)  Initially diagnosed 2022, started on Eliquis anticoagulation and Toprol-XL at that time   B.)  Status post successful cardioversion 10/2022 with quick reversion back to afib   C.)  Repeat successful cardioversion 2022 with flecainide antiarrhythmic therapy added, again quickly reverted back to afib   D.)  TIA noted in the post ablation setting, all imaging negative   E.)  Continued on low dose flecainide and Toprol XL in the post ablations setting - discontinued 2023 due to #2   F.)  Maintained on Eliquis anticoagulation (CHADS2 Vasc at least 5 - age, HTN, TIA, sex)  2. Possible tachybrady syndrome, with reported heart rates in the 30s leading to discontinuation of flecainide and Toprol-XL  3. Preserved LV systolic function with LVEF 60% per echo 2022  4. Chronic lower extremity edema, maintained on Bumex for many years  5. Hypertension  6. Hyperlipidemia  7. Pulmonary hypertension with moderate tricuspid regurgitation  8. Reported history of mild CAD with RCA ectasia by remote catheterization  9. Hypothyroidism, maintained on Synthroid with history of hemithyroidectomy  10.   Obesity with BMI 36      DISCUSSION/SUMMARY:  I reviewed her recent 1 week event monitor, which confirmed normal sinus rhythm with somewhat frequent PACs.  She had 8% PAC burden, with 1.3% burden of atrial couplets. She also had 2.3% overall PVC burden. She did not have recurrent atrial fibrillation, no significant pauses or bradycardia noted. Her average heart rate was 63 bpm, with a minimum of 32 bpm.  His minimum heart rate was early in the morning when she was likely still sleeping, and she was asymptomatic at that time. I reassured her that she is not having atrial fibrillation based on her EKG and recent event monitor. Her home monitor is likely being fooled by her frequent PACs. I reviewed her situation at length with Dr. Dena Ibrahim to determine different treatment options. I informed the patient that she may eventually require pacemaker implantation given her possible tachybrady syndrome, however she does not want to immediately pursue this option if possible. Ultimately, we are recommending that she try diltiazem 120 mg daily to see if this will help suppress her ectopy. We will repeat a 1 week non-live event monitor once she is started on this medication to  evaluate its effectiveness and to see if she has any subsequent bradycardia. We can make further recommendations based on these results. She also reported a facial rash, and is unsure whether this can be caused by Eliquis. I offered changing her Eliquis to Xarelto anticoagulation, however she will hold off on this change until after she is started on Cardizem so that you do not make too many changes at once. She knows to contact us moving forward if she changes her mind. We will tentatively follow-up with her in 3 months for ongoing arrhythmia and symptom monitoring, but we will await the results of her repeat event monitor. She knows to contact us in the meantime with any questions, concerns, or changes in symptoms.       History of Present Illness     CHIEF COMPLAINT: Recurrent atrial fibrillation    HPI/INTERVAL HISTORY: Lanny Bianchi is a 75 y.o. female with symptomatic persistent atrial fibrillation, preserved LV systolic function with chronic lower extremity edema, hypertension, hyperlipidemia, pulmonary hypertension with moderate tricuspid regurgitation, reported history of mild CAD by remote catheterization, hypothyroidism with prior hemithyroidectomy maintained on Synthroid, and obesity with BMI 36. She typically follows with Dr. Dontae Martinez as an outpatient, and initially established with him 1/2022 after being found to be in atrial fibrillation by her PCP. At that time she was started on Eliquis anticoagulation along with metoprolol for rate control. Cardioversion was discussed at several office visit, and eventually pursue 10/2022. While it was successful, she quickly reverted back to atrial fibrillation. After a negative nuclear stress test, she was started on flecainide antiarrhythmic therapy and underwent repeat cardioversion 11/2022. Unfortunately, this only lasted several hours before she went back into atrial fibrillation. She was thus seen in EP consultation by Dr. Nikki Kaur 12/2022, at which time an ablation was recommended. Later that month she underwent RF ablation with pulmonary vein isolation, posterior wall isolation, and typical flutter line. In the immediate postop setting she reported nausea and visual changes, thus she underwent stat CT and eventual MRI which ruled out acute findings. She was seen by neurology, and diagnosed with a TIA. She also had some postop hypotension, and echocardiogram ruled out effusion. Her antihypertensives were reduced upon discharge, she was maintained on low-dose flecainide 50 mg twice daily. Earlier this month, she contacted our office reporting that she had been back in atrial fibrillation since June. She has also noticed bradycardia with rates into the 30s per home monitoring. As she was symptomatic with these heart rates, her flecainide and Toprol-XL were held with improvement.   Given these changes, she is being seen today for further evaluation. She reports that she began to have worsening symptoms, including dyspnea with exertion and occasional heart racing. Per Julissa James home monitoring she is found to have possible atrial fibrillation and at times had rates into the 30s. She was also reported random dizziness and lightheadedness, which could also occur when sitting. Fortunately, she denies associated presyncope or syncope. When she recently called the office to report her symptoms and likely bradycardia, her flecainide and metoprolol were discontinued. She feels much better off of these medications, and reports that she feels less "fuzzy."  Despite this improvement, she is still symptomatic overall and is seeking improvement. Review of Systems  ROS as noted above, otherwise 12 point review of systems was performed and is negative. Historical Information  - I have personally reviewed and updated this information, including social history, medical history, and family history.   Social History     Socioeconomic History   • Marital status: /Civil Union     Spouse name: Not on file   • Number of children: Not on file   • Years of education: Not on file   • Highest education level: Not on file   Occupational History   • Not on file   Tobacco Use   • Smoking status: Never     Passive exposure: Never   • Smokeless tobacco: Never   Vaping Use   • Vaping Use: Never used   Substance and Sexual Activity   • Alcohol use: Not Currently     Comment: seldom   • Drug use: Never   • Sexual activity: Not on file   Other Topics Concern   • Not on file   Social History Narrative    Feels safe at home     Social Determinants of Health     Financial Resource Strain: Low Risk  (8/29/2022)    Overall Financial Resource Strain (CARDIA)    • Difficulty of Paying Living Expenses: Not hard at all   Food Insecurity: Not on file   Transportation Needs: No Transportation Needs (8/29/2022)    [facility] [employer] • Lack of Transportation (Medical): No    • Lack of Transportation (Non-Medical): No   Physical Activity: Not on file   Stress: Not on file   Social Connections: Not on file   Intimate Partner Violence: Not on file   Housing Stability: Not on file     Past Medical History:   Diagnosis Date   • Anxiety     resolved 10/4/17   • Asthma     seasonal   • Atrial fibrillation ([address]) 01/2022    newly diagnosed on eliquis   • Chronic kidney disease     kidney stone   • Colon polyp    • Disease of thyroid gland    • Dysphagia    • GERD (gastroesophageal reflux disease)    • Goiter, nodular     partial thyroidectomy   • Hiatal hernia     repaired 2018   • Hiatal hernia with GERD without esophagitis 04/2018    surgical correction   • History of esophageal varices with bleeding    • History of pernicious anemia    • History of transfusion     x1 after bleeding ulcer   • Hyperlipidemia    • Hypertension    • Irritable bowel syndrome    • Neck mass     2 small areas left side by jawline and midneck US scheduled 0820/21   • RA (rheumatoid arthritis) ([address])    • Seasonal allergies    • Vertigo    • Wears glasses     for reading     Past Surgical History:   Procedure Laterality Date   • BREAST BIOPSY Bilateral     negative   • CARDIAC CATHETERIZATION      x2 secondary to exertional chest pain, due to lung issues, possible mild COPD   • CARDIAC ELECTROPHYSIOLOGY PROCEDURE N/A 12/23/2022    Procedure: Cardiac eps/afib ablation;  Surgeon: Raynold Sacks, MD;  [facility]: [facility]; Service: Cardiology   • CARDIAC ELECTROPHYSIOLOGY PROCEDURE N/A 12/23/2022    Procedure: Cardiac eps/aflutter ablation;  Surgeon: Raynold Sacks, MD;  [facility]: [facility]; Service: Cardiology   • CATARACT EXTRACTION Bilateral    • CHOLECYSTECTOMY  2015    lap - last assessed 10/4/17   • COLONOSCOPY      complete   • ESOPHAGOGASTRODUODENOSCOPY N/A 1/23/2018    Procedure: ESOPHAGOGASTRODUODENOSCOPY (EGD);   Surgeon: Addison Littlejohn MD; Location: [address] [place] [address] GI LAB; Service: Gastroenterology   • HYSTERECTOMY      partial - ovaries remain   • LIPOMA RESECTION      right thigh   • TX ESOPHAGOSCOPY FLEXIBLE TRANSORAL WITH BIOPSY N/A 4/11/2018    Procedure: ESOPHAGOGASTRODUODENOSCOPY (EGD); Surgeon: Jamal Paulino MD;  Location:  MAIN OR;  Service: Thoracic   • TX LAPS RPR PARAESPHGL HRNA INCL FUNDPLSTY [address] N/A 4/11/2018    Procedure: REPAIR HERNIA PARAESOPHAGEAL  LAPAROSCOPIC,ELEONORA GASTROPLASTY, Tod Ar FUNDOPLICATION;  Surgeon: Jamal Paulino MD;  Location: [facility] MAIN OR;  Service: Thoracic   • TX XCAPSL CTRC RMVL INSJ IO LENS PROSTH W/O ECP Right 8/23/2021    Procedure: EXTRACTION EXTRACAPSULAR CATARACT PHACO INTRAOCULAR LENS (IOL); Surgeon: Miriam Schneider MD;  Location: [facility] MAIN OR;  Service: Ophthalmology   • TX XCAPSL CTRC RMVL INSJ IO LENS PROSTH W/O ECP Left 11/15/2021    Procedure: EXTRACTION EXTRACAPSULAR CATARACT PHACO INTRAOCULAR LENS (IOL);   Surgeon: Miriam Schneider MD;  Location: [facility] MAIN OR;  Service: Ophthalmology   • SKIN BIOPSY Right     lump benign - last assessed 10/4/17   • THYROIDECTOMY, PARTIAL  1977   • TONSILLECTOMY     • US GUIDED THYROID BIOPSY  2/21/2023     Social History     Substance and Sexual Activity   Alcohol Use Not Currently    Comment: seldom     Social History     Substance and Sexual Activity   Drug Use Never     Social History     Tobacco Use   Smoking Status Never   • Passive exposure: Never   Smokeless Tobacco Never     Family History   Problem Relation Age of Onset   • Alzheimer's disease Mother    • Cancer Mother         skin    • Thyroid disease Mother    • Ulcerative colitis Mother    • Cancer Father         prostate   • Stroke Father    • Hypertension Father    • Prostate cancer Father    • Thyroid disease Sister    • Ulcerative colitis Sister    • Other Sister         open heart surgery   • Heart disease Sister    • Hyperlipidemia Brother    • No Known Problems Son    • No Known Problems Son • No Known Problems Maternal Grandmother    • No Known Problems Paternal Grandmother    • Mental illness Neg Hx        Meds/Allergies       Current Outpatient Medications:   •  atorvastatin (LIPITOR) 20 mg tablet, TAKE 1 TABLET BY MOUTH DAILY AT  BEDTIME, Disp: 90 tablet, Rfl: 1  •  bumetanide (BUMEX) 1 mg tablet, TAKE 1 TABLET BY MOUTH  DAILY, Disp: 90 tablet, Rfl: 3  •  Calcium Carb-Cholecalciferol 600-1000 MG-UNIT CAPS, Take by mouth every morning gummy , Disp: , Rfl:   •  diltiazem (CARDIZEM CD) 120 mg 24 hr capsule, Take 1 capsule (120 mg total) by mouth daily, Disp: 30 capsule, Rfl: 3  •  Eliquis 5 MG, TAKE 1 TABLET BY MOUTH  TWICE DAILY, Disp: 180 tablet, Rfl: 3  •  levothyroxine 50 mcg tablet, TAKE 1 TABLET BY MOUTH DAILY, Disp: 90 tablet, Rfl: 1  •  omeprazole (PriLOSEC) 40 MG capsule, TAKE 1 CAPSULE BY MOUTH  TWICE DAILY, Disp: 180 capsule, Rfl: 3  •  potassium chloride (Klor-Con) 10 mEq tablet, TAKE 1 TABLET BY MOUTH DAILY, Disp: 90 tablet, Rfl: 1    Allergies   Allergen Reactions   • Orange (Diagnostic) - Food Allergy Anaphylaxis     Throat closes   • Pantoprazole Headache   • Penicillins Other (See Comments)     During allergy testing instructed to NEVER take PCN   • Gluten Meal - Food Allergy      Following a gluten free diet on her own   • Lactose - Food Allergy Diarrhea   • Sulfa Antibiotics Rash       Objective   Vitals: Blood pressure 136/82, pulse 67, height 5' 5" (1.651 m), weight 98.1 kg (216 lb 3.2 oz), not currently breastfeeding.         Physical Exam  GEN: NAD, alert and oriented x 3, well appearing  SKIN: dry without significant lesions or rashes  HEENT: NCAT, PERRL, EOMs intact  NECK: No JVD appreciated  CARDIOVASCULAR: irregular with ectopy noted, normal S1, S2 without murmurs, rubs, or gallops appreciated  LUNGS: Clear to auscultation bilaterally without wheezes, rhonchi, or rales  ABDOMEN: Soft, nontender, nondistended  EXTREMITIES/VASCULAR: perfused without clubbing, cyanosis; left LE edema   PSYCH: Normal mood and affect  NEURO: CN ll-Xll grossly intact        Labs:  No visits with results within 2 Month(s) from this visit. Latest known visit with results is:   Appointment on 06/14/2023   Component Date Value   • TSH 3RD GENERATON 06/14/2023 0.928    • Free T4 06/14/2023 1.07    • Vit D, 25-Hydroxy 06/14/2023 32.4    • PTH 06/14/2023 56.2          Imaging: I have personally reviewed pertinent reports. ECHO: No results found for this or any previous visit. Results for orders placed during the hospital encounter of 01/12/22    Echo complete w/ contrast if indicated    Interpretation Summary  •  Left Ventricle: Left ventricular cavity size is normal. Systolic function is normal.  Estimated ejection fraction is 50-55%. Wall motion is normal. Wall thickness is mildly increased. Unable to assess diastolic function due to atrial fibrillation. •  Left Atrium: The atrium is severely dilated. •  Right Atrium: The atrium is mild to moderately dilated. •  Aortic Valve: There is trace regurgitation. •  Mitral Valve: There is mild regurgitation. •  Tricuspid Valve: There is moderate regurgitation. The right ventricular systolic pressure is mildly elevated at 46 mm Hg. •  Pulmonic Valve: There is mild regurgitation. •  Aorta: Proximal ascending aorta is mildly dilated with a diameter of 3.8 cm. (indexed to BSA= 1.8 cm/m2) Consider other imaging modalities, including CTA for more accurate estimation and to rule out other aortic pathologies. NUCLEAR STRESS TEST 4/2022: Interpretation Summary       •  Stress ECG: Arrhythmias during recovery: rare PVCs. The ECG was equivocal for ischemia. The ECG was not diagnostic due to pharmacological (vasodilator) stress. The stress ECG is equivocal for ischemia after pharmacologic vasodilation. •  Stress Function: Left ventricular function post-stress is normal. Post-stress ejection fraction is 59 %.   •  Perfusion: There are no perfusion defects. •  Stress ECG: A Miguel protocol stress test was performed. The patient reached stage 2.0 of the protocol after exercising for 4 min and 5 sec and had a maximal HR of 151 bpm (102 % of MPHR) and 7.0 METS. The patient experienced no angina during the test. The test was stopped because the patient experienced dyspnea. The patient achieved the target heart rate. The patient reported dyspnea during the stress test. Onset of symptoms occurred at stage 2 of the protocol. Symptoms ended during recovery. Blood pressure demonstrated a normal response and heart rate demonstrated a normal response to stress. The patient's heart rate recovery was normal.     Negative study for evidence of pharmacological induced ischemia or prior infarction. No major symptoms with vasodilation. Elevated baseline blood pressure of 150/100 noted.         EKG: Normal sinus rhythm, frequent PACs with compensatory pause, heart rate 67 bpm, rhythm strip showed single nonconducted PAC

## 2023-08-31 DIAGNOSIS — I48.19 PERSISTENT ATRIAL FIBRILLATION (HCC): ICD-10-CM

## 2023-08-31 DIAGNOSIS — I49.1 PAC (PREMATURE ATRIAL CONTRACTION): ICD-10-CM

## 2023-08-31 RX ORDER — DILTIAZEM HYDROCHLORIDE 120 MG/1
120 CAPSULE, EXTENDED RELEASE ORAL DAILY
Qty: 90 CAPSULE | Refills: 3 | Status: SHIPPED | OUTPATIENT
Start: 2023-08-31

## 2023-09-05 ENCOUNTER — TELEPHONE (OUTPATIENT)
Dept: CARDIOLOGY CLINIC | Facility: CLINIC | Age: 73
End: 2023-09-05

## 2023-09-05 NOTE — TELEPHONE ENCOUNTER
Pt called stating she needs to send her cardio mobile info to our office for Briseida Powers to review. Provided pt w/ my email address to send the report. Pt will call back tomorrow morning to make sure email was received and forwarded to Briseida Powers for review.

## 2023-09-06 ENCOUNTER — TELEPHONE (OUTPATIENT)
Dept: CARDIOLOGY CLINIC | Facility: CLINIC | Age: 73
End: 2023-09-06

## 2023-09-06 NOTE — TELEPHONE ENCOUNTER
Spoke to pt. And scheduled her w/ Dr. Ronak Dial on 12/6. To go over zio results- per Junior Schneider    ----- Message from Michelle Iqbal PA-C sent at 8/29/2023  2:46 PM EDT -----  Hi everyone,    I saw this patient in the office today, but needed to talk to Dr. Ronak Dial before I gave final recommendations. We are starting Cardizem, but he would like her to have another 1 week non-live event monitor. Since she just had one on, she says she would be fine if it is mailed to her home. I placed the order, can someone please start this process? Otherwise, we would like to see her again in 3 months. Can we please put her on the recall list for 3 months? Thank you!   Junior Schneider

## 2023-09-06 NOTE — TELEPHONE ENCOUNTER
Pt sent in 2 EKGs for review. She's asking if she needs to send this in weekly? Please review and advise.

## 2023-09-06 NOTE — TELEPHONE ENCOUNTER
Sent [employer] message to pt w/ email address to send report to. I have yet to receive it.

## 2023-09-07 NOTE — TELEPHONE ENCOUNTER
Spoke to pt. Notified of Miladys's message. Pt understood. She also mentioned she had an episode of afib and was sending report for review. Report attached.

## 2023-09-11 ENCOUNTER — TELEPHONE (OUTPATIENT)
Dept: CARDIOLOGY CLINIC | Facility: CLINIC | Age: 73
End: 2023-09-11

## 2023-09-11 DIAGNOSIS — I49.1 PAC (PREMATURE ATRIAL CONTRACTION): ICD-10-CM

## 2023-09-11 DIAGNOSIS — I48.19 PERSISTENT ATRIAL FIBRILLATION (HCC): Primary | ICD-10-CM

## 2023-09-11 NOTE — LETTER
DEVICE PROCEDURE INSTRUCTIONS    Juventino Rangel   : 1950  MRN: [id number]  [address]   [id number] [facility] [id number]    Procedure: CARDIAC DEVICE IMPLANT    Procedure Date: 10/24/2023    Location: [address] [place] [address]  Address: [address] [place] [address], [address] [place] [address]        Labs to be done AFTER 2023 BEFORE 10/17/2023 CMP /  CBC (FASTING 8 HOURS)    BLOOD THINNER INSTRUCTIONS (Coumadin / Warfarin / Pradaxa / Eliquis / Xarelto):   ELIQUIS; Continue taking as prescribed. Medication Hold:   BUMEX: DO NOT take this medication in the morning of the procedure. FLECAINIDE: Per Dr Neo Pérez changes to this medications will be instruct at the hospital the day of the procedure. CARDIZEM: Per Dr Neo Pérez changes to this medications will be instruct at the hospital the day of the procedure. Arrival Time: The Hospital will contact you the day prior to your procedure, usually between 4PM - 6PM to instruct you on the time and place to report. If you do not hear from a [facility]  by 6PM the evening prior to your procedure, please contact the campus you are scheduled at. • DO NOT drink or eat anything after midnight the night prior to your procedure. You may have a minimal amount of water with your AM medications. If your procedure is scheduled after 12:00 noon, you may have clear liquids until 8:00AM the morning of your procedure. (Clear liquids: 7UP, ginger ale, jello, or broth.)    • Arrange for a responsible adult to drive you to and from the hospital.    • You should continue to take your morning medications with a sip of water UNLESS ADVISED OTHERWISE.     •   DO NOT stop taking Plavix or Aspirin unless advised otherwise. •  If you are currently taking Fish Oil, Krill oil and/or Vitamin E please DO NOT TAKE FOR A WEEK PRIOR TO PROCEDURE. • If you are diabetic, DO NOT take any of your diabetic pills the morning of your procedure.  Oral diabetic medications may include Glucophage, Prandin, Glyburide, Micronase, Avandia, Glucovance, Precose, Glynase, and Starlix. • Bring a list of daily medications, vitamins, minerals, herbals and nutritional supplements you take. Please include dosages and the times you take them each day. • If you are packing an overnight bag, pack minimal clothing, you will be given hospital sleepwear. DO NOT bring money, valuables or jewelry. The wedding band is ok. • If you use CPAP machine, bring it to the hospital.     • Bring your Photo ID and Insurance cards with you. • Please notify us if you have been admitted to the hospital within 30 days. Pre-Operative Showering Instructions. ONLY FOR DEVICE PROCEDURE. • The two nights prior to your procedure, take a shower each night using the special antiseptic body scrub (Chlorhexidine Scrub Brush) you received from the office or hospital. This scrub will replace your soap at home, the sponge is pre-filled with soap. • The scrub brushes are single use only and should be discarded after use. • Shampoo your hair with regular shampoo and rinse completely before using the antiseptic scrub brush. • Using the sponge side of the scrub brush to wash your entire body from your neck down, with special attention to your armpits, chest and groin area. DO NOT USE the scrub on your face and avoid any open wounds. Do not use any other soap or wash your skin. • DO NOT USE any lotion, powder, deodorant or perfume of any kind on your skin after you shower. • AFTER THE FIRST NIGHT of using the scrub, change your bed linen sheets to a fresh clean set and clean pajamas for bedtime. • AFTER THE SECOND NIGHT of using the scrub, use clean pajamas for bedtime. • DO NOT SHOWER THE MORNING OF SURGERY, you will be prepped and cleansed at the hospital prior to your procedure. PLEASE  THE SPONGES AT YOUR MOST CONVENIENT [facility] CARDIOLOGY OFFICE.       FAILURE TO FOLLOW ANY OF THESE INSTRUCTIONS COULD RESULT IN THE CANCELLATION OF YOUR PROCEDURE      • Please call  [phone] [facility]) or [phone] [facility] if you do not hear from the  by 6:00PM the night before your procedure. • All patients enter through [address] is available free of charge or park on [address]         Thank you,   Edwin Jacques  Surgery Coordinator  [facility]   [facility]  Enedelia DIXON  Ph: [phone]

## 2023-09-13 ENCOUNTER — PREP FOR PROCEDURE (OUTPATIENT)
Dept: CARDIOLOGY CLINIC | Facility: CLINIC | Age: 73
End: 2023-09-13

## 2023-09-13 DIAGNOSIS — I48.19 PERSISTENT ATRIAL FIBRILLATION (HCC): Primary | ICD-10-CM

## 2023-09-13 NOTE — TELEPHONE ENCOUNTER
I spoke tomorrow in detail. She has been having palpitations but heart rate has been showing low on the monitor. She was on flecainide in the past but had heart rate in 30s. Explained that she would benefit from highte dose or flecainide again but slow heart rate prevents it. Pacemaker would help with slow heart rate and allow treatment for palpitations. She is agreeable to pacemaker and went over the procedure in detail. We can start flecainide and stop diltiazem afterwards.      Tracy/Evelyn/Subha,    Please schedule this patient for dual chamber PPM.     Routine labs    Hold medication: None    System/Device company: [employer]    Thank you

## 2023-09-13 NOTE — TELEPHONE ENCOUNTER
Patient scheduled for Dual chamber pacer implant at [facility] on 10/24/2023  with Dr Lelia Nunez. Patient aware of general instructions, mail out with labs order. Meds holds: Bumex to hold oin the morning of the procedure. Can we please check insurance for service. Dr Neo Pérez, patient said you stop her medication Flecainide and start her on Diltiazem. Reading your note, you mentioned: "  We can start flecainide and stop diltiazem afterwards. When she needs to start Flecainide, when she will need to stop the Diltiazem? Thank you.

## 2023-10-06 ENCOUNTER — APPOINTMENT (OUTPATIENT)
Dept: LAB | Facility: HOSPITAL | Age: 73
End: 2023-10-06
Payer: MEDICARE

## 2023-10-06 DIAGNOSIS — E03.9 ACQUIRED HYPOTHYROIDISM: ICD-10-CM

## 2023-10-06 LAB
ALBUMIN SERPL BCP-MCNC: 3.9 G/DL (ref 3.5–5)
ALP SERPL-CCNC: 56 U/L (ref 34–104)
ALT SERPL W P-5'-P-CCNC: 17 U/L (ref 7–52)
ANION GAP SERPL CALCULATED.3IONS-SCNC: 9 MMOL/L
AST SERPL W P-5'-P-CCNC: 21 U/L (ref 13–39)
BASOPHILS # BLD AUTO: 0.03 THOUSANDS/ÂΜL (ref 0–0.1)
BASOPHILS NFR BLD AUTO: 1 % (ref 0–1)
BILIRUB SERPL-MCNC: 0.86 MG/DL (ref 0.2–1)
BUN SERPL-MCNC: 10 MG/DL (ref 5–25)
CALCIUM SERPL-MCNC: 9.1 MG/DL (ref 8.4–10.2)
CHLORIDE SERPL-SCNC: 105 MMOL/L (ref 96–108)
CO2 SERPL-SCNC: 28 MMOL/L (ref 21–32)
CREAT SERPL-MCNC: 0.63 MG/DL (ref 0.6–1.3)
EOSINOPHIL # BLD AUTO: 0.06 THOUSAND/ÂΜL (ref 0–0.61)
EOSINOPHIL NFR BLD AUTO: 1 % (ref 0–6)
ERYTHROCYTE [DISTWIDTH] IN BLOOD BY AUTOMATED COUNT: 12.8 % (ref 11.6–15.1)
GFR SERPL CREATININE-BSD FRML MDRD: 88 ML/MIN/1.73SQ M
GLUCOSE P FAST SERPL-MCNC: 100 MG/DL (ref 65–99)
HCT VFR BLD AUTO: 43.1 % (ref 34.8–46.1)
HGB BLD-MCNC: 14.3 G/DL (ref 11.5–15.4)
IMM GRANULOCYTES # BLD AUTO: 0.01 THOUSAND/UL (ref 0–0.2)
IMM GRANULOCYTES NFR BLD AUTO: 0 % (ref 0–2)
INR PPP: 1.12 (ref 0.84–1.19)
LYMPHOCYTES # BLD AUTO: 1.13 THOUSANDS/ÂΜL (ref 0.6–4.47)
LYMPHOCYTES NFR BLD AUTO: 26 % (ref 14–44)
MCH RBC QN AUTO: 29.7 PG (ref 26.8–34.3)
MCHC RBC AUTO-ENTMCNC: 33.2 G/DL (ref 31.4–37.4)
MCV RBC AUTO: 90 FL (ref 82–98)
MONOCYTES # BLD AUTO: 0.34 THOUSAND/ÂΜL (ref 0.17–1.22)
MONOCYTES NFR BLD AUTO: 8 % (ref 4–12)
NEUTROPHILS # BLD AUTO: 2.74 THOUSANDS/ÂΜL (ref 1.85–7.62)
NEUTS SEG NFR BLD AUTO: 64 % (ref 43–75)
NRBC BLD AUTO-RTO: 0 /100 WBCS
PLATELET # BLD AUTO: 194 THOUSANDS/UL (ref 149–390)
PMV BLD AUTO: 10.7 FL (ref 8.9–12.7)
POTASSIUM SERPL-SCNC: 3.2 MMOL/L (ref 3.5–5.3)
PROT SERPL-MCNC: 6.4 G/DL (ref 6.4–8.4)
PROTHROMBIN TIME: 14.6 SECONDS (ref 11.6–14.5)
RBC # BLD AUTO: 4.81 MILLION/UL (ref 3.81–5.12)
SODIUM SERPL-SCNC: 142 MMOL/L (ref 135–147)
T4 FREE SERPL-MCNC: 1.11 NG/DL (ref 0.61–1.12)
TSH SERPL DL<=0.05 MIU/L-ACNC: 0.78 UIU/ML (ref 0.45–4.5)
WBC # BLD AUTO: 4.31 THOUSAND/UL (ref 4.31–10.16)

## 2023-10-06 PROCEDURE — 84443 ASSAY THYROID STIM HORMONE: CPT

## 2023-10-06 PROCEDURE — 84439 ASSAY OF FREE THYROXINE: CPT

## 2023-10-09 ENCOUNTER — TELEPHONE (OUTPATIENT)
Dept: CARDIOLOGY CLINIC | Facility: CLINIC | Age: 73
End: 2023-10-09

## 2023-10-09 NOTE — TELEPHONE ENCOUNTER
I l/m for patient to call office in reference to increasing her K from 10mEq to 20mEq and needing a new Rx for this. I will wait for a return call.

## 2023-10-09 NOTE — TELEPHONE ENCOUNTER
----- Message from Carla Calle MD sent at 10/7/2023  1:02 PM EDT -----  Please call the patient about lab results. Potassium is slightly low. Please ask her to double the dose of potassium daily. Thank you.

## 2023-10-10 ENCOUNTER — TELEPHONE (OUTPATIENT)
Dept: CARDIOLOGY CLINIC | Facility: CLINIC | Age: 73
End: 2023-10-10

## 2023-10-10 NOTE — TELEPHONE ENCOUNTER
----- Message from Marce Thompson MD sent at 10/7/2023  1:02 PM EDT -----  Please call the patient about lab results. Potassium is slightly low. Please ask her to double the dose of potassium daily. Thank you.

## 2023-10-10 NOTE — TELEPHONE ENCOUNTER
I spoke with patient, she is agreeable to increasing K to 20mEq daily which she will start today. She will call office when she requires a refill.

## 2023-10-12 DIAGNOSIS — E03.0 CONGENITAL HYPOTHYROIDISM WITH DIFFUSE GOITER: ICD-10-CM

## 2023-10-12 DIAGNOSIS — I48.91 NEW ONSET ATRIAL FIBRILLATION (HCC): ICD-10-CM

## 2023-10-12 DIAGNOSIS — I10 ESSENTIAL HYPERTENSION: ICD-10-CM

## 2023-10-12 DIAGNOSIS — E78.2 MIXED HYPERLIPIDEMIA: ICD-10-CM

## 2023-10-12 RX ORDER — POTASSIUM CHLORIDE 750 MG/1
TABLET, FILM COATED, EXTENDED RELEASE ORAL
Qty: 90 TABLET | Refills: 3 | Status: SHIPPED | OUTPATIENT
Start: 2023-10-12

## 2023-10-12 RX ORDER — ATORVASTATIN CALCIUM 20 MG/1
TABLET, FILM COATED ORAL
Qty: 90 TABLET | Refills: 1 | Status: SHIPPED | OUTPATIENT
Start: 2023-10-12

## 2023-10-12 RX ORDER — LEVOTHYROXINE SODIUM 0.05 MG/1
TABLET ORAL
Qty: 90 TABLET | Refills: 1 | Status: SHIPPED | OUTPATIENT
Start: 2023-10-12

## 2023-10-12 RX ORDER — APIXABAN 5 MG/1
TABLET, FILM COATED ORAL
Qty: 180 TABLET | Refills: 3 | Status: SHIPPED | OUTPATIENT
Start: 2023-10-12

## 2023-10-23 ENCOUNTER — ANESTHESIA EVENT (OUTPATIENT)
Dept: NON INVASIVE DIAGNOSTICS | Facility: HOSPITAL | Age: 73
End: 2023-10-23
Payer: MEDICARE

## 2023-10-23 NOTE — ANESTHESIA PREPROCEDURE EVALUATION
Procedure:  Cardiac pacer implant DC PM (Chest)    Relevant Problems   ANESTHESIA (within normal limits)   (-) History of anesthesia complications      CARDIO   (+) Benign hypertension   (+) Hypercholesterolemia   (+) Mixed hyperlipidemia   (+) Persistent atrial fibrillation (HCC)   (+) Pulmonary hypertension (HCC)      ENDO   (+) Congenital hypothyroidism with diffuse goiter      GI/HEPATIC   (+) Hiatal hernia   (+) Liver lesion, left lobe      /RENAL (within normal limits)      GYN   (+) History of hysterectomy      MUSCULOSKELETAL   (+) Hiatal hernia   (+) Rheumatoid arthritis (HCC)      NEURO/PSYCH   (+) Anxiety   (+) Vertigo      PULMONARY   (+) Asthma      Digestive   (+) GERD with esophagitis      Other   (+) BMI 35.0-35.9,adult   (+) History of TIA (transient ischemic attack)        Physical Exam    Airway    Mallampati score: II  TM Distance: >3 FB  Neck ROM: full     Dental   No notable dental hx     Cardiovascular  Rhythm: irregular, Rate: normal    Pulmonary  Pulmonary exam normal Breath sounds clear to auscultation    Other Findings        Anesthesia Plan  ASA Score- 3     Anesthesia Type- IV sedation with anesthesia with ASA Monitors. Additional Monitors:     Airway Plan:            Plan Factors-Exercise tolerance (METS): >4 METS. Chart reviewed. EKG reviewed. Imaging results reviewed. Patient is not a current smoker. Patient did not smoke on day of surgery. Induction- intravenous. Postoperative Plan-     Informed Consent- Anesthetic plan and risks discussed with patient and spouse. I personally reviewed this patient with the CRNA. Discussed and agreed on the Anesthesia Plan with the CRNA. .            NPO and allergies verified. Patient took Eliquis this morning, 10/24/23.     Plan:  IV sedation/MAC, GA as backup    Benefits and risks of sedation were discussed with the patient including possibility of recall under sedation and the potential for conversion to general anesthesia if necessary. All questions were answered. Anesthesia consent was obtained from the patient.

## 2023-10-24 ENCOUNTER — APPOINTMENT (OUTPATIENT)
Dept: RADIOLOGY | Facility: HOSPITAL | Age: 73
End: 2023-10-24
Payer: MEDICARE

## 2023-10-24 ENCOUNTER — HOSPITAL ENCOUNTER (OUTPATIENT)
Facility: HOSPITAL | Age: 73
Setting detail: OUTPATIENT SURGERY
Discharge: HOME | End: 2023-10-24
Attending: INTERNAL MEDICINE | Admitting: INTERNAL MEDICINE
Payer: MEDICARE

## 2023-10-24 ENCOUNTER — ANESTHESIA (OUTPATIENT)
Dept: NON INVASIVE DIAGNOSTICS | Facility: HOSPITAL | Age: 73
End: 2023-10-24
Payer: MEDICARE

## 2023-10-24 VITALS
HEART RATE: 60 BPM | SYSTOLIC BLOOD PRESSURE: 134 MMHG | OXYGEN SATURATION: 98 % | RESPIRATION RATE: 18 BRPM | DIASTOLIC BLOOD PRESSURE: 72 MMHG

## 2023-10-24 DIAGNOSIS — G89.18 POST-OP PAIN: ICD-10-CM

## 2023-10-24 DIAGNOSIS — I49.5 TACHY-BRADY SYNDROME (HCC): Primary | ICD-10-CM

## 2023-10-24 DIAGNOSIS — I48.19 PERSISTENT ATRIAL FIBRILLATION (HCC): ICD-10-CM

## 2023-10-24 LAB
ANION GAP SERPL CALCULATED.3IONS-SCNC: 8 MMOL/L
ATRIAL RATE: 61 BPM
ATRIAL RATE: 70 BPM
BUN SERPL-MCNC: 10 MG/DL (ref 5–25)
CALCIUM SERPL-MCNC: 9.1 MG/DL (ref 8.4–10.2)
CHLORIDE SERPL-SCNC: 105 MMOL/L (ref 96–108)
CO2 SERPL-SCNC: 28 MMOL/L (ref 21–32)
CREAT SERPL-MCNC: 0.74 MG/DL (ref 0.6–1.3)
ERYTHROCYTE [DISTWIDTH] IN BLOOD BY AUTOMATED COUNT: 13.2 % (ref 11.6–15.1)
GFR SERPL CREATININE-BSD FRML MDRD: 80 ML/MIN/1.73SQ M
GLUCOSE P FAST SERPL-MCNC: 102 MG/DL (ref 65–99)
GLUCOSE SERPL-MCNC: 102 MG/DL (ref 65–140)
HCT VFR BLD AUTO: 43.5 % (ref 34.8–46.1)
HGB BLD-MCNC: 14.2 G/DL (ref 11.5–15.4)
INR PPP: 1.32 (ref 0.84–1.19)
MCH RBC QN AUTO: 29.3 PG (ref 26.8–34.3)
MCHC RBC AUTO-ENTMCNC: 32.6 G/DL (ref 31.4–37.4)
MCV RBC AUTO: 90 FL (ref 82–98)
P AXIS: 64 DEGREES
P AXIS: 67 DEGREES
PLATELET # BLD AUTO: 207 THOUSANDS/UL (ref 149–390)
PMV BLD AUTO: 10.6 FL (ref 8.9–12.7)
POTASSIUM SERPL-SCNC: 3.3 MMOL/L (ref 3.5–5.3)
PR INTERVAL: 196 MS
PR INTERVAL: 200 MS
PROTHROMBIN TIME: 16.3 SECONDS (ref 11.6–14.5)
QRS AXIS: -16 DEGREES
QRS AXIS: -6 DEGREES
QRSD INTERVAL: 100 MS
QRSD INTERVAL: 96 MS
QT INTERVAL: 440 MS
QT INTERVAL: 446 MS
QTC INTERVAL: 448 MS
QTC INTERVAL: 475 MS
RBC # BLD AUTO: 4.85 MILLION/UL (ref 3.81–5.12)
SODIUM SERPL-SCNC: 141 MMOL/L (ref 135–147)
T WAVE AXIS: 16 DEGREES
T WAVE AXIS: 3 DEGREES
VENTRICULAR RATE: 61 BPM
VENTRICULAR RATE: 70 BPM
WBC # BLD AUTO: 4.81 THOUSAND/UL (ref 4.31–10.16)

## 2023-10-24 PROCEDURE — 93005 ELECTROCARDIOGRAM TRACING: CPT

## 2023-10-24 PROCEDURE — 71045 X-RAY EXAM CHEST 1 VIEW: CPT

## 2023-10-24 PROCEDURE — 33208 INSRT HEART PM ATRIAL & VENT: CPT | Performed by: INTERNAL MEDICINE

## 2023-10-24 PROCEDURE — 85027 COMPLETE CBC AUTOMATED: CPT | Performed by: PHYSICIAN ASSISTANT

## 2023-10-24 PROCEDURE — 80048 BASIC METABOLIC PNL TOTAL CA: CPT | Performed by: PHYSICIAN ASSISTANT

## 2023-10-24 PROCEDURE — C1898 LEAD, PMKR, OTHER THAN TRANS: HCPCS | Performed by: INTERNAL MEDICINE

## 2023-10-24 PROCEDURE — C1785 PMKR, DUAL, RATE-RESP: HCPCS | Performed by: INTERNAL MEDICINE

## 2023-10-24 PROCEDURE — C1769 GUIDE WIRE: HCPCS | Performed by: INTERNAL MEDICINE

## 2023-10-24 PROCEDURE — 76937 US GUIDE VASCULAR ACCESS: CPT | Performed by: INTERNAL MEDICINE

## 2023-10-24 PROCEDURE — NC001 PR NO CHARGE: Performed by: PHYSICIAN ASSISTANT

## 2023-10-24 PROCEDURE — 93010 ELECTROCARDIOGRAM REPORT: CPT | Performed by: INTERNAL MEDICINE

## 2023-10-24 PROCEDURE — C1894 INTRO/SHEATH, NON-LASER: HCPCS | Performed by: INTERNAL MEDICINE

## 2023-10-24 PROCEDURE — C1892 INTRO/SHEATH,FIXED,PEEL-AWAY: HCPCS | Performed by: INTERNAL MEDICINE

## 2023-10-24 PROCEDURE — 85610 PROTHROMBIN TIME: CPT | Performed by: PHYSICIAN ASSISTANT

## 2023-10-24 DEVICE — IPG W1DR01 AZURE XT DR MRI USA
Type: IMPLANTABLE DEVICE | Site: CHEST  WALL | Status: FUNCTIONAL
Brand: AZURE™ XT DR MRI SURESCAN™

## 2023-10-24 DEVICE — LEAD 3830 US MKT/ 69CM MRI LBBAP
Type: IMPLANTABLE DEVICE | Site: HEART | Status: FUNCTIONAL
Brand: SELECTSECURE™ MRI SURESCAN™

## 2023-10-24 DEVICE — LEAD 457453 MRI US BI RCMCRD MVC
Type: IMPLANTABLE DEVICE | Site: HEART | Status: FUNCTIONAL
Brand: CAPSURE SENSE MRI™ SURESCAN™

## 2023-10-24 DEVICE — ENVELOPE CMRM6122 ABSORB MED MR
Type: IMPLANTABLE DEVICE | Site: CHEST  WALL | Status: FUNCTIONAL
Brand: TYRX™

## 2023-10-24 RX ORDER — SODIUM CHLORIDE 9 MG/ML
INJECTION, SOLUTION INTRAVENOUS CONTINUOUS PRN
Status: DISCONTINUED | OUTPATIENT
Start: 2023-10-24 | End: 2023-10-24

## 2023-10-24 RX ORDER — LIDOCAINE HYDROCHLORIDE 10 MG/ML
INJECTION, SOLUTION EPIDURAL; INFILTRATION; INTRACAUDAL; PERINEURAL CODE/TRAUMA/SEDATION MEDICATION
Status: DISCONTINUED | OUTPATIENT
Start: 2023-10-24 | End: 2023-10-24 | Stop reason: HOSPADM

## 2023-10-24 RX ORDER — PROPOFOL 10 MG/ML
INJECTION, EMULSION INTRAVENOUS CONTINUOUS PRN
Status: DISCONTINUED | OUTPATIENT
Start: 2023-10-24 | End: 2023-10-24

## 2023-10-24 RX ORDER — LIDOCAINE HYDROCHLORIDE 10 MG/ML
INJECTION, SOLUTION EPIDURAL; INFILTRATION; INTRACAUDAL; PERINEURAL AS NEEDED
Status: DISCONTINUED | OUTPATIENT
Start: 2023-10-24 | End: 2023-10-24

## 2023-10-24 RX ORDER — OXYCODONE HYDROCHLORIDE 5 MG/1
2.5 TABLET ORAL EVERY 6 HOURS PRN
Status: DISCONTINUED | OUTPATIENT
Start: 2023-10-24 | End: 2023-10-24 | Stop reason: HOSPADM

## 2023-10-24 RX ORDER — VANCOMYCIN HYDROCHLORIDE 1 G/200ML
1000 INJECTION, SOLUTION INTRAVENOUS ONCE
Status: COMPLETED | OUTPATIENT
Start: 2023-10-24 | End: 2023-10-24

## 2023-10-24 RX ORDER — FLECAINIDE ACETATE 100 MG/1
100 TABLET ORAL 2 TIMES DAILY
Qty: 60 TABLET | Refills: 3 | Status: SHIPPED | OUTPATIENT
Start: 2023-10-24

## 2023-10-24 RX ORDER — CEFAZOLIN SODIUM 2 G/50ML
2000 SOLUTION INTRAVENOUS ONCE
Status: DISCONTINUED | OUTPATIENT
Start: 2023-10-24 | End: 2023-10-24

## 2023-10-24 RX ORDER — SODIUM CHLORIDE 9 MG/ML
20 INJECTION, SOLUTION INTRAVENOUS CONTINUOUS
Status: DISCONTINUED | OUTPATIENT
Start: 2023-10-24 | End: 2023-10-24 | Stop reason: HOSPADM

## 2023-10-24 RX ORDER — PHENYLEPHRINE HCL IN 0.9% NACL 1 MG/10 ML
SYRINGE (ML) INTRAVENOUS AS NEEDED
Status: DISCONTINUED | OUTPATIENT
Start: 2023-10-24 | End: 2023-10-24

## 2023-10-24 RX ORDER — ACETAMINOPHEN 325 MG/1
650 TABLET ORAL EVERY 4 HOURS PRN
Status: DISCONTINUED | OUTPATIENT
Start: 2023-10-24 | End: 2023-10-24 | Stop reason: HOSPADM

## 2023-10-24 RX ORDER — GENTAMICIN SULFATE 40 MG/ML
INJECTION, SOLUTION INTRAMUSCULAR; INTRAVENOUS CODE/TRAUMA/SEDATION MEDICATION
Status: DISCONTINUED | OUTPATIENT
Start: 2023-10-24 | End: 2023-10-24 | Stop reason: HOSPADM

## 2023-10-24 RX ORDER — FENTANYL CITRATE 50 UG/ML
INJECTION, SOLUTION INTRAMUSCULAR; INTRAVENOUS AS NEEDED
Status: DISCONTINUED | OUTPATIENT
Start: 2023-10-24 | End: 2023-10-24

## 2023-10-24 RX ORDER — PROPOFOL 10 MG/ML
INJECTION, EMULSION INTRAVENOUS AS NEEDED
Status: DISCONTINUED | OUTPATIENT
Start: 2023-10-24 | End: 2023-10-24

## 2023-10-24 RX ORDER — TRAMADOL HYDROCHLORIDE 50 MG/1
50 TABLET ORAL EVERY 6 HOURS PRN
Qty: 15 TABLET | Refills: 0 | Status: SHIPPED | OUTPATIENT
Start: 2023-10-24 | End: 2023-11-03

## 2023-10-24 RX ADMIN — PROPOFOL 10 MG: 10 INJECTION, EMULSION INTRAVENOUS at 08:41

## 2023-10-24 RX ADMIN — FENTANYL CITRATE 25 MCG: 50 INJECTION INTRAMUSCULAR; INTRAVENOUS at 08:07

## 2023-10-24 RX ADMIN — ACETAMINOPHEN 650 MG: 325 TABLET, FILM COATED ORAL at 09:44

## 2023-10-24 RX ADMIN — PROPOFOL 80 MCG/KG/MIN: 10 INJECTION, EMULSION INTRAVENOUS at 08:07

## 2023-10-24 RX ADMIN — OXYCODONE HYDROCHLORIDE 2.5 MG: 5 TABLET ORAL at 10:25

## 2023-10-24 RX ADMIN — Medication 100 MCG: at 09:11

## 2023-10-24 RX ADMIN — PROPOFOL 20 MG: 10 INJECTION, EMULSION INTRAVENOUS at 08:07

## 2023-10-24 RX ADMIN — FENTANYL CITRATE 25 MCG: 50 INJECTION INTRAMUSCULAR; INTRAVENOUS at 08:47

## 2023-10-24 RX ADMIN — LIDOCAINE HYDROCHLORIDE 50 MG: 10 INJECTION, SOLUTION EPIDURAL; INFILTRATION; INTRACAUDAL; PERINEURAL at 08:07

## 2023-10-24 RX ADMIN — VANCOMYCIN HYDROCHLORIDE 1000 MG: 1 INJECTION, SOLUTION INTRAVENOUS at 08:08

## 2023-10-24 RX ADMIN — SODIUM CHLORIDE: 0.9 INJECTION, SOLUTION INTRAVENOUS at 07:56

## 2023-10-24 NOTE — ANESTHESIA POSTPROCEDURE EVALUATION
Post-Op Assessment Note    CV Status:  Stable  Pain Score: 0    Pain management: adequate     Mental Status:  Alert   Hydration Status:  Euvolemic   PONV Controlled:  None   Airway Patency:  Patent      Post Op Vitals Reviewed: Yes      Staff: CRNA         There were no known notable events for this encounter.     BP   112/74   Temp      Pulse  74   Resp   16   SpO2   97

## 2023-10-24 NOTE — DISCHARGE INSTR - AVS FIRST PAGE
PLEASE NOTE THE FOLLOWING MEDICATION RECOMMENDATIONS:  - discontinue Cardizem  - start flecainide 100 mg twice daily       Please refer to post pacemaker implantation discharge instructions and restrictions and your pacemaker booklet/temporary card. Keep incision dry for one week. Do not use lotions/powders/creams on incision. Leave outer bandage in place for 1 week - it is water proof, and as long as it is fully adhered to your skin you may shower with it. If it appears as though the bandage is coming off and/or there is any communication to the area of device incision, please then keep the whole area dry for the remaining week. After 1 week, please remove by pulling all edges away from the center of the bandage. No overhead reaching/pushing/pulling/lifting greater than 5-10lbs with left arm for six weeks. Please call the office if you notice redness, swelling, bleeding, or drainage from incision or if you develop fevers. AFTER PACEMAKER CARE:    If you have any questions, please call [phone] to speak with a nurse (8:30am-4pm, or [phone] after hours). For appointments, please call [phone]. WHAT YOU SHOULD KNOW:   A pacemaker is a small, battery-powered device that is placed under your skin in your upper chest area with wires placed through a vein that lead directly into the heart. It helps regulate your heart rate and prevent your heart from beating too slowly. AFTER YOU LEAVE:     Medicines:     Pain medicine: You may need medicine to take away or decrease pain. Learn how to take your medicine. Ask what medicine and how much you should take. Be sure you know how, when, and how often to take it. Usually Over the counter pain medicine is sufficient to control pain (Acetominophen or Ibuprofen) Ask your doctor if you may take these. If this does not control your pain, narcotic pain killers may be prescribed, please call if you need prescription.      Do not wait until the pain is severe before you take your medicine. Tell caregivers if your pain does not decrease. Pain medicine can make you dizzy or sleepy. Prevent falls by calling someone when you get out of bed or if you need help. Take your medicine as directed. Call your healthcare provider if you think your medicine is not helping or if you have side effects. Tell him if you are allergic to any medicine. Follow up with your cardiologist after your procedure: You will need a follow-up visit approximately 2 weeks after you leave the hospital. Your cardiologist will check your wound and make sure that your pacemaker is working correctly. Follow the instructions to check your pacemaker: Your cardiologist or primary healthcare provider will check your pacemaker and the battery regularly. He will use a computer to check your pacemaker over the telephone or wireless device which will be given to you. Pacemaker batteries usually last 8 to 10 years. The pacemaker unit will be replaced when the battery gets low. This is a simpler procedure than the original one to implant your pacemaker. Wound care:  Keep your incision dry for one week. Do not use lotions/powders/creams on incision. Leave outer bandage in place for 1 week - it is water proof, and as long as it is fully adhered to your skin you may shower with it. If it appears as though the bandage is coming off and/or there is any communication to the area of device incision, please then keep the whole area dry for the remaining week. After 1 week, please remove by pulling all edges away from the center of the bandage. Please call the office if you notice redness, swelling, bleeding, or drainage from incision or if you develop fevers. Activity:   Arm movement and lifting:  Be careful using the arm on the side of your pacemaker. Do not move your arm for the first 24 hours after your procedure.  Do not  lift your arm above your shoulder or lift more than 10 pounds for one month after your procedure. Avoid pushing, pulling, or repetitive arm movements for one month. This helps the leads stay in place and helps your wound heal. Ask your caregiver when you can drive after your procedure. You may move your arm side to side without lifting above your shoulder, and do not need to wear a sling at home. Driving: you are ok to drive 48 hours after pacemaker is implanted   Sports:  Ask your caregiver when it is okay to play tennis, golf, basketball, or any sport that requires you to lift your arms. Do not play full contact sports, such as football, that could damage your pacemaker. Ask your cardiologist or primary healthcare provider how much and what kinds of physical activity are safe for you. Living with a pacemaker:   Tell all caregivers you have a pacemaker: This includes surgeons, radiologists, and medical technicians. You may want to wear a medical alert ID bracelet or necklace that states that you have a pacemaker. Carry your pacemaker ID card: Make sure you receive a pacemaker ID card. Carry it with you at all times. It lists important information about your pacemaker. Show it to airport security if you travel. Avoid electrical interference:  Avoid welding equipment and other equipment with large magnets or electric fields. These things could interfere with how your pacemaker works. Use your cell phone on the ear opposite from your pacemaker. Do not carry your cell phone in your shirt pocket over your chest.     Some Pacemakers are MRI safe. Ask you doctor if it is safe to proceed with MRI and let the radiologist and staff know you have a pacemaker. Do not touch the skin around your pacemaker: This can cause damage to the lead wires or move the pacemaker unit from where it should be.     Contact your cardiologist or primary healthcare provider if:   The area around your pacemaker has increasing amount of pain after surgery. The pain should improve over first few days after implantation. The skin around your stitches has increasing redness, swelling, or has drainage. This may mean that you have an infection. You have a fever. You have chills, a cough, and feel weak or achy. These are also signs of infection. Your feet or ankles are more swollen than your baseline. Your Heart rate is less than 50 beats per minute     Seek care immediately if:   Your bandage becomes soaked with blood. Your pacemaker is swelling rapidly    Your stitches open up. You feel your heart suddenly beating very slowly or quickly. You become too weak or dizzy to stand, or you pass out. Your arm or leg feels warm, tender, and painful. It may look swollen and red. You have chest pain that does not go away with rest or medicine. You feel lightheaded, short of breath, and have chest pain. You cough up blood. © 2014 [facility] is for End User's use only and may not be sold, redistributed or otherwise used for commercial purposes. All illustrations and images included in CareNotes® are the copyrighted property of [employer] or Luis Hernandez. The above information is an  only. It is not intended as medical advice for individual conditions or treatments. Talk to your doctor, nurse or pharmacist before following any medical regimen to see if it is safe and effective for you.

## 2023-10-24 NOTE — Clinical Note
Patient is here today for vulvar recheck. Her last menses started 4/13/22 and she reports her cycles to be irregular. Current form of birth control none and is satisfied. Latex:  Patient denies allergy to latex. Medications reviewed with patient. Tobacco use verified. Allergies verified. Patient would like communication of their results via:     Cell Phone:   Telephone Information:   Mobile [phone] to leave a message containing results? Yes     Patient's current [facility] status: Active.      Chaperone needed:  no Positioned the atrial lead.

## 2023-10-24 NOTE — H&P
H&P Exam - Cardiology   Dalphine Blizzard 75 y.o. female MRN: [id number]  Unit/Bed#: BE CATH LAB ROOM Encounter: [id number]    Assessment/Plan     Assessment:   1. Symptomatic persistent atrial fibrillation, status post prior RF ablation with pulmonary vein isolation, posterior wall isolation, and typical flutter line 12/2022              A.)  Initially diagnosed 1/2022, started on Eliquis anticoagulation and Toprol-XL at that time              B.)  Status post successful cardioversion 10/2022 with quick reversion back to afib              C.)  Repeat successful cardioversion 11/2022 with flecainide antiarrhythmic therapy added, again quickly reverted back to afib              D.)  TIA noted in the post ablation setting, all imaging negative              E.)  Continued on low dose flecainide and Toprol XL in the post ablations setting - discontinued 8/2023 due to #2              F.)  Maintained on Eliquis anticoagulation (CHADS2 Vasc at least 5 - age, HTN, TIA, sex)  2. Tachybrady syndrome, with reported heart rates in the 30s leading to discontinuation of flecainide and Toprol-XL   A.)  Prior event monitor 8/2023 showed 8% PAC burden and 2.3% overall PVC burden; average heart rate 63 but minimum heart rate 32    B.)  Tried on diltiazem to help with ongoing palpitations/ectopy however with subsequent bradycardia  3. Preserved LV systolic function with LVEF 60% per echo 12/2022  4. Chronic lower extremity edema, maintained on Bumex for many years  5. Hypertension  6. Hyperlipidemia  7. Pulmonary hypertension with moderate tricuspid regurgitation  8. Reported history of mild CAD with RCA ectasia by remote catheterization  9. Hypothyroidism, maintained on Synthroid with history of hemithyroidectomy  10. Obesity with BMI 36      Plan:  Dual-chamber pacemaker implantation.   Following device placement, she will then be started on flecainide with discontinuation of diltiazem for her ongoing palpitations/ectopy. Anticipate discharge later today after bedrest and review of chest x-ray. History of Present Illness   HPI:  Enrique Murry is a 75y.o. year old female with symptomatic persistent atrial fibrillation, preserved LV systolic function with chronic lower extremity edema, hypertension, hyperlipidemia, pulmonary hypertension with moderate tricuspid regurgitation, reported history of mild CAD by remote catheterization, hypothyroidism with prior hemithyroidectomy maintained on Synthroid, and obesity with BMI 36. She typically follows with Dr. Rachel Padgett as an outpatient, and initially established with him 1/2022 after being found to be in atrial fibrillation by her PCP. At that time she was started on Eliquis anticoagulation along with metoprolol for rate control. Cardioversion was discussed at several office visit, and eventually pursue 10/2022. While it was successful, she quickly reverted back to atrial fibrillation. After a negative nuclear stress test, she was started on flecainide antiarrhythmic therapy and underwent repeat cardioversion 11/2022. Unfortunately, this only lasted several hours before she went back into atrial fibrillation. She was thus seen in EP consultation by Dr. Cyndra Spatz 12/2022, at which time an ablation was recommended. Later that month she underwent RF ablation with pulmonary vein isolation, posterior wall isolation, and typical flutter line. In the immediate postop setting she reported nausea and visual changes, thus she underwent stat CT and eventual MRI which ruled out acute findings. She was seen by neurology, and diagnosed with a TIA. She also had some postop hypotension, and echocardiogram ruled out effusion. Her antihypertensives were reduced upon discharge, she was maintained on low-dose flecainide 50 mg twice daily. Earlier this month, she contacted our office reporting that she had been back in atrial fibrillation since June.   She has also noticed bradycardia with rates into the 30s per home monitoring. As she was symptomatic with these heart rates, her flecainide and Toprol-XL were held with improvement. Given these changes, I saw her in the office 8/2023 at which time we started low-dose Cardizem 120 mg daily to see if this would help suppress her ectopy/palpitations. Ongoing home monitoring still showed bradycardia with ectopy, thus Dr. Kelsey Krabbe recommended dual-chamber pacemaker implantation to help prevent bradycardia so that her arrhythmias could be treated with flecainide instead of diltiazem. She presents today to undergo that procedure. Review of Systems  ROS as noted above, otherwise 12 point review of systems was performed and is negative.        Historical Information   Past Medical History:   Diagnosis Date    Anxiety     resolved 10/4/17    Asthma     seasonal    Atrial fibrillation ([address]) 01/2022    newly diagnosed on eliquis    Chronic kidney disease     kidney stone    Colon polyp     Disease of thyroid gland     Dysphagia     GERD (gastroesophageal reflux disease)     Goiter, nodular     partial thyroidectomy    Hiatal hernia     repaired 2018    Hiatal hernia with GERD without esophagitis 04/2018    surgical correction    History of esophageal varices with bleeding     History of pernicious anemia     History of transfusion     x1 after bleeding ulcer    Hyperlipidemia     Hypertension     Irritable bowel syndrome     Neck mass     2 small areas left side by jawline and midneck US scheduled 0820/21    RA (rheumatoid arthritis) ([address])     Seasonal allergies     Vertigo     Wears glasses     for reading     Past Surgical History:   Procedure Laterality Date    BREAST BIOPSY Bilateral     negative    CARDIAC CATHETERIZATION      x2 secondary to exertional chest pain, due to lung issues, possible mild COPD    CARDIAC ELECTROPHYSIOLOGY PROCEDURE N/A 12/23/2022    Procedure: Cardiac eps/afib ablation;  Surgeon: Wojciech Newsome MD;  Location:  CARDIAC CATH LAB; Service: Cardiology    CARDIAC ELECTROPHYSIOLOGY PROCEDURE N/A 12/23/2022    Procedure: Cardiac eps/aflutter ablation;  Surgeon: Donovan Millard MD;  Location:  CARDIAC CATH LAB; Service: Cardiology    CATARACT EXTRACTION Bilateral     CHOLECYSTECTOMY  2015    lap - last assessed 10/4/17    COLONOSCOPY      complete    ESOPHAGOGASTRODUODENOSCOPY N/A 1/23/2018    Procedure: ESOPHAGOGASTRODUODENOSCOPY (EGD); Surgeon: Alden Blackwell MD;  Location: [facility] GI LAB; Service: Gastroenterology    HYSTERECTOMY      partial - ovaries remain    LIPOMA RESECTION      right thigh    DC ESOPHAGOSCOPY FLEXIBLE TRANSORAL WITH BIOPSY N/A 4/11/2018    Procedure: ESOPHAGOGASTRODUODENOSCOPY (EGD); Surgeon: Wilfred Fernandez MD;  Location:  MAIN OR;  Service: Thoracic    DC LAPS RPR PARAESPHGL HRNA INCL FUNDPLSTY [address] N/A 4/11/2018    Procedure: REPAIR HERNIA PARAESOPHAGEAL  LAPAROSCOPIC,ELEONORA GASTROPLASTY, Edita Railing FUNDOPLICATION;  Surgeon: Wilfred Fernandez MD;  Location:  MAIN OR;  Service: Thoracic    DC XCAPSL CTRC RMVL INSJ IO LENS PROSTH W/O ECP Right 8/23/2021    Procedure: EXTRACTION EXTRACAPSULAR CATARACT PHACO INTRAOCULAR LENS (IOL); Surgeon: Bruce Wilhelm MD;  Location: [facility] MAIN OR;  Service: Ophthalmology    DC XCAPSL CTRC RMVL INSJ IO LENS PROSTH W/O ECP Left 11/15/2021    Procedure: EXTRACTION EXTRACAPSULAR CATARACT PHACO INTRAOCULAR LENS (IOL);   Surgeon: Bruce Wilhelm MD;  Location: [facility] MAIN OR;  Service: Ophthalmology    SKIN BIOPSY Right     lump benign - last assessed 10/4/17    THYROIDECTOMY, PARTIAL  1977    TONSILLECTOMY      US GUIDED THYROID BIOPSY  2/21/2023     Family History:   Family History   Problem Relation Age of Onset    Alzheimer's disease Mother     Cancer Mother         skin     Thyroid disease Mother     Ulcerative colitis Mother     Cancer Father         prostate    Stroke Father     Hypertension Father     Prostate cancer Father     Thyroid disease Sister     Ulcerative colitis Sister     Other Sister         open heart surgery    Heart disease Sister     Hyperlipidemia Brother     No Known Problems Son     No Known Problems Son     No Known Problems Maternal Grandmother     No Known Problems Paternal Grandmother     Mental illness Neg Hx        Social History   Social History     Substance and Sexual Activity   Alcohol Use Not Currently    Comment: seldom     Social History     Substance and Sexual Activity   Drug Use Never     Social History     Tobacco Use   Smoking Status Never    Passive exposure: Never   Smokeless Tobacco Never         Meds/Allergies   all medications and allergies reviewed  Home Medications:   Medications Prior to Admission   Medication    atorvastatin (LIPITOR) 20 mg tablet    bumetanide (BUMEX) 1 mg tablet    Calcium Carb-Cholecalciferol 600-1000 MG-UNIT CAPS    Cartia  MG 24 hr capsule    Eliquis 5 MG    levothyroxine 50 mcg tablet    omeprazole (PriLOSEC) 40 MG capsule    potassium chloride (Klor-Con) 10 mEq tablet       Allergies   Allergen Reactions    Orange (Diagnostic) - Food Allergy Anaphylaxis     Throat closes    Pantoprazole Headache    Penicillins Other (See Comments)     During allergy testing instructed to NEVER take PCN  Patient has tolerated cefazolin. Gluten Meal - Food Allergy      Following a gluten free diet on her own    Lactose - Food Allergy Diarrhea    Sulfa Antibiotics Rash       Objective   Vitals: not currently breastfeeding.       No intake or output data in the 24 hours ending 10/24/23 0711    Invasive Devices       None                   Physical Exam  GEN: NAD, alert and oriented x 3, well appearing  SKIN: dry without significant lesions or rashes  HEENT: NCAT, PERRL, EOMs intact  NECK: No JVD appreciated  CARDIOVASCULAR: RRR, normal S1, S2 without murmurs, rubs, or gallops appreciated  LUNGS: Clear to auscultation bilaterally without wheezes, rhonchi, or rales  ABDOMEN: Soft, nontender, nondistended  EXTREMITIES/VASCULAR: perfused without clubbing, cyanosis, or LE edema b/l  PSYCH: Normal mood and affect  NEURO: CN ll-Xll grossly intact      Lab Results: I have personally reviewed pertinent lab results. Invalid input(s): "LABGLOM"              Imaging: I have personally reviewed pertinent reports. ECHO: No results found for this or any previous visit. Results for orders placed during the hospital encounter of 01/12/22    Echo complete w/ contrast if indicated    Interpretation Summary    Left Ventricle: Left ventricular cavity size is normal. Systolic function is normal.  Estimated ejection fraction is 50-55%. Wall motion is normal. Wall thickness is mildly increased. Unable to assess diastolic function due to atrial fibrillation. Left Atrium: The atrium is severely dilated. Right Atrium: The atrium is mild to moderately dilated. Aortic Valve: There is trace regurgitation. Mitral Valve: There is mild regurgitation. Tricuspid Valve: There is moderate regurgitation. The right ventricular systolic pressure is mildly elevated at 46 mm Hg. Pulmonic Valve: There is mild regurgitation. Aorta: Proximal ascending aorta is mildly dilated with a diameter of 3.8 cm. (indexed to BSA= 1.8 cm/m2) Consider other imaging modalities, including CTA for more accurate estimation and to rule out other aortic pathologies.         EKG: pending        Code Status: Level 1 - Full Code

## 2023-10-24 NOTE — Clinical Note
The PACER GENERATOR VINNY XT DR MRI SURESCAN - [id number] device was inserted. The leads were placed into the connector and visually verified to be in correct position. Injury current obtained.

## 2023-10-31 ENCOUNTER — CLINICAL SUPPORT (OUTPATIENT)
Dept: CARDIOLOGY CLINIC | Facility: CLINIC | Age: 73
End: 2023-10-31

## 2023-10-31 ENCOUNTER — TELEPHONE (OUTPATIENT)
Dept: CARDIOLOGY CLINIC | Facility: CLINIC | Age: 73
End: 2023-10-31

## 2023-10-31 DIAGNOSIS — I49.1 PAC (PREMATURE ATRIAL CONTRACTION): Primary | ICD-10-CM

## 2023-10-31 PROCEDURE — RECHECK: Performed by: PHYSICIAN ASSISTANT

## 2023-10-31 RX ORDER — METOPROLOL SUCCINATE 25 MG/1
25 TABLET, EXTENDED RELEASE ORAL 2 TIMES DAILY
Qty: 60 TABLET | Refills: 3
Start: 2023-10-31 | End: 2023-11-01 | Stop reason: SDUPTHER

## 2023-10-31 NOTE — TELEPHONE ENCOUNTER
Pt called stating she removed her aquacel today and noticed it was swollen. Pt denies any cardiac symptoms, pain, fever, or chills. She had pacer implant 10/24. Images are attached. Please advise. Thanks!

## 2023-10-31 NOTE — PROGRESS NOTES
Patient presents for site check after removal of Aquacel bandage, felt incision did not look appropriate. C/O some axillary discomfort, no true pain. No drainage from site, no significant pain, no fever, no bruising. Minor rash from adhesive from Aquacel. Patient seen by Dunia Moser PA-C, felt site looked to be appropriately healing. No further dressing necessary. She also c/o having increase in palpitations. Metoprolol succ 25mg bid was added back to her current regimen per Srinivasan Herndon and Dr. Hayley Pichardo.

## 2023-11-01 ENCOUNTER — TELEPHONE (OUTPATIENT)
Dept: CARDIOLOGY CLINIC | Facility: CLINIC | Age: 73
End: 2023-11-01

## 2023-11-01 DIAGNOSIS — I49.1 PAC (PREMATURE ATRIAL CONTRACTION): ICD-10-CM

## 2023-11-01 RX ORDER — METOPROLOL SUCCINATE 25 MG/1
25 TABLET, EXTENDED RELEASE ORAL 2 TIMES DAILY
Qty: 180 TABLET | Refills: 3 | Status: SHIPPED | OUTPATIENT
Start: 2023-11-01 | End: 2024-10-26

## 2023-11-01 NOTE — TELEPHONE ENCOUNTER
Spoke to pt. Notified of Yudy's response below --    Kamla Baig PA-C       Yes, Toprol XL 25 mg bid. We often prescribe it bid even though it's technically 24 hour extended release. Thanks,  Pat Ruffin     Pt understood. She asked that Rx be sent to [facility] b/c she only has enough for 2 weeks. Updated Rx sent.

## 2023-11-01 NOTE — TELEPHONE ENCOUNTER
Hi, this is Tye Mcintyre. . I was there yesterday at around 4:15 and saw the doctor and the PA Vamshi Venessa I believe her name was. She ordered metoprolol and I had to check to see how many I had on hand before she would put the order in, which is fine. I do have enough for a couple weeks here but I do have a question. It says 25 mgs 24HR tablet. I'm assuming that's 24 hour tablet and then it says take two times today, which kind of sounds to me like it's doubled there. Can you please let me know what this is exactly? My number here is [phone]. I've recently had a pacemaker put in. Thank you.

## 2023-11-06 ENCOUNTER — OFFICE VISIT (OUTPATIENT)
Dept: FAMILY MEDICINE CLINIC | Facility: CLINIC | Age: 73
End: 2023-11-06
Payer: MEDICARE

## 2023-11-06 VITALS
DIASTOLIC BLOOD PRESSURE: 80 MMHG | RESPIRATION RATE: 18 BRPM | SYSTOLIC BLOOD PRESSURE: 134 MMHG | BODY MASS INDEX: 35.32 KG/M2 | TEMPERATURE: 97.9 F | HEART RATE: 62 BPM | HEIGHT: 65 IN | WEIGHT: 212 LBS

## 2023-11-06 DIAGNOSIS — M25.551 RIGHT HIP PAIN: ICD-10-CM

## 2023-11-06 DIAGNOSIS — I10 ESSENTIAL HYPERTENSION: ICD-10-CM

## 2023-11-06 DIAGNOSIS — Z00.00 MEDICARE ANNUAL WELLNESS VISIT, SUBSEQUENT: Primary | ICD-10-CM

## 2023-11-06 DIAGNOSIS — I49.5 TACHY-BRADY SYNDROME (HCC): ICD-10-CM

## 2023-11-06 DIAGNOSIS — I49.1 PAC (PREMATURE ATRIAL CONTRACTION): ICD-10-CM

## 2023-11-06 DIAGNOSIS — E87.6 HYPOKALEMIA: ICD-10-CM

## 2023-11-06 DIAGNOSIS — M06.9 RHEUMATOID ARTHRITIS, INVOLVING UNSPECIFIED SITE, UNSPECIFIED WHETHER RHEUMATOID FACTOR PRESENT (HCC): ICD-10-CM

## 2023-11-06 DIAGNOSIS — I10 BENIGN HYPERTENSION: ICD-10-CM

## 2023-11-06 DIAGNOSIS — E78.2 MIXED HYPERLIPIDEMIA: ICD-10-CM

## 2023-11-06 PROBLEM — G89.18 POST-OP PAIN: Status: RESOLVED | Noted: 2023-10-24 | Resolved: 2023-11-06

## 2023-11-06 PROBLEM — M85.80 OSTEOPENIA: Status: RESOLVED | Noted: 2017-10-24 | Resolved: 2023-11-06

## 2023-11-06 PROBLEM — E78.00 HYPERCHOLESTEROLEMIA: Status: RESOLVED | Noted: 2022-04-14 | Resolved: 2023-11-06

## 2023-11-06 PROBLEM — R10.13 EPIGASTRIC PAIN: Status: RESOLVED | Noted: 2021-08-13 | Resolved: 2023-11-06

## 2023-11-06 PROBLEM — K59.00 CONSTIPATION: Status: RESOLVED | Noted: 2020-03-13 | Resolved: 2023-11-06

## 2023-11-06 PROCEDURE — 99214 OFFICE O/P EST MOD 30 MIN: CPT | Performed by: INTERNAL MEDICINE

## 2023-11-06 PROCEDURE — G0439 PPPS, SUBSEQ VISIT: HCPCS | Performed by: INTERNAL MEDICINE

## 2023-11-06 RX ORDER — METOPROLOL SUCCINATE 25 MG/1
25 TABLET, EXTENDED RELEASE ORAL 2 TIMES DAILY
Qty: 180 TABLET | Refills: 3 | Status: SHIPPED | OUTPATIENT
Start: 2023-11-06 | End: 2024-10-31

## 2023-11-06 RX ORDER — POTASSIUM CHLORIDE 1500 MG/1
20 TABLET, EXTENDED RELEASE ORAL DAILY
Qty: 90 TABLET | Refills: 3 | Status: SHIPPED | OUTPATIENT
Start: 2023-11-06

## 2023-11-06 NOTE — ASSESSMENT & PLAN NOTE
Recent potassium is still low and will recheck bmp with her taking 10 meq of potassium daily. Will adjust potassium if needed based on lab results.

## 2023-11-06 NOTE — PATIENT INSTRUCTIONS
Medicare Preventive Visit Patient Instructions  Thank you for completing your Welcome to Medicare Visit or Medicare Annual Wellness Visit today. Your next wellness visit will be due in one year (11/6/2024). The screening/preventive services that you may require over the next 5-10 years are detailed below. Some tests may not apply to you based off risk factors and/or age. Screening tests ordered at today's visit but not completed yet may show as past due. Also, please note that scanned in results may not display below. Preventive Screenings:  Service Recommendations Previous Testing/Comments   Colorectal Cancer Screening  * Colonoscopy    * Fecal Occult Blood Test (FOBT)/Fecal Immunochemical Test (FIT)  * Fecal DNA/Cologuard Test  * Flexible Sigmoidoscopy Age: 43-73 years old   Colonoscopy: every 10 years (may be performed more frequently if at higher risk)  OR  FOBT/FIT: every 1 year  OR  Cologuard: every 3 years  OR  Sigmoidoscopy: every 5 years  Screening may be recommended earlier than age 39 if at higher risk for colorectal cancer. Also, an individualized decision between you and your healthcare provider will decide whether screening between the ages of 77-80 would be appropriate. Colonoscopy: 07/28/2020  FOBT/FIT: Not on file  Cologuard: Not on file  Sigmoidoscopy: Not on file    Screening Current     Breast Cancer Screening Age: 36 years old  Frequency: every 1-2 years  Not required if history of left and right mastectomy Mammogram: 11/02/2022    Screening Current   Cervical Cancer Screening Between the ages of 21-29, pap smear recommended once every 3 years. Between the ages of 32-69, can perform pap smear with HPV co-testing every 5 years.    Recommendations may differ for women with a history of total hysterectomy, cervical cancer, or abnormal pap smears in past. Pap Smear: Not on file    Screening Not Indicated   Hepatitis C Screening Once for adults born between 1945 and 1965  More frequently in patients at high risk for Hepatitis C Hep C Antibody: 08/02/2019    Screening Current   Diabetes Screening 1-2 times per year if you're at risk for diabetes or have pre-diabetes Fasting glucose: 102 mg/dL (10/24/2023)  A1C: 5.5 % (12/25/2022)  Screening Current   Cholesterol Screening Once every 5 years if you don't have a lipid disorder. May order more often based on risk factors. Lipid panel: 01/18/2023    Screening Not Indicated  History Lipid Disorder     Other Preventive Screenings Covered by Medicare:  Abdominal Aortic Aneurysm (AAA) Screening: covered once if your at risk. You're considered to be at risk if you have a family history of AAA. Lung Cancer Screening: covers low dose CT scan once per year if you meet all of the following conditions: (1) Age 48-67; (2) No signs or symptoms of lung cancer; (3) Current smoker or have quit smoking within the last 15 years; (4) You have a tobacco smoking history of at least 20 pack years (packs per day multiplied by number of years you smoked); (5) You get a written order from a healthcare provider. Glaucoma Screening: covered annually if you're considered high risk: (1) You have diabetes OR (2) Family history of glaucoma OR (3)  aged 48 and older OR (3)  American aged 72 and older  Osteoporosis Screening: covered every 2 years if you meet one of the following conditions: (1) You're estrogen deficient and at risk for osteoporosis based off medical history and other findings; (2) Have a vertebral abnormality; (3) On glucocorticoid therapy for more than 3 months; (4) Have primary hyperparathyroidism; (5) On osteoporosis medications and need to assess response to drug therapy. Last bone density test (DXA Scan): 11/01/2021. HIV Screening: covered annually if you're between the age of 14-79. Also covered annually if you are younger than 13 and older than 72 with risk factors for HIV infection.  For pregnant patients, it is covered up to 3 times per pregnancy. Immunizations:  Immunization Recommendations   Influenza Vaccine Annual influenza vaccination during flu season is recommended for all persons aged >= 6 months who do not have contraindications   Pneumococcal Vaccine   * Pneumococcal conjugate vaccine = PCV13 (Prevnar 13), PCV15 (Vaxneuvance), PCV20 (Prevnar 20)  * Pneumococcal polysaccharide vaccine = PPSV23 (Pneumovax) Adults 53-27 yo with certain risk factors or if 69+ yo  If never received any pneumonia vaccine: recommend Prevnar 20 (PCV20)  Give PCV20 if previously received 1 dose of PCV13 or PPSV23   Hepatitis B Vaccine 3 dose series if at intermediate or high risk (ex: diabetes, end stage renal disease, liver disease)   Respiratory syncytial virus (RSV) Vaccine - COVERED BY MEDICARE PART D  * RSVPreF3 (Arexvy) CDC recommends that adults 61years of age and older may receive a single dose of RSV vaccine using shared clinical decision-making (SCDM)   Tetanus (Td) Vaccine - COST NOT COVERED BY MEDICARE PART B Following completion of primary series, a booster dose should be given every 10 years to maintain immunity against tetanus. Td may also be given as tetanus wound prophylaxis. Tdap Vaccine - COST NOT COVERED BY MEDICARE PART B Recommended at least once for all adults. For pregnant patients, recommended with each pregnancy. Shingles Vaccine (Shingrix) - COST NOT COVERED BY MEDICARE PART B  2 shot series recommended in those 19 years and older who have or will have weakened immune systems or those 50 years and older     Health Maintenance Due:      Topic Date Due   • Breast Cancer Screening: Mammogram  11/02/2023   • Colorectal Cancer Screening  07/28/2025   • Hepatitis C Screening  Completed     Immunizations Due:      Topic Date Due   • COVID-19 Vaccine (7 - Moderna series) 03/18/2023   • Influenza Vaccine (1) 09/01/2023     Advance Directives   What are advance directives?   Advance directives are legal documents that state your wishes and plans for medical care. These plans are made ahead of time in case you lose your ability to make decisions for yourself. Advance directives can apply to any medical decision, such as the treatments you want, and if you want to donate organs. What are the types of advance directives? There are many types of advance directives, and each state has rules about how to use them. You may choose a combination of any of the following:  Living will: This is a written record of the treatment you want. You can also choose which treatments you do not want, which to limit, and which to stop at a certain time. This includes surgery, medicine, IV fluid, and tube feedings. Durable power of  for healthcare [facility]): This is a written record that states who you want to make healthcare choices for you when you are unable to make them for yourself. This person, called a proxy, is usually a family member or a friend. You may choose more than 1 proxy. Do not resuscitate (DNR) order:  A DNR order is used in case your heart stops beating or you stop breathing. It is a request not to have certain forms of treatment, such as CPR. A DNR order may be included in other types of advance directives. Medical directive: This covers the care that you want if you are in a coma, near death, or unable to make decisions for yourself. You can list the treatments you want for each condition. Treatment may include pain medicine, surgery, blood transfusions, dialysis, IV or tube feedings, and a ventilator (breathing machine). Values history: This document has questions about your views, beliefs, and how you feel and think about life. This information can help others choose the care that you would choose. Why are advance directives important? An advance directive helps you control your care. Although spoken wishes may be used, it is better to have your wishes written down. Spoken wishes can be misunderstood, or not followed. Treatments may be given even if you do not want them. An advance directive may make it easier for your family to make difficult choices about your care. Urinary Incontinence   Urinary incontinence (UI)  is when you lose control of your bladder. UI develops because your bladder cannot store or empty urine properly. The 3 most common types of UI are stress incontinence, urge incontinence, or both. Medicines:   May be given to help strengthen your bladder control. Report any side effects of medication to your healthcare provider. Do pelvic muscle exercises often:  Your pelvic muscles help you stop urinating. Squeeze these muscles tight for 5 seconds, then relax for 5 seconds. Gradually work up to squeezing for 10 seconds. Do 3 sets of 15 repetitions a day, or as directed. This will help strengthen your pelvic muscles and improve bladder control. Train your bladder:  Go to the bathroom at set times, such as every 2 hours, even if you do not feel the urge to go. You can also try to hold your urine when you feel the urge to go. For example, hold your urine for 5 minutes when you feel the urge to go. As that becomes easier, hold your urine for 10 minutes. Self-care:   Keep a UI record. Write down how often you leak urine and how much you leak. Make a note of what you were doing when you leaked urine. Drink liquids as directed. You may need to limit the amount of liquid you drink to help control your urine leakage. Do not drink any liquid right before you go to bed. Limit or do not have drinks that contain caffeine or alcohol. Prevent constipation. Eat a variety of high-fiber foods. Good examples are high-fiber cereals, beans, vegetables, and whole-grain breads. Walking is the best way to trigger your intestines to have a bowel movement. Exercise regularly and maintain a healthy weight. Weight loss and exercise will decrease pressure on your bladder and help you control your leakage.    Use a catheter as directed  to help empty your bladder. A catheter is a tiny, plastic tube that is put into your bladder to drain your urine. Go to behavior therapy as directed. Behavior therapy may be used to help you learn to control your urge to urinate. Weight Management   Why it is important to manage your weight:  Being overweight increases your risk of health conditions such as heart disease, high blood pressure, type 2 diabetes, and certain types of cancer. It can also increase your risk for osteoarthritis, sleep apnea, and other respiratory problems. Aim for a slow, steady weight loss. Even a small amount of weight loss can lower your risk of health problems. How to lose weight safely:  A safe and healthy way to lose weight is to eat fewer calories and get regular exercise. You can lose up about 1 pound a week by decreasing the number of calories you eat by 500 calories each day. Healthy meal plan for weight management:  A healthy meal plan includes a variety of foods, contains fewer calories, and helps you stay healthy. A healthy meal plan includes the following:  Eat whole-grain foods more often. A healthy meal plan should contain fiber. Fiber is the part of grains, fruits, and vegetables that is not broken down by your body. Whole-grain foods are healthy and provide extra fiber in your diet. Some examples of whole-grain foods are whole-wheat breads and pastas, oatmeal, brown rice, and bulgur. Eat a variety of vegetables every day. Include dark, leafy greens such as spinach, kale, otto greens, and mustard greens. Eat yellow and orange vegetables such as carrots, sweet potatoes, and winter squash. Eat a variety of fruits every day. Choose fresh or canned fruit (canned in its own juice or light syrup) instead of juice. Fruit juice has very little or no fiber. Eat low-fat dairy foods. Drink fat-free (skim) milk or 1% milk. Eat fat-free yogurt and low-fat cottage cheese.  Try low-fat cheeses such as mozzarella and other reduced-fat cheeses. Choose meat and other protein foods that are low in fat. Choose beans or other legumes such as split peas or lentils. Choose fish, skinless poultry (chicken or turkey), or lean cuts of red meat (beef or pork). Before you cook meat or poultry, cut off any visible fat. Use less fat and oil. Try baking foods instead of frying them. Add less fat, such as margarine, sour cream, regular salad dressing and mayonnaise to foods. Eat fewer high-fat foods. Some examples of high-fat foods include french fries, doughnuts, ice cream, and cakes. Eat fewer sweets. Limit foods and drinks that are high in sugar. This includes candy, cookies, regular soda, and sweetened drinks. Exercise:  Exercise at least 30 minutes per day on most days of the week. Some examples of exercise include walking, biking, dancing, and swimming. You can also fit in more physical activity by taking the stairs instead of the elevator or parking farther away from stores. Ask your healthcare provider about the best exercise plan for you. © Copyright [employer] 2018 Information is for End User's use only and may not be sold, redistributed or otherwise used for commercial purposes.  All illustrations and images included in CareNotes® are the copyrighted property of [employer] or [address] [place] [address]

## 2023-11-06 NOTE — PROGRESS NOTES
Assessment and Plan:     Problem List Items Addressed This Visit        Cardiovascular and Mediastinum    Benign hypertension    Tachy-lziet syndrome ([address])     She is s/p pacemaker and is doing well, has follow up scheduled with cardiology. Musculoskeletal and Integument    Rheumatoid arthritis ([address])     Managed by rheumatology and she feels this is stable. Other    Hypokalemia    Relevant Orders    Basic metabolic panel    Mixed hyperlipidemia   Other Visit Diagnoses     Medicare annual wellness visit, subsequent    -  Primary    Essential hypertension        Relevant Medications    potassium chloride 20 MEQ TBCR    Right hip pain              Depression Screening and Follow-up Plan: Patient was screened for depression during today's encounter. They screened negative with a PHQ-2 score of 0. Preventive health issues were discussed with patient, and age appropriate screening tests were ordered as noted in patient's After Visit Summary. Personalized health advice and appropriate referrals for health education or preventive services given if needed, as noted in patient's After Visit Summary. History of Present Illness:     Patient presents for a Medicare Wellness Visit    Here for follow up and AWV. She recently had a pacemaker put in and is very sore. This was done 10/24. She has a follow up appointment 11/13. Patient Care Team:  Rhett Diaz MD as PCP - General (Internal Medicine)  Juan Grant MD as Consulting Physician (Gastroenterology)  Minnie Mustafa MD as Consulting Physician (Gastroenterology)  Ed Siddiqui MD as Consulting Physician (Cardiology)     Review of Systems:     Review of Systems   Constitutional:  Negative for chills and fever. HENT:  Negative for ear pain and sore throat. Eyes:  Negative for pain and visual disturbance. Respiratory:  Negative for cough and shortness of breath. Cardiovascular:  Negative for chest pain and palpitations. Gastrointestinal:  Negative for abdominal pain and vomiting. Genitourinary:  Negative for dysuria and hematuria. Musculoskeletal:  Negative for arthralgias and back pain. Skin:  Negative for color change and rash. Neurological:  Negative for seizures and syncope. All other systems reviewed and are negative.        Problem List:     Patient Active Problem List   Diagnosis   • Hiatal hernia   • GERD with esophagitis   • Hypokalemia   • Benign hypertension   • Mixed hyperlipidemia   • Congenital hypothyroidism with diffuse goiter   • Liver lesion, left lobe   • Hemorrhoids   • IBS (irritable bowel syndrome)   • Multiple thyroid nodules   • Stress incontinence, female   • Abnormal CT of liver   • Class 2 severe obesity due to excess calories with serious comorbidity and body mass index (BMI) of 35.0 to 35.9 in adult    • BMI 35.0-35.9,adult   • Anxiety   • Rheumatoid arthritis (HCC)   • Vertigo   • History of hysterectomy   • Parotid mass   • Xerostomia   • Asthma   • Persistent atrial fibrillation (HCC)   • Peripheral vision loss, right   • Pigmented purpura (HCC)   • Pulmonary hypertension (HCC)   • History of TIA (transient ischemic attack)   • Abnormal thyroid biopsy   • Localized osteoporosis with current pathological fracture   • Vitamin D deficiency   • Tachy-lizet syndrome ([address])      Past Medical and Surgical History:     Past Medical History:   Diagnosis Date   • Anxiety     resolved 10/4/17   • Asthma     seasonal   • Atrial fibrillation ([address]) 01/2022    newly diagnosed on eliquis   • Chronic kidney disease     kidney stone   • Colon polyp    • Disease of thyroid gland    • Dysphagia    • GERD (gastroesophageal reflux disease)    • Goiter, nodular     partial thyroidectomy   • Hiatal hernia     repaired 2018   • Hiatal hernia with GERD without esophagitis 04/2018    surgical correction   • History of esophageal varices with bleeding    • History of pernicious anemia    • History of transfusion x1 after bleeding ulcer   • Hyperlipidemia    • Hypertension    • Irritable bowel syndrome    • Neck mass     2 small areas left side by jawline and [facility] scheduled 0820/21   • RA (rheumatoid arthritis) (HCC)    • Seasonal allergies    • Vertigo    • Wears glasses     for reading     Past Surgical History:   Procedure Laterality Date   • BREAST BIOPSY Bilateral     negative   • CARDIAC CATHETERIZATION      x2 secondary to exertional chest pain, due to lung issues, possible mild COPD   • CARDIAC ELECTROPHYSIOLOGY PROCEDURE N/A 12/23/2022    Procedure: Cardiac eps/afib ablation;  Surgeon: Wojciech Newsome MD;  Location: [facility]; Service: Cardiology   • CARDIAC ELECTROPHYSIOLOGY PROCEDURE N/A 12/23/2022    Procedure: Cardiac eps/aflutter ablation;  Surgeon: Wojciech Newsome MD;  Location: [facility]; Service: Cardiology   • CARDIAC ELECTROPHYSIOLOGY PROCEDURE N/A 10/24/2023    Procedure: Cardiac pacer implant DC PM;  Surgeon: Wojciech Newsome MD;  [facility]: [facility]; Service: Cardiology   • CATARACT EXTRACTION Bilateral    • CHOLECYSTECTOMY  2015    lap - last assessed 10/4/17   • COLONOSCOPY      complete   • ESOPHAGOGASTRODUODENOSCOPY N/A 1/23/2018    Procedure: ESOPHAGOGASTRODUODENOSCOPY (EGD); Surgeon: Juany Green MD;  Location: [facility] GI LAB; Service: Gastroenterology   • HYSTERECTOMY      partial - ovaries remain   • LIPOMA RESECTION      right thigh   • VA ESOPHAGOSCOPY FLEXIBLE TRANSORAL WITH BIOPSY N/A 4/11/2018    Procedure: ESOPHAGOGASTRODUODENOSCOPY (EGD);   Surgeon: Leda Garcia MD;  Location: [facility];  Service: Thoracic   • VA LAPS RPR PARAESPHGL HRNA INCL FUNDPLSTY [address] N/A 4/11/2018    Procedure: REPAIR HERNIA PARAESOPHAGEAL  LAPAROSCOPIC,ELEONORA GASTROPLASTY, Sindy Smith FUNDOPLICATION;  Surgeon: Leda Garcia MD;  Location: [facility];  Service: Thoracic   • VA XCAPSL CTRC RMVL INSJ IO LENS PROSTH W/O ECP Right 8/23/2021    Procedure: EXTRACTION EXTRACAPSULAR CATARACT PHACO INTRAOCULAR LENS (IOL); Surgeon: Nick Corona MD;  Location: [facility] MAIN OR;  Service: Ophthalmology   • NH XCAPSL CTRC RMVL INSJ IO LENS PROSTH W/O ECP Left 11/15/2021    Procedure: EXTRACTION EXTRACAPSULAR CATARACT PHACO INTRAOCULAR LENS (IOL);   Surgeon: Nick Corona MD;  Location: [facility] MAIN OR;  Service: Ophthalmology   • SKIN BIOPSY Right     lump benign - last assessed 10/4/17   • THYROIDECTOMY, PARTIAL  1977   • TONSILLECTOMY     • [address] THYROID BIOPSY  2/21/2023      Family History:     Family History   Problem Relation Age of Onset   • Alzheimer's disease Mother    • Cancer Mother         skin    • Thyroid disease Mother    • Ulcerative colitis Mother    • Cancer Father         prostate   • Stroke Father    • Hypertension Father    • Prostate cancer Father    • Thyroid disease Sister    • Ulcerative colitis Sister    • Other Sister         open heart surgery   • Heart disease Sister    • Hyperlipidemia Brother    • No Known Problems Son    • No Known Problems Son    • No Known Problems Maternal Grandmother    • No Known Problems Paternal Grandmother    • Mental illness Neg Hx       Social History:     Social History     Socioeconomic History   • Marital status: /Civil Union     Spouse name: None   • Number of children: None   • Years of education: None   • Highest education level: None   Occupational History   • None   Tobacco Use   • Smoking status: Never     Passive exposure: Never   • Smokeless tobacco: Never   Vaping Use   • Vaping Use: Never used   Substance and Sexual Activity   • Alcohol use: Not Currently     Comment: seldom   • Drug use: Never   • Sexual activity: None   Other Topics Concern   • None   Social History Narrative    Feels safe at home     Social Determinants of Health     Financial Resource Strain: Low Risk  (11/6/2023)    Overall Financial Resource Strain (CARDIA)    • Difficulty of Paying Living Expenses: Not hard at all   Food Insecurity: Not on file   Transportation Needs: No Transportation Needs (11/6/2023)    PRAPARE - Transportation    • Lack of Transportation (Medical): No    • Lack of Transportation (Non-Medical): No   Physical Activity: Not on file   Stress: Not on file   Social Connections: Not on file   Intimate Partner Violence: Not on file   Housing Stability: Not on file      Medications and Allergies:     Current Outpatient Medications   Medication Sig Dispense Refill   • atorvastatin (LIPITOR) 20 mg tablet TAKE 1 TABLET BY MOUTH DAILY AT  BEDTIME 90 tablet 1   • bumetanide (BUMEX) 1 mg tablet TAKE 1 TABLET BY MOUTH  DAILY 90 tablet 3   • Calcium Carb-Cholecalciferol 600-1000 MG-UNIT CAPS Take by mouth every morning gummy      • Eliquis 5 MG TAKE 1 TABLET BY MOUTH TWICE  DAILY 180 tablet 3   • flecainide (TAMBOCOR) 100 mg tablet Take 1 tablet (100 mg total) by mouth 2 (two) times a day 60 tablet 3   • levothyroxine 50 mcg tablet TAKE 1 TABLET BY MOUTH DAILY 90 tablet 1   • metoprolol succinate (TOPROL-XL) 25 mg 24 hr tablet Take 1 tablet (25 mg total) by mouth 2 (two) times a day 180 tablet 3   • omeprazole (PriLOSEC) 40 MG capsule TAKE 1 CAPSULE BY MOUTH  TWICE DAILY 180 capsule 3   • potassium chloride 20 MEQ TBCR Take 1 tablet (20 mEq total) by mouth in the morning 90 tablet 3     No current facility-administered medications for this visit. Allergies   Allergen Reactions   • Orange (Diagnostic) - Food Allergy Anaphylaxis     Throat closes   • Pantoprazole Headache   • Penicillins Other (See Comments)     During allergy testing instructed to NEVER take PCN  Patient has tolerated cefazolin.    • Gluten Meal - Food Allergy      Following a gluten free diet on her own   • Lactose - Food Allergy Diarrhea   • Sulfa Antibiotics Rash      Immunizations:     Immunization History   Administered Date(s) Administered   • COVID-19 MODERNA VACC 0.25 ML IM BOOSTER 12/07/2021   • COVID-19 MODERNA VACC 0.5 ML IM 02/23/2021, 03/26/2021, 12/07/2021, 05/18/2022   • COVID-19 Pfizer Vac BIVALENT Júnior-sucrose 12 Yr+ IM 11/18/2022   • INFLUENZA 10/19/2010, 09/30/2011, 10/19/2012, 11/05/2013, 10/01/2014, 09/30/2020   • Influenza Split High Dose Preservative Free IM 11/02/2015, 10/27/2016, 10/04/2017   • Influenza, high dose seasonal 0.7 mL 09/26/2018, 10/17/2019, 09/28/2021, 11/15/2022   • Pneumococcal Conjugate 13-Valent 08/10/2016   • Pneumococcal Polysaccharide PPV23 10/04/2017   • Tdap 07/30/2015      Health Maintenance:         Topic Date Due   • Breast Cancer Screening: Mammogram  11/02/2023   • Colorectal Cancer Screening  07/28/2025   • Hepatitis C Screening  Completed         Topic Date Due   • COVID-19 Vaccine (7 - Lonne Cleaves series) 03/18/2023   • Influenza Vaccine (1) 09/01/2023      Medicare Screening Tests and Risk Assessments:     Kev Alvarez is here for her Subsequent Wellness visit. Health Risk Assessment:   Patient rates overall health as good. Patient feels that their physical health rating is slightly worse. Patient is satisfied with their life. Eyesight was rated as slightly worse. Hearing was rated as slightly worse. Patient feels that their emotional and mental health rating is same. Patients states they are never, rarely angry. Patient states they are sometimes unusually tired/fatigued. Pain experienced in the last 7 days has been a lot. Patient's pain rating has been 8/10. Patient states that she has experienced no weight loss or gain in last 6 months. Depression Screening:   PHQ-2 Score: 0      Fall Risk Screening: In the past year, patient has experienced: no history of falling in past year      Urinary Incontinence Screening:   Patient has leaked urine accidently in the last six months. Home Safety:  Patient has trouble with stairs inside or outside of their home. Patient has working smoke alarms and has working carbon monoxide detector. Home safety hazards include: none.      Nutrition:   Gluten free     Medications: Patient is currently taking over-the-counter supplements. OTC medications include: see medication list. Patient is able to manage medications. Activities of Daily Living (ADLs)/Instrumental Activities of Daily Living (IADLs):   Walk and transfer into and out of bed and chair?: Yes  Dress and groom yourself?: Yes    Bathe or shower yourself?: Yes    Feed yourself? Yes  Do your laundry/housekeeping?: Yes  Manage your money, pay your bills and track your expenses?: Yes  Make your own meals?: Yes    Do your own shopping?: Yes    Previous Hospitalizations:   Any hospitalizations or ED visits within the last 12 months?: Yes    How many hospitalizations have you had in the last year?: 1-2    Advance Care Planning:   Living will: Yes    Durable POA for healthcare: Yes    Advanced directive: Yes    End of Life Decisions reviewed with patient: Yes      Cognitive Screening:   Provider or family/friend/caregiver concerned regarding cognition?: No    PREVENTIVE SCREENINGS      Cardiovascular Screening:    General: Screening Not Indicated and History Lipid Disorder      Diabetes Screening:     General: Screening Current      Colorectal Cancer Screening:     General: Screening Current      Breast Cancer Screening:     General: Screening Current      Cervical Cancer Screening:    General: Screening Not Indicated      Osteoporosis Screening:    General: Screening Not Indicated and History Osteoporosis      Abdominal Aortic Aneurysm (AAA) Screening:        General: Screening Not Indicated      Lung Cancer Screening:     General: Screening Not Indicated      Hepatitis C Screening:    General: Screening Current    Screening, Brief Intervention, and Referral to Treatment (SBIRT)    Screening  Typical number of drinks in a day: 0  Typical number of drinks in a week: 0  Interpretation: Low risk drinking behavior.     Single Item Drug Screening:  How often have you used an illegal drug (including marijuana) or a prescription medication for non-medical reasons in the past year? never    Single Item Drug Screen Score: 0  Interpretation: Negative screen for possible drug use disorder    Brief Intervention  Alcohol & drug use screenings were reviewed. No concerns regarding substance use disorder identified. Other Counseling Topics:   Car/seat belt/driving safety, skin self-exam, sunscreen and regular weightbearing exercise and calcium and vitamin D intake. No results found. Physical Exam:     /80   Pulse 62   Temp 97.9 °F (36.6 °C)   Resp 18   Ht 5' 5" (1.651 m)   Wt 96.2 kg (212 lb)   BMI 35.28 kg/m²     Physical Exam  Vitals and nursing note reviewed. Constitutional:       General: She is not in acute distress. Appearance: She is well-developed. HENT:      Head: Normocephalic and atraumatic. Eyes:      Conjunctiva/sclera: Conjunctivae normal.   Cardiovascular:      Rate and Rhythm: Normal rate and regular rhythm. Heart sounds: No murmur heard. Pulmonary:      Effort: Pulmonary effort is normal. No respiratory distress. Breath sounds: Normal breath sounds. Abdominal:      Palpations: Abdomen is soft. Tenderness: There is no abdominal tenderness. Musculoskeletal:         General: No swelling. Cervical back: Neck supple. Skin:     General: Skin is warm and dry. Capillary Refill: Capillary refill takes less than 2 seconds. Neurological:      Mental Status: She is alert.    Psychiatric:         Mood and Affect: Mood normal.          Donna Garcia MD

## 2023-11-06 NOTE — PROGRESS NOTES
Assessment and Plan:     Problem List Items Addressed This Visit        Cardiovascular and Mediastinum    Benign hypertension     Well controlled and will continue current bp medications. Tachy-lizet syndrome [facility])     She is s/p pacemaker and is doing well, has follow up scheduled with cardiology. Musculoskeletal and Integument    Rheumatoid arthritis ([address])     Managed by rheumatology and she feels this is stable. Other    Hypokalemia     Recent potassium is still low and will recheck bmp with her taking 10 meq of potassium daily. Will adjust potassium if needed based on lab results. Relevant Orders    Basic metabolic panel    Mixed hyperlipidemia     Lipids are within goal, continue atorvastatin as ordered. Other Visit Diagnoses     Medicare annual wellness visit, subsequent    -  Primary    Essential hypertension        Relevant Medications    potassium chloride 20 MEQ TBCR    Right hip pain        Advised to avoid NSAIDS due to blood thinners, and use tylenol and/or topicals. Depression Screening and Follow-up Plan: Patient was screened for depression during today's encounter. They screened negative with a PHQ-2 score of 0. Preventive health issues were discussed with patient, and age appropriate screening tests were ordered as noted in patient's After Visit Summary. Personalized health advice and appropriate referrals for health education or preventive services given if needed, as noted in patient's After Visit Summary. History of Present Illness:     Patient presents for a Medicare Wellness Visit    Here for AWV and follow up visit. She had recent labwork through her cardiologist showing mildly low potassium. Oral potassium replacement was adjusted. There is no chest pain or dyspnea. Her right hip is bothering her but she was afraid to use any topicals due to her blood thinners.   Recently had pacemaker placed due to tachy-lizet syndrome and is now on beta blockers and flecainide. Has follow up with cardiology scheduled. Is very sore at the pacemaker site. Patient Care Team:  Adriana Longoria MD as PCP - General (Internal Medicine)  Eligio Orellana MD as Consulting Physician (Gastroenterology)  Anupama Mchugh MD as Consulting Physician (Gastroenterology)  Bulmaro Reyna MD as Consulting Physician (Cardiology)     Review of Systems:     Review of Systems   Constitutional:  Negative for chills and fever. HENT:  Negative for ear pain and sore throat. Eyes:  Negative for pain and visual disturbance. Respiratory:  Negative for cough and shortness of breath. Cardiovascular:  Negative for chest pain and palpitations. Gastrointestinal:  Negative for abdominal pain and vomiting. Genitourinary:  Negative for dysuria and hematuria. Musculoskeletal:  Negative for arthralgias and back pain. Chest wall tenderness   Skin:  Negative for color change and rash. Neurological:  Negative for seizures and syncope. All other systems reviewed and are negative.        Problem List:     Patient Active Problem List   Diagnosis   • Hiatal hernia   • GERD with esophagitis   • Hypokalemia   • Benign hypertension   • Mixed hyperlipidemia   • Congenital hypothyroidism with diffuse goiter   • Liver lesion, left lobe   • Hemorrhoids   • IBS (irritable bowel syndrome)   • Multiple thyroid nodules   • Stress incontinence, female   • Abnormal CT of liver   • Class 2 severe obesity due to excess calories with serious comorbidity and body mass index (BMI) of 35.0 to 35.9 in adult    • BMI 35.0-35.9,adult   • Anxiety   • Rheumatoid arthritis (HCC)   • Vertigo   • History of hysterectomy   • Parotid mass   • Xerostomia   • Asthma   • Persistent atrial fibrillation (HCC)   • Peripheral vision loss, right   • Pigmented purpura (HCC)   • Pulmonary hypertension (HCC)   • History of TIA (transient ischemic attack)   • Abnormal thyroid biopsy   • Localized osteoporosis with current pathological fracture   • Vitamin D deficiency   • Tachy-lizet syndrome [facility])      Past Medical and Surgical History:     Past Medical History:   Diagnosis Date   • Anxiety     resolved 10/4/17   • Asthma     seasonal   • Atrial fibrillation ([address]) 01/2022    newly diagnosed on eliquis   • Chronic kidney disease     kidney stone   • Colon polyp    • Disease of thyroid gland    • Dysphagia    • GERD (gastroesophageal reflux disease)    • Goiter, nodular     partial thyroidectomy   • Hiatal hernia     repaired 2018   • Hiatal hernia with GERD without esophagitis 04/2018    surgical correction   • History of esophageal varices with bleeding    • History of pernicious anemia    • History of transfusion     x1 after bleeding ulcer   • Hyperlipidemia    • Hypertension    • Irritable bowel syndrome    • Neck mass     2 small [facility]   • RA (rheumatoid arthritis) ([address])    • Seasonal allergies    • Vertigo    • Wears glasses     for reading     Past Surgical History:   Procedure Laterality Date   • BREAST BIOPSY Bilateral     negative   • CARDIAC CATHETERIZATION      x2 secondary to exertional chest pain, due to lung issues, possible mild COPD   • CARDIAC ELECTROPHYSIOLOGY PROCEDURE N/A 12/23/2022    Procedure: Cardiac eps/afib ablation;  Surgeon: Evelina Krishnan MD;  [facility]: [facility]; Service: Cardiology   • CARDIAC ELECTROPHYSIOLOGY PROCEDURE N/A 12/23/2022    Procedure: Cardiac eps/aflutter ablation;  Surgeon: Evelina Krishnan MD;  Location: [facility]; Service: Cardiology   • CARDIAC ELECTROPHYSIOLOGY PROCEDURE N/A 10/24/2023    Procedure: Cardiac pacer implant DC PM;  Surgeon: Evelina Krishnan MD;  Location: [facility];   Service: Cardiology   • CATARACT EXTRACTION Bilateral    • CHOLECYSTECTOMY  2015    lap - last assessed 10/4/17   • COLONOSCOPY      complete   • ESOPHAGOGASTRODUODENOSCOPY N/A 1/23/2018 Procedure: ESOPHAGOGASTRODUODENOSCOPY (EGD); Surgeon: Dylan Napoles MD;  Location: [facility]; Service: Gastroenterology   • HYSTERECTOMY      partial - ovaries remain   • LIPOMA RESECTION      right thigh   • VT ESOPHAGOSCOPY FLEXIBLE TRANSORAL WITH BIOPSY N/A 4/11/2018    Procedure: ESOPHAGOGASTRODUODENOSCOPY (EGD); Surgeon: Holly Jama MD;  Location:  MAIN OR;  Service: Thoracic   • VT LAPS RPR PARAESPHGL HRNA INCL FUNDPLSTY [address] N/A 4/11/2018    Procedure: REPAIR HERNIA PARAESOPHAGEAL  LAPAROSCOPIC,ELEONORA GASTROPLASTY, Mozell Spice FUNDOPLICATION;  Surgeon: Holly Jama MD;  Location:  MAIN OR;  Service: Thoracic   • VT XCAPSL CTRC RMVL INSJ IO LENS PROSTH W/O ECP Right 8/23/2021    Procedure: EXTRACTION EXTRACAPSULAR CATARACT PHACO INTRAOCULAR LENS (IOL); Surgeon: Ryan Mccall MD;  Location: [facility] MA[facility] OR;  Service: Ophthalmology   • VT XCAPSL CTRC RMVL INSJ IO LENS PROSTH W/O ECP Left 11/15/2021    Procedure: EXTRACTION EXTRACAPSULAR CATARACT PHACO INTRAOCULAR LENS (IOL);   Surgeon: Ryan Mccall MD;  Location: [facility] MA[facility] OR;  Service: Ophthalmology   • SKIN BIOPSY Right     lump benign - last assessed 10/4/17   • THYROIDECTOMY, PARTIAL  1977   • TONSILLECTOMY     • US GUIDED THYROID BIOPSY  2/21/2023      Family History:     Family History   Problem Relation Age of Onset   • Alzheimer's disease Mother    • Cancer Mother         skin    • Thyroid disease Mother    • Ulcerative colitis Mother    • Cancer Father         prostate   • Stroke Father    • Hypertension Father    • Prostate cancer Father    • Thyroid disease Sister    • Ulcerative colitis Sister    • Other Sister         open heart surgery   • Heart disease Sister    • Hyperlipidemia Brother    • No Known Problems Son    • No Known Problems Son    • No Known Problems Maternal Grandmother    • No Known Problems Paternal Grandmother    • Mental illness Neg Hx       Social History:     Social History     Socioeconomic History   • Marital status: /Civil Union     Spouse name: None   • Number of children: None   • Years of education: None   • Highest education level: None   Occupational History   • None   Tobacco Use   • Smoking status: Never     Passive exposure: Never   • Smokeless tobacco: Never   Vaping Use   • Vaping Use: Never used   Substance and Sexual Activity   • Alcohol use: Not Currently     Comment: seldom   • Drug use: Never   • Sexual activity: None   Other Topics Concern   • None   Social History Narrative    Feels safe at home     Social Determinants of Health     Financial Resource Strain: Low Risk  (11/6/2023)    Overall Financial Resource Strain (CARDIA)    • Difficulty of Paying Living Expenses: Not hard at all   Food Insecurity: Not on file   Transportation Needs: No Transportation Needs (11/6/2023)    PRAPARE - Transportation    • Lack of Transportation (Medical): No    • Lack of Transportation (Non-Medical):  No   Physical Activity: Not on file   Stress: Not on file   Social Connections: Not on file   Intimate Partner Violence: Not on file   Housing Stability: Not on file      Medications and Allergies:     Current Outpatient Medications   Medication Sig Dispense Refill   • atorvastatin (LIPITOR) 20 mg tablet TAKE 1 TABLET BY MOUTH DAILY AT  BEDTIME 90 tablet 1   • bumetanide (BUMEX) 1 mg tablet TAKE 1 TABLET BY MOUTH  DAILY 90 tablet 3   • Calcium Carb-Cholecalciferol 600-1000 MG-UNIT CAPS Take by mouth every morning gummy      • Eliquis 5 MG TAKE 1 TABLET BY MOUTH TWICE  DAILY 180 tablet 3   • flecainide (TAMBOCOR) 100 mg tablet Take 1 tablet (100 mg total) by mouth 2 (two) times a day 60 tablet 3   • levothyroxine 50 mcg tablet TAKE 1 TABLET BY MOUTH DAILY 90 tablet 1   • metoprolol succinate (TOPROL-XL) 25 mg 24 hr tablet Take 1 tablet (25 mg total) by mouth 2 (two) times a day 180 tablet 3   • omeprazole (PriLOSEC) 40 MG capsule TAKE 1 CAPSULE BY MOUTH  TWICE DAILY 180 capsule 3   • potassium chloride 20 MEQ TBCR Take 1 tablet (20 mEq total) by mouth in the morning 90 tablet 3     No current facility-administered medications for this visit. Allergies   Allergen Reactions   • Orange (Diagnostic) - Food Allergy Anaphylaxis     Throat closes   • Pantoprazole Headache   • Penicillins Other (See Comments)     During allergy testing instructed to NEVER take PCN  Patient has tolerated cefazolin. • Gluten Meal - Food Allergy      Following a gluten free diet on her own   • Lactose - Food Allergy Diarrhea   • Sulfa Antibiotics Rash      Immunizations:     Immunization History   Administered Date(s) Administered   • COVID-19 MODERNA VACC 0.25 ML IM BOOSTER 12/07/2021   • COVID-19 MODERNA VACC 0.5 ML IM 02/23/2021, 03/26/2021, 12/07/2021, 05/18/2022   • COVID-19 Pfizer Vac BIVALENT Júnior-sucrose 12 Yr+ IM 11/18/2022   • INFLUENZA 10/19/2010, 09/30/2011, 10/19/2012, 11/05/2013, 10/01/2014, 09/30/2020   • Influenza Split High Dose Preservative Free IM 11/02/2015, 10/27/2016, 10/04/2017   • Influenza, high dose seasonal 0.7 mL 09/26/2018, 10/17/2019, 09/28/2021, 11/15/2022   • Pneumococcal Conjugate 13-Valent 08/10/2016   • Pneumococcal Polysaccharide PPV23 10/04/2017   • Tdap 07/30/2015      Health Maintenance:         Topic Date Due   • Breast Cancer Screening: Mammogram  11/02/2023   • Colorectal Cancer Screening  07/28/2025   • Hepatitis C Screening  Completed         Topic Date Due   • COVID-19 Vaccine (7 - Moderna series) 03/18/2023   • Influenza Vaccine (1) 09/01/2023      Medicare Screening Tests and Risk Assessments:         Depression Screening:   PHQ-2 Score: 0      No results found. Physical Exam:     /80   Pulse 62   Temp 97.9 °F (36.6 °C)   Resp 18   Ht 5' 5" (1.651 m)   Wt 96.2 kg (212 lb)   BMI 35.28 kg/m²     Physical Exam  Vitals and nursing note reviewed. Constitutional:       General: She is not in acute distress. Appearance: She is well-developed.    HENT:      Head: Normocephalic and atraumatic. Eyes:      Conjunctiva/sclera: Conjunctivae normal.   Cardiovascular:      Rate and Rhythm: Normal rate and regular rhythm. Heart sounds: No murmur heard. Pulmonary:      Effort: Pulmonary effort is normal. No respiratory distress. Breath sounds: Normal breath sounds. Abdominal:      Palpations: Abdomen is soft. Tenderness: There is no abdominal tenderness. Musculoskeletal:         General: No swelling. Cervical back: Neck supple. Skin:     General: Skin is warm and dry. Capillary Refill: Capillary refill takes less than 2 seconds. Comments: L anterior chest wall with healing pacemaker site, no erythema or induration, no discharge   Neurological:      Mental Status: She is alert.    Psychiatric:         Mood and Affect: Mood normal.          Karlo Pena MD

## 2023-11-13 ENCOUNTER — IN-CLINIC DEVICE VISIT (OUTPATIENT)
Dept: CARDIOLOGY CLINIC | Facility: CLINIC | Age: 73
End: 2023-11-13

## 2023-11-13 DIAGNOSIS — Z95.0 CARDIAC PACEMAKER IN SITU: Primary | ICD-10-CM

## 2023-11-13 PROCEDURE — 99024 POSTOP FOLLOW-UP VISIT: CPT | Performed by: INTERNAL MEDICINE

## 2023-11-13 NOTE — PROGRESS NOTES
Results for orders placed or performed in visit on 11/13/23   Cardiac EP device report    Narrative    MDT DC/PM-ACTIVE SYSTEM IS MRI CONDITIONAL  AF ABLATION 12/2022-DR. GODFREYSION 2X  WOUND CHECK: INCISION CLEAN AND DRY WITH EDGES APPROXIMATED; WOUND CARE AND RESTRICTIONS REVIEWED WITH PATIENT. DEVICE INTERROGATED IN THE [place] OFFICE: BATTERY VOLTAGE ADEQUATE-13 YRS. AP 30%  34%. ALL AVAILABLE LEAD PARAMETERS WITHIN NORMAL LIMITS. 21 AT/AF NOTED; 50% BURDEN; PT ON FLECAINIDE, ELIQUIS, & METO SUCC. EF 60% (ECHO 2022). PT ASYMPTOMATIC. NO PROGRAMMING CHANGES MADE TO DEVICE PARAMETERS. NORMAL DEVICE FUNCTION.  NC

## 2023-11-15 DIAGNOSIS — I49.5 TACHY-BRADY SYNDROME (HCC): ICD-10-CM

## 2023-11-16 RX ORDER — FLECAINIDE ACETATE 100 MG/1
100 TABLET ORAL 2 TIMES DAILY
Qty: 180 TABLET | Refills: 3 | Status: SHIPPED | OUTPATIENT
Start: 2023-11-16

## 2023-11-27 ENCOUNTER — TELEPHONE (OUTPATIENT)
Dept: CARDIOLOGY CLINIC | Facility: CLINIC | Age: 73
End: 2023-11-27

## 2023-11-27 ENCOUNTER — IN-CLINIC DEVICE VISIT (OUTPATIENT)
Dept: CARDIOLOGY CLINIC | Facility: CLINIC | Age: 73
End: 2023-11-27

## 2023-11-27 DIAGNOSIS — Z95.0 CARDIAC PACEMAKER IN SITU: Primary | ICD-10-CM

## 2023-11-27 PROCEDURE — RECHECK: Performed by: INTERNAL MEDICINE

## 2023-11-27 NOTE — PROGRESS NOTES
Results for orders placed or performed in visit on 11/27/23   Cardiac EP device report    Narrative    MDT DC/PM-ACTIVE SYSTEM IS MRI CONDITIONAL  NB/REPROG-DEVICE INTERROGATED IN THE [place] OFFICE: PT CHANGED TO PARTIAL+ AND DECREASE MADE TO MODE SWITCH TO REDUCE PACING & TRACKING DURING AFIB. 33 AT/AF NOTED; 87% BURDEN; LONGEST >24 HRS. PT ON ELIQUIS. BATTERY VOLTAGE ADEQUATE-13 YRS. AP 12%  63%. ALL AVAILABLE LEAD PARAMETERS & TRENDS WITHIN NORMAL LIMITS. NORMAL DEVICE FUNCTION.  NC

## 2023-11-27 NOTE — TELEPHONE ENCOUNTER
Received pt's [facility] Summary report via email and was reviewed by Louis. Pt had device transmission today. Bella,  Can you please review her device transmission from today? Thanks!

## 2023-12-01 NOTE — TELEPHONE ENCOUNTER
Received second [address] [place] [address] Road Summary Report for 11/24/23 - 12/01/23 and it has been uploaded to [facility].

## 2023-12-06 ENCOUNTER — OFFICE VISIT (OUTPATIENT)
Dept: CARDIOLOGY CLINIC | Facility: CLINIC | Age: 73
End: 2023-12-06
Payer: MEDICARE

## 2023-12-06 VITALS
HEIGHT: 65 IN | HEART RATE: 65 BPM | DIASTOLIC BLOOD PRESSURE: 90 MMHG | BODY MASS INDEX: 35.28 KG/M2 | SYSTOLIC BLOOD PRESSURE: 132 MMHG

## 2023-12-06 DIAGNOSIS — I48.19 PERSISTENT ATRIAL FIBRILLATION (HCC): ICD-10-CM

## 2023-12-06 DIAGNOSIS — Z95.0 CARDIAC PACEMAKER IN SITU: Primary | ICD-10-CM

## 2023-12-06 DIAGNOSIS — I49.1 PAC (PREMATURE ATRIAL CONTRACTION): ICD-10-CM

## 2023-12-06 DIAGNOSIS — I49.5 TACHY-BRADY SYNDROME (HCC): ICD-10-CM

## 2023-12-06 PROCEDURE — 99215 OFFICE O/P EST HI 40 MIN: CPT | Performed by: INTERNAL MEDICINE

## 2023-12-06 PROCEDURE — 93000 ELECTROCARDIOGRAM COMPLETE: CPT | Performed by: INTERNAL MEDICINE

## 2023-12-06 RX ORDER — DILTIAZEM HYDROCHLORIDE 120 MG/1
120 CAPSULE, COATED, EXTENDED RELEASE ORAL DAILY
Qty: 90 CAPSULE | Refills: 3
Start: 2023-12-06

## 2023-12-06 NOTE — PATIENT INSTRUCTIONS
Stop flecainide    Start diltiazem 120 mg once daily; we will check your device in 2 weeks to see the response. If your heart rate is fast then will have to increase diltiazem to 180 mg daily; if your blood pressure is getting low then will have to stop diltiazem.

## 2023-12-06 NOTE — PROGRESS NOTES
EPS follow-up patient Evaluation - Katie Mejia 75 y.o. female MRN: [id number]       Referring:Dr. Yuko Serna    CC/HPI:   It was a pleasure to see Katie Mejia in our arrhythmia clinic at [facility] [place]. As you know she is a 75 y. o. woman with persistent atrial fibrillation, HTN, HLD, anxiety, GERD, hiatal hernia, hypertension, hyperlipidemia, IBS, rheumatoid arthritis who presented 12/1/2022 to discuss management of atrial arrhythmias. She was diagnosed with atrial fibrillation in January at the PCP office. She has mild coronary artery disease but no stents. She had a repeat catheterization in 2017 which showed no significant coronary disease. She has had history of bleeding ulcer and required hospitalization in the past needing blood transfusion. She had used "[employer] mobile device in January which had suggested atrial fibrillation. She has been having on and off palpitations and exercise intolerance since September 2021. Echocardiogram has shown severe left atrial dilation. She was started on Toprol XL and Eliquis. As she continued to have symptoms with exertion cardioversion was recommended however she declined the option in April. Option was again recommended in July office visit and this time she agreed to proceed with it. She also had exertional chest tightness and stress test was performed which was negative. A cardioversion in October was only transiently successful. Her metoprolol dose was increased in October and flecainide is 100 mg twice daily was started. She underwent repeat cardioversion on November 3rd which was successful. However she was back in atrial fibrillation after 18 hours. EKG performed on November 9, 2022 confirm she was back in atrial fibrillation. Now she presents to discuss further options. She presented with her  who also provided further history. Patient did report having fatigue when having atrial fibrillation.   She did note palpitations when she went back into atrial fibrillation after recent cardioversion. She denied any significant caffeine or alcohol use. She denied any drug use. She felt significant lightheadedness/dizziness since being on flecainide and is afraid to drive a car. She noted symptoms happening even at rest.    Office visit 1/23/2023: After discussing options she opted to proceed with ablation procedure. Patient had pulmonary vein isolation, posterior wall isolation and empiric CTI line placement on December 23, 2022. She was maintained on flecainide 50 mg twice daily and Toprol-XL 25 mg daily. Her blood pressure was elevated afterwards and losartan 25 mg was restarted. She reported feeling well. She denied any palpitations. She did have loss of vision in the right eye after ablation. Neurology team was consulted and brain MRI was done. She was diagnosed with TIA. Her vision recovered spontaneously. She was not placed on aspirin after the procedure to hx of GI bleeding. Office visit 8/29/23:  She was seen in our EP ofice. Her heart was noted to be low and therefore flecainide and Toprol XL were discontinued. She had PAC/PVC and diltiazem was started. Interval history:  Benjamin Hawley presents for a follow-up visit. She had event monitor and had bradycardia at night. She continued to have palpitations therefore we implanted PPM and restarted flecainide. She continue to feel symptoms mainly when exerting while walking. She is not able to keep up the pace. She feels better after the pacemaker but not back to normal self. She attributes possibly to flecainide. She did feel better on diltiazem.          Past Medical History:  Past Medical History:   Diagnosis Date    Anxiety     resolved 10/4/17    Asthma     seasonal    Atrial fibrillation ([address]) 01/2022    newly diagnosed on eliquis    Chronic kidney disease     kidney stone    Colon polyp     Disease of thyroid gland     Dysphagia     GERD (gastroesophageal reflux disease)     Goiter, nodular     partial thyroidectomy    Hiatal hernia     repaired 2018    Hiatal hernia with GERD without esophagitis 04/2018    surgical correction    History of esophageal varices with bleeding     History of pernicious anemia     History of transfusion     x1 after bleeding ulcer    Hyperlipidemia     Hypertension     Irritable bowel syndrome     Neck mass     2 small areas left side by jawline and midneck US scheduled 0820/21    RA (rheumatoid arthritis) (HCC)     Seasonal allergies     Vertigo     Wears glasses     for reading       Medications:      Current Outpatient Medications:     atorvastatin (LIPITOR) 20 mg tablet, TAKE 1 TABLET BY MOUTH DAILY AT  BEDTIME, Disp: 90 tablet, Rfl: 1    bumetanide (BUMEX) 1 mg tablet, TAKE 1 TABLET BY MOUTH  DAILY, Disp: 90 tablet, Rfl: 3    Calcium Carb-Cholecalciferol 600-1000 MG-UNIT CAPS, Take by mouth every morning gummy , Disp: , Rfl:     diltiazem (CARDIZEM CD) 120 mg 24 hr capsule, Take 1 capsule (120 mg total) by mouth daily, Disp: 90 capsule, Rfl: 3    Eliquis 5 MG, TAKE 1 TABLET BY MOUTH TWICE  DAILY, Disp: 180 tablet, Rfl: 3    levothyroxine 50 mcg tablet, TAKE 1 TABLET BY MOUTH DAILY, Disp: 90 tablet, Rfl: 1    metoprolol succinate (TOPROL-XL) 25 mg 24 hr tablet, Take 1 tablet (25 mg total) by mouth 2 (two) times a day, Disp: 180 tablet, Rfl: 3    omeprazole (PriLOSEC) 40 MG capsule, TAKE 1 CAPSULE BY MOUTH  TWICE DAILY, Disp: 180 capsule, Rfl: 3    potassium chloride 20 MEQ TBCR, Take 1 tablet (20 mEq total) by mouth in the morning, Disp: 90 tablet, Rfl: 3     Family History   Problem Relation Age of Onset    Alzheimer's disease Mother     Cancer Mother         skin     Thyroid disease Mother     Ulcerative colitis Mother     Cancer Father         prostate    Stroke Father     Hypertension Father     Prostate cancer Father     Thyroid disease Sister     Ulcerative colitis Sister     Other Sister         open heart surgery    Heart disease Sister     Hyperlipidemia Brother     No Known Problems Son     No Known Problems Son     No Known Problems Maternal Grandmother     No Known Problems Paternal Grandmother     Mental illness Neg Hx      Social History     Socioeconomic History    Marital status: /Civil Union     Spouse name: Not on file    Number of children: Not on file    Years of education: Not on file    Highest education level: Not on file   Occupational History    Not on file   Tobacco Use    Smoking status: Never     Passive exposure: Never    Smokeless tobacco: Never   Vaping Use    Vaping Use: Never used   Substance and Sexual Activity    Alcohol use: Not Currently     Comment: seldom    Drug use: Never    Sexual activity: Not on file   Other Topics Concern    Not on file   Social History Narrative    Feels safe at home     Social Determinants of Health     Financial Resource Strain: Low Risk  (11/6/2023)    Overall Financial Resource Strain (CARDIA)     Difficulty of Paying Living Expenses: Not hard at all   Food Insecurity: Not on file   Transportation Needs: No Transportation Needs (11/6/2023)    PRAPARE - Transportation     Lack of Transportation (Medical): No     Lack of Transportation (Non-Medical): No   Physical Activity: Not on file   Stress: Not on file   Social Connections: Not on file   Intimate Partner Violence: Not on file   Housing Stability: Not on file     Social History     Tobacco Use   Smoking Status Never    Passive exposure: Never   Smokeless Tobacco Never     Social History     Substance and Sexual Activity   Alcohol Use Not Currently    Comment: seldom       Review of Systems   Constitutional: Positive for malaise/fatigue. Negative for chills and fever. HENT: Negative. Eyes:  Negative for blurred vision and double vision. Cardiovascular:  Negative for chest pain, dyspnea on exertion, leg swelling, near-syncope, orthopnea, palpitations, paroxysmal nocturnal dyspnea and syncope. Respiratory:  Negative for cough and sputum production. Endocrine: Negative. Skin: Negative. Negative for rash. Musculoskeletal:  Positive for arthritis. Negative for joint pain. Gastrointestinal:  Negative for abdominal pain, nausea and vomiting. Genitourinary: Negative. Neurological:  Negative for dizziness and light-headedness. Psychiatric/Behavioral: Negative. The patient is not nervous/anxious. Objective:     Vitals: Blood pressure 132/90, pulse 65, height 5' 5" (1.651 m), not currently breastfeeding., Body mass index is 35.28 kg/m². ,        Physical Exam:    GEN: Darwin Beck appears well, alert and oriented x 3, pleasant and cooperative; obese   HEENT: pupils equal, round, and reactive to light; extraocular muscles intact  NECK: supple, no carotid bruits   HEART: Regular rate and rhythm, normal S1 and S2, no murmurs, clicks, gallops or rubs   LUNGS: clear to auscultation bilaterally; no wheezes, rales, or rhonchi   ABDOMEN: normal bowel sounds, soft, no tenderness, no distention  EXTREMITIES: peripheral pulses normal; no clubbing, cyanosis, or edema  NEURO: no focal findings   SKIN: normal without suspicious lesions on exposed skin      Labs & Results:  Below is the patient's most recent value for Albumin, ALT, AST, BUN, Calcium, Chloride, Cholesterol, CO2, Creatinine, GFR, Glucose, HDL, Hematocrit, Hemoglobin, Hemoglobin A1C, LDL, Magnesium, Phosphorus, Platelets, Potassium, PSA, Sodium, Triglycerides, and WBC.    Lab Results   Component Value Date    ALT 17 10/06/2023    AST 21 10/06/2023    BUN 10 10/24/2023    CALCIUM 9.1 10/24/2023     10/24/2023    CHOL 144 09/01/2016    CO2 28 10/24/2023    CREATININE 0.74 10/24/2023    HDL 62 01/18/2023    HCT 43.5 10/24/2023    HGB 14.2 10/24/2023    HGBA1C 5.5 12/25/2022    MG 1.9 01/31/2022    PHOS 3.3 04/17/2018     10/24/2023    K 3.3 (L) 10/24/2023     09/01/2016    TRIG 83 01/18/2023    WBC 4.81 10/24/2023 Note: for a comprehensive list of the patient's lab results, access the Results Review activity. Cardiac testing:     I personally reviewed the ECG performed in the clinic on 12/06/23. It reveals atrial flutter with ventricular paced and conducted beats. Echocardiograms:  No results found for this or any previous visit. No results found for this or any previous visit. Catheterizations:   No results found for this or any previous visit. Stress Tests:  Results for orders placed during the hospital encounter of 04/21/22    NM myocardial perfusion spect (stress and/or rest)    Interpretation Summary    Stress ECG: Arrhythmias during recovery: rare PVCs. The ECG was equivocal for ischemia. The ECG was not diagnostic due to pharmacological (vasodilator) stress. The stress ECG is equivocal for ischemia after pharmacologic vasodilation. Stress Function: Left ventricular function post-stress is normal. Post-stress ejection fraction is 59 %. Perfusion: There are no perfusion defects. Stress ECG: A Miguel protocol stress test was performed. The patient reached stage 2.0 of the protocol after exercising for 4 min and 5 sec and had a maximal HR of 151 bpm (102 % of MPHR) and 7.0 METS. The patient experienced no angina during the test. The test was stopped because the patient experienced dyspnea. The patient achieved the target heart rate. The patient reported dyspnea during the stress test. Onset of symptoms occurred at stage 2 of the protocol. Symptoms ended during recovery. Blood pressure demonstrated a normal response and heart rate demonstrated a normal response to stress. The patient's heart rate recovery was normal.    Negative study for evidence of pharmacological induced ischemia or prior infarction. No major symptoms with vasodilation. Elevated baseline blood pressure of 150/100 noted. Holter monitor -   No results found for this or any previous visit.     No results found for this or any previous visit. ASSESSMENT/PLAN:  1. Persistent atrial fibrillation  Patient was diagnosed with atrial fibrillation in January 2021  She then started to have on of palpitations  She was started on metoprolol and Eliquis  Cardioversion was performed in October 2022 however it was unsuccessful  She was started on flecainide 100 mg twice daily and metoprolol dose was increased  She had repeat cardioversion on November 3, 2022 which was successful in restoring sinus rhythm however it only lasted 18 hours  We discuss next step in management option including alternative rhythm medication versus ablation procedure  After answering all the questions she opted to proceed with ablation procedure  She is s/p pulmonary vein isolation, posterior wall isolation and empiric CTI line placement on December 23, 2022  She had TIA next day during which she lost vision in the right eye. Brain MRI was negative for stroke. Neurology team was consulted and diagnosed her with TIA. She is currently maintained on flecainide 50 mg twice daily and metoprolol 12.5 mg daily  She will need to be maintained on Eliquis due to TIA. Metoprolol dose was increased to 25 mg daily  She had noticed her [employer] mobile hide revealed slow heart rate  Therefore flecainide and metoprolol were discontinued  She continues to have palpitations with cardiac event monitor showing PAC/PVC  She was started on diltiazem 120 mg daily but continued to have symptoms  Given tachycardia/bradycardia syndrome she underwent pacemaker placement  Flecainide and metoprolol were restarted  She continues to feel dyspneic when exerting  Rate histogram shows she may not be getting higher ventricular rates with exertion  We increased rate response  We will also discontinue flecainide and start diltiazem 120 mg daily to see if she has symptoms due to medication  If she continues to have fast episodes and will increase dose to 180 mg daily    2. Hypertension  Patient was maintained on losartan  Diastolic blood pressure elevated in the office  Continue to monitor after starting diltiazem  Previously was on losartan but blood pressure came low and losartan was discontinued    3. Hyperlipidemia   Maintained on atorvastatin 20 mg daily    4. Hypothyroidism  Maintained on levothyroxine 50 mcg daily    5.  Diastolic heart failure  Maintain on Bumex 1 mg daily    Follow-up in 6 months

## 2023-12-07 ENCOUNTER — HOSPITAL ENCOUNTER (OUTPATIENT)
Dept: RADIOLOGY | Facility: HOSPITAL | Age: 73
Discharge: HOME | End: 2023-12-07
Payer: MEDICARE

## 2023-12-07 DIAGNOSIS — E03.9 ACQUIRED HYPOTHYROIDISM: ICD-10-CM

## 2023-12-07 PROCEDURE — 76536 US EXAM OF HEAD AND NECK: CPT

## 2023-12-13 ENCOUNTER — TELEPHONE (OUTPATIENT)
Dept: ENDOCRINOLOGY | Facility: CLINIC | Age: 73
End: 2023-12-13

## 2023-12-13 NOTE — TELEPHONE ENCOUNTER
Fuentes Merrill from Radiology called with Significant findings:    US Thyroid Ordered by Dr. Elliot Engel [phone]

## 2023-12-13 NOTE — TELEPHONE ENCOUNTER
Please call and inform patient of stable thyroid nodules  Recommend repeat thyroid ultrasound in 1 year

## 2024-01-04 DIAGNOSIS — I10 BENIGN HYPERTENSION: ICD-10-CM

## 2024-01-05 RX ORDER — BUMETANIDE 1 MG/1
TABLET ORAL
Qty: 90 TABLET | Refills: 1 | Status: SHIPPED | OUTPATIENT
Start: 2024-01-05

## 2024-01-12 ENCOUNTER — APPOINTMENT (OUTPATIENT)
Dept: LAB | Facility: HOSPITAL | Age: 74
End: 2024-01-12
Payer: COMMERCIAL

## 2024-01-12 DIAGNOSIS — E87.6 HYPOKALEMIA: ICD-10-CM

## 2024-01-12 LAB
ALBUMIN SERPL BCP-MCNC: 4 G/DL (ref 3.5–5)
ALP SERPL-CCNC: 50 U/L (ref 34–104)
ALT SERPL W P-5'-P-CCNC: 15 U/L (ref 7–52)
ANION GAP SERPL CALCULATED.3IONS-SCNC: 8 MMOL/L
AST SERPL W P-5'-P-CCNC: 21 U/L (ref 13–39)
BILIRUB SERPL-MCNC: 0.92 MG/DL (ref 0.2–1)
BUN SERPL-MCNC: 13 MG/DL (ref 5–25)
CALCIUM SERPL-MCNC: 9.1 MG/DL (ref 8.4–10.2)
CHLORIDE SERPL-SCNC: 105 MMOL/L (ref 96–108)
CO2 SERPL-SCNC: 28 MMOL/L (ref 21–32)
CREAT SERPL-MCNC: 0.81 MG/DL (ref 0.6–1.3)
GFR SERPL CREATININE-BSD FRML MDRD: 71 ML/MIN/1.73SQ M
GLUCOSE P FAST SERPL-MCNC: 102 MG/DL (ref 65–99)
POTASSIUM SERPL-SCNC: 3.7 MMOL/L (ref 3.5–5.3)
PROT SERPL-MCNC: 6.7 G/DL (ref 6.4–8.4)
SODIUM SERPL-SCNC: 141 MMOL/L (ref 135–147)

## 2024-02-06 ENCOUNTER — OFFICE VISIT (OUTPATIENT)
Dept: ENDOCRINOLOGY | Facility: CLINIC | Age: 74
End: 2024-02-06
Payer: COMMERCIAL

## 2024-02-06 VITALS
BODY MASS INDEX: 36.02 KG/M2 | HEIGHT: 65 IN | SYSTOLIC BLOOD PRESSURE: 100 MMHG | HEART RATE: 81 BPM | OXYGEN SATURATION: 97 % | WEIGHT: 216.2 LBS | DIASTOLIC BLOOD PRESSURE: 70 MMHG

## 2024-02-06 DIAGNOSIS — E04.2 MULTIPLE THYROID NODULES: ICD-10-CM

## 2024-02-06 DIAGNOSIS — E03.0 CONGENITAL HYPOTHYROIDISM WITH DIFFUSE GOITER: Primary | ICD-10-CM

## 2024-02-06 DIAGNOSIS — M80.80XA LOCALIZED OSTEOPOROSIS WITH CURRENT PATHOLOGICAL FRACTURE, INITIAL ENCOUNTER: ICD-10-CM

## 2024-02-06 DIAGNOSIS — E55.9 VITAMIN D DEFICIENCY: ICD-10-CM

## 2024-02-06 DIAGNOSIS — E66.01 CLASS 2 SEVERE OBESITY DUE TO EXCESS CALORIES WITH SERIOUS COMORBIDITY AND BODY MASS INDEX (BMI) OF 35.0 TO 35.9 IN ADULT: ICD-10-CM

## 2024-02-06 PROCEDURE — 99214 OFFICE O/P EST MOD 30 MIN: CPT | Performed by: NURSE PRACTITIONER

## 2024-02-06 NOTE — PATIENT INSTRUCTIONS
-Repeat thyroid function tests (TSH and free T4) within 2 months  -Repeat thyroid ultrasound in December 2024  -Schedule DXA scan when able

## 2024-02-06 NOTE — PROGRESS NOTES
Established Patient Progress Note     CC: Endocrinology follow up visit    Impression & Plan:    Problem List Items Addressed This Visit          Endocrine    Congenital hypothyroidism with diffuse goiter - Primary     Clinically euthyroid on levothyroxine 50 mcg daily. Recommend repeat thyroid function tests.     Component      Latest Ref Rng 6/14/2023   TSH 3RD GENERATON      0.450 - 4.500 uIU/mL 0.928    Free T4      0.61 - 1.12 ng/dL 1.07               Relevant Orders    TSH, 3rd generation    T4, free    Multiple thyroid nodules     Stable thyroid ultrasound from December 2023.   Denies neck compressive symptoms.  Recommend repeat thyroid ultrasound in December 2024.             Musculoskeletal and Integument    Localized osteoporosis with current pathological fracture     Continues on calcium and vitamin D3 supplementation.   Last DXA scan from November 2021.   Recommend repeat DXA scan.             Other    Class 2 severe obesity due to excess calories with serious comorbidity and body mass index (BMI) of 35.0 to 35.9 in adult     Vitamin D deficiency     Continues on vitamin D3 supplementation 1000 IU daily.             History of Present Illness:     Swetha Lopez is a 73 y.o. female with a history of post-operative thyroidism s/p R hemithyroidectomy for multinodular goiter, pathology benign with history of AFIB. History of osteopenia and Vitamin D deficiency.     For hypothyroidism, continues on levothyroxine 50 mcg daily, taken regularly and properly. Denies any symptoms consistent with hypo/hyperthyroidism.     For multinodular goiter s/p hemithyroidectomy at age 28 years old. Has been on levothyroxine since. Denies neck compressive symptoms. Had an ultrasound-guided thyroid biopsy from February 2023 which was benign. Most recent thyroid ultrasound completed in December 2023 which demonstrated stable thyroid nodules, recommendation to repeat ultrasound in 12 months.     For osteopenia, DXA scan  "from November 2021 demonstrated low bone mineral density with high FRAX score, \"The 10 year risk of hip fracture is 4.3% with the 10 year risk of major osteoporotic fracture being 19.4%\". Continues to deny falls, fractures, or bone pain\". Due for repeat DXA scan.     For vitamin D deficiency, patient continues taking combined calcium and vitamin D supplementation. Most recent vitamin D level from June 2023 was within normal range at 32.4.     Patient Active Problem List   Diagnosis    Hiatal hernia    GERD with esophagitis    Hypokalemia    Benign hypertension    Mixed hyperlipidemia    Congenital hypothyroidism with diffuse goiter    Liver lesion, left lobe    Hemorrhoids    IBS (irritable bowel syndrome)    Multiple thyroid nodules    Stress incontinence, female    Abnormal CT of liver    Class 2 severe obesity due to excess calories with serious comorbidity and body mass index (BMI) of 35.0 to 35.9 in adult     BMI 35.0-35.9,adult    Anxiety    Rheumatoid arthritis (HCC)    Vertigo    History of hysterectomy    Parotid mass    Xerostomia    Asthma    Persistent atrial fibrillation (HCC)    Peripheral vision loss, right    Pigmented purpura (HCC)    Pulmonary hypertension (HCC)    History of TIA (transient ischemic attack)    Abnormal thyroid biopsy    Localized osteoporosis with current pathological fracture    Vitamin D deficiency    Tachy-lizet syndrome (HCC)      Past Medical History:   Diagnosis Date    Anxiety     resolved 10/4/17    Asthma     seasonal    Atrial fibrillation (HCC) 01/2022    newly diagnosed on eliquis    Chronic kidney disease     kidney stone    Colon polyp     Disease of thyroid gland     Dysphagia     GERD (gastroesophageal reflux disease)     Goiter, nodular     partial thyroidectomy    Hiatal hernia     repaired 2018    Hiatal hernia with GERD without esophagitis 04/2018    surgical correction    History of esophageal varices with bleeding     History of pernicious anemia     History " of transfusion     x1 after bleeding ulcer    Hyperlipidemia     Hypertension     Irritable bowel syndrome     Neck mass     2 small areas left side by jawline and midneck  scheduled 0820/21    RA (rheumatoid arthritis) (HCC)     Seasonal allergies     Vertigo     Wears glasses     for reading      Past Surgical History:   Procedure Laterality Date    BREAST BIOPSY Bilateral     negative    CARDIAC CATHETERIZATION      x2 secondary to exertional chest pain, due to lung issues, possible mild COPD    CARDIAC ELECTROPHYSIOLOGY PROCEDURE N/A 12/23/2022    Procedure: Cardiac eps/afib ablation;  Surgeon: Sunil Eubanks MD;  Location: [facility];  Service: Cardiology    CARDIAC ELECTROPHYSIOLOGY PROCEDURE N/A 12/23/2022    Procedure: Cardiac eps/aflutter ablation;  Surgeon: Sunil Eubanks MD;  Location:  [facility];  Service: Cardiology    CARDIAC ELECTROPHYSIOLOGY PROCEDURE N/A 10/24/2023    Procedure: Cardiac pacer implant DC PM;  Surgeon: Sunil Eubanks MD;  Location:  [facility];  Service: Cardiology    CATARACT EXTRACTION Bilateral     CHOLECYSTECTOMY  2015    lap - last assessed 10/4/17    COLONOSCOPY      complete    ESOPHAGOGASTRODUODENOSCOPY N/A 1/23/2018    Procedure: ESOPHAGOGASTRODUODENOSCOPY (EGD);  Surgeon: Richard Bledsoe MD;  Location: [facility];  Service: Gastroenterology    HYSTERECTOMY      partial - ovaries remain    LIPOMA RESECTION      right thigh    AL ESOPHAGOSCOPY FLEXIBLE TRANSORAL WITH BIOPSY N/A 4/11/2018    Procedure: ESOPHAGOGASTRODUODENOSCOPY (EGD);  Surgeon: Yeison Masterson MD;  Location:  MAIN OR;  Service: Thoracic    AL LAPS RPR PARAESPHGL HRNA INCL FUNDPLSTY W/MESH N/A 4/11/2018    Procedure: REPAIR HERNIA PARAESOPHAGEAL  LAPAROSCOPIC,ELEONORA GASTROPLASTY, TUEPET FUNDOPLICATION;  Surgeon: Yeison Masterson MD;  Location: [facility];  Service: Thoracic    AL XCAPSL CTRC RMVL INSJ IO LENS PROSTH W/O ECP Right 8/23/2021    Procedure: EXTRACTION  EXTRACAPSULAR CATARACT PHACO INTRAOCULAR LENS (IOL);  Surgeon: Malcolm Morris MD;  Location: [facility] MAIN OR;  Service: Ophthalmology    ME XCAPSL CTRC RMVL INSJ IO LENS PROSTH W/O ECP Left 11/15/2021    Procedure: EXTRACTION EXTRACAPSULAR CATARACT PHACO INTRAOCULAR LENS (IOL);  Surgeon: Malcolm Morris MD;  Location: [facility] MAIN OR;  Service: Ophthalmology    SKIN BIOPSY Right     lump benign - last assessed 10/4/17    THYROIDECTOMY, PARTIAL  1977    TONSILLECTOMY      US GUIDED THYROID BIOPSY  2/21/2023      Family History   Problem Relation Age of Onset    Alzheimer's disease Mother     Cancer Mother         skin     Thyroid disease Mother     Ulcerative colitis Mother     Cancer Father         prostate    Stroke Father     Hypertension Father     Prostate cancer Father     Thyroid disease Sister     Ulcerative colitis Sister     Other Sister         open heart surgery    Heart disease Sister     Hyperlipidemia Brother     No Known Problems Maternal Grandmother     No Known Problems Paternal Grandmother     No Known Problems Son     No Known Problems Son     Mental illness Neg Hx      Social History     Tobacco Use    Smoking status: Never     Passive exposure: Never    Smokeless tobacco: Never   Substance Use Topics    Alcohol use: Not Currently     Comment: seldom     Allergies   Allergen Reactions    Orange (Diagnostic) - Food Allergy Anaphylaxis     Throat closes    Pantoprazole Headache    Penicillins Other (See Comments)     During allergy testing instructed to NEVER take PCN  Patient has tolerated cefazolin.    Gluten Meal - Food Allergy      Following a gluten free diet on her own    Lactose - Food Allergy Diarrhea    Sulfa Antibiotics Rash       Current Outpatient Medications:     atorvastatin (LIPITOR) 20 mg tablet, TAKE 1 TABLET BY MOUTH DAILY AT  BEDTIME, Disp: 90 tablet, Rfl: 1    bumetanide (BUMEX) 1 mg tablet, TAKE 1 TABLET BY MOUTH DAILY, Disp: 90 tablet, Rfl: 1    Calcium Carb-Cholecalciferol  "600-1000 MG-UNIT CAPS, Take by mouth every morning gummy , Disp: , Rfl:     diltiazem (CARDIZEM CD) 120 mg 24 hr capsule, Take 1 capsule (120 mg total) by mouth daily, Disp: 90 capsule, Rfl: 3    Eliquis 5 MG, TAKE 1 TABLET BY MOUTH TWICE  DAILY, Disp: 180 tablet, Rfl: 3    levothyroxine 50 mcg tablet, TAKE 1 TABLET BY MOUTH DAILY, Disp: 90 tablet, Rfl: 1    metoprolol succinate (TOPROL-XL) 25 mg 24 hr tablet, Take 1 tablet (25 mg total) by mouth 2 (two) times a day, Disp: 180 tablet, Rfl: 3    omeprazole (PriLOSEC) 40 MG capsule, TAKE 1 CAPSULE BY MOUTH  TWICE DAILY, Disp: 180 capsule, Rfl: 3    potassium chloride 20 MEQ TBCR, Take 1 tablet (20 mEq total) by mouth in the morning, Disp: 90 tablet, Rfl: 3    Review of Systems  Constitutional: Negative for activity change, appetite change, fatigue and unexpected weight change.   HENT: Negative for ear pain, sore throat, trouble swallowing and voice change.    Eyes: Negative for visual disturbance.   Respiratory: Negative for cough and shortness of breath.    Cardiovascular: Negative for chest pain and palpitations.   Gastrointestinal: Negative for abdominal distention, abdominal pain, constipation, diarrhea and vomiting.   Endocrine: Negative for cold intolerance, heat intolerance, polydipsia and polyuria.   Genitourinary: Negative for dysuria and hematuria.   Musculoskeletal: Negative for arthralgias and back pain.   Skin: Negative for color change and rash.   Neurological: Negative for weakness or tremors.   All other systems reviewed and are negative.      Physical Exam:  Body mass index is 35.98 kg/m².  /70 (BP Location: Left arm, Patient Position: Sitting, Cuff Size: Large)   Pulse 81   Ht 5' 5\" (1.651 m)   Wt 98.1 kg (216 lb 3.2 oz)   SpO2 97%   BMI 35.98 kg/m²    Wt Readings from Last 3 Encounters:   02/06/24 98.1 kg (216 lb 3.2 oz)   11/06/23 96.2 kg (212 lb)   08/29/23 98.1 kg (216 lb 3.2 oz)        Physical Exam  Vitals reviewed.   Constitutional: "       Appearance: Normal appearance.   Cardiovascular:      Rate and Rhythm: Normal rate and regular rhythm.      Pulses: Normal pulses.      Heart sounds: Normal heart sounds.   Pulmonary:      Effort: Pulmonary effort is normal.      Breath sounds: Normal breath sounds.   Skin:     General: Skin is warm and dry.      Capillary Refill: Capillary refill takes less than 2 seconds.   Neurological:      General: No focal deficit present.      Mental Status: She is alert and oriented to person, place, and time.   Psychiatric:         Mood and Affect: Mood normal.         Behavior: Behavior normal.     Discussed with the patient and all questioned fully answered. She will call me if any problems arise.    Follow-up appointment in 6 months.     Counseled patient on diagnostic results, prognosis, risk and benefit of treatment options, instruction for management, importance of treatment compliance, Risk  factor reduction and impressions    KENNEDY Morillo

## 2024-02-07 ENCOUNTER — HOSPITAL ENCOUNTER (OUTPATIENT)
Dept: RADIOLOGY | Facility: HOSPITAL | Age: 74
Discharge: HOME | End: 2024-02-07
Payer: COMMERCIAL

## 2024-02-07 VITALS — BODY MASS INDEX: 34.99 KG/M2 | WEIGHT: 210 LBS | HEIGHT: 65 IN

## 2024-02-07 DIAGNOSIS — Z12.31 ENCOUNTER FOR SCREENING MAMMOGRAM FOR MALIGNANT NEOPLASM OF BREAST: ICD-10-CM

## 2024-02-07 DIAGNOSIS — Z12.39 SCREENING BREAST EXAMINATION: ICD-10-CM

## 2024-02-07 PROCEDURE — 77063 BREAST TOMOSYNTHESIS BI: CPT

## 2024-02-07 PROCEDURE — 77067 SCR MAMMO BI INCL CAD: CPT

## 2024-02-07 NOTE — ASSESSMENT & PLAN NOTE
Continues on calcium and vitamin D3 supplementation.   Last DXA scan from November 2021.   Recommend repeat DXA scan.

## 2024-02-07 NOTE — ASSESSMENT & PLAN NOTE
Stable thyroid ultrasound from December 2023.   Denies neck compressive symptoms.  Recommend repeat thyroid ultrasound in December 2024.

## 2024-02-07 NOTE — ASSESSMENT & PLAN NOTE
Clinically euthyroid on levothyroxine 50 mcg daily. Recommend repeat thyroid function tests.     Component      Latest Ref Rng 6/14/2023   TSH 3RD GENERATON      0.450 - 4.500 uIU/mL 0.928    Free T4      0.61 - 1.12 ng/dL 1.07

## 2024-02-14 ENCOUNTER — TELEPHONE (OUTPATIENT)
Dept: CARDIOLOGY CLINIC | Facility: CLINIC | Age: 74
End: 2024-02-14

## 2024-02-14 NOTE — TELEPHONE ENCOUNTER
I spoke with Ant and with [employer].  I am going to submit records with a different diagnosis code to see if we can get this issue resolved and monitor covered. Will fax to [employer] at [phone], account # [id number]

## 2024-02-14 NOTE — TELEPHONE ENCOUNTER
This is Rosalba with [employer] calling, in regards to one of your patients, Swetha Lopez  YOB: 1950. We have been in touch with you in regards to her heart monitor that she had done through [facility] on August 11th of 2023. The claim is denying for medical necessity and we're checking to see if the correct diagnosis was sent over to [facility] [employer]. Someone did speak to your office just yesterday and we're just wanting to know if you had a chance to check into that with the doctor, can you please call our outbound resolution team at [phone] and reference the confirmation number [id number]? Thank you. Bye, bye.

## 2024-03-07 ENCOUNTER — OFFICE VISIT (OUTPATIENT)
Dept: CARDIOLOGY CLINIC | Facility: CLINIC | Age: 74
End: 2024-03-07
Payer: COMMERCIAL

## 2024-03-07 VITALS
WEIGHT: 213.8 LBS | DIASTOLIC BLOOD PRESSURE: 90 MMHG | BODY MASS INDEX: 35.62 KG/M2 | HEART RATE: 73 BPM | HEIGHT: 65 IN | SYSTOLIC BLOOD PRESSURE: 126 MMHG

## 2024-03-07 DIAGNOSIS — I49.5 TACHY-BRADY SYNDROME (HCC): Primary | ICD-10-CM

## 2024-03-07 PROCEDURE — 99215 OFFICE O/P EST HI 40 MIN: CPT | Performed by: INTERNAL MEDICINE

## 2024-03-07 PROCEDURE — 93000 ELECTROCARDIOGRAM COMPLETE: CPT | Performed by: INTERNAL MEDICINE

## 2024-03-07 NOTE — PROGRESS NOTES
EPS follow-up patient Evaluation - Swetha Lopez 73 y.o. female MRN: [id number]       Referring:Dr. Ness    CC/HPI:   It was a pleasure to see Swetha Lopez in our arrhythmia clinic at [facility]. As you know she is a 73 y.o. woman with persistent atrial fibrillation, HTN, HLD, anxiety, GERD, hiatal hernia, hypertension, hyperlipidemia, IBS, rheumatoid arthritis who presented 12/1/2022 to discuss management of atrial arrhythmias.    She was diagnosed with atrial fibrillation in January at the PCP office.  She has mild coronary artery disease but no stents.  She had a repeat catheterization in 2017 which showed no significant coronary disease.  She has had history of bleeding ulcer and required hospitalization in the past needing blood transfusion.  She had used [employer] mobile device in January which had suggested atrial fibrillation.  She has been having on and off palpitations and exercise intolerance since September 2021. Echocardiogram has shown severe left atrial dilation.  She was started on Toprol XL and Eliquis.  As she continued to have symptoms with exertion cardioversion was recommended however she declined the option in April.  Option was again recommended in July office visit and this time she agreed to proceed with it.  She also had exertional chest tightness and stress test was performed which was negative.  A cardioversion in October was only transiently successful.  Her metoprolol dose was increased in October and flecainide is 100 mg twice daily was started.  She underwent repeat cardioversion on November 3rd which was successful.  However she was back in atrial fibrillation after 18 hours.  EKG performed on November 9, 2022 confirm she was back in atrial fibrillation.  Now she presents to discuss further options.    She presented with her  who also provided further history.  Patient did report having fatigue when having atrial fibrillation.  She did note  palpitations when she went back into atrial fibrillation after recent cardioversion.  She denied any significant caffeine or alcohol use.  She denied any drug use.  She felt significant lightheadedness/dizziness since being on flecainide and is afraid to drive a car.  She noted symptoms happening even at rest.    Office visit 1/23/2023:  After discussing options she opted to proceed with ablation procedure.  Patient had pulmonary vein isolation, posterior wall isolation and empiric CTI line placement on December 23, 2022.      She was maintained on flecainide 50 mg twice daily and Toprol-XL 25 mg daily.  Her blood pressure was elevated afterwards and losartan 25 mg was restarted. She reported feeling well. She denied any palpitations. She did have loss of vision in the right eye after ablation. Neurology team was consulted and brain MRI was done. She was diagnosed with TIA. Her vision recovered spontaneously. She was not placed on aspirin after the procedure to hx of GI bleeding.     Office visit 8/29/23:  She was seen in our EP ofice. Her heart was noted to be low and therefore flecainide and Toprol XL were discontinued. She had PAC/PVC and diltiazem was started.     Office visit 12/6/2023:  Swetha presented for a follow-up visit. She had event monitor and had bradycardia at night. She continued to have palpitations therefore we implanted PPM and restarted flecainide. She continued to feel symptoms mainly when exerting while walking. She was not able to keep up the pace. She felt better after the pacemaker but not back to normal self.     She attributed symptoms possibly to flecainide. She did feel better on diltiazem.     Interval history:    Swetha presents for a follow-up visit. After her last visit, we discontinued flecainide as it was giving her significant symptoms. She was placed on diltiazem.     She reports feeling better on diltiazem. She notes fatigue still with walking. She also notices palpitations,  with last episode occurring on 3/5/24. She is reporting compliance with medications.     Past Medical History:  Past Medical History:   Diagnosis Date   • Anxiety     resolved 10/4/17   • Asthma     seasonal   • Atrial fibrillation (HCC) 01/2022    newly diagnosed on eliquis   • Chronic kidney disease     kidney stone   • Colon polyp    • Disease of thyroid gland    • Dysphagia    • GERD (gastroesophageal reflux disease)    • Goiter, nodular     partial thyroidectomy   • Hiatal hernia     repaired 2018   • Hiatal hernia with GERD without esophagitis 04/2018    surgical correction   • History of esophageal varices with bleeding    • History of pernicious anemia    • History of transfusion     x1 after bleeding ulcer   • Hyperlipidemia    • Hypertension    • Irritable bowel syndrome    • Neck mass     2 small areas left side by jawline and midneck  scheduled 0820/21   • RA (rheumatoid arthritis) (HCC)    • Seasonal allergies    • Vertigo    • Wears glasses     for reading       Medications:      Current Outpatient Medications:   •  atorvastatin (LIPITOR) 20 mg tablet, TAKE 1 TABLET BY MOUTH DAILY AT  BEDTIME, Disp: 90 tablet, Rfl: 1  •  bumetanide (BUMEX) 1 mg tablet, TAKE 1 TABLET BY MOUTH DAILY, Disp: 90 tablet, Rfl: 1  •  Calcium Carb-Cholecalciferol 600-1000 MG-UNIT CAPS, Take by mouth every morning gummy , Disp: , Rfl:   •  diltiazem (CARDIZEM CD) 120 mg 24 hr capsule, Take 1 capsule (120 mg total) by mouth daily, Disp: 90 capsule, Rfl: 3  •  Eliquis 5 MG, TAKE 1 TABLET BY MOUTH TWICE  DAILY, Disp: 180 tablet, Rfl: 3  •  levothyroxine 50 mcg tablet, TAKE 1 TABLET BY MOUTH DAILY, Disp: 90 tablet, Rfl: 1  •  metoprolol succinate (TOPROL-XL) 25 mg 24 hr tablet, Take 1 tablet (25 mg total) by mouth 2 (two) times a day, Disp: 180 tablet, Rfl: 3  •  omeprazole (PriLOSEC) 40 MG capsule, TAKE 1 CAPSULE BY MOUTH  TWICE DAILY, Disp: 180 capsule, Rfl: 3  •  potassium chloride 20 MEQ TBCR, Take 1 tablet (20 mEq total) by  mouth in the morning, Disp: 90 tablet, Rfl: 3     Family History   Problem Relation Age of Onset   • Alzheimer's disease Mother    • Cancer Mother         skin    • Thyroid disease Mother    • Ulcerative colitis Mother    • Cancer Father         prostate   • Stroke Father    • Hypertension Father    • Prostate cancer Father    • Thyroid disease Sister    • Ulcerative colitis Sister    • Other Sister         open heart surgery   • Heart disease Sister    • Hyperlipidemia Brother    • No Known Problems Maternal Grandmother    • No Known Problems Paternal Grandmother    • No Known Problems Son    • No Known Problems Son    • Mental illness Neg Hx      Social History     Socioeconomic History   • Marital status: /Civil Union     Spouse name: Not on file   • Number of children: 2   • Years of education: Not on file   • Highest education level: Not on file   Occupational History   • Not on file   Tobacco Use   • Smoking status: Never     Passive exposure: Never   • Smokeless tobacco: Never   Vaping Use   • Vaping status: Never Used   Substance and Sexual Activity   • Alcohol use: Not Currently     Comment: seldom   • Drug use: Never   • Sexual activity: Not on file   Other Topics Concern   • Not on file   Social History Narrative    Feels safe at home     Social Determinants of Health     Financial Resource Strain: Low Risk  (11/6/2023)    Overall Financial Resource Strain (CARDIA)    • Difficulty of Paying Living Expenses: Not hard at all   Food Insecurity: Not on file   Transportation Needs: No Transportation Needs (11/6/2023)    PRAPARE - Transportation    • Lack of Transportation (Medical): No    • Lack of Transportation (Non-Medical): No   Physical Activity: Not on file   Stress: Not on file   Social Connections: Not on file   Intimate Partner Violence: Not on file   Housing Stability: Not on file     Social History     Tobacco Use   Smoking Status Never   • Passive exposure: Never   Smokeless Tobacco Never  "    Social History     Substance and Sexual Activity   Alcohol Use Not Currently    Comment: seldom       Review of Systems   Constitutional: Positive for malaise/fatigue. Negative for chills and fever.   HENT: Negative.     Eyes:  Negative for blurred vision and double vision.   Cardiovascular:  Positive for dyspnea on exertion and palpitations. Negative for chest pain, leg swelling, near-syncope, orthopnea, paroxysmal nocturnal dyspnea and syncope.   Respiratory:  Negative for cough and sputum production.    Endocrine: Negative.    Skin: Negative.  Negative for rash.   Musculoskeletal:  Positive for arthritis. Negative for joint pain.   Gastrointestinal:  Negative for abdominal pain, nausea and vomiting.   Genitourinary: Negative.    Neurological:  Negative for dizziness and light-headedness.   Psychiatric/Behavioral: Negative.  The patient is not nervous/anxious.         Objective:     Vitals: Blood pressure 126/90, pulse 73, height 5' 5\" (1.651 m), weight 97 kg (213 lb 12.8 oz), not currently breastfeeding., Body mass index is 35.58 kg/m².,        Physical Exam:    GEN: Swetha Lopez appears well, alert and oriented x 3, pleasant and cooperative; obese   HEENT: pupils equal, round, and reactive to light; extraocular muscles intact  NECK: supple, no carotid bruits   HEART: Regular rate and rhythm, normal S1 and S2, no murmurs, clicks, gallops or rubs   LUNGS: clear to auscultation bilaterally; no wheezes, rales, or rhonchi   ABDOMEN: normal bowel sounds, soft, no tenderness, no distention  EXTREMITIES: peripheral pulses normal; no clubbing, cyanosis, or edema  NEURO: no focal findings   SKIN: normal without suspicious lesions on exposed skin      Labs & Results:  Below is the patient's most recent value for Albumin, ALT, AST, BUN, Calcium, Chloride, Cholesterol, CO2, Creatinine, GFR, Glucose, HDL, Hematocrit, Hemoglobin, Hemoglobin A1C, LDL, Magnesium, Phosphorus, Platelets, Potassium, PSA, Sodium, " Triglycerides, and WBC.   Lab Results   Component Value Date    ALT 15 01/12/2024    AST 21 01/12/2024    BUN 13 01/12/2024    CALCIUM 9.1 01/12/2024     01/12/2024    CHOL 144 09/01/2016    CO2 28 01/12/2024    CREATININE 0.81 01/12/2024    HDL 62 01/18/2023    HCT 43.5 10/24/2023    HGB 14.2 10/24/2023    HGBA1C 5.5 12/25/2022    MG 1.9 01/31/2022    PHOS 3.3 04/17/2018     10/24/2023    K 3.7 01/12/2024     09/01/2016    TRIG 83 01/18/2023    WBC 4.81 10/24/2023     Note: for a comprehensive list of the patient's lab results, access the Results Review activity.          Cardiac testing:     I personally reviewed the ECG performed in the clinic on 03/07/24. It reveals atrial fibrillation, with paced rhythm and PVCs.     Echocardiograms:  No results found for this or any previous visit.    No results found for this or any previous visit.      Catheterizations:   No results found for this or any previous visit.      Stress Tests:  Results for orders placed during the hospital encounter of 04/21/22    NM myocardial perfusion spect (stress and/or rest)    Interpretation Summary  •  Stress ECG: Arrhythmias during recovery: rare PVCs. The ECG was equivocal for ischemia. The ECG was not diagnostic due to pharmacological (vasodilator) stress. The stress ECG is equivocal for ischemia after pharmacologic vasodilation.  •  Stress Function: Left ventricular function post-stress is normal. Post-stress ejection fraction is 59 %.  •  Perfusion: There are no perfusion defects.  •  Stress ECG: A Miguel protocol stress test was performed. The patient reached stage 2.0 of the protocol after exercising for 4 min and 5 sec and had a maximal HR of 151 bpm (102 % of MPHR) and 7.0 METS. The patient experienced no angina during the test. The test was stopped because the patient experienced dyspnea. The patient achieved the target heart rate. The patient reported dyspnea during the stress test. Onset of symptoms occurred  at stage 2 of the protocol. Symptoms ended during recovery. Blood pressure demonstrated a normal response and heart rate demonstrated a normal response to stress. The patient's heart rate recovery was normal.    Negative study for evidence of pharmacological induced ischemia or prior infarction. No major symptoms with vasodilation. Elevated baseline blood pressure of 150/100 noted.      Holter monitor -   No results found for this or any previous visit.    No results found for this or any previous visit.        ASSESSMENT/PLAN:  1. Persistent atrial fibrillation  Patient was diagnosed with atrial fibrillation in January 2021  She then started to have on of palpitations  She was started on metoprolol and Eliquis  Cardioversion was performed in October 2022 however it was unsuccessful  She was started on flecainide 100 mg twice daily and metoprolol dose was increased  She had repeat cardioversion on November 3, 2022 which was successful in restoring sinus rhythm however it only lasted 18 hours  We discuss next step in management option including alternative rhythm medication versus ablation procedure  After answering all the questions she opted to proceed with ablation procedure  She is s/p pulmonary vein isolation, posterior wall isolation and empiric CTI line placement on December 23, 2022  She had TIA next day during which she lost vision in the right eye.  Brain MRI was negative for stroke.  Neurology team was consulted and diagnosed her with TIA.  She is currently maintained on flecainide 50 mg twice daily and metoprolol 12.5 mg daily  She will need to be maintained on Eliquis due to TIA.  Metoprolol dose was increased to 25 mg daily  She had noticed her [employer] mobile hide revealed slow heart rate  Therefore flecainide and metoprolol were discontinued  She continues to have palpitations with cardiac event monitor showing PAC/PVC  She was started on diltiazem 120 mg daily but continued to have symptoms  Given  tachycardia/bradycardia syndrome she underwent pacemaker placement  Flecainide and metoprolol were restarted  She continues to feel dyspneic when exerting  Rate histogram shows she may not be getting higher ventricular rates with exertion  We increased rate response  Discontinued flecainide and started diltiazem 120 mg daily  Continues to feel fatigue, and appears to be in atrial fibrillation all the time  Discussed plan to restore sinus rhythm with dofetilide/Tikosyn and she is agreeable.  Will d/c diltiazem after sotalol/dofetilide     2. Hypertension  Patient was maintained on losartan  Diastolic blood pressure elevated in the office  Previously was on losartan but blood pressure came low and losartan was discontinued  Will monitor in the hospital and change medications accordingly     3. Hyperlipidemia   Maintained on atorvastatin 20 mg daily    4. Hypothyroidism  Maintained on levothyroxine 50 mcg daily    5. Diastolic heart failure  Maintain on Bumex 1 mg daily    Follow-up in 6 months

## 2024-03-08 ENCOUNTER — TELEPHONE (OUTPATIENT)
Dept: CARDIOLOGY CLINIC | Facility: CLINIC | Age: 74
End: 2024-03-08

## 2024-03-08 DIAGNOSIS — K44.9 HIATAL HERNIA WITH GERD WITHOUT ESOPHAGITIS: ICD-10-CM

## 2024-03-08 DIAGNOSIS — I49.5 TACHY-BRADY SYNDROME (HCC): Primary | ICD-10-CM

## 2024-03-08 DIAGNOSIS — R13.19 ESOPHAGEAL DYSPHAGIA: ICD-10-CM

## 2024-03-08 DIAGNOSIS — K21.9 HIATAL HERNIA WITH GERD WITHOUT ESOPHAGITIS: ICD-10-CM

## 2024-03-08 NOTE — TELEPHONE ENCOUNTER
Called patient and Lvm in regard medication treatment admission to be scheduled.    Jaenna can you please check medication price.  Thank you.

## 2024-03-08 NOTE — TELEPHONE ENCOUNTER
"Patient scheduled for medication admission at [facility] on 03/26/2024 per Dr Eubanks.    Patient aware of general instructions.     Can we please have an insurance check for admission.     Dr Eubanks, you mentioned \"Will hold diltiazem in the hospital\"  wonder if she wont take the medication in the morning?  Thank you.        "

## 2024-03-08 NOTE — TELEPHONE ENCOUNTER
----- Message from Sunil Eubanks MD sent at 3/7/2024  1:43 PM EST -----  Regarding: sotalol/dofetilide admission  Hi andreas    Please schedule this patient for sotalol or dofetilide admission.     Jeanna - please soler out the medications - sotalol 120 mg q12h and dofetilide 500 mcg q12h    Routine lab    Will hold diltiazem in the hospital.     Thanks.

## 2024-03-11 RX ORDER — OMEPRAZOLE 40 MG/1
CAPSULE, DELAYED RELEASE ORAL
Qty: 180 CAPSULE | Refills: 3 | OUTPATIENT
Start: 2024-03-11

## 2024-03-11 NOTE — TELEPHONE ENCOUNTER
Patient aware to NOT take her medication Diltiazem the day prior of her admission and in the morning as well, ( 3/25/ and 3/26).

## 2024-03-11 NOTE — TELEPHONE ENCOUNTER
Dofetilide and sotalol are both tier 1 medications and are covered at the same price without prior authorizations       30 day supply - patient is responsible for 25% percent of the cost depending on where they are in their plan with a minimum copay of $10.00 and a maximum copay of $75.00. That is the only information they would give me.      Current estimated cost per [facility]   ID [id number]

## 2024-03-13 DIAGNOSIS — E78.2 MIXED HYPERLIPIDEMIA: ICD-10-CM

## 2024-03-13 DIAGNOSIS — E03.0 CONGENITAL HYPOTHYROIDISM WITH DIFFUSE GOITER: ICD-10-CM

## 2024-03-13 RX ORDER — LEVOTHYROXINE SODIUM 0.05 MG/1
TABLET ORAL
Qty: 90 TABLET | Refills: 1 | Status: SHIPPED | OUTPATIENT
Start: 2024-03-13

## 2024-03-13 NOTE — TELEPHONE ENCOUNTER
[employer] pending auth # [id number] for admit for med loading/[id number] & [id number] @ B w/Dr. Eubanks.

## 2024-03-14 RX ORDER — ATORVASTATIN CALCIUM 20 MG/1
TABLET, FILM COATED ORAL
Qty: 90 TABLET | Refills: 0 | Status: SHIPPED | OUTPATIENT
Start: 2024-03-14

## 2024-03-15 NOTE — TELEPHONE ENCOUNTER
[employer] approved auth # [id number] for admit for med loading/97535 & [id number] @ [facility] w/Dr. Eubanks. Valid 3/14/24-9/14/24

## 2024-03-26 ENCOUNTER — HOSPITAL ENCOUNTER (INPATIENT)
Facility: HOSPITAL | Age: 74
LOS: 2 days | Discharge: HOME | DRG: 310 | End: 2024-03-28
Attending: INTERNAL MEDICINE | Admitting: INTERNAL MEDICINE
Payer: COMMERCIAL

## 2024-03-26 DIAGNOSIS — K44.9 HIATAL HERNIA WITH GERD WITHOUT ESOPHAGITIS: ICD-10-CM

## 2024-03-26 DIAGNOSIS — K21.9 HIATAL HERNIA WITH GERD WITHOUT ESOPHAGITIS: ICD-10-CM

## 2024-03-26 DIAGNOSIS — E87.6 HYPOKALEMIA: ICD-10-CM

## 2024-03-26 DIAGNOSIS — I10 ESSENTIAL HYPERTENSION: ICD-10-CM

## 2024-03-26 DIAGNOSIS — I49.5 TACHY-BRADY SYNDROME (HCC): Primary | ICD-10-CM

## 2024-03-26 DIAGNOSIS — R13.19 ESOPHAGEAL DYSPHAGIA: ICD-10-CM

## 2024-03-26 LAB
ANION GAP SERPL CALCULATED.3IONS-SCNC: 9 MMOL/L (ref 4–13)
ATRIAL RATE: 288 BPM
ATRIAL RATE: 65 BPM
BASOPHILS # BLD AUTO: 0.02 THOUSANDS/ÂΜL (ref 0–0.1)
BASOPHILS NFR BLD AUTO: 0 % (ref 0–1)
BUN SERPL-MCNC: 8 MG/DL (ref 5–25)
CALCIUM SERPL-MCNC: 9 MG/DL (ref 8.4–10.2)
CHLORIDE SERPL-SCNC: 106 MMOL/L (ref 96–108)
CO2 SERPL-SCNC: 26 MMOL/L (ref 21–32)
CREAT SERPL-MCNC: 0.74 MG/DL (ref 0.6–1.3)
EOSINOPHIL # BLD AUTO: 0.09 THOUSAND/ÂΜL (ref 0–0.61)
EOSINOPHIL NFR BLD AUTO: 2 % (ref 0–6)
ERYTHROCYTE [DISTWIDTH] IN BLOOD BY AUTOMATED COUNT: 13 % (ref 11.6–15.1)
GFR SERPL CREATININE-BSD FRML MDRD: 80 ML/MIN/1.73SQ M
GLUCOSE SERPL-MCNC: 92 MG/DL (ref 65–140)
HCT VFR BLD AUTO: 43.6 % (ref 34.8–46.1)
HGB BLD-MCNC: 14.3 G/DL (ref 11.5–15.4)
IMM GRANULOCYTES # BLD AUTO: 0.01 THOUSAND/UL (ref 0–0.2)
IMM GRANULOCYTES NFR BLD AUTO: 0 % (ref 0–2)
LYMPHOCYTES # BLD AUTO: 1.18 THOUSANDS/ÂΜL (ref 0.6–4.47)
LYMPHOCYTES NFR BLD AUTO: 24 % (ref 14–44)
MAGNESIUM SERPL-MCNC: 1.8 MG/DL (ref 1.9–2.7)
MCH RBC QN AUTO: 29.5 PG (ref 26.8–34.3)
MCHC RBC AUTO-ENTMCNC: 32.8 G/DL (ref 31.4–37.4)
MCV RBC AUTO: 90 FL (ref 82–98)
MONOCYTES # BLD AUTO: 0.45 THOUSAND/ÂΜL (ref 0.17–1.22)
MONOCYTES NFR BLD AUTO: 9 % (ref 4–12)
NEUTROPHILS # BLD AUTO: 3.1 THOUSANDS/ÂΜL (ref 1.85–7.62)
NEUTS SEG NFR BLD AUTO: 65 % (ref 43–75)
NRBC BLD AUTO-RTO: 0 /100 WBCS
PLATELET # BLD AUTO: 191 THOUSANDS/UL (ref 149–390)
PMV BLD AUTO: 10.7 FL (ref 8.9–12.7)
POTASSIUM SERPL-SCNC: 3.8 MMOL/L (ref 3.5–5.3)
QRS AXIS: 11 DEGREES
QRS AXIS: 5 DEGREES
QRSD INTERVAL: 100 MS
QRSD INTERVAL: 98 MS
QT INTERVAL: 412 MS
QT INTERVAL: 450 MS
QTC INTERVAL: 457 MS
QTC INTERVAL: 489 MS
RBC # BLD AUTO: 4.84 MILLION/UL (ref 3.81–5.12)
SODIUM SERPL-SCNC: 141 MMOL/L (ref 135–147)
T WAVE AXIS: -78 DEGREES
T WAVE AXIS: -84 DEGREES
VENTRICULAR RATE: 71 BPM
VENTRICULAR RATE: 74 BPM
WBC # BLD AUTO: 4.85 THOUSAND/UL (ref 4.31–10.16)

## 2024-03-26 PROCEDURE — NC001 PR NO CHARGE: Performed by: PHYSICIAN ASSISTANT

## 2024-03-26 PROCEDURE — 93005 ELECTROCARDIOGRAM TRACING: CPT

## 2024-03-26 PROCEDURE — 80048 BASIC METABOLIC PNL TOTAL CA: CPT | Performed by: PHYSICIAN ASSISTANT

## 2024-03-26 PROCEDURE — 83735 ASSAY OF MAGNESIUM: CPT | Performed by: PHYSICIAN ASSISTANT

## 2024-03-26 PROCEDURE — 93010 ELECTROCARDIOGRAM REPORT: CPT | Performed by: INTERNAL MEDICINE

## 2024-03-26 PROCEDURE — 85025 COMPLETE CBC W/AUTO DIFF WBC: CPT | Performed by: PHYSICIAN ASSISTANT

## 2024-03-26 RX ORDER — ATORVASTATIN CALCIUM 20 MG/1
20 TABLET, FILM COATED ORAL
Status: DISCONTINUED | OUTPATIENT
Start: 2024-03-26 | End: 2024-03-28 | Stop reason: HOSPADM

## 2024-03-26 RX ORDER — LEVOTHYROXINE SODIUM 0.05 MG/1
50 TABLET ORAL
Status: DISCONTINUED | OUTPATIENT
Start: 2024-03-27 | End: 2024-03-28 | Stop reason: HOSPADM

## 2024-03-26 RX ORDER — DOFETILIDE 0.5 MG/1
500 CAPSULE ORAL EVERY 12 HOURS SCHEDULED
Status: DISCONTINUED | OUTPATIENT
Start: 2024-03-26 | End: 2024-03-27

## 2024-03-26 RX ORDER — BUMETANIDE 1 MG/1
1 TABLET ORAL DAILY
Status: DISCONTINUED | OUTPATIENT
Start: 2024-03-27 | End: 2024-03-28 | Stop reason: HOSPADM

## 2024-03-26 RX ORDER — LANOLIN ALCOHOL/MO/W.PET/CERES
3 CREAM (GRAM) TOPICAL
Status: DISCONTINUED | OUTPATIENT
Start: 2024-03-26 | End: 2024-03-28 | Stop reason: HOSPADM

## 2024-03-26 RX ORDER — DOCUSATE SODIUM 100 MG/1
100 CAPSULE, LIQUID FILLED ORAL 2 TIMES DAILY PRN
Status: DISCONTINUED | OUTPATIENT
Start: 2024-03-26 | End: 2024-03-28 | Stop reason: HOSPADM

## 2024-03-26 RX ORDER — METOPROLOL SUCCINATE 25 MG/1
25 TABLET, EXTENDED RELEASE ORAL 2 TIMES DAILY
Status: DISCONTINUED | OUTPATIENT
Start: 2024-03-26 | End: 2024-03-28 | Stop reason: HOSPADM

## 2024-03-26 RX ORDER — POTASSIUM CHLORIDE 20 MEQ/1
TABLET, EXTENDED RELEASE ORAL DAILY
Status: DISCONTINUED | OUTPATIENT
Start: 2024-03-27 | End: 2024-03-28 | Stop reason: HOSPADM

## 2024-03-26 RX ADMIN — APIXABAN 5 MG: 5 TABLET, FILM COATED ORAL at 18:31

## 2024-03-26 RX ADMIN — ATORVASTATIN CALCIUM 20 MG: 20 TABLET, FILM COATED ORAL at 21:52

## 2024-03-26 RX ADMIN — METOPROLOL SUCCINATE 25 MG: 25 TABLET, EXTENDED RELEASE ORAL at 18:31

## 2024-03-26 RX ADMIN — DOFETILIDE 500 MCG: 500 CAPSULE ORAL at 13:17

## 2024-03-26 RX ADMIN — DOFETILIDE 500 MCG: 500 CAPSULE ORAL at 23:47

## 2024-03-26 NOTE — H&P
H&P Exam - Electrophysiology  Swetha Lopez 73 y.o. female MRN: [id number]  Unit/Bed#: [facility] 430-01 Encounter: [id number]    Assessment/Plan     Assessment:  Early persistent atrial fibrillation  - anticoagulated with Eliquis / [id number] score of 3 (age, sex, HTN)  - rate control: metoprolol diltiazem prior to admission  - antiarrhythmic therapy: Being started on dofetilide, breakthrough on flecainide  - prior cardioversion: x2  - now status post ablation of afib with PVI and posterior wall isolation and flutter with CTI line by Dr. Eubanks on 12/23/2022  Essential hypertension  Hyperlipidemia  GERD  Rheumatoid arthritis  Hypothryoidism  - stats post hemithyroidectomy - non-emergent thyroid ultrasound recommended  TIA  Medtronic dual-chamber pacemaker in situ, implanted 10/2023    Plan:  Patient presents to the hospital today in rate controlled atrial fibrillation to start dofetilide.  She did hold her diltiazem for 2 days.  Will start dofetilide today at 500 mcg twice daily given reasonable QTc interval and creatinine clearance ~ 75mL/min.    Get EKGs 2 hours post dose and try to get her on a good regimen by doing dose every 11 hours to get her to appropriate regimen of dosing 9 AM and 9 PM.  Will continue to monitor electrolytes with daily blood work.    Will also monitor her blood pressures while she is in-house.    History of Present Illness   HPI:  Swetha Lopez is a 73 y.o. year old female with early persistent atrial fibrillation anticoagulated with Eliquis, essential hypertension, hyperlipidemia, hiatal hernia, GERD, and rheumatoid arthritis.  She first was diagnosed with atrial fibrillation in January of this year in her PCPs office and was symptomatic with palpitations and shortness of breath.  She was put on rate control and anticoagulation of Eliquis.  Though at first hesitant patient did subsequently undergo cardioversion which did not hold in October.  The following month after the addition  of flecainide with nuclear stress test ruling out any structural heart disease, her second cardioversion did not last either.  Therefore, she was seen in consultation by Dr. Eubanks at which point ablation was recommended and she was agreeable.  She had presented to the hospital underwent ablation in December 2022.  She continued to have breakthrough episodes and also concern for tachybradycardia syndrome and in October 2023 underwent pacemaker implantation for this.  She continued breakthrough, it was felt that she would benefit from stronger antiarrhythmic therapy then flecainide.  He presents today to undergo dofetilide initiation.    EKG:     Review of Systems  ROS as noted above, otherwise 12 point review of systems was performed and is negative.     Historical Information   Past Medical History:   Diagnosis Date    Anxiety     resolved 10/4/17    Asthma     seasonal    Atrial fibrillation (HCC) 01/2022    newly diagnosed on eliquis    Chronic kidney disease     kidney stone    Colon polyp     Disease of thyroid gland     Dysphagia     GERD (gastroesophageal reflux disease)     Goiter, nodular     partial thyroidectomy    Hiatal hernia     repaired 2018    Hiatal hernia with GERD without esophagitis 04/2018    surgical correction    History of esophageal varices with bleeding     History of pernicious anemia     History of transfusion     x1 after bleeding ulcer    Hyperlipidemia     Hypertension     Irritable bowel syndrome     Neck mass     2 small areas left side by jawline and midneck US scheduled 0820/21    RA (rheumatoid arthritis) (HCC)     Seasonal allergies     Vertigo     Wears glasses     for reading     Past Surgical History:   Procedure Laterality Date    BREAST BIOPSY Bilateral     negative    CARDIAC CATHETERIZATION      x2 secondary to exertional chest pain, due to lung issues, possible mild COPD    CARDIAC ELECTROPHYSIOLOGY PROCEDURE N/A 12/23/2022    Procedure: Cardiac eps/afib ablation;   Surgeon: Sunil Eubanks MD;  [facility]: [facility];  Service: Cardiology    CARDIAC ELECTROPHYSIOLOGY PROCEDURE N/A 12/23/2022    Procedure: Cardiac eps/aflutter ablation;  Surgeon: Sunil Eubanks MD;  [facility]: [facility];  Service: Cardiology    CARDIAC ELECTROPHYSIOLOGY PROCEDURE N/A 10/24/2023    Procedure: Cardiac pacer implant DC PM;  Surgeon: Sunil Eubanks MD;  [facility]: [facility];  Service: Cardiology    CATARACT EXTRACTION Bilateral     CHOLECYSTECTOMY  2015    lap - last assessed 10/4/17    COLONOSCOPY      complete    ESOPHAGOGASTRODUODENOSCOPY N/A 1/23/2018    Procedure: ESOPHAGOGASTRODUODENOSCOPY (EGD);  Surgeon: Richard Bledsoe MD;  Location: [facility] GI LAB;  Service: Gastroenterology    HYSTERECTOMY      partial - ovaries remain    LIPOMA RESECTION      right thigh    NH ESOPHAGOSCOPY FLEXIBLE TRANSORAL WITH BIOPSY N/A 4/11/2018    Procedure: ESOPHAGOGASTRODUODENOSCOPY (EGD);  Surgeon: Yeison Masterson MD;  Location:  MAIN OR;  Service: Thoracic    NH LAPS RPR PARAESPHGL HRNA INCL FUNDPLSTY W/MESH N/A 4/11/2018    Procedure: REPAIR HERNIA PARAESOPHAGEAL  LAPAROSCOPIC,ELEONORA GASTROPLASTY, TUEPET FUNDOPLICATION;  Surgeon: Yeison Masterson MD;  Location:  MAIN OR;  Service: Thoracic    NH XCAPSL CTRC RMVL INSJ IO LENS PROSTH W/O ECP Right 8/23/2021    Procedure: EXTRACTION EXTRACAPSULAR CATARACT PHACO INTRAOCULAR LENS (IOL);  Surgeon: Malcolm Morris MD;  Location: [facility] MAIN OR;  Service: Ophthalmology    NH XCAPSL CTRC RMVL INSJ IO LENS PROSTH W/O ECP Left 11/15/2021    Procedure: EXTRACTION EXTRACAPSULAR CATARACT PHACO INTRAOCULAR LENS (IOL);  Surgeon: Malcolm Morris MD;  Location: [facility] MAIN OR;  Service: Ophthalmology    SKIN BIOPSY Right     lump benign - last assessed 10/4/17    THYROIDECTOMY, PARTIAL  1977    TONSILLECTOMY      US GUIDED THYROID BIOPSY  2/21/2023     Family History:   Family History   Problem Relation Age of Onset    Alzheimer's disease  Mother     Cancer Mother         skin     Thyroid disease Mother     Ulcerative colitis Mother     Cancer Father         prostate    Stroke Father     Hypertension Father     Prostate cancer Father     Thyroid disease Sister     Ulcerative colitis Sister     Other Sister         open heart surgery    Heart disease Sister     Hyperlipidemia Brother     No Known Problems Maternal Grandmother     No Known Problems Paternal Grandmother     No Known Problems Son     No Known Problems Son     Mental illness Neg Hx        Social History   Social History     Substance and Sexual Activity   Alcohol Use Not Currently    Comment: seldom     Social History     Substance and Sexual Activity   Drug Use Never     Social History     Tobacco Use   Smoking Status Never    Passive exposure: Never   Smokeless Tobacco Never       Meds/Allergies   all medications and allergies reviewed  Home Medications:   Medications Prior to Admission   Medication    atorvastatin (LIPITOR) 20 mg tablet    bumetanide (BUMEX) 1 mg tablet    Calcium Carb-Cholecalciferol 600-1000 MG-UNIT CAPS    Eliquis 5 MG    omeprazole (PriLOSEC) 40 MG capsule    potassium chloride 20 MEQ TBCR    diltiazem (CARDIZEM CD) 120 mg 24 hr capsule    levothyroxine 50 mcg tablet    metoprolol succinate (TOPROL-XL) 25 mg 24 hr tablet       Allergies   Allergen Reactions    Orange (Diagnostic) - Food Allergy Anaphylaxis     Throat closes    Pantoprazole Headache    Penicillins Other (See Comments)     During allergy testing instructed to NEVER take PCN  Patient has tolerated cefazolin.    Gluten Meal - Food Allergy      Following a gluten free diet on her own    Lactose - Food Allergy Diarrhea    Sulfa Antibiotics Rash       Objective   Vitals: Blood pressure (!) 142/106, pulse 73, temperature 97.9 °F (36.6 °C), SpO2 94%, not currently breastfeeding.  Orthostatic Blood Pressures      Flowsheet Row Most Recent Value   Blood Pressure 142/106 filed at 03/26/2024 1151            No  intake or output data in the 24 hours ending 03/26/24 1447    Invasive Devices       Peripheral Intravenous Line  Duration             Peripheral IV 10/24/23 Dorsal (posterior);Left Hand 154 days                    Physical Exam   GEN: NAD, alert and oriented, well appearing  SKIN: dry without significant lesions or rashes  HEENT: NCAT, PERRL, EOMs intact  NECK: No JVD or carotid bruits appreciated  CARDIOVASCULAR: irregular, normal S1, S2 without murmurs, rubs, or gallops appreciated  LUNGS: Clear to auscultation bilaterally without wheezes, rhonchi, or rales  ABDOMEN: Soft, nontender, nondistended  EXTREMITIES/VASCULAR: perfused without clubbing, cyanosis, or edema b/l  PSYCH: Normal mood and affect  NEURO: CN ll-Xll grossly intact    Lab Results: I have personally reviewed pertinent lab results.    Results from last 7 days   Lab Units 03/26/24  1152   WBC Thousand/uL 4.85   HEMOGLOBIN g/dL 14.3   HEMATOCRIT % 43.6   PLATELETS Thousands/uL 191     Results from last 7 days   Lab Units 03/26/24  1152   POTASSIUM mmol/L 3.8   CHLORIDE mmol/L 106   CO2 mmol/L 26   BUN mg/dL 8   CREATININE mg/dL 0.74   CALCIUM mg/dL 9.0         Results from last 7 days   Lab Units 03/26/24  1152   MAGNESIUM mg/dL 1.8*         Imaging: I have personally reviewed pertinent reports.    No results found for this or any previous visit.      Code Status: Level 1 - Full Code    ** Please Note: Dictation voice to text software may have been used in the creation of this document. **

## 2024-03-27 ENCOUNTER — TELEPHONE (OUTPATIENT)
Dept: CARDIOLOGY CLINIC | Facility: CLINIC | Age: 74
End: 2024-03-27

## 2024-03-27 LAB
ANION GAP SERPL CALCULATED.3IONS-SCNC: 6 MMOL/L (ref 4–13)
ATRIAL RATE: 312 BPM
ATRIAL RATE: 92 BPM
BUN SERPL-MCNC: 9 MG/DL (ref 5–25)
CALCIUM SERPL-MCNC: 8.4 MG/DL (ref 8.4–10.2)
CHLORIDE SERPL-SCNC: 107 MMOL/L (ref 96–108)
CO2 SERPL-SCNC: 27 MMOL/L (ref 21–32)
CREAT SERPL-MCNC: 0.71 MG/DL (ref 0.6–1.3)
GFR SERPL CREATININE-BSD FRML MDRD: 84 ML/MIN/1.73SQ M
GLUCOSE SERPL-MCNC: 95 MG/DL (ref 65–140)
MAGNESIUM SERPL-MCNC: 1.8 MG/DL (ref 1.9–2.7)
POTASSIUM SERPL-SCNC: 3.3 MMOL/L (ref 3.5–5.3)
QRS AXIS: -8 DEGREES
QRS AXIS: 0 DEGREES
QRSD INTERVAL: 100 MS
QRSD INTERVAL: 98 MS
QT INTERVAL: 476 MS
QT INTERVAL: 478 MS
QTC INTERVAL: 512 MS
QTC INTERVAL: 521 MS
SODIUM SERPL-SCNC: 140 MMOL/L (ref 135–147)
T WAVE AXIS: -62 DEGREES
T WAVE AXIS: -64 DEGREES
VENTRICULAR RATE: 69 BPM
VENTRICULAR RATE: 72 BPM

## 2024-03-27 PROCEDURE — 80048 BASIC METABOLIC PNL TOTAL CA: CPT | Performed by: PHYSICIAN ASSISTANT

## 2024-03-27 PROCEDURE — 93010 ELECTROCARDIOGRAM REPORT: CPT | Performed by: INTERNAL MEDICINE

## 2024-03-27 PROCEDURE — 93005 ELECTROCARDIOGRAM TRACING: CPT

## 2024-03-27 PROCEDURE — 99232 SBSQ HOSP IP/OBS MODERATE 35: CPT | Performed by: INTERNAL MEDICINE

## 2024-03-27 PROCEDURE — 83735 ASSAY OF MAGNESIUM: CPT | Performed by: PHYSICIAN ASSISTANT

## 2024-03-27 RX ORDER — LANOLIN ALCOHOL/MO/W.PET/CERES
400 CREAM (GRAM) TOPICAL 2 TIMES DAILY
Status: DISCONTINUED | OUTPATIENT
Start: 2024-03-27 | End: 2024-03-28 | Stop reason: HOSPADM

## 2024-03-27 RX ORDER — DOFETILIDE 0.25 MG/1
250 CAPSULE ORAL EVERY 12 HOURS SCHEDULED
Status: DISCONTINUED | OUTPATIENT
Start: 2024-03-27 | End: 2024-03-27

## 2024-03-27 RX ORDER — POTASSIUM CHLORIDE 20 MEQ/1
40 TABLET, EXTENDED RELEASE ORAL ONCE
Status: COMPLETED | OUTPATIENT
Start: 2024-03-27 | End: 2024-03-27

## 2024-03-27 RX ORDER — DOFETILIDE 0.25 MG/1
250 CAPSULE ORAL EVERY 12 HOURS SCHEDULED
Status: DISCONTINUED | OUTPATIENT
Start: 2024-03-27 | End: 2024-03-28 | Stop reason: HOSPADM

## 2024-03-27 RX ORDER — OMEPRAZOLE 40 MG/1
CAPSULE, DELAYED RELEASE ORAL
Qty: 180 CAPSULE | Refills: 0 | Status: SHIPPED | OUTPATIENT
Start: 2024-03-27

## 2024-03-27 RX ADMIN — POTASSIUM CHLORIDE 40 MEQ: 1500 TABLET, EXTENDED RELEASE ORAL at 10:49

## 2024-03-27 RX ADMIN — APIXABAN 5 MG: 5 TABLET, FILM COATED ORAL at 17:41

## 2024-03-27 RX ADMIN — LEVOTHYROXINE SODIUM 50 MCG: 50 TABLET ORAL at 06:13

## 2024-03-27 RX ADMIN — BUMETANIDE 1 MG: 1 TABLET ORAL at 09:42

## 2024-03-27 RX ADMIN — APIXABAN 5 MG: 5 TABLET, FILM COATED ORAL at 09:42

## 2024-03-27 RX ADMIN — ATORVASTATIN CALCIUM 20 MG: 20 TABLET, FILM COATED ORAL at 21:54

## 2024-03-27 RX ADMIN — MAGNESIUM OXIDE TAB 400 MG (241.3 MG ELEMENTAL MG) 400 MG: 400 (241.3 MG) TAB at 10:49

## 2024-03-27 RX ADMIN — METOPROLOL SUCCINATE 25 MG: 25 TABLET, EXTENDED RELEASE ORAL at 17:41

## 2024-03-27 RX ADMIN — DOFETILIDE 250 MCG: 250 CAPSULE ORAL at 10:49

## 2024-03-27 RX ADMIN — MAGNESIUM OXIDE TAB 400 MG (241.3 MG ELEMENTAL MG) 400 MG: 400 (241.3 MG) TAB at 17:41

## 2024-03-27 RX ADMIN — Medication 10 MG: at 06:13

## 2024-03-27 RX ADMIN — METOPROLOL SUCCINATE 25 MG: 25 TABLET, EXTENDED RELEASE ORAL at 09:42

## 2024-03-27 RX ADMIN — DOFETILIDE 250 MCG: 250 CAPSULE ORAL at 21:54

## 2024-03-27 RX ADMIN — POTASSIUM CHLORIDE 20 MEQ: 1500 TABLET, EXTENDED RELEASE ORAL at 09:42

## 2024-03-27 NOTE — UTILIZATION REVIEW
Initial Clinical Review    Admission: Date/Time/Statement:   Admission Orders (From admission, onward)       Ordered        03/26/24 1107  Inpatient Admission  Once                          Orders Placed This Encounter   Procedures    Inpatient Admission     Standing Status:   Standing     Number of Occurrences:   1     Order Specific Question:   Level of Care     Answer:   Med Surg [16]     Order Specific Question:   Estimated length of stay     Answer:   More than 2 Midnights     Order Specific Question:   Certification     Answer:   I certify that inpatient services are medically necessary for this patient for a duration of greater than two midnights. See H&P and MD Progress Notes for additional information about the patient's course of treatment.       Initial Presentation: 73 y.o. female presents for planned inpatient med surg admission for evaluation and treatment of persistent atrial fibrillation despite two cardioversions and an ablation.  She has continued to experience tachybradycardia syndrome and had a pacemaker implanted in October 2013. As she has had breakthrough symptoms, a stronger antiarrhythmic therapy is recommended. Plan to under dofetilide initiation. Plan includes : start dofetilide at 500 mcg twice daily given reasonable Qtc interval and creatinine clearance of 75 ml/min.  Obtain  EKG s 2 hr post dose and adjust dosing to arrive at at 9am and 9pm  regimen. Monitor electrolytes with daily blood work.    Date: 3-27-24    Day 2: inpatient med surg  She has received 2 doses of dofetilide 500 mcg but QT appears to be borderline.  Currently in A flutter/fibrillation with controlled ventricular rates and intermittent ventricular pacing.  Plan to lower dose to 250 mcg bid. Repeat EKG. She will likely need cardioversion on 3-28 if she does not chemically convert.  NPO midnight.     ED Triage Vitals   03/26/24 1151 03/26/24 1151 03/26/24 1938 03/26/24 1151 03/26/24 1151   97.9 °F (36.6 °C) 73 18  (!) 142/106 94 %      No Pain          03/07/24 97 kg (213 lb 12.8 oz)     Additional Vital Signs:     Date/Time Temp Pulse Resp BP MAP (mmHg) SpO2 O2 Device   03/27/24 10:49:19 97.5 °F (36.4 °C) 69 18 143/97 112 96 % --   03/27/24 0900 -- -- -- -- -- 94 % --   03/27/24 06:33:51 -- 66 17 145/95 112 95 % --   03/27/24 02:12:56 97.2 °F (36.2 °C)   Abnormal  68 16 121/82 95 94 % --   03/26/24 22:38:13 97.7 °F (36.5 °C) 74 18 131/93 106 96 % --   03/26/24 19:38:53 97.9 °F (36.6 °C) 71 18 130/92 105 98 % None (Room air)   03/26/24 14:59:33 97.3 °F (36.3 °C)   Abnormal  71 -- 125/91 102 98 % --   03/26/24 1300 -- -- -- -- -- 97 % None (Room air)   03/26/24 11:51:48 97.9 °F (36.6 °C) 73 -- 142/106   Abnormal  118 94 %            Pertinent Labs/Diagnostic Test Results:         Results from last 7 days   Lab Units 03/26/24  1152   WBC Thousand/uL 4.85   HEMOGLOBIN g/dL 14.3   HEMATOCRIT % 43.6   PLATELETS Thousands/uL 191   NEUTROS ABS Thousands/µL 3.10         Results from last 7 days   Lab Units 03/27/24  0450 03/26/24  1152   SODIUM mmol/L 140 141   POTASSIUM mmol/L 3.3* 3.8   CHLORIDE mmol/L 107 106   CO2 mmol/L 27 26   ANION GAP mmol/L 6 9   BUN mg/dL 9 8   CREATININE mg/dL 0.71 0.74   EGFR ml/min/1.73sq m 84 80   CALCIUM mg/dL 8.4 9.0   MAGNESIUM mg/dL 1.8* 1.8*             Results from last 7 days   Lab Units 03/27/24  0450 03/26/24  1152   GLUCOSE RANDOM mg/dL 95 92         ED Treatment:   Medication Administration - No Administrations Displayed (No Start Event Found)       None          Past Medical History:   Diagnosis Date    Anxiety     resolved 10/4/17    Asthma     seasonal    Atrial fibrillation (HCC) 01/2022    newly diagnosed on eliquis    Chronic kidney disease     kidney stone    Colon polyp     Disease of thyroid gland     Dysphagia     GERD (gastroesophageal reflux disease)     Goiter, nodular     partial thyroidectomy    Hiatal hernia     repaired 2018    Hiatal hernia with GERD without esophagitis  04/2018    surgical correction    History of esophageal varices with bleeding     History of pernicious anemia     History of transfusion     x1 after bleeding ulcer    Hyperlipidemia     Hypertension     Irritable bowel syndrome     Neck mass     2 small areas left side by jawline and [place]  scheduled 0820/21    RA (rheumatoid arthritis) (HCC)     Seasonal allergies     Vertigo     Wears glasses     for reading     Present on Admission:   Persistent atrial fibrillation (HCC)      Admitting Diagnosis: Tachy-lizet syndrome (HCC) [I49.5]  Age/Sex: 73 y.o. female    Scheduled Medications:  apixaban, 5 mg, Oral, BID  atorvastatin, 20 mg, Oral, HS  bumetanide, 1 mg, Oral, Daily  dofetilide, 250 mcg, Oral, Q12H XOCHITL  levothyroxine, 50 mcg, Oral, Early Morning  magnesium Oxide, 400 mg, Oral, BID  metoprolol succinate, 25 mg, Oral, BID  potassium chloride, , Oral, Daily      Continuous IV Infusions:     PRN Meds:  docusate sodium, 100 mg, Oral, BID PRN  melatonin, 3 mg, Oral, HS PRN        None    Network Utilization Review Department  ATTENTION: Please call with any questions or concerns to [phone] and carefully listen to the prompts so that you are directed to the right person. All voicemails are confidential.   For Discharge needs, contact Care Management DC Support Team at [phone] opt. [phone]  Send all requests for admission clinical reviews, approved or denied determinations and any other requests to dedicated fax number below belonging to the campus where the patient is receiving treatment. List of dedicated fax numbers for the Facilities:  FACILITY NAME UR FAX NUMBER   ADMISSION DENIALS (Administrative/Medical Necessity) [phone]   DISCHARGE SUPPORT TEAM (NETWORK) [phone]   PARENT CHILD HEALTH (Maternity/NICU/Pediatrics) [phone]   [facility] [phone]   [facility] [phone]   [facility] [phone]   [facility]  [facility] [phone]   [facility] [phone]   [facility] [phone]   [facility] [phone]   [facility] [phone]   [facility] [phone]   [facility] [phone]   [facility] [phone]   [facility] [phone]

## 2024-03-27 NOTE — CASE MANAGEMENT
Case Management Assessment & Discharge Planning Note    Patient name Swetha Lopez  Location [facility] 430/[facility] 430-01 MRN [id number]  : 1950 Date 3/27/2024       Current Admission Date: 3/26/2024  Current Admission Diagnosis:Persistent atrial fibrillation (HCC)   Patient Active Problem List    Diagnosis Date Noted    Tachy-lizet syndrome (HCC) 10/24/2023    Abnormal thyroid biopsy 2023    Localized osteoporosis with current pathological fracture 2023    Vitamin D deficiency 2023    History of TIA (transient ischemic attack) 2023    Pulmonary hypertension (HCC) 2023    Pigmented purpura (HCC) 2023    Peripheral vision loss, right 2022    Persistent atrial fibrillation (HCC) 01/10/2022    Asthma 2021    Xerostomia 2021    Parotid mass 2021    Anxiety 2020    Rheumatoid arthritis (HCC) 2020    Vertigo 2020    History of hysterectomy 2020    Class 2 severe obesity due to excess calories with serious comorbidity and body mass index (BMI) of 35.0 to 35.9 in adult  2020    BMI 35.0-35.9,adult 2020    Abnormal CT of liver 05/15/2018    Hypokalemia 2018    Benign hypertension 2018    Mixed hyperlipidemia 2018    Congenital hypothyroidism with diffuse goiter 2018    Liver lesion, left lobe 2018    GERD with esophagitis 2018    Hemorrhoids 10/25/2017    Hiatal hernia 10/04/2017    IBS (irritable bowel syndrome) 10/04/2017    Multiple thyroid nodules 10/04/2017    Stress incontinence, female 10/04/2017      LOS (days): 1  Geometric Mean LOS (GMLOS) (days): 1.8  Days to GMLOS:0.8     OBJECTIVE:    Risk of Unplanned Readmission Score: 9.83         Current admission status: Inpatient       Preferred Pharmacy:   [facility] ([facility]) - [address] [address]  Phone: [phone] Fax:  [phone]    [facility] - [place], [place] - [address]  [address]  [address]  [facility] [address]  Phone: [phone] Fax: [phone]    [id number] - [place], [place] - [address]  [address]  [facility] [address]  Phone: [phone] Fax: [phone]    Primary Care Provider: Grcaia Danielle MD    Primary Insurance: [employer]  Secondary Insurance:     ASSESSMENT:  Active Health Care Proxies    There are no active Health Care Proxies on file.                 Readmission Root Cause  30 Day Readmission: No    Patient Information  Admitted from:: Home  Mental Status: Alert  During Assessment patient was accompanied by: Not accompanied during assessment  Assessment information provided by:: Patient  Primary Caregiver: Self  Support Systems: Spouse/significant other  County of Residence: [place]  [place] [facility] do you live in?: [place]  Home entry access options. Select all that apply.: Stairs  Number of steps to enter home.: 1  Do the steps have railings?: No  Type of Current Residence: [facility]  Living Arrangements: Lives w/ Spouse/significant other  Is patient a ?: No    Activities of Daily Living Prior to Admission  Functional Status: Independent  Completes ADLs independently?: Yes  Ambulates independently?: Yes  Does patient use assisted devices?: No  Does patient currently own DME?: Yes  What DME does the patient currently own?: Walker, Straight Cane  Does patient have a history of Outpatient Therapy (PT/OT)?: Yes  Does the patient have a history of Short-Term Rehab?: No  Does patient have a history of HHC?: No  Does patient currently have HHC?: No         Patient Information Continued  Income Source: Pension/halfway  Does patient have prescription coverage?: Yes  Does patient receive dialysis treatments?: No  Does patient have a history of substance abuse?: No  Does patient have a history of Mental Health Diagnosis?:  No    PHQ 2/9 Screening   Reviewed PHQ 2/9 Depression Screening Score?: No    Means of Transportation  Means of Transport to Appts:: Family transport      Social Determinants of Health (SDOH)      Flowsheet Row Most Recent Value   Housing Stability    In the last 12 months, was there a time when you were not able to pay the mortgage or rent on time? N   In the last 12 months, how many places have you lived? 1   In the last 12 months, was there a time when you did not have a steady place to sleep or slept in a shelter (including now)? N   Transportation Needs    In the past 12 months, has lack of transportation kept you from medical appointments or from getting medications? no   In the past 12 months, has lack of transportation kept you from meetings, work, or from getting things needed for daily living? No   Food Insecurity    Within the past 12 months, you worried that your food would run out before you got the money to buy more. Never true   Within the past 12 months, the food you bought just didn't last and you didn't have money to get more. Never true   Utilities    In the past 12 months has the electric, gas, oil, or water company threatened to shut off services in your home? No            DISCHARGE DETAILS:    Discharge planning discussed with:: pt at bedside  [place]lements of Choice: Yes  Comments - Freedom of Choice: No discharge recommendation discussed with pt at this time.  CM contacted family/caregiver?: No- see comments  Were Treatment Team discharge recommendations reviewed with patient/caregiver?: Yes  Did patient/caregiver verbalize understanding of patient care needs?: Yes  Were patient/caregiver advised of the risks associated with not following Treatment Team discharge recommendations?: Yes    Contacts  Patient Contacts: Godfrey Lopez  Relationship to Patient:: Family  Contact Method: Phone  Phone Number: [phone]   Reason/Outcome: Continuity of Care, Emergency Contact, Discharge  Planning    Requested Home Health Care         Is the patient interested in HHC at discharge?: No    DME Referral Provided  Referral made for DME?: No         Would you like to participate in our [facility] service program?  : No - Declined    Treatment Team Recommendation: Home  Discharge Destination Plan:: Home  Transport at Discharge : Family                                      Additional Comments: CM introduced herself and role to pt at bedside. Pt stated [place]. Pt stated she is independent at baseline with ADLS/IADLS. Pt stated her spouse provides transport to medical appointments. Pt stated she has had outpatient PT in the past. No Tx history with MH and D&A. Pt stated she ambulates independently, bu has a walker and cane. Spouse will transport at discharge. CM will continue to follow as needed.

## 2024-03-27 NOTE — PROGRESS NOTES
Progress Note - Electrophysiology  Swetha Lopez 73 y.o. female MRN: [id number]  Unit/Bed#: [facility] 430-01 Encounter: [id number]      Assessment:  Early persistent atrial fibrillation  - anticoagulated with Eliquis / [id number] score of 3 (age, sex, HTN)  - rate control: metoprolol / diltiazem prior to admission  - antiarrhythmic therapy: Being started on dofetilide, breakthrough on flecainide  - prior cardioversion: x2  - now status post ablation of afib with PVI and posterior wall isolation and flutter with CTI line by Dr. Eubanks on 12/23/2022  Essential hypertension  - maintained on metoprolol and diltiazem   Hyperlipidemia  GERD  Rheumatoid arthritis  Hypothryoidism  - stats post hemithyroidectomy - non-emergent thyroid ultrasound recommended  TIA  Medtronic dual-chamber pacemaker in situ, implanted 10/2023      Plan:  1. Afib - Patient is currently in atrial flutter/fibrillation with controlled ventricular rates and intermittent ventricular pacing. She has already received 2 doses of dofetilide at 500mcg but QT does appear to be borderline with last night's EKG and therefore will lower to 250mcg BID until attending can review EKG's as well. Advised patient and her  that if attending would like higher dose can give another 250mcg to get her back to 500mcg BID dosing. Will continue to load at this current dose and monitor EKG's two hours post dose. Advised her that we will likely need cardioversion tomorrow if she does not chemically convert on her own. Tentatively have this for tomorrow and will be made NPO after midnight. She says that she has not missed doses of her blood thinner.    2. PPI - Will discontinue omeprazole solution currently.    3. Electrolytes - Potassium of 3.3 today and mag of 1.8 - will replete both of these. Will also draw AM labs to check electrolytes and telemetry as well which has not shown ventricular ectopy thus far.     4. HTN - Blood pressures remain mildly elevated.  Discussed either up-titration of metoprolol vs reintroducing losartan vs valsartan (patient has been on these in the past).    Subjective/Objective   Subjective: Swetha Lopez is a 73 y.o. year old female with past medical history as stated above. She is hospital stay day 2 and has been tolerating dofetilide well.     She reports that she did have intermittent episodes of palps/chest pain last night. Has had these in the past as well but not as frequent.     TELE: Afib/flutter with controlled ventricular response    EKG:   After 2nd dose of dofetilide -      Objective:  Vitals: /95   Pulse 66   Temp (!) 97.2 °F (36.2 °C)   Resp 17   SpO2 95%     There were no vitals filed for this visit.  Orthostatic Blood Pressures      Flowsheet Row Most Recent Value   Blood Pressure 145/95 filed at 03/27/2024 0633              Intake/Output Summary (Last 24 hours) at 3/27/2024 1035  Last data filed at 3/27/2024 0452  Gross per 24 hour   Intake --   Output 1725 ml   Net -1725 ml       Invasive Devices       Peripheral Intravenous Line  Duration             Peripheral IV 10/24/23 Dorsal (posterior);Left Hand 155 days    Peripheral IV 03/26/24 Left;Ventral (anterior) Forearm <1 day                              Scheduled Meds:  Current Facility-Administered Medications   Medication Dose Route Frequency Provider Last Rate    apixaban  5 mg Oral BID Bella Soden, PA-C      atorvastatin  20 mg Oral HS Bella Soden, PA-C      bumetanide  1 mg Oral Daily Bella Soden, PA-C      docusate sodium  100 mg Oral BID PRN Bella Soden, PA-C      dofetilide  500 mcg Oral Q12H XOCHITL Bella Soden, PA-C      levothyroxine  50 mcg Oral Early Morning Bella Soden, PA-C      melatonin  3 mg Oral HS PRN Bella Soden, PA-C      metoprolol succinate  25 mg Oral BID Bella Soden, PA-C      omeprazole (PRILOSEC) suspension 2 mg/mL  10 mg Oral BID AC Miguel Mauricio MD      potassium chloride   Oral Daily Bella Soden, PA-C       Continuous  Infusions:   PRN Meds:.  docusate sodium    melatonin    Review of Systems:  ROS as noted above, otherwise 12 point review of systems was performed and is negative.     Physical Exam:   GEN: NAD, alert and oriented, well appearing  SKIN: dry without significant lesions or rashes  HEENT: NCAT, PERRL, EOMs intact  NECK: No JVD or carotid bruits appreciated  CARDIOVASCULAR: RRR, normal S1, S2 without murmurs, rubs, or gallops appreciated  LUNGS: Clear to auscultation bilaterally without wheezes, rhonchi, or rales  ABDOMEN: Soft, nontender, nondistended  EXTREMITIES/VASCULAR: perfused without clubbing, cyanosis, or edema b/l  PSYCH: Normal mood and affect  NEURO: CN ll-Xll grossly intact              Lab Results: I have personally reviewed pertinent lab results.    Results from last 7 days   Lab Units 03/26/24  1152   WBC Thousand/uL 4.85   HEMOGLOBIN g/dL 14.3   HEMATOCRIT % 43.6   PLATELETS Thousands/uL 191     Results from last 7 days   Lab Units 03/27/24  0450 03/26/24  1152   POTASSIUM mmol/L 3.3* 3.8   CHLORIDE mmol/L 107 106   CO2 mmol/L 27 26   BUN mg/dL 9 8   CREATININE mg/dL 0.71 0.74   CALCIUM mg/dL 8.4 9.0         Results from last 7 days   Lab Units 03/27/24  0450 03/26/24  1152   MAGNESIUM mg/dL 1.8* 1.8*       Imaging: I have personally reviewed pertinent reports.    No results found for this or any previous visit.      VTE Pharmacologic Prophylaxis: Eliuqis  VTE Mechanical Prophylaxis: sequential compression device

## 2024-03-27 NOTE — PLAN OF CARE
Problem: PAIN - ADULT  Goal: Verbalizes/displays adequate comfort level or baseline comfort level  Description: Interventions:  - Encourage patient to monitor pain and request assistance  - Assess pain using appropriate pain scale  - Administer analgesics based on type and severity of pain and evaluate response  - Implement non-pharmacological measures as appropriate and evaluate response  - Consider cultural and social influences on pain and pain management  - Notify physician/advanced practitioner if interventions unsuccessful or patient reports new pain  Outcome: Progressing     Problem: INFECTION - ADULT  Goal: Absence or prevention of progression during hospitalization  Description: INTERVENTIONS:  - Assess and monitor for signs and symptoms of infection  - Monitor lab/diagnostic results  - Monitor all insertion sites, i.e. indwelling lines, tubes, and drains  - Monitor endotracheal if appropriate and nasal secretions for changes in amount and color  - [place] appropriate cooling/warming therapies per order  - Administer medications as ordered  - Instruct and encourage patient and family to use good hand hygiene technique  - Identify and instruct in appropriate isolation precautions for identified infection/condition  Outcome: Progressing  Goal: Absence of fever/infection during neutropenic period  Description: INTERVENTIONS:  - Monitor WBC    Outcome: Progressing     Problem: SAFETY ADULT  Goal: Patient will remain free of falls  Description: INTERVENTIONS:  - Educate patient/family on patient safety including physical limitations  - Instruct patient to call for assistance with activity   - Consult OT/PT to assist with strengthening/mobility   - Keep Call bell within reach    Goal: Maintain or return to baseline ADL function  Description: INTERVENTIONS:  -  Assess patient's ability to carry out ADLs; assess patient's baseline for ADL function and identify physical deficits which impact ability to perform  ADLs (bathing, care of mouth/teeth, toileting, grooming, dressing, etc.)  - Assess/evaluate cause of self-care deficits   - Assess range of motion  - Assess patient's mobility; develop plan if impaired  - Assess patient's need for assistive devices and provide as appropriate  - Encourage maximum independence but intervene and supervise when necessary  - Involve family in performance of ADLs  - Assess for home care needs following discharge   - Consider OT consult to assist with ADL evaluation and planning for discharge  - Provide patient education as appropriate  Outcome: Progressing  Goal: Maintains/Returns to pre admission functional level  Description: INTERVENTIONS:  - Perform AM-PAC 6 Click Basic Mobility/ Daily Activity assessment daily.  - Set and communicate daily mobility goal to care team and patient/family/caregiver.   - Record patient progress and toleration of activity level   Outcome: Progressing     Problem: DISCHARGE PLANNING  Goal: Discharge to home or other facility with appropriate resources  Description: INTERVENTIONS:  - Identify barriers to discharge w/patient and caregiver  - Arrange for needed discharge resources and transportation as appropriate  - Identify discharge learning needs (meds, wound care, etc.)  - Arrange for interpretive services to assist at discharge as needed  - Refer to Case Management Department for coordinating discharge planning if the patient needs post-hospital services based on physician/advanced practitioner order or complex needs related to functional status, cognitive ability, or social support system  Outcome: Progressing     Problem: Knowledge Deficit  Goal: Patient/family/caregiver demonstrates understanding of disease process, treatment plan, medications, and discharge instructions  Description: Complete learning assessment and assess knowledge base.  Interventions:  - Provide teaching at level of understanding  - Provide teaching via preferred learning  methods  Outcome: Progressing     Problem: INFECTION - ADULT  Goal: Absence or prevention of progression during hospitalization  Description: INTERVENTIONS:  - Assess and monitor for signs and symptoms of infection  - Monitor lab/diagnostic results  - Monitor all insertion sites, i.e. indwelling lines, tubes, and drains  - Monitor endotracheal if appropriate and nasal secretions for changes in amount and color  - [place] appropriate cooling/warming therapies per order  - Administer medications as ordered  - Instruct and encourage patient and family to use good hand hygiene technique  - Identify and instruct in appropriate isolation precautions for identified infection/condition  Outcome: Progressing     Problem: DISCHARGE PLANNING  Goal: Discharge to home or other facility with appropriate resources  Description: INTERVENTIONS:  - Identify barriers to discharge w/patient and caregiver  - Arrange for needed discharge resources and transportation as appropriate  - Identify discharge learning needs (meds, wound care, etc.)  - Arrange for interpretive services to assist at discharge as needed  - Refer to Case Management Department for coordinating discharge planning if the patient needs post-hospital services based on physician/advanced practitioner order or complex needs related to functional status, cognitive ability, or social support system  Outcome: Progressing

## 2024-03-27 NOTE — TELEPHONE ENCOUNTER
Trying to see if patient had a secondary insurance that was active 8/11/23.  At that time Medicare was billed for her Zio which was pain, but there is a remaining balance of $232.83 and they would like to try and bill whomever the secondary insurance was at that time.      Can you look that up.  I could not tell exactly.  Thanks.

## 2024-03-28 ENCOUNTER — APPOINTMENT (INPATIENT)
Dept: NON INVASIVE DIAGNOSTICS | Facility: HOSPITAL | Age: 74
DRG: 310 | End: 2024-03-28
Payer: COMMERCIAL

## 2024-03-28 VITALS
OXYGEN SATURATION: 94 % | DIASTOLIC BLOOD PRESSURE: 68 MMHG | RESPIRATION RATE: 18 BRPM | SYSTOLIC BLOOD PRESSURE: 122 MMHG | TEMPERATURE: 98 F | HEART RATE: 62 BPM

## 2024-03-28 LAB
ANION GAP SERPL CALCULATED.3IONS-SCNC: 7 MMOL/L (ref 4–13)
ATRIAL RATE: 202 BPM
ATRIAL RATE: 70 BPM
ATRIAL RATE: 78 BPM
BUN SERPL-MCNC: 10 MG/DL (ref 5–25)
CALCIUM SERPL-MCNC: 8.7 MG/DL (ref 8.4–10.2)
CHLORIDE SERPL-SCNC: 105 MMOL/L (ref 96–108)
CO2 SERPL-SCNC: 28 MMOL/L (ref 21–32)
CREAT SERPL-MCNC: 0.78 MG/DL (ref 0.6–1.3)
GFR SERPL CREATININE-BSD FRML MDRD: 75 ML/MIN/1.73SQ M
GLUCOSE SERPL-MCNC: 99 MG/DL (ref 65–140)
MAGNESIUM SERPL-MCNC: 2 MG/DL (ref 1.9–2.7)
P AXIS: 78 DEGREES
POTASSIUM SERPL-SCNC: 3.6 MMOL/L (ref 3.5–5.3)
PR INTERVAL: 200 MS
PR INTERVAL: 242 MS
QRS AXIS: -5 DEGREES
QRS AXIS: -6 DEGREES
QRS AXIS: 0 DEGREES
QRSD INTERVAL: 100 MS
QRSD INTERVAL: 100 MS
QRSD INTERVAL: 96 MS
QT INTERVAL: 468 MS
QT INTERVAL: 470 MS
QT INTERVAL: 492 MS
QTC INTERVAL: 507 MS
QTC INTERVAL: 515 MS
QTC INTERVAL: 546 MS
SODIUM SERPL-SCNC: 140 MMOL/L (ref 135–147)
T WAVE AXIS: -44 DEGREES
T WAVE AXIS: -48 DEGREES
T WAVE AXIS: -52 DEGREES
VENTRICULAR RATE: 70 BPM
VENTRICULAR RATE: 73 BPM
VENTRICULAR RATE: 74 BPM

## 2024-03-28 PROCEDURE — 93005 ELECTROCARDIOGRAM TRACING: CPT

## 2024-03-28 PROCEDURE — 93010 ELECTROCARDIOGRAM REPORT: CPT | Performed by: INTERNAL MEDICINE

## 2024-03-28 PROCEDURE — 83735 ASSAY OF MAGNESIUM: CPT | Performed by: PHYSICIAN ASSISTANT

## 2024-03-28 PROCEDURE — 92960 CARDIOVERSION ELECTRIC EXT: CPT

## 2024-03-28 PROCEDURE — NC001 PR NO CHARGE: Performed by: PHYSICIAN ASSISTANT

## 2024-03-28 PROCEDURE — 80048 BASIC METABOLIC PNL TOTAL CA: CPT | Performed by: PHYSICIAN ASSISTANT

## 2024-03-28 PROCEDURE — 5A2204Z RESTORATION OF CARDIAC RHYTHM, SINGLE: ICD-10-PCS | Performed by: INTERNAL MEDICINE

## 2024-03-28 PROCEDURE — 92960 CARDIOVERSION ELECTRIC EXT: CPT | Performed by: INTERNAL MEDICINE

## 2024-03-28 RX ORDER — PROPOFOL 10 MG/ML
INJECTION, EMULSION INTRAVENOUS AS NEEDED
Status: DISCONTINUED | OUTPATIENT
Start: 2024-03-28 | End: 2024-03-28

## 2024-03-28 RX ORDER — POTASSIUM CHLORIDE 750 MG/1
20 TABLET, EXTENDED RELEASE ORAL 2 TIMES DAILY
Start: 2024-03-28

## 2024-03-28 RX ORDER — SODIUM CHLORIDE 9 MG/ML
INJECTION, SOLUTION INTRAVENOUS CONTINUOUS PRN
Status: DISCONTINUED | OUTPATIENT
Start: 2024-03-28 | End: 2024-03-28

## 2024-03-28 RX ORDER — POTASSIUM CHLORIDE 20 MEQ/1
40 TABLET, EXTENDED RELEASE ORAL ONCE
Status: COMPLETED | OUTPATIENT
Start: 2024-03-28 | End: 2024-03-28

## 2024-03-28 RX ORDER — DOFETILIDE 0.25 MG/1
250 CAPSULE ORAL 2 TIMES DAILY
Qty: 60 CAPSULE | Refills: 0 | Status: SHIPPED | OUTPATIENT
Start: 2024-03-28 | End: 2024-03-29 | Stop reason: SDUPTHER

## 2024-03-28 RX ADMIN — PROPOFOL 10 MG: 10 INJECTION, EMULSION INTRAVENOUS at 10:25

## 2024-03-28 RX ADMIN — DOFETILIDE 250 MCG: 250 CAPSULE ORAL at 09:53

## 2024-03-28 RX ADMIN — BUMETANIDE 1 MG: 1 TABLET ORAL at 11:04

## 2024-03-28 RX ADMIN — LEVOTHYROXINE SODIUM 50 MCG: 50 TABLET ORAL at 06:29

## 2024-03-28 RX ADMIN — PROPOFOL 20 MG: 10 INJECTION, EMULSION INTRAVENOUS at 10:24

## 2024-03-28 RX ADMIN — PROPOFOL 50 MG: 10 INJECTION, EMULSION INTRAVENOUS at 10:23

## 2024-03-28 RX ADMIN — POTASSIUM CHLORIDE 40 MEQ: 1500 TABLET, EXTENDED RELEASE ORAL at 11:07

## 2024-03-28 RX ADMIN — MAGNESIUM OXIDE TAB 400 MG (241.3 MG ELEMENTAL MG) 400 MG: 400 (241.3 MG) TAB at 11:04

## 2024-03-28 RX ADMIN — METOPROLOL SUCCINATE 25 MG: 25 TABLET, EXTENDED RELEASE ORAL at 09:53

## 2024-03-28 RX ADMIN — POTASSIUM CHLORIDE 20 MEQ: 1500 TABLET, EXTENDED RELEASE ORAL at 11:03

## 2024-03-28 RX ADMIN — SODIUM CHLORIDE: 0.9 INJECTION, SOLUTION INTRAVENOUS at 10:13

## 2024-03-28 RX ADMIN — APIXABAN 5 MG: 5 TABLET, FILM COATED ORAL at 09:53

## 2024-03-28 NOTE — DISCHARGE INSTR - AVS FIRST PAGE
MEDICATION CHANGES AFTER BEING STARTED ON TIKOSYN (DOFETILIDE):  Please start taking dofetilide 250 mcg twice daily.  Please increase potassium to 30 mill equivalents daily (keep taking your usual 20mEq and add your small yellow round pill that is 10mEq for the next week), recheck blood work in 1 week.  Please stop taking Cardiazem (diltiazem).    We did not need to add any blood pressure medications but please keep an eye on these at home and call the office if these start to creep up.    FOLLOW UP:  After starting this new medication, you will need to come to one of our cardiology offices for a 1 week EKG appointment. Please see further discharge instructions for this appointment time and location. If you have any issues with the timing, please call our schedulers at [phone] if appointment was made at [place] - otherwise the number listed above the appointment time at the [facility].    At this one week EKG appointment, please ask the medical assistant or nurse taking care of you to transfer your prescription over from the [facility] to your home pharmacy that you normally use if this has not already been done already.     You will have a follow up in the EP office in about 4-6 weeks. If you have any questions or concerns prior to this visit, please contact Dr. Eubanks's office at [phone]. If you have any significant issues after hours or on the weekends, please call the on call cardiology number at [phone].    COMMON MEDICATION INTERACTION SHEET:  Please refer to this medication list prior to beginning any new medications while on Tikosyn:

## 2024-03-28 NOTE — ANESTHESIA PREPROCEDURE EVALUATION
Procedure:  CARDIOVERSION    Relevant Problems   CARDIO   (+) Benign hypertension   (+) Mixed hyperlipidemia   (+) Persistent atrial fibrillation (HCC)   (+) Tachy-lizet syndrome (HCC)      ENDO   (+) Congenital hypothyroidism with diffuse goiter      GI/HEPATIC   (+) Hiatal hernia   (+) Liver lesion, left lobe      GYN   (+) History of hysterectomy      MUSCULOSKELETAL   (+) Hiatal hernia   (+) Rheumatoid arthritis (HCC)      NEURO/PSYCH   (+) Anxiety   (+) Vertigo      PULMONARY   (+) Asthma        Physical Exam    Airway    Mallampati score: II  TM Distance: >3 FB  Neck ROM: full     Dental   No notable dental hx     Cardiovascular  Rhythm: irregular, Rate: normal, No murmur    Pulmonary  Pulmonary exam normal Breath sounds clear to auscultation    Other Findings  post-pubertal.      Anesthesia Plan  ASA Score- 3     Anesthesia Type- general with ASA Monitors.         Additional Monitors:     Airway Plan: NTT.           Plan Factors-Exercise tolerance (METS): >4 METS.    Chart reviewed. EKG reviewed. Imaging results reviewed. Existing labs reviewed. Patient summary reviewed.    Patient is not a current smoker.              Induction- intravenous.    Postoperative Plan-     Informed Consent- Anesthetic plan and risks discussed with patient.  I personally reviewed this patient with the CRNA. Discussed and agreed on the Anesthesia Plan with the CRNA..

## 2024-03-28 NOTE — ANESTHESIA POSTPROCEDURE EVALUATION
Post-Op Assessment Note    CV Status:  Stable  Pain Score: 0    Pain management: adequate       Mental Status:  Alert and awake   Hydration Status:  Euvolemic   PONV Controlled:  Controlled   Airway Patency:  Patent     Post Op Vitals Reviewed: Yes    No anethesia notable event occurred.    Staff: CRNA               BP   131/79   Temp      Pulse  66   Resp   20   SpO2   99

## 2024-03-28 NOTE — DISCHARGE SUMMARY
Discharge Summary - Swetha Lopez 73 y.o. female MRN: [id number]    Unit/Bed#: [facility] 430-01 Encounter: [id number]      Admission Date: 3/26/2024   Discharge Date: 3/28/2024    Discharge Diagnosis:   Early persistent atrial fibrillation  - anticoagulated with Eliquis / [id number] score of 3 (age, sex, HTN)  - rate control: metoprolol / diltiazem discontinued with dofetilide initiation  - antiarrhythmic therapy: dofetilide 250mcg twice daily / breakthrough on flecainide  - prior cardioversion: x2, now status post CV 3/28/2024 after dofetilide loading  - now status post ablation of afib with PVI and posterior wall isolation and flutter with CTI line by Dr. Eubanks on 12/23/2022  Essential hypertension  - maintained on metoprolol, BP's still normotensive (if elevated in OP setting consider restarting valsartan)  Hyperlipidemia  GERD  Rheumatoid arthritis  Hypothryoidism  - stats post hemithyroidectomy - non-emergent thyroid ultrasound recommended  TIA  Medtronic dual-chamber pacemaker in situ, implanted 10/2023      Procedures Performed:   Cardioversion  No orders of the defined types were placed in this encounter.      Consultants: None    HPI: Please refer to the initial history and physical as well as procedure notes for full details. Briefly, Swetha Lopez is a 73 y.o. year old female with early persistent atrial fibrillation anticoagulated with Eliquis, essential hypertension, hyperlipidemia, hiatal hernia, GERD, and rheumatoid arthritis.  She first was diagnosed with atrial fibrillation in January of this year in her PCPs office and was symptomatic with palpitations and shortness of breath.  She was put on rate control and anticoagulation of Eliquis.  Though at first hesitant patient did subsequently undergo cardioversion which did not hold in October.  The following month after the addition of flecainide with nuclear stress test ruling out any structural heart disease, her second cardioversion did not  last either.  Therefore, she was seen in consultation by Dr. Eubanks at which point ablation was recommended and she was agreeable.  She had presented to the hospital underwent ablation in December 2022.  She continued to have breakthrough episodes and also concern for tachybradycardia syndrome and in October 2023 underwent pacemaker implantation for this.  She continued breakthrough, it was felt that she would benefit from stronger antiarrhythmic therapy then flecainide. Patient presented this hospital admission to undergo initiation of dofetilide.     Hospital Course:   Swetha Lopez is a 73 y.o. female who was admitted elective for antiarrhythmic medication initiation.  Patient was seen in the office by Dr. Eubanks and recommended to initiate therapy with dofetilide.   She has been on chronic anticoagulation therapy with Eliquis without missed dose.     She presents to the hospital in rate controlled atrial fibrillation.  She was initiated on dofetilide at 500mcg twice daily. Serial EKGs were performed 2 hours after each dose of antiarrhythmic medication. As there was some concern for QT prolongation even while in afib, dose was lowered to 250mcg twice daily. She was cardioverted after her 4th dose of dofetilide back to sinus rhythm. There were no significant occurrence of ventricular ectopy on telemetry.  Electrolytes and renal function were monitored throughout her stay - she did need more potassium repletion.  She underwent a total of 5 doses, with her last EKG as follows (although read as long, did discuss with attending who still felt comfortable with patient leaving same day as cardioversion and felt that QT was closer to 440 - PAC's causing longer read):      Physical exam on the day of discharge was as follows:  GEN: NAD, alert and oriented, well appearing  SKIN: dry without significant lesions or rashes  HEENT: NCAT, PERRL, EOMs intact  NECK: No JVD or carotid bruits appreciated  CARDIOVASCULAR:  RRR, normal S1, S2 without murmurs, rubs, or gallops appreciated  LUNGS: Clear to auscultation bilaterally without wheezes, rhonchi, or rales  ABDOMEN: Soft, nontender, nondistended  EXTREMITIES/VASCULAR: perfused without clubbing, cyanosis, or edema b/l  PSYCH: Normal mood and affect  NEURO: CN ll-Xll grossly intact     At time of discharge, patient was ambulating the cardiac unit without complaints of  lightheadedness, presyncope, syncope, chest pain, shortness of breath, orthopnea, PND, palpitations, or bleeding problems. She received education regarding dofetilide therapy, avoiding missed doses, pharmacy moitoring for drug to drug interactions, and concerning signs and symptoms that would prompt urgent evaluation.  She was given a one-week EKG appointment after starting dofetilide along with a follow up with Geetha Avila PA-C in 4-6 weeks in the office.     In terms of patient's medications, she was started on dofetilide 250 mcg twice daily.  As potassium was predominantly low during her admission, I have increased her potassium.  40 mg was difficult for her to swallow and therefore she will take her 20 mill equivalent tablet along with 10 mill equivalent tablets that she has before it was increased in the outpatient setting.  She will do potassium 30 mill equivalents daily for 1 week and recheck blood work next Thursday to determine if she needs further supplementation.  She will also not restart diltiazem.  As blood pressures were normotensive, no further BP medication was added.  She will monitor this as an outpatient and let us know if that is creep up.    She is stable for discharge at this time with all questions answered. She was discussed in detail with Dr. Eubanks who is in agreement with this discharge summary.     Discharge Medications:  See after visit summary for reconciled discharge medications provided to patient and family.    Medications Prior to Admission   Medication    atorvastatin  (LIPITOR) 20 mg tablet    bumetanide (BUMEX) 1 mg tablet    Calcium Carb-Cholecalciferol 600-1000 MG-UNIT CAPS    Eliquis 5 MG    potassium chloride 20 MEQ TBCR    diltiazem (CARDIZEM CD) 120 mg 24 hr capsule    levothyroxine 50 mcg tablet    metoprolol succinate (TOPROL-XL) 25 mg 24 hr tablet         [facility] Labs/diagnostics:  CBC with diff:   Results from last 7 days   Lab Units 03/26/24  1152   WBC Thousand/uL 4.85   HEMOGLOBIN g/dL 14.3   HEMATOCRIT % 43.6   MCV fL 90   PLATELETS Thousands/uL 191   RBC Million/uL 4.84   MCH pg 29.5   MCHC g/dL 32.8   RDW % 13.0   MPV fL 10.7   NRBC AUTO /100 WBCs 0       BMP:  Results from last 7 days   Lab Units 03/28/24  0438 03/27/24  0450 03/26/24  1152   POTASSIUM mmol/L 3.6 3.3* 3.8   CHLORIDE mmol/L 105 107 106   CO2 mmol/L 28 27 26   BUN mg/dL 10 9 8   CREATININE mg/dL 0.78 0.71 0.74   CALCIUM mg/dL 8.7 8.4 9.0       Magnesium:   Results from last 7 days   Lab Units 03/28/24  0438 03/27/24  0450 03/26/24  1152   MAGNESIUM mg/dL 2.0 1.8* 1.8*       Coags:       Complications: none    Condition at Discharge: good     Discharge instructions/Information to patient and family:   See after visit summary for information provided to patient and family.      Provisions for Follow-Up Care:  See after visit summary for information related to follow-up care and any pertinent home health orders.      Disposition: Home    Planned Readmission: No    Discharge Statement   I spent 45 minutes minutes discharging the patient. This time was spent on the day of discharge. I had direct contact with the patient on the day of discharge. Additional documentation is required if more than 30 minutes were spent on discharge. Evaluating the incision, discussing discharge instructions and restrictions, arranging follow up appointments, discussing medications

## 2024-03-29 ENCOUNTER — TRANSITIONAL CARE MANAGEMENT (OUTPATIENT)
Dept: FAMILY MEDICINE CLINIC | Facility: CLINIC | Age: 74
End: 2024-03-29

## 2024-03-29 DIAGNOSIS — I49.5 TACHY-BRADY SYNDROME (HCC): ICD-10-CM

## 2024-03-29 RX ORDER — DOFETILIDE 0.25 MG/1
250 CAPSULE ORAL 2 TIMES DAILY
Qty: 180 CAPSULE | Refills: 2 | Status: SHIPPED | OUTPATIENT
Start: 2024-03-29

## 2024-03-29 NOTE — UTILIZATION REVIEW
Continued Stay Review    3/28     Day 3: Has surpassed a 2nd midnight with active treatments and services.       Current Patient Class: ip  Current Level of Care: ms OLIVA:73 y.o. female initially admitted on 3/26;  elective admit for  antiarrhythmic medication (dofetilide)  initiation.      She was initiated on dofetilide at 500mcg twice daily. Serial EKGs were performed 2 hours after each dose of antiarrhythmic medication. As there was some concern for QT prolongation even while in afib, dose was lowered to 250mcg twice daily. She was cardioverted after her 4th dose of dofetilide  (3/28)  back to sinus rhythm. There were no significant occurrence of ventricular ectopy on telemetry.  Electrolytes and renal function were monitored throughout her stay - provided po K+ and Mg repletion daily.          Vital Signs:   03/28/24 14:07:32 -- 65 -- 109/62 78 95 % -- --   03/28/24 14:07:23 -- 64 -- 109/62 78 95 % -- --   03/28/24 14:07:14 -- 66 -- 109/62 78 95 % -- --   03/28/24 1041 -- 87 -- 132/82 -- 93 % None (Room air) --   03/28/24 1035 -- 75 -- 131/69 -- 94 % None (Room air) Lying   03/28/24 0900 -- -- -- -- -- 95 % None (Room air) --   03/28/24 07:19:23 98 °F (36.7 °C) 68 18 133/96 108 94 % -- --   03/28/24 02:41:32 98.3 °F (36.8 °C) 68 16 113/74 87 97 % -- --   03/28/24 0000 -- -- -- -- -- -- None (Debra        Pertinent Labs/Diagnostic Results:       Results from last 7 days   Lab Units 03/28/24  0438 03/27/24  0450 03/26/24  1152   SODIUM mmol/L 140 140 141   POTASSIUM mmol/L 3.6 3.3* 3.8   CHLORIDE mmol/L 105 107 106   CO2 mmol/L 28 27 26   ANION GAP mmol/L 7 6 9   BUN mg/dL 10 9 8   CREATININE mg/dL 0.78 0.71 0.74   EGFR ml/min/1.73sq m 75 84 80   CALCIUM mg/dL 8.7 8.4 9.0   MAGNESIUM mg/dL 2.0 1.8* 1.8*           Discharge Plan: home - fup OP w/cardiology       Network Utilization Review Department  ATTENTION: Please call with any questions or concerns to [phone] and carefully listen to the prompts so that  you are directed to the right person. All voicemails are confidential.   For Discharge needs, contact Care Management DC Support Team at [phone] opt. 2  Send all requests for admission clinical reviews, approved or denied determinations and any other requests to dedicated fax number below belonging to the campus where the patient is receiving treatment. List of dedicated fax numbers for the Facilities:  FACILITY NAME UR FAX NUMBER   ADMISSION DENIALS (Administrative/Medical Necessity) [phone]   DISCHARGE SUPPORT TEAM (NETWORK) [phone]   PARENT CHILD HEALTH (Maternity/NICU/Pediatrics) [phone]   [facility] [phone]   [facility] [phone]   [facility] [phone]   [facility] [phone]   [facility] [phone]   [facility] [phone]   [facility] [phone]   [facility] [phone]   [facility] [phone]   [facility] [phone]   [facility] [phone]   [facility] [phone]

## 2024-04-02 NOTE — TELEPHONE ENCOUNTER
Left message for patient to see if she still has her old [employer] card as I would like to pass those numbers along to [employer] to bill for remaining balance of Zio from 2023.

## 2024-04-03 NOTE — TELEPHONE ENCOUNTER
Obtained secondary insurance information from patient's  Ant - ID [id number] and Group [id number].    Spoke with [employer] - provided secondary insurance information.    They will resubmit for Zio and patient was told to disregard current bill and that it will take 30-60 days to process claim.

## 2024-04-04 ENCOUNTER — CLINICAL SUPPORT (OUTPATIENT)
Dept: CARDIOLOGY CLINIC | Facility: CLINIC | Age: 74
End: 2024-04-04
Payer: COMMERCIAL

## 2024-04-04 ENCOUNTER — APPOINTMENT (OUTPATIENT)
Dept: LAB | Facility: HOSPITAL | Age: 74
End: 2024-04-04
Payer: COMMERCIAL

## 2024-04-04 ENCOUNTER — TELEPHONE (OUTPATIENT)
Dept: CARDIOLOGY CLINIC | Facility: CLINIC | Age: 74
End: 2024-04-04

## 2024-04-04 VITALS
OXYGEN SATURATION: 98 % | DIASTOLIC BLOOD PRESSURE: 74 MMHG | HEART RATE: 68 BPM | BODY MASS INDEX: 34.99 KG/M2 | HEIGHT: 65 IN | SYSTOLIC BLOOD PRESSURE: 108 MMHG | WEIGHT: 210 LBS

## 2024-04-04 DIAGNOSIS — E87.6 HYPOKALEMIA: ICD-10-CM

## 2024-04-04 DIAGNOSIS — I49.5 TACHY-BRADY SYNDROME (HCC): ICD-10-CM

## 2024-04-04 DIAGNOSIS — I49.1 PAC (PREMATURE ATRIAL CONTRACTION): Primary | ICD-10-CM

## 2024-04-04 DIAGNOSIS — I48.0 PAROXYSMAL ATRIAL FIBRILLATION (HCC): ICD-10-CM

## 2024-04-04 LAB
ANION GAP SERPL CALCULATED.3IONS-SCNC: 7 MMOL/L (ref 4–13)
BUN SERPL-MCNC: 10 MG/DL (ref 5–25)
CALCIUM SERPL-MCNC: 9.3 MG/DL (ref 8.4–10.2)
CHLORIDE SERPL-SCNC: 104 MMOL/L (ref 96–108)
CO2 SERPL-SCNC: 29 MMOL/L (ref 21–32)
CREAT SERPL-MCNC: 0.75 MG/DL (ref 0.6–1.3)
GFR SERPL CREATININE-BSD FRML MDRD: 78 ML/MIN/1.73SQ M
GLUCOSE P FAST SERPL-MCNC: 98 MG/DL (ref 65–99)
POTASSIUM SERPL-SCNC: 3.7 MMOL/L (ref 3.5–5.3)
SODIUM SERPL-SCNC: 140 MMOL/L (ref 135–147)

## 2024-04-04 PROCEDURE — 80048 BASIC METABOLIC PNL TOTAL CA: CPT

## 2024-04-04 PROCEDURE — 93000 ELECTROCARDIOGRAM COMPLETE: CPT

## 2024-04-04 PROCEDURE — 99211 OFF/OP EST MAY X REQ PHY/QHP: CPT

## 2024-04-04 PROCEDURE — 36415 COLL VENOUS BLD VENIPUNCTURE: CPT

## 2024-04-04 NOTE — TELEPHONE ENCOUNTER
Spoke with pt. She will be unattaching us from her Kard[employer]obile and will instead be sending the device clinic a transmission from her PM and letting the office know she has sent it and of any symptoms as well per Miladys Morales PA-C.

## 2024-04-04 NOTE — PROGRESS NOTES
Pt came in for EKG after starting tikosyn, per dr mishra, pt to continue current medications. Pt agreeable.

## 2024-04-05 RX ORDER — POTASSIUM CHLORIDE 750 MG/1
20 TABLET, EXTENDED RELEASE ORAL 2 TIMES DAILY
Qty: 180 TABLET | Refills: 1 | Status: SHIPPED | OUTPATIENT
Start: 2024-04-05

## 2024-04-05 RX ORDER — DOFETILIDE 0.25 MG/1
250 CAPSULE ORAL 2 TIMES DAILY
Qty: 180 CAPSULE | Refills: 2 | Status: SHIPPED | OUTPATIENT
Start: 2024-04-05 | End: 2024-04-10 | Stop reason: SDUPTHER

## 2024-04-08 ENCOUNTER — TELEPHONE (OUTPATIENT)
Dept: CARDIOLOGY CLINIC | Facility: CLINIC | Age: 74
End: 2024-04-08

## 2024-04-08 NOTE — TELEPHONE ENCOUNTER
I spoke with patient and said that you would speak with her after you were finished with patients.  She stated she would be home.

## 2024-04-08 NOTE — TELEPHONE ENCOUNTER
Patient called.  She went back into afib yesterday and feels she is still in.  States she sent a pacemaker transmission which I will have device send to you.  She c/o feeling presyncopal. Kardia showed afib.     BP this morning 125/88  HR 86 bpm.    BP last evening 130/92     Do you want me to bring her in for EKG today?    She is on dofetilide 250mcg bid and Eliquis, metoprolol 25mg bid.

## 2024-04-08 NOTE — TELEPHONE ENCOUNTER
Spoke to patient    Given afib has recurred despite Tikosyn and ablation also did not hold sinus rhythm more than 6 month, we discussed AV node ablation; Paced QRS is 120 ms.     Patient would like to research the procedure. She will get back to us.     Brianna

## 2024-04-09 NOTE — TELEPHONE ENCOUNTER
Pt called with additional questions regarding AV node ablation for Dr. Eubanks.    Please give the pt a call when you have a moment. Thank you!

## 2024-04-10 ENCOUNTER — PREP FOR PROCEDURE (OUTPATIENT)
Dept: CARDIOLOGY CLINIC | Facility: CLINIC | Age: 74
End: 2024-04-10

## 2024-04-10 ENCOUNTER — TELEPHONE (OUTPATIENT)
Dept: CARDIOLOGY CLINIC | Facility: CLINIC | Age: 74
End: 2024-04-10

## 2024-04-10 DIAGNOSIS — I49.5 TACHY-BRADY SYNDROME (HCC): Primary | ICD-10-CM

## 2024-04-10 DIAGNOSIS — I49.5 TACHY-BRADY SYNDROME (HCC): ICD-10-CM

## 2024-04-10 DIAGNOSIS — I49.1 PAC (PREMATURE ATRIAL CONTRACTION): ICD-10-CM

## 2024-04-10 RX ORDER — DOFETILIDE 0.25 MG/1
250 CAPSULE ORAL 2 TIMES DAILY
Qty: 180 CAPSULE | Refills: 3 | Status: SHIPPED | OUTPATIENT
Start: 2024-04-10

## 2024-04-10 NOTE — TELEPHONE ENCOUNTER
I will take care of the dofetilide Rx.    Dr. Eubanks can you comment if f/u with Geetha is needed as patient already scheduled for procedure.

## 2024-04-10 NOTE — TELEPHONE ENCOUNTER
----- Message from Sunil Eubanks MD sent at 4/10/2024 10:53 AM EDT -----  Regarding: AV node ablation  Zeinab Molina/Subha,    Please schedule this patient for AV node ablation    Routine labs    Hold medication: none    System/Device company: None    Thank you

## 2024-04-10 NOTE — TELEPHONE ENCOUNTER
Pt called again this morning stating she's waiting for your call. Please call when you get a chance. She can be reached at [phone].    Thanks

## 2024-04-10 NOTE — TELEPHONE ENCOUNTER
Patient scheduled for  AV node ablation  at \[facility]\"" on 6/11/2024 with Dr Lacey.  Patient aware of general instructions, labs orders.  Meds holds:  Bumex to hold the morning of the procedure.    Patt, Can you please mail out instructions and labs to patient.  Thank you.      Patient placed on cancellation list to get her to have the procedure sooner.  Patient mentioned will need refill of Dofetilide medication since she only have enough until 4/26/2024. She would  like the medication to be sent to  [id number] [address] [facility] [[id number]]     Also patient would like to know if she needs to keep her hospital f/u appointment that is currently scheduled for 5/1/24 with Geetha Smith. Please advised.

## 2024-04-10 NOTE — LETTER
CARDIAC ABLATION INSTRUCTIONS     Swetha Lopez   : 1950  MRN: [id number]  [address] [place] [address]    Procedure:  AV Node ablation    Procedure Date: 24    Location: [facility]  Address: [address] [place] [id number]        Labs to be done after 2024 before  2024    CMP /  CBC (FASTING 8 HOURS)    BLOOD THINNER INSTRUCTIONS (Coumadin / Warfarin / Pradaxa / Eliquis / Xarelto):   ELIQUIS: Continue taking as prescribed, per Dr Eubanks you DONT need to stop this medication      Medication Hold:   BUMEX: DO NOT take this medication the morning of the procedure.       Arrival Time: The Hospital will contact you the day prior to your procedure, usually between 4PM - 6PM to instruct you on the time and place to report. If you do not hear from a [facility]  by 6PM the evening prior to your procedure, please contact the campus you are scheduled at.     DO NOT drink or eat anything after midnight the night prior to your procedure. You may have a minimal amount of water with your AM medications. If your procedure is scheduled after 12:00 noon, you may have clear liquids until 8:00AM the morning of your procedure. (Clear liquids: 7UP, ginger ale, jello, or broth.)    Arrange for a responsible adult to drive you to and from the hospital.    You should continue to take your morning medications with a sip of water UNLESS ADVISED OTHERWISE.       DO NOT stop taking Plavix or Aspirin unless advised otherwise.     If you are currently taking Fish Oil, Krill oil and/or Vitamin E please DO NOT TAKE FOR A WEEK PRIOR TO PROCEDURE.     If you are diabetic, DO NOT take any of your diabetic pills the morning of your procedure. Oral diabetic medications may include Glucophage, Prandin, Glyburide, Micronase, Avandia, Glucovance, Precose, Glynase, and Starlix.     Bring a list of daily medications, vitamins, minerals, herbals and nutritional  supplements you take. Please include dosages and the times you take them each day.     If you are packing an overnight bag, pack minimal clothing, you will be given hospital sleepwear.   DO NOT bring money, valuables or jewelry. The wedding band is ok.     If you use CPAP machine, bring it to the hospital.      Bring your Photo ID and Insurance cards with you.     Please shower before your procedure and do not use powders or lotions.     Please notify us if you have been admitted to the hospital within 30 days.      FAILURE TO FOLLOW ANY OF THESE INSTRUCTIONS COULD RESULT IN THE CANCELLATION OF YOUR PROCEDURE      Please call [phone] if you do not hear from the  by 6:00PM the night before your procedure.    All patients enter through ENTRANCE B.  Parking is available free of charge or park on Parking Deck B.        Thank you,   Tracy Doyle  Surgery Coordinator  [facility]   [address] [place] [id number]  Ph: [phone]

## 2024-04-11 NOTE — TELEPHONE ENCOUNTER
"Instructions and labs (INR/CMP/CBC) mailed to patient's home address on file.     Thanks,  Fara \"Subha\" Fili       "

## 2024-04-16 ENCOUNTER — HOSPITAL ENCOUNTER (OUTPATIENT)
Dept: NON INVASIVE DIAGNOSTICS | Facility: HOSPITAL | Age: 74
Discharge: HOME | End: 2024-04-16
Payer: COMMERCIAL

## 2024-04-16 VITALS
BODY MASS INDEX: 34.99 KG/M2 | DIASTOLIC BLOOD PRESSURE: 87 MMHG | WEIGHT: 210 LBS | HEIGHT: 65 IN | HEART RATE: 101 BPM | SYSTOLIC BLOOD PRESSURE: 146 MMHG

## 2024-04-16 DIAGNOSIS — I49.5 TACHY-BRADY SYNDROME (HCC): ICD-10-CM

## 2024-04-16 PROCEDURE — 93306 TTE W/DOPPLER COMPLETE: CPT

## 2024-04-16 PROCEDURE — 93306 TTE W/DOPPLER COMPLETE: CPT | Performed by: INTERNAL MEDICINE

## 2024-04-18 ENCOUNTER — TELEPHONE (OUTPATIENT)
Dept: CARDIOLOGY CLINIC | Facility: CLINIC | Age: 74
End: 2024-04-18

## 2024-04-18 LAB
AORTIC ROOT: 3.7 CM
APICAL FOUR CHAMBER EJECTION FRACTION: 55 %
ASCENDING AORTA: 4 CM
BSA FOR ECHO PROCEDURE: 2.02 M2
E WAVE DECELERATION TIME: 227 MS
E/A RATIO: 1.36
FRACTIONAL SHORTENING: 29 (ref 28–44)
INTERVENTRICULAR SEPTUM IN DIASTOLE (PARASTERNAL SHORT AXIS VIEW): 1 CM
INTERVENTRICULAR SEPTUM: 1 CM (ref 0.6–1.1)
LAAS-AP2: 40.1 CM2
LAAS-AP4: 35.8 CM2
LEFT ATRIUM SIZE: 4.5 CM
LEFT ATRIUM VOLUME (MOD BIPLANE): 155 ML
LEFT ATRIUM VOLUME INDEX (MOD BIPLANE): 76.7 ML/M2
LEFT INTERNAL DIMENSION IN SYSTOLE: 2.9 CM (ref 2.1–4)
LEFT VENTRICULAR INTERNAL DIMENSION IN DIASTOLE: 4.1 CM (ref 3.5–6)
LEFT VENTRICULAR POSTERIOR WALL IN END DIASTOLE: 1 CM
LEFT VENTRICULAR STROKE VOLUME: 43 ML
LVSV (TEICH): 43 ML
MV E'TISSUE VEL-LAT: 14 CM/S
MV E'TISSUE VEL-SEP: 7 CM/S
MV PEAK A VEL: 0.47 M/S
MV PEAK E VEL: 64 CM/S
MV STENOSIS PRESSURE HALF TIME: 66 MS
MV VALVE AREA P 1/2 METHOD: 3.33
RIGHT ATRIUM AREA SYSTOLE A4C: 15.2 CM2
RIGHT VENTRICLE ID DIMENSION: 3.9 CM
SINOTUBULAR JUNCTION: 2.9 CM
SL CV LEFT ATRIUM LENGTH A2C: 8 CM
SL CV LV EF: 55
SL CV PED ECHO LEFT VENTRICLE DIASTOLIC VOLUME (MOD BIPLANE) 2D: 76 ML
SL CV PED ECHO LEFT VENTRICLE SYSTOLIC VOLUME (MOD BIPLANE) 2D: 33 ML
SL CV SINUS OF VALSALVA 2D: 3.7 CM
STJ: 2.9 CM
TR MAX PG: 63 MMHG
TR PEAK VELOCITY: 4 M/S
TRICUSPID ANNULAR PLANE SYSTOLIC EXCURSION: 1.8 CM
TRICUSPID VALVE PEAK REGURGITATION VELOCITY: 3.97 M/S

## 2024-04-18 NOTE — TELEPHONE ENCOUNTER
Spoke to patient about dilated left atrium on echo.  She is asking to speak with you as she has concerns about the severity of the dilatation of the left atrium and does have further questions.

## 2024-04-18 NOTE — TELEPHONE ENCOUNTER
----- Message from Sunil Eubanks MD sent at 4/18/2024  2:18 PM EDT -----  Please call the patient about ECHO results.     Please let her know that left atrium is dilated but expected due to atrial fibrillation.     Thank you.

## 2024-04-18 NOTE — TELEPHONE ENCOUNTER
Pt states that she got a call from our coordinator about changing her appt for her ablation. Please advise.

## 2024-04-24 ENCOUNTER — HOSPITAL ENCOUNTER (EMERGENCY)
Facility: HOSPITAL | Age: 74
Discharge: HOME | End: 2024-04-24
Attending: EMERGENCY MEDICINE
Payer: COMMERCIAL

## 2024-04-24 ENCOUNTER — TELEPHONE (OUTPATIENT)
Age: 74
End: 2024-04-24

## 2024-04-24 ENCOUNTER — APPOINTMENT (EMERGENCY)
Dept: RADIOLOGY | Facility: HOSPITAL | Age: 74
End: 2024-04-24
Payer: COMMERCIAL

## 2024-04-24 VITALS
SYSTOLIC BLOOD PRESSURE: 112 MMHG | RESPIRATION RATE: 16 BRPM | OXYGEN SATURATION: 96 % | HEART RATE: 71 BPM | DIASTOLIC BLOOD PRESSURE: 77 MMHG | TEMPERATURE: 98.9 F

## 2024-04-24 DIAGNOSIS — M79.675 PAIN OF TOE OF LEFT FOOT: ICD-10-CM

## 2024-04-24 DIAGNOSIS — S62.512A CLOSED DISPLACED FRACTURE OF PROXIMAL PHALANX OF LEFT THUMB, INITIAL ENCOUNTER: Primary | ICD-10-CM

## 2024-04-24 PROCEDURE — 99285 EMERGENCY DEPT VISIT HI MDM: CPT

## 2024-04-24 PROCEDURE — 99284 EMERGENCY DEPT VISIT MOD MDM: CPT | Performed by: PHYSICIAN ASSISTANT

## 2024-04-24 PROCEDURE — 73140 X-RAY EXAM OF FINGER(S): CPT

## 2024-04-24 PROCEDURE — 73660 X-RAY EXAM OF TOE(S): CPT

## 2024-04-24 PROCEDURE — 29125 APPL SHORT ARM SPLINT STATIC: CPT | Performed by: PHYSICIAN ASSISTANT

## 2024-04-24 RX ORDER — ACETAMINOPHEN 325 MG/1
650 TABLET ORAL EVERY 6 HOURS
Qty: 56 TABLET | Refills: 0 | Status: SHIPPED | OUTPATIENT
Start: 2024-04-24 | End: 2024-05-01

## 2024-04-24 RX ORDER — OXYCODONE HYDROCHLORIDE 5 MG/1
5 TABLET ORAL EVERY 4 HOURS PRN
Qty: 5 TABLET | Refills: 0 | Status: SHIPPED | OUTPATIENT
Start: 2024-04-24 | End: 2024-04-27

## 2024-04-24 NOTE — TELEPHONE ENCOUNTER
Caller: -Godfrey    Doctor: Eloina    Reason for call: Calling to schedule appt for Closed displaced fracture of proximal phalanx of left thumb     Call back#: [phone]

## 2024-04-26 ENCOUNTER — OFFICE VISIT (OUTPATIENT)
Dept: OBGYN CLINIC | Facility: CLINIC | Age: 74
End: 2024-04-26
Payer: COMMERCIAL

## 2024-04-26 VITALS
DIASTOLIC BLOOD PRESSURE: 92 MMHG | BODY MASS INDEX: 34.99 KG/M2 | WEIGHT: 210 LBS | SYSTOLIC BLOOD PRESSURE: 129 MMHG | HEART RATE: 79 BPM | HEIGHT: 65 IN

## 2024-04-26 DIAGNOSIS — S62.512A CLOSED DISPLACED FRACTURE OF PROXIMAL PHALANX OF LEFT THUMB, INITIAL ENCOUNTER: Primary | ICD-10-CM

## 2024-04-26 PROCEDURE — 99213 OFFICE O/P EST LOW 20 MIN: CPT | Performed by: STUDENT IN AN ORGANIZED HEALTH CARE EDUCATION/TRAINING PROGRAM

## 2024-04-26 PROCEDURE — 29075 APPL CST ELBW FNGR SHORT ARM: CPT | Performed by: STUDENT IN AN ORGANIZED HEALTH CARE EDUCATION/TRAINING PROGRAM

## 2024-04-26 NOTE — PROGRESS NOTES
ASSESSMENT/PLAN:    Assessment:   Comminuted intra-articular fracture of left thumb proximal phalanx     Plan:   - Given the patient's cardiac history and upcoming ablation, we will treat her fracture non-operatively. We discussed that the goal of surgery would be to prevent arthritis in the future and potentially advance her healing by a few weeks. However, clinically she may not note significant difference with versus without surgery, so I believe non-operative fracture care is indicated in this case.   - Cast was applied today. Keep clean and dry. Avoid lifting in the LUE  - Perform gentle active finger motion as tolerated daily.  - Continue elevating the hand to address swelling.  - Use Tylenol as needed for pain. Avoid NSAIDs due to being on Eliquis.    Follow Up:  3 weeks with updated x-rays. Can be out of cast    To Do Next Visit:  - Plan to transition to removable splint  - Start OT    General Discussions:     Fracture - Nonoperative Care: The physiology of a fractured bone was discussed with the patient today.  With nondisplaced or minimally displaced fractures, conservative treatment often results in a functional recovery.  Typically, these fractures are immobilized in either a cast or splint depending on the pattern.  Radiographs are typically taken at intervals throughout the fracture healing to ensure that muscular forces do not cause loss of reduction or alignment.  If the fracture loses its alignment, surgical intervention may be required to stabilize it.  Medical conditions such as diabetes, osteoporosis, vitamin D deficiency, and a history of or exposure to smoking may delay or prevent fracture healing.    Operative Discussions:  None    _____________________________________________________  CHIEF COMPLAINT:  Chief Complaint   Patient presents with   • Left Hand - Fracture         SUBJECTIVE:  Swetha Lopez is a 73 y.o. female who presents for initial evaluation of comminuted left thumb proximal  phalanx fracture following a trip and fall 2 days ago. The patient presented to the ED for this where x-rays were obtained and she was placed in a splint and simple sling. The patient denies numbness/tingling. 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 She is on Eliquis and has an ablation scheduled for 5/9/24.    PAST MEDICAL HISTORY:  Past Medical History:   Diagnosis Date   • Anxiety     resolved 10/4/17   • Asthma     seasonal   • Atrial fibrillation (HCC) 01/2022    newly diagnosed on eliquis   • Chronic kidney disease     kidney stone   • Colon polyp    • Disease of thyroid gland    • Dysphagia    • GERD (gastroesophageal reflux disease)    • Goiter, nodular     partial thyroidectomy   • Hiatal hernia     repaired 2018   • Hiatal hernia with GERD without esophagitis 04/2018    surgical correction   • History of esophageal varices with bleeding    • History of pernicious anemia    • History of transfusion     x1 after bleeding ulcer   • Hyperlipidemia    • Hypertension    • Irritable bowel syndrome    • Neck mass     2 small areas left side by jawline and midneck US scheduled 0820/21   • RA (rheumatoid arthritis) (HCC)    • Seasonal allergies    • Vertigo    • Wears glasses     for reading       PAST SURGICAL HISTORY:  Past Surgical History:   Procedure Laterality Date   • BREAST BIOPSY Bilateral     negative   • CARDIAC CATHETERIZATION      x2 secondary to exertional chest pain, due to lung issues, possible mild COPD   • CARDIAC ELECTROPHYSIOLOGY PROCEDURE N/A 12/23/2022    Procedure: Cardiac eps/afib ablation;  Surgeon: Sunil Eubanks MD;  Location: [facility];  Service: Cardiology   • CARDIAC ELECTROPHYSIOLOGY PROCEDURE N/A 12/23/2022    Procedure: Cardiac eps/aflutter ablation;  Surgeon: Sunil Eubanks MD;  Location: [facility];  Service: Cardiology   • CARDIAC ELECTROPHYSIOLOGY PROCEDURE N/A 10/24/2023    Procedure:  Cardiac pacer implant DC PM;  Surgeon: Sunil Eubanks MD;  Location:  CARDIAC CATH LAB;  Service: Cardiology   • CATARACT EXTRACTION Bilateral    • CHOLECYSTECTOMY  2015    lap - last assessed 10/4/17   • COLONOSCOPY      complete   • ESOPHAGOGASTRODUODENOSCOPY N/A 1/23/2018    Procedure: ESOPHAGOGASTRODUODENOSCOPY (EGD);  Surgeon: Richard Bledsoe MD;  Location: [facility];  Service: Gastroenterology   • HYSTERECTOMY      partial - ovaries remain   • LIPOMA RESECTION      right thigh   • DE ESOPHAGOSCOPY FLEXIBLE TRANSORAL WITH BIOPSY N/A 4/11/2018    Procedure: ESOPHAGOGASTRODUODENOSCOPY (EGD);  Surgeon: Yeison Masterson MD;  Location:  MAIN OR;  Service: Thoracic   • DE LAPS RPR PARAESPHGL HRNA INCL FUNDPLSTY W/MESH N/A 4/11/2018    Procedure: REPAIR HERNIA PARAESOPHAGEAL  LAPAROSCOPIC,ELEONORA GASTROPLASTY, TUEPET FUNDOPLICATION;  Surgeon: Yeison Masterson MD;  Location:  MAIN OR;  Service: Thoracic   • DE XCAPSL CTRC RMVL INSJ IO LENS PROSTH W/O ECP Right 8/23/2021    Procedure: EXTRACTION EXTRACAPSULAR CATARACT PHACO INTRAOCULAR LENS (IOL);  Surgeon: Malcolm Morris MD;  Location: [facility] MAIN OR;  Service: Ophthalmology   • DE XCAPSL CTRC RMVL INSJ IO LENS PROSTH W/O ECP Left 11/15/2021    Procedure: EXTRACTION EXTRACAPSULAR CATARACT PHACO INTRAOCULAR LENS (IOL);  Surgeon: Malcolm Morris MD;  Location: [facility] MAIN OR;  Service: Ophthalmology   • SKIN BIOPSY Right     lump benign - last assessed 10/4/17   • THYROIDECTOMY, PARTIAL  1977   • TONSILLECTOMY     • US GUIDED THYROID BIOPSY  2/21/2023       FAMILY HISTORY:  Family History   Problem Relation Age of Onset   • Alzheimer's disease Mother    • Cancer Mother         skin    • Thyroid disease Mother    • Ulcerative colitis Mother    • Cancer Father         prostate   • Stroke Father    • Hypertension Father    • Prostate cancer Father    • Thyroid disease Sister    • Ulcerative colitis Sister    • Other Sister         open heart surgery   • Heart  disease Sister    • Hyperlipidemia Brother    • No Known Problems Maternal Grandmother    • No Known Problems Paternal Grandmother    • No Known Problems Son    • No Known Problems Son    • Mental illness Neg Hx        SOCIAL HISTORY:  Social History     Tobacco Use   • Smoking status: Never     Passive exposure: Never   • Smokeless tobacco: Never   Vaping Use   • Vaping status: Never Used   Substance Use Topics   • Alcohol use: Not Currently     Comment: seldom   • Drug use: Never       MEDICATIONS:    Current Outpatient Medications:   •  acetaminophen (TYLENOL) 325 mg tablet, Take 2 tablets (650 mg total) by mouth every 6 (six) hours for 7 days, Disp: 56 tablet, Rfl: 0  •  atorvastatin (LIPITOR) 20 mg tablet, TAKE 1 TABLET BY MOUTH DAILY AT  BEDTIME, Disp: 90 tablet, Rfl: 0  •  bumetanide (BUMEX) 1 mg tablet, TAKE 1 TABLET BY MOUTH DAILY, Disp: 90 tablet, Rfl: 1  •  Calcium Carb-Cholecalciferol 600-1000 MG-UNIT CAPS, Take by mouth every morning gummy , Disp: , Rfl:   •  dofetilide (TIKOSYN) 250 mcg capsule, Take 1 capsule (250 mcg total) by mouth 2 (two) times a day, Disp: 180 capsule, Rfl: 3  •  Eliquis 5 MG, TAKE 1 TABLET BY MOUTH TWICE  DAILY, Disp: 180 tablet, Rfl: 3  •  levothyroxine 50 mcg tablet, TAKE 1 TABLET BY MOUTH DAILY, Disp: 90 tablet, Rfl: 1  •  metoprolol succinate (TOPROL-XL) 25 mg 24 hr tablet, Take 1 tablet (25 mg total) by mouth 2 (two) times a day, Disp: 180 tablet, Rfl: 3  •  omeprazole (PriLOSEC) 40 MG capsule, TAKE 1 CAPSULE BY MOUTH TWICE  DAILY, Disp: 180 capsule, Rfl: 0  •  potassium chloride (Klor-Con M10) 10 mEq tablet, Take 2 tablets (20 mEq total) by mouth 2 (two) times a day, Disp: 180 tablet, Rfl: 1  •  potassium chloride 20 MEQ TBCR, Take 1 tablet (20 mEq total) by mouth in the morning, Disp: 90 tablet, Rfl: 3  •  oxyCODONE (ROXICODONE) 5 immediate release tablet, Take 1 tablet (5 mg total) by mouth every 4 (four) hours as needed for moderate pain for up to 3 days Max Daily  "Amount: 30 mg (Patient not taking: Reported on 4/26/2024), Disp: 5 tablet, Rfl: 0    ALLERGIES:  Allergies   Allergen Reactions   • Orange (Diagnostic) - Food Allergy Anaphylaxis     Throat closes   • Pantoprazole Headache   • Penicillins Other (See Comments)     During allergy testing instructed to NEVER take PCN  Patient has tolerated cefazolin.   • Gluten Meal - Food Allergy      Following a gluten free diet on her own   • Lactose - Food Allergy Diarrhea   • Sulfa Antibiotics Rash       REVIEW OF SYSTEMS:  Pertinent items are noted in HPI.  A comprehensive review of systems was negative.    LABS:  HgA1c:   Lab Results   Component Value Date    HGBA1C 5.5 12/25/2022     BMP:   Lab Results   Component Value Date    CALCIUM 9.3 04/04/2024     09/01/2016    K 3.7 04/04/2024    CO2 29 04/04/2024     04/04/2024    BUN 10 04/04/2024    CREATININE 0.75 04/04/2024         _____________________________________________________  PHYSICAL EXAMINATION:  Vital signs: /92   Pulse 79   Ht 5' 5\" (1.651 m)   Wt 95.3 kg (210 lb)   BMI 34.95 kg/m²   General: well developed and well nourished, alert, oriented times 3, and appears comfortable  Psychiatric: Normal  HEENT: Trachea Midline, No torticollis  Cardiovascular: No discernable arrhythmia  Pulmonary: No wheezing or stridor  Abdomen: No rebound or guarding  Extremities: No peripheral edema  Skin: No masses, erythema, lacerations, fluctation, ulcerations  Neurovascular: Sensation Intact to the Median, Ulnar, Radial Nerve, Motor Intact to the Ulnar Nerve, and Pulses Intact    MUSCULOSKELETAL EXAMINATION:  Left Thumb: TTP at fracture site. Ecchymosis throughout the thumb and volar hand extending to the wrist. Swelling throughout the first three digits and dorsal hand. ROM and strength not assessed secondary to known fracture.    _____________________________________________________  STUDIES REVIEWED:  Images were reviewed in PACS by Dr. Duvall and demonstrate " IMPRESSION:     Comminuted intra-articular fracture of the proximal phalanx of the left thumb extending into the first MCP joint.      PROCEDURES PERFORMED:  Cast application    Date/Time: 4/26/2024 11:00 AM    Performed by: Diane Duvall MD  Authorized by: Diane Duvall MD  Universal Protocol:  Consent: Verbal consent obtained.  Risks and benefits: risks, benefits and alternatives were discussed  Consent given by: patient  Timeout called at: 4/26/2024 12:05 PM.  Patient understanding: patient states understanding of the procedure being performed  Patient consent: the patient's understanding of the procedure matches consent given  Site marked: the operative site was marked  Radiology Images displayed and confirmed. If images not available, report reviewed: imaging studies available  Patient identity confirmed: verbally with patient    Pre-procedure details:     Sensation:  Normal  Procedure details:     Laterality:  Left    Location:  Wrist    Wrist:  L wristCast type:  Short arm        Supplies:  Cotton padding, skin protective strip and Ortho-Glass

## 2024-04-27 NOTE — ED PROVIDER NOTES
History  Chief Complaint   Patient presents with    Fall     Pt reports missing step and landing on right side, reports significant pain in left thumb. Rates pain 8/10. Did not hit head, on blood thinners.      Patient is a 73-year-old female with past medical history significant for anxiety, asthma, atrial fibrillation, GERD, hiatal hernia, hypothyroidism, hyperlipidemia, who presents for evaluation of left thumb pain and left second and third toe pain after slipping and falling to her knees and hands while ascending the stairs.  She specifically denies head strike, headache, neck pain, or loss of consciousness.  She states her thumb is exquisitely tender and she is unable to move it.  Her toes hurt somewhat less but are still painful to move or ambulate.      Fall  Associated symptoms: no abdominal pain, no back pain, no chest pain, no seizures and no vomiting        Prior to Admission Medications   Prescriptions Last Dose Informant Patient Reported? Taking?   Calcium Carb-Cholecalciferol 600-1000 MG-UNIT CAPS  Self Yes No   Sig: Take by mouth every morning gummy    Eliquis 5 MG  Self No No   Sig: TAKE 1 TABLET BY MOUTH TWICE  DAILY   atorvastatin (LIPITOR) 20 mg tablet   No No   Sig: TAKE 1 TABLET BY MOUTH DAILY AT  BEDTIME   bumetanide (BUMEX) 1 mg tablet  Self No No   Sig: TAKE 1 TABLET BY MOUTH DAILY   dofetilide (TIKOSYN) 250 mcg capsule   No No   Sig: Take 1 capsule (250 mcg total) by mouth 2 (two) times a day   levothyroxine 50 mcg tablet   No No   Sig: TAKE 1 TABLET BY MOUTH DAILY   metoprolol succinate (TOPROL-XL) 25 mg 24 hr tablet  Self No No   Sig: Take 1 tablet (25 mg total) by mouth 2 (two) times a day   omeprazole (PriLOSEC) 40 MG capsule   No No   Sig: TAKE 1 CAPSULE BY MOUTH TWICE  DAILY   potassium chloride (Klor-Con M10) 10 mEq tablet   No No   Sig: Take 2 tablets (20 mEq total) by mouth 2 (two) times a day   potassium chloride 20 MEQ TBCR  Self No No   Sig: Take 1 tablet (20 mEq total) by mouth  in the morning      Facility-Administered Medications: None       Past Medical History:   Diagnosis Date    Anxiety     resolved 10/4/17    Asthma     seasonal    Atrial fibrillation (HCC) 01/2022    newly diagnosed on eliquis    Chronic kidney disease     kidney stone    Colon polyp     Disease of thyroid gland     Dysphagia     GERD (gastroesophageal reflux disease)     Goiter, nodular     partial thyroidectomy    Hiatal hernia     repaired 2018    Hiatal hernia with GERD without esophagitis 04/2018    surgical correction    History of esophageal varices with bleeding     History of pernicious anemia     History of transfusion     x1 after bleeding ulcer    Hyperlipidemia     Hypertension     Irritable bowel syndrome     Neck mass     2 small areas left side by jawline and midneck US scheduled 0820/21    RA (rheumatoid arthritis) (HCC)     Seasonal allergies     Vertigo     Wears glasses     for reading       Past Surgical History:   Procedure Laterality Date    BREAST BIOPSY Bilateral     negative    CARDIAC CATHETERIZATION      x2 secondary to exertional chest pain, due to lung issues, possible mild COPD    CARDIAC ELECTROPHYSIOLOGY PROCEDURE N/A 12/23/2022    Procedure: Cardiac eps/afib ablation;  Surgeon: Sunil Eubanks MD;  Location:  CARDI[facility] CATH LAB;  Service: Cardiology    CARDIAC ELECTROPHYSIOLOGY PROCEDURE N/A 12/23/2022    Procedure: Cardiac eps/aflutter ablation;  Surgeon: Sunil Eubanks MD;  Location:  CARDI[facility] CATH LAB;  Service: Cardiology    CARDIAC ELECTROPHYSIOLOGY PROCEDURE N/A 10/24/2023    Procedure: Cardiac pacer implant DC PM;  Surgeon: Suinl Eubanks MD;  Location:  CARDIAC CATH LAB;  Service: Cardiology    CATARACT EXTRACTION Bilateral     CHOLECYSTECTOMY  2015    lap - last assessed 10/4/17    COLONOSCOPY      complete    ESOPHAGOGASTRODUODENOSCOPY N/A 1/23/2018    Procedure: ESOPHAGOGASTRODUODENOSCOPY (EGD);  Surgeon: Richard Bledsoe MD;  Location: [facility] GI LAB;  Service:  Gastroenterology    HYSTERECTOMY      partial - ovaries remain    LIPOMA RESECTION      right thigh    MT ESOPHAGOSCOPY FLEXIBLE TRANSORAL WITH BIOPSY N/A 4/11/2018    Procedure: ESOPHAGOGASTRODUODENOSCOPY (EGD);  Surgeon: Yeison Masterson MD;  Location:  MAIN OR;  Service: Thoracic    MT LAPS RPR PARAESPHGL HRNA INCL FUNDPLSTY W/MESH N/A 4/11/2018    Procedure: REPAIR HERNIA PARAESOPHAGEAL  LAPAROSCOPIC,ELEONORA GASTROPLASTY, TUEPET FUNDOPLICATION;  Surgeon: Yeison Masterson MD;  Location:  MAIN OR;  Service: Thoracic    MT XCAPSL CTRC RMVL INSJ IO LENS PROSTH W/O ECP Right 8/23/2021    Procedure: EXTRACTION EXTRACAPSULAR CATARACT PHACO INTRAOCULAR LENS (IOL);  Surgeon: Malcolm Morris MD;  Location: [facility] MAIN OR;  Service: Ophthalmology    MT XCAPSL CTRC RMVL INSJ IO LENS PROSTH W/O ECP Left 11/15/2021    Procedure: EXTRACTION EXTRACAPSULAR CATARACT PHACO INTRAOCULAR LENS (IOL);  Surgeon: Malcolm Morris MD;  Location: [facility] MAIN OR;  Service: Ophthalmology    SKIN BIOPSY Right     lump benign - last assessed 10/4/17    THYROIDECTOMY, PARTIAL  1977    TONSILLECTOMY      US GUIDED THYROID BIOPSY  2/21/2023       Family History   Problem Relation Age of Onset    Alzheimer's disease Mother     Cancer Mother         skin     Thyroid disease Mother     Ulcerative colitis Mother     Cancer Father         prostate    Stroke Father     Hypertension Father     Prostate cancer Father     Thyroid disease Sister     Ulcerative colitis Sister     Other Sister         open heart surgery    Heart disease Sister     Hyperlipidemia Brother     No Known Problems Maternal Grandmother     No Known Problems Paternal Grandmother     No Known Problems Son     No Known Problems Son     Mental illness Neg Hx      I have reviewed and agree with the history as documented.    E-Cigarette/Vaping    E-Cigarette Use Never User      E-Cigarette/Vaping Substances    Nicotine No     THC No     CBD No     Flavoring No     Other No     Unknown  No      Social History     Tobacco Use    Smoking status: Never     Passive exposure: Never    Smokeless tobacco: Never   Vaping Use    Vaping status: Never Used   Substance Use Topics    Alcohol use: Not Currently     Comment: seldom    Drug use: Never       Review of Systems   Constitutional:  Negative for chills and fever.   HENT:  Negative for ear pain and sore throat.    Eyes:  Negative for pain and visual disturbance.   Respiratory:  Negative for cough and shortness of breath.    Cardiovascular:  Negative for chest pain and palpitations.   Gastrointestinal:  Negative for abdominal pain and vomiting.   Genitourinary:  Negative for dysuria and hematuria.   Musculoskeletal:  Positive for arthralgias, gait problem and joint swelling. Negative for back pain.   Skin:  Negative for color change and rash.   Neurological:  Negative for seizures and syncope.   Psychiatric/Behavioral: Negative.     All other systems reviewed and are negative.      Physical Exam  Physical Exam  Vitals and nursing note reviewed.   Constitutional:       General: She is not in acute distress.     Appearance: She is well-developed.   HENT:      Head: Normocephalic and atraumatic.      Right Ear: Tympanic membrane, ear canal and external ear normal.      Left Ear: Tympanic membrane, ear canal and external ear normal.      Nose: Nose normal.      Mouth/Throat:      Mouth: Mucous membranes are moist.   Eyes:      General: No scleral icterus.     Conjunctiva/sclera: Conjunctivae normal.      Pupils: Pupils are equal, round, and reactive to light.   Cardiovascular:      Rate and Rhythm: Normal rate and regular rhythm.      Pulses: Normal pulses.      Heart sounds: Normal heart sounds. No murmur heard.  Pulmonary:      Effort: Pulmonary effort is normal. No respiratory distress.      Breath sounds: Normal breath sounds.   Abdominal:      General: Abdomen is flat.      Palpations: Abdomen is soft.      Tenderness: There is no abdominal tenderness.    Musculoskeletal:         General: Swelling and tenderness present. Normal range of motion.        Hands:       Cervical back: Normal range of motion and neck supple. No tenderness.        Feet:    Skin:     General: Skin is warm and dry.      Capillary Refill: Capillary refill takes less than 2 seconds.   Neurological:      General: No focal deficit present.      Mental Status: She is alert and oriented to person, place, and time.      Cranial Nerves: No cranial nerve deficit.      Sensory: No sensory deficit.      Motor: No weakness.      Coordination: Coordination normal.      Gait: Gait abnormal.      Deep Tendon Reflexes: Reflexes normal.   Psychiatric:         Mood and Affect: Mood normal.         Behavior: Behavior normal.         Vital Signs  ED Triage Vitals   Temperature Pulse Respirations Blood Pressure SpO2   04/24/24 1007 04/24/24 1007 04/24/24 1007 04/24/24 1007 04/24/24 1007   98.9 °F (37.2 °C) 83 18 155/97 98 %      Temp Source Heart Rate Source Patient Position - Orthostatic VS BP Location FiO2 (%)   04/24/24 1007 04/24/24 1007 04/24/24 1155 -- --   Temporal Monitor Sitting        Pain Score       04/24/24 1007       8           Vitals:    04/24/24 1007 04/24/24 1155   BP: 155/97 112/77   Pulse: 83 71   Patient Position - Orthostatic VS:  Sitting         Visual Acuity      ED Medications  Medications - No data to display    Diagnostic Studies  Results Reviewed       None                   XR toe third min 2 views LEFT   Final Result by Wilder Lane DO (04/24 1626)      Questionable nondisplaced transverse fracture distal phalanx of the left great toe. Correlate for point tenderness.      The study was marked in EPIC for immediate notification.      Workstation performed: [id number]         XR thumb first digit-min 2 views LEFT   Final Result by Wilder Lane DO (04/24 1627)      Comminuted intra-articular fracture of the proximal phalanx of the left thumb extending into the first MCP  "joint.               Workstation performed: [id number]                    Procedures  Splint application    Date/Time: 4/24/2024 12:00 PM    Performed by: Rosa He PA-C  Authorized by: Rosa He PA-C  Universal Protocol:  Consent: Verbal consent obtained.  Risks and benefits: risks, benefits and alternatives were discussed  Consent given by: patient  Time out: Immediately prior to procedure a \"time out\" was called to verify the correct patient, procedure, equipment, support staff and site/side marked as required.  Patient identity confirmed: verbally with patient    Pre-procedure details:     Sensation:  Normal    Skin color:  Pink  Procedure details:     Laterality:  Left    Location:  Arm    Arm:  L lower arm    Splint type: radial gutter.    Supplies:  Cotton padding, elastic bandage and Ortho-Glass  Post-procedure details:     Pain:  Improved    Sensation:  Normal    Skin color:  Pink    Patient tolerance of procedure:  Tolerated well, no immediate complications           ED Course                               SBIRT 20yo+      Flowsheet Row Most Recent Value   Initial Alcohol Screen: US AUDIT-C     1. How often do you have a drink containing alcohol? 0 Filed at: 04/24/2024 1010   Audit-C Score 0 Filed at: 04/24/2024 1010   YUN: How many times in the past year have you...    Used an illegal drug or used a prescription medication for non-medical reasons? Never Filed at: 04/24/2024 1010                      Medical Decision Making  Problems Addressed:  Closed displaced fracture of proximal phalanx of left thumb, initial encounter: acute illness or injury  Pain of toe of left foot: acute illness or injury    Amount and/or Complexity of Data Reviewed  Radiology: ordered and independent interpretation performed.    Risk  OTC drugs.  Prescription drug management.             Disposition  Final diagnoses:   Closed displaced fracture of proximal phalanx of left thumb, initial encounter "   Pain of toe of left foot     Time reflects when diagnosis was documented in both MDM as applicable and the Disposition within this note       Time User Action Codes Description Comment    4/24/2024 11:54 AM Rosa He Add [S62.512A] Closed displaced fracture of proximal phalanx of left thumb, initial encounter     4/24/2024 11:54 AM Rosa He Add [M79.675] Pain of toe of left foot           ED Disposition       ED Disposition   Discharge    Condition   Stable    Date/Time   Wed Apr 24, 2024 1154    Comment   Swetha Lopez discharge to home/self care.                   Follow-up Information       Follow up With Specialties Details Why Contact Info Additional Information    Gracia Danielle MD Internal Medicine, Family Medicine Go to  As needed [address]  [address]  [place] [address]  [phone]       Diane Duvall MD Orthopedic Surgery, Hand Surgery Schedule an appointment as soon as possible for a visit   [address]  [place] [id number] [phone]       [facility] Emergency Department Emergency Medicine Go to  If symptoms worsen [address] [address]  [phone] [facility] Emergency Department, [address] [place], [place], [address]    Yoshi Villalobos DPM Podiatry Schedule an appointment as soon as possible for a visit  As needed - if toe pain does not resolve or worsens [address] [facility] [address] [phone]               Discharge Medication List as of 4/24/2024 11:58 AM        START taking these medications    Details   acetaminophen (TYLENOL) 325 mg tablet Take 2 tablets (650 mg total) by mouth every 6 (six) hours for 7 days, Starting Wed 4/24/2024, Until Wed 5/1/2024, Normal      oxyCODONE (ROXICODONE) 5 immediate release tablet Take 1 tablet (5 mg total) by mouth every 4 (four) hours as needed for moderate pain for up to 3 days Max Daily  Amount: 30 mg, Starting Wed 4/24/2024, Until Sat 4/27/2024 at 2359, Normal           CONTINUE these medications which have NOT CHANGED    Details   atorvastatin (LIPITOR) 20 mg tablet TAKE 1 TABLET BY MOUTH DAILY AT  BEDTIME, Normal      bumetanide (BUMEX) 1 mg tablet TAKE 1 TABLET BY MOUTH DAILY, Normal      Calcium Carb-Cholecalciferol 600-1000 MG-UNIT CAPS Take by mouth every morning gummy , Historical Med      dofetilide (TIKOSYN) 250 mcg capsule Take 1 capsule (250 mcg total) by mouth 2 (two) times a day, Starting Wed 4/10/2024, Normal      Eliquis 5 MG TAKE 1 TABLET BY MOUTH TWICE  DAILY, Normal      levothyroxine 50 mcg tablet TAKE 1 TABLET BY MOUTH DAILY, Normal      metoprolol succinate (TOPROL-XL) 25 mg 24 hr tablet Take 1 tablet (25 mg total) by mouth 2 (two) times a day, Starting Mon 11/6/2023, Until Thu 10/31/2024, Normal      omeprazole (PriLOSEC) 40 MG capsule TAKE 1 CAPSULE BY MOUTH TWICE  DAILY, Normal      potassium chloride (Klor-Con M10) 10 mEq tablet Take 2 tablets (20 mEq total) by mouth 2 (two) times a day, Starting Fri 4/5/2024, Normal      potassium chloride 20 MEQ TBCR Take 1 tablet (20 mEq total) by mouth in the morning, Starting Mon 11/6/2023, Normal                 PDMP Review         Value Time User    PDMP Reviewed  Yes 7/11/2022 10:20 AM Gracia Danielle MD            ED Provider  Electronically Signed by             Rosa He PA-C  04/27/24 1206

## 2024-04-29 ENCOUNTER — RA CDI HCC (OUTPATIENT)
Dept: OTHER | Facility: HOSPITAL | Age: 74
End: 2024-04-29

## 2024-04-29 PROBLEM — E66.812 CLASS 2 SEVERE OBESITY DUE TO EXCESS CALORIES WITH SERIOUS COMORBIDITY AND BODY MASS INDEX (BMI) OF 35.0 TO 35.9 IN ADULT (HCC): Status: RESOLVED | Noted: 2020-01-27 | Resolved: 2024-04-29

## 2024-04-29 PROBLEM — E66.01 CLASS 2 SEVERE OBESITY DUE TO EXCESS CALORIES WITH SERIOUS COMORBIDITY AND BODY MASS INDEX (BMI) OF 35.0 TO 35.9 IN ADULT (HCC): Status: RESOLVED | Noted: 2020-01-27 | Resolved: 2024-04-29

## 2024-05-01 ENCOUNTER — APPOINTMENT (OUTPATIENT)
Dept: LAB | Facility: HOSPITAL | Age: 74
End: 2024-05-01
Payer: COMMERCIAL

## 2024-05-01 DIAGNOSIS — I49.1 PAC (PREMATURE ATRIAL CONTRACTION): ICD-10-CM

## 2024-05-01 DIAGNOSIS — I49.5 TACHY-BRADY SYNDROME (HCC): ICD-10-CM

## 2024-05-01 DIAGNOSIS — E03.0 CONGENITAL HYPOTHYROIDISM WITH DIFFUSE GOITER: ICD-10-CM

## 2024-05-01 LAB
BASOPHILS # BLD AUTO: 0.03 THOUSANDS/ÂΜL (ref 0–0.1)
BASOPHILS NFR BLD AUTO: 1 % (ref 0–1)
EOSINOPHIL # BLD AUTO: 0.17 THOUSAND/ÂΜL (ref 0–0.61)
EOSINOPHIL NFR BLD AUTO: 4 % (ref 0–6)
ERYTHROCYTE [DISTWIDTH] IN BLOOD BY AUTOMATED COUNT: 13.2 % (ref 11.6–15.1)
HCT VFR BLD AUTO: 43 % (ref 34.8–46.1)
HGB BLD-MCNC: 14.1 G/DL (ref 11.5–15.4)
IMM GRANULOCYTES # BLD AUTO: 0.01 THOUSAND/UL (ref 0–0.2)
IMM GRANULOCYTES NFR BLD AUTO: 0 % (ref 0–2)
INR PPP: 1.13 (ref 0.84–1.19)
LYMPHOCYTES # BLD AUTO: 0.92 THOUSANDS/ÂΜL (ref 0.6–4.47)
LYMPHOCYTES NFR BLD AUTO: 24 % (ref 14–44)
MCH RBC QN AUTO: 29.4 PG (ref 26.8–34.3)
MCHC RBC AUTO-ENTMCNC: 32.8 G/DL (ref 31.4–37.4)
MCV RBC AUTO: 90 FL (ref 82–98)
MONOCYTES # BLD AUTO: 0.36 THOUSAND/ÂΜL (ref 0.17–1.22)
MONOCYTES NFR BLD AUTO: 9 % (ref 4–12)
NEUTROPHILS # BLD AUTO: 2.36 THOUSANDS/ÂΜL (ref 1.85–7.62)
NEUTS SEG NFR BLD AUTO: 62 % (ref 43–75)
NRBC BLD AUTO-RTO: 0 /100 WBCS
PLATELET # BLD AUTO: 171 THOUSANDS/UL (ref 149–390)
PMV BLD AUTO: 10.3 FL (ref 8.9–12.7)
PROTHROMBIN TIME: 14.7 SECONDS (ref 11.6–14.5)
RBC # BLD AUTO: 4.79 MILLION/UL (ref 3.81–5.12)
WBC # BLD AUTO: 3.85 THOUSAND/UL (ref 4.31–10.16)

## 2024-05-01 PROCEDURE — 85025 COMPLETE CBC W/AUTO DIFF WBC: CPT

## 2024-05-06 ENCOUNTER — TELEPHONE (OUTPATIENT)
Dept: CARDIOLOGY CLINIC | Facility: CLINIC | Age: 74
End: 2024-05-06

## 2024-05-06 ENCOUNTER — OFFICE VISIT (OUTPATIENT)
Dept: FAMILY MEDICINE CLINIC | Facility: CLINIC | Age: 74
End: 2024-05-06
Payer: COMMERCIAL

## 2024-05-06 VITALS
RESPIRATION RATE: 18 BRPM | WEIGHT: 209 LBS | HEART RATE: 68 BPM | SYSTOLIC BLOOD PRESSURE: 128 MMHG | HEIGHT: 65 IN | BODY MASS INDEX: 34.82 KG/M2 | DIASTOLIC BLOOD PRESSURE: 78 MMHG | TEMPERATURE: 98 F

## 2024-05-06 DIAGNOSIS — E78.2 MIXED HYPERLIPIDEMIA: ICD-10-CM

## 2024-05-06 DIAGNOSIS — I10 BENIGN HYPERTENSION: Primary | ICD-10-CM

## 2024-05-06 DIAGNOSIS — I48.19 PERSISTENT ATRIAL FIBRILLATION (HCC): ICD-10-CM

## 2024-05-06 DIAGNOSIS — M80.80XA LOCALIZED OSTEOPOROSIS WITH CURRENT PATHOLOGICAL FRACTURE, INITIAL ENCOUNTER: ICD-10-CM

## 2024-05-06 DIAGNOSIS — M06.9 RHEUMATOID ARTHRITIS, INVOLVING UNSPECIFIED SITE, UNSPECIFIED WHETHER RHEUMATOID FACTOR PRESENT (HCC): ICD-10-CM

## 2024-05-06 PROBLEM — D69.2 PIGMENTED PURPURA (HCC): Status: RESOLVED | Noted: 2023-01-05 | Resolved: 2024-05-06

## 2024-05-06 PROBLEM — I27.20 PULMONARY HYPERTENSION (HCC): Status: RESOLVED | Noted: 2023-01-16 | Resolved: 2024-05-06

## 2024-05-06 PROBLEM — G70.9 NEUROMUSCULAR DISEASE (HCC): Status: RESOLVED | Noted: 2020-09-21 | Resolved: 2024-05-06

## 2024-05-06 PROCEDURE — G2211 COMPLEX E/M VISIT ADD ON: HCPCS | Performed by: INTERNAL MEDICINE

## 2024-05-06 PROCEDURE — 99214 OFFICE O/P EST MOD 30 MIN: CPT | Performed by: INTERNAL MEDICINE

## 2024-05-06 RX ORDER — OXYCODONE HYDROCHLORIDE 5 MG/1
TABLET ORAL
COMMUNITY
Start: 2024-05-01 | End: 2024-05-06

## 2024-05-06 NOTE — PROGRESS NOTES
Name: Swetha Lopez      : 1950      MRN: [id number]  Encounter Provider: Gracia Danielle MD  Encounter Date: 2024   Encounter department: [facility]    Assessment & Plan     1. Benign hypertension  Assessment & Plan:  Well controlled and will continue current medications as ordered.       2. Persistent atrial fibrillation (HCC)  Assessment & Plan:  She has upcoming admission for AV kike ablation.       3. Rheumatoid arthritis, involving unspecified site, unspecified whether rheumatoid factor present (HCC)  Assessment & Plan:  This is by history and possible labwork in the past; she denies any joint pain.      4. Mixed hyperlipidemia  Assessment & Plan:  Well controlled on current dose of atorvastatin and will continue as ordered.       5. Localized osteoporosis with current pathological fracture, initial encounter  Assessment & Plan:  She is seeing ortho for this and has dxa ordered through her endocrinologist, will get this done after her ablation.             Subjective     Here for follow up of hypertension. She had a recent trip and fall and broke L thumb, is in a cast.  Has DXA ordered through her endocrinologist, but is having an av kike ablation and wants to get through this first.  Denies chest pain, dyspnea, palpitations.        Review of Systems   Constitutional: Negative.    Respiratory: Negative.     Cardiovascular: Negative.    Endocrine: Negative.  Negative for cold intolerance and heat intolerance.       Past Medical History:   Diagnosis Date   • Anxiety     resolved 10/4/17   • Asthma     seasonal   • Atrial fibrillation (HCC) 2022    newly diagnosed on eliquis   • Chronic kidney disease     kidney stone   • Colon polyp    • Disease of thyroid gland    • Dysphagia    • GERD (gastroesophageal reflux disease)    • Goiter, nodular     partial thyroidectomy   • Hiatal hernia     repaired    • Hiatal hernia with GERD without esophagitis 2018    surgical  correction   • History of esophageal varices with bleeding    • History of pernicious anemia    • History of transfusion     x1 after bleeding ulcer   • Hyperlipidemia    • Hypertension    • Irritable bowel syndrome    • Neck mass     [facility]   • RA (rheumatoid arthritis) (HCC)    • Seasonal allergies    • Vertigo    • Wears glasses     for reading     Past Surgical History:   Procedure Laterality Date   • BREAST BIOPSY Bilateral     negative   • CARDIAC CATHETERIZATION      x2 secondary to exertional chest pain, due to lung issues, possible mild COPD   • CARDIAC ELECTROPHYSIOLOGY PROCEDURE N/A 12/23/2022    Procedure: Cardiac eps/afib ablation;  Surgeon: Sunil Eubanks MD;  [facility]: [facility];  Service: Cardiology   • CARDIAC ELECTROPHYSIOLOGY PROCEDURE N/A 12/23/2022    Procedure: Cardiac eps/aflutter ablation;  Surgeon: Sunil Eubanks MD;  Location: [facility];  Service: Cardiology   • CARDIAC ELECTROPHYSIOLOGY PROCEDURE N/A 10/24/2023    Procedure: Cardiac pacer implant DC PM;  Surgeon: Sunil Eubanks MD;  Location: [facility];  Service: Cardiology   • CATARACT EXTRACTION Bilateral    • CHOLECYSTECTOMY  2015    lap - last assessed 10/4/17   • COLONOSCOPY      complete   • ESOPHAGOGASTRODUODENOSCOPY N/A 1/23/2018    Procedure: ESOPHAGOGASTRODUODENOSCOPY (EGD);  Surgeon: Richard Bledsoe MD;  Location: [facility] GI LAB;  Service: Gastroenterology   • HYSTERECTOMY      partial - ovaries remain   • LIPOMA RESECTION      right thigh   • AR ESOPHAGOSCOPY FLEXIBLE TRANSORAL WITH BIOPSY N/A 4/11/2018    Procedure: ESOPHAGOGASTRODUODENOSCOPY (EGD);  Surgeon: Yeison Masterson MD;  Location:  MAIN OR;  Service: Thoracic   • AR LAPS RPR PARAESPHGL HRNA INCL FUNDPLSTY W/MESH N/A 4/11/2018    Procedure: REPAIR HERNIA PARAESOPHAGEAL  LAPAROSCOPIC,ELEONORA GASTROPLASTY, TUEPET FUNDOPLICATION;  Surgeon: Yeison Masterson MD;  Location:   MAIN OR;  Service: Thoracic   • NH XCAPSL CTRC RMVL INSJ IO LENS PROSTH W/O ECP Right 8/23/2021    Procedure: EXTRACTION EXTRACAPSULAR CATARACT PHACO INTRAOCULAR LENS (IOL);  Surgeon: Malcolm Morris MD;  Location: [facility] MAIN OR;  Service: Ophthalmology   • NH XCAPSL CTRC RMVL INSJ IO LENS PROSTH W/O ECP Left 11/15/2021    Procedure: EXTRACTION EXTRACAPSULAR CATARACT PHACO INTRAOCULAR LENS (IOL);  Surgeon: Malcolm Mroris MD;  Location: [facility] MAIN OR;  Service: Ophthalmology   • SKIN BIOPSY Right     lump benign - last assessed 10/4/17   • THYROIDECTOMY, PARTIAL  1977   • TONSILLECTOMY     • US GUIDED THYROID BIOPSY  2/21/2023     Family History   Problem Relation Age of Onset   • Alzheimer's disease Mother    • Cancer Mother         skin    • Thyroid disease Mother    • Ulcerative colitis Mother    • Cancer Father         prostate   • Stroke Father    • Hypertension Father    • Prostate cancer Father    • Thyroid disease Sister    • Ulcerative colitis Sister    • Other Sister         open heart surgery   • Heart disease Sister    • Hyperlipidemia Brother    • No Known Problems Maternal Grandmother    • No Known Problems Paternal Grandmother    • No Known Problems Son    • No Known Problems Son    • Mental illness Neg Hx      Social History     Socioeconomic History   • Marital status: /Civil Union     Spouse name: None   • Number of children: 2   • Years of education: None   • Highest education level: None   Occupational History   • None   Tobacco Use   • Smoking status: Never     Passive exposure: Never   • Smokeless tobacco: Never   Vaping Use   • Vaping status: Never Used   Substance and Sexual Activity   • Alcohol use: Not Currently     Comment: seldom   • Drug use: Never   • Sexual activity: None   Other Topics Concern   • None   Social History Narrative    Feels safe at home     Social Determinants of Health     Financial Resource Strain: Low Risk  (11/6/2023)    Overall Financial Resource Strain  (CARDIA)    • Difficulty of Paying Living Expenses: Not hard at all   Food Insecurity: No Food Insecurity (3/27/2024)    Hunger Vital Sign    • Worried About Running Out of Food in the Last Year: Never true    • Ran Out of Food in the Last Year: Never true   Transportation Needs: No Transportation Needs (3/27/2024)    PRAPARE - Transportation    • Lack of Transportation (Medical): No    • Lack of Transportation (Non-Medical): No   Physical Activity: Not on file   Stress: Not on file   Social Connections: Not on file   Intimate Partner Violence: Not on file   Housing Stability: Low Risk  (3/27/2024)    Housing Stability Vital Sign    • Unable to Pay for Housing in the Last Year: No    • Number of Places Lived in the Last Year: 1    • Unstable Housing in the Last Year: No     Current Outpatient Medications on File Prior to Visit   Medication Sig   • atorvastatin (LIPITOR) 20 mg tablet TAKE 1 TABLET BY MOUTH DAILY AT  BEDTIME   • bumetanide (BUMEX) 1 mg tablet TAKE 1 TABLET BY MOUTH DAILY   • Calcium Carb-Cholecalciferol 600-1000 MG-UNIT CAPS Take by mouth every morning gummy    • dofetilide (TIKOSYN) 250 mcg capsule Take 1 capsule (250 mcg total) by mouth 2 (two) times a day   • Eliquis 5 MG TAKE 1 TABLET BY MOUTH TWICE  DAILY   • levothyroxine 50 mcg tablet TAKE 1 TABLET BY MOUTH DAILY   • metoprolol succinate (TOPROL-XL) 25 mg 24 hr tablet Take 1 tablet (25 mg total) by mouth 2 (two) times a day   • omeprazole (PriLOSEC) 40 MG capsule TAKE 1 CAPSULE BY MOUTH TWICE  DAILY   • potassium chloride (Klor-Con M10) 10 mEq tablet Take 2 tablets (20 mEq total) by mouth 2 (two) times a day (Patient taking differently: Take 20 mEq by mouth in the morning Take once daily along with 20MEQ tablet)   • potassium chloride 20 MEQ TBCR Take 1 tablet (20 mEq total) by mouth in the morning   • [DISCONTINUED] oxyCODONE (ROXICODONE) 5 immediate release tablet      Allergies   Allergen Reactions   • Orange (Diagnostic) - Food Allergy  "Anaphylaxis     Throat closes   • Pantoprazole Headache   • Penicillins Other (See Comments)     During allergy testing instructed to NEVER take PCN  Patient has tolerated cefazolin.   • Gluten Meal - Food Allergy      Following a gluten free diet on her own   • Lactose - Food Allergy Diarrhea   • Sulfa Antibiotics Rash     Immunization History   Administered Date(s) Administered   • COVID-19 MODERNA VACC 0.25 ML IM BOOSTER 12/07/2021   • COVID-19 MODERNA VACC 0.5 ML IM 02/23/2021, 03/26/2021, 12/07/2021, 05/18/2022   • COVID-19 Pfizer Vac BIVALENT Júnior-sucrose 12 Yr+ IM 11/18/2022   • INFLUENZA 10/19/2010, 09/30/2011, 10/19/2012, 11/05/2013, 10/01/2014, 09/30/2020   • Influenza Split High Dose Preservative Free IM 11/02/2015, 10/27/2016, 10/04/2017   • Influenza, high dose seasonal 0.7 mL 09/26/2018, 10/17/2019, 09/28/2021, 11/15/2022   • Pneumococcal Conjugate 13-Valent 08/10/2016   • Pneumococcal Polysaccharide PPV23 10/04/2017   • Tdap 07/30/2015       Objective     /78   Pulse 68   Temp 98 °F (36.7 °C)   Resp 18   Ht 5' 5\" (1.651 m)   Wt 94.8 kg (209 lb)   BMI 34.78 kg/m²     Physical Exam  Constitutional:       Appearance: She is well-developed.   Eyes:      Conjunctiva/sclera: Conjunctivae normal.   Neck:      Thyroid: No thyromegaly.      Vascular: No JVD.   Cardiovascular:      Rate and Rhythm: Normal rate and regular rhythm.      Heart sounds: Normal heart sounds. No murmur heard.     No friction rub. No gallop.   Pulmonary:      Effort: Pulmonary effort is normal.      Breath sounds: Normal breath sounds. No wheezing or rales.   Abdominal:      General: Bowel sounds are normal. There is no distension.      Palpations: Abdomen is soft.      Tenderness: There is no abdominal tenderness.   Musculoskeletal:      Cervical back: Neck supple.       Gracia Danielle MD    "

## 2024-05-06 NOTE — TELEPHONE ENCOUNTER
----- Message from Sunil Eubanks MD sent at 5/3/2024  8:15 PM EDT -----  Please call the patient about normal CBC lab results.     Thank you.

## 2024-05-08 ENCOUNTER — ANESTHESIA EVENT (OUTPATIENT)
Dept: NON INVASIVE DIAGNOSTICS | Facility: HOSPITAL | Age: 74
End: 2024-05-08
Payer: COMMERCIAL

## 2024-05-08 LAB
ALBUMIN SERPL BCP-MCNC: 3.8 G/DL (ref 3.5–5)
ALP SERPL-CCNC: 59 U/L (ref 34–104)
ALT SERPL W P-5'-P-CCNC: 22 U/L (ref 7–52)
ANION GAP SERPL CALCULATED.3IONS-SCNC: 6 MMOL/L (ref 4–13)
AST SERPL W P-5'-P-CCNC: 26 U/L (ref 13–39)
BILIRUB SERPL-MCNC: 0.94 MG/DL (ref 0.2–1)
BUN SERPL-MCNC: 10 MG/DL (ref 5–25)
CALCIUM SERPL-MCNC: 9 MG/DL (ref 8.4–10.2)
CHLORIDE SERPL-SCNC: 104 MMOL/L (ref 96–108)
CO2 SERPL-SCNC: 29 MMOL/L (ref 21–32)
CREAT SERPL-MCNC: 0.67 MG/DL (ref 0.6–1.3)
GFR SERPL CREATININE-BSD FRML MDRD: 86 ML/MIN/1.73SQ M
GLUCOSE P FAST SERPL-MCNC: 99 MG/DL (ref 65–99)
POTASSIUM SERPL-SCNC: 3.8 MMOL/L (ref 3.5–5.3)
PROT SERPL-MCNC: 6.4 G/DL (ref 6.4–8.4)
SODIUM SERPL-SCNC: 139 MMOL/L (ref 135–147)

## 2024-05-09 ENCOUNTER — HOSPITAL ENCOUNTER (OUTPATIENT)
Facility: HOSPITAL | Age: 74
Setting detail: OUTPATIENT SURGERY
Discharge: HOME | End: 2024-05-09
Attending: INTERNAL MEDICINE | Admitting: INTERNAL MEDICINE
Payer: COMMERCIAL

## 2024-05-09 ENCOUNTER — ANESTHESIA (OUTPATIENT)
Dept: NON INVASIVE DIAGNOSTICS | Facility: HOSPITAL | Age: 74
End: 2024-05-09
Payer: COMMERCIAL

## 2024-05-09 VITALS
DIASTOLIC BLOOD PRESSURE: 74 MMHG | HEART RATE: 80 BPM | TEMPERATURE: 97.5 F | HEIGHT: 65 IN | OXYGEN SATURATION: 96 % | BODY MASS INDEX: 34.82 KG/M2 | RESPIRATION RATE: 17 BRPM | SYSTOLIC BLOOD PRESSURE: 130 MMHG | WEIGHT: 209 LBS

## 2024-05-09 DIAGNOSIS — I49.5 TACHY-BRADY SYNDROME (HCC): ICD-10-CM

## 2024-05-09 DIAGNOSIS — Z95.0 PACEMAKER: Primary | ICD-10-CM

## 2024-05-09 PROBLEM — Z98.890 S/P AV NODAL ABLATION: Status: ACTIVE | Noted: 2024-05-09

## 2024-05-09 PROBLEM — I49.8 PACEMAKER-DEPENDENT DUE TO NATIVE CARDIAC RHYTHM INSUFFICIENT TO SUPPORT LIFE: Status: ACTIVE | Noted: 2024-05-09

## 2024-05-09 LAB
ANION GAP SERPL CALCULATED.3IONS-SCNC: 8 MMOL/L (ref 4–13)
ATRIAL RATE: 62 BPM
ATRIAL RATE: 81 BPM
BUN SERPL-MCNC: 10 MG/DL (ref 5–25)
CALCIUM SERPL-MCNC: 9.3 MG/DL (ref 8.4–10.2)
CHLORIDE SERPL-SCNC: 106 MMOL/L (ref 96–108)
CO2 SERPL-SCNC: 26 MMOL/L (ref 21–32)
CREAT SERPL-MCNC: 0.74 MG/DL (ref 0.6–1.3)
ERYTHROCYTE [DISTWIDTH] IN BLOOD BY AUTOMATED COUNT: 13.2 % (ref 11.6–15.1)
GFR SERPL CREATININE-BSD FRML MDRD: 80 ML/MIN/1.73SQ M
GLUCOSE P FAST SERPL-MCNC: 97 MG/DL (ref 65–99)
GLUCOSE SERPL-MCNC: 97 MG/DL (ref 65–140)
HCT VFR BLD AUTO: 42.9 % (ref 34.8–46.1)
HGB BLD-MCNC: 14.4 G/DL (ref 11.5–15.4)
INR PPP: 1.35 (ref 0.84–1.19)
MCH RBC QN AUTO: 29.8 PG (ref 26.8–34.3)
MCHC RBC AUTO-ENTMCNC: 33.6 G/DL (ref 31.4–37.4)
MCV RBC AUTO: 89 FL (ref 82–98)
P AXIS: 59 DEGREES
P AXIS: 72 DEGREES
PLATELET # BLD AUTO: 188 THOUSANDS/UL (ref 149–390)
PMV BLD AUTO: 10.1 FL (ref 8.9–12.7)
POTASSIUM SERPL-SCNC: 3.8 MMOL/L (ref 3.5–5.3)
PR INTERVAL: 168 MS
PR INTERVAL: 192 MS
PROTHROMBIN TIME: 16.5 SECONDS (ref 11.6–14.5)
QRS AXIS: -12 DEGREES
QRS AXIS: 141 DEGREES
QRSD INTERVAL: 160 MS
QRSD INTERVAL: 98 MS
QT INTERVAL: 474 MS
QT INTERVAL: 482 MS
QTC INTERVAL: 481 MS
QTC INTERVAL: 559 MS
RBC # BLD AUTO: 4.84 MILLION/UL (ref 3.81–5.12)
SODIUM SERPL-SCNC: 140 MMOL/L (ref 135–147)
T WAVE AXIS: -4 DEGREES
T WAVE AXIS: 91 DEGREES
VENTRICULAR RATE: 62 BPM
VENTRICULAR RATE: 81 BPM
WBC # BLD AUTO: 4.82 THOUSAND/UL (ref 4.31–10.16)

## 2024-05-09 PROCEDURE — 85610 PROTHROMBIN TIME: CPT | Performed by: PHYSICIAN ASSISTANT

## 2024-05-09 PROCEDURE — 76937 US GUIDE VASCULAR ACCESS: CPT | Performed by: INTERNAL MEDICINE

## 2024-05-09 PROCEDURE — 80048 BASIC METABOLIC PNL TOTAL CA: CPT | Performed by: PHYSICIAN ASSISTANT

## 2024-05-09 PROCEDURE — 93650 ICAR CATH ABLTJ AV NODE FUNC: CPT | Performed by: INTERNAL MEDICINE

## 2024-05-09 PROCEDURE — C1894 INTRO/SHEATH, NON-LASER: HCPCS | Performed by: INTERNAL MEDICINE

## 2024-05-09 PROCEDURE — 93005 ELECTROCARDIOGRAM TRACING: CPT

## 2024-05-09 PROCEDURE — C1733 CATH, EP, OTHR THAN COOL-TIP: HCPCS | Performed by: INTERNAL MEDICINE

## 2024-05-09 PROCEDURE — 85027 COMPLETE CBC AUTOMATED: CPT | Performed by: PHYSICIAN ASSISTANT

## 2024-05-09 PROCEDURE — NC001 PR NO CHARGE: Performed by: PHYSICIAN ASSISTANT

## 2024-05-09 PROCEDURE — 93010 ELECTROCARDIOGRAM REPORT: CPT | Performed by: INTERNAL MEDICINE

## 2024-05-09 PROCEDURE — C1893 INTRO/SHEATH, FIXED,NON-PEEL: HCPCS | Performed by: INTERNAL MEDICINE

## 2024-05-09 PROCEDURE — 93286 PERI-PX EVAL PM/LDLS PM IP: CPT | Performed by: INTERNAL MEDICINE

## 2024-05-09 PROCEDURE — 93280 PM DEVICE PROGR EVAL DUAL: CPT

## 2024-05-09 PROCEDURE — C1760 CLOSURE DEV, VASC: HCPCS | Performed by: INTERNAL MEDICINE

## 2024-05-09 PROCEDURE — 93603 RIGHT VENTRICULAR RECORDING: CPT | Performed by: INTERNAL MEDICINE

## 2024-05-09 DEVICE — PERCLOSE™ PROSTYLE™ SUTURE-MEDIATED CLOSURE AND REPAIR SYSTEM
Type: IMPLANTABLE DEVICE | Site: GROIN | Status: FUNCTIONAL
Brand: PERCLOSE™ PROSTYLE™

## 2024-05-09 RX ORDER — ACETAMINOPHEN 325 MG/1
650 TABLET ORAL EVERY 4 HOURS PRN
Status: DISCONTINUED | OUTPATIENT
Start: 2024-05-09 | End: 2024-05-09 | Stop reason: HOSPADM

## 2024-05-09 RX ORDER — ONDANSETRON 2 MG/ML
INJECTION INTRAMUSCULAR; INTRAVENOUS AS NEEDED
Status: DISCONTINUED | OUTPATIENT
Start: 2024-05-09 | End: 2024-05-09

## 2024-05-09 RX ORDER — SODIUM CHLORIDE 9 MG/ML
20 INJECTION, SOLUTION INTRAVENOUS CONTINUOUS
Status: DISCONTINUED | OUTPATIENT
Start: 2024-05-09 | End: 2024-05-09 | Stop reason: HOSPADM

## 2024-05-09 RX ORDER — LIDOCAINE HYDROCHLORIDE 10 MG/ML
INJECTION, SOLUTION EPIDURAL; INFILTRATION; INTRACAUDAL; PERINEURAL CODE/TRAUMA/SEDATION MEDICATION
Status: DISCONTINUED | OUTPATIENT
Start: 2024-05-09 | End: 2024-05-09 | Stop reason: HOSPADM

## 2024-05-09 RX ORDER — PROPOFOL 10 MG/ML
INJECTION, EMULSION INTRAVENOUS CONTINUOUS PRN
Status: DISCONTINUED | OUTPATIENT
Start: 2024-05-09 | End: 2024-05-09

## 2024-05-09 RX ORDER — CEFAZOLIN SODIUM 2 G/50ML
SOLUTION INTRAVENOUS AS NEEDED
Status: DISCONTINUED | OUTPATIENT
Start: 2024-05-09 | End: 2024-05-09

## 2024-05-09 RX ORDER — LIDOCAINE HYDROCHLORIDE 10 MG/ML
INJECTION, SOLUTION EPIDURAL; INFILTRATION; INTRACAUDAL; PERINEURAL AS NEEDED
Status: DISCONTINUED | OUTPATIENT
Start: 2024-05-09 | End: 2024-05-09

## 2024-05-09 RX ORDER — SODIUM CHLORIDE 9 MG/ML
20 INJECTION, SOLUTION INTRAVENOUS ONCE
Status: DISCONTINUED | OUTPATIENT
Start: 2024-05-09 | End: 2024-05-09 | Stop reason: HOSPADM

## 2024-05-09 RX ORDER — MIDAZOLAM HYDROCHLORIDE 2 MG/2ML
INJECTION, SOLUTION INTRAMUSCULAR; INTRAVENOUS AS NEEDED
Status: DISCONTINUED | OUTPATIENT
Start: 2024-05-09 | End: 2024-05-09

## 2024-05-09 RX ORDER — SODIUM CHLORIDE 9 MG/ML
INJECTION, SOLUTION INTRAVENOUS CONTINUOUS PRN
Status: DISCONTINUED | OUTPATIENT
Start: 2024-05-09 | End: 2024-05-09

## 2024-05-09 RX ORDER — METOCLOPRAMIDE HYDROCHLORIDE 5 MG/ML
INJECTION INTRAMUSCULAR; INTRAVENOUS AS NEEDED
Status: DISCONTINUED | OUTPATIENT
Start: 2024-05-09 | End: 2024-05-09

## 2024-05-09 RX ADMIN — PHENYLEPHRINE HYDROCHLORIDE 20 MCG/MIN: 10 INJECTION INTRAVENOUS at 10:47

## 2024-05-09 RX ADMIN — METOCLOPRAMIDE 10 MG: 5 INJECTION, SOLUTION INTRAMUSCULAR; INTRAVENOUS at 10:43

## 2024-05-09 RX ADMIN — CEFAZOLIN SODIUM 2000 MG: 2 SOLUTION INTRAVENOUS at 10:49

## 2024-05-09 RX ADMIN — LIDOCAINE HYDROCHLORIDE 100 MG: 10 INJECTION, SOLUTION EPIDURAL; INFILTRATION; INTRACAUDAL; PERINEURAL at 10:47

## 2024-05-09 RX ADMIN — MIDAZOLAM 1 MG: 1 INJECTION INTRAMUSCULAR; INTRAVENOUS at 10:47

## 2024-05-09 RX ADMIN — SODIUM CHLORIDE: 0.9 INJECTION, SOLUTION INTRAVENOUS at 10:38

## 2024-05-09 RX ADMIN — MIDAZOLAM 1 MG: 1 INJECTION INTRAMUSCULAR; INTRAVENOUS at 10:37

## 2024-05-09 RX ADMIN — PROPOFOL 50 MCG/KG/MIN: 10 INJECTION, EMULSION INTRAVENOUS at 10:47

## 2024-05-09 RX ADMIN — ONDANSETRON 4 MG: 2 INJECTION INTRAMUSCULAR; INTRAVENOUS at 10:43

## 2024-05-09 RX ADMIN — SODIUM CHLORIDE 20 ML/HR: 0.9 INJECTION, SOLUTION INTRAVENOUS at 08:38

## 2024-05-09 NOTE — DISCHARGE INSTR - AVS FIRST PAGE
PLEASE NOTE THE FOLLOWING MEDICATION RECOMMENDATIONS:  Continue Eliquis 5 mg every 12 hours  STOP Toprol XL (metoprolol succinate)  STOP Tikosyn (dofetilide)  Continue your other medications as taken previously   Continue potassium as prescribed - would check blood work again in 1-2 weeks      RESTRICTIONS:  No heavy lifting or strenuous activity for one week.    No soaking in a bath tub/hot tub/swimming pool for one week or until groin heals. You may shower - please let soap and water run over the groins, no scrubbing, and pat the area dry. Please place band-aid on groins daily for up to five days, but you may remove sooner if no issues are noted.     If you notice ongoing bleeding, swelling, or firm lumps in groin near ablation incision, please contact Dr. Eubanks's office - [phone].

## 2024-05-09 NOTE — H&P
H&P Exam - Electrophysiology - Cardiology   Swetha Lopez 73 y.o. female MRN: [id number]  Unit/Bed#: [facility] CATH LAB ROOM Encounter: [id number]    Assessment/Plan     Assessment:  Recurrent symptomatic persistent Afib  -- s/p Afib ablation 12/23/2022 (PVI, posterior wall, CTI ablation)  -- multiple cardioversions in the past with recurrence  -- failed both flecainide (side effects) and dofetilide  -- Toprol XL for rate control, previously on diltiazem  -- symptomatic with fatigue, palpitations, presyncope  -- AVN ablation recommended  Medtronic dual chamber PPM with CSP 10/2023 due to bradycardia  H/o TIA post ablation  HTN  HLD  Hypothyroidism  HFpEF 55%  RA    Plan:  -- NPO for AVN ablation today  -- D/C dofetilide and metoprolol succinate post procedure  -- continue Eliquis for stroke prevention  -- bedrest post procedure x 4 hours  -- plan to D/C home post procedure  -- F/U with EP in 4-6 weeks      History of Present Illness   HPI:  Swetha Lopez is a 73 y.o. female with persistent atrial fibrillation s/p Afib ablation 2022, TIA post ablation, Medtronic dual chamber PPM 10/2023, HTN, HLD, anxiety, GERD, hiatal hernia, hypertension, hyperlipidemia, IBS, rheumatoid arthritis who presents to [facility] for AVN ablation.      She was diagnosed with atrial fibrillation in January at the PCP office.  She has mild coronary artery disease but no stents.  She had a repeat catheterization in 2017 which showed no significant coronary disease.  She has had history of bleeding ulcer and required hospitalization in the past needing blood transfusion.  She had used [facility] [employer]. mobile device in January which had suggested atrial fibrillation.  She has been having on and off palpitations and exercise intolerance since September 2021. Echocardiogram has shown severe left atrial dilation.  She was started on Toprol XL and Eliquis.  As she continued to have symptoms with exertion cardioversion was recommended however she  declined the option in April.  Option was again recommended in July office visit and this time she agreed to proceed with it.  She also had exertional chest tightness and stress test was performed which was negative.  A cardioversion in October was only transiently successful.  Her metoprolol dose was increased in October and flecainide is 100 mg twice daily was started.  She underwent repeat cardioversion on November 3rd which was successful.  However she was back in atrial fibrillation after 18 hours.  EKG performed on November 9, 2022 confirm she was back in atrial fibrillation.  Now she presents to discuss further options.     After discussing options she opted to proceed with ablation procedure.  Patient had pulmonary vein isolation, posterior wall isolation and empiric CTI line placement on December 23, 2022.       She was maintained on flecainide 50 mg twice daily and Toprol-XL 25 mg daily.  Her blood pressure was elevated afterwards and losartan 25 mg was restarted. She reported feeling well. She denied any palpitations. She did have loss of vision in the right eye after ablation. Neurology team was consulted and brain MRI was done. She was diagnosed with TIA. Her vision recovered spontaneously. She was not placed on aspirin after the procedure to hx of GI bleeding.     In August 2023, her heart rate was noted to be low and therefore flecainide and Toprol XL were discontinued. She had PAC/PVC and diltiazem was started.      In October 2023, she underwent implantation of a Medtronic dual chamber PPM due to bradycardia limited uptitration of AV kike agents. Flecainide was restarted afterwards as she continued to have palpitations. She had side effects related to flecainide and this was discontinued. She went back on diltiazem.     She had recurrent Afib and repeat cardioversion and initiation of Tikosyn was recommended. She underwent DCCV on 3/28/2024. She had recurrence of Afib and was symptomatic with  fatigue and presyncope. AVN ablation was recommended.     She presents today for this procedure. She denies chest pain, SOB, lightheadedness, dizziness, orthopnea, or PND. She is NPO for the procedure today.     EKG 5/9/2024: sinus rhythm HR 62 bpm    Echo 4/16/2024:    Left Ventricle: Left ventricular cavity size is normal. Wall thickness is normal. The left ventricular ejection fraction is 55% by visual estimation. Systolic function is normal. Wall motion is normal. Diastolic function is moderately abnormal, consistent with grade II (pseudonormal) relaxation.    Right Ventricle: Systolic function is normal. Normal tricuspid annular plane systolic excursion (TAPSE) > 1.7 cm.    Left Atrium: The atrium is severely dilated by index volume of 77 ml/m2    Right Atrium: The atrium is mildly dilated.    Mitral Valve: There is mild regurgitation.    Tricuspid Valve: There is mild regurgitation. The right ventricular systolic pressure is moderately to severely elevated with peak pressure of 60mmHg    Prior TTE study available for comparison. Prior study date: 12/24/2022. LV size and function remain in normal range. Increased filling pressures remain. Index left atrial size is now in severe range. Pulmonary pressure remain elevated. Less prominent tricuspid regurgitation.    Review of Systems   Constitutional: Negative.    HENT: Negative.     Eyes: Negative.    Respiratory: Negative.     Cardiovascular: Negative.    Gastrointestinal: Negative.    Endocrine: Negative.    Genitourinary: Negative.    Musculoskeletal: Negative.    Skin: Negative.    Neurological: Negative.    Hematological: Negative.      ROS as noted above, otherwise 12 point review of systems was performed and is negative.     Historical Information   Past Medical History:   Diagnosis Date    Anxiety     resolved 10/4/17    Asthma     seasonal    Atrial fibrillation (HCC) 01/2022    newly diagnosed on eliquis    Chronic kidney disease     kidney stone     Colon polyp     Disease of thyroid gland     Dysphagia     GERD (gastroesophageal reflux disease)     Goiter, nodular     partial thyroidectomy    Hiatal hernia     repaired 2018    Hiatal hernia with GERD without esophagitis 04/2018    surgical correction    History of esophageal varices with bleeding     History of pernicious anemia     History of transfusion     x1 after bleeding ulcer    Hyperlipidemia     Hypertension     Irritable bowel syndrome     Neck mass     2 small areas left side by jawline and midneck US scheduled 0820/21    RA (rheumatoid arthritis) (HCC)     Seasonal allergies     Vertigo     Wears glasses     for reading     Past Surgical History:   Procedure Laterality Date    BREAST BIOPSY Bilateral     negative    CARDIAC CATHETERIZATION      x2 secondary to exertional chest pain, due to lung issues, possible mild COPD    CARDIAC ELECTROPHYSIOLOGY PROCEDURE N/A 12/23/2022    Procedure: Cardiac eps/afib ablation;  Surgeon: Sunil Eubanks MD;  Location: [facility];  Service: Cardiology    CARDIAC ELECTROPHYSIOLOGY PROCEDURE N/A 12/23/2022    Procedure: Cardiac eps/aflutter ablation;  Surgeon: Sunil Eubanks MD;  Location: [facility];  Service: Cardiology    CARDIAC ELECTROPHYSIOLOGY PROCEDURE N/A 10/24/2023    Procedure: Cardiac pacer implant DC PM;  Surgeon: Sunil Eubanks MD;  Location:  CARDIAC CATH LAB;  Service: Cardiology    CATARACT EXTRACTION Bilateral     CHOLECYSTECTOMY  2015    lap - last assessed 10/4/17    COLONOSCOPY      complete    ESOPHAGOGASTRODUODENOSCOPY N/A 1/23/2018    Procedure: ESOPHAGOGASTRODUODENOSCOPY (EGD);  Surgeon: Richard Bledsoe MD;  Location: [facility] GI LAB;  Service: Gastroenterology    HYSTERECTOMY      partial - ovaries remain    LIPOMA RESECTION      right thigh    OK ESOPHAGOSCOPY FLEXIBLE TRANSORAL WITH BIOPSY N/A 4/11/2018    Procedure: ESOPHAGOGASTRODUODENOSCOPY (EGD);  Surgeon: Yeison Masterson MD;  Location:  MAIN OR;  Service:  Thoracic    CO LAPS RPR PARAESPHGL HRNA INCL FUNDPLSTY W/MESH N/A 4/11/2018    Procedure: REPAIR HERNIA PARAESOPHAGEAL  LAPAROSCOPIC,ELEONORA GASTROPLASTY, TUEPET FUNDOPLICATION;  Surgeon: Yeison Masterson MD;  Location:  MAIN OR;  Service: Thoracic    CO XCAPSL CTRC RMVL INSJ IO LENS PROSTH W/O ECP Right 8/23/2021    Procedure: EXTRACTION EXTRACAPSULAR CATARACT PHACO INTRAOCULAR LENS (IOL);  Surgeon: Malcolm Morris MD;  Location: [facility] MAIN OR;  Service: Ophthalmology    CO XCAPSL CTRC RMVL INSJ IO LENS PROSTH W/O ECP Left 11/15/2021    Procedure: EXTRACTION EXTRACAPSULAR CATARACT PHACO INTRAOCULAR LENS (IOL);  Surgeon: Malcolm Morris MD;  Location: [facility] MAIN OR;  Service: Ophthalmology    SKIN BIOPSY Right     lump benign - last assessed 10/4/17    THYROIDECTOMY, PARTIAL  1977    TONSILLECTOMY      US GUIDED THYROID BIOPSY  2/21/2023     Family History:   Family History   Problem Relation Age of Onset    Alzheimer's disease Mother     Cancer Mother         skin     Thyroid disease Mother     Ulcerative colitis Mother     Cancer Father         prostate    Stroke Father     Hypertension Father     Prostate cancer Father     Thyroid disease Sister     Ulcerative colitis Sister     Other Sister         open heart surgery    Heart disease Sister     Hyperlipidemia Brother     No Known Problems Maternal Grandmother     No Known Problems Paternal Grandmother     No Known Problems Son     No Known Problems Son     Mental illness Neg Hx        Social History   Social History     Substance and Sexual Activity   Alcohol Use Not Currently    Comment: seldom     Social History     Substance and Sexual Activity   Drug Use Never     Social History     Tobacco Use   Smoking Status Never    Passive exposure: Never   Smokeless Tobacco Never       Meds/Allergies   Home Medications:   Medications Prior to Admission   Medication    atorvastatin (LIPITOR) 20 mg tablet    bumetanide (BUMEX) 1 mg tablet    Calcium  "Carb-Cholecalciferol 600-1000 MG-UNIT CAPS    dofetilide (TIKOSYN) 250 mcg capsule    Eliquis 5 MG    levothyroxine 50 mcg tablet    metoprolol succinate (TOPROL-XL) 25 mg 24 hr tablet    omeprazole (PriLOSEC) 40 MG capsule    potassium chloride (Klor-Con M10) 10 mEq tablet    potassium chloride 20 MEQ TBCR       Allergies   Allergen Reactions    Orange (Diagnostic) - Food Allergy Anaphylaxis     Throat closes    Pantoprazole Headache    Penicillins Other (See Comments)     During allergy testing instructed to NEVER take PCN  Patient has tolerated cefazolin.    Gluten Meal - Food Allergy      Following a gluten free diet on her own    Lactose - Food Allergy Diarrhea    Sulfa Antibiotics Rash       Objective   Vitals: Blood pressure 139/82, pulse 66, temperature 99.1 °F (37.3 °C), temperature source Temporal, resp. rate 17, height 5' 5\" (1.651 m), weight 94.8 kg (209 lb), SpO2 93%, not currently breastfeeding.  Orthostatic Blood Pressures      Flowsheet Row Most Recent Value   Blood Pressure 139/82 filed at 05/09/2024 0854            No intake or output data in the 24 hours ending 05/09/24 1003    Invasive Devices       Peripheral Intravenous Line  Duration             Peripheral IV 10/24/23 Dorsal (posterior);Left Hand 198 days    Peripheral IV 05/09/24 Right;Ventral (anterior) Forearm <1 day                    Physical Exam   GEN: NAD, alert and oriented, well appearing  SKIN: dry without significant lesions or rashes  HEENT: NCAT  NECK: No JVD or carotid bruits appreciated  CARDIOVASCULAR: RRR, normal S1, S2 without murmurs, rubs, or gallops appreciated  LUNGS: Clear to auscultation bilaterally without wheezes, rhonchi, or rales  ABDOMEN: Soft, nontender, nondistended  EXTREMITIES/VASCULAR: perfused without clubbing, cyanosis, or edema b/l  PSYCH: Normal mood and affect  NEURO: CN ll-Xll grossly intact    Lab Results:   Results from last 7 days   Lab Units 05/09/24  0836   WBC Thousand/uL 4.82   HEMOGLOBIN g/dL " "14.4   HEMATOCRIT % 42.9   PLATELETS Thousands/uL 188           Invalid input(s): \"LABGLOM\"  Results from last 7 days   Lab Units 05/09/24  0836   INR  1.35*           "

## 2024-05-09 NOTE — ANESTHESIA POSTPROCEDURE EVALUATION
Post-Op Assessment Note    CV Status:  Stable  Pain Score: 0    Pain management: adequate       Mental Status:  Alert and awake   Hydration Status:  Euvolemic   PONV Controlled:  Controlled   Airway Patency:  Patent     Post Op Vitals Reviewed: Yes    No anethesia notable event occurred.    Staff: CRNA               BP   98/70   Temp      Pulse 80   Resp 14   SpO2 97%

## 2024-05-09 NOTE — ANESTHESIA PREPROCEDURE EVALUATION
Procedure:  Cardiac eps/av node ablation (Chest)    Relevant Problems   CARDIO   (+) Benign hypertension   (+) Mixed hyperlipidemia   (+) Persistent atrial fibrillation (HCC)   (+) Pulmonary hypertension (HCC)   (+) Tachy-lizet syndrome (HCC)      ENDO   (+) Congenital hypothyroidism with diffuse goiter      GI/HEPATIC   (+) Hiatal hernia   (+) Liver lesion, left lobe      GYN   (+) History of hysterectomy      MUSCULOSKELETAL   (+) Hiatal hernia   (+) Rheumatoid arthritis (HCC)      NEURO/PSYCH   (+) Anxiety      PULMONARY   (+) Asthma      Neurology/Sleep   (+) History of TIA (transient ischemic attack)      FEN/Gastrointestinal   (+) GERD with esophagitis      Hx of post op vision loss following a fib ablation, resolved quickly, diagnosed as TIA, no residual symptoms      4/16/24 Left Ventricle: Left ventricular cavity size is normal. Wall thickness is normal. The left ventricular ejection fraction is 55% by visual estimation. Systolic function is normal. Wall motion is normal. Diastolic function is moderately abnormal, consistent with grade II (pseudonormal) relaxation.    Right Ventricle: Systolic function is normal. Normal tricuspid annular plane systolic excursion (TAPSE) > 1.7 cm.    Left Atrium: The atrium is severely dilated by index volume of 77 ml/m2    Right Atrium: The atrium is mildly dilated.    Mitral Valve: There is mild regurgitation.    Tricuspid Valve: There is mild regurgitation. The right ventricular systolic pressure is moderately to severely elevated with peak pressure of 60mmHg    Prior TTE study available for comparison. Prior study date: 12/24/2022. LV size and function remain in normal range. Increased filling pressures remain. Index left atrial size is now in severe range. Pulmonary pressure remain elevated. Less prominent tricuspid regurgitation.     Physical Exam    Airway    Mallampati score: II  TM Distance: >3 FB  Neck ROM: full     Dental   No notable dental hx      Cardiovascular      Pulmonary      Other Findings  post-pubertal.      Anesthesia Plan  ASA Score- 3     Anesthesia Type- IV sedation with anesthesia with ASA Monitors.         Additional Monitors:     Airway Plan: ETT.           Plan Factors-     EKG reviewed. Imaging results reviewed.  Patient summary reviewed.    Patient is not a current smoker.              Induction- intravenous.    Postoperative Plan- . Planned trial extubation    Informed Consent- Anesthetic plan and risks discussed with patient.  I personally reviewed this patient with the CRNA. Discussed and agreed on the Anesthesia Plan with the CRNA..

## 2024-05-09 NOTE — Clinical Note
Defib pad site: right upper chest, left lower flank.Defib pad site assessment: skin integrity intact.

## 2024-05-13 ENCOUNTER — TELEPHONE (OUTPATIENT)
Dept: CARDIOLOGY CLINIC | Facility: CLINIC | Age: 74
End: 2024-05-13

## 2024-05-13 NOTE — TELEPHONE ENCOUNTER
Patient is post-5/9 AV node ablation. Called office today to report that she has been in afib since 2 PM today. Patient was putting away laundry when she started getting symptomatic (heart racing, SOB, chest pressure). She checked with her Kardia monitor which showed she was in Afib with a HR of 103 bpm.   She is off dofetilide and metoprolol. Reports compliance to [employer].    Patient rechecked Kardia during the call, showed Afib with a HR in the 90s. Still feels a little symptomatic.     Patient also sent a transmission with PM.  Device - can we please upload her transmission in chart?    Patient wants to know if this is expected and what she can do.     Please advise. Thanks.

## 2024-05-13 NOTE — TELEPHONE ENCOUNTER
Pacemaker transmission processed and in [employer] system for review.    Presenting electrogram showing -    AS/ @ avg 94 bpm    Last AF episode on 5/9/24.  Total AF burden 0.4%.

## 2024-05-15 NOTE — TELEPHONE ENCOUNTER
Spoke to pt. Notified of Yudy's response --    Yudy Baig PA-C       It's possible she had recurrent Afib after the transmission was sent. If she is still having frequent episodes we will need to discuss with Dr. Eubanks when he returns. She is ok to proceed with her normal activities/plans as long as she is feeling ok.    Thanks,  Yudy     Pt understood.

## 2024-05-17 ENCOUNTER — OFFICE VISIT (OUTPATIENT)
Dept: OBGYN CLINIC | Facility: CLINIC | Age: 74
End: 2024-05-17
Payer: COMMERCIAL

## 2024-05-17 ENCOUNTER — APPOINTMENT (OUTPATIENT)
Dept: RADIOLOGY | Facility: CLINIC | Age: 74
End: 2024-05-17
Payer: COMMERCIAL

## 2024-05-17 ENCOUNTER — TELEPHONE (OUTPATIENT)
Age: 74
End: 2024-05-17

## 2024-05-17 ENCOUNTER — OFFICE VISIT (OUTPATIENT)
Dept: OCCUPATIONAL THERAPY | Facility: CLINIC | Age: 74
End: 2024-05-17
Payer: COMMERCIAL

## 2024-05-17 VITALS
WEIGHT: 209 LBS | BODY MASS INDEX: 34.82 KG/M2 | SYSTOLIC BLOOD PRESSURE: 119 MMHG | HEART RATE: 100 BPM | HEIGHT: 65 IN | DIASTOLIC BLOOD PRESSURE: 85 MMHG

## 2024-05-17 DIAGNOSIS — S62.512D CLOSED DISPLACED FRACTURE OF PROXIMAL PHALANX OF LEFT THUMB WITH ROUTINE HEALING, SUBSEQUENT ENCOUNTER: Primary | ICD-10-CM

## 2024-05-17 DIAGNOSIS — S62.512A CLOSED DISPLACED FRACTURE OF PROXIMAL PHALANX OF LEFT THUMB, INITIAL ENCOUNTER: ICD-10-CM

## 2024-05-17 DIAGNOSIS — S62.512A CLOSED DISPLACED FRACTURE OF PROXIMAL PHALANX OF LEFT THUMB, INITIAL ENCOUNTER: Primary | ICD-10-CM

## 2024-05-17 PROCEDURE — L3808 WHFO, RIGID W/O JOINTS: HCPCS

## 2024-05-17 PROCEDURE — 73140 X-RAY EXAM OF FINGER(S): CPT

## 2024-05-17 PROCEDURE — 99213 OFFICE O/P EST LOW 20 MIN: CPT | Performed by: STUDENT IN AN ORGANIZED HEALTH CARE EDUCATION/TRAINING PROGRAM

## 2024-05-17 RX ORDER — ONDANSETRON 2 MG/ML
4 INJECTION INTRAMUSCULAR; INTRAVENOUS EVERY 6 HOURS PRN
Status: CANCELLED | OUTPATIENT
Start: 2024-05-17

## 2024-05-17 RX ORDER — TRAMADOL HYDROCHLORIDE 50 MG/1
50 TABLET ORAL EVERY 6 HOURS SCHEDULED
Status: CANCELLED | OUTPATIENT
Start: 2024-05-17

## 2024-05-17 RX ORDER — CHLORHEXIDINE GLUCONATE ORAL RINSE 1.2 MG/ML
15 SOLUTION DENTAL ONCE
Status: CANCELLED | OUTPATIENT
Start: 2024-05-17 | End: 2024-05-17

## 2024-05-17 RX ORDER — ACETAMINOPHEN 325 MG/1
975 TABLET ORAL EVERY 8 HOURS
Status: CANCELLED | OUTPATIENT
Start: 2024-05-17

## 2024-05-17 NOTE — H&P
Assessment:   Comminuted intra-articular fracture of left thumb proximal phalanx with epic interval displacement 4/24/24     Plan:   -Based on imaging today the fracture has significantly displaced into an unacceptable position she still has significant swelling.  I discussed surgical options which included open reduction internal fixation with plating of the proximal phalanx versus a fusion of the MCP joint.  I discussed that this decision would be an IntraOp decision.  We discussed the risk and benefits of ORIF and MCP fusion.  She just underwent an ablation for her atrial fibrillation and is now on Eliquis.  I discussed I would want her to stop for 48 hours prior to surgery and this would need to be discussed with her cardiologist and would require clearance.  The procedure require light sedation with a local block to the thumb.  The procedure length will be about 90 minutes  -Procedures tentatively scheduled for next Wednesday pending cardiac clearance follow-up evaluation     Follow Up:  Postop     _____________________________________________________  CHIEF COMPLAINT:      Chief Complaint   Patient presents with   • Left Hand - Fracture            SUBJECTIVE:  Swetha Lopez is a 73 y.o. female who presents for initial evaluation of comminuted left thumb proximal phalanx fracture following up x3 weeks post injury and casting. Reports s persistent pain to her left thumb despite 2 weeks of casting.      PAST MEDICAL HISTORY:  Medical History        Past Medical History:   Diagnosis Date   • Anxiety       resolved 10/4/17   • Asthma       seasonal   • Atrial fibrillation (HCC) 01/2022     newly diagnosed on eliquis   • Chronic kidney disease       kidney stone   • Colon polyp     • Disease of thyroid gland     • Dysphagia     • GERD (gastroesophageal reflux disease)     • Goiter, nodular       partial thyroidectomy   • Hiatal hernia       repaired 2018   • Hiatal hernia with GERD without esophagitis 04/2018      surgical correction   • History of esophageal varices with bleeding     • History of pernicious anemia     • History of transfusion       x1 after bleeding ulcer   • Hyperlipidemia     • Hypertension     • Irritable bowel syndrome     • Neck mass       [facility]   • RA (rheumatoid arthritis) (HCC)     • Seasonal allergies     • Vertigo     • Wears glasses       for reading            PAST SURGICAL HISTORY:  Surgical History         Past Surgical History:   Procedure Laterality Date   • BREAST BIOPSY Bilateral       negative   • CARDIAC CATHETERIZATION         x2 secondary to exertional chest pain, due to lung issues, possible mild COPD   • CARDIAC ELECTROPHYSIOLOGY PROCEDURE N/A 12/23/2022     Procedure: Cardiac eps/afib ablation;  Surgeon: Sunil Eubanks MD;  [facility];  Service: Cardiology   • CARDIAC ELECTROPHYSIOLOGY PROCEDURE N/A 12/23/2022     Procedure: Cardiac eps/aflutter ablation;  Surgeon: Sunil Eubanks MD;  [facility];  Service: Cardiology   • CARDIAC ELECTROPHYSIOLOGY PROCEDURE N/A 10/24/2023     Procedure: Cardiac pacer implant DC PM;  Surgeon: Sunil Eubanks MD;  Location:  [facility] CATH LAB;  Service: Cardiology   • CATARACT EXTRACTION Bilateral     • CHOLECYSTECTOMY   2015     lap - last assessed 10/4/17   • COLONOSCOPY         complete   • ESOPHAGOGASTRODUODENOSCOPY N/A 1/23/2018     Procedure: ESOPHAGOGASTRODUODENOSCOPY (EGD);  Surgeon: Richard Bledsoe MD;  Location: [facility] GI LAB;  Service: Gastroenterology   • HYSTERECTOMY         partial - ovaries remain   • LIPOMA RESECTION         right thigh   • OK ESOPHAGOSCOPY FLEXIBLE TRANSORAL WITH BIOPSY N/A 4/11/2018     Procedure: ESOPHAGOGASTRODUODENOSCOPY (EGD);  Surgeon: Yeison Masterson MD;  Location:  MAIN OR;  Service: Thoracic   • OK LAPS RPR PARAESPHGL HRNA INCL FUNDPLSTY W/MESH N/A 4/11/2018     Procedure: REPAIR HERNIA PARAESOPHAGEAL   LAPAROSCOPIC,ELEONORA GASTROPLASTY, TUEPET FUNDOPLICATION;  Surgeon: Yeison Masterson MD;  Location:  MAIN OR;  Service: Thoracic   • CT XCAPSL CTRC RMVL INSJ IO LENS PROSTH W/O ECP Right 8/23/2021     Procedure: EXTRACTION EXTRACAPSULAR CATARACT PHACO INTRAOCULAR LENS (IOL);  Surgeon: Malcolm Morris MD;  Location: [facility] MAIN OR;  Service: Ophthalmology   • CT XCAPSL CTRC RMVL INSJ IO LENS PROSTH W/O ECP Left 11/15/2021     Procedure: EXTRACTION EXTRACAPSULAR CATARACT PHACO INTRAOCULAR LENS (IOL);  Surgeon: Malcolm Morris MD;  Location: [facility] MAIN OR;  Service: Ophthalmology   • SKIN BIOPSY Right       lump benign - last assessed 10/4/17   • THYROIDECTOMY, PARTIAL   1977   • TONSILLECTOMY       • US GUIDED THYROID BIOPSY   2/21/2023            FAMILY HISTORY:  Family History         Family History   Problem Relation Age of Onset   • Alzheimer's disease Mother     • Cancer Mother           skin    • Thyroid disease Mother     • Ulcerative colitis Mother     • Cancer Father           prostate   • Stroke Father     • Hypertension Father     • Prostate cancer Father     • Thyroid disease Sister     • Ulcerative colitis Sister     • Other Sister           open heart surgery   • Heart disease Sister     • Hyperlipidemia Brother     • No Known Problems Maternal Grandmother     • No Known Problems Paternal Grandmother     • No Known Problems Son     • No Known Problems Son     • Mental illness Neg Hx              SOCIAL HISTORY:  Social History   Social History            Tobacco Use   • Smoking status: Never       Passive exposure: Never   • Smokeless tobacco: Never   Vaping Use   • Vaping status: Never Used   Substance Use Topics   • Alcohol use: Not Currently       Comment: seldom   • Drug use: Never            MEDICATIONS:    Current Medications      Current Outpatient Medications:   •  acetaminophen (TYLENOL) 325 mg tablet, Take 2 tablets (650 mg total) by mouth every 6 (six) hours for 7 days, Disp: 56 tablet, Rfl:  0  •  atorvastatin (LIPITOR) 20 mg tablet, TAKE 1 TABLET BY MOUTH DAILY AT  BEDTIME, Disp: 90 tablet, Rfl: 0  •  bumetanide (BUMEX) 1 mg tablet, TAKE 1 TABLET BY MOUTH DAILY, Disp: 90 tablet, Rfl: 1  •  Calcium Carb-Cholecalciferol 600-1000 MG-UNIT CAPS, Take by mouth every morning gummy , Disp: , Rfl:   •  dofetilide (TIKOSYN) 250 mcg capsule, Take 1 capsule (250 mcg total) by mouth 2 (two) times a day, Disp: 180 capsule, Rfl: 3  •  Eliquis 5 MG, TAKE 1 TABLET BY MOUTH TWICE  DAILY, Disp: 180 tablet, Rfl: 3  •  levothyroxine 50 mcg tablet, TAKE 1 TABLET BY MOUTH DAILY, Disp: 90 tablet, Rfl: 1  •  metoprolol succinate (TOPROL-XL) 25 mg 24 hr tablet, Take 1 tablet (25 mg total) by mouth 2 (two) times a day, Disp: 180 tablet, Rfl: 3  •  omeprazole (PriLOSEC) 40 MG capsule, TAKE 1 CAPSULE BY MOUTH TWICE  DAILY, Disp: 180 capsule, Rfl: 0  •  potassium chloride (Klor-Con M10) 10 mEq tablet, Take 2 tablets (20 mEq total) by mouth 2 (two) times a day, Disp: 180 tablet, Rfl: 1  •  potassium chloride 20 MEQ TBCR, Take 1 tablet (20 mEq total) by mouth in the morning, Disp: 90 tablet, Rfl: 3  •  oxyCODONE (ROXICODONE) 5 immediate release tablet, Take 1 tablet (5 mg total) by mouth every 4 (four) hours as needed for moderate pain for up to 3 days Max Daily Amount: 30 mg (Patient not taking: Reported on 4/26/2024), Disp: 5 tablet, Rfl: 0        ALLERGIES:  Allergies         Allergies   Allergen Reactions   • Orange (Diagnostic) - Food Allergy Anaphylaxis       Throat closes   • Pantoprazole Headache   • Penicillins Other (See Comments)       During allergy testing instructed to NEVER take PCN  Patient has tolerated cefazolin.   • Gluten Meal - Food Allergy         Following a gluten free diet on her own   • Lactose - Food Allergy Diarrhea   • Sulfa Antibiotics Rash            REVIEW OF SYSTEMS:  Pertinent items are noted in HPI.  A comprehensive review of systems was negative.     LABS:  HgA1c:         Lab Results   Component  "Value Date     HGBA1C 5.5 12/25/2022      BMP:         Lab Results   Component Value Date     CALCIUM 9.3 04/04/2024      09/01/2016     K 3.7 04/04/2024     CO2 29 04/04/2024      04/04/2024     BUN 10 04/04/2024     CREATININE 0.75 04/04/2024            _____________________________________________________  PHYSICAL EXAMINATION:  Vital signs: /92   Pulse 79   Ht 5' 5\" (1.651 m)   Wt 95.3 kg (210 lb)   BMI 34.95 kg/m²   General: well developed and well nourished, alert, oriented times 3, and appears comfortable  Psychiatric: Normal  HEENT: Trachea Midline, No torticollis  Cardiovascular: No discernable arrhythmia  Pulmonary: No wheezing or stridor  Abdomen: No rebound or guarding  Extremities: No peripheral edema  Skin: No masses, erythema, lacerations, fluctation, ulcerations  Neurovascular: Sensation Intact to the Median, Ulnar, Radial Nerve, Motor Intact to the Ulnar Nerve, and Pulses Intact     MUSCULOSKELETAL EXAMINATION:  Left Thumb: TTP at fracture site. Ecchymosis now only limited to the thumb. FPL and EPL intact     _____________________________________________________  STUDIES REVIEWED:  Images were reviewed in PACS by Dr. Duvall and demonstrate IMPRESSION: Significant interval displacement of the base of the proximal phalanx fracture from previous x-rays, comminuted intra-articular proximal phalanx base fracture      "

## 2024-05-17 NOTE — PROGRESS NOTES
Orthosis    Diagnosis:   1. Closed displaced fracture of proximal phalanx of left thumb with routine healing, subsequent encounter          Indication: Fracture    Location: Left  thumb  Supplies: Custom Fit Orthotic  Orthosis type: Thumb SPICA forearm-based  Wearing Schedule: Remove with Protected Technique Only as Needed  Describe Position: Wrist neutral, with thumb opposed     Precautions: Fracture    Patient or Caregiver expresses understanding of wearing Schedule and Precautions? Yes  Patient or Caregiver able to don/doff orthotic independently?Yes    Written orders provided to patient? No  Orders Obtained: Written  Orders Obtained from: Dr. Diane Duvall    Return for evaluation and treatment Yes

## 2024-05-17 NOTE — LETTER
Cardiology Pre Operative Clearance      PRE OPERATIVE CARDIAC RISK ASSESSMENT    05/20/24    Swetha Lopez  1950  [id number]    Date of Surgery: 05/22/2024    Type of Surgery: Left Thumb proximal phalanx and internal fixation versus fusion of metecarpal phalanx join     Surgeon: Diane Duvall MD     No Cardiac Contraindication for Planned Surgical Procedures    Anticoagulation: yes - Eliquis 5 mg, bid    Physician Comment: Patient can proceed with her surgery. She can hold Eliquis for 2 days if needed.     Electronically Signed: Sunil Eubanks

## 2024-05-17 NOTE — H&P (VIEW-ONLY)
Assessment:   Comminuted intra-articular fracture of left thumb proximal phalanx with epic interval displacement 4/24/24     Plan:   -Based on imaging today the fracture has significantly displaced into an unacceptable position she still has significant swelling.  I discussed surgical options which included open reduction internal fixation with plating of the proximal phalanx versus a fusion of the MCP joint.  I discussed that this decision would be an IntraOp decision.  We discussed the risk and benefits of ORIF and MCP fusion.  She just underwent an ablation for her atrial fibrillation and is now on Eliquis.  I discussed I would want her to stop for 48 hours prior to surgery and this would need to be discussed with her cardiologist and would require clearance.  The procedure require light sedation with a local block to the thumb.  The procedure length will be about 90 minutes  -Procedures tentatively scheduled for next Wednesday pending cardiac clearance follow-up evaluation     Follow Up:  Postop     _____________________________________________________  CHIEF COMPLAINT:      Chief Complaint   Patient presents with   • Left Hand - Fracture            SUBJECTIVE:  Swetha Lopez is a 73 y.o. female who presents for initial evaluation of comminuted left thumb proximal phalanx fracture following up x3 weeks post injury and casting. Reports s persistent pain to her left thumb despite 2 weeks of casting.      PAST MEDICAL HISTORY:  Medical History        Past Medical History:   Diagnosis Date   • Anxiety       resolved 10/4/17   • Asthma       seasonal   • Atrial fibrillation (HCC) 01/2022     newly diagnosed on eliquis   • Chronic kidney disease       kidney stone   • Colon polyp     • Disease of thyroid gland     • Dysphagia     • GERD (gastroesophageal reflux disease)     • Goiter, nodular       partial thyroidectomy   • Hiatal hernia       repaired 2018   • Hiatal hernia with GERD without esophagitis 04/2018      surgical correction   • History of esophageal varices with bleeding     • History of pernicious anemia     • History of transfusion       x1 after bleeding ulcer   • Hyperlipidemia     • Hypertension     • Irritable bowel syndrome     • Neck mass       [facility]   • RA (rheumatoid arthritis) (HCC)     • Seasonal allergies     • Vertigo     • Wears glasses       for reading            PAST SURGICAL HISTORY:  Surgical History         Past Surgical History:   Procedure Laterality Date   • BREAST BIOPSY Bilateral       negative   • CARDIAC CATHETERIZATION         x2 secondary to exertional chest pain, due to lung issues, possible mild COPD   • CARDIAC ELECTROPHYSIOLOGY PROCEDURE N/A 12/23/2022     Procedure: Cardiac eps/afib ablation;  Surgeon: Sunil Eubanks MD;  [facility];  Service: Cardiology   • CARDIAC ELECTROPHYSIOLOGY PROCEDURE N/A 12/23/2022     Procedure: Cardiac eps/aflutter ablation;  Surgeon: Sunil Eubanks MD;  [facility];  Service: Cardiology   • CARDIAC ELECTROPHYSIOLOGY PROCEDURE N/A 10/24/2023     Procedure: Cardiac pacer implant DC PM;  Surgeon: Sunil Eubanks MD;  Location:  CARDIAC CATH LAB;  Service: Cardiology   • CATARACT EXTRACTION Bilateral     • CHOLECYSTECTOMY   2015     lap - last assessed 10/4/17   • COLONOSCOPY         complete   • ESOPHAGOGASTRODUODENOSCOPY N/A 1/23/2018     Procedure: ESOPHAGOGASTRODUODENOSCOPY (EGD);  Surgeon: Richard Bledsoe MD;  Location: [facility] GI LAB;  Service: Gastroenterology   • HYSTERECTOMY         partial - ovaries remain   • LIPOMA RESECTION         right thigh   • MS ESOPHAGOSCOPY FLEXIBLE TRANSORAL WITH BIOPSY N/A 4/11/2018     Procedure: ESOPHAGOGASTRODUODENOSCOPY (EGD);  Surgeon: Yeison Masterson MD;  Location:  MAIN OR;  Service: Thoracic   • MS LAPS RPR PARAESPHGL HRNA INCL FUNDPLSTY W/MESH N/A 4/11/2018     Procedure: REPAIR HERNIA PARAESOPHAGEAL   LAPAROSCOPIC,ELEONORA GASTROPLASTY, TUEPET FUNDOPLICATION;  Surgeon: Yeison Masterson MD;  Location:  MAIN OR;  Service: Thoracic   • VA XCAPSL CTRC RMVL INSJ IO LENS PROSTH W/O ECP Right 8/23/2021     Procedure: EXTRACTION EXTRACAPSULAR CATARACT PHACO INTRAOCULAR LENS (IOL);  Surgeon: Malcolm Morris MD;  Location: [facility] MAIN OR;  Service: Ophthalmology   • VA XCAPSL CTRC RMVL INSJ IO LENS PROSTH W/O ECP Left 11/15/2021     Procedure: EXTRACTION EXTRACAPSULAR CATARACT PHACO INTRAOCULAR LENS (IOL);  Surgeon: Malcolm Morris MD;  Location: [facility] MAIN OR;  Service: Ophthalmology   • SKIN BIOPSY Right       lump benign - last assessed 10/4/17   • THYROIDECTOMY, PARTIAL   1977   • TONSILLECTOMY       • US GUIDED THYROID BIOPSY   2/21/2023            FAMILY HISTORY:  Family History         Family History   Problem Relation Age of Onset   • Alzheimer's disease Mother     • Cancer Mother           skin    • Thyroid disease Mother     • Ulcerative colitis Mother     • Cancer Father           prostate   • Stroke Father     • Hypertension Father     • Prostate cancer Father     • Thyroid disease Sister     • Ulcerative colitis Sister     • Other Sister           open heart surgery   • Heart disease Sister     • Hyperlipidemia Brother     • No Known Problems Maternal Grandmother     • No Known Problems Paternal Grandmother     • No Known Problems Son     • No Known Problems Son     • Mental illness Neg Hx              SOCIAL HISTORY:  Social History   Social History            Tobacco Use   • Smoking status: Never       Passive exposure: Never   • Smokeless tobacco: Never   Vaping Use   • Vaping status: Never Used   Substance Use Topics   • Alcohol use: Not Currently       Comment: seldom   • Drug use: Never            MEDICATIONS:    Current Medications      Current Outpatient Medications:   •  acetaminophen (TYLENOL) 325 mg tablet, Take 2 tablets (650 mg total) by mouth every 6 (six) hours for 7 days, Disp: 56 tablet, Rfl:  0  •  atorvastatin (LIPITOR) 20 mg tablet, TAKE 1 TABLET BY MOUTH DAILY AT  BEDTIME, Disp: 90 tablet, Rfl: 0  •  bumetanide (BUMEX) 1 mg tablet, TAKE 1 TABLET BY MOUTH DAILY, Disp: 90 tablet, Rfl: 1  •  Calcium Carb-Cholecalciferol 600-1000 MG-UNIT CAPS, Take by mouth every morning gummy , Disp: , Rfl:   •  dofetilide (TIKOSYN) 250 mcg capsule, Take 1 capsule (250 mcg total) by mouth 2 (two) times a day, Disp: 180 capsule, Rfl: 3  •  Eliquis 5 MG, TAKE 1 TABLET BY MOUTH TWICE  DAILY, Disp: 180 tablet, Rfl: 3  •  levothyroxine 50 mcg tablet, TAKE 1 TABLET BY MOUTH DAILY, Disp: 90 tablet, Rfl: 1  •  metoprolol succinate (TOPROL-XL) 25 mg 24 hr tablet, Take 1 tablet (25 mg total) by mouth 2 (two) times a day, Disp: 180 tablet, Rfl: 3  •  omeprazole (PriLOSEC) 40 MG capsule, TAKE 1 CAPSULE BY MOUTH TWICE  DAILY, Disp: 180 capsule, Rfl: 0  •  potassium chloride (Klor-Con M10) 10 mEq tablet, Take 2 tablets (20 mEq total) by mouth 2 (two) times a day, Disp: 180 tablet, Rfl: 1  •  potassium chloride 20 MEQ TBCR, Take 1 tablet (20 mEq total) by mouth in the morning, Disp: 90 tablet, Rfl: 3  •  oxyCODONE (ROXICODONE) 5 immediate release tablet, Take 1 tablet (5 mg total) by mouth every 4 (four) hours as needed for moderate pain for up to 3 days Max Daily Amount: 30 mg (Patient not taking: Reported on 4/26/2024), Disp: 5 tablet, Rfl: 0        ALLERGIES:  Allergies         Allergies   Allergen Reactions   • Orange (Diagnostic) - Food Allergy Anaphylaxis       Throat closes   • Pantoprazole Headache   • Penicillins Other (See Comments)       During allergy testing instructed to NEVER take PCN  Patient has tolerated cefazolin.   • Gluten Meal - Food Allergy         Following a gluten free diet on her own   • Lactose - Food Allergy Diarrhea   • Sulfa Antibiotics Rash            REVIEW OF SYSTEMS:  Pertinent items are noted in HPI.  A comprehensive review of systems was negative.     LABS:  HgA1c:         Lab Results   Component  "Value Date     HGBA1C 5.5 12/25/2022      BMP:         Lab Results   Component Value Date     CALCIUM 9.3 04/04/2024      09/01/2016     K 3.7 04/04/2024     CO2 29 04/04/2024      04/04/2024     BUN 10 04/04/2024     CREATININE 0.75 04/04/2024            _____________________________________________________  PHYSICAL EXAMINATION:  Vital signs: /92   Pulse 79   Ht 5' 5\" (1.651 m)   Wt 95.3 kg (210 lb)   BMI 34.95 kg/m²   General: well developed and well nourished, alert, oriented times 3, and appears comfortable  Psychiatric: Normal  HEENT: Trachea Midline, No torticollis  Cardiovascular: No discernable arrhythmia  Pulmonary: No wheezing or stridor  Abdomen: No rebound or guarding  Extremities: No peripheral edema  Skin: No masses, erythema, lacerations, fluctation, ulcerations  Neurovascular: Sensation Intact to the Median, Ulnar, Radial Nerve, Motor Intact to the Ulnar Nerve, and Pulses Intact     MUSCULOSKELETAL EXAMINATION:  Left Thumb: TTP at fracture site. Ecchymosis now only limited to the thumb. FPL and EPL intact     _____________________________________________________  STUDIES REVIEWED:  Images were reviewed in PACS by Dr. Duvall and demonstrate IMPRESSION: Significant interval displacement of the base of the proximal phalanx fracture from previous x-rays, comminuted intra-articular proximal phalanx base fracture      "

## 2024-05-17 NOTE — PROGRESS NOTES
Assessment:   Comminuted intra-articular fracture of left thumb proximal phalanx with epic interval displacement 4/24/24     Plan:   -Based on imaging today the fracture has significantly displaced into an unacceptable position she still has significant swelling.  I discussed surgical options which included open reduction internal fixation with plating of the proximal phalanx versus a fusion of the MCP joint.  I discussed that this decision would be an IntraOp decision.  We discussed the risk and benefits of ORIF and MCP fusion.  She just underwent an ablation for her atrial fibrillation and is now on Eliquis.  I discussed I would want her to stop for 48 hours prior to surgery and this would need to be discussed with her cardiologist and would require clearance.  The procedure require light sedation with a local block to the thumb.  The procedure length will be about 90 minutes  -Procedures tentatively scheduled for next Wednesday pending cardiac clearance follow-up evaluation     Follow Up:  Postop     _____________________________________________________  CHIEF COMPLAINT:      Chief Complaint   Patient presents with    Left Hand - Fracture            SUBJECTIVE:  Swetha Lopez is a 73 y.o. female who presents for initial evaluation of comminuted left thumb proximal phalanx fracture following up x3 weeks post injury and casting. Reports s persistent pain to her left thumb despite 2 weeks of casting.      PAST MEDICAL HISTORY:  Medical History        Past Medical History:   Diagnosis Date    Anxiety       resolved 10/4/17    Asthma       seasonal    Atrial fibrillation (HCC) 01/2022     newly diagnosed on eliquis    Chronic kidney disease       kidney stone    Colon polyp      Disease of thyroid gland      Dysphagia      GERD (gastroesophageal reflux disease)      Goiter, nodular       partial thyroidectomy    Hiatal hernia       repaired 2018    Hiatal hernia with GERD without esophagitis 04/2018     surgical  correction    History of esophageal varices with bleeding      History of pernicious anemia      History of transfusion       x1 after bleeding ulcer    Hyperlipidemia      Hypertension      Irritable bowel syndrome      Neck mass       2 small areas left side by jawline and midneck  scheduled 0820/21    RA (rheumatoid arthritis) (HCC)      Seasonal allergies      Vertigo      Wears glasses       for reading            PAST SURGICAL HISTORY:  Surgical History         Past Surgical History:   Procedure Laterality Date    BREAST BIOPSY Bilateral       negative    CARDIAC CATHETERIZATION         x2 secondary to exertional chest pain, due to lung issues, possible mild COPD    CARDIAC ELECTROPHYSIOLOGY PROCEDURE N/A 12/23/2022     Procedure: Cardiac eps/afib ablation;  Surgeon: Sunil Eubanks MD;  Location: [facility];  Service: Cardiology    CARDIAC ELECTROPHYSIOLOGY PROCEDURE N/A 12/23/2022     Procedure: Cardiac eps/aflutter ablation;  Surgeon: Sunil Eubanks MD;  Location: [facility];  Service: Cardiology    CARDIAC ELECTROPHYSIOLOGY PROCEDURE N/A 10/24/2023     Procedure: Cardiac pacer implant DC PM;  Surgeon: Sunil Eubanks MD;  Location:  [facility];  Service: Cardiology    CATARACT EXTRACTION Bilateral      CHOLECYSTECTOMY   2015     lap - last assessed 10/4/17    COLONOSCOPY         complete    ESOPHAGOGASTRODUODENOSCOPY N/A 1/23/2018     Procedure: ESOPHAGOGASTRODUODENOSCOPY (EGD);  Surgeon: Richard Bledsoe MD;  Location: [facility];  Service: Gastroenterology    HYSTERECTOMY         partial - ovaries remain    LIPOMA RESECTION         right thigh    FL ESOPHAGOSCOPY FLEXIBLE TRANSORAL WITH BIOPSY N/A 4/11/2018     Procedure: ESOPHAGOGASTRODUODENOSCOPY (EGD);  Surgeon: Yeison Masterson MD;  Location:  MAIN OR;  Service: Thoracic    FL LAPS RPR PARAESPHGL HRNA INCL FUNDPLSTY W/MESH N/A 4/11/2018     Procedure: REPAIR HERNIA PARAESOPHAGEAL  LAPAROSCOPIC,ELEONORA GASTROPLASTY,  TUEPET FUNDOPLICATION;  Surgeon: Yeison Masterson MD;  Location:  MAIN OR;  Service: Thoracic    OR XCAPSL CTRC RMVL INSJ IO LENS PROSTH W/O ECP Right 8/23/2021     Procedure: EXTRACTION EXTRACAPSULAR CATARACT PHACO INTRAOCULAR LENS (IOL);  Surgeon: Malcolm Morris MD;  Location: [facility] OR;  Service: Ophthalmology    OR XCAPSL CTRC RMVL INSJ IO LENS PROSTH W/O ECP Left 11/15/2021     Procedure: EXTRACTION EXTRACAPSULAR CATARACT PHACO INTRAOCULAR LENS (IOL);  Surgeon: Malcolm Morris MD;  Location: [facility] OR;  Service: Ophthalmology    SKIN BIOPSY Right       lump benign - last assessed 10/4/17    THYROIDECTOMY, PARTIAL   1977    TONSILLECTOMY        US GUIDED THYROID BIOPSY   2/21/2023            FAMILY HISTORY:  Family History         Family History   Problem Relation Age of Onset    Alzheimer's disease Mother      Cancer Mother           skin     Thyroid disease Mother      Ulcerative colitis Mother      Cancer Father           prostate    Stroke Father      Hypertension Father      Prostate cancer Father      Thyroid disease Sister      Ulcerative colitis Sister      Other Sister           open heart surgery    Heart disease Sister      Hyperlipidemia Brother      No Known Problems Maternal Grandmother      No Known Problems Paternal Grandmother      No Known Problems Son      No Known Problems Son      Mental illness Neg Hx              SOCIAL HISTORY:  Social History   Social History            Tobacco Use    Smoking status: Never       Passive exposure: Never    Smokeless tobacco: Never   Vaping Use    Vaping status: Never Used   Substance Use Topics    Alcohol use: Not Currently       Comment: seldom    Drug use: Never            MEDICATIONS:    Current Medications      Current Outpatient Medications:     acetaminophen (TYLENOL) 325 mg tablet, Take 2 tablets (650 mg total) by mouth every 6 (six) hours for 7 days, Disp: 56 tablet, Rfl: 0    atorvastatin (LIPITOR) 20 mg tablet, TAKE 1 TABLET BY MOUTH  DAILY AT  BEDTIME, Disp: 90 tablet, Rfl: 0    bumetanide (BUMEX) 1 mg tablet, TAKE 1 TABLET BY MOUTH DAILY, Disp: 90 tablet, Rfl: 1    Calcium Carb-Cholecalciferol 600-1000 MG-UNIT CAPS, Take by mouth every morning gummy , Disp: , Rfl:     dofetilide (TIKOSYN) 250 mcg capsule, Take 1 capsule (250 mcg total) by mouth 2 (two) times a day, Disp: 180 capsule, Rfl: 3    Eliquis 5 MG, TAKE 1 TABLET BY MOUTH TWICE  DAILY, Disp: 180 tablet, Rfl: 3    levothyroxine 50 mcg tablet, TAKE 1 TABLET BY MOUTH DAILY, Disp: 90 tablet, Rfl: 1    metoprolol succinate (TOPROL-XL) 25 mg 24 hr tablet, Take 1 tablet (25 mg total) by mouth 2 (two) times a day, Disp: 180 tablet, Rfl: 3    omeprazole (PriLOSEC) 40 MG capsule, TAKE 1 CAPSULE BY MOUTH TWICE  DAILY, Disp: 180 capsule, Rfl: 0    potassium chloride (Klor-Con M10) 10 mEq tablet, Take 2 tablets (20 mEq total) by mouth 2 (two) times a day, Disp: 180 tablet, Rfl: 1    potassium chloride 20 MEQ TBCR, Take 1 tablet (20 mEq total) by mouth in the morning, Disp: 90 tablet, Rfl: 3    oxyCODONE (ROXICODONE) 5 immediate release tablet, Take 1 tablet (5 mg total) by mouth every 4 (four) hours as needed for moderate pain for up to 3 days Max Daily Amount: 30 mg (Patient not taking: Reported on 4/26/2024), Disp: 5 tablet, Rfl: 0        ALLERGIES:  Allergies         Allergies   Allergen Reactions    Orange (Diagnostic) - Food Allergy Anaphylaxis       Throat closes    Pantoprazole Headache    Penicillins Other (See Comments)       During allergy testing instructed to NEVER take PCN  Patient has tolerated cefazolin.    Gluten Meal - Food Allergy         Following a gluten free diet on her own    Lactose - Food Allergy Diarrhea    Sulfa Antibiotics Rash            REVIEW OF SYSTEMS:  Pertinent items are noted in HPI.  A comprehensive review of systems was negative.     LABS:  HgA1c:         Lab Results   Component Value Date     HGBA1C 5.5 12/25/2022      BMP:         Lab Results   Component Value  "Date     CALCIUM 9.3 04/04/2024      09/01/2016     K 3.7 04/04/2024     CO2 29 04/04/2024      04/04/2024     BUN 10 04/04/2024     CREATININE 0.75 04/04/2024            _____________________________________________________  PHYSICAL EXAMINATION:  Vital signs: /92   Pulse 79   Ht 5' 5\" (1.651 m)   Wt 95.3 kg (210 lb)   BMI 34.95 kg/m²   General: well developed and well nourished, alert, oriented times 3, and appears comfortable  Psychiatric: Normal  HEENT: Trachea Midline, No torticollis  Cardiovascular: No discernable arrhythmia  Pulmonary: No wheezing or stridor  Abdomen: No rebound or guarding  Extremities: No peripheral edema  Skin: No masses, erythema, lacerations, fluctation, ulcerations  Neurovascular: Sensation Intact to the Median, Ulnar, Radial Nerve, Motor Intact to the Ulnar Nerve, and Pulses Intact     MUSCULOSKELETAL EXAMINATION:  Left Thumb: TTP at fracture site. Ecchymosis now only limited to the thumb. FPL and EPL intact     _____________________________________________________  STUDIES REVIEWED:  Images were reviewed in PACS by Dr. Duvall and demonstrate IMPRESSION: Significant interval displacement of the base of the proximal phalanx fracture from previous x-rays, comminuted intra-articular proximal phalanx base fracture    "

## 2024-05-17 NOTE — TELEPHONE ENCOUNTER
Patient needs CC for May 22nd however, Kerline stated that the patient has had in and out Afib episodes as well as an ablation. Kerline needs a call back ASAP to discuss this matter.  [phone]

## 2024-05-19 DIAGNOSIS — E78.2 MIXED HYPERLIPIDEMIA: ICD-10-CM

## 2024-05-20 ENCOUNTER — TELEPHONE (OUTPATIENT)
Dept: FAMILY MEDICINE CLINIC | Facility: CLINIC | Age: 74
End: 2024-05-20

## 2024-05-20 ENCOUNTER — ANESTHESIA EVENT (OUTPATIENT)
Dept: PERIOP | Facility: HOSPITAL | Age: 74
End: 2024-05-20
Payer: COMMERCIAL

## 2024-05-20 ENCOUNTER — TELEPHONE (OUTPATIENT)
Age: 74
End: 2024-05-20

## 2024-05-20 ENCOUNTER — TELEPHONE (OUTPATIENT)
Dept: OBGYN CLINIC | Facility: CLINIC | Age: 74
End: 2024-05-20

## 2024-05-20 DIAGNOSIS — K21.9 HIATAL HERNIA WITH GERD WITHOUT ESOPHAGITIS: ICD-10-CM

## 2024-05-20 DIAGNOSIS — R13.19 ESOPHAGEAL DYSPHAGIA: ICD-10-CM

## 2024-05-20 DIAGNOSIS — K44.9 HIATAL HERNIA WITH GERD WITHOUT ESOPHAGITIS: ICD-10-CM

## 2024-05-20 RX ORDER — ATORVASTATIN CALCIUM 20 MG/1
TABLET, FILM COATED ORAL
Qty: 90 TABLET | Refills: 0 | Status: SHIPPED | OUTPATIENT
Start: 2024-05-20

## 2024-05-20 NOTE — TELEPHONE ENCOUNTER
----- Message from Amber COLUNGA sent at 5/20/2024 11:22 AM EDT -----  Regarding: Surgery 5/22 need Medical Clearance  Pt is scheduled to have urgent surgery for Thumb FX on 5/22.  Pt seen in your office 5/6, please confirm pt is medically cleared for surgery.  Thank you

## 2024-05-20 NOTE — TELEPHONE ENCOUNTER
MD Amber Gaming; Yudy Baig PA-C; Diane Duvall MD  Does not need office visit.    Thx    ----- Message from Sunil Eubanks MD sent at 5/19/2024  7:44 PM EDT -----  She is cleared. She can hold Eliquis if required starting Monday morning.     Thx  ----- Message -----  From: Amber Apodaca  Sent: 5/17/2024  11:10 AM EDT  To: Yudy Baig PA-C; Sunil Eubanks MD; #    Hi!  Pt is in need of surgery for her Thumb fracture, Dr. Duvall would like to proceed on 5/22.  Please confirm cardiac clearance and Eliquis instructions.      Also will pt need to come into the office for an appt?    Thank you,  Amber (Surgery Coordinator)

## 2024-05-20 NOTE — TELEPHONE ENCOUNTER
Swetha called in stating she was returning Consuelo's call in-regards to preop appt. No appointments that I could schedule before 5/22. Patient was warm transferred to [facility] for further assistance.

## 2024-05-20 NOTE — PRE-PROCEDURE INSTRUCTIONS
Pre-Surgery Instructions:   Medication Instructions    atorvastatin (LIPITOR) 20 mg tablet Take night before surgery    bumetanide (BUMEX) 1 mg tablet Hold day of surgery.    Calcium Carb-Cholecalciferol 600-1000 MG-UNIT CAPS Hold day of surgery.    Eliquis 5 MG Stop taking 2 days prior to surgery.    levothyroxine 50 mcg tablet Take day of surgery.    omeprazole (PriLOSEC) 40 MG capsule Take day of surgery.    potassium chloride 20 MEQ TBCR Hold day of surgery.   Medication instructions for day surgery reviewed. Please use only a sip of water to take your instructed medications. Avoid all over the counter vitamins, supplements and NSAIDS for one week prior to surgery per anesthesia guidelines. Tylenol is ok to take as needed.     You will receive a call one business day prior to surgery with an arrival time and hospital directions. If your surgery is scheduled on a Monday, the hospital will be calling you on the Friday prior to your surgery. If you have not heard from anyone by 8pm, please call the hospital supervisor through the hospital  at [phone]. ([place] [phone] or [place] [phone]).    Do not eat or drink anything after midnight the night before your surgery, including candy, mints, lifesavers, or chewing gum. Do not drink alcohol 24hrs before your surgery. Try not to smoke at least 24hrs before your surgery.       Follow the pre surgery showering instructions as listed in the “My Surgical Experience Booklet” or otherwise provided by your surgeon's office. Do not use a blade to shave the surgical area 1 week before surgery. It is okay to use a clean electric clippers up to 24 hours before surgery. Do not apply any lotions, creams, including makeup, cologne, deodorant, or perfumes after showering on the day of your surgery. Do not use dry shampoo, hair spray, hair gel, or any type of hair products.     No contact lenses, eye make-up, or artificial eyelashes. Remove nail polish,  including gel polish, and any artificial, gel, or acrylic nails if possible. Remove all jewelry including rings and body piercing jewelry.     Wear causal clothing that is easy to take on and off. Consider your type of surgery.    Keep any valuables, jewelry, piercings at home. Please bring any specially ordered equipment (sling, braces) if indicated.    Arrange for a responsible person to drive you to and from the hospital on the day of your surgery. Please confirm the visitor policy for the day of your procedure when you receive your phone call with an arrival time.     Call the surgeon's office with any new illnesses, exposures, or additional questions prior to surgery.    Please reference your “My Surgical Experience Booklet” for additional information to prepare for your upcoming surgery.    Pt. Verbalized an understanding of all instructions reviewed and offers no concerns at this time.

## 2024-05-20 NOTE — TELEPHONE ENCOUNTER
Called pt's  explained that pt was contacted as soon as we received the message from pts' cardiologist, also Dr. Danielle's office was contacted in reference to preop clearance in which Dr. Danielle asked that she be seen for a preop although pt was last seen on 5/6 and has been cleared by cardiologist.   expressed his frustrations with the lack of communication and was concerned her surgery would be cancelled.  I reassured that surgery will not be cancelled per Dr. Duvall.  Pt's  hung the phone up at that point.    Dr. Duvall called pt's  also.      Spoke with Minerva at Dr. Danielle's office who has informed her manager of this situation and they will follow up with the pt for an appt.

## 2024-05-20 NOTE — TELEPHONE ENCOUNTER
Patient needs a pre op clearance before surgery on 05/22/24. She said her cardiologist has cleared her and she had blood work done last week. Is there any way we could see her in a 15 min slot tomorrow?

## 2024-05-20 NOTE — TELEPHONE ENCOUNTER
Caller: Patients  Godfrey    Doctor: Eloina    Reason for call: Patients  is calling very upset stating his wife isn't getting the patient care she should and is being left in the dark about what needs to happen for her upcoming surgery Wednesday. She didn't hear back about her eliquis until after she took it today. He is requesting a callback from Dr Duvall to discuss and asked that his PCP also be copied in this message because he doesn't want to call back to their office again to speak to someone. He stated if he doesn't get a callback today he is reporting this to [facility]'s and wanted that comment put in his wife's chart so there is record he said that.    Call back#: [phone]

## 2024-05-20 NOTE — TELEPHONE ENCOUNTER
You'd have to ask whoever the appointment would be with tomorrow.  She can see NP if she needs preop.

## 2024-05-21 ENCOUNTER — OFFICE VISIT (OUTPATIENT)
Dept: FAMILY MEDICINE CLINIC | Facility: CLINIC | Age: 74
End: 2024-05-21
Payer: COMMERCIAL

## 2024-05-21 VITALS
DIASTOLIC BLOOD PRESSURE: 74 MMHG | TEMPERATURE: 97.4 F | HEIGHT: 65 IN | BODY MASS INDEX: 35.06 KG/M2 | WEIGHT: 210.4 LBS | HEART RATE: 96 BPM | SYSTOLIC BLOOD PRESSURE: 120 MMHG | RESPIRATION RATE: 16 BRPM

## 2024-05-21 DIAGNOSIS — Z01.818 PREOP EXAMINATION: Primary | ICD-10-CM

## 2024-05-21 PROCEDURE — 99214 OFFICE O/P EST MOD 30 MIN: CPT | Performed by: FAMILY MEDICINE

## 2024-05-21 PROCEDURE — G2211 COMPLEX E/M VISIT ADD ON: HCPCS | Performed by: FAMILY MEDICINE

## 2024-05-21 RX ORDER — OMEPRAZOLE 40 MG/1
CAPSULE, DELAYED RELEASE ORAL
Qty: 180 CAPSULE | Refills: 1 | Status: SHIPPED | OUTPATIENT
Start: 2024-05-21

## 2024-05-21 NOTE — PROGRESS NOTES
[facility] PRE-OPERATIVE EVALUATION  [facility]    NAME: Swetha Lopez  AGE: 73 y.o. SEX: female  : 1950     DATE: 2024    [facility] Pre-Operative Evaluation      Chief Complaint: Pre-operative Evaluation     Surgery: Left Thumb proximal phalanx and internal fixation versus fusion of metecarpal phalanx joint   Anticipated Date of Surgery: 24  Surgeon: Diane Duvall MD      Assessment & Recommendations:     Pre-Op Evaluation Plan  1. Further preoperative workup as follows:   - None; no further preoperative work-up is required    2. Medication Management/Recommendations:   Hold eliquis for 2 days prior to surgery.   Take levothyroxine the morning of surgery and the rest of her pills after surgery.     3. Patient requires further consultation with: None. Cardiology clearance reviewed.     Clearance  Patient is CLEARED for surgery without any additional cardiac testing.       Problem List Items Addressed This Visit    None  Visit Diagnoses       Preop examination    -  Primary            No follow-ups on file.    History of Present Illness:     HPI    Anesthesia:  IV sedation  Bleeding Risk: no recent abnormal bleeding  Current Anti-platelet/anticoagulation medication: Apixaban (Eliquis)      Prior Anesthesia Reactions: No     Personal history of venous thromboembolic disease? No         Review of Systems:     Review of Systems   Constitutional: Negative.    HENT: Negative.     Eyes: Negative.    Respiratory: Negative.     Cardiovascular: Negative.    Gastrointestinal: Negative.    Endocrine: Negative.    Genitourinary: Negative.    Musculoskeletal: Negative.    Skin: Negative.    Allergic/Immunologic: Negative.    Neurological: Negative.    Hematological: Negative.    Psychiatric/Behavioral: Negative.         Current Problem List:     Patient Active Problem List   Diagnosis    Hiatal hernia    GERD with esophagitis    Hypokalemia    Benign  hypertension    Mixed hyperlipidemia    Congenital hypothyroidism with diffuse goiter    Liver lesion, left lobe    Hemorrhoids    IBS (irritable bowel syndrome)    Multiple thyroid nodules    Stress incontinence, female    Abnormal CT of liver    Anxiety    Rheumatoid arthritis (HCC)    History of hysterectomy    Parotid mass    Xerostomia    Asthma    Persistent atrial fibrillation (HCC)    Peripheral vision loss, right    Pulmonary hypertension (HCC)    History of TIA (transient ischemic attack)    Abnormal thyroid biopsy    Localized osteoporosis with current pathological fracture    Vitamin D deficiency    Tachy-lizet syndrome (HCC)    S/P AV kike ablation 5/9/2024    Pacemaker-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 cardiac 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    s/p Medtronic dual chamber PPM 10/24/2023       Allergies:     Allergies   Allergen Reactions    Orange (Diagnostic) - Food Allergy Anaphylaxis     Throat closes    Pantoprazole Headache    Penicillins Other (See Comments)     During allergy testing instructed to NEVER take PCN  Patient has tolerated cefazolin.    Gluten Meal - Food Allergy      Following a gluten free diet on her own    Lactose - Food Allergy Diarrhea    Sulfa Antibiotics Rash       Current Medications:       Current Outpatient Medications:     atorvastatin (LIPITOR) 20 mg tablet, TAKE 1 TABLET BY MOUTH DAILY AT  BEDTIME, Disp: 90 tablet, Rfl: 0    bumetanide (BUMEX) 1 mg tablet, TAKE 1 TABLET BY MOUTH DAILY, Disp: 90 tablet, Rfl: 1    Calcium Carb-Cholecalciferol 600-1000 MG-UNIT CAPS, Take by mouth every morning gummy , Disp: , Rfl:     Eliquis 5 MG, TAKE 1 TABLET BY MOUTH TWICE  DAILY, Disp: 180 tablet, Rfl: 3    levothyroxine 50 mcg tablet, TAKE 1 TABLET BY MOUTH DAILY, Disp: 90 tablet, Rfl: 1    omeprazole (PriLOSEC) 40 MG capsule, TAKE 1 CAPSULE BY MOUTH TWICE  DAILY, Disp: 180 capsule, Rfl: 0    potassium chloride (Klor-Con M10) 10 mEq tablet, Take 2 tablets (20 mEq total) by mouth 2 (two)  times a day (Patient taking differently: Take 20 mEq by mouth in the morning Take once daily along with 20MEQ tablet), Disp: 180 tablet, Rfl: 1    potassium chloride 20 MEQ TBCR, Take 1 tablet (20 mEq total) by mouth in the morning, Disp: 90 tablet, Rfl: 3    Past Medical History:       Past Medical History:   Diagnosis Date    A-fib (HCC)     Anxiety     resolved 10/4/17    Asthma     seasonal    Atrial fibrillation (HCC) 01/2022    newly diagnosed on eliquis    Chronic kidney disease     kidney stone    Colon polyp     Disease of thyroid gland     Dysphagia     GERD (gastroesophageal reflux disease)     Goiter, nodular     partial thyroidectomy    Hiatal hernia     repaired 2018    Hiatal hernia with GERD without esophagitis 04/2018    surgical correction    History of esophageal varices with bleeding     History of pernicious anemia     History of transfusion     x1 after bleeding ulcer    Hyperlipidemia     Hypertension     Irritable bowel syndrome     Neck mass     2 small areas left side by jawline and midneck US scheduled 0820/21    Pacemaker-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 cardiac 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 05/09/2024    RA (rheumatoid arthritis) (HCC)     S/P AV kike ablation 5/9/2024 05/09/2024    s/p Medtronic dual chamber PPM 10/24/2023 05/09/2024    Seasonal allergies     Vertigo     Wears glasses     for reading        Past Surgical History:   Procedure Laterality Date    AV NODE ABLATION  05/09/2024    BREAST BIOPSY Bilateral     negative    CARDIAC CATHETERIZATION      x2 secondary to exertional chest pain, due to lung issues, possible mild COPD    CARDIAC ELECTROPHYSIOLOGY PROCEDURE N/A 12/23/2022    Procedure: Cardiac eps/afib ablation;  Surgeon: Sunil Eubanks MD;  [facility]: [facility];  Service: Cardiology    CARDIAC ELECTROPHYSIOLOGY PROCEDURE N/A 12/23/2022    Procedure: Cardiac eps/aflutter ablation;  Surgeon: Sunil Eubanks MD;  Location: [facility];  Service:  Cardiology    CARDIAC ELECTROPHYSIOLOGY PROCEDURE N/A 10/24/2023    Procedure: Cardiac pacer implant DC PM;  Surgeon: Sunil Eubanks MD;  Location: [facility];  Service: Cardiology    CARDIAC ELECTROPHYSIOLOGY PROCEDURE N/A 05/09/2024    Procedure: Cardiac eps/av node ablation;  Surgeon: Sunil Eubanks MD;  [facility]: [facility];  Service: Cardiology    CATARACT EXTRACTION Bilateral     CHOLECYSTECTOMY  2015    lap - last assessed 10/4/17    COLONOSCOPY      complete    ESOPHAGOGASTRODUODENOSCOPY N/A 01/23/2018    Procedure: ESOPHAGOGASTRODUODENOSCOPY (EGD);  Surgeon: Richard Bledsoe MD;  Location: [facility];  Service: Gastroenterology    HYSTERECTOMY      partial - ovaries remain    LIPOMA RESECTION      right thigh    LA ESOPHAGOSCOPY FLEXIBLE TRANSORAL WITH BIOPSY N/A 04/11/2018    Procedure: ESOPHAGOGASTRODUODENOSCOPY (EGD);  Surgeon: Yeison Masterson MD;  Location:  MAIN OR;  Service: Thoracic    LA LAPS RPR PARAESPHGL HRNA INCL FUNDPLSTY W/MESH N/A 04/11/2018    Procedure: REPAIR HERNIA PARAESOPHAGEAL  LAPAROSCOPIC,ELEONORA GASTROPLASTY, TUEPET FUNDOPLICATION;  Surgeon: Yeison Masterson MD;  Location:  MAIN OR;  Service: Thoracic    LA XCAPSL CTRC RMVL INSJ IO LENS PROSTH W/O ECP Right 08/23/2021    Procedure: EXTRACTION EXTRACAPSULAR CATARACT PHACO INTRAOCULAR LENS (IOL);  Surgeon: Malcolm Morris MD;  Location: [facility];  Service: Ophthalmology    LA XCAPSL CTRC RMVL INSJ IO LENS PROSTH W/O ECP Left 11/15/2021    Procedure: EXTRACTION EXTRACAPSULAR CATARACT PHACO INTRAOCULAR LENS (IOL);  Surgeon: Malcolm Morris MD;  Location: [facility] OR;  Service: Ophthalmology    SKIN BIOPSY Right     lump benign - last assessed 10/4/17    THYROIDECTOMY, PARTIAL  1977    TONSILLECTOMY      US GUIDED THYROID BIOPSY  02/21/2023        Family History   Problem Relation Age of Onset    Alzheimer's disease Mother     Cancer Mother         skin     Thyroid disease Mother     Ulcerative colitis  Mother     Cancer Father         prostate    Stroke Father     Hypertension Father     Prostate cancer Father     Thyroid disease Sister     Ulcerative colitis Sister     Other Sister         open heart surgery    Heart disease Sister     Hyperlipidemia Brother     No Known Problems Maternal Grandmother     No Known Problems Paternal Grandmother     No Known Problems Son     No Known Problems Son     Mental illness Neg Hx         Social History     Socioeconomic History    Marital status: /Civil Union     Spouse name: Not on file    Number of children: 2    Years of education: Not on file    Highest education level: Not on file   Occupational History    Not on file   Tobacco Use    Smoking status: Never     Passive exposure: Never    Smokeless tobacco: Never   Vaping Use    Vaping status: Never Used   Substance and Sexual Activity    Alcohol use: Not Currently     Comment: seldom    Drug use: Never    Sexual activity: Not on file   Other Topics Concern    Not on file   Social History Narrative    Feels safe at home     Social Determinants of Health     Financial Resource Strain: Low Risk  (11/6/2023)    Overall Financial Resource Strain (CARDIA)     Difficulty of Paying Living Expenses: Not hard at all   Food Insecurity: No Food Insecurity (3/27/2024)    Hunger Vital Sign     Worried About Running Out of Food in the Last Year: Never true     Ran Out of Food in the Last Year: Never true   Transportation Needs: No Transportation Needs (3/27/2024)    PRAPARE - Transportation     Lack of Transportation (Medical): No     Lack of Transportation (Non-Medical): No   Physical Activity: Not on file   Stress: Not on file   Social Connections: Not on file   Intimate Partner Violence: Not on file   Housing Stability: Low Risk  (3/27/2024)    Housing Stability Vital Sign     Unable to Pay for Housing in the Last Year: No     Number of Places Lived in the Last Year: 1     Unstable Housing in the Last Year: No       "  Physical Exam:     /74   Pulse 96   Temp (!) 97.4 °F (36.3 °C)   Resp 16   Ht 5' 5\" (1.651 m)   Wt 95.4 kg (210 lb 6.4 oz)   BMI 35.01 kg/m²     Physical Exam  Constitutional:       General: She is not in acute distress.     Appearance: Normal appearance. She is well-developed. She is not diaphoretic.   HENT:      Head: Normocephalic and atraumatic.      Right Ear: Tympanic membrane, ear canal and external ear normal. There is no impacted cerumen.      Left Ear: Tympanic membrane, ear canal and external ear normal. There is no impacted cerumen.   Eyes:      General: No scleral icterus.        Right eye: No discharge.         Left eye: No discharge.      Extraocular Movements: Extraocular movements intact.      Conjunctiva/sclera: Conjunctivae normal.      Pupils: Pupils are equal, round, and reactive to light.   Cardiovascular:      Rate and Rhythm: Normal rate and regular rhythm.      Heart sounds: Normal heart sounds. No murmur heard.     No friction rub. No gallop.   Pulmonary:      Effort: Pulmonary effort is normal. No respiratory distress.      Breath sounds: Normal breath sounds. No wheezing or rales.   Chest:      Chest wall: No tenderness.   Abdominal:      General: Bowel sounds are normal. There is no distension.      Palpations: Abdomen is soft. There is no mass.      Tenderness: There is no abdominal tenderness. There is no guarding or rebound.   Musculoskeletal:         General: No deformity. Normal range of motion.      Cervical back: Normal range of motion and neck supple.   Skin:     General: Skin is warm and dry.      Findings: No erythema or rash.   Neurological:      Mental Status: She is alert and oriented to person, place, and time.   Psychiatric:         Behavior: Behavior normal.         Thought Content: Thought content normal.         Judgment: Judgment normal.          Data:     Pre-operative work-up    Laboratory Results: I have personally reviewed the pertinent laboratory " results/reports      EKG: I have personally reviewed pertinent reports.      Recent Results (from the past 672 hour(s))   Comprehensive metabolic panel    Collection Time: 05/01/24  8:52 AM   Result Value Ref Range    Sodium 139 135 - 147 mmol/L    Potassium 3.8 3.5 - 5.3 mmol/L    Chloride 104 96 - 108 mmol/L    CO2 29 21 - 32 mmol/L    ANION GAP 6 4 - 13 mmol/L    BUN 10 5 - 25 mg/dL    Creatinine 0.67 0.60 - 1.30 mg/dL    Glucose, Fasting 99 65 - 99 mg/dL    Calcium 9.0 8.4 - 10.2 mg/dL    AST 26 13 - 39 U/L    ALT 22 7 - 52 U/L    Alkaline Phosphatase 59 34 - 104 U/L    Total Protein 6.4 6.4 - 8.4 g/dL    Albumin 3.8 3.5 - 5.0 g/dL    Total Bilirubin 0.94 0.20 - 1.00 mg/dL    eGFR 86 ml/min/1.73sq m   Protime-INR    Collection Time: 05/01/24  8:52 AM   Result Value Ref Range    Protime 14.7 (H) 11.6 - 14.5 seconds    INR 1.13 0.84 - 1.19   CBC and differential    Collection Time: 05/01/24  8:52 AM   Result Value Ref Range    WBC 3.85 (L) 4.31 - 10.16 Thousand/uL    RBC 4.79 3.81 - 5.12 Million/uL    Hemoglobin 14.1 11.5 - 15.4 g/dL    Hematocrit 43.0 34.8 - 46.1 %    MCV 90 82 - 98 fL    MCH 29.4 26.8 - 34.3 pg    MCHC 32.8 31.4 - 37.4 g/dL    RDW 13.2 11.6 - 15.1 %    MPV 10.3 8.9 - 12.7 fL    Platelets 171 149 - 390 Thousands/uL    nRBC 0 /100 WBCs    Segmented % 62 43 - 75 %    Immature Grans % 0 0 - 2 %    Lymphocytes % 24 14 - 44 %    Monocytes % 9 4 - 12 %    Eosinophils Relative 4 0 - 6 %    Basophils Relative 1 0 - 1 %    Absolute Neutrophils 2.36 1.85 - 7.62 Thousands/µL    Absolute Immature Grans 0.01 0.00 - 0.20 Thousand/uL    Absolute Lymphocytes 0.92 0.60 - 4.47 Thousands/µL    Absolute Monocytes 0.36 0.17 - 1.22 Thousand/µL    Eosinophils Absolute 0.17 0.00 - 0.61 Thousand/µL    Basophils Absolute 0.03 0.00 - 0.10 Thousands/µL   Basic metabolic panel    Collection Time: 05/09/24  8:36 AM   Result Value Ref Range    Sodium 140 135 - 147 mmol/L    Potassium 3.8 3.5 - 5.3 mmol/L    Chloride 106 96 -  108 mmol/L    CO2 26 21 - 32 mmol/L    ANION GAP 8 4 - 13 mmol/L    BUN 10 5 - 25 mg/dL    Creatinine 0.74 0.60 - 1.30 mg/dL    Glucose 97 65 - 140 mg/dL    Glucose, Fasting 97 65 - 99 mg/dL    Calcium 9.3 8.4 - 10.2 mg/dL    eGFR 80 ml/min/1.73sq m   CBC and Platelet    Collection Time: 05/09/24  8:36 AM   Result Value Ref Range    WBC 4.82 4.31 - 10.16 Thousand/uL    RBC 4.84 3.81 - 5.12 Million/uL    Hemoglobin 14.4 11.5 - 15.4 g/dL    Hematocrit 42.9 34.8 - 46.1 %    MCV 89 82 - 98 fL    MCH 29.8 26.8 - 34.3 pg    MCHC 33.6 31.4 - 37.4 g/dL    RDW 13.2 11.6 - 15.1 %    Platelets 188 149 - 390 Thousands/uL    MPV 10.1 8.9 - 12.7 fL   Protime-INR    Collection Time: 05/09/24  8:36 AM   Result Value Ref Range    Protime 16.5 (H) 11.6 - 14.5 seconds    INR 1.35 (H) 0.84 - 1.19   ECG 12 lead    Collection Time: 05/09/24  8:41 AM   Result Value Ref Range    Ventricular Rate 62 BPM    Atrial Rate 62 BPM    TX Interval 192 ms    QRSD Interval 98 ms    QT Interval 474 ms    QTC Interval 481 ms    P [place] 59 degrees    QRS Axis -12 degrees    T Wave Axis -4 degrees   ECG 12 lead    Collection Time: 05/09/24 12:24 PM   Result Value Ref Range    Ventricular Rate 81 BPM    Atrial Rate 81 BPM    TX Interval 168 ms    QRSD Interval 160 ms    QT Interval 482 ms    QTC Interval 559 ms    P [place] 72 degrees    QRS Axis 141 degrees    T Wave Axis 91 degrees       Lila Mosquera MD  [facility]  [address] [address]  Phone#  [phone]  Fax#  [phone]

## 2024-05-22 ENCOUNTER — HOSPITAL ENCOUNTER (OUTPATIENT)
Facility: HOSPITAL | Age: 74
Setting detail: OUTPATIENT SURGERY
Discharge: HOME | End: 2024-05-22
Attending: STUDENT IN AN ORGANIZED HEALTH CARE EDUCATION/TRAINING PROGRAM | Admitting: STUDENT IN AN ORGANIZED HEALTH CARE EDUCATION/TRAINING PROGRAM
Payer: COMMERCIAL

## 2024-05-22 ENCOUNTER — ANESTHESIA (OUTPATIENT)
Dept: PERIOP | Facility: HOSPITAL | Age: 74
End: 2024-05-22
Payer: COMMERCIAL

## 2024-05-22 ENCOUNTER — APPOINTMENT (OUTPATIENT)
Dept: RADIOLOGY | Facility: HOSPITAL | Age: 74
End: 2024-05-22
Payer: COMMERCIAL

## 2024-05-22 VITALS
DIASTOLIC BLOOD PRESSURE: 73 MMHG | SYSTOLIC BLOOD PRESSURE: 137 MMHG | HEART RATE: 80 BPM | BODY MASS INDEX: 34.82 KG/M2 | HEIGHT: 65 IN | OXYGEN SATURATION: 97 % | RESPIRATION RATE: 16 BRPM | WEIGHT: 209 LBS | TEMPERATURE: 97.4 F

## 2024-05-22 DIAGNOSIS — M79.643 PAIN OF HAND, UNSPECIFIED LATERALITY: Primary | ICD-10-CM

## 2024-05-22 PROCEDURE — 26852 FUSION OF KNUCKLE WITH GRAFT: CPT | Performed by: STUDENT IN AN ORGANIZED HEALTH CARE EDUCATION/TRAINING PROGRAM

## 2024-05-22 PROCEDURE — C1713 ANCHOR/SCREW BN/BN,TIS/BN: HCPCS | Performed by: STUDENT IN AN ORGANIZED HEALTH CARE EDUCATION/TRAINING PROGRAM

## 2024-05-22 PROCEDURE — 73120 X-RAY EXAM OF HAND: CPT

## 2024-05-22 DEVICE — 2.0MM X 8MM FULLY THREADED SCREW, ANGLED LOCKING
Type: IMPLANTABLE DEVICE | Site: THUMB | Status: FUNCTIONAL
Brand: OSTEOMED

## 2024-05-22 DEVICE — .045" X 6" ST GUIDE WIRE
Type: IMPLANTABLE DEVICE | Site: THUMB | Status: FUNCTIONAL
Brand: ACUMED

## 2024-05-22 DEVICE — 2.0MM X 9MM FULLY THREADED SCREW, ANGLED LOCKING
Type: IMPLANTABLE DEVICE | Site: THUMB | Status: FUNCTIONAL
Brand: OSTEOMED

## 2024-05-22 DEVICE — 2.0MM X 10MM FULLY THREADED SCREW, ANGLED LOCKING
Type: IMPLANTABLE DEVICE | Site: THUMB | Status: FUNCTIONAL
Brand: OSTEOMED

## 2024-05-22 DEVICE — 2.0MM 2 X 8 T PLATE, LOCKING
Type: IMPLANTABLE DEVICE | Site: THUMB | Status: FUNCTIONAL
Brand: OSTEOMED

## 2024-05-22 DEVICE — 2.0MM X 11MM FULLY THREADED SCREW, ANGLED LOCKING
Type: IMPLANTABLE DEVICE | Site: THUMB | Status: FUNCTIONAL
Brand: OSTEOMED

## 2024-05-22 RX ORDER — CEFAZOLIN SODIUM 2 G/50ML
SOLUTION INTRAVENOUS AS NEEDED
Status: DISCONTINUED | OUTPATIENT
Start: 2024-05-22 | End: 2024-05-22

## 2024-05-22 RX ORDER — ONDANSETRON 2 MG/ML
4 INJECTION INTRAMUSCULAR; INTRAVENOUS EVERY 6 HOURS PRN
Status: DISCONTINUED | OUTPATIENT
Start: 2024-05-22 | End: 2024-05-23 | Stop reason: HOSPADM

## 2024-05-22 RX ORDER — FENTANYL CITRATE/PF 50 MCG/ML
25 SYRINGE (ML) INJECTION
Status: DISCONTINUED | OUTPATIENT
Start: 2024-05-22 | End: 2024-05-22 | Stop reason: HOSPADM

## 2024-05-22 RX ORDER — SENNOSIDES 8.6 MG
650 CAPSULE ORAL EVERY 8 HOURS PRN
Qty: 30 TABLET | Refills: 0 | Status: SHIPPED | OUTPATIENT
Start: 2024-05-22

## 2024-05-22 RX ORDER — NAPROXEN SODIUM 220 MG
220 TABLET ORAL 2 TIMES DAILY WITH MEALS
Qty: 60 TABLET | Refills: 0 | Status: SHIPPED | OUTPATIENT
Start: 2024-05-22

## 2024-05-22 RX ORDER — FENTANYL CITRATE 50 UG/ML
INJECTION, SOLUTION INTRAMUSCULAR; INTRAVENOUS AS NEEDED
Status: DISCONTINUED | OUTPATIENT
Start: 2024-05-22 | End: 2024-05-22

## 2024-05-22 RX ORDER — LIDOCAINE HYDROCHLORIDE 10 MG/ML
INJECTION, SOLUTION EPIDURAL; INFILTRATION; INTRACAUDAL; PERINEURAL AS NEEDED
Status: DISCONTINUED | OUTPATIENT
Start: 2024-05-22 | End: 2024-05-22

## 2024-05-22 RX ORDER — DEXAMETHASONE SODIUM PHOSPHATE 4 MG/ML
INJECTION, SOLUTION INTRA-ARTICULAR; INTRALESIONAL; INTRAMUSCULAR; INTRAVENOUS; SOFT TISSUE AS NEEDED
Status: DISCONTINUED | OUTPATIENT
Start: 2024-05-22 | End: 2024-05-22

## 2024-05-22 RX ORDER — HYDROMORPHONE HCL IN WATER/PF 6 MG/30 ML
0.2 PATIENT CONTROLLED ANALGESIA SYRINGE INTRAVENOUS
Status: DISCONTINUED | OUTPATIENT
Start: 2024-05-22 | End: 2024-05-22 | Stop reason: HOSPADM

## 2024-05-22 RX ORDER — MEPERIDINE HYDROCHLORIDE 25 MG/ML
12.5 INJECTION INTRAMUSCULAR; INTRAVENOUS; SUBCUTANEOUS
Status: DISCONTINUED | OUTPATIENT
Start: 2024-05-22 | End: 2024-05-22 | Stop reason: HOSPADM

## 2024-05-22 RX ORDER — TRAMADOL HYDROCHLORIDE 50 MG/1
50 TABLET ORAL EVERY 6 HOURS PRN
Qty: 15 TABLET | Refills: 0 | Status: SHIPPED | OUTPATIENT
Start: 2024-05-22 | End: 2024-06-01

## 2024-05-22 RX ORDER — PROPOFOL 10 MG/ML
INJECTION, EMULSION INTRAVENOUS AS NEEDED
Status: DISCONTINUED | OUTPATIENT
Start: 2024-05-22 | End: 2024-05-22

## 2024-05-22 RX ORDER — ACETAMINOPHEN 325 MG/1
975 TABLET ORAL EVERY 8 HOURS
Status: DISCONTINUED | OUTPATIENT
Start: 2024-05-22 | End: 2024-05-23 | Stop reason: HOSPADM

## 2024-05-22 RX ORDER — ONDANSETRON 2 MG/ML
INJECTION INTRAMUSCULAR; INTRAVENOUS AS NEEDED
Status: DISCONTINUED | OUTPATIENT
Start: 2024-05-22 | End: 2024-05-22

## 2024-05-22 RX ORDER — HYDROMORPHONE HCL/PF 1 MG/ML
SYRINGE (ML) INJECTION AS NEEDED
Status: DISCONTINUED | OUTPATIENT
Start: 2024-05-22 | End: 2024-05-22

## 2024-05-22 RX ORDER — SODIUM CHLORIDE, SODIUM LACTATE, POTASSIUM CHLORIDE, CALCIUM CHLORIDE 600; 310; 30; 20 MG/100ML; MG/100ML; MG/100ML; MG/100ML
75 INJECTION, SOLUTION INTRAVENOUS CONTINUOUS
Status: DISCONTINUED | OUTPATIENT
Start: 2024-05-22 | End: 2024-05-23 | Stop reason: HOSPADM

## 2024-05-22 RX ORDER — TRAMADOL HYDROCHLORIDE 50 MG/1
50 TABLET ORAL EVERY 6 HOURS SCHEDULED
Status: DISCONTINUED | OUTPATIENT
Start: 2024-05-22 | End: 2024-05-23 | Stop reason: HOSPADM

## 2024-05-22 RX ORDER — CHLORHEXIDINE GLUCONATE ORAL RINSE 1.2 MG/ML
15 SOLUTION DENTAL ONCE
Status: COMPLETED | OUTPATIENT
Start: 2024-05-22 | End: 2024-05-22

## 2024-05-22 RX ORDER — MIDAZOLAM HYDROCHLORIDE 2 MG/2ML
INJECTION, SOLUTION INTRAMUSCULAR; INTRAVENOUS AS NEEDED
Status: DISCONTINUED | OUTPATIENT
Start: 2024-05-22 | End: 2024-05-22

## 2024-05-22 RX ORDER — SODIUM CHLORIDE, SODIUM LACTATE, POTASSIUM CHLORIDE, CALCIUM CHLORIDE 600; 310; 30; 20 MG/100ML; MG/100ML; MG/100ML; MG/100ML
INJECTION, SOLUTION INTRAVENOUS CONTINUOUS PRN
Status: DISCONTINUED | OUTPATIENT
Start: 2024-05-22 | End: 2024-05-22

## 2024-05-22 RX ORDER — MAGNESIUM HYDROXIDE 1200 MG/15ML
LIQUID ORAL AS NEEDED
Status: DISCONTINUED | OUTPATIENT
Start: 2024-05-22 | End: 2024-05-22 | Stop reason: HOSPADM

## 2024-05-22 RX ORDER — ONDANSETRON 2 MG/ML
4 INJECTION INTRAMUSCULAR; INTRAVENOUS ONCE AS NEEDED
Status: COMPLETED | OUTPATIENT
Start: 2024-05-22 | End: 2024-05-22

## 2024-05-22 RX ADMIN — CEFAZOLIN SODIUM 2000 MG: 2 SOLUTION INTRAVENOUS at 14:25

## 2024-05-22 RX ADMIN — LIDOCAINE HYDROCHLORIDE 50 MG: 10 INJECTION, SOLUTION EPIDURAL; INFILTRATION; INTRACAUDAL; PERINEURAL at 14:17

## 2024-05-22 RX ADMIN — MIDAZOLAM 2 MG: 1 INJECTION INTRAMUSCULAR; INTRAVENOUS at 14:08

## 2024-05-22 RX ADMIN — PHENYLEPHRINE HYDROCHLORIDE 15 MCG/MIN: 10 INJECTION INTRAVENOUS at 14:46

## 2024-05-22 RX ADMIN — DEXAMETHASONE SODIUM PHOSPHATE 10 MG: 4 INJECTION, SOLUTION INTRA-ARTICULAR; INTRALESIONAL; INTRAMUSCULAR; INTRAVENOUS; SOFT TISSUE at 14:21

## 2024-05-22 RX ADMIN — SODIUM CHLORIDE, SODIUM LACTATE, POTASSIUM CHLORIDE, AND CALCIUM CHLORIDE: .6; .31; .03; .02 INJECTION, SOLUTION INTRAVENOUS at 16:43

## 2024-05-22 RX ADMIN — ONDANSETRON 4 MG: 2 INJECTION INTRAMUSCULAR; INTRAVENOUS at 14:21

## 2024-05-22 RX ADMIN — FENTANYL CITRATE 50 MCG: 50 INJECTION, SOLUTION INTRAMUSCULAR; INTRAVENOUS at 14:22

## 2024-05-22 RX ADMIN — CHLORHEXIDINE GLUCONATE 15 ML: 1.2 SOLUTION ORAL at 13:35

## 2024-05-22 RX ADMIN — FENTANYL CITRATE 50 MCG: 50 INJECTION, SOLUTION INTRAMUSCULAR; INTRAVENOUS at 14:59

## 2024-05-22 RX ADMIN — PROPOFOL 200 MG: 10 INJECTION, EMULSION INTRAVENOUS at 14:17

## 2024-05-22 RX ADMIN — FENTANYL CITRATE 50 MCG: 50 INJECTION, SOLUTION INTRAMUSCULAR; INTRAVENOUS at 14:24

## 2024-05-22 RX ADMIN — FENTANYL CITRATE 50 MCG: 50 INJECTION, SOLUTION INTRAMUSCULAR; INTRAVENOUS at 14:57

## 2024-05-22 RX ADMIN — ONDANSETRON 4 MG: 2 INJECTION INTRAMUSCULAR; INTRAVENOUS at 17:02

## 2024-05-22 RX ADMIN — HYDROMORPHONE HYDROCHLORIDE 1 MG: 1 INJECTION, SOLUTION INTRAMUSCULAR; INTRAVENOUS; SUBCUTANEOUS at 15:38

## 2024-05-22 RX ADMIN — SODIUM CHLORIDE, SODIUM LACTATE, POTASSIUM CHLORIDE, AND CALCIUM CHLORIDE: .6; .31; .03; .02 INJECTION, SOLUTION INTRAVENOUS at 14:06

## 2024-05-22 NOTE — ANESTHESIA PREPROCEDURE EVALUATION
Procedure:  Left Thumb proximal phalanx and internal fixation versus fusion of metecarpal phalanx joint (Left: Thumb)    Relevant Problems   CARDIO   (+) Benign hypertension   (+) Mixed hyperlipidemia   (+) Pacemaker-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 cardiac 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   (+) Persistent atrial fibrillation (HCC)   (+) Tachy-lizet syndrome (HCC)      ENDO   (+) Congenital hypothyroidism with diffuse goiter      GI/HEPATIC   (+) Hiatal hernia   (+) Liver lesion, left lobe      GYN   (+) History of hysterectomy      MUSCULOSKELETAL   (+) Hiatal hernia   (+) Rheumatoid arthritis (HCC)      NEURO/PSYCH   (+) Anxiety      PULMONARY   (+) Asthma        Physical Exam    Airway    Mallampati score: II  TM Distance: >3 FB  Neck ROM: full     Dental   No notable dental hx     Cardiovascular  Cardiovascular exam normal    Pulmonary  Pulmonary exam normal     Other Findings  post-pubertal.      Anesthesia Plan  ASA Score- 3     Anesthesia Type- general with ASA Monitors.         Additional Monitors:     Airway Plan: LMA.           Plan Factors-Exercise tolerance (METS): >4 METS.    Chart reviewed.   Existing labs reviewed. Patient summary reviewed.    Patient is not a current smoker.      Obstructive sleep apnea risk education given perioperatively.        Induction- intravenous.    Postoperative Plan- Plan for postoperative opioid use.     Perioperative Resuscitation Plan - Level 1 - Full Code.       Informed Consent- Anesthetic plan and risks discussed with patient.  I personally reviewed this patient with the CRNA. Discussed and agreed on the Anesthesia Plan with the CRNA..

## 2024-05-22 NOTE — DISCHARGE INSTR - AVS FIRST PAGE
Post Operative Instructions    You have had surgery on your arm today, please read and follow the information below:  Elevate your hand above your elbow during the next 24-48 hours to help with swelling.  Place your hand and arm over your head with motion at your shoulder three times a day.  Do not apply any cream/ointment/oil to your incisions including antibiotics.  Do not soak your hands in standing water (dishwater, tubs, Jacuzzi's, pools, etc.) until given permission (typically 2-3 weeks after injury)    Call the office if you notice any:  Increased numbness or tingling of your hand or fingers that is not relieved with elevation.  Increasing pain that is not controlled with medication.  Difficulty chewing, breathing, swallowing.  Chest pains or shortness of breath.  Fever over 101.4 degrees.    Bandage: Do NOT remove bandage until follow-up appointment.    Motion: Move fingers into a fist 5 times a day, DO NOT move any splinted fingers.    Weight bearing status: Avoid heavy lifting (>5 pounds) with the extremity that was operated on until follow up appointment. Normal activities of daily living are OK.    Ice: Ice for 10 minutes every hour as needed for swelling x 24 hours.    Sling: No sling necessary.    Medications:   Naproxen 220 mg two times a day   Tylenol Extended Release 650 mg every 8 hours  Tramadol 1 tab every 6 hours AS needed for pain    After surgery, we would like you to take naproxen 220 mg one tablet by mouth every 12 hours with food  AND Tylenol 650 mg one tablet by mouth every 8 hours  (at breakfast, lunch and dinner) for 3-5 days after your surgery.  Please take these medication EVERYDAY after surgery for 3-5 days, and not just as needed. You can take these medications at the same time.  Taking these medications after surgery will limit your need for prescription pain medication.      We will also prescribe a narcotic pain medication for a limited time after surgery that you can take as  needed for moderate or severe pain.      Follow-up Appointment: 10-14 days.      Please call the office if you have any questions or concerns regarding your post-operative care.

## 2024-05-22 NOTE — INTERVAL H&P NOTE
H&P reviewed. After examining the patient I find no changes in the patients condition since the H&P had been written.  Discussed based on her preoperative imaging this fracture would be most amenable to an MCP fusion.  We discussed the risk of nonunion is between 15 to 7%.  Knowing all the risk and benefits of the procedure the patient still decided move forward with surgery.  I also discussed with her using bone graft from distal radius to increase union right if needed.    Vitals:    05/22/24 1317   BP: 139/86   Pulse: 76   Resp: 18   Temp: 98.6 °F (37 °C)   SpO2: 96%

## 2024-05-22 NOTE — ANESTHESIA POSTPROCEDURE EVALUATION
Post-Op Assessment Note    CV Status:  Stable  Pain Score: 0    Pain management: adequate       Mental Status:  Sleepy and arousable   Hydration Status:  Stable   PONV Controlled:  None  Two or more mitigation strategies used for obstructive sleep apnea   Post Op Vitals Reviewed: Yes    No anethesia notable event occurred.    Staff: DAVID               /76 (05/22/24 1640)    Temp (!) 97.4 °F (36.3 °C) (05/22/24 1640)    Pulse 92 (05/22/24 1640)   Resp (!) 10 (05/22/24 1640)    SpO2 96 % (05/22/24 1640)

## 2024-05-22 NOTE — OP NOTE
OPERATIVE REPORT  PATIENT NAME: Swetha Lopez    :  1950  MRN: [id number]  Pt Location: [facility] OR ROOM 02    SURGERY DATE: 2024    Surgeons and Role:     * Diane Duvall MD - Primary    Preop Diagnosis:  Closed displaced fracture of proximal phalanx of left thumb, initial encounter [S62.512A]    Post-Op Diagnosis Codes:     * Closed displaced fracture of proximal phalanx of left thumb, initial encounter [S62.678A]    Procedure(s):  Left thumb metacarpal phalanx fusion with distal radius autograft     Hardware: Acumed T plate 2.0mm    Specimen(s):  * No specimens in log *    Estimated Blood Loss:   Minimal    Drains:  * No LDAs found *    Anesthesia Type:   IV Sedation with Anesthesia    Operative Indications:  Closed displaced fracture of proximal phalanx of left thumb, initial encounter [M84.782K]    Swetha is a pleasant 73-year-old female with past medical history of atrial fibrillation on [employer] who fell on 2024 and sustained a left thumb proximal phalanx intra-articular fracture.  She first presented to me on 2024 and proximal phalanx fracture had maintained alignment and was treated conservatively with a splint.  She followed up 2 weeks later and repeat x-rays showed significant displacement with proximal phalanx with volar subluxation.  On exam she has significant swelling and pain, radial deviation deformity of the thumb and EPL and FPL were intact.  Due to the displacement of the proximal phalanx fracture I discussed operative management.  We discussed that it would be unlikely amenable to open reduction internal fixation of the proximal phalanx and would benefit from a MCP fusion based on her bone quality and fracture pattern.  We discussed the risk of an MCP fusion to the thumb which include but were not limited to, thumb stiffness, nonunion between 5 and 15%, malunion, infection and injury to extensor tendon mechanism.  The benefits would be improved alignment of the MCP  joint and a stable thumb post for hand function.  Knowing all the risks and benefits of the procedure Swetha decided to move forward with surgery.  Consent was signed and all questions were answered.  I did discuss with her that I would be leaving the department in 3 weeks and that her follow-up care would be with another capable provider as this fusion would not heal in 3 weeks.  Swetha understood and was okay with moving forward with the procedure.    Operative Findings:  Comminuted intra-articular base fracture, proximal phalanx, left thumb      Complications:   None    Procedure and Technique:  Procedure in detail the patient was brought to the operating room on a stretcher and standard hand table placed on the operative side of the patient.  A nonsterile tourniquet was placed on the left arm of the patient    Patient was prepped and draped in the usual sterile fashion to the left upper extremity. Surgical pause was performed to confirm the correct patient by name birthdate and MRN as well as correct laterality of the left thumb.     20cc, 50/50 mixture of 1% lidocaine and 25% marcaine without epinephrine was given as a digital block to the thumb including the superficial radial nerve.    A tourniquet was then inflated with the assistance of an Esmarch to 250 mmHg. I began by making a longitudinal dorsal incision over the MCP joint of the thumb.  Soft tissue flaps were created.  A longitudinal incision between EPL and EPB was performed to access the joint capsule.  The joint capsule was then incised longitudinally.  The proximal phalanx base intra-articular fracture was evaluated and noted to be extensively comminuted and impacted, the decision for fusion of the MCP joint was made.  The most distal part of the first metacarpal head was removed using a sagittal saw with a volar inclination to help with fusion in slight flexion.  Then using a rongeur the articular surface was removed to the base of the proximal  phalanx exposing cancellous bone, no saw was needed as the bone was incredibly soft.  Using fluoroscopy and x2 0.45 K wires the MCP joint was reduced with adequate bony contact for fusion.  Once adequate reduction in 15-20 degrees of flexion of the MCP joint was obtained a T plate, 2.00mm acumed, was bent and placed to maintain reduction and for compression.  Using fluoroscopy the T plate was placed in appropriate position and compressed using the oblique proximal hole 3 locking screws were placed both distal and proximal, to the MCP joint.  Clinically there was appropriate compression at the fusion site noted on x-ray.  Due to poor bone quality decision was made to add distal radius autograft.  An incision was made at Zachary's tubercle just ulnar to expose the third extensor compartment.  EPL was then transposed and bone graft was obtained just ulnar to Listers using a curette.  This bone graft was then packed into the fusion site.  Tourniquet was then taken down at 84 minutes.  The capsule of the MCP joint was closed with 3-0 FiberWire.  The wounds were then irrigated.  Hemostasis was achieved with pressure.  The extensor mechanism was then repaired with 3-0 FiberWire.  The subcutaneous tissue was repaired with 3-0 Monocryl simple sutures and running Monocryl for the skin.  Skin glue and Steri-Strips were applied.  A bulky sterile dressing and thumb spica splint was applied.  Final count was correct.  The patient was awoken from anesthesia without any complications.     I was present for the entire procedure.    Patient Disposition:  PACU         SIGNATURE: Diane Duvall MD  DATE: May 22, 2024  TIME: 4:52 PM

## 2024-05-22 NOTE — PERIOPERATIVE NURSING NOTE
"Pt more alert,but remains\"tired\". VSS Dr. Hunter aware, Spoke to pt, spouse re: overnight bed, but pt doesn't want to stay.  "

## 2024-05-22 NOTE — PERIOPERATIVE NURSING NOTE
"Pt states\"feels better\".  Assisted with dressing, stopped at bathroom, voided large amt urine.  "

## 2024-05-31 ENCOUNTER — APPOINTMENT (OUTPATIENT)
Dept: RADIOLOGY | Facility: CLINIC | Age: 74
End: 2024-05-31
Payer: COMMERCIAL

## 2024-05-31 ENCOUNTER — OFFICE VISIT (OUTPATIENT)
Dept: OBGYN CLINIC | Facility: CLINIC | Age: 74
End: 2024-05-31
Payer: COMMERCIAL

## 2024-05-31 VITALS
SYSTOLIC BLOOD PRESSURE: 116 MMHG | HEART RATE: 99 BPM | HEIGHT: 65 IN | BODY MASS INDEX: 34.99 KG/M2 | DIASTOLIC BLOOD PRESSURE: 79 MMHG | WEIGHT: 210 LBS

## 2024-05-31 DIAGNOSIS — S62.512A CLOSED DISPLACED FRACTURE OF PROXIMAL PHALANX OF LEFT THUMB, INITIAL ENCOUNTER: ICD-10-CM

## 2024-05-31 DIAGNOSIS — M80.80XA LOCALIZED OSTEOPOROSIS WITH CURRENT PATHOLOGICAL FRACTURE, INITIAL ENCOUNTER: Primary | ICD-10-CM

## 2024-05-31 PROCEDURE — 73140 X-RAY EXAM OF FINGER(S): CPT

## 2024-05-31 PROCEDURE — 29125 APPL SHORT ARM SPLINT STATIC: CPT | Performed by: STUDENT IN AN ORGANIZED HEALTH CARE EDUCATION/TRAINING PROGRAM

## 2024-05-31 PROCEDURE — 99024 POSTOP FOLLOW-UP VISIT: CPT | Performed by: STUDENT IN AN ORGANIZED HEALTH CARE EDUCATION/TRAINING PROGRAM

## 2024-05-31 NOTE — PROGRESS NOTES
ASSESSMENT/PLAN:    Assessment:   9 days status post Left Thumb Proximal Phalanx, Internal Fusion Of Metacarpal Phalanx Joint, Distal Radius Bone Graft. - Left on 5/22/2024     Plan:   Pins will be removed in 5 to 6 weeks  Splint applied today  Will transition to thermoplastic splint at next visit  Will follow-up with Dr. Mason after her visit on 6/14/2024    Follow Up:  2  week(s)    To Do Next Visit:  X-rays of the  left  thumb and Cast/splint off prior to x-ray      _____________________________________________________  CHIEF COMPLAINT:  Chief Complaint   Patient presents with   • Left Hand - Post-op         SUBJECTIVE:  Swetha Lopez is a 73 y.o. female who presents for follow up 9 days status post Left Thumb Proximal Phalanx, Internal Fusion Of Metacarpal Phalanx Joint, Distal Radius Bone Graft. - Left on 5/22/2024.  She reports her pain began to improve yesterday. She had a rash from taking the Tramadol, so she has only been taking Tylenol for her pain control.      PAST MEDICAL HISTORY:  Past Medical History:   Diagnosis Date   • A-fib (Formerly [facility])    • Anxiety     resolved 10/4/17   • Asthma     seasonal   • Atrial fibrillation (Formerly [facility]) 01/2022    newly diagnosed on eliquis   • Chronic kidney disease     kidney stone   • Colon polyp    • Disease of thyroid gland    • Dysphagia    • GERD (gastroesophageal reflux disease)    • Goiter, nodular     partial thyroidectomy   • Hiatal hernia     repaired 2018   • Hiatal hernia with GERD without esophagitis 04/2018    surgical correction   • History of esophageal varices with bleeding    • History of pernicious anemia    • History of transfusion     x1 after bleeding ulcer   • Hyperlipidemia    • Hypertension    • Irritable bowel syndrome    • Neck mass     2 small areas left side by jawline and midneck  scheduled 0820/21   • Pacemaker-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 cardiac 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 05/09/2024   • RA (rheumatoid arthritis) (Formerly [facility])    • S/P AV kike  ablation 5/9/2024 05/09/2024   • s/p Medtronic dual chamber PPM 10/24/2023 05/09/2024   • Seasonal allergies    • Vertigo    • Wears glasses     for reading       PAST SURGICAL HISTORY:  Past Surgical History:   Procedure Laterality Date   • AV NODE ABLATION  05/09/2024   • BREAST BIOPSY Bilateral     negative   • CARDIAC CATHETERIZATION      x2 secondary to exertional chest pain, due to lung issues, possible mild COPD   • CARDIAC ELECTROPHYSIOLOGY PROCEDURE N/A 12/23/2022    Procedure: Cardiac eps/afib ablation;  Surgeon: Sunil Eubanks MD;  Location:  CARDIAC CATH LAB;  Service: Cardiology   • CARDIAC ELECTROPHYSIOLOGY PROCEDURE N/A 12/23/2022    Procedure: Cardiac eps/aflutter ablation;  Surgeon: Sunil Eubanks MD;  Location: [facility];  Service: Cardiology   • CARDIAC ELECTROPHYSIOLOGY PROCEDURE N/A 10/24/2023    Procedure: Cardiac pacer implant DC PM;  Surgeon: Sunil Eubanks MD;  Location: [facility];  Service: Cardiology   • CARDIAC ELECTROPHYSIOLOGY PROCEDURE N/A 05/09/2024    Procedure: Cardiac eps/av node ablation;  Surgeon: Sunil Eubanks MD;  Location: [facility];  Service: Cardiology   • CATARACT EXTRACTION Bilateral    • CHOLECYSTECTOMY  2015    lap - last assessed 10/4/17   • COLONOSCOPY      complete   • ESOPHAGOGASTRODUODENOSCOPY N/A 01/23/2018    Procedure: ESOPHAGOGASTRODUODENOSCOPY (EGD);  Surgeon: Richard Bledsoe MD;  Location: [facility] GI LAB;  Service: Gastroenterology   • HAND FRACTURE REPAIR Left 5/22/2024    Procedure: LEFT THUMB PROXIMAL PHALANX, INTERNAL FUSION OF METACARPAL PHALANX JOINT, DISTAL RADIUS BONE GRAFT.;  Surgeon: Diane Duvall MD;  Location: [facility] MAIN OR;  Service: Orthopedics   • HYSTERECTOMY      partial - ovaries remain   • LIPOMA RESECTION      right thigh   • VA ESOPHAGOSCOPY FLEXIBLE TRANSORAL WITH BIOPSY N/A 04/11/2018    Procedure: ESOPHAGOGASTRODUODENOSCOPY (EGD);  Surgeon: Yeison Masterson MD;  Location:  MAIN OR;   Service: Thoracic   • KS LAPS RPR PARAESPHGL HRNA INCL FUNDPLSTY W/MESH N/A 04/11/2018    Procedure: REPAIR HERNIA PARAESOPHAGEAL  LAPAROSCOPIC,ELEONORA GASTROPLASTY, TUEPET FUNDOPLICATION;  Surgeon: Yeison Masterson MD;  Location:  MAIN OR;  Service: Thoracic   • KS XCAPSL CTRC RMVL INSJ IO LENS PROSTH W/O ECP Right 08/23/2021    Procedure: EXTRACTION EXTRACAPSULAR CATARACT PHACO INTRAOCULAR LENS (IOL);  Surgeon: Malcolm Morris MD;  Location: [facility] MAIN OR;  Service: Ophthalmology   • KS XCAPSL CTRC RMVL INSJ IO LENS PROSTH W/O ECP Left 11/15/2021    Procedure: EXTRACTION EXTRACAPSULAR CATARACT PHACO INTRAOCULAR LENS (IOL);  Surgeon: Malcolm Morris MD;  Location: [facility] MAIN OR;  Service: Ophthalmology   • SKIN BIOPSY Right     lump benign - last assessed 10/4/17   • THYROIDECTOMY, PARTIAL  1977   • TONSILLECTOMY     • US GUIDED THYROID BIOPSY  02/21/2023       FAMILY HISTORY:  Family History   Problem Relation Age of Onset   • Alzheimer's disease Mother    • Cancer Mother         skin    • Thyroid disease Mother    • Ulcerative colitis Mother    • Cancer Father         prostate   • Stroke Father    • Hypertension Father    • Prostate cancer Father    • Thyroid disease Sister    • Ulcerative colitis Sister    • Other Sister         open heart surgery   • Heart disease Sister    • Hyperlipidemia Brother    • No Known Problems Maternal Grandmother    • No Known Problems Paternal Grandmother    • No Known Problems Son    • No Known Problems Son    • Mental illness Neg Hx        SOCIAL HISTORY:  Social History     Tobacco Use   • Smoking status: Never     Passive exposure: Never   • Smokeless tobacco: Never   Vaping Use   • Vaping status: Never Used   Substance Use Topics   • Alcohol use: Not Currently     Comment: seldom   • Drug use: Never       MEDICATIONS:    Current Outpatient Medications:   •  acetaminophen (TYLENOL) 650 mg CR tablet, Take 1 tablet (650 mg total) by mouth every 8 (eight) hours as needed for  mild pain, Disp: 30 tablet, Rfl: 0  •  atorvastatin (LIPITOR) 20 mg tablet, TAKE 1 TABLET BY MOUTH DAILY AT  BEDTIME, Disp: 90 tablet, Rfl: 0  •  bumetanide (BUMEX) 1 mg tablet, TAKE 1 TABLET BY MOUTH DAILY, Disp: 90 tablet, Rfl: 1  •  Calcium Carb-Cholecalciferol 600-1000 MG-UNIT CAPS, Take by mouth every morning gummy , Disp: , Rfl:   •  Eliquis 5 MG, TAKE 1 TABLET BY MOUTH TWICE  DAILY, Disp: 180 tablet, Rfl: 3  •  levothyroxine 50 mcg tablet, TAKE 1 TABLET BY MOUTH DAILY, Disp: 90 tablet, Rfl: 1  •  naproxen sodium (ALEVE) 220 MG tablet, Take 1 tablet (220 mg total) by mouth 2 (two) times a day with meals, Disp: 60 tablet, Rfl: 0  •  omeprazole (PriLOSEC) 40 MG capsule, TAKE 1 CAPSULE BY MOUTH TWICE  DAILY, Disp: 180 capsule, Rfl: 1  •  potassium chloride (Klor-Con M10) 10 mEq tablet, Take 2 tablets (20 mEq total) by mouth 2 (two) times a day (Patient taking differently: Take 20 mEq by mouth in the morning Take once daily along with 20MEQ tablet), Disp: 180 tablet, Rfl: 1  •  potassium chloride 20 MEQ TBCR, Take 1 tablet (20 mEq total) by mouth in the morning, Disp: 90 tablet, Rfl: 3  •  traMADol (Ultram) 50 mg tablet, Take 1 tablet (50 mg total) by mouth every 6 (six) hours as needed for moderate pain for up to 10 days, Disp: 15 tablet, Rfl: 0    ALLERGIES:  Allergies   Allergen Reactions   • Orange (Diagnostic) - Food Allergy Anaphylaxis     Throat closes   • Pantoprazole Headache   • Penicillins Other (See Comments)     During allergy testing instructed to NEVER take PCN  Patient has tolerated cefazolin.   • Gluten Meal - Food Allergy      Following a gluten free diet on her own   • Lactose - Food Allergy Diarrhea   • Sulfa Antibiotics Rash       REVIEW OF SYSTEMS:  Pertinent items are noted in HPI.  A comprehensive review of systems was negative.    LABS:  HgA1c:   Lab Results   Component Value Date    HGBA1C 5.5 12/25/2022     BMP:   Lab Results   Component Value Date    CALCIUM 9.3 05/09/2024      "09/01/2016    K 3.8 05/09/2024    CO2 26 05/09/2024     05/09/2024    BUN 10 05/09/2024    CREATININE 0.74 05/09/2024           _____________________________________________________  PHYSICAL EXAMINATION:  Vital signs: /79   Pulse 99   Ht 5' 5\" (1.651 m)   Wt 95.3 kg (210 lb)   BMI 34.95 kg/m²   General: well developed and well nourished, alert, oriented times 3, and appears comfortable  Psychiatric: Normal  HEENT: Trachea Midline, No torticollis  Cardiovascular: No discernable arrhythmia  Pulmonary: No wheezing or stridor  Abdomen: No rebound or guarding  Extremities: No peripheral edema  Skin: No masses, erythema, lacerations, fluctation, ulcerations  Neurovascular: Sensation Intact to the Median, Ulnar, Radial Nerve, Motor Intact to the Median, Ulnar, Radial Nerve, and Pulses Intact    MUSCULOSKELETAL EXAMINATION:  Left Thumb  Incision: clean, dry, and intact  Swelling improved  No erythema or warmth  Pulses 2+  Sensation intact to distal tip    _____________________________________________________  STUDIES REVIEWED:  Images were reviewed in PACS by Dr. Duvall and demonstrate: stable alignment of percutaneous pins and internal fixation hardware      PROCEDURES PERFORMED:  Splint application    Date/Time: 5/31/2024 10:30 AM    Performed by: Diane Duvall MD  Authorized by: Diane Duvall MD  Universal Protocol:  Consent: Verbal consent obtained.  Risks and benefits: risks, benefits and alternatives were discussed  Consent given by: patient  Time out: Immediately prior to procedure a \"time out\" was called to verify the correct patient, procedure, equipment, support staff and site/side marked as required.  Patient understanding: patient states understanding of the procedure being performed  Site marked: the operative site was marked  Patient identity confirmed: verbally with patient    Pre-procedure details:     Sensation:  Normal  Procedure details:     Laterality:  Left    Location:  " Finger    Finger:  L thumb    Splint type:  Thumb spica    Supplies:  Plaster  Post-procedure details:     Pain:  Unchanged    Sensation:  Normal    Patient tolerance of procedure:  Tolerated well, no immediate complications         Scribe Attestation    I,:  Adriana Russell am acting as a scribe while in the presence of the attending physician.:       I,:  Diane Duvall MD personally performed the services described in this documentation    as scribed in my presence.:

## 2024-06-03 DIAGNOSIS — I10 BENIGN HYPERTENSION: ICD-10-CM

## 2024-06-03 DIAGNOSIS — M79.643 PAIN OF HAND, UNSPECIFIED LATERALITY: ICD-10-CM

## 2024-06-03 RX ORDER — SENNOSIDES 8.6 MG
650 CAPSULE ORAL EVERY 8 HOURS PRN
Qty: 30 TABLET | Refills: 0 | Status: SHIPPED | OUTPATIENT
Start: 2024-06-03

## 2024-06-03 NOTE — TELEPHONE ENCOUNTER
Reason for call:   [x] Refill   [] Prior Auth  [] Other:     Office:   [] PCP/Provider -   [x] Specialty/Provider - Ortho Diane Duvall    Medication: Acetaminophen CR    Dose/Frequency: 650 mg Q 8 HRS PRN    Quantity: 30    [facility]     Does the patient have enough for 3 days?   [] Yes   [x] No - Send as HP to POD

## 2024-06-04 RX ORDER — BUMETANIDE 1 MG/1
TABLET ORAL
Qty: 90 TABLET | Refills: 1 | Status: SHIPPED | OUTPATIENT
Start: 2024-06-04

## 2024-06-11 ENCOUNTER — OFFICE VISIT (OUTPATIENT)
Dept: OBGYN CLINIC | Facility: CLINIC | Age: 74
End: 2024-06-11
Payer: COMMERCIAL

## 2024-06-11 ENCOUNTER — IN-CLINIC DEVICE VISIT (OUTPATIENT)
Dept: CARDIOLOGY CLINIC | Facility: CLINIC | Age: 74
End: 2024-06-11
Payer: COMMERCIAL

## 2024-06-11 ENCOUNTER — OFFICE VISIT (OUTPATIENT)
Dept: CARDIOLOGY CLINIC | Facility: CLINIC | Age: 74
End: 2024-06-11
Payer: COMMERCIAL

## 2024-06-11 ENCOUNTER — APPOINTMENT (OUTPATIENT)
Dept: RADIOLOGY | Facility: CLINIC | Age: 74
End: 2024-06-11
Payer: COMMERCIAL

## 2024-06-11 VITALS
SYSTOLIC BLOOD PRESSURE: 124 MMHG | DIASTOLIC BLOOD PRESSURE: 83 MMHG | HEART RATE: 82 BPM | HEIGHT: 65 IN | WEIGHT: 206 LBS | BODY MASS INDEX: 34.32 KG/M2

## 2024-06-11 VITALS
HEIGHT: 65 IN | WEIGHT: 206 LBS | BODY MASS INDEX: 34.32 KG/M2 | DIASTOLIC BLOOD PRESSURE: 80 MMHG | HEART RATE: 82 BPM | SYSTOLIC BLOOD PRESSURE: 140 MMHG

## 2024-06-11 DIAGNOSIS — E87.6 HYPOKALEMIA: Primary | ICD-10-CM

## 2024-06-11 DIAGNOSIS — I48.91 NEW ONSET ATRIAL FIBRILLATION (HCC): ICD-10-CM

## 2024-06-11 DIAGNOSIS — I48.0 PAROXYSMAL ATRIAL FIBRILLATION (HCC): ICD-10-CM

## 2024-06-11 DIAGNOSIS — S62.512A CLOSED DISPLACED FRACTURE OF PROXIMAL PHALANX OF LEFT THUMB, INITIAL ENCOUNTER: ICD-10-CM

## 2024-06-11 DIAGNOSIS — M80.80XA LOCALIZED OSTEOPOROSIS WITH CURRENT PATHOLOGICAL FRACTURE, INITIAL ENCOUNTER: Primary | ICD-10-CM

## 2024-06-11 DIAGNOSIS — Z09 POSTOP CHECK: ICD-10-CM

## 2024-06-11 DIAGNOSIS — I10 ESSENTIAL HYPERTENSION: ICD-10-CM

## 2024-06-11 DIAGNOSIS — Z95.0 CARDIAC PACEMAKER IN SITU: Primary | ICD-10-CM

## 2024-06-11 PROCEDURE — 93280 PM DEVICE PROGR EVAL DUAL: CPT | Performed by: INTERNAL MEDICINE

## 2024-06-11 PROCEDURE — 29125 APPL SHORT ARM SPLINT STATIC: CPT | Performed by: STUDENT IN AN ORGANIZED HEALTH CARE EDUCATION/TRAINING PROGRAM

## 2024-06-11 PROCEDURE — 93000 ELECTROCARDIOGRAM COMPLETE: CPT | Performed by: PHYSICIAN ASSISTANT

## 2024-06-11 PROCEDURE — 73140 X-RAY EXAM OF FINGER(S): CPT

## 2024-06-11 PROCEDURE — 99024 POSTOP FOLLOW-UP VISIT: CPT | Performed by: STUDENT IN AN ORGANIZED HEALTH CARE EDUCATION/TRAINING PROGRAM

## 2024-06-11 PROCEDURE — 99215 OFFICE O/P EST HI 40 MIN: CPT | Performed by: PHYSICIAN ASSISTANT

## 2024-06-11 RX ORDER — POTASSIUM CHLORIDE 750 MG/1
10 TABLET, EXTENDED RELEASE ORAL DAILY
Start: 2024-06-11

## 2024-06-11 NOTE — PROGRESS NOTES
Results for orders placed or performed in visit on 06/11/24   Cardiac EP device report    Narrative    MDT DC/PM-ACTIVE SYSTEM IS MRI CONDITIONAL  DEVICE INTERROGATED IN THE [facility] OFFICE W/EB OV: BATTERY VOLTAGE ADEQUATE-12 YRS. AP 77%  98%. ALL AVAILABLE LEAD PARAMETERS & TRENDS WITHIN NORMAL LIMITS. 1 AT/AF NOTED; 5.32 HRS LONG; AVAIL EGRAM SHOWING AFIB. PT ON ELIQUIS. LR DECREASED TO 70 BPM AND UTR DECREASED  BPM. NORMAL DEVICE FUNCTION. NC

## 2024-06-11 NOTE — H&P (VIEW-ONLY)
Electrophysiology Follow Up  Heart & Vascular Center  [facility] [address], [place] [address]    Name: Swetha Lopez  : 1950  MRN: [id number]    ASSESSMENT/PLAN:  Recurrent symptomatic persistent Afib  -- s/p Afib ablation 2022 (PVI, posterior wall, CTI ablation)  -- multiple cardioversions in the past with recurrence  -- failed both flecainide (side effects) and dofetilide-discontinued post AVJ  -- Toprol XL for rate control, previously on diltiazem- discontinued post AVJ  -- symptomatic with fatigue, palpitations, presyncope  -- 2024- s/p AVN ablation   --AC with eliquis tolerating without difficulty  Medtronic dual chamber PPM with CSP 10/2023 due to bradycardia  H/o TIA post ablation  HTN  HLD  Hypothyroidism  HFpEF 55%  RA    Today we made changes to her device to decrease the heart rate to 70 bpm with a plan for her to come to the [place] office in approximately 1 month and decrease the rate again down to 60 bpm.  We also discussed that the procedure she underwent does not stop A-fib from occurring in the top chamber of her heart, it simply does not allow the signal to affect the bottom chamber so her heart is never able to go to fast.  We discussed that the [employer] mobile is unlikely to give her good data going forward and if she has symptoms ideally she would send us a device transmission.    We also discussed the possibility of adjusting her rate response as it possibly could be too sensitive for her.  It was decided that we would reevaluate this at her 1 month follow-up since we already made 1 change today to her device.    She also has decreased the dose of her potassium to 10 mEq daily from 20 and her BMP has not been checked since so we will do that at this time.    Patient has been instructed to follow up in our EP office in 6 months. She will call our office with any questions or concerns in the meantime.      CC/HPI:   Interim history:  "Swetha Lopez is a 73 y.o. female with above past medical history, she presents for AVN ablation follow up.  She reports having episodes of irregular heartbeats that she has used her [employer] mobile to evaluate the rhythm on 3-4 separate occasions.  It reports atrial fibrillation as the rhythm read.  During these events she reports she is doing housework.  The heart rates vary during these episodes from , she did show me the EKG strips and it appeared to be a regular rhythm.  She also reported she had surgery on her arm recently and her surgeon did not recommend calcium supplementation at her age and she was going to be speaking with her PCP about Prolia.     EKG: AV paced rhythm with occasional PVC    Review of Systems      OBJECTIVE:   Vitals:   /80 (BP Location: Right arm, Patient Position: Sitting, Cuff Size: Standard)   Pulse 82   Ht 5' 5\" (1.651 m)   Wt 93.4 kg (206 lb)   BMI 34.28 kg/m²   Body mass index is 34.28 kg/m².        Physical Exam:   Physical Exam  Constitutional:       General: She is not in acute distress.     Appearance: She is not ill-appearing.   HENT:      Mouth/Throat:      Mouth: Mucous membranes are moist.   Cardiovascular:      Rate and Rhythm: Normal rate and regular rhythm.      Heart sounds: No murmur heard.     No friction rub. No gallop.   Pulmonary:      Effort: Pulmonary effort is normal. No respiratory distress.      Breath sounds: No wheezing or rales.   Chest:      Chest wall: No tenderness.   Skin:     General: Skin is warm and dry.   Neurological:      Mental Status: She is alert.            Medications:      Current Outpatient Medications:     acetaminophen (TYLENOL) 650 mg CR tablet, Take 1 tablet (650 mg total) by mouth every 8 (eight) hours as needed for mild pain, Disp: 30 tablet, Rfl: 0    apixaban (Eliquis) 5 mg, Take 1 tablet (5 mg total) by mouth 2 (two) times a day, Disp: 180 tablet, Rfl: 3    atorvastatin (LIPITOR) 20 mg tablet, TAKE 1 TABLET BY " MOUTH DAILY AT  BEDTIME, Disp: 90 tablet, Rfl: 0    bumetanide (BUMEX) 1 mg tablet, TAKE 1 TABLET BY MOUTH DAILY, Disp: 90 tablet, Rfl: 1    Calcium Carb-Cholecalciferol 600-1000 MG-UNIT CAPS, Take by mouth every morning gummy , Disp: , Rfl:     levothyroxine 50 mcg tablet, TAKE 1 TABLET BY MOUTH DAILY, Disp: 90 tablet, Rfl: 1    omeprazole (PriLOSEC) 40 MG capsule, TAKE 1 CAPSULE BY MOUTH TWICE  DAILY, Disp: 180 capsule, Rfl: 1    potassium chloride (Klor-Con M10) 10 mEq tablet, Take 1 tablet (10 mEq total) by mouth daily, Disp: , Rfl:        Historical Information   Past Medical History:   Diagnosis Date    A-fib (HCC)     Anxiety     resolved 10/4/17    Asthma     seasonal    Atrial fibrillation (HCC) 01/2022    newly diagnosed on eliquis    Chronic kidney disease     kidney stone    Colon polyp     Disease of thyroid gland     Dysphagia     GERD (gastroesophageal reflux disease)     Goiter, nodular     partial thyroidectomy    Hiatal hernia     repaired 2018    Hiatal hernia with GERD without esophagitis 04/2018    surgical correction    History of esophageal varices with bleeding     History of pernicious anemia     History of transfusion     x1 after bleeding ulcer    Hyperlipidemia     Hypertension     Irritable bowel syndrome     Neck mass     2 small areas left side by jawline and midneck US scheduled 0820/21    Pacemaker-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 cardiac 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 05/09/2024    RA (rheumatoid arthritis) (HCC)     S/P AV kike ablation 5/9/2024 05/09/2024    s/p Medtronic dual chamber PPM 10/24/2023 05/09/2024    Seasonal allergies     Vertigo     Wears glasses     for reading       Past Surgical History:   Procedure Laterality Date    AV NODE ABLATION  05/09/2024    BREAST BIOPSY Bilateral     negative    CARDIAC CATHETERIZATION      x2 secondary to exertional chest pain, due to lung issues, possible mild COPD    CARDIAC ELECTROPHYSIOLOGY PROCEDURE N/A 12/23/2022    Procedure:  Cardiac eps/afib ablation;  Surgeon: Sunil Eubanks MD;  Location:  CARDIAC CATH LAB;  Service: Cardiology    CARDIAC ELECTROPHYSIOLOGY PROCEDURE N/A 12/23/2022    Procedure: Cardiac eps/aflutter ablation;  Surgeon: Sunil Eubanks MD;  Location:  CARDIAC CATH LAB;  Service: Cardiology    CARDIAC ELECTROPHYSIOLOGY PROCEDURE N/A 10/24/2023    Procedure: Cardiac pacer implant DC PM;  Surgeon: Sunil Eubanks MD;  [facility];  Service: Cardiology    CARDIAC ELECTROPHYSIOLOGY PROCEDURE N/A 05/09/2024    Procedure: Cardiac eps/av node ablation;  Surgeon: Sunil Eubanks MD;  [facility]: [facility];  Service: Cardiology    CATARACT EXTRACTION Bilateral     CHOLECYSTECTOMY  2015    lap - last assessed 10/4/17    COLONOSCOPY      complete    ESOPHAGOGASTRODUODENOSCOPY N/A 01/23/2018    Procedure: ESOPHAGOGASTRODUODENOSCOPY (EGD);  Surgeon: Richard Bledsoe MD;  Location: [facility] GI LAB;  Service: Gastroenterology    HAND FRACTURE REPAIR Left 5/22/2024    Procedure: LEFT THUMB PROXIMAL PHALANX, INTERNAL FUSION OF METACARPAL PHALANX JOINT, DISTAL RADIUS BONE GRAFT.;  Surgeon: Diane Duvall MD;  Location: [facility] MAIN OR;  Service: Orthopedics    HYSTERECTOMY      partial - ovaries remain    LIPOMA RESECTION      right thigh    IN ESOPHAGOSCOPY FLEXIBLE TRANSORAL WITH BIOPSY N/A 04/11/2018    Procedure: ESOPHAGOGASTRODUODENOSCOPY (EGD);  Surgeon: Yeison Masterson MD;  Location:  MAIN OR;  Service: Thoracic    IN LAPS RPR PARAESPHGL HRNA INCL FUNDPLSTY W/MESH N/A 04/11/2018    Procedure: REPAIR HERNIA PARAESOPHAGEAL  LAPAROSCOPIC,ELEONORA GASTROPLASTY, TUEPET FUNDOPLICATION;  Surgeon: Yeison Masterson MD;  Location:  MAIN OR;  Service: Thoracic    IN XCAPSL CTRC RMVL INSJ IO LENS PROSTH W/O ECP Right 08/23/2021    Procedure: EXTRACTION EXTRACAPSULAR CATARACT PHACO INTRAOCULAR LENS (IOL);  Surgeon: Malcolm Morris MD;  Location: [facility] MAIN OR;  Service: Ophthalmology    IN XCAPSL CTRC RMVL  INSJ IO LENS PROSTH W/O ECP Left 11/15/2021    Procedure: EXTRACTION EXTRACAPSULAR CATARACT PHACO INTRAOCULAR LENS (IOL);  Surgeon: Malcolm Morris MD;  Location: [facility] MAIN OR;  Service: Ophthalmology    SKIN BIOPSY Right     lump benign - last assessed 10/4/17    THYROIDECTOMY, PARTIAL  1977    TONSILLECTOMY      US GUIDED THYROID BIOPSY  02/21/2023       Social History     Substance and Sexual Activity   Alcohol Use Not Currently    Comment: seldom     Social History     Substance and Sexual Activity   Drug Use Never     Social History     Tobacco Use   Smoking Status Never    Passive exposure: Never   Smokeless Tobacco Never       Family History   Problem Relation Age of Onset    Alzheimer's disease Mother     Cancer Mother         skin     Thyroid disease Mother     Ulcerative colitis Mother     Cancer Father         prostate    Stroke Father     Hypertension Father     Prostate cancer Father     Thyroid disease Sister     Ulcerative colitis Sister     Other Sister         open heart surgery    Heart disease Sister     Hyperlipidemia Brother     No Known Problems Maternal Grandmother     No Known Problems Paternal Grandmother     No Known Problems Son     No Known Problems Son     Mental illness Neg Hx          Labs & Results:  Below is the patient's most recent value for Albumin, ALT, AST, BUN, Calcium, Chloride, Cholesterol, CO2, Creatinine, GFR, Glucose, HDL, Hematocrit, Hemoglobin, Hemoglobin A1C, LDL, Magnesium, Phosphorus, Platelets, Potassium, PSA, Sodium, Triglycerides, and WBC.   Lab Results   Component Value Date    ALT 22 05/01/2024    AST 26 05/01/2024    BUN 10 05/09/2024    CALCIUM 9.3 05/09/2024     05/09/2024    CHOL 144 09/01/2016    CO2 26 05/09/2024    CREATININE 0.74 05/09/2024    HDL 62 01/18/2023    HCT 42.9 05/09/2024    HGB 14.4 05/09/2024    HGBA1C 5.5 12/25/2022    MG 2.0 03/28/2024    PHOS 3.3 04/17/2018     05/09/2024    K 3.8 05/09/2024     09/01/2016    TRIG 83  01/18/2023    WBC 4.82 05/09/2024     Note: for a comprehensive list of the patient's lab results, access the Results Review activity.

## 2024-06-11 NOTE — PROGRESS NOTES
Electrophysiology Follow Up  Heart & Vascular Center  [facility] [address], [place] [address]    Name: Swetha Lopez  : 1950  MRN: [id number]    ASSESSMENT/PLAN:  Recurrent symptomatic persistent Afib  -- s/p Afib ablation 2022 (PVI, posterior wall, CTI ablation)  -- multiple cardioversions in the past with recurrence  -- failed both flecainide (side effects) and dofetilide-discontinued post AVJ  -- Toprol XL for rate control, previously on diltiazem- discontinued post AVJ  -- symptomatic with fatigue, palpitations, presyncope  -- 2024- s/p AVN ablation   --AC with eliquis tolerating without difficulty  Medtronic dual chamber PPM with CSP 10/2023 due to bradycardia  H/o TIA post ablation  HTN  HLD  Hypothyroidism  HFpEF 55%  RA    Today we made changes to her device to decrease the heart rate to 70 bpm with a plan for her to come to the [place] office in approximately 1 month and decrease the rate again down to 60 bpm.  We also discussed that the procedure she underwent does not stop A-fib from occurring in the top chamber of her heart, it simply does not allow the signal to affect the bottom chamber so her heart is never able to go to fast.  We discussed that the [employer] mobile is unlikely to give her good data going forward and if she has symptoms ideally she would send us a device transmission.    We also discussed the possibility of adjusting her rate response as it possibly could be too sensitive for her.  It was decided that we would reevaluate this at her 1 month follow-up since we already made 1 change today to her device.    She also has decreased the dose of her potassium to 10 mEq daily from 20 and her BMP has not been checked since so we will do that at this time.    Patient has been instructed to follow up in our EP office in 6 months. She will call our office with any questions or concerns in the meantime.      CC/HPI:   Interim history:  "Swetha Lopez is a 73 y.o. female with above past medical history, she presents for AVN ablation follow up.  She reports having episodes of irregular heartbeats that she has used her [employer] mobile to evaluate the rhythm on 3-4 separate occasions.  It reports atrial fibrillation as the rhythm read.  During these events she reports she is doing housework.  The heart rates vary during these episodes from , she did show me the EKG strips and it appeared to be a regular rhythm.  She also reported she had surgery on her arm recently and her surgeon did not recommend calcium supplementation at her age and she was going to be speaking with her PCP about Prolia.     EKG: AV paced rhythm with occasional PVC    Review of Systems      OBJECTIVE:   Vitals:   /80 (BP Location: Right arm, Patient Position: Sitting, Cuff Size: Standard)   Pulse 82   Ht 5' 5\" (1.651 m)   Wt 93.4 kg (206 lb)   BMI 34.28 kg/m²   Body mass index is 34.28 kg/m².        Physical Exam:   Physical Exam  Constitutional:       General: She is not in acute distress.     Appearance: She is not ill-appearing.   HENT:      Mouth/Throat:      Mouth: Mucous membranes are moist.   Cardiovascular:      Rate and Rhythm: Normal rate and regular rhythm.      Heart sounds: No murmur heard.     No friction rub. No gallop.   Pulmonary:      Effort: Pulmonary effort is normal. No respiratory distress.      Breath sounds: No wheezing or rales.   Chest:      Chest wall: No tenderness.   Skin:     General: Skin is warm and dry.   Neurological:      Mental Status: She is alert.            Medications:      Current Outpatient Medications:     acetaminophen (TYLENOL) 650 mg CR tablet, Take 1 tablet (650 mg total) by mouth every 8 (eight) hours as needed for mild pain, Disp: 30 tablet, Rfl: 0    apixaban (Eliquis) 5 mg, Take 1 tablet (5 mg total) by mouth 2 (two) times a day, Disp: 180 tablet, Rfl: 3    atorvastatin (LIPITOR) 20 mg tablet, TAKE 1 TABLET BY " MOUTH DAILY AT  BEDTIME, Disp: 90 tablet, Rfl: 0    bumetanide (BUMEX) 1 mg tablet, TAKE 1 TABLET BY MOUTH DAILY, Disp: 90 tablet, Rfl: 1    Calcium Carb-Cholecalciferol 600-1000 MG-UNIT CAPS, Take by mouth every morning gummy , Disp: , Rfl:     levothyroxine 50 mcg tablet, TAKE 1 TABLET BY MOUTH DAILY, Disp: 90 tablet, Rfl: 1    omeprazole (PriLOSEC) 40 MG capsule, TAKE 1 CAPSULE BY MOUTH TWICE  DAILY, Disp: 180 capsule, Rfl: 1    potassium chloride (Klor-Con M10) 10 mEq tablet, Take 1 tablet (10 mEq total) by mouth daily, Disp: , Rfl:        Historical Information   Past Medical History:   Diagnosis Date    A-fib (HCC)     Anxiety     resolved 10/4/17    Asthma     seasonal    Atrial fibrillation (HCC) 01/2022    newly diagnosed on eliquis    Chronic kidney disease     kidney stone    Colon polyp     Disease of thyroid gland     Dysphagia     GERD (gastroesophageal reflux disease)     Goiter, nodular     partial thyroidectomy    Hiatal hernia     repaired 2018    Hiatal hernia with GERD without esophagitis 04/2018    surgical correction    History of esophageal varices with bleeding     History of pernicious anemia     History of transfusion     x1 after bleeding ulcer    Hyperlipidemia     Hypertension     Irritable bowel syndrome     Neck mass     2 small areas left side by jawline and midneck US scheduled 0820/21    Pacemaker-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 cardiac 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 05/09/2024    RA (rheumatoid arthritis) (HCC)     S/P AV kike ablation 5/9/2024 05/09/2024    s/p Medtronic dual chamber PPM 10/24/2023 05/09/2024    Seasonal allergies     Vertigo     Wears glasses     for reading       Past Surgical History:   Procedure Laterality Date    AV NODE ABLATION  05/09/2024    BREAST BIOPSY Bilateral     negative    CARDIAC CATHETERIZATION      x2 secondary to exertional chest pain, due to lung issues, possible mild COPD    CARDIAC ELECTROPHYSIOLOGY PROCEDURE N/A 12/23/2022    Procedure:  Cardiac eps/afib ablation;  Surgeon: Sunil Eubanks MD;  Location:  CARDIAC CATH LAB;  Service: Cardiology    CARDIAC ELECTROPHYSIOLOGY PROCEDURE N/A 12/23/2022    Procedure: Cardiac eps/aflutter ablation;  Surgeon: Sunil Eubanks MD;  Location:  CARDIAC CATH LAB;  Service: Cardiology    CARDIAC ELECTROPHYSIOLOGY PROCEDURE N/A 10/24/2023    Procedure: Cardiac pacer implant DC PM;  Surgeon: Sunil Eubanks MD;  [facility];  Service: Cardiology    CARDIAC ELECTROPHYSIOLOGY PROCEDURE N/A 05/09/2024    Procedure: Cardiac eps/av node ablation;  Surgeon: Sunil Eubanks MD;  [facility];  Service: Cardiology    CATARACT EXTRACTION Bilateral     CHOLECYSTECTOMY  2015    lap - last assessed 10/4/17    COLONOSCOPY      complete    ESOPHAGOGASTRODUODENOSCOPY N/A 01/23/2018    Procedure: ESOPHAGOGASTRODUODENOSCOPY (EGD);  Surgeon: Richard Bledsoe MD;  Location: [facility] GI LAB;  Service: Gastroenterology    HAND FRACTURE REPAIR Left 5/22/2024    Procedure: LEFT THUMB PROXIMAL PHALANX, INTERNAL FUSION OF METACARPAL PHALANX JOINT, DISTAL RADIUS BONE GRAFT.;  Surgeon: Diane Duvall MD;  Location: [facility];  Service: Orthopedics    HYSTERECTOMY      partial - ovaries remain    LIPOMA RESECTION      right thigh    FL ESOPHAGOSCOPY FLEXIBLE TRANSORAL WITH BIOPSY N/A 04/11/2018    Procedure: ESOPHAGOGASTRODUODENOSCOPY (EGD);  Surgeon: Yeison Masterson MD;  Location:  MAIN OR;  Service: Thoracic    FL LAPS RPR PARAESPHGL HRNA INCL FUNDPLSTY W/MESH N/A 04/11/2018    Procedure: REPAIR HERNIA PARAESOPHAGEAL  LAPAROSCOPIC,ELEONORA GASTROPLASTY, TUEPET FUNDOPLICATION;  Surgeon: Yeison Masterson MD;  Location:  MAIN OR;  Service: Thoracic    FL XCAPSL CTRC RMVL INSJ IO LENS PROSTH W/O ECP Right 08/23/2021    Procedure: EXTRACTION EXTRACAPSULAR CATARACT PHACO INTRAOCULAR LENS (IOL);  Surgeon: Malcolm Morris MD;  Location: [facility] MAIN OR;  Service: Ophthalmology    FL XCAPSL CTRC RMVL  INSJ IO LENS PROSTH W/O ECP Left 11/15/2021    Procedure: EXTRACTION EXTRACAPSULAR CATARACT PHACO INTRAOCULAR LENS (IOL);  Surgeon: Malcolm Morris MD;  Location: [facility] MAIN OR;  Service: Ophthalmology    SKIN BIOPSY Right     lump benign - last assessed 10/4/17    THYROIDECTOMY, PARTIAL  1977    TONSILLECTOMY      US GUIDED THYROID BIOPSY  02/21/2023       Social History     Substance and Sexual Activity   Alcohol Use Not Currently    Comment: seldom     Social History     Substance and Sexual Activity   Drug Use Never     Social History     Tobacco Use   Smoking Status Never    Passive exposure: Never   Smokeless Tobacco Never       Family History   Problem Relation Age of Onset    Alzheimer's disease Mother     Cancer Mother         skin     Thyroid disease Mother     Ulcerative colitis Mother     Cancer Father         prostate    Stroke Father     Hypertension Father     Prostate cancer Father     Thyroid disease Sister     Ulcerative colitis Sister     Other Sister         open heart surgery    Heart disease Sister     Hyperlipidemia Brother     No Known Problems Maternal Grandmother     No Known Problems Paternal Grandmother     No Known Problems Son     No Known Problems Son     Mental illness Neg Hx          Labs & Results:  Below is the patient's most recent value for Albumin, ALT, AST, BUN, Calcium, Chloride, Cholesterol, CO2, Creatinine, GFR, Glucose, HDL, Hematocrit, Hemoglobin, Hemoglobin A1C, LDL, Magnesium, Phosphorus, Platelets, Potassium, PSA, Sodium, Triglycerides, and WBC.   Lab Results   Component Value Date    ALT 22 05/01/2024    AST 26 05/01/2024    BUN 10 05/09/2024    CALCIUM 9.3 05/09/2024     05/09/2024    CHOL 144 09/01/2016    CO2 26 05/09/2024    CREATININE 0.74 05/09/2024    HDL 62 01/18/2023    HCT 42.9 05/09/2024    HGB 14.4 05/09/2024    HGBA1C 5.5 12/25/2022    MG 2.0 03/28/2024    PHOS 3.3 04/17/2018     05/09/2024    K 3.8 05/09/2024     09/01/2016    TRIG 83  01/18/2023    WBC 4.82 05/09/2024     Note: for a comprehensive list of the patient's lab results, access the Results Review activity.

## 2024-06-18 ENCOUNTER — OFFICE VISIT (OUTPATIENT)
Dept: GASTROENTEROLOGY | Facility: CLINIC | Age: 74
End: 2024-06-18
Payer: COMMERCIAL

## 2024-06-18 VITALS
SYSTOLIC BLOOD PRESSURE: 123 MMHG | WEIGHT: 203 LBS | OXYGEN SATURATION: 96 % | BODY MASS INDEX: 33.82 KG/M2 | HEIGHT: 65 IN | HEART RATE: 76 BPM | DIASTOLIC BLOOD PRESSURE: 80 MMHG

## 2024-06-18 DIAGNOSIS — K21.00 GASTROESOPHAGEAL REFLUX DISEASE WITH ESOPHAGITIS WITHOUT HEMORRHAGE: ICD-10-CM

## 2024-06-18 DIAGNOSIS — R19.7 DIARRHEA, UNSPECIFIED TYPE: ICD-10-CM

## 2024-06-18 DIAGNOSIS — K44.9 HIATAL HERNIA: Primary | ICD-10-CM

## 2024-06-18 DIAGNOSIS — K58.0 IRRITABLE BOWEL SYNDROME WITH DIARRHEA: ICD-10-CM

## 2024-06-18 PROCEDURE — 99214 OFFICE O/P EST MOD 30 MIN: CPT | Performed by: INTERNAL MEDICINE

## 2024-06-18 RX ORDER — FAMOTIDINE 40 MG/1
40 TABLET, FILM COATED ORAL 2 TIMES DAILY
Qty: 60 TABLET | Refills: 3 | Status: SHIPPED | OUTPATIENT
Start: 2024-06-18

## 2024-06-19 ENCOUNTER — APPOINTMENT (OUTPATIENT)
Dept: LAB | Facility: HOSPITAL | Age: 74
End: 2024-06-19
Payer: COMMERCIAL

## 2024-06-19 DIAGNOSIS — R19.7 DIARRHEA, UNSPECIFIED TYPE: ICD-10-CM

## 2024-06-19 PROCEDURE — 87209 SMEAR COMPLEX STAIN: CPT

## 2024-06-19 PROCEDURE — 83993 ASSAY FOR CALPROTECTIN FECAL: CPT

## 2024-06-19 PROCEDURE — 87505 NFCT AGENT DETECTION GI: CPT

## 2024-06-19 PROCEDURE — 87177 OVA AND PARASITES SMEARS: CPT

## 2024-06-19 NOTE — PROGRESS NOTES
[facility]  Swetha Lopez 73 y.o. female MRN: [id number]            Assessment & Plan:  Pleasant 73-year-old female with a history of GERD, esophageal dysphagia with remote history of esophageal Nissen fundoplication, she had been on PPI therapy which she was concerned about taking given her recent fractures.  Now with increased diarrheal symptoms.    1.  Diarrhea: Most likely exacerbation of underlying IBS in setting of stressors however she did have recent surgery and had preprocedural antibiotics  -Will check stool studies to exclude infectious etiology such as C. difficile  -Continue with dietary modification for now, lactose-free dairy free diet and gluten-free diet    2.  GERD with dysphagia: With a remote history of Nissen fundoplication  -Patient was reluctant to take pantoprazole, or other PPI therapy she had some concerns about bone density, I tried to reassure her.  -Reassured her the PPI is safe, she can take omeprazole the morning however for now she can try taking famotidine 40 mg twice daily to see if this will control her reflux symptoms    Follow-up will be set up in the office.  Will contact her once we have the results of stool studies.      Swetha was seen today for medication management, diarrhea and abdominal cramping.    Diagnoses and all orders for this visit:    Hiatal hernia    Irritable bowel syndrome with diarrhea    Gastroesophageal reflux disease with esophagitis without hemorrhage  -     famotidine (PEPCID) 40 MG tablet; Take 1 tablet (40 mg total) by mouth 2 (two) times a day    Diarrhea, unspecified type  -     Clostridium difficile toxin by PCR with EIA; Future  -     Calprotectin,Fecal; Future  -     Stool Enteric Bacterial Panel by PCR; Future  -     Ova and parasite examination; Future            _____________________________________________________________        CC: Follow-up    HPI:  Swetha Lopez is a 73 y.o.female who is here for follow-up.   This is a pleasant 73-year-old female, has a history of IBS with diarrhea predominant symptoms, has a history of GERD, hiatal hernia.  Since her last visit patient has had issues with atrial fibrillation, she is also had fall and fracture of her hand requiring surgery.  About 1 week after her surgical repair she started to have diarrhea up to 3 bowel movements per day which are loose and watery, associated with some urgency and occasional nocturnal diarrhea which is different from the patient's baseline IBS type symptoms.  Her appetite is good, she reports mild nausea, denies any vomiting, has occasional cramps but no fevers or chills.  Denies any melena or rectal bleeding.    She notes that fiber caused painful gas and she has not been taking this but has been trying to eat more fiber including oatmeal and fruit in her diet.    There is some concern for cardiac dilation, she is having repeat echocardiogram performed.      ROS:  The remainder of the ROS was negative except for the pertinent positives mentioned in HPI.      Allergies: Orange (diagnostic) - food allergy, Pantoprazole, Penicillins, Gluten meal - food allergy, Lactose - food allergy, and Sulfa antibiotics    Medications:   Current Outpatient Medications:     acetaminophen (TYLENOL) 650 mg CR tablet, Take 1 tablet (650 mg total) by mouth every 8 (eight) hours as needed for mild pain, Disp: 30 tablet, Rfl: 0    apixaban (Eliquis) 5 mg, Take 1 tablet (5 mg total) by mouth 2 (two) times a day, Disp: 180 tablet, Rfl: 3    atorvastatin (LIPITOR) 20 mg tablet, TAKE 1 TABLET BY MOUTH DAILY AT  BEDTIME, Disp: 90 tablet, Rfl: 0    bumetanide (BUMEX) 1 mg tablet, TAKE 1 TABLET BY MOUTH DAILY, Disp: 90 tablet, Rfl: 1    Calcium Carb-Cholecalciferol 600-1000 MG-UNIT CAPS, Take by mouth every morning gummy , Disp: , Rfl:     famotidine (PEPCID) 40 MG tablet, Take 1 tablet (40 mg total) by mouth 2 (two) times a day, Disp: 60 tablet, Rfl: 3    levothyroxine 50 mcg  tablet, TAKE 1 TABLET BY MOUTH DAILY, Disp: 90 tablet, Rfl: 1    omeprazole (PriLOSEC) 40 MG capsule, TAKE 1 CAPSULE BY MOUTH TWICE  DAILY, Disp: 180 capsule, Rfl: 1    potassium chloride (Klor-Con M10) 10 mEq tablet, Take 1 tablet (10 mEq total) by mouth daily, Disp: , Rfl:     Past Medical History:   Diagnosis Date    A-fib (HCC)     Anxiety     resolved 10/4/17    Asthma     seasonal    Atrial fibrillation (HCC) 01/2022    newly diagnosed on eliquis    Chronic kidney disease     kidney stone    Colon polyp     Disease of thyroid gland     Dysphagia     GERD (gastroesophageal reflux disease)     Goiter, nodular     partial thyroidectomy    Hiatal hernia     repaired 2018    Hiatal hernia with GERD without esophagitis 04/2018    surgical correction    History of esophageal varices with bleeding     History of pernicious anemia     History of transfusion     x1 after bleeding ulcer    Hyperlipidemia     Hypertension     Irritable bowel syndrome     Neck mass     2 small areas left side by jawline and midneck US scheduled 0820/21    Pacemaker-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 cardiac 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 05/09/2024    RA (rheumatoid arthritis) (HCC)     S/P AV kike ablation 5/9/2024 05/09/2024    s/p Medtronic dual chamber PPM 10/24/2023 05/09/2024    Seasonal allergies     Vertigo     Wears glasses     for reading       Past Surgical History:   Procedure Laterality Date    AV NODE ABLATION  05/09/2024    BREAST BIOPSY Bilateral     negative    CARDIAC CATHETERIZATION      x2 secondary to exertional chest pain, due to lung issues, possible mild COPD    CARDIAC ELECTROPHYSIOLOGY PROCEDURE N/A 12/23/2022    Procedure: Cardiac eps/afib ablation;  Surgeon: Sunil Eubanks MD;  [facility]: [facility];  Service: Cardiology    CARDIAC ELECTROPHYSIOLOGY PROCEDURE N/A 12/23/2022    Procedure: Cardiac eps/aflutter ablation;  Surgeon: Sunil Eubanks MD;  Location: [facility];  Service: Cardiology     CARDIAC ELECTROPHYSIOLOGY PROCEDURE N/A 10/24/2023    Procedure: Cardiac pacer implant DC PM;  Surgeon: Sunil Eubanks MD;  [facility]: [facility];  Service: Cardiology    CARDIAC ELECTROPHYSIOLOGY PROCEDURE N/A 05/09/2024    Procedure: Cardiac eps/av node ablation;  Surgeon: Sunil Eubanks MD;  Location:  [facility] CATH [facility];  Service: Cardiology    CATARACT EXTRACTION Bilateral     CHOLECYSTECTOMY  2015    lap - last assessed 10/4/17    COLONOSCOPY      complete    ESOPHAGOGASTRODUODENOSCOPY N/A 01/23/2018    Procedure: ESOPHAGOGASTRODUODENOSCOPY (EGD);  Surgeon: Richard Bledsoe MD;  Location: [facility] GI [facility];  Service: Gastroenterology    HAND FRACTURE REPAIR Left 5/22/2024    Procedure: LEFT THUMB PROXIMAL PHALANX, INTERNAL FUSION OF METACARPAL PHALANX JOINT, DISTAL RADIUS BONE GRAFT.;  Surgeon: Diane Duvall MD;  Location: [facility];  Service: Orthopedics    HYSTERECTOMY      partial - ovaries remain    LIPOMA RESECTION      right thigh    DC ESOPHAGOSCOPY FLEXIBLE TRANSORAL WITH BIOPSY N/A 04/11/2018    Procedure: ESOPHAGOGASTRODUODENOSCOPY (EGD);  Surgeon: Yeison Masterson MD;  Location:  MAIN OR;  Service: Thoracic    DC LAPS RPR PARAESPHGL HRNA INCL FUNDPLSTY W/MESH N/A 04/11/2018    Procedure: REPAIR HERNIA PARAESOPHAGEAL  LAPAROSCOPIC,ELEONORA GASTROPLASTY, TUEPET FUNDOPLICATION;  Surgeon: Yeison Masterson MD;  Location:  MAIN OR;  Service: Thoracic    DC XCAPSL CTRC RMVL INSJ IO LENS PROSTH W/O ECP Right 08/23/2021    Procedure: EXTRACTION EXTRACAPSULAR CATARACT PHACO INTRAOCULAR LENS (IOL);  Surgeon: Malcolm Morris MD;  Location: [facility] MAIN OR;  Service: Ophthalmology    DC XCAPSL CTRC RMVL INSJ IO LENS PROSTH W/O ECP Left 11/15/2021    Procedure: EXTRACTION EXTRACAPSULAR CATARACT PHACO INTRAOCULAR LENS (IOL);  Surgeon: Malcolm Morris MD;  Location: [facility] MAIN OR;  Service: Ophthalmology    SKIN BIOPSY Right     lump benign - last assessed 10/4/17    THYROIDECTOMY, PARTIAL  1977     "TONSILLECTOMY      US GUIDED THYROID BIOPSY  02/21/2023       Family History   Problem Relation Age of Onset    Alzheimer's disease Mother     Cancer Mother         skin     Thyroid disease Mother     Ulcerative colitis Mother     Cancer Father         prostate    Stroke Father     Hypertension Father     Prostate cancer Father     Thyroid disease Sister     Ulcerative colitis Sister     Other Sister         open heart surgery    Heart disease Sister     Hyperlipidemia Brother     No Known Problems Maternal Grandmother     No Known Problems Paternal Grandmother     No Known Problems Son     No Known Problems Son     Mental illness Neg Hx         reports that she has never smoked. She has never been exposed to tobacco smoke. She has never used smokeless tobacco. She reports that she does not currently use alcohol. She reports that she does not use drugs.      Physical Exam:    /80 (BP Location: Right arm, Patient Position: Sitting, Cuff Size: Standard)   Pulse 76   Ht 5' 5\" (1.651 m)   Wt 92.1 kg (203 lb)   SpO2 96%   BMI 33.78 kg/m²     Gen: wn/wd, NAD  HEENT: anicteric, MMM, no cervical LAD  CVS: RRR, no m/r/g  CHEST: CTA b/l  ABD: +BS, soft, NT,ND, no hepatosplenomegaly  EXT: no c/c/e, left hand cast  NEURO: aaox3  SKIN: NO rashes    "

## 2024-06-20 ENCOUNTER — APPOINTMENT (OUTPATIENT)
Dept: LAB | Facility: HOSPITAL | Age: 74
End: 2024-06-20
Payer: COMMERCIAL

## 2024-06-20 DIAGNOSIS — R19.7 DIARRHEA, UNSPECIFIED TYPE: ICD-10-CM

## 2024-06-20 LAB
C COLI+JEJUNI TUF STL QL NAA+PROBE: NEGATIVE
C DIFF TOX GENS STL QL NAA+PROBE: NEGATIVE
EC STX1+STX2 GENES STL QL NAA+PROBE: NEGATIVE
SALMONELLA SP SPAO STL QL NAA+PROBE: NEGATIVE
SHIGELLA SP+EIEC IPAH STL QL NAA+PROBE: NEGATIVE

## 2024-06-20 PROCEDURE — 87209 SMEAR COMPLEX STAIN: CPT

## 2024-06-20 PROCEDURE — 87177 OVA AND PARASITES SMEARS: CPT

## 2024-06-24 ENCOUNTER — TELEPHONE (OUTPATIENT)
Age: 74
End: 2024-06-24

## 2024-06-24 ENCOUNTER — TELEPHONE (OUTPATIENT)
Dept: OBGYN CLINIC | Facility: CLINIC | Age: 74
End: 2024-06-24

## 2024-06-24 NOTE — TELEPHONE ENCOUNTER
Lvm for pt that her appt with Dr. Mason needs to be rescheduled with Salazar Palmer PA-C per Dr. Duvall's last note. If patient calls back, please place on Salazar's schedule

## 2024-06-25 ENCOUNTER — OFFICE VISIT (OUTPATIENT)
Dept: OBGYN CLINIC | Facility: CLINIC | Age: 74
End: 2024-06-25

## 2024-06-25 ENCOUNTER — APPOINTMENT (OUTPATIENT)
Dept: RADIOLOGY | Facility: CLINIC | Age: 74
End: 2024-06-25
Payer: COMMERCIAL

## 2024-06-25 ENCOUNTER — OFFICE VISIT (OUTPATIENT)
Dept: OCCUPATIONAL THERAPY | Facility: CLINIC | Age: 74
End: 2024-06-25
Payer: COMMERCIAL

## 2024-06-25 VITALS
SYSTOLIC BLOOD PRESSURE: 109 MMHG | BODY MASS INDEX: 33.82 KG/M2 | DIASTOLIC BLOOD PRESSURE: 75 MMHG | HEART RATE: 76 BPM | WEIGHT: 203 LBS | HEIGHT: 65 IN

## 2024-06-25 DIAGNOSIS — M80.80XA LOCALIZED OSTEOPOROSIS WITH CURRENT PATHOLOGICAL FRACTURE, INITIAL ENCOUNTER: Primary | ICD-10-CM

## 2024-06-25 DIAGNOSIS — S62.512A CLOSED DISPLACED FRACTURE OF PROXIMAL PHALANX OF LEFT THUMB, INITIAL ENCOUNTER: ICD-10-CM

## 2024-06-25 PROCEDURE — L3913 HFO W/O JOINTS CF: HCPCS

## 2024-06-25 PROCEDURE — 99024 POSTOP FOLLOW-UP VISIT: CPT | Performed by: PHYSICIAN ASSISTANT

## 2024-06-25 PROCEDURE — 73140 X-RAY EXAM OF FINGER(S): CPT

## 2024-06-25 NOTE — PROGRESS NOTES
Orthosis    Diagnosis:   1. Localized osteoporosis with current pathological fracture, initial encounter  Ambulatory Referral to PT/OT Hand Therapy      2. Closed displaced fracture of proximal phalanx of left thumb, initial encounter  Ambulatory Referral to PT/OT Hand Therapy        Indication: Fracture and Motion Blocking with Pins    Location: Left  thumb  Supplies: Custom Fit Orthotic, Skin coverage , and Dressing   Orthosis type: Radial Gutter and radial side thumb trough for protection of pins  custom molded radial gutter splint for thumb PIP fusion   Wearing Schedule: Remove for hygiene only and Remove with Protected Technique Only as Needed  Describe Position: Thumb full flexion in functional position to protect pins    Precautions: Fracture, Soft tissue repair, and Universal (skin contact/breakdown)    Patient or Caregiver expresses understanding of wearing Schedule and Precautions? Yes  Patient or Caregiver able to don/doff orthotic independently?Yes    Written orders provided to patient? Yes  Orders Obtained: Written  Orders Obtained from: Salazar Palmer PA-C    Return for evaluation and treatment No

## 2024-06-26 ENCOUNTER — APPOINTMENT (OUTPATIENT)
Dept: LAB | Facility: HOSPITAL | Age: 74
End: 2024-06-26
Payer: COMMERCIAL

## 2024-06-26 ENCOUNTER — HOSPITAL ENCOUNTER (OUTPATIENT)
Dept: NON INVASIVE DIAGNOSTICS | Facility: HOSPITAL | Age: 74
Discharge: HOME | End: 2024-06-26
Payer: COMMERCIAL

## 2024-06-26 ENCOUNTER — TELEPHONE (OUTPATIENT)
Dept: CARDIOLOGY CLINIC | Facility: CLINIC | Age: 74
End: 2024-06-26

## 2024-06-26 VITALS
HEART RATE: 76 BPM | SYSTOLIC BLOOD PRESSURE: 110 MMHG | WEIGHT: 203 LBS | BODY MASS INDEX: 33.82 KG/M2 | DIASTOLIC BLOOD PRESSURE: 70 MMHG | HEIGHT: 65 IN

## 2024-06-26 DIAGNOSIS — E87.6 HYPOKALEMIA: ICD-10-CM

## 2024-06-26 DIAGNOSIS — I48.0 PAROXYSMAL ATRIAL FIBRILLATION (HCC): ICD-10-CM

## 2024-06-26 LAB
ANION GAP SERPL CALCULATED.3IONS-SCNC: 13 MMOL/L (ref 4–13)
AORTIC ROOT: 3.2 CM
APICAL FOUR CHAMBER EJECTION FRACTION: 43 %
BSA FOR ECHO PROCEDURE: 1.99 M2
BUN SERPL-MCNC: 10 MG/DL (ref 5–25)
CALCIUM SERPL-MCNC: 9 MG/DL (ref 8.4–10.2)
CALPROTECTIN STL-MCNT: 49 UG/G (ref 0–120)
CHLORIDE SERPL-SCNC: 102 MMOL/L (ref 96–108)
CO2 SERPL-SCNC: 29 MMOL/L (ref 21–32)
CREAT SERPL-MCNC: 0.69 MG/DL (ref 0.6–1.3)
E WAVE DECELERATION TIME: 242 MS
E/A RATIO: 1.02
FRACTIONAL SHORTENING: 29 (ref 28–44)
GFR SERPL CREATININE-BSD FRML MDRD: 86 ML/MIN/1.73SQ M
GLUCOSE P FAST SERPL-MCNC: 96 MG/DL (ref 65–99)
INTERVENTRICULAR SEPTUM IN DIASTOLE (PARASTERNAL SHORT AXIS VIEW): 1.4 CM
INTERVENTRICULAR SEPTUM: 1.4 CM (ref 0.6–1.1)
LAAS-AP2: 36.9 CM2
LEFT ATRIUM SIZE: 5.7 CM
LEFT INTERNAL DIMENSION IN SYSTOLE: 3 CM (ref 2.1–4)
LEFT VENTRICULAR INTERNAL DIMENSION IN DIASTOLE: 4.2 CM (ref 3.5–6)
LEFT VENTRICULAR POSTERIOR WALL IN END DIASTOLE: 1.1 CM
LEFT VENTRICULAR STROKE VOLUME: 44 ML
LVSV (TEICH): 44 ML
MITRAL REGURGITATION PEAK VELOCITY: 5.6 M/S
MITRAL VALVE MEAN INFLOW VELOCITY: 4.22 M/S
MITRAL VALVE REGURGITANT PEAK GRADIENT: 125 MMHG
MV PEAK A VEL: 0.56 M/S
MV PEAK E VEL: 57 CM/S
MV STENOSIS PRESSURE HALF TIME: 70 MS
MV VALVE AREA P 1/2 METHOD: 3.14
POTASSIUM SERPL-SCNC: 3.5 MMOL/L (ref 3.5–5.3)
SL CV DOP CALC MV VTI RETROGRADE: 199.4 CM
SL CV LEFT ATRIUM LENGTH A2C: 7.6 CM
SL CV MV MEAN GRADIENT RETROGRADE: 78 MMHG
SL CV PED ECHO LEFT VENTRICLE DIASTOLIC VOLUME (MOD BIPLANE) 2D: 80 ML
SL CV PED ECHO LEFT VENTRICLE SYSTOLIC VOLUME (MOD BIPLANE) 2D: 36 ML
SODIUM SERPL-SCNC: 144 MMOL/L (ref 135–147)
T4 FREE SERPL-MCNC: 0.9 NG/DL (ref 0.61–1.12)
TR MAX PG: 40 MMHG
TR PEAK VELOCITY: 3.2 M/S
TRICUSPID ANNULAR PLANE SYSTOLIC EXCURSION: 2.3 CM
TRICUSPID VALVE PEAK REGURGITATION VELOCITY: 3.16 M/S
TSH SERPL DL<=0.05 MIU/L-ACNC: 0.49 UIU/ML (ref 0.45–4.5)

## 2024-06-26 PROCEDURE — 93321 DOPPLER ECHO F-UP/LMTD STD: CPT

## 2024-06-26 PROCEDURE — 93325 DOPPLER ECHO COLOR FLOW MAPG: CPT | Performed by: INTERNAL MEDICINE

## 2024-06-26 PROCEDURE — 93325 DOPPLER ECHO COLOR FLOW MAPG: CPT

## 2024-06-26 PROCEDURE — 80048 BASIC METABOLIC PNL TOTAL CA: CPT

## 2024-06-26 PROCEDURE — 93308 TTE F-UP OR LMTD: CPT | Performed by: INTERNAL MEDICINE

## 2024-06-26 PROCEDURE — 93321 DOPPLER ECHO F-UP/LMTD STD: CPT | Performed by: INTERNAL MEDICINE

## 2024-06-26 PROCEDURE — 93308 TTE F-UP OR LMTD: CPT

## 2024-06-26 PROCEDURE — 36415 COLL VENOUS BLD VENIPUNCTURE: CPT

## 2024-06-26 NOTE — TELEPHONE ENCOUNTER
----- Message from Geetha Avila PA-C sent at 6/26/2024  1:17 PM EDT -----  Potassium within normal limits, but on the lower side. OK to continue taking potassium 10mEq as discussed at her last appointment.

## 2024-06-27 NOTE — PROGRESS NOTES
"Assessment:   S/P Left Thumb Proximal Phalanx, Internal Fusion Of Metacarpal Phalanx Joint, Distal Radius Bone Graft. - Left on 5/22/2024    Plan:   Splint for the next 2 weeks with local wound care of the pins  NWB to the left thumb    Follow Up:  2 weeks    To Do Next Visit:  X-rays of the  left  thumb  Removal of Pins      CHIEF COMPLAINT:  Chief Complaint   Patient presents with    Left Hand - Post-op         SUBJECTIVE:  Swetha Lopez is a 73 y.o. female who presents for follow up after Left Thumb Proximal Phalanx, Internal Fusion Of Metacarpal Phalanx Joint, Distal Radius Bone Graft. - Left on 5/22/2024.  Today patient has pain and swelling in the left thumb. She states that it is not changing in nature and has been consistent since last visit. She denies any new injuries. She denies any acute complaints.       PHYSICAL EXAMINATION:  Vital signs: /75   Pulse 76   Ht 5' 5\" (1.651 m)   Wt 92.1 kg (203 lb)   BMI 33.78 kg/m²   General: well developed and well nourished, alert, oriented times 3, and appears comfortable  Psychiatric: Normal    MUSCULOSKELETAL EXAMINATION:  Incision: Clean, dry, intact. Pin sites clean  Range of Motion: As expected  Neurovascular status: Neuro intact, good cap refill  Activity Restrictions: Cast/splint restrictions         STUDIES REVIEWED:  Images were reviewed in PACS and demonstrate: pins and hardware in good position with healing of the operative site noted. Callus formation is beginning to create bony bridge over the fusion site      PROCEDURES PERFORMED:  Procedures  No Procedures performed today    "

## 2024-06-28 ENCOUNTER — TELEPHONE (OUTPATIENT)
Dept: CARDIOLOGY CLINIC | Facility: CLINIC | Age: 74
End: 2024-06-28

## 2024-06-28 ENCOUNTER — PREP FOR PROCEDURE (OUTPATIENT)
Dept: CARDIOLOGY CLINIC | Facility: CLINIC | Age: 74
End: 2024-06-28

## 2024-06-28 DIAGNOSIS — R53.83 FATIGUE, UNSPECIFIED TYPE: Primary | ICD-10-CM

## 2024-06-28 NOTE — PROGRESS NOTES
Called to discuss ECHO results and Dr. Eubanks's recommendation.  Plan for LHC, if negative we will plan for device upgrade. All questions answered, message sent to card surg coordinator.

## 2024-06-28 NOTE — TELEPHONE ENCOUNTER
"Patient scheduled for 7/8/24 Heart Cath on 7/8/24 at [facility] with Dr. Oleary.      Instructions sent to patient through [employer].      Patient aware of all general instructions.    Medication holds:   Eliquis - Do not take the night before and morning of procedure.    Bumex - Do not take morning of procedure.    Labs to be done on 7/1/24:  CBC (No fasting)   (Patient did BMP on 6/26/24)          Thank you,  Fara \"Subha\" Fili      "

## 2024-06-28 NOTE — TELEPHONE ENCOUNTER
----- Message from Geetha Avila PA-C sent at 6/28/2024  9:45 AM EDT -----  Please call patient this afternoon to schedule her for [facility] for drop in EF, fatigue.  She wants to speak with her  first this morning and then would be able to schedule.     Thanks!

## 2024-07-01 ENCOUNTER — APPOINTMENT (OUTPATIENT)
Dept: LAB | Facility: HOSPITAL | Age: 74
End: 2024-07-01
Payer: COMMERCIAL

## 2024-07-01 LAB
BASOPHILS # BLD AUTO: 0.02 THOUSANDS/ÂΜL (ref 0–0.1)
BASOPHILS NFR BLD AUTO: 1 % (ref 0–1)
EOSINOPHIL # BLD AUTO: 0.09 THOUSAND/ÂΜL (ref 0–0.61)
EOSINOPHIL NFR BLD AUTO: 2 % (ref 0–6)
ERYTHROCYTE [DISTWIDTH] IN BLOOD BY AUTOMATED COUNT: 13.2 % (ref 11.6–15.1)
HCT VFR BLD AUTO: 40.8 % (ref 34.8–46.1)
HGB BLD-MCNC: 13.2 G/DL (ref 11.5–15.4)
IMM GRANULOCYTES # BLD AUTO: 0.01 THOUSAND/UL (ref 0–0.2)
IMM GRANULOCYTES NFR BLD AUTO: 0 % (ref 0–2)
LYMPHOCYTES # BLD AUTO: 1.11 THOUSANDS/ÂΜL (ref 0.6–4.47)
LYMPHOCYTES NFR BLD AUTO: 29 % (ref 14–44)
MCH RBC QN AUTO: 29.9 PG (ref 26.8–34.3)
MCHC RBC AUTO-ENTMCNC: 32.4 G/DL (ref 31.4–37.4)
MCV RBC AUTO: 93 FL (ref 82–98)
MONOCYTES # BLD AUTO: 0.37 THOUSAND/ÂΜL (ref 0.17–1.22)
MONOCYTES NFR BLD AUTO: 10 % (ref 4–12)
NEUTROPHILS # BLD AUTO: 2.28 THOUSANDS/ÂΜL (ref 1.85–7.62)
NEUTS SEG NFR BLD AUTO: 58 % (ref 43–75)
NRBC BLD AUTO-RTO: 0 /100 WBCS
PLATELET # BLD AUTO: 171 THOUSANDS/UL (ref 149–390)
PMV BLD AUTO: 11 FL (ref 8.9–12.7)
RBC # BLD AUTO: 4.41 MILLION/UL (ref 3.81–5.12)
WBC # BLD AUTO: 3.88 THOUSAND/UL (ref 4.31–10.16)

## 2024-07-01 PROCEDURE — 36415 COLL VENOUS BLD VENIPUNCTURE: CPT

## 2024-07-01 PROCEDURE — 85025 COMPLETE CBC W/AUTO DIFF WBC: CPT

## 2024-07-03 ENCOUNTER — TELEPHONE (OUTPATIENT)
Dept: GASTROENTEROLOGY | Facility: CLINIC | Age: 74
End: 2024-07-03

## 2024-07-03 NOTE — TELEPHONE ENCOUNTER
From: Richard Bledsoe MD   Sent: 7/2/2024   8:17 AM EDT   To: [facility]     Your stool studies showed no signs of inflammation, please let us know if you continue to have diarrhea.       From: Richard Bledsoe MD   Sent: 7/1/2024   1:15 PM EDT   To: [facility]     Great news, all of your stool studies were normal with no evidence infection.     Please call us, let us know here for continued episodes of diarrhea.

## 2024-07-08 ENCOUNTER — HOSPITAL ENCOUNTER (OUTPATIENT)
Facility: HOSPITAL | Age: 74
Setting detail: OUTPATIENT SURGERY
Discharge: HOME | End: 2024-07-08
Attending: INTERNAL MEDICINE | Admitting: INTERNAL MEDICINE
Payer: COMMERCIAL

## 2024-07-08 VITALS
HEART RATE: 69 BPM | RESPIRATION RATE: 16 BRPM | WEIGHT: 200 LBS | BODY MASS INDEX: 33.32 KG/M2 | HEIGHT: 65 IN | TEMPERATURE: 97.5 F | DIASTOLIC BLOOD PRESSURE: 62 MMHG | SYSTOLIC BLOOD PRESSURE: 98 MMHG | OXYGEN SATURATION: 94 %

## 2024-07-08 DIAGNOSIS — R53.83 FATIGUE, UNSPECIFIED TYPE: ICD-10-CM

## 2024-07-08 LAB
ATRIAL RATE: 86 BPM
P AXIS: 73 DEGREES
PR INTERVAL: 198 MS
QRS AXIS: 152 DEGREES
QRSD INTERVAL: 164 MS
QT INTERVAL: 430 MS
QTC INTERVAL: 514 MS
T WAVE AXIS: 100 DEGREES
VENTRICULAR RATE: 86 BPM

## 2024-07-08 PROCEDURE — C1769 GUIDE WIRE: HCPCS | Performed by: INTERNAL MEDICINE

## 2024-07-08 PROCEDURE — C1894 INTRO/SHEATH, NON-LASER: HCPCS | Performed by: INTERNAL MEDICINE

## 2024-07-08 PROCEDURE — 93005 ELECTROCARDIOGRAM TRACING: CPT

## 2024-07-08 PROCEDURE — 99153 MOD SED SAME PHYS/QHP EA: CPT | Performed by: INTERNAL MEDICINE

## 2024-07-08 PROCEDURE — 93010 ELECTROCARDIOGRAM REPORT: CPT | Performed by: INTERNAL MEDICINE

## 2024-07-08 PROCEDURE — 99152 MOD SED SAME PHYS/QHP 5/>YRS: CPT | Performed by: INTERNAL MEDICINE

## 2024-07-08 PROCEDURE — 93458 L HRT ARTERY/VENTRICLE ANGIO: CPT | Performed by: INTERNAL MEDICINE

## 2024-07-08 RX ORDER — HEPARIN SODIUM 1000 [USP'U]/ML
INJECTION, SOLUTION INTRAVENOUS; SUBCUTANEOUS CODE/TRAUMA/SEDATION MEDICATION
Status: DISCONTINUED | OUTPATIENT
Start: 2024-07-08 | End: 2024-07-08 | Stop reason: HOSPADM

## 2024-07-08 RX ORDER — SODIUM CHLORIDE 9 MG/ML
100 INJECTION, SOLUTION INTRAVENOUS CONTINUOUS
Status: DISCONTINUED | OUTPATIENT
Start: 2024-07-08 | End: 2024-07-08

## 2024-07-08 RX ORDER — ACETAMINOPHEN 325 MG/1
975 TABLET ORAL ONCE AS NEEDED
Status: CANCELLED | OUTPATIENT
Start: 2024-07-08

## 2024-07-08 RX ORDER — SODIUM CHLORIDE 9 MG/ML
50 INJECTION, SOLUTION INTRAVENOUS CONTINUOUS
Status: DISPENSED | OUTPATIENT
Start: 2024-07-08 | End: 2024-07-08

## 2024-07-08 RX ORDER — NITROGLYCERIN 0.4 MG/1
0.4 TABLET SUBLINGUAL ONCE AS NEEDED
Status: DISCONTINUED | OUTPATIENT
Start: 2024-07-08 | End: 2024-07-08 | Stop reason: HOSPADM

## 2024-07-08 RX ORDER — NITROGLYCERIN 20 MG/100ML
INJECTION INTRAVENOUS CODE/TRAUMA/SEDATION MEDICATION
Status: DISCONTINUED | OUTPATIENT
Start: 2024-07-08 | End: 2024-07-08 | Stop reason: HOSPADM

## 2024-07-08 RX ORDER — MIDAZOLAM HYDROCHLORIDE 2 MG/2ML
INJECTION, SOLUTION INTRAMUSCULAR; INTRAVENOUS CODE/TRAUMA/SEDATION MEDICATION
Status: DISCONTINUED | OUTPATIENT
Start: 2024-07-08 | End: 2024-07-08 | Stop reason: HOSPADM

## 2024-07-08 RX ORDER — LIDOCAINE HYDROCHLORIDE 10 MG/ML
INJECTION, SOLUTION EPIDURAL; INFILTRATION; INTRACAUDAL; PERINEURAL CODE/TRAUMA/SEDATION MEDICATION
Status: DISCONTINUED | OUTPATIENT
Start: 2024-07-08 | End: 2024-07-08 | Stop reason: HOSPADM

## 2024-07-08 RX ORDER — ONDANSETRON 2 MG/ML
4 INJECTION INTRAMUSCULAR; INTRAVENOUS ONCE AS NEEDED
Status: DISCONTINUED | OUTPATIENT
Start: 2024-07-08 | End: 2024-07-08 | Stop reason: HOSPADM

## 2024-07-08 RX ORDER — VERAPAMIL HYDROCHLORIDE 2.5 MG/ML
INJECTION, SOLUTION INTRAVENOUS CODE/TRAUMA/SEDATION MEDICATION
Status: DISCONTINUED | OUTPATIENT
Start: 2024-07-08 | End: 2024-07-08 | Stop reason: HOSPADM

## 2024-07-08 RX ORDER — FENTANYL CITRATE 50 UG/ML
INJECTION, SOLUTION INTRAMUSCULAR; INTRAVENOUS CODE/TRAUMA/SEDATION MEDICATION
Status: DISCONTINUED | OUTPATIENT
Start: 2024-07-08 | End: 2024-07-08 | Stop reason: HOSPADM

## 2024-07-08 RX ORDER — SODIUM CHLORIDE 9 MG/ML
50 INJECTION, SOLUTION INTRAVENOUS CONTINUOUS
Status: CANCELLED | OUTPATIENT
Start: 2024-07-08 | End: 2024-07-11

## 2024-07-08 RX ORDER — ACETAMINOPHEN 325 MG/1
975 TABLET ORAL ONCE AS NEEDED
Status: DISCONTINUED | OUTPATIENT
Start: 2024-07-08 | End: 2024-07-08 | Stop reason: HOSPADM

## 2024-07-08 RX ORDER — ASPIRIN 81 MG/1
324 TABLET, CHEWABLE ORAL ONCE
Status: COMPLETED | OUTPATIENT
Start: 2024-07-08 | End: 2024-07-08

## 2024-07-08 RX ADMIN — SODIUM CHLORIDE 100 ML/HR: 0.9 INJECTION, SOLUTION INTRAVENOUS at 08:39

## 2024-07-08 RX ADMIN — ASPIRIN 81 MG CHEWABLE TABLET 324 MG: 81 TABLET CHEWABLE at 08:39

## 2024-07-08 RX ADMIN — SODIUM CHLORIDE 50 ML/HR: 0.9 INJECTION, SOLUTION INTRAVENOUS at 10:28

## 2024-07-08 NOTE — DISCHARGE INSTR - AVS FIRST PAGE
1. Please see the post cardiac catheterization dishcarge instructions.   No heavy lifting, greater than 10 lbs. or strenuous  activity for 48 hrs.    2.Remove band aid tomorrow.  Shower and wash area- wrist gently with soap and water- beginning tomorrow. Rinse and pat dry.  Apply new water seal band aid.  Repeat this process for 5 days. No powders, creams lotions or antibiotic ointments  for 5 days.  No tub baths, hot tubs or swimming for 5 days.     3. Please call our office ([phone]) if you have any fever, redness, swelling, discharge from your wrist access site.    4.No driving for 3 days

## 2024-07-08 NOTE — INTERVAL H&P NOTE
"H&P reviewed. After examining the patient I find no changes in the patients condition since the H&P had been written.    Vitals:    07/08/24 0930   BP:    Pulse:    Resp:    Temp:    SpO2: 99%       H&P reviewed. After examining the patient, I find no changed to the H&P since it had been written.    Patient is here for a [facility], she has no acute complaints. Endorses positional lower extremity edema that improves with rest and elevation, otherwise has no complaints and reports feeling well.       /84   Pulse 75   Temp 99.4 °F (37.4 °C) (Temporal)   Resp 16   Ht 5' 5\" (1.651 m)   Wt 90.7 kg (200 lb)   SpO2 99%   BMI 33.28 kg/m²      Patient re-evaluated. Accept as history and physical.    Carolyn Skinner MD/July 8, 2024/9:58 AM   "

## 2024-07-08 NOTE — INTERVAL H&P NOTE
"Update: (This section must be completed if the H&P was completed greater than 24 hrs to procedure or admission)    H&P reviewed. After examining the patient, I find no changed to the H&P since it had been written.    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    /84   Pulse 75   Temp 99.4 °F (37.4 °C) (Temporal)   Resp 16   Ht 5' 5\" (1.651 m)   Wt 90.7 kg (200 lb)   SpO2 96%   BMI 33.28 kg/m²       Patient re-evaluated. Accept as history and physical.    KENNEDY Jauregui/July 8, 2024/9:27 AM  "

## 2024-07-09 ENCOUNTER — TELEPHONE (OUTPATIENT)
Dept: CARDIOLOGY CLINIC | Facility: CLINIC | Age: 74
End: 2024-07-09

## 2024-07-09 ENCOUNTER — APPOINTMENT (OUTPATIENT)
Dept: RADIOLOGY | Facility: CLINIC | Age: 74
End: 2024-07-09
Payer: COMMERCIAL

## 2024-07-09 ENCOUNTER — OFFICE VISIT (OUTPATIENT)
Dept: OBGYN CLINIC | Facility: CLINIC | Age: 74
End: 2024-07-09

## 2024-07-09 ENCOUNTER — PREP FOR PROCEDURE (OUTPATIENT)
Dept: CARDIOLOGY CLINIC | Facility: CLINIC | Age: 74
End: 2024-07-09

## 2024-07-09 DIAGNOSIS — S62.512A CLOSED DISPLACED FRACTURE OF PROXIMAL PHALANX OF LEFT THUMB, INITIAL ENCOUNTER: Primary | ICD-10-CM

## 2024-07-09 DIAGNOSIS — I49.5 TACHY-BRADY SYNDROME (HCC): Primary | ICD-10-CM

## 2024-07-09 DIAGNOSIS — I48.19 PERSISTENT ATRIAL FIBRILLATION (HCC): Primary | ICD-10-CM

## 2024-07-09 DIAGNOSIS — S62.512A CLOSED DISPLACED FRACTURE OF PROXIMAL PHALANX OF LEFT THUMB, INITIAL ENCOUNTER: ICD-10-CM

## 2024-07-09 PROCEDURE — 73140 X-RAY EXAM OF FINGER(S): CPT

## 2024-07-09 PROCEDURE — 99024 POSTOP FOLLOW-UP VISIT: CPT | Performed by: PHYSICIAN ASSISTANT

## 2024-07-09 NOTE — TELEPHONE ENCOUNTER
----- Message from Heidy GALE sent at 7/9/2024  2:59 PM EDT -----  Regarding: program lrl to 60  Dr. Eubanks  Pt is scheduled for [facility] 7/15 to have LRL programmed to 60 bpm from 80 bpm. Do you want this still done, or keep her @ 80 until upgraded to Bi-V PM?  Thanks,  ~Heidy '  ----- Message -----  From: Sunil Eubanks MD  Sent: 7/9/2024   1:36 PM EDT  To: Geetha Avila PA-C; #    I spoke with Ev about upgrading her device. She is agreeable. Her LHC showed no occlusion and her EF is low due to RV pacing as it was normal earlier this year.     She mentioned something about coming in to device clinic for reprogramming? On 7/15. Is that needed?     Tracy/Subha    Please schedule this patient for upgrade to BiV PPM - also venogram.     Routine labs    Hold medication: None    System/Device company: [employer]    Thank you

## 2024-07-09 NOTE — TELEPHONE ENCOUNTER
07/09/24 @ 3:50pm, spoke to pt, per dr. FINLEY cannikki 7/15 appt for device clinic, no need to bring pt in to program lrl to 60 bpm. Pt is being upgraded to bi-v device in near future.~ch

## 2024-07-09 NOTE — TELEPHONE ENCOUNTER
"Patient scheduled for device upgrade from DC Pacemaker to BIV Pacemaker device on 8/27/24 at  [place] with Dr. Eubanks.      Instructions sent to patient through [employer].      Patient aware of all general instructions.    Medication holds:   Bumex - Do not take morning of procedure.    Blood Thinners:  Eliquis - Keep taking and do not stop.    Labs to be done on 8/13/24:  PT INR / CMP / CBC (FASTING 8 HOURS)        Thank you,  Fara \"Subha\" Fili    "

## 2024-07-09 NOTE — TELEPHONE ENCOUNTER
Spoke with patient regarding recent C    No signficant coronary disease.     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     She is agreeable to proceed.     Will have scheduling team reach out and set-up upgrade, venogram.

## 2024-07-11 ENCOUNTER — VBI (OUTPATIENT)
Dept: ADMINISTRATIVE | Facility: OTHER | Age: 74
End: 2024-07-11

## 2024-07-11 NOTE — TELEPHONE ENCOUNTER
07/11/24 1:33 PM     Chart reviewed for DEXA Scan ; nothing is submitted to the patient's insurance at this time.     Edie Carranza   [facility]

## 2024-07-15 ENCOUNTER — HOSPITAL ENCOUNTER (OUTPATIENT)
Dept: RADIOLOGY | Facility: HOSPITAL | Age: 74
Discharge: HOME | End: 2024-07-15
Payer: COMMERCIAL

## 2024-07-15 VITALS — BODY MASS INDEX: 33.66 KG/M2 | HEIGHT: 65 IN | WEIGHT: 202 LBS

## 2024-07-15 DIAGNOSIS — E03.9 ACQUIRED HYPOTHYROIDISM: ICD-10-CM

## 2024-07-15 DIAGNOSIS — M85.89 OSTEOPENIA OF MULTIPLE SITES: ICD-10-CM

## 2024-07-15 PROCEDURE — 77080 DXA BONE DENSITY AXIAL: CPT

## 2024-07-23 ENCOUNTER — TELEPHONE (OUTPATIENT)
Dept: CARDIOLOGY CLINIC | Facility: CLINIC | Age: 74
End: 2024-07-23

## 2024-07-23 NOTE — TELEPHONE ENCOUNTER
Spoke with pt. She is aware of device check in one week and prefers to be called with the results.    Thanks!

## 2024-07-23 NOTE — TELEPHONE ENCOUNTER
Pt returned call. I stated her device report showed fast heart rates developing from her ventricle and Dr. Eubanks would like to restart her on metoprolol succ 25mg BID. Pt agreed and will begin the med today.    She would like to know when next device check will be to reevaluate her HR's on metoprolol.

## 2024-07-23 NOTE — TELEPHONE ENCOUNTER
----- Message from Sunil Eubanks MD sent at 7/22/2024  9:13 PM EDT -----  Normal device function.  Please ask her if she is okay with restarting metoprolol. We are seeing some fast heart rate from the ventricle - bottom chamber.

## 2024-07-24 RX ORDER — METOPROLOL SUCCINATE 25 MG/1
25 TABLET, EXTENDED RELEASE ORAL 2 TIMES DAILY
COMMUNITY

## 2024-07-25 ENCOUNTER — VBI (OUTPATIENT)
Dept: ADMINISTRATIVE | Facility: OTHER | Age: 74
End: 2024-07-25

## 2024-07-25 NOTE — TELEPHONE ENCOUNTER
07/25/24 12:47 PM     Chart reviewed for DEXA Scan was/were not submitted to the patient's insurance.     Edie Carranza   [facility]

## 2024-07-29 NOTE — PROGRESS NOTES
ASSESSMENT/PLAN:    Assessment:   S/p left thumb proximal phalanx fusion of MCP joint and distal radius bone grafting - 5/22/24.    Plan:   Pins removed today in office  Continue with splint for support. Can work on some slight ROM activities    Follow Up:  6  week(s)    To Do Next Visit:  Xrays left thumb    _____________________________________________________  CHIEF COMPLAINT:  Chief Complaint   Patient presents with    Left Thumb - Post-op         SUBJECTIVE:  Swetha Lopez is a 74 y.o. female who presents for follow up regarding left thumb MCP fusion from 5/22/24. Patient denies any significant pain.she denies any signs of an infection. She notes that the swelling is improving at this time.     Patient needed a cardiac cath yesterday.    PAST MEDICAL HISTORY:  Past Medical History:   Diagnosis Date    A-fib ([facility])     Anxiety     resolved 10/4/17    Asthma     seasonal    Atrial fibrillation ([facility]) 01/2022    newly diagnosed on eliquis    Chronic kidney disease     kidney stone    Colon polyp     Disease of thyroid gland     Dysphagia     GERD (gastroesophageal reflux disease)     Goiter, nodular     partial thyroidectomy    Hiatal hernia     repaired 2018    Hiatal hernia with GERD without esophagitis 04/2018    surgical correction    History of esophageal varices with bleeding     History of pernicious anemia     History of transfusion     x1 after bleeding ulcer    Hyperlipidemia     Hypertension     Irritable bowel syndrome     Neck mass     2 small areas left side by jawline and midneck US scheduled 0820/21    Pacemaker-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 cardiac 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 05/09/2024    RA (rheumatoid arthritis) ([facility])     S/P AV kike ablation 5/9/2024 05/09/2024    s/p Medtronic dual chamber PPM 10/24/2023 05/09/2024    Seasonal allergies     Vertigo     Wears glasses     for reading       PAST SURGICAL HISTORY:  Past Surgical History:   Procedure Laterality Date    AV NODE ABLATION   05/09/2024    BREAST BIOPSY Bilateral     negative    CARDIAC CATHETERIZATION      x2 secondary to exertional chest pain, due to lung issues, possible mild COPD    CARDIAC CATHETERIZATION Left 7/8/2024    Procedure: Cardiac Left Heart Cath;  Surgeon: Dede Oleary DO;  [facility];  Service: Cardiology    CARDIAC CATHETERIZATION N/A 7/8/2024    Procedure: Cardiac Coronary Angiogram;  Surgeon: Dede Oleary DO;  [facility];  Service: Cardiology    CARDIAC ELECTROPHYSIOLOGY PROCEDURE N/A 12/23/2022    Procedure: Cardiac eps/afib ablation;  Surgeon: Sunil Eubanks MD;  [facility];  Service: Cardiology    CARDIAC ELECTROPHYSIOLOGY PROCEDURE N/A 12/23/2022    Procedure: Cardiac eps/aflutter ablation;  Surgeon: Sunil Eubanks MD;  [facility];  Service: Cardiology    CARDIAC ELECTROPHYSIOLOGY PROCEDURE N/A 10/24/2023    Procedure: Cardiac pacer implant DC PM;  Surgeon: Sunil Eubanks MD;  [facility];  Service: Cardiology    CARDIAC ELECTROPHYSIOLOGY PROCEDURE N/A 05/09/2024    Procedure: Cardiac eps/av node ablation;  Surgeon: Sunil Eubanks MD;  [facility];  Service: Cardiology    CATARACT EXTRACTION Bilateral     CHOLECYSTECTOMY  2015    lap - last assessed 10/4/17    COLONOSCOPY      complete    ESOPHAGOGASTRODUODENOSCOPY N/A 01/23/2018    Procedure: ESOPHAGOGASTRODUODENOSCOPY (EGD);  Surgeon: Richard Bledsoe MD;  Location: [facility] GI [facility];  Service: Gastroenterology    HAND FRACTURE REPAIR Left 5/22/2024    Procedure: LEFT THUMB PROXIMAL PHALANX, INTERNAL FUSION OF METACARPAL PHALANX JOINT, DISTAL RADIUS BONE GRAFT.;  Surgeon: Diane Duvall MD;  Location: [facility];  Service: Orthopedics    HYSTERECTOMY      partial - ovaries remain    LIPOMA RESECTION      right thigh    WV ESOPHAGOSCOPY FLEXIBLE TRANSORAL WITH BIOPSY N/A 04/11/2018    Procedure: ESOPHAGOGASTRODUODENOSCOPY (EGD);   Surgeon: Yeison Masterson MD;  Location:  MAIN OR;  Service: Thoracic    WI LAPS RPR PARAESPHGL HRNA INCL FUNDPLSTY W/MESH N/A 04/11/2018    Procedure: REPAIR HERNIA PARAESOPHAGEAL  LAPAROSCOPIC,ELEONORA GASTROPLASTY, TUEPET FUNDOPLICATION;  Surgeon: Yeison Masterson MD;  Location:  MAIN OR;  Service: Thoracic    WI XCAPSL CTRC RMVL INSJ IO LENS PROSTH W/O ECP Right 08/23/2021    Procedure: EXTRACTION EXTRACAPSULAR CATARACT PHACO INTRAOCULAR LENS (IOL);  Surgeon: Malcolm Morris MD;  Location: [facility] MAIN OR;  Service: Ophthalmology    WI XCAPSL CTRC RMVL INSJ IO LENS PROSTH W/O ECP Left 11/15/2021    Procedure: EXTRACTION EXTRACAPSULAR CATARACT PHACO INTRAOCULAR LENS (IOL);  Surgeon: Malcolm Morris MD;  Location: [facility] MAIN OR;  Service: Ophthalmology    SKIN BIOPSY Right     lump benign - last assessed 10/4/17    THYROIDECTOMY, PARTIAL  1977    TONSILLECTOMY      US GUIDED THYROID BIOPSY  02/21/2023       FAMILY HISTORY:  Family History   Problem Relation Age of Onset    Alzheimer's disease Mother     Cancer Mother         skin     Thyroid disease Mother     Ulcerative colitis Mother     Cancer Father         prostate    Stroke Father     Hypertension Father     Prostate cancer Father     Thyroid disease Sister     Ulcerative colitis Sister     Other Sister         open heart surgery    Heart disease Sister     Hyperlipidemia Brother     No Known Problems Maternal Grandmother     No Known Problems Paternal Grandmother     No Known Problems Son     No Known Problems Son     Mental illness Neg Hx        SOCIAL HISTORY:  Social History     Tobacco Use    Smoking status: Never     Passive exposure: Never    Smokeless tobacco: Never   Vaping Use    Vaping status: Never Used   Substance Use Topics    Alcohol use: Not Currently     Comment: stopped 10/2023    Drug use: Never       MEDICATIONS:    Current Outpatient Medications:     acetaminophen (TYLENOL) 650 mg CR tablet, Take 1 tablet (650 mg total) by mouth every  8 (eight) hours as needed for mild pain, Disp: 30 tablet, Rfl: 0    apixaban (Eliquis) 5 mg, Take 1 tablet (5 mg total) by mouth 2 (two) times a day, Disp: 180 tablet, Rfl: 3    atorvastatin (LIPITOR) 20 mg tablet, TAKE 1 TABLET BY MOUTH DAILY AT  BEDTIME, Disp: 90 tablet, Rfl: 0    bumetanide (BUMEX) 1 mg tablet, TAKE 1 TABLET BY MOUTH DAILY, Disp: 90 tablet, Rfl: 1    Calcium Carb-Cholecalciferol 600-1000 MG-UNIT CAPS, Take by mouth every morning gummy , Disp: , Rfl:     famotidine (PEPCID) 40 MG tablet, Take 1 tablet (40 mg total) by mouth 2 (two) times a day, Disp: 60 tablet, Rfl: 3    levothyroxine 50 mcg tablet, TAKE 1 TABLET BY MOUTH DAILY, Disp: 90 tablet, Rfl: 1    metoprolol succinate (TOPROL-XL) 25 mg 24 hr tablet, Take 25 mg by mouth 2 (two) times a day, Disp: , Rfl:     omeprazole (PriLOSEC) 40 MG capsule, TAKE 1 CAPSULE BY MOUTH TWICE  DAILY, Disp: 180 capsule, Rfl: 1    potassium chloride (Klor-Con M10) 10 mEq tablet, Take 1 tablet (10 mEq total) by mouth daily, Disp: , Rfl:     ALLERGIES:  Allergies   Allergen Reactions    Orange (Diagnostic) - Food Allergy Anaphylaxis     Throat closes    Pantoprazole Headache    Penicillins Other (See Comments)     During allergy testing instructed to NEVER take PCN  Patient has tolerated cefazolin.    Gluten Meal - Food Allergy      Following a gluten free diet on her own    Lactose - Food Allergy Diarrhea    Sulfa Antibiotics Rash       REVIEW OF SYSTEMS:  Pertinent items are noted in HPI.  A comprehensive review of systems was negative.    LABS:  HgA1c:   Lab Results   Component Value Date    HGBA1C 5.5 12/25/2022     BMP:   Lab Results   Component Value Date    CALCIUM 9.0 06/26/2024     09/01/2016    K 3.5 06/26/2024    CO2 29 06/26/2024     06/26/2024    BUN 10 06/26/2024    CREATININE 0.69 06/26/2024           _____________________________________________________  PHYSICAL EXAMINATION:  Vital signs: There were no vitals taken for this  visit.  General: well developed and well nourished, alert, oriented times 3, and appears comfortable  Psychiatric: Normal  HEENT: Trachea Midline, No torticollis  Cardiovascular: No discernable arrhythmia  Pulmonary: No wheezing or stridor  Abdomen: No rebound or guarding  Extremities: No peripheral edema  Skin: No masses, erythema, lacerations, fluctation, ulcerations  Neurovascular: Sensation Intact to the Median, Ulnar, Radial Nerve, Motor Intact to the Median, Ulnar, Radial Nerve, and Pulses Intact    MUSCULOSKELETAL EXAMINATION:  LEFT SIDE:  thumb  Incision site well healed. Pin sites appear well   Limited ROM as expected  Sensation intact  Brisk capillary refill    _____________________________________________________  STUDIES REVIEWED:  Images were reviewed in PACS by Dr. Ruiz and demonstrate: stable appearance of orthopedic hardware with bony bridging apparent      PROCEDURES PERFORMED:  Procedures   Pins removed in office

## 2024-07-30 ENCOUNTER — REMOTE DEVICE CLINIC VISIT (OUTPATIENT)
Dept: CARDIOLOGY CLINIC | Facility: CLINIC | Age: 74
End: 2024-07-30

## 2024-07-30 DIAGNOSIS — I48.91 ATRIAL FIBRILLATION, UNSPECIFIED TYPE (HCC): Primary | ICD-10-CM

## 2024-07-30 PROCEDURE — RECHECK: Performed by: INTERNAL MEDICINE

## 2024-07-30 NOTE — PROGRESS NOTES
Results for orders placed or performed in visit on 07/30/24   Cardiac EP device report    Narrative    MDT DC/PM-ACTIVE SYSTEM IS MRI CONDITIONAL  NON-BILLABLE CARELINK TRANSMISSION: 1 WK CHECK PER  HM: RESTART METOPROLOL SUCC DUE TO VHR: BATTERY VOLTAGE ADEQUATE (12.1 YRS). AP: 77.7%. : 98.5% (>40%~MVP-OFF~DDDR/70). ALL AVAILABLE LEAD PARAMETERS WITHIN NORMAL LIMITS. 1 AT/AF EPISODE W/ AF IN PROGRESS. AF BURDEN: 0.5%. PT TAKES ELIQUIS, METOPROLOL SUCC. EF: 35% (ECHO 6/26/24). NORMAL DEVICE FUNCTION. CH

## 2024-07-31 ENCOUNTER — TELEPHONE (OUTPATIENT)
Dept: CARDIOLOGY CLINIC | Facility: CLINIC | Age: 74
End: 2024-07-31

## 2024-07-31 NOTE — TELEPHONE ENCOUNTER
----- Message from Sunil Eubanks MD sent at 7/30/2024 11:11 PM EDT -----  No ventricular arrhythmias noted. Please let her know.      ----- Message -----  From: Interface, [place] Results Incoming  Sent: 7/30/2024  11:58 AM EDT  To: Sunil Eubanks MD

## 2024-08-08 ENCOUNTER — OFFICE VISIT (OUTPATIENT)
Dept: ENDOCRINOLOGY | Facility: CLINIC | Age: 74
End: 2024-08-08
Payer: COMMERCIAL

## 2024-08-08 VITALS
OXYGEN SATURATION: 97 % | DIASTOLIC BLOOD PRESSURE: 70 MMHG | BODY MASS INDEX: 34.75 KG/M2 | HEART RATE: 80 BPM | WEIGHT: 208.6 LBS | HEIGHT: 65 IN | SYSTOLIC BLOOD PRESSURE: 118 MMHG

## 2024-08-08 DIAGNOSIS — E03.0 CONGENITAL HYPOTHYROIDISM WITH DIFFUSE GOITER: ICD-10-CM

## 2024-08-08 DIAGNOSIS — E04.2 MULTIPLE THYROID NODULES: ICD-10-CM

## 2024-08-08 DIAGNOSIS — E55.9 VITAMIN D DEFICIENCY: ICD-10-CM

## 2024-08-08 DIAGNOSIS — M80.80XA LOCALIZED OSTEOPOROSIS WITH CURRENT PATHOLOGICAL FRACTURE, INITIAL ENCOUNTER: Primary | ICD-10-CM

## 2024-08-08 PROCEDURE — 99214 OFFICE O/P EST MOD 30 MIN: CPT | Performed by: NURSE PRACTITIONER

## 2024-08-08 NOTE — PATIENT INSTRUCTIONS
Thyroid ultrasound to be repeated approximately December 2024.   Lab work to be completed at your convenience

## 2024-08-08 NOTE — PROGRESS NOTES
Established Patient Progress Note     CC: Endocrinology follow up visit    Impression & Plan:    Problem List Items Addressed This Visit          Endocrine    Congenital hypothyroidism with diffuse goiter     Continues on levothyroxine 50 mcg daily.  Clinically and biochemically euthyroid.  Repeat thyroid function tests a few days before next follow up or sooner if symptomatic.          Relevant Orders    TSH, 3rd generation    T4, free    Multiple thyroid nodules     S/p hemithyroidectomy of MNG at age 28 years continues to deny neck compressive symptoms.  Ultrasound guided thyroid biopsy in February 2023 which was benign.  Stable thyroid ultrasound in December 2023.   Recommend repeat thyroid ultrasound December 2023.          Relevant Orders    US thyroid       Musculoskeletal and Integument    Localized osteoporosis with current pathological fracture - Primary     DXA completed in July 2024 demonstrated osteopenia with high FRAX.   - Reviewed pathophysiology of osteoporosis, including risk for fracture and morbidty and mortality associated with fracture.  - Reviewed potential need for treatment to reduce risk of fracture.  Reviewed limits of bone density test in predicting fracture risk  Reviewed FRAX score and its relationship to overall risk.  - Reviewed treatment options and side effects recommend workup of secondary causes before determining treatment course.   - Emphasized importance of regular calcium and vitamin D intake  -Goal vitamin D >3  -Goal calcium intake ~1200mg /day.  - weight bearing exercise as tolerated           Relevant Orders    Comprehensive metabolic panel    PTH, intact    Phosphorus    NTX Panel, Urine       Other    Vitamin D deficiency     Continues to take vitamin d supplementation.  Recommend repeat vitamin d25 hydroxy level.         Relevant Orders    Vitamin D 25 hydroxy       History of Present Illness:     Swetha Lopez is a 74 y.o. female with a history of post-operative  thyroidism s/p R hemithyroidectomy for multinodular goiter, pathology benign with history of AFIB. History of osteopenia and Vitamin D deficiency.      For hypothyroidism, continues on levothyroxine 50 mcg daily, taken regularly and properly. Denies any symptoms consistent with hypo/hyperthyroidism.      For multinodular goiter s/p hemithyroidectomy at age 28 years old. Denies neck compressive symptoms. Had an ultrasound-guided thyroid biopsy from February 2023 which was benign. Most recent thyroid ultrasound completed in December 2023 which demonstrated stable thyroid nodules     For osteopenia, DXA scan from November 2021 demonstrated low bone mineral density with high FRAX score the 10 year risk of hip fracture is 4.3% with the 10 year risk of major osteoporotic fracture being 19.4%    July 2024 DXA scan demonstrates osteopenia with high FRAX the 10 year risk of hip fracture is 26.3% with the 10 year risk of major osteoporotic fracture being 41.9%.    For vitamin D deficiency, patient continues taking combined calcium and vitamin D supplementation.      Fracture R thumb April 26 th 2024, May 17th surgery.     Patient Active Problem List   Diagnosis    Hiatal hernia    GERD with esophagitis    Hypokalemia    Benign hypertension    Mixed hyperlipidemia    Congenital hypothyroidism with diffuse goiter    Liver lesion, left lobe    Hemorrhoids    IBS (irritable bowel syndrome)    Multiple thyroid nodules    Stress incontinence, female    Abnormal CT of liver    Anxiety    Rheumatoid arthritis (HCC)    History of hysterectomy    Parotid mass    Xerostomia    Asthma    Persistent atrial fibrillation (HCC)    Peripheral vision loss, right    Pulmonary hypertension (HCC)    History of TIA (transient ischemic attack)    Localized osteoporosis with current pathological fracture    Vitamin D deficiency    Tachy-lizet syndrome (HCC)    S/P AV kike ablation 5/9/2024    Pacemaker-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 cardiac rhythm  insufficient to support life    s/p Medtronic dual chamber PPM 10/24/2023    Diarrhea      Past Medical History:   Diagnosis Date    A-fib (HCC)     Anxiety     resolved 10/4/17    Asthma     seasonal    Atrial fibrillation (HCC) 01/2022    newly diagnosed on eliquis    Chronic kidney disease     kidney stone    Colon polyp     Disease of thyroid gland     Dysphagia     GERD (gastroesophageal reflux disease)     Goiter, nodular     partial thyroidectomy    Hiatal hernia     repaired 2018    Hiatal hernia with GERD without esophagitis 04/2018    surgical correction    History of esophageal varices with bleeding     History of pernicious anemia     History of transfusion     x1 after bleeding ulcer    Hyperlipidemia     Hypertension     Irritable bowel syndrome     Neck mass     2 small areas left side by jawline and midneck US scheduled 0820/21    Pacemaker-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 cardiac 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 05/09/2024    RA (rheumatoid arthritis) ([facility])     S/P AV kike ablation 5/9/2024 05/09/2024    s/p Medtronic dual chamber PPM 10/24/2023 05/09/2024    Seasonal allergies     Vertigo     Wears glasses     for reading      Past Surgical History:   Procedure Laterality Date    AV NODE ABLATION  05/09/2024    BREAST BIOPSY Bilateral     negative    CARDIAC CATHETERIZATION      x2 secondary to exertional chest pain, due to lung issues, possible mild COPD    CARDIAC CATHETERIZATION Left 7/8/2024    Procedure: Cardiac Left Heart Cath;  Surgeon: Dede Oleary DO;  [facility];  Service: Cardiology    CARDIAC CATHETERIZATION N/A 7/8/2024    Procedure: Cardiac Coronary Angiogram;  Surgeon: Dede Oleary DO;  [facility];  Service: Cardiology    CARDIAC ELECTROPHYSIOLOGY PROCEDURE N/A 12/23/2022    Procedure: Cardiac eps/afib ablation;  Surgeon: Sunil Eubanks MD;  [facility];  Service: Cardiology    CARDIAC ELECTROPHYSIOLOGY PROCEDURE N/A  12/23/2022    Procedure: Cardiac eps/aflutter ablation;  Surgeon: Sunil Eubanks MD;  Location:  CARDI[facility] CATH LAB;  Service: Cardiology    CARDIAC ELECTROPHYSIOLOGY PROCEDURE N/A 10/24/2023    Procedure: Cardiac pacer implant DC PM;  Surgeon: Sunil Eubanks MD;  Location:  [facility] CATH LAB;  Service: Cardiology    CARDIAC ELECTROPHYSIOLOGY PROCEDURE N/A 05/09/2024    Procedure: Cardiac eps/av node ablation;  Surgeon: Sunil Eubanks MD;  Location:  [facility] CATH [facility];  Service: Cardiology    CATARACT EXTRACTION Bilateral     CHOLECYSTECTOMY  2015    lap - last assessed 10/4/17    COLONOSCOPY      complete    ESOPHAGOGASTRODUODENOSCOPY N/A 01/23/2018    Procedure: ESOPHAGOGASTRODUODENOSCOPY (EGD);  Surgeon: Richard Bledsoe MD;  Location: [facility] GI LAB;  Service: Gastroenterology    HAND FRACTURE REPAIR Left 5/22/2024    Procedure: LEFT THUMB PROXIMAL PHALANX, INTERNAL FUSION OF METACARPAL PHALANX JOINT, DISTAL RADIUS BONE GRAFT.;  Surgeon: Diane Duvall MD;  Location: [facility];  Service: Orthopedics    HYSTERECTOMY      partial - ovaries remain    LIPOMA RESECTION      right thigh    ND ESOPHAGOSCOPY FLEXIBLE TRANSORAL WITH BIOPSY N/A 04/11/2018    Procedure: ESOPHAGOGASTRODUODENOSCOPY (EGD);  Surgeon: Yeison Masterson MD;  Location:  MAIN OR;  Service: Thoracic    ND LAPS RPR PARAESPHGL HRNA INCL FUNDPLSTY W/MESH N/A 04/11/2018    Procedure: REPAIR HERNIA PARAESOPHAGEAL  LAPAROSCOPIC,ELEONORA GASTROPLASTY, TUEPET FUNDOPLICATION;  Surgeon: Yeison Masterson MD;  Location:  MAIN OR;  Service: Thoracic    ND XCAPSL CTRC RMVL INSJ IO LENS PROSTH W/O ECP Right 08/23/2021    Procedure: EXTRACTION EXTRACAPSULAR CATARACT PHACO INTRAOCULAR LENS (IOL);  Surgeon: Malcolm Morris MD;  Location: [facility] MAIN OR;  Service: Ophthalmology    ND XCAPSL CTRC RMVL INSJ IO LENS PROSTH W/O ECP Left 11/15/2021    Procedure: EXTRACTION EXTRACAPSULAR CATARACT PHACO INTRAOCULAR LENS (IOL);  Surgeon: Malcolm Morris MD;   Location: [facility] MAIN OR;  Service: Ophthalmology    SKIN BIOPSY Right     lump benign - last assessed 10/4/17    THYROIDECTOMY, PARTIAL  1977    TONSILLECTOMY      US GUIDED THYROID BIOPSY  02/21/2023      Family History   Problem Relation Age of Onset    Alzheimer's disease Mother     Cancer Mother         skin     Thyroid disease Mother     Ulcerative colitis Mother     Cancer Father         prostate    Stroke Father     Hypertension Father     Prostate cancer Father     Thyroid disease Sister     Ulcerative colitis Sister     Other Sister         open heart surgery    Heart disease Sister     Hyperlipidemia Brother     No Known Problems Maternal Grandmother     No Known Problems Paternal Grandmother     No Known Problems Son     No Known Problems Son     Mental illness Neg Hx      Social History     Tobacco Use    Smoking status: Never     Passive exposure: Never    Smokeless tobacco: Never   Substance Use Topics    Alcohol use: Not Currently     Comment: stopped 10/2023     Allergies   Allergen Reactions    Orange (Diagnostic) - Food Allergy Anaphylaxis     Throat closes    Pantoprazole Headache    Penicillins Other (See Comments)     During allergy testing instructed to NEVER take PCN  Patient has tolerated cefazolin.    Gluten Meal - Food Allergy      Following a gluten free diet on her own    Lactose - Food Allergy Diarrhea    Sulfa Antibiotics Rash       Current Outpatient Medications:     acetaminophen (TYLENOL) 650 mg CR tablet, Take 1 tablet (650 mg total) by mouth every 8 (eight) hours as needed for mild pain, Disp: 30 tablet, Rfl: 0    apixaban (Eliquis) 5 mg, Take 1 tablet (5 mg total) by mouth 2 (two) times a day, Disp: 180 tablet, Rfl: 3    atorvastatin (LIPITOR) 20 mg tablet, TAKE 1 TABLET BY MOUTH DAILY AT  BEDTIME, Disp: 90 tablet, Rfl: 0    bumetanide (BUMEX) 1 mg tablet, TAKE 1 TABLET BY MOUTH DAILY, Disp: 90 tablet, Rfl: 1    Calcium Carb-Cholecalciferol 600-1000 MG-UNIT CAPS, Take by mouth  "every morning gummy , Disp: , Rfl:     levothyroxine 50 mcg tablet, TAKE 1 TABLET BY MOUTH DAILY, Disp: 90 tablet, Rfl: 1    metoprolol succinate (TOPROL-XL) 25 mg 24 hr tablet, Take 25 mg by mouth 2 (two) times a day, Disp: , Rfl:     omeprazole (PriLOSEC) 40 MG capsule, TAKE 1 CAPSULE BY MOUTH TWICE  DAILY, Disp: 180 capsule, Rfl: 1    potassium chloride (Klor-Con M10) 10 mEq tablet, Take 1 tablet (10 mEq total) by mouth daily, Disp: , Rfl:     famotidine (PEPCID) 40 MG tablet, Take 1 tablet (40 mg total) by mouth 2 (two) times a day, Disp: 60 tablet, Rfl: 3    Review of Systems  See HPI  All other systems reviewed and are negative.     Physical Exam:  Body mass index is 34.71 kg/m².  /70 (BP Location: Right arm, Patient Position: Sitting, Cuff Size: Large)   Pulse 80   Ht 5' 5\" (1.651 m)   Wt 94.6 kg (208 lb 9.6 oz)   SpO2 97%   BMI 34.71 kg/m²    Wt Readings from Last 3 Encounters:   08/08/24 94.6 kg (208 lb 9.6 oz)   07/15/24 91.6 kg (202 lb)   07/08/24 90.7 kg (200 lb)        Physical Exam  Vitals reviewed.   Constitutional:       Appearance: Normal appearance.   Cardiovascular:      Rate and Rhythm: Normal rate and regular rhythm.      Pulses: Normal pulses.      Heart sounds: Normal heart sounds.   Pulmonary:      Effort: Pulmonary effort is normal.      Breath sounds: Normal breath sounds.   Skin:     General: Skin is warm and dry.      Capillary Refill: Capillary refill takes less than 2 seconds.   Neurological:      General: No focal deficit present.      Mental Status: She is alert and oriented to person, place, and time.   Psychiatric:         Mood and Affect: Mood normal.         Behavior: Behavior normal.     Labs:   Component      Latest Ref Rng 10/6/2023 10/24/2023 1/12/2024 6/26/2024   Sodium      135 - 147 mmol/L  141  141  144    Potassium      3.5 - 5.3 mmol/L  3.3 (L)  3.7  3.5    Chloride      96 - 108 mmol/L  105  105  102    Carbon Dioxide      21 - 32 mmol/L  28  28  29    ANION " GAP      4 - 13 mmol/L  8  8  13    BUN      5 - 25 mg/dL  10  13  10    Creatinine      0.60 - 1.30 mg/dL  0.74  0.81  0.69    GLUCOSE, FASTING      65 - 99 mg/dL  102 (H)  102 (H)  96    Calcium      8.4 - 10.2 mg/dL  9.1  9.1  9.0    AST      13 - 39 U/L   21     ALT      7 - 52 U/L   15     ALK PHOS      34 - 104 U/L   50     Total Protein      6.4 - 8.4 g/dL   6.7     Albumin      3.5 - 5.0 g/dL   4.0     Total Bilirubin      0.20 - 1.00 mg/dL   0.92     GFR, Calculated      ml/min/1.73sq m  80  71  86    GLUCOSE      65 - 140 mg/dL  102      FREE T4      0.61 - 1.12 ng/dL 1.11    0.90    TSH 3RD GENERATON      0.450 - 4.500 uIU/mL 0.776    0.488        Discussed with the patient and all questioned fully answered. She will call me if any problems arise.    Follow-up appointment in 3 months.     Counseled patient on diagnostic results, prognosis, risk and benefit of treatment options, instruction for management, importance of treatment compliance, Risk  factor reduction and impressions    KENNEDY Morillo

## 2024-08-09 PROBLEM — R89.9 ABNORMAL THYROID BIOPSY: Status: RESOLVED | Noted: 2023-06-08 | Resolved: 2024-08-09

## 2024-08-09 NOTE — ASSESSMENT & PLAN NOTE
Continues on levothyroxine 50 mcg daily.  Clinically and biochemically euthyroid.  Repeat thyroid function tests a few days before next follow up or sooner if symptomatic.

## 2024-08-09 NOTE — ASSESSMENT & PLAN NOTE
S/p hemithyroidectomy of MNG at age 28 years continues to deny neck compressive symptoms.  Ultrasound guided thyroid biopsy in February 2023 which was benign.  Stable thyroid ultrasound in December 2023.   Recommend repeat thyroid ultrasound December 2023.

## 2024-08-09 NOTE — ASSESSMENT & PLAN NOTE
DXA completed in July 2024 demonstrated osteopenia with high FRAX.   - Reviewed pathophysiology of osteoporosis, including risk for fracture and morbidty and mortality associated with fracture.  - Reviewed potential need for treatment to reduce risk of fracture.  Reviewed limits of bone density test in predicting fracture risk  Reviewed FRAX score and its relationship to overall risk.  - Reviewed treatment options and side effects recommend workup of secondary causes before determining treatment course.   - Emphasized importance of regular calcium and vitamin D intake  -Goal vitamin D >3  -Goal calcium intake ~1200mg /day.  - weight bearing exercise as tolerated

## 2024-08-12 ENCOUNTER — HOSPITAL ENCOUNTER (OUTPATIENT)
Dept: NON INVASIVE DIAGNOSTICS | Facility: HOSPITAL | Age: 74
Discharge: HOME | End: 2024-08-12
Attending: RADIOLOGY
Payer: COMMERCIAL

## 2024-08-12 DIAGNOSIS — I48.19 PERSISTENT ATRIAL FIBRILLATION (HCC): ICD-10-CM

## 2024-08-12 PROCEDURE — 75820 VEIN X-RAY ARM/LEG: CPT

## 2024-08-12 PROCEDURE — 36005 INJECTION EXT VENOGRAPHY: CPT

## 2024-08-12 RX ADMIN — IOHEXOL 30 ML: 350 INJECTION, SOLUTION INTRAVENOUS at 11:32

## 2024-08-13 ENCOUNTER — APPOINTMENT (OUTPATIENT)
Dept: LAB | Facility: HOSPITAL | Age: 74
End: 2024-08-13
Payer: COMMERCIAL

## 2024-08-13 ENCOUNTER — APPOINTMENT (OUTPATIENT)
Dept: RADIOLOGY | Facility: CLINIC | Age: 74
End: 2024-08-13
Payer: COMMERCIAL

## 2024-08-13 ENCOUNTER — OFFICE VISIT (OUTPATIENT)
Dept: OBGYN CLINIC | Facility: CLINIC | Age: 74
End: 2024-08-13

## 2024-08-13 VITALS
HEART RATE: 79 BPM | SYSTOLIC BLOOD PRESSURE: 131 MMHG | BODY MASS INDEX: 34.66 KG/M2 | DIASTOLIC BLOOD PRESSURE: 89 MMHG | HEIGHT: 65 IN | WEIGHT: 208 LBS

## 2024-08-13 DIAGNOSIS — S62.512A CLOSED DISPLACED FRACTURE OF PROXIMAL PHALANX OF LEFT THUMB, INITIAL ENCOUNTER: ICD-10-CM

## 2024-08-13 DIAGNOSIS — M80.80XA LOCALIZED OSTEOPOROSIS WITH CURRENT PATHOLOGICAL FRACTURE, INITIAL ENCOUNTER: ICD-10-CM

## 2024-08-13 DIAGNOSIS — S62.512A CLOSED DISPLACED FRACTURE OF PROXIMAL PHALANX OF LEFT THUMB, INITIAL ENCOUNTER: Primary | ICD-10-CM

## 2024-08-13 DIAGNOSIS — E55.9 VITAMIN D DEFICIENCY: ICD-10-CM

## 2024-08-13 DIAGNOSIS — E03.0 CONGENITAL HYPOTHYROIDISM WITH DIFFUSE GOITER: ICD-10-CM

## 2024-08-13 DIAGNOSIS — E78.2 MIXED HYPERLIPIDEMIA: ICD-10-CM

## 2024-08-13 LAB
ALBUMIN SERPL BCG-MCNC: 4 G/DL (ref 3.5–5)
ALP SERPL-CCNC: 56 U/L (ref 34–104)
ALT SERPL W P-5'-P-CCNC: 16 U/L (ref 7–52)
ANION GAP SERPL CALCULATED.3IONS-SCNC: 7 MMOL/L (ref 4–13)
AST SERPL W P-5'-P-CCNC: 23 U/L (ref 13–39)
BASOPHILS # BLD AUTO: 0.02 THOUSANDS/ÂΜL (ref 0–0.1)
BASOPHILS NFR BLD AUTO: 0 % (ref 0–1)
BILIRUB SERPL-MCNC: 0.87 MG/DL (ref 0.2–1)
BUN SERPL-MCNC: 9 MG/DL (ref 5–25)
CALCIUM SERPL-MCNC: 9.2 MG/DL (ref 8.4–10.2)
CHLORIDE SERPL-SCNC: 104 MMOL/L (ref 96–108)
CO2 SERPL-SCNC: 28 MMOL/L (ref 21–32)
CREAT SERPL-MCNC: 0.71 MG/DL (ref 0.6–1.3)
EOSINOPHIL # BLD AUTO: 0.1 THOUSAND/ÂΜL (ref 0–0.61)
EOSINOPHIL NFR BLD AUTO: 2 % (ref 0–6)
ERYTHROCYTE [DISTWIDTH] IN BLOOD BY AUTOMATED COUNT: 12.6 % (ref 11.6–15.1)
GFR SERPL CREATININE-BSD FRML MDRD: 83 ML/MIN/1.73SQ M
GLUCOSE P FAST SERPL-MCNC: 101 MG/DL (ref 65–99)
HCT VFR BLD AUTO: 41.6 % (ref 34.8–46.1)
HGB BLD-MCNC: 13.6 G/DL (ref 11.5–15.4)
IMM GRANULOCYTES # BLD AUTO: 0.01 THOUSAND/UL (ref 0–0.2)
IMM GRANULOCYTES NFR BLD AUTO: 0 % (ref 0–2)
INR PPP: 1.06 (ref 0.85–1.19)
LYMPHOCYTES # BLD AUTO: 1.06 THOUSANDS/ÂΜL (ref 0.6–4.47)
LYMPHOCYTES NFR BLD AUTO: 23 % (ref 14–44)
MCH RBC QN AUTO: 30 PG (ref 26.8–34.3)
MCHC RBC AUTO-ENTMCNC: 32.7 G/DL (ref 31.4–37.4)
MCV RBC AUTO: 92 FL (ref 82–98)
MONOCYTES # BLD AUTO: 0.36 THOUSAND/ÂΜL (ref 0.17–1.22)
MONOCYTES NFR BLD AUTO: 8 % (ref 4–12)
NEUTROPHILS # BLD AUTO: 3.17 THOUSANDS/ÂΜL (ref 1.85–7.62)
NEUTS SEG NFR BLD AUTO: 67 % (ref 43–75)
NRBC BLD AUTO-RTO: 0 /100 WBCS
PLATELET # BLD AUTO: 162 THOUSANDS/UL (ref 149–390)
PMV BLD AUTO: 10.1 FL (ref 8.9–12.7)
POTASSIUM SERPL-SCNC: 3.7 MMOL/L (ref 3.5–5.3)
PROT SERPL-MCNC: 6.4 G/DL (ref 6.4–8.4)
PROTHROMBIN TIME: 14.4 SECONDS (ref 12.3–15)
RBC # BLD AUTO: 4.53 MILLION/UL (ref 3.81–5.12)
SODIUM SERPL-SCNC: 139 MMOL/L (ref 135–147)
WBC # BLD AUTO: 4.72 THOUSAND/UL (ref 4.31–10.16)

## 2024-08-13 PROCEDURE — 73140 X-RAY EXAM OF FINGER(S): CPT

## 2024-08-13 PROCEDURE — 99024 POSTOP FOLLOW-UP VISIT: CPT | Performed by: PHYSICIAN ASSISTANT

## 2024-08-13 NOTE — PROGRESS NOTES
ASSESSMENT/PLAN:    Assessment:   S/p left thumb proximal phalanx fusion of MCP joint and distal radius bone grafting - 5/22/24.    Plan:   Can progress to WB activities as tolerated. Can start to ween out of the splint    Follow Up:  6  week(s)    To Do Next Visit:  Xrays left thumb    _____________________________________________________  CHIEF COMPLAINT:  Chief Complaint   Patient presents with    Left Thumb - Post-op         SUBJECTIVE:  Swetha Lopez is a 74 y.o. female who presents for follow up regarding left thumb MCP fusion from 5/22/24. Patient denies any significant pain. She can see that the swelling is starting to improve more so than before. She denies any issues at this time.    She is going in for another cardiac procedure in 2 weeks.    PAST MEDICAL HISTORY:  Past Medical History:   Diagnosis Date    A-fib ([facility])     Anxiety     resolved 10/4/17    Asthma     seasonal    Atrial fibrillation ([facility]) 01/2022    newly diagnosed on eliquis    Chronic kidney disease     kidney stone    Colon polyp     Disease of thyroid gland     Dysphagia     GERD (gastroesophageal reflux disease)     Goiter, nodular     partial thyroidectomy    Hiatal hernia     repaired 2018    Hiatal hernia with GERD without esophagitis 04/2018    surgical correction    History of esophageal varices with bleeding     History of pernicious anemia     History of transfusion     x1 after bleeding ulcer    Hyperlipidemia     Hypertension     Irritable bowel syndrome     Neck mass     2 small areas left side by jawline and midneck US scheduled 0820/21    Pacemaker-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 cardiac 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 05/09/2024    RA (rheumatoid arthritis) ([facility])     S/P AV kike ablation 5/9/2024 05/09/2024    s/p Medtronic dual chamber PPM 10/24/2023 05/09/2024    Seasonal allergies     Vertigo     Wears glasses     for reading       PAST SURGICAL HISTORY:  Past Surgical History:   Procedure Laterality Date    AV NODE  ABLATION  05/09/2024    BREAST BIOPSY Bilateral     negative    CARDIAC CATHETERIZATION      x2 secondary to exertional chest pain, due to lung issues, possible mild COPD    CARDIAC CATHETERIZATION Left 7/8/2024    Procedure: Cardiac Left Heart Cath;  Surgeon: Dede Oleary DO;  Location: [facility];  Service: Cardiology    CARDIAC CATHETERIZATION N/A 7/8/2024    Procedure: Cardiac Coronary Angiogram;  Surgeon: Dede Oleary DO;  [facility]: [facility];  Service: Cardiology    CARDIAC ELECTROPHYSIOLOGY PROCEDURE N/A 12/23/2022    Procedure: Cardiac eps/afib ablation;  Surgeon: Sunil Eubanks MD;  Location: [facility];  Service: Cardiology    CARDIAC ELECTROPHYSIOLOGY PROCEDURE N/A 12/23/2022    Procedure: Cardiac eps/aflutter ablation;  Surgeon: Sunil Eubanks MD;  Location:  CARDIAC CATH LAB;  Service: Cardiology    CARDIAC ELECTROPHYSIOLOGY PROCEDURE N/A 10/24/2023    Procedure: Cardiac pacer implant DC PM;  Surgeon: Sunil Eubanks MD;  [facility]: [facility];  Service: Cardiology    CARDIAC ELECTROPHYSIOLOGY PROCEDURE N/A 05/09/2024    Procedure: Cardiac eps/av node ablation;  Surgeon: Sunil Eubanks MD;  [facility]: [facility];  Service: Cardiology    CATARACT EXTRACTION Bilateral     CHOLECYSTECTOMY  2015    lap - last assessed 10/4/17    COLONOSCOPY      complete    ESOPHAGOGASTRODUODENOSCOPY N/A 01/23/2018    Procedure: ESOPHAGOGASTRODUODENOSCOPY (EGD);  Surgeon: Richard Bledsoe MD;  Location: [facility] GI LAB;  Service: Gastroenterology    HAND FRACTURE REPAIR Left 5/22/2024    Procedure: LEFT THUMB PROXIMAL PHALANX, INTERNAL FUSION OF METACARPAL PHALANX JOINT, DISTAL RADIUS BONE GRAFT.;  Surgeon: Diane Duvall MD;  Location: [facility] MAIN OR;  Service: Orthopedics    HYSTERECTOMY      partial - ovaries remain    IR UPPER EXTREMITY VENOGRAM- DIAGNOSTIC  8/12/2024    LIPOMA RESECTION      right thigh    SC ESOPHAGOSCOPY FLEXIBLE TRANSORAL WITH BIOPSY  N/A 04/11/2018    Procedure: ESOPHAGOGASTRODUODENOSCOPY (EGD);  Surgeon: Yeison Masterson MD;  Location:  MAIN OR;  Service: Thoracic    VT LAPS RPR PARAESPHGL HRNA INCL FUNDPLSTY W/MESH N/A 04/11/2018    Procedure: REPAIR HERNIA PARAESOPHAGEAL  LAPAROSCOPIC,ELEONORA GASTROPLASTY, TUEPET FUNDOPLICATION;  Surgeon: Yeison Masterson MD;  Location:  MAIN OR;  Service: Thoracic    VT XCAPSL CTRC RMVL INSJ IO LENS PROSTH W/O ECP Right 08/23/2021    Procedure: EXTRACTION EXTRACAPSULAR CATARACT PHACO INTRAOCULAR LENS (IOL);  Surgeon: Malcolm Morris MD;  Location: [facility] MAIN OR;  Service: Ophthalmology    VT XCAPSL CTRC RMVL INSJ IO LENS PROSTH W/O ECP Left 11/15/2021    Procedure: EXTRACTION EXTRACAPSULAR CATARACT PHACO INTRAOCULAR LENS (IOL);  Surgeon: Malcolm Morris MD;  Location: [facility] MAIN OR;  Service: Ophthalmology    SKIN BIOPSY Right     lump benign - last assessed 10/4/17    THYROIDECTOMY, PARTIAL  1977    TONSILLECTOMY      US GUIDED THYROID BIOPSY  02/21/2023       FAMILY HISTORY:  Family History   Problem Relation Age of Onset    Alzheimer's disease Mother     Cancer Mother         skin     Thyroid disease Mother     Ulcerative colitis Mother     Cancer Father         prostate    Stroke Father     Hypertension Father     Prostate cancer Father     Thyroid disease Sister     Ulcerative colitis Sister     Other Sister         open heart surgery    Heart disease Sister     Hyperlipidemia Brother     No Known Problems Maternal Grandmother     No Known Problems Paternal Grandmother     No Known Problems Son     No Known Problems Son     Mental illness Neg Hx        SOCIAL HISTORY:  Social History     Tobacco Use    Smoking status: Never     Passive exposure: Never    Smokeless tobacco: Never   Vaping Use    Vaping status: Never Used   Substance Use Topics    Alcohol use: Not Currently     Comment: stopped 10/2023    Drug use: Never       MEDICATIONS:    Current Outpatient Medications:     acetaminophen  (TYLENOL) 650 mg CR tablet, Take 1 tablet (650 mg total) by mouth every 8 (eight) hours as needed for mild pain, Disp: 30 tablet, Rfl: 0    apixaban (Eliquis) 5 mg, Take 1 tablet (5 mg total) by mouth 2 (two) times a day, Disp: 180 tablet, Rfl: 3    atorvastatin (LIPITOR) 20 mg tablet, TAKE 1 TABLET BY MOUTH DAILY AT  BEDTIME, Disp: 90 tablet, Rfl: 0    bumetanide (BUMEX) 1 mg tablet, TAKE 1 TABLET BY MOUTH DAILY, Disp: 90 tablet, Rfl: 1    Calcium Carb-Cholecalciferol 600-1000 MG-UNIT CAPS, Take by mouth every morning gummy , Disp: , Rfl:     famotidine (PEPCID) 40 MG tablet, Take 1 tablet (40 mg total) by mouth 2 (two) times a day, Disp: 60 tablet, Rfl: 3    metoprolol succinate (TOPROL-XL) 25 mg 24 hr tablet, Take 25 mg by mouth 2 (two) times a day, Disp: , Rfl:     omeprazole (PriLOSEC) 40 MG capsule, TAKE 1 CAPSULE BY MOUTH TWICE  DAILY, Disp: 180 capsule, Rfl: 1    potassium chloride (Klor-Con M10) 10 mEq tablet, Take 1 tablet (10 mEq total) by mouth daily, Disp: , Rfl:     levothyroxine 50 mcg tablet, TAKE 1 TABLET BY MOUTH DAILY, Disp: 90 tablet, Rfl: 1    ALLERGIES:  Allergies   Allergen Reactions    Orange (Diagnostic) - Food Allergy Anaphylaxis     Throat closes    Pantoprazole Headache    Penicillins Other (See Comments)     During allergy testing instructed to NEVER take PCN  Patient has tolerated cefazolin.    Gluten Meal - Food Allergy      Following a gluten free diet on her own    Lactose - Food Allergy Diarrhea    Sulfa Antibiotics Rash       REVIEW OF SYSTEMS:  Pertinent items are noted in HPI.  A comprehensive review of systems was negative.    LABS:  HgA1c:   Lab Results   Component Value Date    HGBA1C 5.5 12/25/2022     BMP:   Lab Results   Component Value Date    CALCIUM 9.2 08/13/2024     09/01/2016    K 3.7 08/13/2024    CO2 28 08/13/2024     08/13/2024    BUN 9 08/13/2024    CREATININE 0.71 08/13/2024           _____________________________________________________  PHYSICAL  "EXAMINATION:  Vital signs: /89   Pulse 79   Ht 5' 5\" (1.651 m)   Wt 94.3 kg (208 lb)   BMI 34.61 kg/m²   General: well developed and well nourished, alert, oriented times 3, and appears comfortable  Psychiatric: Normal  HEENT: Trachea Midline, No torticollis  Cardiovascular: No discernable arrhythmia  Pulmonary: No wheezing or stridor  Abdomen: No rebound or guarding  Extremities: No peripheral edema  Skin: No masses, erythema, lacerations, fluctation, ulcerations  Neurovascular: Sensation Intact to the Median, Ulnar, Radial Nerve, Motor Intact to the Median, Ulnar, Radial Nerve, and Pulses Intact    MUSCULOSKELETAL EXAMINATION:  LEFT SIDE:  thumb  Incision site well healed.   Limited ROM as expected for fusion  Sensation intact  Brisk capillary refill    _____________________________________________________  STUDIES REVIEWED:  Images were reviewed in PACS and demonstrate: stable appearance of orthopedic hardware with bony bridging apparent      PROCEDURES PERFORMED:  Procedures  NO procedures performed today    "

## 2024-08-14 RX ORDER — LEVOTHYROXINE SODIUM 50 UG/1
TABLET ORAL
Qty: 90 TABLET | Refills: 1 | Status: SHIPPED | OUTPATIENT
Start: 2024-08-14

## 2024-08-15 RX ORDER — ATORVASTATIN CALCIUM 20 MG/1
TABLET, FILM COATED ORAL
Qty: 90 TABLET | Refills: 0 | Status: SHIPPED | OUTPATIENT
Start: 2024-08-15

## 2024-08-20 ENCOUNTER — EVALUATION (OUTPATIENT)
Dept: OCCUPATIONAL THERAPY | Facility: CLINIC | Age: 74
End: 2024-08-20
Payer: COMMERCIAL

## 2024-08-20 DIAGNOSIS — S62.512A CLOSED DISPLACED FRACTURE OF PROXIMAL PHALANX OF LEFT THUMB, INITIAL ENCOUNTER: Primary | ICD-10-CM

## 2024-08-20 PROCEDURE — 97166 OT EVAL MOD COMPLEX 45 MIN: CPT

## 2024-08-20 NOTE — PROGRESS NOTES
OT Evaluation     Today's date: 2024  Patient name: Swetha Lopez  : 1950  MRN: [id number]  Referring provider: Salazar Palmer*  Dx:   Encounter Diagnosis     ICD-10-CM    1. Closed displaced fracture of proximal phalanx of left thumb, initial encounter  S62.512A Ambulatory Referral to PT/OT Hand Therapy          Start Time: 1015  Stop Time: 1100  Total time in clinic (min): 45 minutes    Assessment  Impairments: abnormal or restricted ROM, activity intolerance, impaired physical strength, lacks appropriate home exercise program, pain with function, weight-bearing intolerance and activity limitations  Understanding of Dx/Px/POC: good     Prognosis: good    Goals  Short Term Goals by 2 - 4 weeks:     Establish HEP to increase performance with daily activities.     Patient will increase ROM of LUE by at least 10 degrees to complete ADLs.     Patient will decrease pain rate of LUE by at least 2 points per the VAS scale.     Decreased Edema by at least 2 cm to assist in completing ADLs.         Long Term Goals by discharge:    Establish final home exercise program to enhance maximal functional level with ADLs.    Achieve functional active range of motion of LUE for full return to household chores.                             Achieve functional strength of LUE for full return to high level ADLs.               Plan  Patient would benefit from: skilled occupational therapy, custom splinting, orthotics and OT eval  Planned modality interventions: electrical stimulation/Russian stimulation, cryotherapy, TENS, thermotherapy: hydrocollator packs, thermotherapy: paraffin bath, ultrasound and unattended electrical stimulation    Planned therapy interventions: IASTM, joint mobilization, kinesiology taping, manual therapy, massage, patient/caregiver education, orthotic management and training, orthotic fitting/training, therapeutic activities, strengthening, stretching, therapeutic exercise, home exercise  program, graded exercise, graded activity, flexibility and functional ROM exercises    Frequency: 1-2x week  Duration in weeks: 8  Plan of Care beginning date: 2024  Plan of Care expiration date: 10/15/2024  Treatment plan discussed with: patient        Subjective Evaluation    History of Present Illness  Date of surgery: 2024  Mechanism of injury: surgery  Mechanism of injury: Patient is a 74 y.o. RHD female who presents for OT IE and treatment for s/p 2024 left thumb proximal phalanx fusion of MCP joint and distal radius bone grating performed by Dr. Duvall. Patient reports sustaining a fall on 2024 where she tripped and fell due to not picking her foot up when walking into the kitchen (1/2 step). Patient was treated prior to surgical management which includes conservative management which included splinting and casting with no improvement. Patient notes having pins and needle sensation to the median nerve distrubution digits, specifically the thumb. Patient has transitioned out of the wrist cock up brace at this time and was instructed to progress into WB tasks within tolerance. Patient notes difficulty with FMC tasks, such as donning/doffing bra, zippers, and buttons. 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 She is schedule for a  cardiology procedure to change pacer maker on 2024. Patient referred by Salazar Palmer PA-C to initiate treatment including hand therapy.       Quality of life: good    Patient Goals  Patient goals for therapy: decreased edema, decreased pain, increased motion, increased strength, return to sport/leisure activities and independence with ADLs/IADLs  Patient goal: putting bra on, zipping and buttoning clothes  Pain  Current pain ratin  At best pain ratin  At worst pain ratin  Quality: needle-like, cramping, sharp, radiating and throbbing    Social Support  Lives with:  spouse    Employment status: not working  Hand dominance: right    Treatments  Previous treatment: immobilization        Objective     Active Range of Motion     Left Wrist   Wrist flexion: 29 degrees   Wrist extension: 50 degrees   Radial deviation: 16 degrees   Ulnar deviation: 14 degrees with pain      Right Wrist   Wrist flexion: 55 degrees   Wrist extension: 66 degrees   Radial deviation: 20 degrees   Ulnar deviation: 10 degrees     Left Thumb   Flexion     CMC: 10 degrees    MP: 28 degrees    DIP: 4 degrees  Extension     CMC: 2 degrees    MP: 20 degrees    DIP: 4 degrees  Palmar Abduction     CMC: 41 degrees  Radial abduction    CMC: 39 degrees      Right Thumb   Flexion     CMC: 10    MP: 64    DIP: 54  Extension     CMC: 0    MP: 0    DIP: 2  Palmar Abduction    CMC: 46  Radial Abduction    CMC: 45    Additional Active Range of Motion Details  Approximately 3.5cm to 4.5cm away from DPC for the left hand.  - difficulty opposing thumb to all digits        Strength/Myotome Testing     Right Wrist/Hand      (2nd hand position)     Trial 1: 25    Thumb Strength   Key/Lateral Pinch     Trial 1: 8.5  Tip/Two-Point Pinch     Trial 1: 1  Palmar/Three-Point Pinch     Trial 1: 3    Additional Strength Details  Functional strength deferred for the left upper extremity secondary to ROM deficits.               Precautions: S/p left thumb proximal phalanx fusion of MCP joint and distal radius bone grafting - 5/22/24.      Auth Tracker  Auth Status Total   Visits  Expiration date Co-Insurance   Approved BOMN                                 Visit Tracker: NO AUTH REQ. BOMN  Date 8/20  IE                                                                                       Manuals HEP 8/20/2024                       Ther Ex     Patient education on Dx and HEP  x5min   Wrist AROM x 3x10   Prayer stretch x x5 10 sec hold   Flexor tendon glides x 3x10   Thumb Opposition x 3x10        Ther Activity                                    Modalities     [facility]  x5min            Assessment:   Patient tolerated session well. Upon today's evaluation, patient demonstrates ROM deficits. Functional strength deferred at this time secondary to ROM deficits. Patient session focused on patient education on anatomy and physiology concerning current dx, techniques for decreasing deficits through HEP, and appropriate use of modalities. Patient educated on HEP with verbal instructions and handouts for patient reference. Patient educated on treatment plan at this time. Patient benefiting from skilled hand therapy OT to reduce deficits to improve independence with daily activities.       Plan:   Focus on AROM, AAROM, PROM, flexor tendon glides, FMC coordination, desensitization, IASTM, cupping, manual, strengthening and all modalities as seen fit to improve ability to complete daily activites with ease.  POC: 8/20/2024 - 10/15/2024

## 2024-08-23 ENCOUNTER — TELEPHONE (OUTPATIENT)
Dept: FAMILY MEDICINE CLINIC | Facility: CLINIC | Age: 74
End: 2024-08-23

## 2024-08-23 NOTE — TELEPHONE ENCOUNTER
Patient's  (Godfrey) dropped off Placard form for Dr. Danielle to complete. Informed Godfrey that Dr Danielle will return on Monday 8/26/24 and will review then. Patient's portion is filled out. Placed form in Dr Danielle's folder. Please call Godfrey @ [phone] when form is ready

## 2024-08-24 DIAGNOSIS — K21.00 GASTROESOPHAGEAL REFLUX DISEASE WITH ESOPHAGITIS WITHOUT HEMORRHAGE: ICD-10-CM

## 2024-08-25 RX ORDER — FAMOTIDINE 40 MG/1
40 TABLET, FILM COATED ORAL 2 TIMES DAILY
Qty: 180 TABLET | Refills: 1 | Status: SHIPPED | OUTPATIENT
Start: 2024-08-25

## 2024-08-26 ENCOUNTER — ANESTHESIA EVENT (OUTPATIENT)
Dept: NON INVASIVE DIAGNOSTICS | Facility: HOSPITAL | Age: 74
End: 2024-08-26
Payer: COMMERCIAL

## 2024-08-27 ENCOUNTER — ANESTHESIA (OUTPATIENT)
Dept: NON INVASIVE DIAGNOSTICS | Facility: HOSPITAL | Age: 74
End: 2024-08-27
Payer: COMMERCIAL

## 2024-08-27 ENCOUNTER — APPOINTMENT (OUTPATIENT)
Dept: RADIOLOGY | Facility: HOSPITAL | Age: 74
End: 2024-08-27
Payer: COMMERCIAL

## 2024-08-27 ENCOUNTER — HOSPITAL ENCOUNTER (OUTPATIENT)
Facility: HOSPITAL | Age: 74
Setting detail: OUTPATIENT SURGERY
Discharge: HOME | End: 2024-08-27
Attending: INTERNAL MEDICINE | Admitting: INTERNAL MEDICINE
Payer: COMMERCIAL

## 2024-08-27 VITALS
DIASTOLIC BLOOD PRESSURE: 60 MMHG | RESPIRATION RATE: 16 BRPM | HEART RATE: 59 BPM | BODY MASS INDEX: 33.32 KG/M2 | OXYGEN SATURATION: 94 % | HEIGHT: 65 IN | SYSTOLIC BLOOD PRESSURE: 86 MMHG | TEMPERATURE: 98 F | WEIGHT: 200 LBS

## 2024-08-27 DIAGNOSIS — I49.5 TACHY-BRADY SYNDROME (HCC): ICD-10-CM

## 2024-08-27 DIAGNOSIS — I42.8 NONISCHEMIC CARDIOMYOPATHY (HCC): Primary | ICD-10-CM

## 2024-08-27 LAB
ANION GAP SERPL CALCULATED.3IONS-SCNC: 8 MMOL/L (ref 4–13)
ATRIAL RATE: 250 BPM
ATRIAL RATE: 375 BPM
BUN SERPL-MCNC: 10 MG/DL (ref 5–25)
CALCIUM SERPL-MCNC: 9.2 MG/DL (ref 8.4–10.2)
CHLORIDE SERPL-SCNC: 103 MMOL/L (ref 96–108)
CO2 SERPL-SCNC: 30 MMOL/L (ref 21–32)
CREAT SERPL-MCNC: 0.84 MG/DL (ref 0.6–1.3)
ERYTHROCYTE [DISTWIDTH] IN BLOOD BY AUTOMATED COUNT: 12.6 % (ref 11.6–15.1)
GFR SERPL CREATININE-BSD FRML MDRD: 68 ML/MIN/1.73SQ M
GLUCOSE P FAST SERPL-MCNC: 103 MG/DL (ref 65–99)
GLUCOSE SERPL-MCNC: 103 MG/DL (ref 65–140)
HCT VFR BLD AUTO: 41.2 % (ref 34.8–46.1)
HGB BLD-MCNC: 13.6 G/DL (ref 11.5–15.4)
INR PPP: 1.25 (ref 0.85–1.19)
MCH RBC QN AUTO: 30.2 PG (ref 26.8–34.3)
MCHC RBC AUTO-ENTMCNC: 33 G/DL (ref 31.4–37.4)
MCV RBC AUTO: 92 FL (ref 82–98)
PLATELET # BLD AUTO: 179 THOUSANDS/UL (ref 149–390)
PMV BLD AUTO: 10.1 FL (ref 8.9–12.7)
POTASSIUM SERPL-SCNC: 3.5 MMOL/L (ref 3.5–5.3)
PROTHROMBIN TIME: 15.9 SECONDS (ref 12.3–15)
QRS AXIS: 153 DEGREES
QRS AXIS: 270 DEGREES
QRSD INTERVAL: 136 MS
QRSD INTERVAL: 168 MS
QT INTERVAL: 458 MS
QT INTERVAL: 494 MS
QTC INTERVAL: 494 MS
QTC INTERVAL: 497 MS
RBC # BLD AUTO: 4.5 MILLION/UL (ref 3.81–5.12)
SODIUM SERPL-SCNC: 141 MMOL/L (ref 135–147)
T WAVE AXIS: -12 DEGREES
T WAVE AXIS: 118 DEGREES
VENTRICULAR RATE: 61 BPM
VENTRICULAR RATE: 70 BPM
WBC # BLD AUTO: 5.36 THOUSAND/UL (ref 4.31–10.16)

## 2024-08-27 PROCEDURE — 80048 BASIC METABOLIC PNL TOTAL CA: CPT | Performed by: PHYSICIAN ASSISTANT

## 2024-08-27 PROCEDURE — C1887 CATHETER, GUIDING: HCPCS | Performed by: INTERNAL MEDICINE

## 2024-08-27 PROCEDURE — C2621 PMKR, OTHER THAN SING/DUAL: HCPCS | Performed by: INTERNAL MEDICINE

## 2024-08-27 PROCEDURE — 33225 L VENTRIC PACING LEAD ADD-ON: CPT | Performed by: INTERNAL MEDICINE

## 2024-08-27 PROCEDURE — 85610 PROTHROMBIN TIME: CPT | Performed by: PHYSICIAN ASSISTANT

## 2024-08-27 PROCEDURE — C1751 CATH, INF, PER/CENT/MIDLINE: HCPCS | Performed by: INTERNAL MEDICINE

## 2024-08-27 PROCEDURE — C1900 LEAD, CORONARY VENOUS: HCPCS | Performed by: INTERNAL MEDICINE

## 2024-08-27 PROCEDURE — C1730 CATH, EP, 19 OR FEW ELECT: HCPCS | Performed by: INTERNAL MEDICINE

## 2024-08-27 PROCEDURE — C1769 GUIDE WIRE: HCPCS | Performed by: INTERNAL MEDICINE

## 2024-08-27 PROCEDURE — 76937 US GUIDE VASCULAR ACCESS: CPT | Performed by: INTERNAL MEDICINE

## 2024-08-27 PROCEDURE — 93005 ELECTROCARDIOGRAM TRACING: CPT

## 2024-08-27 PROCEDURE — 33229 REMV&REPLC PM GEN MULT LEADS: CPT | Performed by: INTERNAL MEDICINE

## 2024-08-27 PROCEDURE — 71045 X-RAY EXAM CHEST 1 VIEW: CPT

## 2024-08-27 PROCEDURE — C1892 INTRO/SHEATH,FIXED,PEEL-AWAY: HCPCS | Performed by: INTERNAL MEDICINE

## 2024-08-27 PROCEDURE — NC001 PR NO CHARGE: Performed by: PHYSICIAN ASSISTANT

## 2024-08-27 PROCEDURE — 93010 ELECTROCARDIOGRAM REPORT: CPT | Performed by: INTERNAL MEDICINE

## 2024-08-27 PROCEDURE — 85027 COMPLETE CBC AUTOMATED: CPT | Performed by: PHYSICIAN ASSISTANT

## 2024-08-27 DEVICE — CRTP W4TR02 SERENA QUAD CRTP MRI US
Type: IMPLANTABLE DEVICE | Site: CHEST  WALL | Status: FUNCTIONAL
Brand: SERENA™ QUAD CRT-P MRI SURESCAN™

## 2024-08-27 DEVICE — ENVELOPE CMRM6122 ABSORB MED MR
Type: IMPLANTABLE DEVICE | Site: CHEST  WALL | Status: FUNCTIONAL
Brand: TYRX™

## 2024-08-27 DEVICE — LEAD 459888 MRI S-TIP US
Type: IMPLANTABLE DEVICE | Site: HEART | Status: FUNCTIONAL
Brand: ATTAIN PERFORMA™ S MRI SURESCAN™

## 2024-08-27 RX ORDER — VANCOMYCIN HYDROCHLORIDE 1 G/200ML
1000 INJECTION, SOLUTION INTRAVENOUS ONCE
Status: COMPLETED | OUTPATIENT
Start: 2024-08-27 | End: 2024-08-27

## 2024-08-27 RX ORDER — LIDOCAINE HYDROCHLORIDE 10 MG/ML
INJECTION, SOLUTION EPIDURAL; INFILTRATION; INTRACAUDAL; PERINEURAL CODE/TRAUMA/SEDATION MEDICATION
Status: DISCONTINUED | OUTPATIENT
Start: 2024-08-27 | End: 2024-08-27 | Stop reason: HOSPADM

## 2024-08-27 RX ORDER — FENTANYL CITRATE 50 UG/ML
INJECTION, SOLUTION INTRAMUSCULAR; INTRAVENOUS AS NEEDED
Status: DISCONTINUED | OUTPATIENT
Start: 2024-08-27 | End: 2024-08-27

## 2024-08-27 RX ORDER — SODIUM CHLORIDE 9 MG/ML
20 INJECTION, SOLUTION INTRAVENOUS CONTINUOUS
Status: DISCONTINUED | OUTPATIENT
Start: 2024-08-27 | End: 2024-08-27 | Stop reason: HOSPADM

## 2024-08-27 RX ORDER — PROPOFOL 10 MG/ML
INJECTION, EMULSION INTRAVENOUS CONTINUOUS PRN
Status: DISCONTINUED | OUTPATIENT
Start: 2024-08-27 | End: 2024-08-27

## 2024-08-27 RX ORDER — ONDANSETRON 2 MG/ML
INJECTION INTRAMUSCULAR; INTRAVENOUS AS NEEDED
Status: DISCONTINUED | OUTPATIENT
Start: 2024-08-27 | End: 2024-08-27

## 2024-08-27 RX ORDER — PHENYLEPHRINE HCL IN 0.9% NACL 1 MG/10 ML
SYRINGE (ML) INTRAVENOUS AS NEEDED
Status: DISCONTINUED | OUTPATIENT
Start: 2024-08-27 | End: 2024-08-27

## 2024-08-27 RX ORDER — ACETAMINOPHEN 325 MG/1
650 TABLET ORAL EVERY 4 HOURS PRN
Status: DISCONTINUED | OUTPATIENT
Start: 2024-08-27 | End: 2024-08-27 | Stop reason: HOSPADM

## 2024-08-27 RX ORDER — GENTAMICIN 40 MG/ML
INJECTION, SOLUTION INTRAMUSCULAR; INTRAVENOUS CODE/TRAUMA/SEDATION MEDICATION
Status: DISCONTINUED | OUTPATIENT
Start: 2024-08-27 | End: 2024-08-27 | Stop reason: HOSPADM

## 2024-08-27 RX ADMIN — FENTANYL CITRATE 25 MCG: 50 INJECTION INTRAMUSCULAR; INTRAVENOUS at 09:05

## 2024-08-27 RX ADMIN — SODIUM CHLORIDE 20 ML/HR: 0.9 INJECTION, SOLUTION INTRAVENOUS at 07:10

## 2024-08-27 RX ADMIN — VANCOMYCIN HYDROCHLORIDE 1000 MG: 1 INJECTION, SOLUTION INTRAVENOUS at 07:48

## 2024-08-27 RX ADMIN — Medication 100 MCG: at 08:25

## 2024-08-27 RX ADMIN — PROPOFOL 100 MCG/KG/MIN: 10 INJECTION, EMULSION INTRAVENOUS at 08:10

## 2024-08-27 RX ADMIN — PHENYLEPHRINE HYDROCHLORIDE 20 MCG/MIN: 10 INJECTION INTRAVENOUS at 08:26

## 2024-08-27 RX ADMIN — ACETAMINOPHEN 650 MG: 325 TABLET ORAL at 09:52

## 2024-08-27 RX ADMIN — ONDANSETRON 4 MG: 2 INJECTION INTRAMUSCULAR; INTRAVENOUS at 09:04

## 2024-08-27 RX ADMIN — FENTANYL CITRATE 25 MCG: 50 INJECTION INTRAMUSCULAR; INTRAVENOUS at 09:11

## 2024-08-27 RX ADMIN — FENTANYL CITRATE 50 MCG: 50 INJECTION INTRAMUSCULAR; INTRAVENOUS at 08:05

## 2024-08-27 NOTE — H&P
H&P Exam - Cardiology   Swetha Lopez 74 y.o. female MRN: [id number]  Unit/Bed#: [facility] CATH [facility] ROOM Encounter: [id number]    Assessment & Plan     Assessment:  Nonischemic cardiomyopathy with LVEF 35% per echo 6/2024, presumed to be pacing induced  4/2024 - LVEF previously 55% prior to AV node ablation 5/2024 7/2024 - no obstructive CAD per cardiac catheterization (mild diffuse disease noted in LAD and left circumflex, moderate disease and ectatic RCA)  Near 100% ventricular pacing since AV node ablation 5/2024  Maintained on Toprol-XL, previously on losartan however discontinued when uptitration of beta-blocker needed (has not been reintroduced since AV node ablation)  Recurrent symptomatic persistent atrial fibrillation with breakthrough despite antiarrhythmics and multiple ablations, status post AV node ablation 5/2024 1/2022 - initially diagnosed, started on Eliquis and Toprol-XL  10/2022 - status post successful cardioversion with quick reversion back to A-fib  11/2022 - repeat cardioversion after being started on flecainide antiarrhythmic therapy, again quickly reverted back to A-fib  12/2022 - RF ablation with pulmonary vein isolation, posterior wall isolation, and typical CTI flutter line --- initially continued on flecainide and Toprol-XL  8/2023 - flecainide and Toprol discontinued due to tachybrady syndrome, restarted after device implantation 10/2023  12/2023 - flecainide discontinued due to possible side effects, diltiazem utilized for better rate control  3/2024 - admission for dofetilide initiation  4/2024 - recurrent symptomatic A-fib despite dofetilide, thus AV node ablation pursued  5/2024 - AV node ablation  Maintained on Eliquis anticoagulation ([id number] = 6)  Tachybrady syndrome, with Medtronic dual-chamber pacemaker in situ (implanted 10/2023)  TIA noted in the initial postablation setting, all imaging negative  Nonobstructive CAD with mild diffuse disease noted in LAD and left  circumflex, moderate disease and ectatic RCA per cardiac cath 7/2024  Hypertension  Hyperlipidemia  Chronic lower extremity edema, maintained on Bumex  Pulmonary hypertension with moderate tricuspid regurgitation  Hypothyroidism, maintained on Synthroid  Obesity with BMI 34      Plan:  Upgrade to biventricular pacemaker.  She previously underwent venogram 8/2024 which showed venous patency.    Require several hours of bedrest and postoperative chest x-ray to confirm lead placement and rule out injury to lungs.  Potential discharge later today, but will defer to attending physician.      History of Present Illness   HPI:  Swetha Lopez is a 74 y.o. year old female with symptomatic persistent atrial fibrillation status post AV node ablation, tachybrady syndrome with Medtronic dual-chamber pacemaker in situ, nonischemic cardiomyopathy with newly reduced LVEF 35% per echo 6/2024, chronic lower extremity edema, hypertension, hyperlipidemia, pulmonary hypertension with moderate tricuspid regurgitation, reported history of mild CAD by remote catheterization, hypothyroidism with prior hemithyroidectomy maintained on Synthroid, and obesity with BMI 34.  She typically follows with Dr. Ness as an outpatient, and initially established with him 1/2022 after being found to be in atrial fibrillation by her PCP.  At that time she was started on Eliquis anticoagulation along with metoprolol for rate control.  Cardioversion was discussed at several office visit, and eventually pursue 10/2022.  While it was successful, she quickly reverted back to atrial fibrillation.  After a negative nuclear stress test, she was started on flecainide antiarrhythmic therapy and underwent repeat cardioversion 11/2022.  Unfortunately, this only lasted several hours before she went back into atrial fibrillation.  She was thus seen in EP consultation by Dr. Eubanks 12/2022, at which time an ablation was recommended.  Later that month she  underwent RF ablation with pulmonary vein isolation, posterior wall isolation, and typical flutter line.  In the immediate postop setting she reported nausea and visual changes, thus she underwent stat CT and eventual MRI which ruled out acute findings.  She was seen by neurology, and diagnosed with a TIA.  She also had some postop hypotension, and echocardiogram ruled out effusion.  Her antihypertensives were reduced upon discharge, she was maintained on low-dose flecainide 50 mg twice daily.  Outpatient monitoring showed evidence of tachybrady syndrome, and thus flecainide and Toprol were discontinued and device implantation was recommended.  In 10/2023 she underwent Medtronic dual-chamber pacemaker implantation, and was restarted on flecainide and Toprol.  She was seen in follow-up 12/2023, at which time flecainide was discontinued due to possible side effects and instead diltiazem was utilized for better rate control.  While she felt better on diltiazem, she continued to have breakthrough A-fib and that she was admitted for dofetilide initiation 3/2024.  Despite dofetilide, she had recurrent symptomatic atrial fibrillation 4/2024, at which time AV node ablation was recommended given her recurrent A-fib despite multiple treatments.  This was performed 5/2024.  She had a follow-up echo performed 6/2024, which unfortunately showed reduction in LVEF from 55% down to 35%.  She underwent cardiac catheterization which showed no obstructive CAD, thus it was felt to be pacemaker induced.  She underwent outpatient venogram which showed venous patency, and upgrade to a biventricular pacemaker was recommended.  She presents today to undergo that procedure.      Review of Systems  ROS as noted above, otherwise 12 point review of systems was performed and is negative.       Historical Information   Past Medical History:   Diagnosis Date    A-fib (HCC)     Anxiety     resolved 10/4/17    Asthma     seasonal    Atrial  fibrillation (HCC) 01/2022    newly diagnosed on eliquis    Chronic kidney disease     kidney stone    Colon polyp     Disease of thyroid gland     Dysphagia     GERD (gastroesophageal reflux disease)     Goiter, nodular     partial thyroidectomy    Hiatal hernia     repaired 2018    Hiatal hernia with GERD without esophagitis 04/2018    surgical correction    History of esophageal varices with bleeding     History of pernicious anemia     History of transfusion     x1 after bleeding ulcer    Hyperlipidemia     Hypertension     Irritable bowel syndrome     Neck mass     2 small areas left side by jawline and midneck US scheduled 0820/21    Pacemaker-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 cardiac 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 05/09/2024    RA (rheumatoid arthritis) (HCC)     S/P AV kike ablation 5/9/2024 05/09/2024    s/p Medtronic dual chamber PPM 10/24/2023 05/09/2024    Seasonal allergies     Vertigo     Wears glasses     for reading     Past Surgical History:   Procedure Laterality Date    AV NODE ABLATION  05/09/2024    BREAST BIOPSY Bilateral     negative    CARDIAC CATHETERIZATION      x2 secondary to exertional chest pain, due to lung issues, possible mild COPD    CARDIAC CATHETERIZATION Left 7/8/2024    Procedure: Cardiac Left Heart Cath;  Surgeon: Dede Oleary DO;  Location: [facility];  Service: Cardiology    CARDIAC CATHETERIZATION N/A 7/8/2024    Procedure: Cardiac Coronary Angiogram;  Surgeon: Dede Oleary DO;  Location: [facility];  Service: Cardiology    CARDIAC ELECTROPHYSIOLOGY PROCEDURE N/A 12/23/2022    Procedure: Cardiac eps/afib ablation;  Surgeon: Sunil Eubanks MD;  Location: [facility];  Service: Cardiology    CARDIAC ELECTROPHYSIOLOGY PROCEDURE N/A 12/23/2022    Procedure: Cardiac eps/aflutter ablation;  Surgeon: Sunil Eubanks MD;  Location: [facility];  Service: Cardiology    CARDIAC ELECTROPHYSIOLOGY PROCEDURE N/A 10/24/2023    Procedure:  Cardiac pacer implant DC PM;  Surgeon: Sunil Eubanks MD;  Location:  CARDIAC CATH LAB;  Service: Cardiology    CARDIAC ELECTROPHYSIOLOGY PROCEDURE N/A 05/09/2024    Procedure: Cardiac eps/av node ablation;  Surgeon: Sunil Eubanks MD;  Location:  CARDIAC CATH LAB;  Service: Cardiology    CATARACT EXTRACTION Bilateral     CHOLECYSTECTOMY  2015    lap - last assessed 10/4/17    COLONOSCOPY      complete    ESOPHAGOGASTRODUODENOSCOPY N/A 01/23/2018    Procedure: ESOPHAGOGASTRODUODENOSCOPY (EGD);  Surgeon: Richard Bledsoe MD;  Location: [facility] GI LAB;  Service: Gastroenterology    HAND FRACTURE REPAIR Left 5/22/2024    Procedure: LEFT THUMB PROXIMAL PHALANX, INTERNAL FUSION OF METACARPAL PHALANX JOINT, DISTAL RADIUS BONE GRAFT.;  Surgeon: Diane Duvall MD;  Location: [facility] MAIN OR;  Service: Orthopedics    HYSTERECTOMY      partial - ovaries remain    IR UPPER EXTREMITY VENOGRAM- DIAGNOSTIC  8/12/2024    LIPOMA RESECTION      right thigh    SD ESOPHAGOSCOPY FLEXIBLE TRANSORAL WITH BIOPSY N/A 04/11/2018    Procedure: ESOPHAGOGASTRODUODENOSCOPY (EGD);  Surgeon: Yeison Masterson MD;  Location:  MAIN OR;  Service: Thoracic    SD LAPS RPR PARAESPHGL HRNA INCL FUNDPLSTY W/MESH N/A 04/11/2018    Procedure: REPAIR HERNIA PARAESOPHAGEAL  LAPAROSCOPIC,ELEONORA GASTROPLASTY, TUEPET FUNDOPLICATION;  Surgeon: Yeison Masterson MD;  Location:  MAIN OR;  Service: Thoracic    SD XCAPSL CTRC RMVL INSJ IO LENS PROSTH W/O ECP Right 08/23/2021    Procedure: EXTRACTION EXTRACAPSULAR CATARACT PHACO INTRAOCULAR LENS (IOL);  Surgeon: Malcolm Morris MD;  Location: [facility] MAIN OR;  Service: Ophthalmology    SD XCAPSL CTRC RMVL INSJ IO LENS PROSTH W/O ECP Left 11/15/2021    Procedure: EXTRACTION EXTRACAPSULAR CATARACT PHACO INTRAOCULAR LENS (IOL);  Surgeon: Malcolm Morris MD;  Location: [facility] MAIN OR;  Service: Ophthalmology    SKIN BIOPSY Right     lump benign - last assessed 10/4/17    THYROIDECTOMY, PARTIAL  1977     TONSILLECTOMY      US GUIDED THYROID BIOPSY  02/21/2023     Family History:   Family History   Problem Relation Age of Onset    Alzheimer's disease Mother     Cancer Mother         skin     Thyroid disease Mother     Ulcerative colitis Mother     Cancer Father         prostate    Stroke Father     Hypertension Father     Prostate cancer Father     Thyroid disease Sister     Ulcerative colitis Sister     Other Sister         open heart surgery    Heart disease Sister     Hyperlipidemia Brother     No Known Problems Maternal Grandmother     No Known Problems Paternal Grandmother     No Known Problems Son     No Known Problems Son     Mental illness Neg Hx        Social History   Social History     Substance and Sexual Activity   Alcohol Use Not Currently    Comment: stopped 10/2023     Social History     Substance and Sexual Activity   Drug Use Never     Social History     Tobacco Use   Smoking Status Never    Passive exposure: Never   Smokeless Tobacco Never         Meds/Allergies   all medications and allergies reviewed  Home Medications:   Medications Prior to Admission:     acetaminophen (TYLENOL) 650 mg CR tablet    apixaban (Eliquis) 5 mg    atorvastatin (LIPITOR) 20 mg tablet    bumetanide (BUMEX) 1 mg tablet    Calcium Carb-Cholecalciferol 600-1000 MG-UNIT CAPS    famotidine (PEPCID) 40 MG tablet    levothyroxine 50 mcg tablet    metoprolol succinate (TOPROL-XL) 25 mg 24 hr tablet    omeprazole (PriLOSEC) 40 MG capsule    potassium chloride (Klor-Con M10) 10 mEq tablet    Allergies   Allergen Reactions    Orange (Diagnostic) - Food Allergy Anaphylaxis     Throat closes    Pantoprazole Headache    Penicillins Other (See Comments)     During allergy testing instructed to NEVER take PCN  Patient has tolerated cefazolin.    Gluten Meal - Food Allergy      Following a gluten free diet on her own    Lactose - Food Allergy Diarrhea    Sulfa Antibiotics Rash       Objective   Vitals: not currently  "breastfeeding.      No intake or output data in the 24 hours ending 08/27/24 0658    Invasive Devices       None                   Physical Exam  GEN: NAD, alert and oriented x 3, well appearing  SKIN: dry without significant lesions or rashes  HEENT: NCAT, PERRL, EOMs intact  NECK: No JVD appreciated  CARDIOVASCULAR: RRR  LUNGS: Clear to auscultation bilaterally without wheezes, rhonchi, or rales  ABDOMEN: Soft, nontender, nondistended  EXTREMITIES/VASCULAR: perfused without clubbing, cyanosis, or LE edema b/l  PSYCH: Normal mood and affect  NEURO: CN ll-Xll grossly intact      Lab Results: I have personally reviewed pertinent lab results.              Invalid input(s): \"LABGLOM\"              Imaging: I have personally reviewed pertinent reports.      ECHO: No results found for this or any previous visit.      Results for orders placed during the hospital encounter of 04/16/24    Echo complete w/ contrast if indicated    Interpretation Summary    Left Ventricle: Left ventricular cavity size is normal. Wall thickness is normal. The left ventricular ejection fraction is 55% by visual estimation. Systolic function is normal. Wall motion is normal. Diastolic function is moderately abnormal, consistent with grade II (pseudonormal) relaxation.    Right Ventricle: Systolic function is normal. Normal tricuspid annular plane systolic excursion (TAPSE) > 1.7 cm.    Left Atrium: The atrium is severely dilated by index volume of 77 ml/m2    Right Atrium: The atrium is mildly dilated.    Mitral Valve: There is mild regurgitation.    Tricuspid Valve: There is mild regurgitation. The right ventricular systolic pressure is moderately to severely elevated with peak pressure of 60mmHg    Prior TTE study available for comparison. Prior study date: 12/24/2022. LV size and function remain in normal range. Increased filling pressures remain. Index left atrial size is now in severe range. Pulmonary pressure remain elevated. Less " prominent tricuspid regurgitation.        EKG: pending        Code Status: Level 1 - Full Code

## 2024-08-27 NOTE — Clinical Note
The PACER GENERATOR TONIE QUAD CRT-P MRI BIV - [id number] device was inserted. The leads were placed into the connector and visually verified to be in correct position. Injury current obtained. Disconnected the existing device & re-connected leads to new generator

## 2024-08-27 NOTE — Clinical Note
Accessed site: left axillary vein. Ultrasound guidance via Micropuncture Kit; Exchanged for 0.035 wire

## 2024-08-27 NOTE — DISCHARGE INSTR - AVS FIRST PAGE
Please schedule an echocardiogram in about 3 months (to be done prior to your scheduled office follow up with Dr. Eubanks 12/2024). Call [phone] to arrange.     Please refer to post device implantation discharge instructions and restrictions below and your device booklet/temporary card.      Keep incision dry for one week. Leave outer bandage in place for 1 week - it is water proof, and as long as it is fully adhered to your skin you may shower with it.  If it appears as though the bandage is coming off and/or there is any communication to the area of device incision, please then keep the whole area dry for the remaining week.  After 1 week, please remove by pulling all edges away from the center of the bandage. After the bandage is removed, you may then shower normally and get the area wet with soap and water, no scrubbing, and pat dry. Do not use lotions/powders/creams on incision.    No overhead reaching/pushing/pulling/lifting greater than 5-10lbs with left arm for six weeks. Please call the office if you notice redness, swelling, bleeding, or drainage from incision or if you develop fevers    Cardiac Resynchronization Therapy (CRT)    If you have any questions, please call [phone] to speak with a nurse (8:30am-4pm, or [phone] after hours). For appointments, please call [phone].    WHAT YOU SHOULD KNOW:    Your device is a biventricular pacemaker, which delivers resynchronization therapy. Cardiac resynchronization therapy (CRT) is a procedure used to treat problems with how your heart contracts. CRT is also called biventricular pacing. Your heart has 2 upper chambers, called atria, and 2 lower chambers, called ventricles. Your heartbeat is synchronized when all areas of your heart contract together properly. When the areas of your heart do not contract as they should, your heart cannot pump enough blood and oxygen to your body. You may have trouble breathing, tire easily, and have swelling  in your legs and feet. With a biventricular device, there is one wire in the right atria (in most cases), one wire in the right ventricle, and a third wire that goes through a vein and wraps around to your left ventricle. The two ventricular wires work together to help your heart contract more synchronously and efficiently.               AFTER YOU LEAVE:      Medicines:   Heart medicine may be given to help your heart beat strongly and regularly. Ask your healthcare provider for more information about heart medicines.    Take your medicine as directed. Contact your healthcare provider if you think your medicine is not helping or if you have side effects. Tell him if you are allergic to any medicine. Keep a list of the medicines, vitamins, and herbs you take. Include the amounts, and when and why you take them. Bring the list or the pill bottles to follow-up visits. Carry your medicine list with you in case of an emergency.    Driving: you are able to drive 48 hours after device is implanted    Follow up with your healthcare provider as directed: You will need to return to have your pacemaker checked. You may also need an EKG, echocardiogram, or chest x-ray to check the leads in your heart, and your doctor will order these tests if necessary. Write down your questions so you remember to ask them during your visits.    Device safety: Some electrical devices may interfere with how your pacemaker works. Some examples are cell phones, security systems, power cables, and electric monitors. Ask your healthcare provider how long you can be near these devices, and which devices to avoid. Tell caregivers you have a pacemaker before you have a procedure or surgery.    Wound care: Care for your wound as directed. Keep incision dry for one week. Do not use lotions/powders/creams on incision. Leave outer bandage in place for 1 week - it is water proof, and as long as it is fully adhered to your skin you may shower with it.  If it  appears as though the bandage is coming off and/or there is any communication to the area of device incision, please then keep the whole area dry for the remaining week.  After 1 week, please remove by pulling all edges away from the center of the bandage. Leave underlying steri-strips, if there are any in place, they will either fall off on their own or will be removed at 2 week follow up appointment. No overhead reaching/pushing/pulling/lifting greater than 5-10lbs with left arm for six weeks. Please call the office if you notice redness, swelling, bleeding, or drainage from incision or if you develop fevers      Contact your healthcare provider if:   You have new or increased swelling in your legs or feet.   You feel more tired than usual.   You have trouble breathing during activity.    Your wound is red, warm, swollen, or draining pus.   You have a fever.   You have questions or concerns about your condition or care.    Seek care immediately or call 911 if:   You feel like your heart is fluttering or jumping.   You have any of the following signs of a heart attack:    Squeezing, pressure, fullness, or pain in your chest that lasts longer than a few minutes or returns   Discomfort or pain in your back, neck, jaw, stomach, or arm   Shortness of breath or breathing problems   A sudden cold sweat, lightheadedness, dizziness, or nausea, especially with chest pain or trouble breathing        © 2014 [employer]. Information is for End User's use only and may not be sold, redistributed or otherwise used for commercial purposes. All illustrations and images included in CareNotes® are the copyrighted property of [employer] or [employer].  The above information is an  only. It is not intended as medical advice for individual conditions or treatments. Talk to your doctor, nurse or pharmacist before following any medical regimen to see if it is safe and effective for you.

## 2024-08-27 NOTE — ANESTHESIA PREPROCEDURE EVALUATION
Procedure:  Cardiac upgrade from DC Pacer to BIV Pacer (Chest)    EF 35%  Her LHC showed no occlusion and her EF is low due to RV pacing     Relevant Problems   CARDIO   (+) Benign hypertension   (+) Mixed hyperlipidemia   (+) Pacemaker-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 cardiac 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   (+) Persistent atrial fibrillation (HCC)   (+) Tachy-lizet syndrome (HCC)      ENDO   (+) Congenital hypothyroidism with diffuse goiter      GI/HEPATIC   (+) Hiatal hernia   (+) Liver lesion, left lobe      GYN   (+) History of hysterectomy      MUSCULOSKELETAL   (+) Hiatal hernia   (+) Rheumatoid arthritis (HCC)      NEURO/PSYCH   (+) Anxiety      PULMONARY   (+) Asthma      Hx of post op vision loss following a fib ablation, resolved quickly, diagnosed as TIA, no residual symptoms    TTE 2024  History    Pacemaker-dependent, PAF, S/p Cardiac ablation for Atrial fibrillation, Tachy-lizet syndrome, TIA, Hypokalemia.     Interpretation Summary         Left Ventricle: Left ventricular cavity size is normal. Wall thickness is mildly increased. The left ventricular ejection fraction is 35%. Systolic function is moderately reduced. There is moderate global hypokinesis with regional variation.  LV apex and inferoseptal wall are severely hypokinetic.  Diastolic function is normal.    Left Ventricle: Diastolic function is moderately abnormal, consistent with grade II (pseudonormal) relaxation.    IVS: There is abnormal septal motion consistent with right ventricular pacing. There is sigmoid appearance of the septum.    Right Ventricle: Right ventricular cavity size is normal.  Pacemaker wire noted. Systolic function is normal.    Left Atrium: The atrium is severely dilated.    Right Atrium: Pacemaker wire noted    Aortic Valve: There is mild regurgitation.    Mitral Valve: There is mild regurgitation.    Tricuspid Valve: There is mild regurgitation. The right ventricular systolic pressure is mildly elevated at 43  mmHg.      Physical Exam    Airway    Mallampati score: II  TM Distance: >3 FB  Neck ROM: full     Dental   No notable dental hx     Cardiovascular      Pulmonary      Other Findings  post-pubertal.      Anesthesia Plan  ASA Score- 3     Anesthesia Type- IV sedation with anesthesia with ASA Monitors.         Additional Monitors:     Airway Plan:            Plan Factors-Exercise tolerance (METS): <4 METS.    Chart reviewed. EKG reviewed. Imaging results reviewed. Existing labs reviewed. Patient summary reviewed.                  Induction- intravenous.    Postoperative Plan-     Perioperative Resuscitation Plan - Level 1 - Full Code.       Informed Consent- Anesthetic plan and risks discussed with patient.  I personally reviewed this patient with the CRNA. Discussed and agreed on the Anesthesia Plan with the CRNA..

## 2024-08-27 NOTE — ANESTHESIA POSTPROCEDURE EVALUATION
"Post-Op Assessment Note            No anethesia notable event occurred.            BP (!) 86/60   Pulse 59   Temp 98 °F (36.7 °C) (Temporal)   Resp 16   Ht 5' 5\" (1.651 m)   Wt 90.7 kg (200 lb)   SpO2 94%   BMI 33.28 kg/m²     "

## 2024-08-28 ENCOUNTER — TELEPHONE (OUTPATIENT)
Dept: CARDIOLOGY CLINIC | Facility: CLINIC | Age: 74
End: 2024-08-28

## 2024-08-28 NOTE — TELEPHONE ENCOUNTER
Pt called our office, but v/m left was blank.    Returned call to pt. L/m requesting call back as v/m was blank and we don't know what she needs.

## 2024-09-03 ENCOUNTER — OFFICE VISIT (OUTPATIENT)
Dept: OCCUPATIONAL THERAPY | Facility: CLINIC | Age: 74
End: 2024-09-03
Payer: COMMERCIAL

## 2024-09-03 DIAGNOSIS — S62.512A CLOSED DISPLACED FRACTURE OF PROXIMAL PHALANX OF LEFT THUMB, INITIAL ENCOUNTER: Primary | ICD-10-CM

## 2024-09-03 PROCEDURE — 97110 THERAPEUTIC EXERCISES: CPT

## 2024-09-03 PROCEDURE — 97140 MANUAL THERAPY 1/> REGIONS: CPT

## 2024-09-03 PROCEDURE — 97530 THERAPEUTIC ACTIVITIES: CPT

## 2024-09-03 NOTE — PROGRESS NOTES
"Daily Note     Today's date: 9/3/2024  Patient name: Swetha Lopez  : 1950  MRN: [id number]  Referring provider: Salazar Palmer*  Dx:   Encounter Diagnosis     ICD-10-CM    1. Closed displaced fracture of proximal phalanx of left thumb, initial encounter  S62.512A           Start Time: 0930  Stop Time: 1015  Total time in clinic (min): 45 minutes    Subjective: Patient notes consistency with HEP, able to oppose thumb to index after completing exercises prior to oppose; Patient reports Left shoulder \"very sharp pain\" randomly or when laying down or getting up; unable to lay on the left side secondary to pain.       Objective: See treatment diary below           Precautions: S/p left thumb proximal phalanx fusion of MCP joint and distal radius bone grafting - 24.    S/p Cardiac upgrade from DC Pacer to BIV Pacer (Chest)  2024; No overhead reaching/pushing.pulling/lifting greater than 5 - 10lbs with left arm for 6 weeks      Auth Tracker  Auth Status Total   Visits  Expiration date Co-Insurance   Approved BOMN                                 Visit Tracker: NO AUTH REQ. BOMN  Date   IE 9/3                                                                                      Manuals HEP 9/3/2024                       Ther Ex     Patient education on Dx and HEP  x5min   Therapist assisted AAROM/PROM  x8min   Wrist AROM with towel x 3x10   Flexor tendon glides x 3x10   Thumb Opposition x 3x10   Towel scrunches x x5        Ther Activity     Juxaciser  x5   Marker hops  x20 1/2 cap   Dice opposition  Whole container                  Modalities     MHP  x5min            Assessment:   Patient tolerated session well. Session focused on HEP education, range of motion, and patient education  to improve functional task performance with daily activities. Patient tolerated all TA & TE with good tolerance and minimal complaints of pain at the CMC joint. Patient progressing well towards goals. Patient " benefiting from skilled hand therapy OT to reduce deficits to improve independence with daily activities.           Plan:   Focus on AROM, AAROM, PROM, flexor tendon glides, FMC coordination, desensitization, IASTM, cupping, manual, strengthening and all modalities as seen fit to improve ability to complete daily activites with ease.  POC: 8/20/2024 - 10/15/2024

## 2024-09-09 ENCOUNTER — IN-CLINIC DEVICE VISIT (OUTPATIENT)
Dept: CARDIOLOGY CLINIC | Facility: CLINIC | Age: 74
End: 2024-09-09

## 2024-09-09 DIAGNOSIS — Z95.0 CARDIAC PACEMAKER IN SITU: Primary | ICD-10-CM

## 2024-09-09 PROCEDURE — 99024 POSTOP FOLLOW-UP VISIT: CPT | Performed by: INTERNAL MEDICINE

## 2024-09-09 NOTE — PROGRESS NOTES
Results for orders placed or performed in visit on 09/09/24   Cardiac EP device report    Narrative    MDT DC/PM-ACTIVE SYSTEM IS MRI CONDITIONAL  WOUND CHECK: INCISION CLEAN AND DRY WITH EDGES APPROXIMATED; WOUND CARE AND RESTRICTIONS REVIEWED WITH PATIENT. DEVICE INTERROGATED IN THE [place] OFFICE: BATTERY VOLTAGE ADEQUATE-11 YRS. AP 0% CRT PACING (BIV) 100% (LV) 0%. ALL AVAILABLE LEAD PARAMETERS WITHIN NORMAL LIMITS. NO SIGNIFICANT HIGH RATE EPISODES. OPTI-VOL FLUID THRESHOLDS INITIALIZING. NO PROGRAMMING CHANGES MADE TO DEVICE PARAMETERS. NORMAL DEVICE FUNCTION. NC

## 2024-09-10 ENCOUNTER — OFFICE VISIT (OUTPATIENT)
Dept: OCCUPATIONAL THERAPY | Facility: CLINIC | Age: 74
End: 2024-09-10
Payer: COMMERCIAL

## 2024-09-10 DIAGNOSIS — S62.512A CLOSED DISPLACED FRACTURE OF PROXIMAL PHALANX OF LEFT THUMB, INITIAL ENCOUNTER: Primary | ICD-10-CM

## 2024-09-10 PROCEDURE — 97110 THERAPEUTIC EXERCISES: CPT

## 2024-09-10 PROCEDURE — 97530 THERAPEUTIC ACTIVITIES: CPT

## 2024-09-10 NOTE — PROGRESS NOTES
Daily Note     Today's date: 9/10/2024  Patient name: Swetha Lopez  : 1950  MRN: [id number]  Referring provider: Salazar Palmer*  Dx:   Encounter Diagnosis     ICD-10-CM    1. Closed displaced fracture of proximal phalanx of left thumb, initial encounter  S62.512A           Start Time: 0930  Stop Time: 1015  Total time in clinic (min): 45 minutes    Subjective: Patient notes overusing the hand yesterday with noted sharp pain afterwards. Patient would like to go back to participating in leisure activities, such as knitting/crocheting.           Objective: See treatment diary below           Precautions: S/p left thumb proximal phalanx fusion of MCP joint and distal radius bone grafting - 24.    S/p Cardiac upgrade from DC Pacer to BIV Pacer (Chest)  2024; No overhead reaching/pushing.pulling/lifting greater than 5 - 10lbs with left arm for 6 weeks      Auth Tracker  Auth Status Total   Visits  Expiration date Co-Insurance   Approved BOMN                                 Visit Tracker: NO AUTH REQ. BOMN  Date   IE 9/3 9/10                                                                                     Manuals HEP 9/10/2024                       Ther Ex     Patient education on Dx and HEP  x5min   Therapist assisted AAROM/PROM  x8min   Wrist AROM with towel x 3x10   Flexor tendon glides x 3x10   Thumb Opposition x 3x10   Towel scrunches and thumb scrunches x x5 each        Ther Activity     Juxaciser  x10   Marker hops  x20 1/2 cap   Dice, IHM  Whole container   Chime balls  x1min             Modalities     MHP  x5min            Assessment:   Patient tolerated session well. Session focused on HEP education, range of motion, and patient education  to improve functional task performance with daily activities. Patient tolerated all TA & TE with good tolerance and minimal complaints of pain at the CMC joint. Patient progressing well towards goals. Patient benefiting from skilled hand  therapy OT to reduce deficits to improve independence with daily activities.           Plan:   Focus on AROM, AAROM, PROM, flexor tendon glides, FMC coordination, desensitization, IASTM, cupping, manual, strengthening and all modalities as seen fit to improve ability to complete daily activites with ease.  POC: 8/20/2024 - 10/15/2024

## 2024-09-17 ENCOUNTER — OFFICE VISIT (OUTPATIENT)
Dept: OCCUPATIONAL THERAPY | Facility: CLINIC | Age: 74
End: 2024-09-17
Payer: COMMERCIAL

## 2024-09-17 DIAGNOSIS — S62.512A CLOSED DISPLACED FRACTURE OF PROXIMAL PHALANX OF LEFT THUMB, INITIAL ENCOUNTER: Primary | ICD-10-CM

## 2024-09-17 PROCEDURE — 97110 THERAPEUTIC EXERCISES: CPT

## 2024-09-17 PROCEDURE — 97530 THERAPEUTIC ACTIVITIES: CPT

## 2024-09-17 NOTE — PROGRESS NOTES
"Daily Note     Today's date: 2024  Patient name: Swetha Lopez  : 1950  MRN: [id number]  Referring provider: Salazar Palmer*  Dx:   Encounter Diagnosis     ICD-10-CM    1. Closed displaced fracture of proximal phalanx of left thumb, initial encounter  S62.512A           Start Time: 0930  Stop Time: 1015  Total time in clinic (min): 45 minutes    Subjective: Patient notes improvement in pain in the morning, however, notes difficulty with holding a fork/knife due to limited ROM and swelling of the thumb.               Objective: See treatment diary below           Precautions: S/p left thumb proximal phalanx fusion of MCP joint and distal radius bone grafting - 24.    S/p Cardiac upgrade from DC Pacer to BIV Pacer (Chest)  2024; No overhead reaching/pushing.pulling/lifting greater than 5 - 10lbs with left arm for 6 weeks      Auth Tracker  Auth Status Total   Visits  Expiration date Co-Insurance   Approved BOMN                                 Visit Tracker: NO AUTH REQ. BOMN  Date   IE 9/3 9/10 9/17                                                                                    Manuals HEP 2024                       Ther Ex     Patient education on Dx and HEP  x5min   Therapist assisted AAROM/PROM  x8min   Wrist AROM with towel x 3x10   Flexor tendon glides x 3x10   Thumb Opposition x 3x10   Towel scrunches and thumb scrunches x x5 each        Ther Activity     Juxaciser  x10   Marker hops  x20    Dice, opposition   Whole container   Chime balls  x1min   Lacrosse ball \"ABC\"  1 round        Modalities     MHP  x5min            Assessment:   Patient tolerated session well. Educated patient and provided patient with foam grippers to use with kitchen utensils to assist with increasing handle of utensil due to limited ROM. Session focused on HEP education, range of motion, and patient education  to improve functional task performance with daily activities. Patient tolerated " all TA & TE with good tolerance and minimal complaints of pain at the CMC joint. Patient progressing well towards goals. Patient benefiting from skilled hand therapy OT to reduce deficits to improve independence with daily activities.           Plan:   Focus on AROM, AAROM, PROM, flexor tendon glides, FMC coordination, desensitization, IASTM, cupping, manual, strengthening and all modalities as seen fit to improve ability to complete daily activites with ease.  POC: 8/20/2024 - 10/15/2024

## 2024-09-24 ENCOUNTER — OFFICE VISIT (OUTPATIENT)
Dept: OBGYN CLINIC | Facility: CLINIC | Age: 74
End: 2024-09-24
Payer: COMMERCIAL

## 2024-09-24 ENCOUNTER — APPOINTMENT (OUTPATIENT)
Dept: RADIOLOGY | Facility: CLINIC | Age: 74
End: 2024-09-24
Payer: COMMERCIAL

## 2024-09-24 ENCOUNTER — OFFICE VISIT (OUTPATIENT)
Dept: OCCUPATIONAL THERAPY | Facility: CLINIC | Age: 74
End: 2024-09-24
Payer: COMMERCIAL

## 2024-09-24 VITALS
HEART RATE: 90 BPM | WEIGHT: 200 LBS | HEIGHT: 65 IN | SYSTOLIC BLOOD PRESSURE: 126 MMHG | BODY MASS INDEX: 33.32 KG/M2 | DIASTOLIC BLOOD PRESSURE: 86 MMHG

## 2024-09-24 DIAGNOSIS — S62.512A CLOSED DISPLACED FRACTURE OF PROXIMAL PHALANX OF LEFT THUMB, INITIAL ENCOUNTER: Primary | ICD-10-CM

## 2024-09-24 DIAGNOSIS — S62.512A CLOSED DISPLACED FRACTURE OF PROXIMAL PHALANX OF LEFT THUMB, INITIAL ENCOUNTER: ICD-10-CM

## 2024-09-24 PROCEDURE — 73140 X-RAY EXAM OF FINGER(S): CPT

## 2024-09-24 PROCEDURE — 97110 THERAPEUTIC EXERCISES: CPT

## 2024-09-24 PROCEDURE — 99213 OFFICE O/P EST LOW 20 MIN: CPT | Performed by: STUDENT IN AN ORGANIZED HEALTH CARE EDUCATION/TRAINING PROGRAM

## 2024-09-24 PROCEDURE — 97530 THERAPEUTIC ACTIVITIES: CPT

## 2024-09-24 NOTE — PROGRESS NOTES
"Daily Note     Today's date: 2024  Patient name: Swetha Lopez  : 1950  MRN: [id number]  Referring provider: Salazar Palmer*  Dx:   Encounter Diagnosis     ICD-10-CM    1. Closed displaced fracture of proximal phalanx of left thumb, initial encounter  S62.512A             Start Time: 0930  Stop Time: 1024  Total time in clinic (min): 54 minutes    Subjective: Patient reports a mechanical fall on , 2024 when her foot got caught on the table cloth. Patient reports falling \"like a plank\". Denies LOC or hitting head. Patient notes banging the L hand ulnar side, however, noted L shoulder & R knee pain.           Objective: See treatment diary below    Current Measurements as of 2024:   Wrist flexion: 35 degrees  Wrist Extension: 70 degrees  RD: 23 degrees  UD: 15 degrees      Thumb Extension:  CMC: 0 degrees  MP: 20 degrees  DIP: 10 degrees      Thumb Flexion:  CMC: 14 degrees  MP: 32 Degrees  DIP: 36 Degrees    Thumb Radial: 45 degrees  Thumb Palmar: 45 degrees               Precautions: S/p left thumb proximal phalanx fusion of MCP joint and distal radius bone grafting - 24.    S/p Cardiac upgrade from DC Pacer to BIV Pacer (Chest)  2024; No overhead reaching/pushing.pulling/lifting greater than 5 - 10lbs with left arm for 6 weeks      Auth Tracker  Auth Status Total   Visits  Expiration date Co-Insurance   Approved BOMN                                 Visit Tracker: NO AUTH REQ. BOMN  Date   IE 9/3 9/10 9/17 9/24                                                                                   Manuals HEP 2024                       Ther Ex     Measurements  x8min   Patient education on Dx and HEP  x5min   Therapist assisted AAROM/PROM  x8min   Wrist AROM with towel x 3x10   Flexor tendon glides x 3x10   Thumb Opposition x 3x10   Towel scrunches and thumb scrunches x x5 each        Ther Activity     Juxaciser  x10   Marker hops  x20    Dice, opposition   " "Whole container   Chime balls  x1min   Lacrosse ball \"ABC\"  1 round   Dice with pincer  Yellow    Digit extension rubberband x yellow             Modalities     MHP  x5min            Assessment:   Patient tolerated session well. Session focused on HEP education, range of motion, gentle thumb strengthening, and patient education  to improve functional task performance with daily activities. Patient tolerated all TA & TE with good tolerance and minimal complaints of pain at the CMC joint. Patient progressing well towards goals. Patient benefiting from skilled hand therapy OT to reduce deficits to improve independence with daily activities.           Plan:   Focus on AROM, AAROM, PROM, flexor tendon glides, FMC coordination, desensitization, IASTM, cupping, manual, strengthening and all modalities as seen fit to improve ability to complete daily activites with ease.  POC: 8/20/2024 - 10/15/2024           "

## 2024-09-24 NOTE — PROGRESS NOTES
Assessment/Plan:  Patient ID: Swetha Lopez 74 y.o. female   Surgery: Left thumb, proximal phalanx, internal fusion of metacarpal phalanx joint, distal radius bone graft - left  Date of Surgery: 5/22/24      - Xrays reviewed demonstrate healed fusion. OK to continue OT to work on strengthening and all modalities as needed. Educated patient to continue with OT until she no longer experiences any benefit.   - Patient has some numbness in digits that started after surgery. No pain related and activity dependent. Not interested in further evaluation at this time. We will monitor and re-evaluate at next visit.  - Patient will follow up 3 months with repeat X-rays of the left thumb upon arrival.        CHIEF COMPLAINT:  Chief Complaint   Patient presents with    Left Thumb - Follow-up, Fracture         SUBJECTIVE:  Swetha Lopez is a 74 y.o. year old female who presents for follow up 4 months status post left thumb proximal phalanx, internal fusion of metacarpal phalanx joint, distal radius bone graft - left. Today patient has decreased sensation of the fingers. She does have 2/10 sharp pain when making sudden movements or grasping objects. She has been occupational therapy, and feels she is making great improvements. She reports a mechanical fall on 9/22/2024. She feels shaky since the fall. Her pain has improved slightly since the fall. She takes Tylenol as needed for pain control.    PHYSICAL EXAMINATION:  General: well developed and well nourished, alert, oriented times 3, and appears comfortable  Psychiatric: Normal    FOCUSED MUSCULOSKELETAL EXAMINATION:  Left Upper Extremity  Inspection: incision clean, dry and intact. No surrounding erythema.   Palpation:  Appropriate oneyda-incisional tenderness to palpation  Neurologic:  5/5 elbow flexion, 5/5 elbow extension, 5/5 wrist extension, 5/5 wrist flexion, 5/5 finger flexion, 5/5 finger extension, 4/5 FPL, 4/5 EPL, 4/5 APB, 5/5 intrinsics, sensation intact to  median, radial, and ulnar nerve distributions  Vascular: Palpable radial pulse, brisk cap refill <2sec, hand warm and well perfused  MSK:  ROM/strength limited by surgery/immobilization  Positive Carpal Compression Test  No tinel at wrist    STUDIES REVIEWED:  Images were reviewed in PACS by Dr. Olivia and demonstrate: well healed left thumb MCP joint with hardware in place without complication    PROCEDURES PERFORMED:  No Procedures performed today    Scribe Attestation      I,:  Adriana Russell am acting as a scribe while in the presence of the attending physician.:       I,:  James Olivia MD personally performed the services described in this documentation    as scribed in my presence.:            I agree with the history, physical examination, assessment and plan of care as documented above.    James Olivia M.D.  Attending, Orthopaedic Surgery  Hand, Wrist, and Elbow Surgery  [facility]

## 2024-10-01 ENCOUNTER — EVALUATION (OUTPATIENT)
Dept: OCCUPATIONAL THERAPY | Facility: CLINIC | Age: 74
End: 2024-10-01
Payer: COMMERCIAL

## 2024-10-01 DIAGNOSIS — S62.512A CLOSED DISPLACED FRACTURE OF PROXIMAL PHALANX OF LEFT THUMB, INITIAL ENCOUNTER: Primary | ICD-10-CM

## 2024-10-01 PROCEDURE — 97530 THERAPEUTIC ACTIVITIES: CPT

## 2024-10-01 PROCEDURE — 97110 THERAPEUTIC EXERCISES: CPT

## 2024-10-01 NOTE — PROGRESS NOTES
OT Re-Evaluation     Today's date: 10/1/2024  Patient name: Swetha Lopez  : 1950  MRN: [id number]  Referring provider: Salazar Palmer*  Dx:   Encounter Diagnosis     ICD-10-CM    1. Closed displaced fracture of proximal phalanx of left thumb, initial encounter  S62.512A           Start Time: 0930  Stop Time: 1015  Total time in clinic (min): 45 minutes    Assessment  Impairments: abnormal or restricted ROM, activity intolerance, impaired physical strength, lacks appropriate home exercise program, pain with function, weight-bearing intolerance and activity limitations  Understanding of Dx/Px/POC: good     Prognosis: good    Goals  Short Term Goals by 2 - 4 weeks:     Establish HEP to increase performance with daily activities. MET    Patient will increase ROM of LUE by at least 10 degrees to complete ADLs. MET    Patient will decrease pain rate of LUE by at least 2 points per the VAS scale. Progressing         New Goals added 10/1/2024:  Patient will increase ROM of wrist flexion by at least 5 to 8 degrees to assist with ADLs, IADLs, and leisure/hobby tasks.   Patient will increase ROM of L thumb by at least 5 to 8 degrees to assist with participating in knitting hobby tasks with minimal to no difficulty.   Patient will increase functional pinch strength by at least 2lbs to assist with FMC dexterity and ADLs/IADLs with minimal to no difficulty.   Patient will increase functional  strength by at least 5 lbs to assist with holding purse with minimal to no difficulty.           Long Term Goals by discharge:    Establish final home exercise program to enhance maximal functional level with ADLs. Progressing    Achieve functional active range of motion of LUE for full return to household chores. Progressing                            Achieve functional strength of LUE for full return to high level ADLs. Not Met              Plan  Patient would benefit from: skilled occupational therapy, custom  splinting, orthotics and OT eval  Planned modality interventions: electrical stimulation/Russian stimulation, cryotherapy, TENS, thermotherapy: hydrocollator packs, thermotherapy: paraffin bath, ultrasound and unattended electrical stimulation    Planned therapy interventions: IASTM, joint mobilization, kinesiology taping, manual therapy, massage, patient/caregiver education, orthotic management and training, orthotic fitting/training, therapeutic activities, strengthening, stretching, therapeutic exercise, home exercise program, graded exercise, graded activity, flexibility and functional ROM exercises    Frequency: 1-2x week  Duration in weeks: 8  Plan of Care beginning date: 10/1/2024  Plan of Care expiration date: 11/26/2024  Treatment plan discussed with: patient        Subjective Evaluation    History of Present Illness  Date of surgery: 5/22/2024  Mechanism of injury: surgery  Mechanism of injury: Patient is a 74 y.o. RHD female who presents for OT RE for s/p 5/22/2024 left thumb proximal phalanx fusion of MCP joint and distal radius bone grating performed by Dr. Duvall. Patient reports occasional sharp pain and continued swelling to the thumb. Pins and needle sensation to the median nerve distribution to the digits.        Patient is a 74 y.o. RHD female who presents for OT IE and treatment for s/p 5/22/2024 left thumb proximal phalanx fusion of MCP joint and distal radius bone grating performed by Dr. Duvall. Patient reports sustaining a fall on 4/24/2024 where she tripped and fell due to not picking her foot up when walking into the kitchen (1/2 step). Patient was treated prior to surgical management which includes conservative management which included splinting and casting with no improvement. Patient notes having pins and needle sensation to the median nerve distrubution digits, specifically the thumb. Patient has transitioned out of the wrist cock up brace at this time and was instructed to progress  into WB tasks within tolerance. Patient notes difficulty with FMC tasks, such as donning/doffing bra, zippers, and buttons. 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 She is schedule for a  cardiology procedure to change pacer maker on 2024. Patient referred by Salazar Palmer PA-C to initiate treatment including hand therapy.       Quality of life: good    Patient Goals  Patient goals for therapy: decreased edema, decreased pain, increased motion, increased strength, return to sport/leisure activities and independence with ADLs/IADLs  Patient goal: putting bra on, zipping and buttoning clothes  Pain  Current pain ratin  At best pain ratin  At worst pain ratin  Quality: needle-like, cramping, sharp, radiating and throbbing    Social Support  Lives with: spouse    Employment status: not working  Hand dominance: right    Treatments  Previous treatment: immobilization        Objective     Active Range of Motion     Left Wrist   Wrist flexion: 46 degrees   Wrist extension: 70 degrees   Radial deviation: 12 degrees   Ulnar deviation: 20 degrees with pain      Right Wrist   Wrist flexion: 55 degrees   Wrist extension: 66 degrees   Radial deviation: 20 degrees   Ulnar deviation: 10 degrees     Left Thumb   Flexion     CMC: 18 degrees    MP: 32 degrees    DIP: 30 degrees  Extension     CMC: 0 degrees    MP: 12 degrees    DIP: 14 degrees  Palmar Abduction     CMC: 40 degrees  Radial abduction    CMC: 45 degrees      Right Thumb   Flexion     CMC: 10    MP: 64    DIP: 54  Extension     CMC: 0    MP: 0    DIP: 2  Palmar Abduction    CMC: 46  Radial Abduction    CMC: 45    Additional Active Range of Motion Details  Approximately 3.5cm to 4.5cm away from [facility] for the left hand.  - difficulty opposing thumb to all digits      RE 10/1/2024  + opposing thumb to all digits   + loose composite fist to the DPC       Strength/Myotome Testing     Left  Wrist/Hand      (2nd hand position)     Trial 1: 20    Thumb Strength  Key/Lateral Pinch     Trial 1: 7  Tip/Two-Point Pinch     Trial 1: 5  Palmar/Three-Point Pinch     Trial 1: 5    Right Wrist/Hand      (2nd hand position)     Trial 1: 26    Thumb Strength   Key/Lateral Pinch     Trial 1: 12  Tip/Two-Point Pinch     Trial 1: 6.5  Palmar/Three-Point Pinch     Trial 1: 8               Daily Note     Today's date: 10/1/2024  Patient name: Swetha Lopez  : 1950  MRN: [id number]  Referring provider: Salazar Palmer*  Dx:   Encounter Diagnosis     ICD-10-CM    1. Closed displaced fracture of proximal phalanx of left thumb, initial encounter  S62.512A               Start Time: 930  Stop Time: 101  Total time in clinic (min): 45 minutes    Subjective: Patient reports occasional sharp pain and continued swelling to the thumb. Pins and needle sensation to the median nerve distribution to the digits.        Objective: See treatment diary below               Precautions: S/p left thumb proximal phalanx fusion of MCP joint and distal radius bone grafting - 24.    S/p Cardiac upgrade from DC Pacer to BIV Pacer (Chest)  2024; No overhead reaching/pushing.pulling/lifting greater than 5 - 10lbs with left arm for 6 weeks      Auth Tracker  Auth Status Total   Visits  Expiration date Co-Insurance   Approved BOMN                                 Visit Tracker: NO AUTH REQ. BOMN  Date   IE 9/3 9/10 9/17 9/24 10/1  RE                                                                                  Manuals HEP 10/1/2024                       Ther Ex     Re-evaluation measurements  x10min   Patient education on Dx and HEP  x5min   Therapist assisted AAROM/PROM  x8min   Wrist AROM with towel x 3x10   Flexor tendon glides x 3x10   Thumb Opposition x 3x10   Towel scrunches and thumb scrunches x x5 each        Ther Activity     Juxaciser  x10   Marker hops  x20    Chime balls  x1min   Lacrosse ball  "\"ABC\"  1 round   Dice with pincer  Yellow    Digit extension rubberband x yellow   Digit flex  Yellow x10   Thumb depressor   X10 no resistance   Wrist PREs  1lb x10   Theraputty gripping/pinching x Tan x10        Modalities     MHP  x5min            Assessment:   Patient tolerated session well. Upon today's re-evaluation, patient demonstrates significant improvement in ROM of the wrist and thumb compared to previous initial measurement values on 8/20/24. Functional strength officially assessed this date with moderate functional strength deficits of the left upper extremity compared to the contralateral right upper extremity. Session focused on HEP education, range of motion, thumb strengthening, and initiating of upgraded UE strengthening and patient education  to improve functional task performance with daily activities. Patient tolerated all TA & TE with good tolerance and minimal complaints of pain at the CMC joint. Patient progressing well towards goals. Patient would benefit from continued skilled hand therapy OT to reduce deficits to improve independence with daily activities.           Plan:   Focus on AROM, AAROM, PROM, flexor tendon glides, FMC coordination, desensitization, IASTM, cupping, manual, strengthening and all modalities as seen fit to improve ability to complete daily activites with ease.  POC: 10/1/2024 - 11/26/2024           "

## 2024-10-08 ENCOUNTER — OFFICE VISIT (OUTPATIENT)
Dept: OCCUPATIONAL THERAPY | Facility: CLINIC | Age: 74
End: 2024-10-08
Payer: COMMERCIAL

## 2024-10-08 DIAGNOSIS — S62.512A CLOSED DISPLACED FRACTURE OF PROXIMAL PHALANX OF LEFT THUMB, INITIAL ENCOUNTER: Primary | ICD-10-CM

## 2024-10-08 PROCEDURE — 97110 THERAPEUTIC EXERCISES: CPT

## 2024-10-08 PROCEDURE — 97530 THERAPEUTIC ACTIVITIES: CPT

## 2024-10-08 NOTE — PROGRESS NOTES
"  Daily Note     Today's date: 10/8/2024  Patient name: Swetha Lopez  : 1950  MRN: [id number]  Referring provider: Salazar Palmer*  Dx:   Encounter Diagnosis     ICD-10-CM    1. Closed displaced fracture of proximal phalanx of left thumb, initial encounter  S62.512A               Start Time: 0930  Stop Time: 1015  Total time in clinic (min): 45 minutes    Subjective: Patient reports sharp pain to the UE up to the forearm on Saturday after using a 1lb water bottle to work on gentle resistive wrist PREs.            Objective: See treatment diary below               Precautions: S/p left thumb proximal phalanx fusion of MCP joint and distal radius bone grafting - 24.    S/p Cardiac upgrade from DC Pacer to BIV Pacer (Chest)  2024; No overhead reaching/pushing.pulling/lifting greater than 5 - 10lbs with left arm for 6 weeks      Auth Tracker  Auth Status Total   Visits  Expiration date Co-Insurance   Approved BOMN                                 Visit Tracker: NO AUTH REQ. BOMN  Date   IE 9/3 9/10 9/17 9/24 10/1  RE    10/8                                                                              Manuals HEP 10/8/2024                       Ther Ex     Patient education on Dx and HEP  x5min   Therapist assisted AAROM/PROM  x8min   Wrist AROM with towel x 3x10   Flexor tendon glides x 3x10   Thumb Opposition x 3x10   Thumb towel scrunches x x5 each        Ther Activity     Juxaciser  x10   Marker hops  x20    Chime balls  x1min   Lacrosse ball \"ABC\"     Dice with pincer  Red    Digit extension rubberband x Yellow x10   Thumb radial/palmar abduction rubberband  Yellow  x10   Digit flex  Yellow 2x10   Thumb depressor and thumb extension  2x10 no resistance   Wrist PREs  1lb x10   Theraputty gripping/pinching Tan HEP Only        Modalities     MHP  x5min            Assessment:   Patient tolerated session well. Session focused on HEP education, range of motion, thumb strengthening, and  " UE strengthening and patient education  to improve functional task performance with daily activities. Patient tolerated all TA & TE with good tolerance and minimal complaints of fatigue at the end of today's session. Patient progressing well towards goals. Patient would benefit from continued skilled hand therapy OT to reduce deficits to improve independence with daily activities.           Plan:   Focus on AROM, AAROM, PROM, flexor tendon glides, FMC coordination, desensitization, IASTM, cupping, manual, strengthening and all modalities as seen fit to improve ability to complete daily activites with ease.  POC: 10/1/2024 - 11/26/2024

## 2024-10-14 DIAGNOSIS — K44.9 HIATAL HERNIA WITH GERD WITHOUT ESOPHAGITIS: ICD-10-CM

## 2024-10-14 DIAGNOSIS — K21.9 HIATAL HERNIA WITH GERD WITHOUT ESOPHAGITIS: ICD-10-CM

## 2024-10-14 DIAGNOSIS — R13.19 ESOPHAGEAL DYSPHAGIA: ICD-10-CM

## 2024-10-15 ENCOUNTER — OFFICE VISIT (OUTPATIENT)
Dept: OCCUPATIONAL THERAPY | Facility: CLINIC | Age: 74
End: 2024-10-15
Payer: COMMERCIAL

## 2024-10-15 DIAGNOSIS — S62.512A CLOSED DISPLACED FRACTURE OF PROXIMAL PHALANX OF LEFT THUMB, INITIAL ENCOUNTER: Primary | ICD-10-CM

## 2024-10-15 PROCEDURE — 97110 THERAPEUTIC EXERCISES: CPT

## 2024-10-15 PROCEDURE — 97530 THERAPEUTIC ACTIVITIES: CPT

## 2024-10-15 RX ORDER — OMEPRAZOLE 40 MG/1
CAPSULE, DELAYED RELEASE ORAL
Qty: 180 CAPSULE | Refills: 1 | Status: SHIPPED | OUTPATIENT
Start: 2024-10-15

## 2024-10-15 NOTE — PROGRESS NOTES
"  Daily Note     Today's date: 10/15/2024  Patient name: Swetha Lopez  : 1950  MRN: [id number]  Referring provider: Salazar Palmer*  Dx:   Encounter Diagnosis     ICD-10-CM    1. Closed displaced fracture of proximal phalanx of left thumb, initial encounter  S62.512A               Start Time: 0935  Stop Time: 1005  Total time in clinic (min): 30 minutes    Subjective: Patient reports functional tasks are getting easier (I.e. turning door knobs). However, patient reports dropping her sandwich the other day due to strength deficits of the left thumb.             Objective: See treatment diary below               Precautions: S/p left thumb proximal phalanx fusion of MCP joint and distal radius bone grafting - 24.    S/p Cardiac upgrade from DC Pacer to BIV Pacer (Chest)  2024; No overhead reaching/pushing.pulling/lifting greater than 5 - 10lbs with left arm for 6 weeks      Auth Tracker  Auth Status Total   Visits  Expiration date Co-Insurance   Approved BOMN                                 Visit Tracker: NO AUTH REQ. BOMN  Date   IE 9/3 9/10 9/17 9/24 10/1  RE    10/8 10/15                                                                             Manuals HEP 10/15/2024                       Ther Ex     Patient education on Dx and HEP  x5min   Therapist assisted AAROM/PROM  x5min   Wrist AROM with towel x    Flexor tendon glides x    Thumb Opposition x    Thumb towel scrunches x         Ther Activity     Juxaciser  x10   Marker hops x    Chime balls x    Lacrosse ball \"ABC\"     Dice with pincer  Red 3pt pinch   Digit extension rubberband x Yellow x10  Red x10   Thumb radial/palmar abduction rubberband  Yellow x10  Red x10   Digit flex  Yellow x10  Red x10   Thumb depressor and thumb extension  2x10 no resistance   Wrist PREs  2lb x10   Theraputty with cisers Tan X5min tan         Modalities     MHP  x5min            Assessment:   Patient tolerated session well. Session focused on " HEP education, range of motion, thumb strengthening, and  UE strengthening and patient education  to improve functional task performance with daily activities. Patient tolerated all TA & TE with good tolerance and minimal complaints of fatigue at the end of today's session. Patient progressing well towards goals. Patient would benefit from continued skilled hand therapy OT to reduce deficits to improve independence with daily activities.           Plan:   Focus on AROM, AAROM, PROM, flexor tendon glides, FMC coordination, desensitization, IASTM, cupping, manual, strengthening and all modalities as seen fit to improve ability to complete daily activites with ease.  POC: 10/1/2024 - 11/26/2024

## 2024-10-20 DIAGNOSIS — E78.2 MIXED HYPERLIPIDEMIA: ICD-10-CM

## 2024-10-22 ENCOUNTER — OFFICE VISIT (OUTPATIENT)
Dept: OCCUPATIONAL THERAPY | Facility: CLINIC | Age: 74
End: 2024-10-22
Payer: COMMERCIAL

## 2024-10-22 DIAGNOSIS — S62.512A CLOSED DISPLACED FRACTURE OF PROXIMAL PHALANX OF LEFT THUMB, INITIAL ENCOUNTER: Primary | ICD-10-CM

## 2024-10-22 PROCEDURE — 97110 THERAPEUTIC EXERCISES: CPT

## 2024-10-22 PROCEDURE — 97530 THERAPEUTIC ACTIVITIES: CPT

## 2024-10-22 RX ORDER — ATORVASTATIN CALCIUM 20 MG/1
TABLET, FILM COATED ORAL
Qty: 90 TABLET | Refills: 0 | Status: SHIPPED | OUTPATIENT
Start: 2024-10-22

## 2024-10-22 NOTE — PROGRESS NOTES
Daily Note     Today's date: 10/22/2024  Patient name: Swetha Lopez  : 1950  MRN: [id number]  Referring provider: Salazar Palmer*  Dx:   Encounter Diagnosis     ICD-10-CM    1. Closed displaced fracture of proximal phalanx of left thumb, initial encounter  S62.512A               Start Time: 0930  Stop Time: 1015  Total time in clinic (min): 45 minutes    Subjective: Patient reports using a 3lb dumbbell for HEP with increase soreness to the wrist afterwards.             Objective: See treatment diary below               Precautions: S/p left thumb proximal phalanx fusion of MCP joint and distal radius bone grafting - 24.    S/p Cardiac upgrade from DC Pacer to BIV Pacer (Chest)  2024; No overhead reaching/pushing.pulling/lifting greater than 5 - 10lbs with left arm for 6 weeks      Auth Tracker  Auth Status Total   Visits  Expiration date Co-Insurance   Approved BOMN                                 Visit Tracker: NO AUTH REQ. BOMN  Date   IE 9/3 9/10 9/17 9/24 10/1  RE    10/8 10/15 10/22                                                                            Manuals HEP 10/22/2024                       Ther Ex     Patient education on Dx and HEP  x5min   Therapist assisted AAROM/PROM  x5min   Wrist AROM with towel x    Flexor tendon glides x    Thumb Opposition x    Thumb towel scrunches x x5        Ther Activity     Juxaciser  x10   Dice with pincer  Red 3pt pinch   Digit extension rubberband x Red x10   Thumb radial/palmar abduction rubberband  Red x10   Digit flex  Yellow x10  Red x10   Thumb depressor and thumb extension  2x10 half resistance    Wrist PREs  2lb x10   Theraputty with cisers Tan X5min tan         Modalities     MHP  x5min            Assessment:   Patient tolerated session well. Session focused on HEP education, range of motion, thumb strengthening, and  UE strengthening and patient education  to improve functional task performance with daily activities.  Patient tolerated all TA & TE with good tolerance and minimal complaints of fatigue at the end of today's session. Patient progressing well towards goals. Patient would benefit from continued skilled hand therapy OT to reduce deficits to improve independence with daily activities.           Plan:   Focus on AROM, AAROM, PROM, flexor tendon glides, FMC coordination, desensitization, IASTM, cupping, manual, strengthening and all modalities as seen fit to improve ability to complete daily activites with ease.  POC: 10/1/2024 - 11/26/2024

## 2024-10-23 ENCOUNTER — OFFICE VISIT (OUTPATIENT)
Dept: GASTROENTEROLOGY | Facility: CLINIC | Age: 74
End: 2024-10-23
Payer: COMMERCIAL

## 2024-10-23 VITALS
HEIGHT: 65 IN | WEIGHT: 214.4 LBS | SYSTOLIC BLOOD PRESSURE: 117 MMHG | HEART RATE: 79 BPM | DIASTOLIC BLOOD PRESSURE: 83 MMHG | BODY MASS INDEX: 35.72 KG/M2

## 2024-10-23 DIAGNOSIS — K58.0 IRRITABLE BOWEL SYNDROME WITH DIARRHEA: Primary | ICD-10-CM

## 2024-10-23 DIAGNOSIS — K21.9 GASTROESOPHAGEAL REFLUX DISEASE WITHOUT ESOPHAGITIS: ICD-10-CM

## 2024-10-23 DIAGNOSIS — R13.19 ESOPHAGEAL DYSPHAGIA: ICD-10-CM

## 2024-10-23 PROCEDURE — G2211 COMPLEX E/M VISIT ADD ON: HCPCS | Performed by: PHYSICIAN ASSISTANT

## 2024-10-23 PROCEDURE — 99214 OFFICE O/P EST MOD 30 MIN: CPT | Performed by: PHYSICIAN ASSISTANT

## 2024-10-23 NOTE — ASSESSMENT & PLAN NOTE
-has hx fundoplication, on omeprazole once daily, denies reflux symptoms  -can wean off PPI and trial famotidine when she is ready  -anti-reflux diet and measures

## 2024-10-23 NOTE — PROGRESS NOTES
Ambulatory Visit  Name: Swetha Lopez      : 1950      MRN: [id number]  Encounter Provider: Talia Khan PA-C  Encounter Date: 10/23/2024   Encounter department: [facility]    Assessment & Plan  Irritable bowel syndrome with diarrhea  -patient goes through cycles of diarrhea for a few days then cannot go for a few days, then diarrhea again, with associated gas. She reports fiber makes her more gassy, is already on a dairy free and gluten free diet  -will try a course of xifaxan for her symptoms as she has already made a lot of dietary modifications  -consider peppermint oil capsule as well  Orders:    rifaximin (XIFAXAN) 550 mg tablet; Take 1 tablet (550 mg total) by mouth every 8 (eight) hours for 14 days    Gastroesophageal reflux disease without esophagitis  -has hx fundoplication, on omeprazole once daily, denies reflux symptoms  -can wean off PPI and trial famotidine when she is ready  -anti-reflux diet and measures        Esophageal dysphagia  Reports issues with this regularly with meats and bread, has had dilation of fundoplication in the past, does not feel she needs dilation at this time  -advised to contact us if she feels she needs EGD with dilation as this has helped before.          History of Present Illness     Swetha Lopez is a 74 y.o. female with a history of pulmonary hypertension, atrial fibrillation on Eliquis, pacemaker, cardiomyopathy, hypertension, IBS with diarrhea, GERD, osteoporosis, vitamin D deficiency, hyperlipidemia who presents for a follow-up for diarrhea and GERD.  Patient was last seen in the summer and had stool testing done due to worsening diarrhea and recent antibiotic use.  Stool testing was negative for infection.  She reports that she goes through cycles of having diarrhea for several days followed by not moving her bowels for several days.  She very rarely takes anything to help her move her bowels.  She very  occasionally will take MiraLAX but does not do this often because it causes more diarrhea.  She reports that recently she also had an external hemorrhoid with bleeding that resolved within a day or 2 and she has not had any recurrence of this.  She had a colonoscopy in 2020 which was unremarkable.  She reports a lot of gassiness.  She is on omeprazole daily and denies any reflux symptoms.  She does get esophageal dysphagia primarily to meat and breads which she has had ever since having her to fundoplication.  She was dilated a couple years ago and reports that this does help her.  She does not feel that she is at the point of needing to be dilated at this time.  Denies any nausea or vomiting.  Weight is stable.  She follows a dairy free and gluten-free diet for her IBS.      Review of Systems   Constitutional:  Negative for activity change, appetite change, fever and unexpected weight change.   HENT:  Negative for drooling, mouth sores, sore throat, trouble swallowing and voice change.    Eyes:  Negative for pain, discharge and visual disturbance.   Respiratory:  Negative for cough, choking, chest tightness and shortness of breath.    Cardiovascular:  Negative for chest pain, palpitations and leg swelling.   Gastrointestinal:  Positive for abdominal pain, blood in stool, constipation and diarrhea. Negative for abdominal distention, anal bleeding, nausea, rectal pain and vomiting.   Genitourinary:  Negative for decreased urine volume, difficulty urinating, dysuria, flank pain and urgency.   Musculoskeletal:  Negative for arthralgias, back pain, gait problem, myalgias and neck stiffness.   Skin:  Negative for color change, pallor and rash.   Neurological:  Negative for dizziness, syncope and light-headedness.   Hematological:  Negative for adenopathy.   Psychiatric/Behavioral:  Negative for confusion. The patient is not nervous/anxious.            Objective     /83 (BP Location: Left arm, Patient Position:  "Sitting, Cuff Size: Standard)   Pulse 79   Ht 5' 5\" (1.651 m)   Wt 97.3 kg (214 lb 6.4 oz)   BMI 35.68 kg/m²     Physical Exam  Constitutional:       General: She is not in acute distress.     Appearance: Normal appearance. She is well-developed.   HENT:      Head: Normocephalic and atraumatic.      Mouth/Throat:      Mouth: Mucous membranes are moist.   Eyes:      General: No scleral icterus.  Neck:      Trachea: No tracheal deviation.   Cardiovascular:      Rate and Rhythm: Normal rate and regular rhythm.      Heart sounds: Normal heart sounds. No murmur heard.  Pulmonary:      Effort: Pulmonary effort is normal. No respiratory distress.      Breath sounds: Normal breath sounds. No wheezing or rales.   Abdominal:      General: Bowel sounds are normal. There is no distension.      Palpations: Abdomen is soft. There is no mass.      Tenderness: There is no abdominal tenderness. There is no guarding or rebound.   Musculoskeletal:         General: Normal range of motion.      Cervical back: Normal range of motion and neck supple.   Skin:     General: Skin is warm and dry.      Coloration: Skin is not pale.      Findings: No rash.   Neurological:      Mental Status: She is alert and oriented to person, place, and time. Mental status is at baseline.   Psychiatric:         Mood and Affect: Mood normal.         Behavior: Behavior normal.         "

## 2024-10-23 NOTE — ASSESSMENT & PLAN NOTE
-patient goes through cycles of diarrhea for a few days then cannot go for a few days, then diarrhea again, with associated gas. She reports fiber makes her more gassy, is already on a dairy free and gluten free diet  -will try a course of xifaxan for her symptoms as she has already made a lot of dietary modifications  -consider peppermint oil capsule as well  Orders:    rifaximin (XIFAXAN) 550 mg tablet; Take 1 tablet (550 mg total) by mouth every 8 (eight) hours for 14 days

## 2024-10-24 ENCOUNTER — TELEPHONE (OUTPATIENT)
Age: 74
End: 2024-10-24

## 2024-10-24 NOTE — TELEPHONE ENCOUNTER
PA for Xifaxan 550 mg SUBMITTED     via  []CMM-KEY:   [x]Surescripts-Case ID #     [id number]    Payer: [employer]   Phone: [phone]     []Availity-Auth ID # NDC #   []Faxed to plan   []Other website   []Phone call Case ID #     Office notes sent, clinical questions answered. Awaiting determination    Turnaround time for your insurance to make a decision on your Prior Authorization can take 7-21 business days.

## 2024-10-25 NOTE — TELEPHONE ENCOUNTER
PA for Xifaxan   APPROVED     Date(s) approved Authorized from October 24, 2024 to November 7, 2024        Case #[id number]     Patient advised by          []ACS [employer]arker[employer] Message  [x]Phone call voiced understanding   []LMOM  []L/M to call office as no active Communication consent on file  []Unable to leave detailed message as VM not approved on Communication consent       Pharmacy advised by    [x]Fax  []Phone call    Approval letter scanned into Media Yes

## 2024-10-29 ENCOUNTER — APPOINTMENT (OUTPATIENT)
Dept: OCCUPATIONAL THERAPY | Facility: CLINIC | Age: 74
End: 2024-10-29
Payer: COMMERCIAL

## 2024-10-30 ENCOUNTER — RA CDI HCC (OUTPATIENT)
Dept: OTHER | Facility: HOSPITAL | Age: 74
End: 2024-10-30

## 2024-10-31 ENCOUNTER — EVALUATION (OUTPATIENT)
Dept: OCCUPATIONAL THERAPY | Facility: CLINIC | Age: 74
End: 2024-10-31
Payer: COMMERCIAL

## 2024-10-31 DIAGNOSIS — S62.512A CLOSED DISPLACED FRACTURE OF PROXIMAL PHALANX OF LEFT THUMB, INITIAL ENCOUNTER: Primary | ICD-10-CM

## 2024-10-31 PROCEDURE — 97530 THERAPEUTIC ACTIVITIES: CPT

## 2024-10-31 PROCEDURE — 97110 THERAPEUTIC EXERCISES: CPT

## 2024-10-31 NOTE — PROGRESS NOTES
OT Re-Evaluation     Today's date: 10/31/2024  Patient name: Swetha Lopez  : 1950  MRN: [id number]  Referring provider: Salazar Palmer*  Dx:   Encounter Diagnosis     ICD-10-CM    1. Closed displaced fracture of proximal phalanx of left thumb, initial encounter  S62.512A           Start Time: 0930  Stop Time: 1015  Total time in clinic (min): 45 minutes    Assessment  Impairments: abnormal or restricted ROM, activity intolerance, impaired physical strength, lacks appropriate home exercise program, pain with function, weight-bearing intolerance and activity limitations  Understanding of Dx/Px/POC: good     Prognosis: good    Goals  Short Term Goals by 2 - 4 weeks:     Establish HEP to increase performance with daily activities. MET    Patient will increase ROM of LUE by at least 10 degrees to complete ADLs. MET    Patient will decrease pain rate of LUE by at least 2 points per the VAS scale. Progressing         New Goals added 10/1/2024:  Patient will increase ROM of wrist flexion by at least 5 to 8 degrees to assist with ADLs, IADLs, and leisure/hobby tasks. MET (+17lbs)  Patient will increase ROM of L thumb by at least 5 to 8 degrees to assist with participating in knitting hobby tasks with minimal to no difficulty. Progressing  Patient will increase functional pinch strength by at least 2lbs to assist with FMC dexterity and ADLs/IADLs with minimal to no difficulty. MET  Patient will increase functional  strength by at least 5 lbs to assist with holding purse with minimal to no difficulty. Progressing (+2lbs)           Long Term Goals by discharge:    Establish final home exercise program to enhance maximal functional level with ADLs. Progressing    Achieve functional active range of motion of LUE for full return to household chores. Progressing                            Achieve functional strength of LUE for full return to high level ADLs. Progressing              Plan  Patient would  benefit from: skilled occupational therapy, custom splinting, orthotics and OT eval  Planned modality interventions: electrical stimulation/Russian stimulation, cryotherapy, TENS, thermotherapy: hydrocollator packs, thermotherapy: paraffin bath, ultrasound and unattended electrical stimulation    Planned therapy interventions: IASTM, joint mobilization, kinesiology taping, manual therapy, massage, patient/caregiver education, orthotic management and training, orthotic fitting/training, therapeutic activities, strengthening, stretching, therapeutic exercise, home exercise program, graded exercise, graded activity, flexibility and functional ROM exercises    Frequency: 1-2x week  Duration in weeks: 8  Plan of Care beginning date: 10/1/2024  Plan of Care expiration date: 11/26/2024  Treatment plan discussed with: patient        Subjective Evaluation    History of Present Illness  Date of surgery: 5/22/2024  Mechanism of injury: surgery  Mechanism of injury: Patient is a 74 y.o. RHD female who presents for OT RE for s/p 5/22/2024 left thumb proximal phalanx fusion of MCP joint and distal radius bone grating performed by Dr. Duvall. Patient reports noted improvement in functional use of the UE to complete daily tasks. Patient notes only pins and needle sensation localized to the tip of them digit only; improvement with sensation to the index and middle digits.       Patient is a 74 y.o. RHD female who presents for OT RE for s/p 5/22/2024 left thumb proximal phalanx fusion of MCP joint and distal radius bone grating performed by Dr. Duvall. Patient reports occasional sharp pain and continued swelling to the thumb. Pins and needle sensation to the median nerve distribution to the digits.        Patient is a 74 y.o. RHD female who presents for OT IE and treatment for s/p 5/22/2024 left thumb proximal phalanx fusion of MCP joint and distal radius bone grating performed by Dr. Duvall. Patient reports sustaining a fall on  2024 where she tripped and fell due to not picking her foot up when walking into the kitchen (1/2 step). Patient was treated prior to surgical management which includes conservative management which included splinting and casting with no improvement. Patient notes having pins and needle sensation to the median nerve distrubution digits, specifically the thumb. Patient has transitioned out of the wrist cock up brace at this time and was instructed to progress into WB tasks within tolerance. Patient notes difficulty with FMC tasks, such as donning/doffing bra, zippers, and buttons. 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 She is schedule for a  cardiology procedure to change pacer maker on 2024. Patient referred by Salazar Palmer PA-C to initiate treatment including hand therapy.       Quality of life: good    Patient Goals  Patient goals for therapy: decreased edema, decreased pain, increased motion, increased strength, return to sport/leisure activities and independence with ADLs/IADLs  Patient goal: putting bra on, zipping and buttoning clothes  Pain  Current pain ratin  At best pain ratin  At worst pain ratin  Quality: needle-like, cramping, sharp, radiating, throbbing and tight    Social Support  Lives with: spouse    Employment status: not working  Hand dominance: right    Treatments  Previous treatment: immobilization        Objective     Active Range of Motion     Left Wrist   Wrist flexion: 49 degrees   Wrist extension: 72 degrees   Radial deviation: 22 degrees   Ulnar deviation: 22 degrees with pain      Right Wrist   Wrist flexion: 55 degrees   Wrist extension: 66 degrees   Radial deviation: 20 degrees   Ulnar deviation: 10 degrees     Left Thumb   Flexion     CMC: 18 degrees    MP: 32 degrees    DIP: 30 degrees  Extension     CMC: 0 degrees    MP: 22 degrees    DIP: 4 degrees  Palmar Abduction     CMC: 42 degrees  Radial  abduction    CMC: 40 degrees    Opposition: Pre measurements 10/31/2024:  MCP: 8  16  MP: 28  32  IP: 14   40    **post measurements noted above     Right Thumb   Flexion     CMC: 10    MP: 64    DIP: 54  Extension     CMC: 0    MP: 0    DIP: 2  Palmar Abduction    CMC: 46  Radial Abduction    CMC: 45    Additional Active Range of Motion Details  Approximately 3.5cm to 4.5cm away from DPC for the left hand.  - difficulty opposing thumb to all digits      RE 10/1/2024  + opposing thumb to all digits   + loose composite fist to the DPC       Strength/Myotome Testing     Left Wrist/Hand      (2nd hand position)     Trial 1: 28    Thumb Strength  Key/Lateral Pinch     Trial 1: 8  Tip/Two-Point Pinch     Trial 1: 6  Palmar/Three-Point Pinch     Trial 1: 6    Right Wrist/Hand      (2nd hand position)     Trial 1: 26    Thumb Strength   Key/Lateral Pinch     Trial 1: 12  Tip/Two-Point Pinch     Trial 1: 6.5  Palmar/Three-Point Pinch     Trial 1: 8                    Daily Note     Today's date: 10/31/2024  Patient name: Swetha Lopez  : 1950  MRN: [id number]  Referring provider: Salazar Palmer*  Dx:   Encounter Diagnosis     ICD-10-CM    1. Closed displaced fracture of proximal phalanx of left thumb, initial encounter  S62.512A               Start Time: 30  Stop Time: 1015  Total time in clinic (min): 45 minutes    Subjective: Patient reports noted improvement in using the hand for daily tasks with minimal difficulty.             Objective: See treatment diary below               Precautions: S/p left thumb proximal phalanx fusion of MCP joint and distal radius bone grafting - 24.    S/p Cardiac upgrade from DC Pacer to BIV Pacer (Chest)  2024; No overhead reaching/pushing.pulling/lifting greater than 5 - 10lbs with left arm for 6 weeks      Auth Tracker  Auth Status Total   Visits  Expiration date Co-Insurance   Approved BOMN                                 Visit Tracker: NO AUTH  REQ. BOMN  Date 8/20  IE 9/3 9/10 9/17 9/24 10/1  RE    10/8 10/15 10/22 10/31  RE                                                                           Manuals HEP 10/31/2024                       Ther Ex     Re-evaluation  x10min   Patient education on Dx and HEP  x5min   Therapist assisted AAROM/PROM  x5min   Wrist AROM with towel x    Flexor tendon glides x    Thumb Opposition x    Thumb towel scrunches x x10        Ther Activity     Juxaciser  x10   Dice with pincer  Red 3pt pinch   Digit extension rubberband, thumb radial abd/palmar abd x Red x10   Thumb radial/palmar abduction rubberband  Red x10   Digit flex   Red x10   Thumb depressor and thumb extension  2x10 half resistance    Wrist PREs  2lb x10   Theraputty with cisers tan HEP        Modalities     MHP  x5min            Assessment:   Patient tolerated session well. Upon today's re-evaluation, patient demonstrates noted improvement in all areas of ROM and functional strength compared to previous re-evaluation measurements from 10/1/2024. Session focused on re-evaluation, HEP education, range of motion, thumb strengthening, and  UE strengthening and patient education to improve functional task performance with daily activities. Patient tolerated all TA & TE with good tolerance and minimal complaints of fatigue at the end of today's session. Patient progressing well towards goals. Patient would benefit from continued skilled hand therapy OT to reduce deficits to improve independence with daily activities with focus on ROM and functional strength.        Plan:   Focus on AROM, AAROM, PROM, flexor tendon glides, FMC coordination, desensitization, IASTM, cupping, manual, strengthening and all modalities as seen fit to improve ability to complete daily activites with ease.  POC: 10/1/2024 - 11/26/2024

## 2024-11-05 ENCOUNTER — OFFICE VISIT (OUTPATIENT)
Dept: OCCUPATIONAL THERAPY | Facility: CLINIC | Age: 74
End: 2024-11-05
Payer: COMMERCIAL

## 2024-11-05 DIAGNOSIS — S62.512A CLOSED DISPLACED FRACTURE OF PROXIMAL PHALANX OF LEFT THUMB, INITIAL ENCOUNTER: Primary | ICD-10-CM

## 2024-11-05 PROCEDURE — 97530 THERAPEUTIC ACTIVITIES: CPT

## 2024-11-05 PROCEDURE — 97110 THERAPEUTIC EXERCISES: CPT

## 2024-11-05 NOTE — PROGRESS NOTES
Daily Note     Today's date: 2024  Patient name: Swetha Lopez  : 1950  MRN: [id number]  Referring provider: Salazar Palmer*  Dx:   Encounter Diagnosis     ICD-10-CM    1. Closed displaced fracture of proximal phalanx of left thumb, initial encounter  S62.512A               Start Time: 0930  Stop Time: 1015  Total time in clinic (min): 45 minutes    Subjective: Patient offers no functional changes this date.         Objective: See treatment diary below             Precautions: S/p left thumb proximal phalanx fusion of MCP joint and distal radius bone grafting - 24.    S/p Cardiac upgrade from DC Pacer to BIV Pacer (Chest)  2024; No overhead reaching/pushing.pulling/lifting greater than 5 - 10lbs with left arm for 6 weeks      Auth Tracker  Auth Status Total   Visits  Expiration date Co-Insurance   Approved BOMN                                 Visit Tracker: NO AUTH REQ. BOMN  Date   IE 9/3 9/10 9/17 9/24 10/1  RE    10/8 10/15 10/22 10/31  RE                                                                           Manuals HEP 2024                       Ther Ex     Patient education on Dx and HEP  x5min   Therapist assisted AAROM/PROM  x5min   Wrist AROM with towel x    Flexor tendon glides x    Thumb Opposition x    Thumb towel scrunches x x10        Ther Activity     Juxaciser  x5   Dice with pincer  Red 3pt pinch   Digit extension rubberband, thumb radial abd/palmar abd x Red x10   Thumb radial/palmar abduction rubberband  Red x10   Digit flex   Red 2x10   Thumb depressor and thumb extension  2x10 half resistance    Wrist PREs  2lb x10   Theraputty marble find tan Tan x5min 10 marbles         Modalities     MHP  x5min            Assessment:   Patient tolerated session well.  Session focused on HEP education, range of motion, thumb strengthening, and  UE strengthening and patient education to improve functional task performance with daily activities. Patient  tolerated all TA & TE with good tolerance and minimal complaints of pain to the IP joint of the hand at the end of today's session.  Patient progressing well towards goals. Patient would benefit from continued skilled hand therapy OT to reduce deficits to improve independence with daily activities with focus on ROM and functional strength.        Plan:   Focus on AROM, AAROM, PROM, flexor tendon glides, FMC coordination, desensitization, IASTM, cupping, manual, strengthening and all modalities as seen fit to improve ability to complete daily activites with ease.  POC: 10/1/2024 - 11/26/2024

## 2024-11-06 ENCOUNTER — OFFICE VISIT (OUTPATIENT)
Dept: FAMILY MEDICINE CLINIC | Facility: CLINIC | Age: 74
End: 2024-11-06
Payer: COMMERCIAL

## 2024-11-06 VITALS
DIASTOLIC BLOOD PRESSURE: 80 MMHG | RESPIRATION RATE: 18 BRPM | BODY MASS INDEX: 35.32 KG/M2 | TEMPERATURE: 98.1 F | HEIGHT: 65 IN | WEIGHT: 212 LBS | HEART RATE: 68 BPM | SYSTOLIC BLOOD PRESSURE: 116 MMHG

## 2024-11-06 DIAGNOSIS — I10 BENIGN HYPERTENSION: ICD-10-CM

## 2024-11-06 DIAGNOSIS — Z00.00 MEDICARE ANNUAL WELLNESS VISIT, SUBSEQUENT: Primary | ICD-10-CM

## 2024-11-06 DIAGNOSIS — M80.00XD AGE-RELATED OSTEOPOROSIS WITH CURRENT PATHOLOGICAL FRACTURE WITH ROUTINE HEALING: ICD-10-CM

## 2024-11-06 DIAGNOSIS — E03.0 CONGENITAL HYPOTHYROIDISM WITH DIFFUSE GOITER: ICD-10-CM

## 2024-11-06 DIAGNOSIS — E78.2 MIXED HYPERLIPIDEMIA: ICD-10-CM

## 2024-11-06 PROCEDURE — 99214 OFFICE O/P EST MOD 30 MIN: CPT | Performed by: INTERNAL MEDICINE

## 2024-11-06 PROCEDURE — G0439 PPPS, SUBSEQ VISIT: HCPCS | Performed by: INTERNAL MEDICINE

## 2024-11-06 NOTE — PATIENT INSTRUCTIONS
Medicare Preventive Visit Patient Instructions  Thank you for completing your Welcome to Medicare Visit or Medicare Annual Wellness Visit today. Your next wellness visit will be due in one year (11/7/2025).  The screening/preventive services that you may require over the next 5-10 years are detailed below. Some tests may not apply to you based off risk factors and/or age. Screening tests ordered at today's visit but not completed yet may show as past due. Also, please note that scanned in results may not display below.  Preventive Screenings:  Service Recommendations Previous Testing/Comments   Colorectal Cancer Screening  * Colonoscopy    * Fecal Occult Blood Test (FOBT)/Fecal Immunochemical Test (FIT)  * Fecal DNA/Cologuard Test  * Flexible Sigmoidoscopy Age: 45-75 years old   Colonoscopy: every 10 years (may be performed more frequently if at higher risk)  OR  FOBT/FIT: every 1 year  OR  Cologuard: every 3 years  OR  Sigmoidoscopy: every 5 years  Screening may be recommended earlier than age 45 if at higher risk for colorectal cancer. Also, an individualized decision between you and your healthcare provider will decide whether screening between the ages of 76-85 would be appropriate. Colonoscopy: 07/28/2020  FOBT/FIT: Not on file  Cologuard: Not on file  Sigmoidoscopy: Not on file          Breast Cancer Screening Age: 40+ years old  Frequency: every 1-2 years  Not required if history of left and right mastectomy Mammogram: 02/07/2024        Cervical Cancer Screening Between the ages of 21-29, pap smear recommended once every 3 years.   Between the ages of 30-65, can perform pap smear with HPV co-testing every 5 years.   Recommendations may differ for women with a history of total hysterectomy, cervical cancer, or abnormal pap smears in past. Pap Smear: Not on file        Hepatitis C Screening Once for adults born between 1945 and 1965  More frequently in patients at high risk for Hepatitis C Hep C Antibody:  08/02/2019        Diabetes Screening 1-2 times per year if you're at risk for diabetes or have pre-diabetes Fasting glucose: 103 mg/dL (8/27/2024)  A1C: 5.5 % (12/25/2022)      Cholesterol Screening Once every 5 years if you don't have a lipid disorder. May order more often based on risk factors. Lipid panel: 01/18/2023          Other Preventive Screenings Covered by Medicare:  Abdominal Aortic Aneurysm (AAA) Screening: covered once if your at risk. You're considered to be at risk if you have a family history of AAA.  Lung Cancer Screening: covers low dose CT scan once per year if you meet all of the following conditions: (1) Age 55-77; (2) No signs or symptoms of lung cancer; (3) Current smoker or have quit smoking within the last 15 years; (4) You have a tobacco smoking history of at least 20 pack years (packs per day multiplied by number of years you smoked); (5) You get a written order from a healthcare provider.  Glaucoma Screening: covered annually if you're considered high risk: (1) You have diabetes OR (2) Family history of glaucoma OR (3)  aged 50 and older OR (4)  American aged 65 and older  Osteoporosis Screening: covered every 2 years if you meet one of the following conditions: (1) You're estrogen deficient and at risk for osteoporosis based off medical history and other findings; (2) Have a vertebral abnormality; (3) On glucocorticoid therapy for more than 3 months; (4) Have primary hyperparathyroidism; (5) On osteoporosis medications and need to assess response to drug therapy.   Last bone density test (DXA Scan): 07/15/2024.  HIV Screening: covered annually if you're between the age of 15-65. Also covered annually if you are younger than 15 and older than 65 with risk factors for HIV infection. For pregnant patients, it is covered up to 3 times per pregnancy.    Immunizations:  Immunization Recommendations   Influenza Vaccine Annual influenza vaccination during flu season is  recommended for all persons aged >= 6 months who do not have contraindications   Pneumococcal Vaccine   * Pneumococcal conjugate vaccine = PCV13 (Prevnar 13), PCV15 (Vaxneuvance), PCV20 (Prevnar 20)  * Pneumococcal polysaccharide vaccine = PPSV23 (Pneumovax) Adults 19-63 yo with certain risk factors or if 65+ yo  If never received any pneumonia vaccine: recommend Prevnar 20 (PCV20)  Give PCV20 if previously received 1 dose of PCV13 or PPSV23   Hepatitis B Vaccine 3 dose series if at intermediate or high risk (ex: diabetes, end stage renal disease, liver disease)   Respiratory syncytial virus (RSV) Vaccine - COVERED BY MEDICARE PART D  * RSVPreF3 (Arexvy) CDC recommends that adults 60 years of age and older may receive a single dose of RSV vaccine using shared clinical decision-making (SCDM)   Tetanus (Td) Vaccine - COST NOT COVERED BY MEDICARE PART B Following completion of primary series, a booster dose should be given every 10 years to maintain immunity against tetanus. Td may also be given as tetanus wound prophylaxis.   Tdap Vaccine - COST NOT COVERED BY MEDICARE PART B Recommended at least once for all adults. For pregnant patients, recommended with each pregnancy.   Shingles Vaccine (Shingrix) - COST NOT COVERED BY MEDICARE PART B  2 shot series recommended in those 19 years and older who have or will have weakened immune systems or those 50 years and older     Health Maintenance Due:      Topic Date Due   • Breast Cancer Screening: Mammogram  02/07/2025   • Colorectal Cancer Screening  07/28/2025   • Hepatitis C Screening  Completed     Immunizations Due:      Topic Date Due   • Influenza Vaccine (1) 09/01/2024   • COVID-19 Vaccine (7 - 2023-24 season) 09/01/2024     Advance Directives   What are advance directives?  Advance directives are legal documents that state your wishes and plans for medical care. These plans are made ahead of time in case you lose your ability to make decisions for yourself. Advance  directives can apply to any medical decision, such as the treatments you want, and if you want to donate organs.   What are the types of advance directives?  There are many types of advance directives, and each state has rules about how to use them. You may choose a combination of any of the following:  Living will:  This is a written record of the treatment you want. You can also choose which treatments you do not want, which to limit, and which to stop at a certain time. This includes surgery, medicine, IV fluid, and tube feedings.   Durable power of  for healthcare (DPAHC):  This is a written record that states who you want to make healthcare choices for you when you are unable to make them for yourself. This person, called a proxy, is usually a family member or a friend. You may choose more than 1 proxy.  Do not resuscitate (DNR) order:  A DNR order is used in case your heart stops beating or you stop breathing. It is a request not to have certain forms of treatment, such as CPR. A DNR order may be included in other types of advance directives.  Medical directive:  This covers the care that you want if you are in a coma, near death, or unable to make decisions for yourself. You can list the treatments you want for each condition. Treatment may include pain medicine, surgery, blood transfusions, dialysis, IV or tube feedings, and a ventilator (breathing machine).  Values history:  This document has questions about your views, beliefs, and how you feel and think about life. This information can help others choose the care that you would choose.  Why are advance directives important?  An advance directive helps you control your care. Although spoken wishes may be used, it is better to have your wishes written down. Spoken wishes can be misunderstood, or not followed. Treatments may be given even if you do not want them. An advance directive may make it easier for your family to make difficult choices about  your care.   Weight Management   Why it is important to manage your weight:  Being overweight increases your risk of health conditions such as heart disease, high blood pressure, type 2 diabetes, and certain types of cancer. It can also increase your risk for osteoarthritis, sleep apnea, and other respiratory problems. Aim for a slow, steady weight loss. Even a small amount of weight loss can lower your risk of health problems.  How to lose weight safely:  A safe and healthy way to lose weight is to eat fewer calories and get regular exercise. You can lose up about 1 pound a week by decreasing the number of calories you eat by 500 calories each day.   Healthy meal plan for weight management:  A healthy meal plan includes a variety of foods, contains fewer calories, and helps you stay healthy. A healthy meal plan includes the following:  Eat whole-grain foods more often.  A healthy meal plan should contain fiber. Fiber is the part of grains, fruits, and vegetables that is not broken down by your body. Whole-grain foods are healthy and provide extra fiber in your diet. Some examples of whole-grain foods are whole-wheat breads and pastas, oatmeal, brown rice, and bulgur.  Eat a variety of vegetables every day.  Include dark, leafy greens such as spinach, kale, otto greens, and mustard greens. Eat yellow and orange vegetables such as carrots, sweet potatoes, and winter squash.   Eat a variety of fruits every day.  Choose fresh or canned fruit (canned in its own juice or light syrup) instead of juice. Fruit juice has very little or no fiber.  Eat low-fat dairy foods.  Drink fat-free (skim) milk or 1% milk. Eat fat-free yogurt and low-fat cottage cheese. Try low-fat cheeses such as mozzarella and other reduced-fat cheeses.  Choose meat and other protein foods that are low in fat.  Choose beans or other legumes such as split peas or lentils. Choose fish, skinless poultry (chicken or turkey), or lean cuts of red meat  (beef or pork). Before you cook meat or poultry, cut off any visible fat.   Use less fat and oil.  Try baking foods instead of frying them. Add less fat, such as margarine, sour cream, regular salad dressing and mayonnaise to foods. Eat fewer high-fat foods. Some examples of high-fat foods include french fries, doughnuts, ice cream, and cakes.  Eat fewer sweets.  Limit foods and drinks that are high in sugar. This includes candy, cookies, regular soda, and sweetened drinks.  Exercise:  Exercise at least 30 minutes per day on most days of the week. Some examples of exercise include walking, biking, dancing, and swimming. You can also fit in more physical activity by taking the stairs instead of the elevator or parking farther away from stores. Ask your healthcare provider about the best exercise plan for you.      © Copyright [employer] 2018 Information is for End User's use only and may not be sold, redistributed or otherwise used for commercial purposes. All illustrations and images included in CareNotes® are the copyrighted property of [employer] or [employer]

## 2024-11-06 NOTE — ASSESSMENT & PLAN NOTE
I ordered a lipid panel to add to the labs from endocrinology.  Continue atorvastatin 20 mg daily.   Orders:    Lipid panel; Future

## 2024-11-06 NOTE — PROGRESS NOTES
Ambulatory Visit  Name: Swetha Lopez      : 1950      MRN: [id number]  Encounter Provider: Gracia Danielle MD  Encounter Date: 2024   Encounter department: [facility]    Assessment & Plan  Medicare annual wellness visit, subsequent         Benign hypertension  Well controlled on current medications and will continue as ordered.        Congenital hypothyroidism with diffuse goiter  Recent TSH is WNL and she is clinically euthyroid, continue levothyroxine as ordered.        Mixed hyperlipidemia  I ordered a lipid panel to add to the labs from endocrinology.  Continue atorvastatin 20 mg daily.   Orders:    Lipid panel; Future    Age-related osteoporosis with current pathological fracture with routine healing  Management per endocrinology.  Discussed aims of preventing further fracture.           Preventive health issues were discussed with patient, and age appropriate screening tests were ordered as noted in patient's After Visit Summary. Personalized health advice and appropriate referrals for health education or preventive services given if needed, as noted in patient's After Visit Summary.    History of Present Illness     Here for follow up and AWV.  She had recent surgery for pacemaker and a L thumb fracture.  Is undergoing workup for her osteoporosis with endocrinology.    Feels well in general and denies chest pain or dyspnea.  There is no cold or heat intolerance, diarrhea or constipation, hair or skin changes, unanticipated weight gain or loss.         Patient Care Team:  Gracia Danielle MD as PCP - General (Internal Medicine)  Richard Bledsoe MD as Consulting Physician (Gastroenterology)  Malcolm Morris MD as Consulting Physician (Gastroenterology)  Gucci Ness MD as Consulting Physician (Cardiology)  KENNEDY Hernandes (Nurse Practitioner)    Review of Systems   Constitutional:  Negative for chills and fever.   HENT:  Negative for ear pain and sore throat.    Eyes:  Negative  for pain and visual disturbance.   Respiratory:  Negative for cough and shortness of breath.    Cardiovascular:  Negative for chest pain and palpitations.   Gastrointestinal:  Negative for abdominal pain and vomiting.   Endocrine: Negative for cold intolerance and heat intolerance.   Genitourinary:  Negative for dysuria and hematuria.   Musculoskeletal:  Negative for arthralgias and back pain.   Skin:  Negative for color change and rash.   Neurological:  Negative for seizures and syncope.   All other systems reviewed and are negative.    Medical History Reviewed by provider this encounter:       Annual Wellness Visit Questionnaire   Swetha is here for her Subsequent Wellness visit.     Health Risk Assessment:   Patient rates overall health as good. Patient feels that their physical health rating is slightly worse. Patient is satisfied with their life. Eyesight was rated as slightly worse. Hearing was rated as same. Patient feels that their emotional and mental health rating is same. Patients states they are never, rarely angry. Patient states they are sometimes unusually tired/fatigued. Pain experienced in the last 7 days has been some. Patient's pain rating has been 4/10. Patient states that she has experienced no weight loss or gain in last 6 months.     Fall Risk Screening:   In the past year, patient has experienced: history of falling in past year    Number of falls: 2 or more  Injured during fall?: Yes    Feels unsteady when standing or walking?: Yes    Worried about falling?: Yes      Urinary Incontinence Screening:   Patient has leaked urine accidently in the last six months.     Home Safety:  Patient has trouble with stairs inside or outside of their home. Patient has working smoke alarms and has working carbon monoxide detector. Home safety hazards include: uneven floors.     Nutrition:   Current diet is Regular.     Medications:   Patient is currently taking over-the-counter supplements. OTC medications  include: see medication list. Patient is able to manage medications.     Activities of Daily Living (ADLs)/Instrumental Activities of Daily Living (IADLs):   Walk and transfer into and out of bed and chair?: Yes  Dress and groom yourself?: Yes    Bathe or shower yourself?: Yes    Feed yourself? Yes  Do your laundry/housekeeping?: Yes  Manage your money, pay your bills and track your expenses?: Yes  Make your own meals?: Yes    Do your own shopping?: Yes    Previous Hospitalizations:   Any hospitalizations or ED visits within the last 12 months?: Yes      Hospitalization Comments: Pace maker    Advance Care Planning:   Living will: Yes    Durable POA for healthcare: Yes    Advanced directive: Yes      Cognitive Screening:   Provider or family/friend/caregiver concerned regarding cognition?: No    PREVENTIVE SCREENINGS      Cardiovascular Screening:    General: Screening Not Indicated and History Lipid Disorder      Diabetes Screening:     General: Screening Current      Colorectal Cancer Screening:     General: Screening Current      Breast Cancer Screening:     General: Screening Current      Cervical Cancer Screening:    General: Screening Not Indicated      Osteoporosis Screening:    General: Screening Not Indicated and History Osteoporosis      Abdominal Aortic Aneurysm (AAA) Screening:        General: Screening Not Indicated      Lung Cancer Screening:     General: Screening Not Indicated      Hepatitis C Screening:    General: Screening Current    Screening, Brief Intervention, and Referral to Treatment (SBIRT)    Screening  Typical number of drinks in a day: 0  Typical number of drinks in a week: 0  Interpretation: Low risk drinking behavior.    Single Item Drug Screening:  How often have you used an illegal drug (including marijuana) or a prescription medication for non-medical reasons in the past year? never    Single Item Drug Screen Score: 0  Interpretation: Negative screen for possible drug use  "disorder    Brief Intervention  Alcohol & drug use screenings were reviewed. No concerns regarding substance use disorder identified.     Other Counseling Topics:   Car/seat belt/driving safety, skin self-exam, sunscreen and calcium and vitamin D intake and regular weightbearing exercise.     Social Determinants of Health     Financial Resource Strain: Low Risk  (11/6/2023)    Overall Financial Resource Strain (CARDIA)     Difficulty of Paying Living Expenses: Not hard at all   Food Insecurity: No Food Insecurity (11/6/2024)    Hunger Vital Sign     Worried About Running Out of Food in the Last Year: Never true     Ran Out of Food in the Last Year: Never true   Transportation Needs: No Transportation Needs (11/6/2024)    PRAPARE - Transportation     Lack of Transportation (Medical): No     Lack of Transportation (Non-Medical): No   Housing Stability: Low Risk  (11/6/2024)    Housing Stability Vital Sign     Unable to Pay for Housing in the Last Year: No     Number of Times Moved in the Last Year: 0     Homeless in the Last Year: No   Utilities: Not At Risk (11/6/2024)    [facility] Utilities     Threatened with loss of utilities: No     No results found.    Objective     /80   Pulse 68   Temp 98.1 °F (36.7 °C)   Resp 18   Ht 5' 5\" (1.651 m)   Wt 96.2 kg (212 lb)   BMI 35.28 kg/m²     Physical Exam  Constitutional:       General: She is not in acute distress.     Appearance: She is well-developed. She is not diaphoretic.   HENT:      Head: Normocephalic and atraumatic.      Right Ear: External ear normal.      Left Ear: External ear normal.      Nose: Nose normal.   Eyes:      Pupils: Pupils are equal, round, and reactive to light.   Neck:      Thyroid: No thyromegaly.      Vascular: No JVD.   Cardiovascular:      Rate and Rhythm: Regular rhythm.      Heart sounds: No murmur heard.     No friction rub. No gallop.   Pulmonary:      Effort: Pulmonary effort is normal.      Breath sounds: Normal breath sounds. No " wheezing or rales.   Abdominal:      General: Bowel sounds are normal. There is no distension.      Palpations: Abdomen is soft.      Tenderness: There is no abdominal tenderness.   Musculoskeletal:         General: Normal range of motion.      Cervical back: Normal range of motion and neck supple.   Skin:     General: Skin is warm and dry.      Findings: No rash.   Neurological:      Mental Status: She is alert and oriented to person, place, and time.      Cranial Nerves: No cranial nerve deficit.      Sensory: No sensory deficit.      Motor: No abnormal muscle tone.      Coordination: Coordination normal.      Deep Tendon Reflexes: Reflexes normal.   Psychiatric:         Behavior: Behavior normal.         Thought Content: Thought content normal.         Judgment: Judgment normal.

## 2024-11-07 RX ORDER — BUMETANIDE 1 MG/1
1 TABLET ORAL DAILY
Qty: 90 TABLET | Refills: 1 | Status: SHIPPED | OUTPATIENT
Start: 2024-11-07

## 2024-11-11 DIAGNOSIS — I48.91 ATRIAL FIBRILLATION, UNSPECIFIED TYPE (HCC): Primary | ICD-10-CM

## 2024-11-11 DIAGNOSIS — E87.6 HYPOKALEMIA: ICD-10-CM

## 2024-11-11 RX ORDER — POTASSIUM CHLORIDE 750 MG/1
10 TABLET, EXTENDED RELEASE ORAL DAILY
Qty: 90 TABLET | Refills: 1 | Status: SHIPPED | OUTPATIENT
Start: 2024-11-11

## 2024-11-11 RX ORDER — METOPROLOL SUCCINATE 25 MG/1
25 TABLET, EXTENDED RELEASE ORAL 2 TIMES DAILY
Qty: 180 TABLET | Refills: 1 | Status: SHIPPED | OUTPATIENT
Start: 2024-11-11

## 2024-11-11 NOTE — TELEPHONE ENCOUNTER
Reason for call:   [x] Refill   [] Prior Auth  [] Other:     Office:   [] PCP/Provider -   [x] Specialty/Provider - [facility]     Medication: metoprolol succinate (TOPROL-XL) 25 mg 24 hr Take 25 mg by mouth 2 (two) times a day     potassium chloride (Klor-Con M10) 10 mEq Take 1 tablet (10 mEq total) by mouth daily       Pharmacy: [facility]     Does the patient have enough for 3 days?   [x] Yes   [] No - Send as HP to POD

## 2024-11-12 ENCOUNTER — OFFICE VISIT (OUTPATIENT)
Dept: OCCUPATIONAL THERAPY | Facility: CLINIC | Age: 74
End: 2024-11-12
Payer: COMMERCIAL

## 2024-11-12 DIAGNOSIS — S62.512A CLOSED DISPLACED FRACTURE OF PROXIMAL PHALANX OF LEFT THUMB, INITIAL ENCOUNTER: Primary | ICD-10-CM

## 2024-11-12 PROCEDURE — 97110 THERAPEUTIC EXERCISES: CPT

## 2024-11-12 PROCEDURE — 97530 THERAPEUTIC ACTIVITIES: CPT

## 2024-11-12 NOTE — PROGRESS NOTES
Daily Note     Today's date: 2024  Patient name: Swetha Lopez  : 1950  MRN: [id number]  Referring provider: Salazar Palmer*  Dx:   Encounter Diagnosis     ICD-10-CM    1. Closed displaced fracture of proximal phalanx of left thumb, initial encounter  S62.512A               Start Time: 0930  Stop Time: 1015  Total time in clinic (min): 45 minutes    Subjective: Patient notes occasional pain to the IP joint with resistive strengthening during today's therapy session.          Objective: See treatment diary below             Precautions: S/p left thumb proximal phalanx fusion of MCP joint and distal radius bone grafting - 24.    S/p Cardiac upgrade from DC Pacer to BIV Pacer (Chest)  2024; No overhead reaching/pushing.pulling/lifting greater than 5 - 10lbs with left arm for 6 weeks      Auth Tracker  Auth Status Total   Visits  Expiration date Co-Insurance   Approved BOMN                                 Visit Tracker: NO AUTH REQ. BOMN  Date   IE 9/3 9/10 9/17 9/24 10/1  RE    10/8 10/15 10/22 10/31  RE                                                                          Manuals HEP 2024                       Ther Ex     Patient education on Dx and HEP  x5min   Therapist assisted AAROM/PROM  x8min   Wrist AROM with towel x    Flexor tendon glides x    Thumb Opposition x    Thumb towel scrunches x         Ther Activity     Dice with pincer  Red 3pt pinch   Digit extension rubberband, thumb radial abd/palmar abd x Red x10   Thumb radial/palmar abduction rubberband  Red x10   Digit flex   Red 2x10   Thumb depressor and thumb extension  2x10 half resistance    Wrist PREs  2lb x10   Theraputty marble find tan         Modalities     MHP  x5min            Assessment:   Patient tolerated session well.  Session focused on HEP education, range of motion, thumb strengthening, and  UE strengthening and patient education to improve functional task performance with  daily activities. Patient tolerated all TA & TE with good tolerance and minimal complaints of pain to the IP joint of the hand at the end of today's session.  Patient progressing well towards goals. Patient would benefit from continued skilled hand therapy OT to reduce deficits to improve independence with daily activities with focus on ROM and functional strength.        Plan:   Focus on AROM, AAROM, PROM, flexor tendon glides, FMC coordination, desensitization, IASTM, cupping, manual, strengthening and all modalities as seen fit to improve ability to complete daily activites with ease.  POC: 10/1/2024 - 11/26/2024

## 2024-11-19 ENCOUNTER — OFFICE VISIT (OUTPATIENT)
Dept: OCCUPATIONAL THERAPY | Facility: CLINIC | Age: 74
End: 2024-11-19
Payer: COMMERCIAL

## 2024-11-19 DIAGNOSIS — S62.512A CLOSED DISPLACED FRACTURE OF PROXIMAL PHALANX OF LEFT THUMB, INITIAL ENCOUNTER: Primary | ICD-10-CM

## 2024-11-19 PROCEDURE — 97530 THERAPEUTIC ACTIVITIES: CPT

## 2024-11-19 PROCEDURE — 97110 THERAPEUTIC EXERCISES: CPT

## 2024-11-19 NOTE — PROGRESS NOTES
Daily Note/OT Discharge     Today's date: 2024  Patient name: Swetha Lopez  : 1950  MRN: [id number]  Referring provider: Salazar Palmer*  Dx:   Encounter Diagnosis     ICD-10-CM    1. Closed displaced fracture of proximal phalanx of left thumb, initial encounter  S62.512A               Start Time: 0930  Stop Time: 1015  Total time in clinic (min): 45 minutes    Subjective: Patient notes improvement in functional use of the hand as evidence by her ability to turn door knobs, open the door of the dryer with the left hand.         Objective: See treatment diary below        Left Wrist   Wrist flexion: 50 degrees  (+1)  Wrist extension: 73 degrees (+1)  Radial deviation: 15 degrees (-7)  Ulnar deviation: 15 degrees (-7)          Left Thumb   Flexion     CMC: 18 degrees (~)    MP: 40 degrees   (+8)    DIP: 40 degrees  (+10)  Extension     CMC: 0 degrees  (~)    MP: 30 degrees  (+8)    DIP: 4 degrees  (~)  Palmar Abduction     CMC: 46 degrees  (+4)  Radial abduction    CMC: 46 degrees  (+6)      Strength/Myotome Testing      Left Wrist/Hand       (2nd hand position)     Trial 1: 32 (+4)     Thumb Strength  Key/Lateral Pinch     Trial 1:  8 (~)  Tip/Two-Point Pinch     Trial 1: 5 (-1)  Palmar/Three-Point Pinch     Trial 1: 6  (~)  Goals  Short Term Goals by 2 - 4 weeks:      Establish HEP to increase performance with daily activities. MET     Patient will increase ROM of LUE by at least 10 degrees to complete ADLs. MET     Patient will decrease pain rate of LUE by at least 2 points per the VAS scale. MET           New Goals added 10/1/2024:  Patient will increase ROM of wrist flexion by at least 5 to 8 degrees to assist with ADLs, IADLs, and leisure/hobby tasks. MET  Patient will increase ROM of L thumb by at least 5 to 8 degrees to assist with participating in knitting hobby tasks with minimal to no difficulty. MET  Patient will increase functional pinch strength by at least 2lbs to  assist with FMC dexterity and ADLs/IADLs with minimal to no difficulty. MET  Patient will increase functional  strength by at least 5 lbs to assist with holding purse with minimal to no difficulty.  MET              Long Term Goals by discharge:     Establish final home exercise program to enhance maximal functional level with ADLs. MET     Achieve functional active range of motion of LUE for full return to household chores.MET                            Achieve functional strength of LUE for full return to high level ADLs. MET          Precautions: S/p left thumb proximal phalanx fusion of MCP joint and distal radius bone grafting - 5/22/24.    S/p Cardiac upgrade from DC Pacer to BIV Pacer (Chest)  8/27/2024      Auth Tracker  Auth Status Total   Visits  Expiration date Co-Insurance   Approved BOMN                                 Visit Tracker: NO AUTH REQ. BOMN  Date 8/20  IE 9/3 9/10 9/17 9/24 10/1  RE    10/8 10/15 10/22 10/31  RE 11/5 11/12 11/19  D/C                                                                     Manuals HEP 11/19/2024                       Ther Ex     Patient education on Dx and HEP  x10min   measurements  x15min   Therapist assisted AAROM/PROM  x8min   Prayer Stretch x 10 sec hold x5reps   Wrist flexor stretch x 5 sec hold x5reps   Wrist extensor stretch x 5 sec hold x5reps   Wrist AROM with towel x    Flexor tendon glides x    Thumb Opposition x    Thumb towel scrunches x         Ther Activity     Digit extension rubberband, thumb radial abd/palmar abd x Red x10   Thumb radial/palmar abduction rubberband  Red x10   Wrist PREs  2lb x10   Theraputty gripping, pinching, thumb flexion Tan/yellow X5min yellow        Modalities     MHP  x5min            Assessment:   Patient tolerated session well.  Upon today's objective measurements, patient demonstrates improvement in all areas of ROM and functional strength compared to RE on 10/31/2024. Reviewed current measurements with patient.  Discussed with patient about transitioning to HEP at this time. Patient verablized agreement to this plan at this time. Reviewed updated and final HEP for D/C. Session focused on reviewing HEP education, range of motion, thumb strengthening, and  UE strengthening and patient education to improve functional task performance with daily activities. Patient tolerated all TA & TE with good tolerance and minimal complaints of pain to the IP joint of the hand at the end of today's session. D/C to HEP.       Plan:   D/C to HEP.  POC: 10/1/2024 - 11/26/2024

## 2024-11-20 ENCOUNTER — RESULTS FOLLOW-UP (OUTPATIENT)
Dept: NON INVASIVE DIAGNOSTICS | Facility: HOSPITAL | Age: 74
End: 2024-11-20

## 2024-11-20 ENCOUNTER — HOSPITAL ENCOUNTER (OUTPATIENT)
Dept: NON INVASIVE DIAGNOSTICS | Facility: HOSPITAL | Age: 74
Discharge: HOME | End: 2024-11-20
Payer: COMMERCIAL

## 2024-11-20 VITALS
BODY MASS INDEX: 35.32 KG/M2 | WEIGHT: 212 LBS | HEART RATE: 87 BPM | SYSTOLIC BLOOD PRESSURE: 158 MMHG | HEIGHT: 65 IN | DIASTOLIC BLOOD PRESSURE: 114 MMHG

## 2024-11-20 DIAGNOSIS — I42.8 NONISCHEMIC CARDIOMYOPATHY (HCC): ICD-10-CM

## 2024-11-20 LAB
AORTIC ROOT: 3.7 CM
AV LVOT PEAK GRADIENT: 2 MMHG
AV PEAK GRADIENT: 4 MMHG
BSA FOR ECHO PROCEDURE: 2.03 M2
DOP CALC LVOT AREA: 3.46 CM2
DOP CALC LVOT DIAMETER: 2.1 CM
E WAVE DECELERATION TIME: 99 MS
E/A RATIO: 1.93
FRACTIONAL SHORTENING: 22 (ref 28–44)
INTERVENTRICULAR SEPTUM IN DIASTOLE (PARASTERNAL SHORT AXIS VIEW): 1.2 CM
INTERVENTRICULAR SEPTUM: 1.2 CM (ref 0.6–1.1)
LAAS-AP2: 43.6 CM2
LAAS-AP4: 37.7 CM2
LEFT ATRIUM AREA SYSTOLE SINGLE PLANE A4C: 38.3 CM2
LEFT ATRIUM SIZE: 4.1 CM
LEFT ATRIUM VOLUME (MOD BIPLANE): 163 ML
LEFT ATRIUM VOLUME INDEX (MOD BIPLANE): 80.3 ML/M2
LEFT INTERNAL DIMENSION IN SYSTOLE: 3.5 CM (ref 2.1–4)
LEFT VENTRICULAR INTERNAL DIMENSION IN DIASTOLE: 4.5 CM (ref 3.5–6)
LEFT VENTRICULAR POSTERIOR WALL IN END DIASTOLE: 1.2 CM
LEFT VENTRICULAR STROKE VOLUME: 44 ML
LVSV (TEICH): 44 ML
MV E'TISSUE VEL-LAT: 12 CM/S
MV E'TISSUE VEL-SEP: 5 CM/S
MV PEAK A VEL: 0.41 M/S
MV PEAK E VEL: 79 CM/S
MV STENOSIS PRESSURE HALF TIME: 29 MS
MV VALVE AREA P 1/2 METHOD: 7.59
PV PEAK GRADIENT: 3 MMHG
RIGHT ATRIUM AREA SYSTOLE A4C: 23.1 CM2
RIGHT VENTRICLE ID DIMENSION: 3.6 CM
SL CV LEFT ATRIUM LENGTH A2C: 8 CM
SL CV LV EF: 40
SL CV PED ECHO LEFT VENTRICLE DIASTOLIC VOLUME (MOD BIPLANE) 2D: 94 ML
SL CV PED ECHO LEFT VENTRICLE SYSTOLIC VOLUME (MOD BIPLANE) 2D: 50 ML
TR MAX PG: 45 MMHG
TR PEAK VELOCITY: 3.4 M/S
TRICUSPID ANNULAR PLANE SYSTOLIC EXCURSION: 1.5 CM
TRICUSPID VALVE PEAK REGURGITATION VELOCITY: 3.35 M/S

## 2024-11-20 PROCEDURE — 93308 TTE F-UP OR LMTD: CPT

## 2024-11-20 PROCEDURE — 93308 TTE F-UP OR LMTD: CPT | Performed by: INTERNAL MEDICINE

## 2024-11-21 NOTE — TELEPHONE ENCOUNTER
----- Message from Miladys Morales PA-C sent at 11/20/2024  1:37 PM EST -----  Please call this patient to let her know the results of her echo - her LVEF has shown some improvement since her procedure in August (it was 35%, now is 40-45%). Hopefully she is feeling well, and we will see her in follow up as scheduled in a few weeks.     I included Dr. Eubanks on this as well just so he is aware of the results.     Thanks!

## 2024-12-09 ENCOUNTER — IN-CLINIC DEVICE VISIT (OUTPATIENT)
Dept: CARDIOLOGY CLINIC | Facility: CLINIC | Age: 74
End: 2024-12-09
Payer: COMMERCIAL

## 2024-12-09 ENCOUNTER — OFFICE VISIT (OUTPATIENT)
Dept: CARDIOLOGY CLINIC | Facility: CLINIC | Age: 74
End: 2024-12-09
Payer: COMMERCIAL

## 2024-12-09 VITALS
HEART RATE: 69 BPM | HEIGHT: 65 IN | WEIGHT: 214.8 LBS | BODY MASS INDEX: 35.79 KG/M2 | SYSTOLIC BLOOD PRESSURE: 154 MMHG | DIASTOLIC BLOOD PRESSURE: 102 MMHG

## 2024-12-09 DIAGNOSIS — Z95.0 CARDIAC PACEMAKER IN SITU: Primary | ICD-10-CM

## 2024-12-09 DIAGNOSIS — I49.5 TACHY-BRADY SYNDROME (HCC): Primary | ICD-10-CM

## 2024-12-09 DIAGNOSIS — I10 PRIMARY HYPERTENSION: ICD-10-CM

## 2024-12-09 PROCEDURE — 93000 ELECTROCARDIOGRAM COMPLETE: CPT | Performed by: INTERNAL MEDICINE

## 2024-12-09 PROCEDURE — 93281 PM DEVICE PROGR EVAL MULTI: CPT | Performed by: INTERNAL MEDICINE

## 2024-12-09 PROCEDURE — 99215 OFFICE O/P EST HI 40 MIN: CPT | Performed by: INTERNAL MEDICINE

## 2024-12-09 RX ORDER — LOSARTAN POTASSIUM 25 MG/1
25 TABLET ORAL DAILY
Qty: 90 TABLET | Refills: 2 | Status: SHIPPED | OUTPATIENT
Start: 2024-12-09

## 2024-12-09 NOTE — PROGRESS NOTES
EPS follow-up patient Evaluation - Swetha Lopez 74 y.o. female MRN: [id number]       Referring:Dr. Ness    CC/HPI:   It was a pleasure to see Swetha Lopez in our arrhythmia clinic at [facility]. As you know she is a 74 y.o. woman with persistent atrial fibrillation, HTN, HLD, anxiety, GERD, hiatal hernia, hypertension, hyperlipidemia, IBS, rheumatoid arthritis who presented 12/1/2022 to discuss management of atrial arrhythmias.    She was diagnosed with atrial fibrillation in January at the PCP office.  She has mild coronary artery disease but no stents.  She had a repeat catheterization in 2017 which showed no significant coronary disease.  She has had history of bleeding ulcer and required hospitalization in the past needing blood transfusion.  She had used [employer] mobile device in January which had suggested atrial fibrillation.  She has been having on and off palpitations and exercise intolerance since September 2021. Echocardiogram has shown severe left atrial dilation.  She was started on Toprol XL and Eliquis.  As she continued to have symptoms with exertion cardioversion was recommended however she declined the option in April.  Option was again recommended in July office visit and this time she agreed to proceed with it.  She also had exertional chest tightness and stress test was performed which was negative.  A cardioversion in October was only transiently successful.  Her metoprolol dose was increased in October and flecainide is 100 mg twice daily was started.  She underwent repeat cardioversion on November 3rd which was successful.  However she was back in atrial fibrillation after 18 hours.  EKG performed on November 9, 2022 confirm she was back in atrial fibrillation.  Now she presents to discuss further options.    She presented with her  who also provided further history.  Patient did report having fatigue when having atrial fibrillation.  She did note  palpitations when she went back into atrial fibrillation after recent cardioversion.  She denied any significant caffeine or alcohol use.  She denied any drug use.  She felt significant lightheadedness/dizziness since being on flecainide and is afraid to drive a car.  She noted symptoms happening even at rest.    Office visit 1/23/2023:  After discussing options she opted to proceed with ablation procedure.  Patient had pulmonary vein isolation, posterior wall isolation and empiric CTI line placement on December 23, 2022.      She was maintained on flecainide 50 mg twice daily and Toprol-XL 25 mg daily.  Her blood pressure was elevated afterwards and losartan 25 mg was restarted. She reported feeling well. She denied any palpitations. She did have loss of vision in the right eye after ablation. Neurology team was consulted and brain MRI was done. She was diagnosed with TIA. Her vision recovered spontaneously. She was not placed on aspirin after the procedure to hx of GI bleeding.     Office visit 8/29/23:  She was seen in our EP ofice. Her heart was noted to be low and therefore flecainide and Toprol XL were discontinued. She had PAC/PVC and diltiazem was started.     Office visit 12/6/2023:  Swetha presented for a follow-up visit. She had event monitor and had bradycardia at night. She continued to have palpitations therefore we implanted PPM and restarted flecainide. She continued to feel symptoms mainly when exerting while walking. She was not able to keep up the pace. She felt better after the pacemaker but not back to normal self.     She attributed symptoms possibly to flecainide. She did feel better on diltiazem.     Office visit 3/7/24:    Swetha presented for a follow-up visit. After her last visit, we discontinued flecainide as it was giving her significant symptoms. She was placed on diltiazem.     She reported feeling better on diltiazem. She noted fatigue still with walking. She also noticed  palpitations, with last episode occurring on 3/5/24. She was reporting compliance with medications.     We opted to proceed with AV node ablation given difficulty in controlling her atrial fibrillation with medications and symptoms.    Office visit 6/11/2024:  After previous visit she was loaded with dofetilide 250 mcg q12h. She continued to have atrial fibrillation. She underwent AV node ablation on May 9, 2024.  She was doing well at the follow-up visit.     Interval history:  Swetha now presents for a follow-up visit.  After her previous visit, her symptoms worsened and echocardiogram was obtained on June 26, 2024 which showed ejection fraction decreased to 35%.  She underwent left heart cath which showed nonobstructive coronary disease.    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  She had the procedure done on August 27, 2024.    Repeat echocardiogram performed on November 20, 2024 shows ejection fraction has improved to 40-45%.  Symptomatically she feels better but not back to her baseline.  She is able to walk up the stairs but only few steps before she has to rest.  Prior to upgrade she was not able to do that.  She also has mild chest discomfort at the site of generator.  She notes discomfort is occurring mainly at time when she is sleeping on her left side.  It prompts her to sleep on the right side.  She otherwise denies any bleeding episodes, dizziness, lightheadedness or palpitations.        Past Medical History:  Past Medical History:   Diagnosis Date    A-fib (HCC)     Anxiety     resolved 10/4/17    Asthma     seasonal    Atrial fibrillation (HCC) 01/2022    newly diagnosed on eliquis    Chronic kidney disease     kidney stone    Colon polyp     Disease of thyroid gland     Dysphagia     GERD (gastroesophageal reflux disease)     Goiter, nodular     partial thyroidectomy    Hiatal hernia     repaired 2018    Hiatal hernia with  GERD without esophagitis 04/2018    surgical correction    History of esophageal varices with bleeding     History of pernicious anemia     History of transfusion     x1 after bleeding ulcer    Hyperlipidemia     Hypertension     Irritable bowel syndrome     Neck mass     2 small areas left side by jawline and midneck US scheduled 0820/21    Pacemaker-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 cardiac 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 05/09/2024    RA (rheumatoid arthritis) (HCC)     S/P AV kike ablation 5/9/2024 05/09/2024    s/p Medtronic dual chamber PPM 10/24/2023 05/09/2024    Seasonal allergies     Vertigo     Wears glasses     for reading       Medications:      Current Outpatient Medications:     apixaban (Eliquis) 5 mg, Take 1 tablet (5 mg total) by mouth 2 (two) times a day, Disp: 180 tablet, Rfl: 3    atorvastatin (LIPITOR) 20 mg tablet, TAKE 1 TABLET BY MOUTH DAILY AT  BEDTIME, Disp: 90 tablet, Rfl: 0    bumetanide (BUMEX) 1 mg tablet, TAKE 1 TABLET BY MOUTH DAILY, Disp: 90 tablet, Rfl: 1    Calcium Carb-Cholecalciferol 600-1000 MG-UNIT CAPS, Take by mouth every morning gummy , Disp: , Rfl:     levothyroxine 50 mcg tablet, TAKE 1 TABLET BY MOUTH DAILY, Disp: 90 tablet, Rfl: 1    losartan (COZAAR) 25 mg tablet, Take 1 tablet (25 mg total) by mouth daily, Disp: 90 tablet, Rfl: 2    metoprolol succinate (TOPROL-XL) 25 mg 24 hr tablet, Take 1 tablet (25 mg total) by mouth 2 (two) times a day, Disp: 180 tablet, Rfl: 1    omeprazole (PriLOSEC) 40 MG capsule, TAKE 1 CAPSULE BY MOUTH TWICE  DAILY, Disp: 180 capsule, Rfl: 1    potassium chloride (Klor-Con M10) 10 mEq tablet, Take 1 tablet (10 mEq total) by mouth daily, Disp: 90 tablet, Rfl: 1    famotidine (PEPCID) 40 MG tablet, TAKE 1 TABLET BY MOUTH TWICE  DAILY (Patient not taking: Reported on 11/6/2024), Disp: 180 tablet, Rfl: 1     Family History   Problem Relation Age of Onset    Alzheimer's disease Mother     Cancer Mother         skin     Thyroid disease Mother      Ulcerative colitis Mother     Cancer Father         prostate    Stroke Father     Hypertension Father     Prostate cancer Father     Thyroid disease Sister     Ulcerative colitis Sister     Other Sister         open heart surgery    Heart disease Sister     Hyperlipidemia Brother     No Known Problems Maternal Grandmother     No Known Problems Paternal Grandmother     No Known Problems Son     No Known Problems Son     Mental illness Neg Hx      Social History     Socioeconomic History    Marital status: /Civil Union     Spouse name: Not on file    Number of children: 2    Years of education: Not on file    Highest education level: Not on file   Occupational History    Not on file   Tobacco Use    Smoking status: Never     Passive exposure: Never    Smokeless tobacco: Never   Vaping Use    Vaping status: Never Used   Substance and Sexual Activity    Alcohol use: Not Currently     Comment: stopped 10/2023    Drug use: Never    Sexual activity: Not on file   Other Topics Concern    Not on file   Social History Narrative    Feels safe at home     Social Drivers of Health     Financial Resource Strain: Low Risk  (11/6/2023)    Overall Financial Resource Strain (CARDIA)     Difficulty of Paying Living Expenses: Not hard at all   Food Insecurity: No Food Insecurity (11/6/2024)    Hunger Vital Sign     Worried About Running Out of Food in the Last Year: Never true     Ran Out of Food in the Last Year: Never true   Transportation Needs: No Transportation Needs (11/6/2024)    PRAPARE - Transportation     Lack of Transportation (Medical): No     Lack of Transportation (Non-Medical): No   Physical Activity: Not on file   Stress: Not on file   Social Connections: Not on file   Intimate Partner Violence: Not on file   Housing Stability: Low Risk  (11/6/2024)    Housing Stability Vital Sign     Unable to Pay for Housing in the Last Year: No     Number of Times Moved in the Last Year: 0     Homeless in the Last Year: No  "    Social History     Tobacco Use   Smoking Status Never    Passive exposure: Never   Smokeless Tobacco Never     Social History     Substance and Sexual Activity   Alcohol Use Not Currently    Comment: stopped 10/2023       Review of Systems   Constitutional: Positive for malaise/fatigue. Negative for chills and fever.   HENT: Negative.     Eyes:  Negative for blurred vision and double vision.   Cardiovascular:  Positive for dyspnea on exertion and palpitations. Negative for chest pain, leg swelling, near-syncope, orthopnea, paroxysmal nocturnal dyspnea and syncope.   Respiratory:  Negative for cough and sputum production.    Endocrine: Negative.    Skin: Negative.  Negative for rash.   Musculoskeletal:  Positive for arthritis. Negative for joint pain.   Gastrointestinal:  Negative for abdominal pain, nausea and vomiting.   Genitourinary: Negative.    Neurological:  Negative for dizziness and light-headedness.   Psychiatric/Behavioral: Negative.  The patient is not nervous/anxious.         Objective:     Vitals: Blood pressure (!) 154/102, pulse 69, height 5' 5\" (1.651 m), weight 97.4 kg (214 lb 12.8 oz), not currently breastfeeding., Body mass index is 35.74 kg/m².,        Physical Exam:    GEN: Swetha Lopez appears well, alert and oriented x 3, pleasant and cooperative; obese   HEENT: pupils equal, round, and reactive to light; extraocular muscles intact  NECK: supple, no carotid bruits   HEART: Regular rate and rhythm, normal S1 and S2, no murmurs, clicks, gallops or rubs   LUNGS: clear to auscultation bilaterally; no wheezes, rales, or rhonchi   ABDOMEN: normal bowel sounds, soft, no tenderness, no distention  EXTREMITIES: peripheral pulses normal; no clubbing, cyanosis, or edema  NEURO: no focal findings   SKIN: normal without suspicious lesions on exposed skin      Labs & Results:  Below is the patient's most recent value for Albumin, ALT, AST, BUN, Calcium, Chloride, Cholesterol, CO2, Creatinine, GFR, " Glucose, HDL, Hematocrit, Hemoglobin, Hemoglobin A1C, LDL, Magnesium, Phosphorus, Platelets, Potassium, PSA, Sodium, Triglycerides, and WBC.   Lab Results   Component Value Date    ALT 16 08/13/2024    AST 23 08/13/2024    BUN 10 08/27/2024    CALCIUM 9.2 08/27/2024     08/27/2024    CHOL 144 09/01/2016    CO2 30 08/27/2024    CREATININE 0.84 08/27/2024    HDL 62 01/18/2023    HCT 41.2 08/27/2024    HGB 13.6 08/27/2024    HGBA1C 5.5 12/25/2022    MG 2.0 03/28/2024    PHOS 3.3 04/17/2018     08/27/2024    K 3.5 08/27/2024     09/01/2016    TRIG 83 01/18/2023    WBC 5.36 08/27/2024     Note: for a comprehensive list of the patient's lab results, access the Results Review activity.          Cardiac testing:     I personally reviewed the ECG performed in the clinic on 12/09/24. It reveals atrial fibrillation, with paced rhythm and PVCs.     Echocardiograms:  No results found for this or any previous visit.    No results found for this or any previous visit.      Catheterizations:   No results found for this or any previous visit.      Stress Tests:  Results for orders placed during the hospital encounter of 04/21/22    NM myocardial perfusion spect (stress and/or rest)    Interpretation Summary    Stress ECG: Arrhythmias during recovery: rare PVCs. The ECG was equivocal for ischemia. The ECG was not diagnostic due to pharmacological (vasodilator) stress. The stress ECG is equivocal for ischemia after pharmacologic vasodilation.    Stress Function: Left ventricular function post-stress is normal. Post-stress ejection fraction is 59 %.    Perfusion: There are no perfusion defects.    Stress ECG: A Miguel protocol stress test was performed. The patient reached stage 2.0 of the protocol after exercising for 4 min and 5 sec and had a maximal HR of 151 bpm (102 % of MPHR) and 7.0 METS. The patient experienced no angina during the test. The test was stopped because the patient experienced dyspnea. The patient  achieved the target heart rate. The patient reported dyspnea during the stress test. Onset of symptoms occurred at stage 2 of the protocol. Symptoms ended during recovery. Blood pressure demonstrated a normal response and heart rate demonstrated a normal response to stress. The patient's heart rate recovery was normal.    Negative study for evidence of pharmacological induced ischemia or prior infarction. No major symptoms with vasodilation. Elevated baseline blood pressure of 150/100 noted.      Holter monitor -   No results found for this or any previous visit.    No results found for this or any previous visit.        ASSESSMENT/PLAN:  1. Persistent atrial fibrillation  Patient was diagnosed with atrial fibrillation in January 2021  She then started to have on of palpitations  She was started on metoprolol and Eliquis  Cardioversion was performed in October 2022 however it was unsuccessful  She was started on flecainide 100 mg twice daily and metoprolol dose was increased  She had repeat cardioversion on November 3, 2022 which was successful in restoring sinus rhythm however it only lasted 18 hours  We discuss next step in management option including alternative rhythm medication versus ablation procedure  After answering all the questions she opted to proceed with ablation procedure  She is s/p pulmonary vein isolation, posterior wall isolation and empiric CTI line placement on December 23, 2022  She had TIA next day during which she lost vision in the right eye.  Brain MRI was negative for stroke.  Neurology team was consulted and diagnosed her with TIA.  She is currently maintained on flecainide 50 mg twice daily and metoprolol 12.5 mg daily  She will need to be maintained on Eliquis due to TIA.  Metoprolol dose was increased to 25 mg daily  She had noticed her [employer] mobile hide revealed slow heart rate  Therefore flecainide and metoprolol were discontinued  She continues to have palpitations with cardiac  event monitor showing PAC/PVC  She was started on diltiazem 120 mg daily but continued to have symptoms  Given tachycardia/bradycardia syndrome she underwent pacemaker placement  Flecainide and metoprolol were restarted  She continues to feel dyspneic when exerting  Rate histogram shows she may not be getting higher ventricular rates with exertion  We increased rate response  Discontinued flecainide and started diltiazem 120 mg daily  Continues to feel fatigue, and appears to be in atrial fibrillation all the time  Discussed plan to restore sinus rhythm with dofetilide/Tikosyn and she is agreeable.  Will d/c diltiazem after sotalol/dofetilide   She underwent dofetilide admission and was loaded with 250 mcg every 12 hours of dofetilide.  She continued to have atrial fibrillation therefore she underwent AV node ablation in May.  Her symptoms worsened therefore echo was obtained which showed ejection fraction decreased to 35%  She underwent upgrade of her pacemaker to biventricular pacemaker  Echocardiogram afterwards in November showed EF improved to 40 to 45% however it has not normalized.  She continues to feel some symptoms with activity of daily living and exertional activities.  We increased her lower rate to 70 bpm and made rate response more sensitive.  Will repeat an echocardiogram in about 6 months    2. Hypertension  Patient was maintained on losartan  Diastolic blood pressure elevated in the office  Previously was on losartan but blood pressure came low and losartan was discontinued  Now having elevated diastolic BP- will restart losartan at 25 mg once daily   Check BMP in one week     3. Hyperlipidemia   Maintained on atorvastatin 20 mg daily    4. Hypothyroidism  Maintained on levothyroxine 50 mcg daily    5. Diastolic heart failure  Maintain on Bumex 1 mg daily    Follow-up in 6 months

## 2024-12-09 NOTE — PROGRESS NOTES
Results for orders placed or performed in visit on 12/09/24   Cardiac EP device report    Narrative    MDT DC/PM-ACTIVE SYSTEM IS MRI CONDITIONAL  DEVICE INTERROGATED IN THE [place] OFFICE. BATTERY VOLTAGE ADEQUATE. AP-0.5%, BVP-100% (TOTAL -97.7%+VSR PACE-2.3%). ALL LEAD PARAMETERS WITHIN NORMAL LIMITS. NO NEW SIGNIFICANT HIGH RATE EPISODES. 2 VENT SENSING EPISODES. PT IN AF SINCE 8/27/24. PT ON ELIQUIS & METOPROLOL. OPTI-VOL WITHIN NORMAL LIMITS. DECREASE MADE TO RV AMPLITUDE TO PROMOTE DEVICE LONGEVITY WHILE MAINTAINING AN APPROPRIATE SAFETY MARGIN. PT TO SEE DR. FINLEY TODAY. NORMAL DEVICE FUNCTION. GV

## 2024-12-09 NOTE — PATIENT INSTRUCTIONS
Will start Losartan 25 mg once daily    Continue metoprolol    Obtain kidney blood work in one week    Keep a log of the blood pressure readings

## 2024-12-18 ENCOUNTER — RESULTS FOLLOW-UP (OUTPATIENT)
Dept: CARDIOLOGY CLINIC | Facility: CLINIC | Age: 74
End: 2024-12-18

## 2024-12-20 ENCOUNTER — HOSPITAL ENCOUNTER (OUTPATIENT)
Dept: RADIOLOGY | Facility: HOSPITAL | Age: 74
Discharge: HOME | End: 2024-12-20
Payer: COMMERCIAL

## 2024-12-20 DIAGNOSIS — E04.2 MULTIPLE THYROID NODULES: ICD-10-CM

## 2024-12-20 PROCEDURE — 76536 US EXAM OF HEAD AND NECK: CPT

## 2024-12-23 ENCOUNTER — RESULTS FOLLOW-UP (OUTPATIENT)
Dept: FAMILY MEDICINE CLINIC | Facility: CLINIC | Age: 74
End: 2024-12-23

## 2024-12-23 ENCOUNTER — APPOINTMENT (OUTPATIENT)
Dept: RADIOLOGY | Facility: CLINIC | Age: 74
End: 2024-12-23
Payer: COMMERCIAL

## 2024-12-23 ENCOUNTER — APPOINTMENT (OUTPATIENT)
Dept: LAB | Facility: HOSPITAL | Age: 74
End: 2024-12-23
Payer: COMMERCIAL

## 2024-12-23 ENCOUNTER — OFFICE VISIT (OUTPATIENT)
Dept: OBGYN CLINIC | Facility: CLINIC | Age: 74
End: 2024-12-23
Payer: COMMERCIAL

## 2024-12-23 VITALS
DIASTOLIC BLOOD PRESSURE: 92 MMHG | HEIGHT: 65 IN | SYSTOLIC BLOOD PRESSURE: 129 MMHG | HEART RATE: 99 BPM | BODY MASS INDEX: 35.74 KG/M2

## 2024-12-23 DIAGNOSIS — S62.512D CLOSED DISPLACED FRACTURE OF PROXIMAL PHALANX OF LEFT THUMB WITH ROUTINE HEALING, SUBSEQUENT ENCOUNTER: Primary | ICD-10-CM

## 2024-12-23 DIAGNOSIS — E78.2 MIXED HYPERLIPIDEMIA: ICD-10-CM

## 2024-12-23 DIAGNOSIS — Z09 S/P ORTHOPEDIC SURGERY, FOLLOW-UP EXAM: ICD-10-CM

## 2024-12-23 DIAGNOSIS — I49.5 TACHY-BRADY SYNDROME (HCC): ICD-10-CM

## 2024-12-23 DIAGNOSIS — S62.512A CLOSED DISPLACED FRACTURE OF PROXIMAL PHALANX OF LEFT THUMB, INITIAL ENCOUNTER: ICD-10-CM

## 2024-12-23 DIAGNOSIS — I10 PRIMARY HYPERTENSION: ICD-10-CM

## 2024-12-23 LAB
25(OH)D3 SERPL-MCNC: 40.3 NG/ML (ref 30–100)
ALBUMIN SERPL BCG-MCNC: 4.2 G/DL (ref 3.5–5)
ALP SERPL-CCNC: 59 U/L (ref 34–104)
ALT SERPL W P-5'-P-CCNC: 15 U/L (ref 7–52)
ANION GAP SERPL CALCULATED.3IONS-SCNC: 8 MMOL/L (ref 4–13)
AST SERPL W P-5'-P-CCNC: 23 U/L (ref 13–39)
BILIRUB SERPL-MCNC: 1.22 MG/DL (ref 0.2–1)
BUN SERPL-MCNC: 12 MG/DL (ref 5–25)
CALCIUM SERPL-MCNC: 9.2 MG/DL (ref 8.4–10.2)
CHLORIDE SERPL-SCNC: 103 MMOL/L (ref 96–108)
CHOLEST SERPL-MCNC: 139 MG/DL (ref ?–200)
CO2 SERPL-SCNC: 29 MMOL/L (ref 21–32)
CREAT SERPL-MCNC: 0.78 MG/DL (ref 0.6–1.3)
GFR SERPL CREATININE-BSD FRML MDRD: 74 ML/MIN/1.73SQ M
GLUCOSE P FAST SERPL-MCNC: 101 MG/DL (ref 65–99)
HDLC SERPL-MCNC: 58 MG/DL
LDLC SERPL CALC-MCNC: 60 MG/DL (ref 0–100)
NONHDLC SERPL-MCNC: 81 MG/DL
PHOSPHATE SERPL-MCNC: 4.6 MG/DL (ref 2.3–4.1)
POTASSIUM SERPL-SCNC: 3.6 MMOL/L (ref 3.5–5.3)
PROT SERPL-MCNC: 6.6 G/DL (ref 6.4–8.4)
PTH-INTACT SERPL-MCNC: 61.4 PG/ML (ref 12–88)
SODIUM SERPL-SCNC: 140 MMOL/L (ref 135–147)
T4 FREE SERPL-MCNC: 1.09 NG/DL (ref 0.61–1.12)
TRIGL SERPL-MCNC: 107 MG/DL (ref ?–150)
TSH SERPL DL<=0.05 MIU/L-ACNC: 0.79 UIU/ML (ref 0.45–4.5)

## 2024-12-23 PROCEDURE — 73140 X-RAY EXAM OF FINGER(S): CPT

## 2024-12-23 PROCEDURE — 99214 OFFICE O/P EST MOD 30 MIN: CPT | Performed by: STUDENT IN AN ORGANIZED HEALTH CARE EDUCATION/TRAINING PROGRAM

## 2024-12-23 PROCEDURE — 80061 LIPID PANEL: CPT

## 2024-12-23 NOTE — PROGRESS NOTES
Assessment/Plan:  Patient ID: Swetha Lopez 74 y.o. female   Surgery: Left thumb, proximal phalanx, internal fusion of metacarpal phalanx joint, distal radius bone graft - left  Date of Surgery: 5/22/24    I am pleased with her clinical exam and review of imaging today  She may continue activities as tolerated  She no longer requires orthopedic follow up        CHIEF COMPLAINT:  Chief Complaint   Patient presents with    Left Thumb - Follow-up         SUBJECTIVE:  Swetha Lopez is a 74 y.o. year old female who presents for follow up 7 months status post left thumb proximal phalanx, internal fusion of metacarpal phalanx joint, distal radius bone graft - left, with Dr Duvall. Patient is doing very well, she denies any pain today. She has been discharged from occupational therapy.       PHYSICAL EXAMINATION:  General: well developed and well nourished, alert, oriented times 3, and appears comfortable  Psychiatric: Normal    FOCUSED MUSCULOSKELETAL EXAMINATION:  Left Upper Extremity  Inspection: incision clean, dry and intact. No surrounding erythema.   Palpation:  Appropriate oneyda-incisional tenderness to palpation  Neurologic:  5/5 elbow flexion, 5/5 elbow extension, 5/5 wrist extension, 5/5 wrist flexion, 5/5 finger flexion, 5/5 finger extension, 4/5 FPL, 4/5 EPL, 4/5 APB, 5/5 intrinsics, sensation intact to median, radial, and ulnar nerve distributions  Vascular: Palpable radial pulse, brisk cap refill <2sec, hand warm and well perfused  MSK:  ROM/strength limited by surgery/immobilization  Positive Carpal Compression Test  No tinel at wrist    STUDIES REVIEWED:  Images were reviewed in PACS by Dr. Oliiva and demonstrate: well healed left thumb MCP joint with hardware in place without complication    PROCEDURES PERFORMED:  No Procedures performed today    Scribe Attestation      I,:  Christina Gonzalez am acting as a scribe while in the presence of the attending physician.:       I,:  James Olivia,  MD personally performed the services described in this documentation    as scribed in my presence.:            I agree with the history, physical examination, assessment and plan of care as documented above.    James Olivia M.D.  Attending, Orthopaedic Surgery  Hand, Wrist, and Elbow Surgery  [facility]

## 2024-12-24 ENCOUNTER — RESULTS FOLLOW-UP (OUTPATIENT)
Dept: ENDOCRINOLOGY | Facility: CLINIC | Age: 74
End: 2024-12-24

## 2024-12-27 DIAGNOSIS — E78.2 MIXED HYPERLIPIDEMIA: ICD-10-CM

## 2024-12-27 RX ORDER — ATORVASTATIN CALCIUM 20 MG/1
20 TABLET, FILM COATED ORAL
Qty: 90 TABLET | Refills: 1 | Status: SHIPPED | OUTPATIENT
Start: 2024-12-27

## 2025-01-08 ENCOUNTER — OFFICE VISIT (OUTPATIENT)
Dept: ENDOCRINOLOGY | Facility: CLINIC | Age: 75
End: 2025-01-08
Payer: COMMERCIAL

## 2025-01-08 VITALS
OXYGEN SATURATION: 100 % | SYSTOLIC BLOOD PRESSURE: 110 MMHG | BODY MASS INDEX: 35.79 KG/M2 | HEIGHT: 65 IN | HEART RATE: 98 BPM | DIASTOLIC BLOOD PRESSURE: 60 MMHG | WEIGHT: 214.8 LBS

## 2025-01-08 DIAGNOSIS — I27.20 PULMONARY HYPERTENSION (HCC): ICD-10-CM

## 2025-01-08 DIAGNOSIS — E55.9 VITAMIN D DEFICIENCY: ICD-10-CM

## 2025-01-08 DIAGNOSIS — I42.8 NONISCHEMIC CARDIOMYOPATHY (HCC): ICD-10-CM

## 2025-01-08 DIAGNOSIS — R17 ELEVATED BILIRUBIN: Primary | ICD-10-CM

## 2025-01-08 DIAGNOSIS — E66.01 OBESITY, MORBID (HCC): ICD-10-CM

## 2025-01-08 DIAGNOSIS — E03.0 CONGENITAL HYPOTHYROIDISM WITH DIFFUSE GOITER: ICD-10-CM

## 2025-01-08 DIAGNOSIS — M85.89 OSTEOPENIA OF MULTIPLE SITES: ICD-10-CM

## 2025-01-08 PROCEDURE — 99204 OFFICE O/P NEW MOD 45 MIN: CPT | Performed by: NURSE PRACTITIONER

## 2025-01-08 NOTE — PROGRESS NOTES
Name: Swetha Lopez      : 1950      MRN: [id number]  Encounter Provider: KENNEDY Morillo  Encounter Date: 2025   Encounter department: [facility]  :  Assessment & Plan  Elevated bilirubin  Repeat and follow with pcp.   Orders:    Comprehensive metabolic panel; Future    Obesity, morbid (HCC)  Lifestyle modifications would benefit from weight bearing exercise, as tolerated.        Pulmonary hypertension (HCC)  Follows with cardiology.        Nonischemic cardiomyopathy (HCC)  Follows with cardiology        Congenital hypothyroidism with diffuse goiter  Clinically euthyroid. Continues on levothyroxine 50 mcg daily. Recommend repeat TSH and free T4 a few days before next appointment.   Orders:    TSH, 3rd generation; Future    T4, free; Future    Vitamin D deficiency  Continues on vitamin D3 supplementation.   Orders:    Vitamin D 25 hydroxy; Future    Osteopenia of multiple sites  - Reviewed pathophysiology of osteoporosis, including risk for fracture and morbidty and mortality associated with fracture.  - Reviewed potential need for treatment to reduce risk of fracture.  Reviewed limits of bone density test in predicting fracture risk  Reviewed FRAX score and its relationship to overall risk.  - Reviewed treatment options and side effects, oral bisphosphonates, Reclast, prolia, Forteo,  tymlos, evenity. Including atrial fibrillation with IV reclast, osteonecrosis of jaw with bisphosphonate(s) and frozen bone syndrome with prolonged bisphosphonate(s) use.  - Emphasized importance of regular calcium and vitamin D intake; Goal vitamin D >30 and calcium intake ~1200mg /day.  - weight bearing exercise as tolerated  -- Emphasized importance of close clinical follow-up to montior for drug efficacy and toxicity  Orders:    PTH, intact; Future        History of Present Illness   HPI  Swetha Lopez is a 74 y.o. female who presents with a history of  post-operative thyroidism s/p R hemithyroidectomy for multinodular goiter, pathology benign with history of AFIB. History of osteopenia and Vitamin D deficiency.      For hypothyroidism, continues on levothyroxine 50 mcg daily, taken regularly and properly. Continues to deny any symptoms consistent with hypo/hyperthyroidism.      For multinodular goiter s/p hemithyroidectomy at age 28 years old. Denies neck compressive symptoms. Had an ultrasound-guided thyroid biopsy from February 2023 which was benign. December 2024 ultrasound demonstrated a stable thyroid nodule.      For osteopenia, July 2024 DXA scan demonstrates osteopenia with high FRAX the 10 year risk of hip fracture is 26.3% with the 10 year risk of major osteoporotic fracture being 41.9%.     For vitamin D deficiency, patient continues taking combined calcium and vitamin D supplementation regularly.      Fracture R thumb April 26 th 2024, May 17th surgery.     History obtained from: patient    Review of Systems  See HPI.  All other systems reviewed and are negative.    Medical History Reviewed by provider this encounter:  Tobacco  Allergies  Meds  Problems  Med Hx  Surg Hx  Fam Hx     .  Current Outpatient Medications on File Prior to Visit   Medication Sig Dispense Refill    apixaban (Eliquis) 5 mg Take 1 tablet (5 mg total) by mouth 2 (two) times a day 180 tablet 3    atorvastatin (LIPITOR) 20 mg tablet TAKE 1 TABLET BY MOUTH DAILY AT  BEDTIME 90 tablet 1    bumetanide (BUMEX) 1 mg tablet TAKE 1 TABLET BY MOUTH DAILY 90 tablet 1    Calcium Carb-Cholecalciferol 600-1000 MG-UNIT CAPS Take by mouth every morning gummy       famotidine (PEPCID) 40 MG tablet TAKE 1 TABLET BY MOUTH TWICE  DAILY 180 tablet 1    losartan (COZAAR) 25 mg tablet Take 1 tablet (25 mg total) by mouth daily 90 tablet 2    metoprolol succinate (TOPROL-XL) 25 mg 24 hr tablet Take 1 tablet (25 mg total) by mouth 2 (two) times a day 180 tablet 1    omeprazole (PriLOSEC) 40 MG  "capsule TAKE 1 CAPSULE BY MOUTH TWICE  DAILY 180 capsule 1    potassium chloride (Klor-Con M10) 10 mEq tablet Take 1 tablet (10 mEq total) by mouth daily 90 tablet 1     No current facility-administered medications on file prior to visit.         Objective   /60 (BP Location: Right arm, Patient Position: Sitting, Cuff Size: Large)   Pulse 98   Ht 5' 5\" (1.651 m)   Wt 97.4 kg (214 lb 12.8 oz)   SpO2 100%   BMI 35.74 kg/m²         Physical Exam  Vitals reviewed.   Constitutional:       Appearance: Normal appearance.   Cardiovascular:      Rate and Rhythm: Normal rate and regular rhythm.   Pulmonary:      Effort: Pulmonary effort is normal.   Skin:     General: Skin is warm and dry.   Neurological:      General: No focal deficit present.      Mental Status: He is alert and oriented to person, place, and time.   Psychiatric:         Mood and Affect: Mood normal.         Behavior: Behavior normal.       Administrative Statements     "

## 2025-01-08 NOTE — ASSESSMENT & PLAN NOTE
Clinically euthyroid. Continues on levothyroxine 50 mcg daily. Recommend repeat TSH and free T4 a few days before next appointment.   Orders:    TSH, 3rd generation; Future    T4, free; Future

## 2025-01-13 DIAGNOSIS — E03.0 CONGENITAL HYPOTHYROIDISM WITH DIFFUSE GOITER: ICD-10-CM

## 2025-01-14 RX ORDER — LEVOTHYROXINE SODIUM 50 UG/1
50 TABLET ORAL DAILY
Qty: 90 TABLET | Refills: 1 | Status: SHIPPED | OUTPATIENT
Start: 2025-01-14

## 2025-03-05 ENCOUNTER — OFFICE VISIT (OUTPATIENT)
Age: 75
End: 2025-03-05
Payer: COMMERCIAL

## 2025-03-05 VITALS — BODY MASS INDEX: 35.65 KG/M2 | RESPIRATION RATE: 17 BRPM | HEIGHT: 65 IN | WEIGHT: 214 LBS

## 2025-03-05 DIAGNOSIS — L60.0 INGROWN TOENAIL: ICD-10-CM

## 2025-03-05 DIAGNOSIS — L03.031 PARONYCHIA OF TOENAIL OF RIGHT FOOT: Primary | ICD-10-CM

## 2025-03-05 DIAGNOSIS — M21.962 ACQUIRED DEFORMITY OF BOTH FEET: ICD-10-CM

## 2025-03-05 DIAGNOSIS — M79.671 RIGHT FOOT PAIN: ICD-10-CM

## 2025-03-05 DIAGNOSIS — B07.0 PLANTAR WARTS: ICD-10-CM

## 2025-03-05 DIAGNOSIS — M21.961 ACQUIRED DEFORMITY OF BOTH FEET: ICD-10-CM

## 2025-03-05 PROCEDURE — 17110 DESTRUCTION B9 LES UP TO 14: CPT | Performed by: PODIATRIST

## 2025-03-05 PROCEDURE — 99213 OFFICE O/P EST LOW 20 MIN: CPT | Performed by: PODIATRIST

## 2025-03-05 NOTE — PROGRESS NOTES
Assessment/Plan: Recurrent ingrown toenail right hallux.  Paronychia fibular nail groove.  Pain.  Atypical verruca submetatarsal 5 left foot.    Plan.  Chart reviewed.  PCP notes reviewed.  Patient evaluated.  At this time we will treat for ingrown toenail.  Medication applied.  Patient given aftercare instruction.  She may need nail procedure.  Today left foot lesion debrided.  Cantharone applied.  Aftercare instruction given.  Return as needed.    Lesion Destruction    Date/Time: 3/5/2025 1:00 PM    Performed by: Toney Grimaldo DPM  Authorized by: Toney Grimaldo DPM  Universal Protocol:  procedure performed by consultantConsent: Verbal consent obtained. Written consent not obtained.  Risks and benefits: risks, benefits and alternatives were discussed  Consent given by: patient  Timeout called at: 3/5/2025 1:11 PM.  Patient understanding: patient states understanding of the procedure being performed  Patient identity confirmed: verbally with patient    Procedure Details - Lesion Destruction:     Number of Lesions:  1  Lesion 1:     Body area:  Lower extremity    Lower extremity location:  L foot    Malignancy: benign lesion      Destruction method: chemical removal               Diagnoses and all orders for this visit:    Paronychia of toenail of right foot    Ingrown toenail    Right foot pain    Acquired deformity of both feet    Plantar warts          Subjective: Patient has 2 complaints.  Patient has pain in her right big toe.  This has been ongoing for several weeks.  No history of trauma.  She also has a painful skin lesion of the left foot.    Allergies   Allergen Reactions    Orange (Diagnostic) - Food Allergy Anaphylaxis     Throat closes    Pantoprazole Headache    Penicillins Other (See Comments)     During allergy testing instructed to NEVER take PCN  Patient has tolerated cefazolin.    Gluten Meal - Food Allergy      Following a gluten free diet on her own    Lactose - Food Allergy Diarrhea    Sulfa  Antibiotics Rash         Current Outpatient Medications:     apixaban (Eliquis) 5 mg, Take 1 tablet (5 mg total) by mouth 2 (two) times a day, Disp: 180 tablet, Rfl: 3    atorvastatin (LIPITOR) 20 mg tablet, TAKE 1 TABLET BY MOUTH DAILY AT  BEDTIME, Disp: 90 tablet, Rfl: 1    bumetanide (BUMEX) 1 mg tablet, TAKE 1 TABLET BY MOUTH DAILY, Disp: 90 tablet, Rfl: 1    Calcium Carb-Cholecalciferol 600-1000 MG-UNIT CAPS, Take by mouth every morning gummy , Disp: , Rfl:     famotidine (PEPCID) 40 MG tablet, TAKE 1 TABLET BY MOUTH TWICE  DAILY, Disp: 180 tablet, Rfl: 1    levothyroxine 50 mcg tablet, TAKE 1 TABLET BY MOUTH DAILY, Disp: 90 tablet, Rfl: 1    losartan (COZAAR) 25 mg tablet, Take 1 tablet (25 mg total) by mouth daily, Disp: 90 tablet, Rfl: 2    metoprolol succinate (TOPROL-XL) 25 mg 24 hr tablet, Take 1 tablet (25 mg total) by mouth 2 (two) times a day, Disp: 180 tablet, Rfl: 1    omeprazole (PriLOSEC) 40 MG capsule, TAKE 1 CAPSULE BY MOUTH TWICE  DAILY, Disp: 180 capsule, Rfl: 1    potassium chloride (Klor-Con M10) 10 mEq tablet, Take 1 tablet (10 mEq total) by mouth daily, Disp: 90 tablet, Rfl: 1    Patient Active Problem List   Diagnosis    Hiatal hernia    GERD with esophagitis    Hypokalemia    Benign hypertension    Mixed hyperlipidemia    Congenital hypothyroidism with diffuse goiter    Liver lesion, left lobe    Hemorrhoids    IBS (irritable bowel syndrome)    Multiple thyroid nodules    Stress incontinence, female    Abnormal CT of liver    Anxiety    Rheumatoid arthritis (HCC)    History of hysterectomy    Parotid mass    Xerostomia    Asthma    Persistent atrial fibrillation (HCC)    Peripheral vision loss, right    Pulmonary hypertension (HCC)    History of TIA (transient ischemic attack)    Age-related osteoporosis with current pathological fracture with routine healing    Vitamin D deficiency    Tachy-lizet syndrome (HCC)    S/P AV kike ablation 5/9/2024    Pacemaker-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  cardiac 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    s/p Medtronic dual chamber PPM 10/24/2023    Diarrhea    Nonischemic cardiomyopathy (HCC)    Obesity, morbid (HCC)          Patient ID: Swetha Lopez is a 74 y.o. female.    HPI    The following portions of the patient's history were reviewed and updated as appropriate:     family history includes Alzheimer's disease in her mother; Cancer in her father and mother; Heart disease in her sister; Hyperlipidemia in her brother; Hypertension in her father; No Known Problems in her maternal grandmother, paternal grandmother, son, and son; Other in her sister; Prostate cancer in her father; Stroke in her father; Thyroid disease in her mother and sister; Ulcerative colitis in her mother and sister.      reports that she has never smoked. She has never been exposed to tobacco smoke. She has never used smokeless tobacco. She reports that she does not currently use alcohol. She reports that she does not use drugs.    Vitals:    03/05/25 1259   Resp: 17       Review of Systems      Objective:  Patient's shoes and socks removed.   Foot ExamPhysical Exam      Constitutional:       Appearance: Normal appearance.   Cardiovascular:      Rate and Rhythm: Normal rate and regular rhythm.   Skin:     Capillary Refill: Capillary refill takes less than 2 seconds.      Comments: Right hallux demonstrates cellulitis of the fibular aspect of the toe.  Positive abscess noted within nail groove.  Positive ingrown toenail noted.  All fibular aspect.  Patient demonstrates small atypical verruca submetatarsal 5 left foot  Neurological:      Mental Status: She is alert.   Psychiatric:         Mood and Affect: Mood normal.         Thought Content: Thought content normal.         Judgment: Judgment normal.

## 2025-03-14 ENCOUNTER — REMOTE DEVICE CLINIC VISIT (OUTPATIENT)
Dept: CARDIOLOGY CLINIC | Facility: CLINIC | Age: 75
End: 2025-03-14
Payer: COMMERCIAL

## 2025-03-14 ENCOUNTER — RESULTS FOLLOW-UP (OUTPATIENT)
Dept: NON INVASIVE DIAGNOSTICS | Facility: HOSPITAL | Age: 75
End: 2025-03-14

## 2025-03-14 DIAGNOSIS — Z95.0 PRESENCE OF PERMANENT CARDIAC PACEMAKER: Primary | ICD-10-CM

## 2025-03-14 PROCEDURE — 93294 REM INTERROG EVL PM/LDLS PM: CPT | Performed by: INTERNAL MEDICINE

## 2025-03-14 PROCEDURE — 93296 REM INTERROG EVL PM/IDS: CPT | Performed by: INTERNAL MEDICINE

## 2025-03-14 NOTE — PROGRESS NOTES
Results for orders placed or performed in visit on 03/14/25   Cardiac EP device report    Narrative    MDT DC/PM-ACTIVE SYSTEM IS MRI CONDITIONAL  CARELINK TRANSMISSION: BATTERY VOLTAGE ADEQUATE. (10.6 YRS) AP <1%  97%. ALL AVAILABLE LEAD PARAMETERS WITHIN NORMAL LIMITS. NO SIGNIFICANT HIGH RATE EPISODES. CURRENTLY IN AF; PATIENT IS ON METOPROLOL AND ELIQUIS. OPTI-VOL WITHIN NORMAL LIMITS. NORMAL DEVICE FUNCTION.---PATEL

## 2025-03-22 DIAGNOSIS — K44.9 HIATAL HERNIA WITH GERD WITHOUT ESOPHAGITIS: ICD-10-CM

## 2025-03-22 DIAGNOSIS — K21.9 HIATAL HERNIA WITH GERD WITHOUT ESOPHAGITIS: ICD-10-CM

## 2025-03-22 DIAGNOSIS — R13.19 ESOPHAGEAL DYSPHAGIA: ICD-10-CM

## 2025-03-23 ENCOUNTER — RESULTS FOLLOW-UP (OUTPATIENT)
Dept: CARDIOLOGY CLINIC | Facility: CLINIC | Age: 75
End: 2025-03-23

## 2025-03-24 RX ORDER — OMEPRAZOLE 40 MG/1
40 CAPSULE, DELAYED RELEASE ORAL 2 TIMES DAILY
Qty: 180 CAPSULE | Refills: 1 | Status: SHIPPED | OUTPATIENT
Start: 2025-03-24

## 2025-04-02 ENCOUNTER — TELEPHONE (OUTPATIENT)
Age: 75
End: 2025-04-02

## 2025-04-02 DIAGNOSIS — I48.91 ATRIAL FIBRILLATION, UNSPECIFIED TYPE (HCC): ICD-10-CM

## 2025-04-02 RX ORDER — METOPROLOL SUCCINATE 25 MG/1
25 TABLET, EXTENDED RELEASE ORAL 2 TIMES DAILY
Qty: 180 TABLET | Refills: 1 | Status: SHIPPED | OUTPATIENT
Start: 2025-04-02

## 2025-04-02 NOTE — TELEPHONE ENCOUNTER
Patient of Dr Eubanks. Unable to warm transfer to .    Patient would like a call back. She said her BP has been running low since the middle of March and would like to discuss her medications.    Please call mobile number in chart.

## 2025-04-02 NOTE — TELEPHONE ENCOUNTER
Patient started on losartan in December 2024 and initially BPs were ok by the month of March her BP is now 80s/60's, no lightheartedness, dizziness. She is fatiqued, hard time concentrating, and her balance is off a little.    Patient states Endocrinologist wants to give her Reclast infusion for osteoporosis, she wants to know if this this is ok to take with her medications.    She is on 25 mg Losartan and Toprol - XL 25 BID    I informed her to hold on taking her losartan until we get back to her.

## 2025-04-03 NOTE — TELEPHONE ENCOUNTER
Lets discontinue Losartan.     I am okay with receiving reclast infusion.     Please ask endocrinologist what usual cardiac reactions do they see with reclast infusion? I am not familiar with them since I don't administer it.     Thanks.

## 2025-04-03 NOTE — TELEPHONE ENCOUNTER
Patient aware to d/c losartan and that she can proceed with reclast infusion. I will reach out to endocrine regarding cardiac reactions to expect.

## 2025-04-08 DIAGNOSIS — I10 BENIGN HYPERTENSION: ICD-10-CM

## 2025-04-09 RX ORDER — BUMETANIDE 1 MG/1
1 TABLET ORAL DAILY
Qty: 90 TABLET | Refills: 1 | Status: SHIPPED | OUTPATIENT
Start: 2025-04-09

## 2025-04-14 ENCOUNTER — TELEPHONE (OUTPATIENT)
Age: 75
End: 2025-04-14

## 2025-04-14 ENCOUNTER — OFFICE VISIT (OUTPATIENT)
Age: 75
End: 2025-04-14
Payer: COMMERCIAL

## 2025-04-14 VITALS
HEART RATE: 91 BPM | BODY MASS INDEX: 36.39 KG/M2 | SYSTOLIC BLOOD PRESSURE: 133 MMHG | DIASTOLIC BLOOD PRESSURE: 91 MMHG | WEIGHT: 218.4 LBS | HEIGHT: 65 IN

## 2025-04-14 DIAGNOSIS — K21.00 GASTROESOPHAGEAL REFLUX DISEASE WITH ESOPHAGITIS WITHOUT HEMORRHAGE: ICD-10-CM

## 2025-04-14 DIAGNOSIS — R13.19 ESOPHAGEAL DYSPHAGIA: ICD-10-CM

## 2025-04-14 DIAGNOSIS — K58.0 IRRITABLE BOWEL SYNDROME WITH DIARRHEA: Primary | ICD-10-CM

## 2025-04-14 PROCEDURE — 99214 OFFICE O/P EST MOD 30 MIN: CPT | Performed by: PHYSICIAN ASSISTANT

## 2025-04-14 PROCEDURE — G2211 COMPLEX E/M VISIT ADD ON: HCPCS | Performed by: PHYSICIAN ASSISTANT

## 2025-04-14 RX ORDER — SODIUM CHLORIDE, SODIUM LACTATE, POTASSIUM CHLORIDE, CALCIUM CHLORIDE 600; 310; 30; 20 MG/100ML; MG/100ML; MG/100ML; MG/100ML
125 INJECTION, SOLUTION INTRAVENOUS CONTINUOUS
OUTPATIENT
Start: 2025-04-14

## 2025-04-14 RX ORDER — DICYCLOMINE HYDROCHLORIDE 10 MG/1
10 CAPSULE ORAL 3 TIMES DAILY PRN
Qty: 90 CAPSULE | Refills: 1 | Status: SHIPPED | OUTPATIENT
Start: 2025-04-14

## 2025-04-14 RX ORDER — CALCIUM CARBONATE 500 MG/1
1 TABLET, CHEWABLE ORAL DAILY
COMMUNITY

## 2025-04-14 NOTE — TELEPHONE ENCOUNTER
Dr. Eubanks    Our mutual patient is scheduled for procedure: EGD    On: __5__/_   6 _/_ 25   _      With:  __Rakan_______    He/She is taking the following blood thinner:   Eliquis       Can this be stopped ___2___ days prior to the procedure?      Physician Approving clearance: ________________________    Thank you!

## 2025-04-14 NOTE — ASSESSMENT & PLAN NOTE
-did respond well to xifaxan 14 day course but symptoms have returned, had SE and felt sick on xifaxan so will hold off on repeating this   -recommend as needed bentyl for days when patient has diarrhea and cramping as this is not a daily occurrence and may occur once a week     Orders:    dicyclomine (BENTYL) 10 mg capsule; Take 1 capsule (10 mg total) by mouth 3 (three) times a day as needed (abdominal cramping and diarrhea)

## 2025-04-14 NOTE — TELEPHONE ENCOUNTER
Sent clearance to Dr. Eubanks to get permission to hold Eliquis 2 days prior to her procedure on 5/6 with Dr. Bledsoe. Will follow up in a week if not returned.

## 2025-04-14 NOTE — PROGRESS NOTES
Name: Swetha Lopez      : 1950      MRN: [id number]  Encounter Provider: Talia Khan PA-C  Encounter Date: 2025   Encounter department: [facility]  :  Assessment & Plan  Esophageal dysphagia  - Reports globus sensation and worsening of swallowing with solid foods.  She does have a history of having dilation in the past and also had extrinsic compression presumed to be from her aortic arch on her last endoscopy in .  Also rule out any esophagitis, esophageal strictures or narrowing.  She could also of a motility issue or this could just be strictly related to reflux.  -Continue PPI twice daily for now, Tums as needed.  Patient is not currently taking Pepcid and can use this only if she feels that she needs it  -The goal would overall to be able to reduce her PPI usage pending further workup  -Chew food well, alternate liquids and solids  -Recommend esophagram to further evaluate motility  -Plan for EGD to further assess, consider dilation.  Will need cardiac clearance and Eliquis hold.  Orders:    FL barium swallow; Future    EGD; Future    Gastroesophageal reflux disease with esophagitis without hemorrhage  -continue PPI BID, advised that while this can affect her osteoporosis and bone absorption, with her current symptoms I would not recommend stopping this as the symptoms could be related to reflux, esophageal stricture, esophagitis, or could be motility or compression related.  -Recommend esophagram and EGD as above  -Antireflux diet and measures  Orders:    FL barium swallow; Future    EGD; Future    Irritable bowel syndrome with diarrhea  -did respond well to xifaxan 14 day course but symptoms have returned, had SE and felt sick on xifaxan so will hold off on repeating this   -recommend as needed bentyl for days when patient has diarrhea and cramping as this is not a daily occurrence and may occur once a week     Orders:    dicyclomine (BENTYL) 10  mg capsule; Take 1 capsule (10 mg total) by mouth 3 (three) times a day as needed (abdominal cramping and diarrhea)        History of Present Illness   HPI  Swetha Lopze is a 74 y.o. female with a history of pulmonary hypertension, atrial fibrillation, pacemaker, cardiomyopathy, hypertension, asthma, hiatal hernia, IBS with diarrhea, GERD, rheumatoid arthritis, osteoporosis, hyperlipidemia who presents for a follow-up for esophageal dysphagia, globus sensation, reflux type symptoms.  Patient is status post fundoplication in the past.  She reports that more recently she has been having worsening globus sensation, burping, feeling like food is getting stuck.  She has a history of this in the past.  She did have an endoscopy 3 years ago in which she had a dilation of the GE junction which was tight from her prior wrap and had some improvement after this.  She also had notable extrinsic compression in the proximal esophagus which was suspected to be secondary to the aortic arch.  Her last barium swallow was 5 years ago and was normal at that time.  She reports nausea when she gets her episodes of diarrhea but denies any vomiting.  She does still get diarrhea off and on.  Typically she will have a day where she has several episodes and then she is good for a while and does not have a bowel movement for a couple days.  This may be happens about once a week.  She has not been able to find any triggers.  Denies any blood in the stool or black tarry stool.  She did do a course of Xifaxan last year and had some improvement with this however she felt very sick with an upset stomach when she was on the medication.  She feels that her fundoplication may have unwrapped based on her symptoms.      Review of Systems   Constitutional:  Negative for activity change, appetite change, fever and unexpected weight change.   HENT:  Positive for trouble swallowing. Negative for drooling, mouth sores, sore throat and voice change.     Eyes:  Negative for pain, discharge and visual disturbance.   Respiratory:  Negative for cough, choking, chest tightness and shortness of breath.    Cardiovascular:  Negative for chest pain, palpitations and leg swelling.   Gastrointestinal:  Positive for diarrhea and nausea. Negative for abdominal distention, abdominal pain, anal bleeding, blood in stool, constipation, rectal pain and vomiting.   Genitourinary:  Negative for decreased urine volume, difficulty urinating, dysuria, flank pain and urgency.   Musculoskeletal:  Negative for arthralgias, back pain, gait problem, myalgias and neck stiffness.   Skin:  Negative for color change, pallor and rash.   Neurological:  Negative for dizziness, syncope and light-headedness.   Hematological:  Negative for adenopathy.   Psychiatric/Behavioral:  Negative for confusion. The patient is not nervous/anxious.           Objective   There were no vitals taken for this visit.     Physical Exam  Constitutional:       General: She is not in acute distress.     Appearance: Normal appearance. She is well-developed.   HENT:      Head: Normocephalic and atraumatic.      Mouth/Throat:      Mouth: Mucous membranes are moist.   Eyes:      General: No scleral icterus.  Neck:      Trachea: No tracheal deviation.   Cardiovascular:      Rate and Rhythm: Normal rate and regular rhythm.      Heart sounds: Normal heart sounds. No murmur heard.  Pulmonary:      Effort: Pulmonary effort is normal. No respiratory distress.      Breath sounds: Normal breath sounds. No wheezing or rales.   Abdominal:      General: Bowel sounds are normal. There is no distension.      Palpations: Abdomen is soft. There is no mass.      Tenderness: There is no abdominal tenderness. There is no guarding or rebound.   Musculoskeletal:         General: Normal range of motion.      Cervical back: Normal range of motion and neck supple.   Skin:     General: Skin is warm and dry.      Coloration: Skin is not pale.       Findings: No rash.   Neurological:      Mental Status: She is alert and oriented to person, place, and time. Mental status is at baseline.   Psychiatric:         Mood and Affect: Mood normal.         Behavior: Behavior normal.

## 2025-04-14 NOTE — PATIENT INSTRUCTIONS
Scheduled date of EGD(as of today):05/06/25  Physician performing EGD:Dr. Bledsoe  Location of EGD:[facility]  Instructions reviewed with patient by:ti  Clearances: Dr. Eubanks to hold Eliquis 2 days prior.   Continue omeprazole twice daily for now  Tums as needed  Plan for barium esophagram  Endoscopy, will check with cardiology     Dicyclomine 10 mg as needed for diarrhea and cramping

## 2025-04-14 NOTE — ASSESSMENT & PLAN NOTE
-continue PPI BID, advised that while this can affect her osteoporosis and bone absorption, with her current symptoms I would not recommend stopping this as the symptoms could be related to reflux, esophageal stricture, esophagitis, or could be motility or compression related.  -Recommend esophagram and EGD as above  -Antireflux diet and measures  Orders:    FL barium swallow; Future    EGD; Future

## 2025-04-15 NOTE — TELEPHONE ENCOUNTER
Received clearance back pt may hold for two days. Left message for patient to confirm she received my message.

## 2025-04-15 NOTE — TELEPHONE ENCOUNTER
Pt returned call that she is to stop her Eliquis for 2 days prior to procedure, she is agreeable.  She also states the Barium Swallow is scheduled for 4/22/25.

## 2025-04-18 ENCOUNTER — APPOINTMENT (OUTPATIENT)
Dept: LAB | Facility: HOSPITAL | Age: 75
End: 2025-04-18
Attending: NURSE PRACTITIONER
Payer: COMMERCIAL

## 2025-04-18 DIAGNOSIS — M85.89 OSTEOPENIA OF MULTIPLE SITES: ICD-10-CM

## 2025-04-18 DIAGNOSIS — E55.9 VITAMIN D DEFICIENCY: ICD-10-CM

## 2025-04-18 DIAGNOSIS — E03.0 CONGENITAL HYPOTHYROIDISM WITH DIFFUSE GOITER: ICD-10-CM

## 2025-04-18 DIAGNOSIS — R17 ELEVATED BILIRUBIN: ICD-10-CM

## 2025-04-18 LAB
25(OH)D3 SERPL-MCNC: 40.9 NG/ML (ref 30–100)
ALBUMIN SERPL BCG-MCNC: 3.9 G/DL (ref 3.5–5)
ALP SERPL-CCNC: 53 U/L (ref 34–104)
ALT SERPL W P-5'-P-CCNC: 16 U/L (ref 7–52)
ANION GAP SERPL CALCULATED.3IONS-SCNC: 10 MMOL/L (ref 4–13)
AST SERPL W P-5'-P-CCNC: 22 U/L (ref 13–39)
BILIRUB SERPL-MCNC: 1.2 MG/DL (ref 0.2–1)
BUN SERPL-MCNC: 10 MG/DL (ref 5–25)
CALCIUM SERPL-MCNC: 8.9 MG/DL (ref 8.4–10.2)
CHLORIDE SERPL-SCNC: 103 MMOL/L (ref 96–108)
CO2 SERPL-SCNC: 27 MMOL/L (ref 21–32)
CREAT SERPL-MCNC: 0.74 MG/DL (ref 0.6–1.3)
GFR SERPL CREATININE-BSD FRML MDRD: 79 ML/MIN/1.73SQ M
GLUCOSE P FAST SERPL-MCNC: 97 MG/DL (ref 65–99)
POTASSIUM SERPL-SCNC: 3.8 MMOL/L (ref 3.5–5.3)
PROT SERPL-MCNC: 6.2 G/DL (ref 6.4–8.4)
PTH-INTACT SERPL-MCNC: 55.8 PG/ML (ref 12–88)
SODIUM SERPL-SCNC: 140 MMOL/L (ref 135–147)
T4 FREE SERPL-MCNC: 1.09 NG/DL (ref 0.61–1.12)
TSH SERPL DL<=0.05 MIU/L-ACNC: 0.71 UIU/ML (ref 0.45–4.5)

## 2025-04-18 PROCEDURE — 84443 ASSAY THYROID STIM HORMONE: CPT

## 2025-04-18 PROCEDURE — 80053 COMPREHEN METABOLIC PANEL: CPT

## 2025-04-18 PROCEDURE — 82306 VITAMIN D 25 HYDROXY: CPT

## 2025-04-18 PROCEDURE — 84439 ASSAY OF FREE THYROXINE: CPT

## 2025-04-18 PROCEDURE — 83970 ASSAY OF PARATHORMONE: CPT

## 2025-04-18 PROCEDURE — 36415 COLL VENOUS BLD VENIPUNCTURE: CPT

## 2025-04-21 ENCOUNTER — RESULTS FOLLOW-UP (OUTPATIENT)
Dept: ENDOCRINOLOGY | Facility: CLINIC | Age: 75
End: 2025-04-21

## 2025-04-22 ENCOUNTER — ANESTHESIA (OUTPATIENT)
Dept: ANESTHESIOLOGY | Facility: HOSPITAL | Age: 75
End: 2025-04-22

## 2025-04-22 ENCOUNTER — HOSPITAL ENCOUNTER (OUTPATIENT)
Dept: RADIOLOGY | Facility: HOSPITAL | Age: 75
Discharge: HOME | End: 2025-04-22
Attending: PHYSICIAN ASSISTANT
Payer: COMMERCIAL

## 2025-04-22 ENCOUNTER — RESULTS FOLLOW-UP (OUTPATIENT)
Dept: GASTROENTEROLOGY | Facility: AMBULARY SURGERY CENTER | Age: 75
End: 2025-04-22

## 2025-04-22 ENCOUNTER — ANESTHESIA EVENT (OUTPATIENT)
Dept: ANESTHESIOLOGY | Facility: HOSPITAL | Age: 75
End: 2025-04-22

## 2025-04-22 DIAGNOSIS — R17 ELEVATED BILIRUBIN: Primary | ICD-10-CM

## 2025-04-22 DIAGNOSIS — R13.19 ESOPHAGEAL DYSPHAGIA: ICD-10-CM

## 2025-04-22 DIAGNOSIS — K21.00 GASTROESOPHAGEAL REFLUX DISEASE WITH ESOPHAGITIS WITHOUT HEMORRHAGE: ICD-10-CM

## 2025-04-22 LAB
COLLAGEN NTX UR-SCNC: 210 NMOL BCE
COLLAGEN NTX/CREAT UR-SRTO: 49 NM BCE/MM CR (ref 0–89)
CREAT UR-MCNC: 48.9 MG/DL
INTERPRETIVE GUIDE:: NORMAL

## 2025-04-22 PROCEDURE — 74220 X-RAY XM ESOPHAGUS 1CNTRST: CPT

## 2025-04-24 ENCOUNTER — HOSPITAL ENCOUNTER (OUTPATIENT)
Dept: RADIOLOGY | Facility: HOSPITAL | Age: 75
Discharge: HOME | End: 2025-04-24
Attending: PHYSICIAN ASSISTANT
Payer: COMMERCIAL

## 2025-04-24 DIAGNOSIS — R17 ELEVATED BILIRUBIN: ICD-10-CM

## 2025-04-24 PROCEDURE — 76705 ECHO EXAM OF ABDOMEN: CPT

## 2025-04-25 ENCOUNTER — TELEPHONE (OUTPATIENT)
Age: 75
End: 2025-04-25

## 2025-04-25 NOTE — TELEPHONE ENCOUNTER
Scheduled date of EGD(as of today): 6/18/25    Physician performing EGD: Rakan    Location of EGD:  [facility]    Instructions reviewed with patient by: office    Clearances: Eliquis    Patient prefers to stay with Dr. Bledsoe, fay from 5/6/25    Needs to be in Main OR

## 2025-04-30 ENCOUNTER — RESULTS FOLLOW-UP (OUTPATIENT)
Age: 75
End: 2025-04-30

## 2025-04-30 ENCOUNTER — RA CDI HCC (OUTPATIENT)
Dept: OTHER | Facility: HOSPITAL | Age: 75
End: 2025-04-30

## 2025-04-30 DIAGNOSIS — R17 ELEVATED BILIRUBIN: Primary | ICD-10-CM

## 2025-05-07 ENCOUNTER — OFFICE VISIT (OUTPATIENT)
Dept: FAMILY MEDICINE CLINIC | Facility: CLINIC | Age: 75
End: 2025-05-07
Payer: COMMERCIAL

## 2025-05-07 VITALS
HEART RATE: 97 BPM | HEIGHT: 65 IN | OXYGEN SATURATION: 97 % | BODY MASS INDEX: 36.82 KG/M2 | WEIGHT: 221 LBS | RESPIRATION RATE: 14 BRPM | SYSTOLIC BLOOD PRESSURE: 126 MMHG | TEMPERATURE: 97.9 F | DIASTOLIC BLOOD PRESSURE: 86 MMHG

## 2025-05-07 DIAGNOSIS — E03.0 CONGENITAL HYPOTHYROIDISM WITH DIFFUSE GOITER: ICD-10-CM

## 2025-05-07 DIAGNOSIS — E78.2 MIXED HYPERLIPIDEMIA: ICD-10-CM

## 2025-05-07 DIAGNOSIS — I10 BENIGN HYPERTENSION: Primary | ICD-10-CM

## 2025-05-07 DIAGNOSIS — I49.5 TACHY-BRADY SYNDROME (HCC): ICD-10-CM

## 2025-05-07 DIAGNOSIS — M80.00XD AGE-RELATED OSTEOPOROSIS WITH CURRENT PATHOLOGICAL FRACTURE WITH ROUTINE HEALING: ICD-10-CM

## 2025-05-07 DIAGNOSIS — M06.9 RHEUMATOID ARTHRITIS, INVOLVING UNSPECIFIED SITE, UNSPECIFIED WHETHER RHEUMATOID FACTOR PRESENT (HCC): ICD-10-CM

## 2025-05-07 PROCEDURE — 99214 OFFICE O/P EST MOD 30 MIN: CPT | Performed by: INTERNAL MEDICINE

## 2025-05-07 PROCEDURE — G2211 COMPLEX E/M VISIT ADD ON: HCPCS | Performed by: INTERNAL MEDICINE

## 2025-05-07 NOTE — ASSESSMENT & PLAN NOTE
She is working on this with her endocrinologist and will likely start prolia.  Possible side effects were discussed.

## 2025-05-07 NOTE — PROGRESS NOTES
"Name: Swetha Lopez      : 1950      MRN: [id number]  Encounter Provider: Gracia Danielle MD  Encounter Date: 2025   Encounter department: [facility]  :  Assessment & Plan  Benign hypertension  Well controlled and denies orthostatic symptoms.  She will continue meroprolol as ordered.        Congenital hypothyroidism with diffuse goiter  Managed by endocrinology and labwork is within goal.  Continue levothyroxine 50 mcg daily.       Mixed hyperlipidemia  Reviewed recent labs and lipids are within goal on atorvastatin 20 mg daily.       Age-related osteoporosis with current pathological fracture with routine healing  She is working on this with her endocrinologist and will likely start prolia.  Possible side effects were discussed.        Rheumatoid arthritis, involving unspecified site, unspecified whether rheumatoid factor present (HCC)  She states this is by history but she has no symptoms and does not follow with rheumatology.       Tachy-lizet syndrome (HCC)  Managed by cardiology.              History of Present Illness   Here for follow up visit.   Her cardiologist took her off the losartan.  Her bp had been dropping as low as 88/73 at nighttime. It is still running a little low at home but she is asymptomatic.    She is also following with endocrinology and will likely be starting prolia for her osteoporosis.  She is asking about possible side effects.          Review of Systems   Constitutional: Negative.    Respiratory: Negative.     Cardiovascular: Negative.    Musculoskeletal:  Negative for arthralgias.       Objective   /86   Pulse 97   Temp 97.9 °F (36.6 °C)   Resp 14   Ht 5' 5\" (1.651 m)   Wt 100 kg (221 lb)   SpO2 97%   BMI 36.78 kg/m²      Physical Exam  Constitutional:       Appearance: Normal appearance.   HENT:      Head: Normocephalic and atraumatic.   Eyes:      Pupils: Pupils are equal, round, and reactive to light.   Cardiovascular:      Rate and " Rhythm: Normal rate and regular rhythm.      Heart sounds: No murmur heard.     No friction rub. No gallop.   Pulmonary:      Effort: Pulmonary effort is normal.      Breath sounds: Normal breath sounds. No wheezing or rales.   Abdominal:      General: Abdomen is flat. Bowel sounds are normal.      Palpations: Abdomen is soft.      Tenderness: There is no abdominal tenderness.   Musculoskeletal:         General: No swelling.   Neurological:      Mental Status: She is alert.

## 2025-05-12 DIAGNOSIS — I48.91 NEW ONSET ATRIAL FIBRILLATION (HCC): ICD-10-CM

## 2025-05-13 ENCOUNTER — OFFICE VISIT (OUTPATIENT)
Dept: ENDOCRINOLOGY | Facility: CLINIC | Age: 75
End: 2025-05-13
Payer: COMMERCIAL

## 2025-05-13 VITALS
DIASTOLIC BLOOD PRESSURE: 88 MMHG | WEIGHT: 220 LBS | OXYGEN SATURATION: 96 % | HEIGHT: 65 IN | BODY MASS INDEX: 36.65 KG/M2 | HEART RATE: 99 BPM | SYSTOLIC BLOOD PRESSURE: 126 MMHG

## 2025-05-13 DIAGNOSIS — M85.89 OSTEOPENIA OF MULTIPLE SITES: Primary | ICD-10-CM

## 2025-05-13 DIAGNOSIS — E55.9 VITAMIN D DEFICIENCY: ICD-10-CM

## 2025-05-13 DIAGNOSIS — E03.0 CONGENITAL HYPOTHYROIDISM WITH DIFFUSE GOITER: ICD-10-CM

## 2025-05-13 PROCEDURE — 99214 OFFICE O/P EST MOD 30 MIN: CPT | Performed by: NURSE PRACTITIONER

## 2025-05-13 NOTE — PROGRESS NOTES
Name: Swetha Lopez      : 1950      MRN: [id number]  Encounter Provider: KENNEDY Morillo  Encounter Date: 2025   Encounter department: [facility]    Assessment & Plan  Osteopenia of multiple sites  Will start Reclast for osteopenia with high FRAX score. Reviewed risks and benefits. Patient discussed with both cardiology for rate controlled atrial fibrillation history and dentist with no know plans for invasive dental work.   Orders:    Comprehensive metabolic panel; Future    NTX Panel, Urine; Future    Comprehensive metabolic panel; Future    Vitamin D deficiency  Recommend continue vitamin D supplementation. Vitamin D sufficient from 2025 lab work over 30.   Orders:    Vitamin D 25 hydroxy; Future    Congenital hypothyroidism with diffuse goiter  Clinically and biochemically euthyroid on levothyroxine 50 mcg daily. Will continue current dose of levothyroxine and repeat lab work a few days before next appointment.   Orders:    TSH, 3rd generation; Future    T4, free; Future        History of Present Illness     Swetha Lopez is a 74 y.o. female who presents with a history of post-operative thyroidism s/p R hemithyroidectomy for multinodular goiter with benign pathology benign . History of osteopenia with high FRAX score and Vitamin D deficiency.      For hypothyroidism, continues to take levothyroxine 50 mcg daily, taken regularly and properly. Continues to deny any symptoms consistent with hypo/hyperthyroidism.      For multinodular goiter s/p hemithyroidectomy at age 28 years old. Continues to deny neck compressive symptoms. Had an ultrasound-guided thyroid biopsy from 2023 which was benign. 2024 ultrasound demonstrated a stable thyroid nodule.      For osteopenia, 2024 DXA scan demonstrates osteopenia with high FRAX the 10 year risk of hip fracture is 26.3% with the 10 year risk of major osteoporotic fracture  "being 41.9%.     For vitamin D deficiency, patient continues taking combined calcium and vitamin D supplementation regularly.     History obtained from: patient      Review of Systems as per HPI  Medical History Reviewed by provider this encounter:  Tobacco  Allergies  Meds  Problems  Med Hx  Surg Hx  Fam Hx     .  Current Outpatient Medications on File Prior to Visit   Medication Sig Dispense Refill    apixaban (Eliquis) 5 mg Take 1 tablet (5 mg total) by mouth 2 (two) times a day 180 tablet 3    atorvastatin (LIPITOR) 20 mg tablet TAKE 1 TABLET BY MOUTH DAILY AT  BEDTIME 90 tablet 1    bumetanide (BUMEX) 1 mg tablet TAKE 1 TABLET BY MOUTH DAILY 90 tablet 1    Calcium Carb-Cholecalciferol 600-1000 MG-UNIT CAPS Take by mouth every morning gummy       calcium carbonate (TUMS) 500 mg chewable tablet Chew 1 tablet daily      dicyclomine (BENTYL) 10 mg capsule Take 1 capsule (10 mg total) by mouth 3 (three) times a day as needed (abdominal cramping and diarrhea) 90 capsule 1    levothyroxine 50 mcg tablet TAKE 1 TABLET BY MOUTH DAILY 90 tablet 1    metoprolol succinate (TOPROL-XL) 25 mg 24 hr tablet TAKE 1 TABLET BY MOUTH TWICE  DAILY 180 tablet 1    omeprazole (PriLOSEC) 40 MG capsule TAKE 1 CAPSULE BY MOUTH TWICE  DAILY 180 capsule 1    potassium chloride (Klor-Con M10) 10 mEq tablet Take 1 tablet (10 mEq total) by mouth daily 90 tablet 1     No current facility-administered medications on file prior to visit.         Medical History Reviewed by provider this encounter:  Tobacco  Allergies  Meds  Problems  Med Hx  Surg Hx  Fam Hx     .    Objective   /88 (BP Location: Right arm, Patient Position: Sitting, Cuff Size: Large)   Pulse 99   Ht 5' 5\" (1.651 m)   Wt 99.8 kg (220 lb)   SpO2 96%   BMI 36.61 kg/m²      Body mass index is 36.61 kg/m².  Wt Readings from Last 3 Encounters:   05/13/25 99.8 kg (220 lb)   05/07/25 100 kg (221 lb)   04/14/25 99.1 kg (218 lb 6.4 oz)        Physical Exam  Vitals " reviewed.   Constitutional:       Appearance: Normal appearance.   Cardiovascular:      Rate and Rhythm: Normal rate and regular rhythm.      Pulses: Normal pulses.      Heart sounds: Normal heart sounds.   Pulmonary:      Effort: Pulmonary effort is normal.      Breath sounds: Normal breath sounds.   Skin:     General: Skin is warm and dry.      Capillary Refill: Capillary refill takes less than 2 seconds.   Neurological:      General: No focal deficit present.      Mental Status: She is alert and oriented to person, place, and time.   Psychiatric:         Mood and Affect: Mood normal.         Behavior: Behavior normal.       Labs:   Lab Results   Component Value Date    HGBA1C 5.5 12/25/2022     Lab Results   Component Value Date    CREATININE 0.74 04/18/2025    CREATININE 0.78 12/23/2024    CREATININE 0.84 08/27/2024    BUN 10 04/18/2025     09/01/2016    K 3.8 04/18/2025     04/18/2025    CO2 27 04/18/2025     eGFR   Date Value Ref Range Status   04/18/2025 79 ml/min/1.73sq m Final     Lab Results   Component Value Date    CHOL 144 09/01/2016    HDL 58 12/23/2024    TRIG 107 12/23/2024     Lab Results   Component Value Date    ALT 16 04/18/2025    AST 22 04/18/2025    ALKPHOS 53 04/18/2025    BILITOT 0.8 09/01/2016     Lab Results   Component Value Date    JIF2OIKGLGQI 0.715 04/18/2025    ZKD3UQLEXGBL 0.788 12/23/2024    VUS4IKAPJAIV 0.488 06/26/2024     Lab Results   Component Value Date    FREET4 1.09 04/18/2025     Discussed with the patient and all questioned fully answered. She will call me if any problems arise.

## 2025-05-14 NOTE — ASSESSMENT & PLAN NOTE
Recommend continue vitamin D supplementation. Vitamin D sufficient from April 2025 lab work over 30.   Orders:    Vitamin D 25 hydroxy; Future

## 2025-05-14 NOTE — ASSESSMENT & PLAN NOTE
Clinically and biochemically euthyroid on levothyroxine 50 mcg daily. Will continue current dose of levothyroxine and repeat lab work a few days before next appointment.   Orders:    TSH, 3rd generation; Future    T4, free; Future

## 2025-05-19 DIAGNOSIS — E87.6 HYPOKALEMIA: ICD-10-CM

## 2025-05-19 RX ORDER — POTASSIUM CHLORIDE 750 MG/1
10 TABLET, EXTENDED RELEASE ORAL DAILY
Qty: 90 TABLET | Refills: 3 | Status: SHIPPED | OUTPATIENT
Start: 2025-05-19

## 2025-05-29 ENCOUNTER — PROCEDURE VISIT (OUTPATIENT)
Age: 75
End: 2025-05-29
Payer: COMMERCIAL

## 2025-05-29 VITALS — RESPIRATION RATE: 18 BRPM | BODY MASS INDEX: 36.65 KG/M2 | HEIGHT: 65 IN | WEIGHT: 220 LBS

## 2025-05-29 DIAGNOSIS — M79.671 RIGHT FOOT PAIN: ICD-10-CM

## 2025-05-29 DIAGNOSIS — L03.031 PARONYCHIA OF TOENAIL OF RIGHT FOOT: Primary | ICD-10-CM

## 2025-05-29 DIAGNOSIS — M21.961 ACQUIRED DEFORMITY OF BOTH FEET: ICD-10-CM

## 2025-05-29 DIAGNOSIS — M21.962 ACQUIRED DEFORMITY OF BOTH FEET: ICD-10-CM

## 2025-05-29 DIAGNOSIS — L60.0 INGROWN TOENAIL: ICD-10-CM

## 2025-05-29 DIAGNOSIS — E78.2 MIXED HYPERLIPIDEMIA: ICD-10-CM

## 2025-05-29 DIAGNOSIS — B07.0 PLANTAR WARTS: ICD-10-CM

## 2025-05-29 PROCEDURE — 99213 OFFICE O/P EST LOW 20 MIN: CPT | Performed by: PODIATRIST

## 2025-05-29 NOTE — PROGRESS NOTES
Assessment/Plan: Recurrent ingrown toenail right hallux.  Paronychia fibular nail groove.  Pain.  Atypical verruca submetatarsal 5 left foot.     Plan.  Chart reviewed.  PCP notes reviewed.  Patient evaluated.  At this time we will treat for ingrown toenail.  Medication applied.  Patient given aftercare instruction.  She may need nail procedure.  Today left foot is noted to have resolved.  Aftercare instruction given.  Return as needed.                  Assessment  Diagnoses and all orders for this visit:     Paronychia of toenail of right foot     Ingrown toenail     Right foot pain     Acquired deformity of both feet     Plantar warts              Subjective: Patient has 2 complaints.  Patient has pain in her right big toe.  This has been ongoing for several weeks.  No history of trauma.  She also has a painful skin lesion of the left foot.           Allergies   Allergen Reactions    Orange (Diagnostic) - Food Allergy Anaphylaxis       Throat closes    Pantoprazole Headache    Penicillins Other (See Comments)       During allergy testing instructed to NEVER take PCN  Patient has tolerated cefazolin.    Gluten Meal - Food Allergy         Following a gluten free diet on her own    Lactose - Food Allergy Diarrhea    Sulfa Antibiotics Rash           Current Medications      Current Outpatient Medications:     apixaban (Eliquis) 5 mg, Take 1 tablet (5 mg total) by mouth 2 (two) times a day, Disp: 180 tablet, Rfl: 3    atorvastatin (LIPITOR) 20 mg tablet, TAKE 1 TABLET BY MOUTH DAILY AT  BEDTIME, Disp: 90 tablet, Rfl: 1    bumetanide (BUMEX) 1 mg tablet, TAKE 1 TABLET BY MOUTH DAILY, Disp: 90 tablet, Rfl: 1    Calcium Carb-Cholecalciferol 600-1000 MG-UNIT CAPS, Take by mouth every morning gummy , Disp: , Rfl:     famotidine (PEPCID) 40 MG tablet, TAKE 1 TABLET BY MOUTH TWICE  DAILY, Disp: 180 tablet, Rfl: 1    levothyroxine 50 mcg tablet, TAKE 1 TABLET BY MOUTH DAILY, Disp: 90 tablet, Rfl: 1    losartan (COZAAR) 25 mg  tablet, Take 1 tablet (25 mg total) by mouth daily, Disp: 90 tablet, Rfl: 2    metoprolol succinate (TOPROL-XL) 25 mg 24 hr tablet, Take 1 tablet (25 mg total) by mouth 2 (two) times a day, Disp: 180 tablet, Rfl: 1    omeprazole (PriLOSEC) 40 MG capsule, TAKE 1 CAPSULE BY MOUTH TWICE  DAILY, Disp: 180 capsule, Rfl: 1    potassium chloride (Klor-Con M10) 10 mEq tablet, Take 1 tablet (10 mEq total) by mouth daily, Disp: 90 tablet, Rfl: 1        Problem List       Patient Active Problem List   Diagnosis    Hiatal hernia    GERD with esophagitis    Hypokalemia    Benign hypertension    Mixed hyperlipidemia    Congenital hypothyroidism with diffuse goiter    Liver lesion, left lobe    Hemorrhoids    IBS (irritable bowel syndrome)    Multiple thyroid nodules    Stress incontinence, female    Abnormal CT of liver    Anxiety    Rheumatoid arthritis (HCC)    History of hysterectomy    Parotid mass    Xerostomia    Asthma    Persistent atrial fibrillation (HCC)    Peripheral vision loss, right    Pulmonary hypertension (HCC)    History of TIA (transient ischemic attack)    Age-related osteoporosis with current pathological fracture with routine healing    Vitamin D deficiency    Tachy-lizet syndrome (HCC)    S/P AV kike ablation 5/9/2024    Pacemaker-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 cardiac 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    s/p Medtronic dual chamber PPM 10/24/2023    Diarrhea    Nonischemic cardiomyopathy (HCC)    Obesity, morbid (HCC)               Subjective  Patient ID: Swetha Lopez is a 74 y.o. female.     HPI     The following portions of the patient's history were reviewed and updated as appropriate:      family history includes Alzheimer's disease in her mother; Cancer in her father and mother; Heart disease in her sister; Hyperlipidemia in her brother; Hypertension in her father; No Known Problems in her maternal grandmother, paternal grandmother, son, and son; Other in her sister; Prostate cancer in her father;  Stroke in her father; Thyroid disease in her mother and sister; Ulcerative colitis in her mother and sister.       reports that she has never smoked. She has never been exposed to tobacco smoke. She has never used smokeless tobacco. She reports that she does not currently use alcohol. She reports that she does not use drugs.      Objective:  Patient's shoes and socks removed.  Objective  Foot ExamPhysical Exam        Constitutional:       Appearance: Normal appearance.   Cardiovascular:      Rate and Rhythm: Normal rate and regular rhythm.   Skin:     Capillary Refill: Capillary refill takes less than 2 seconds.      Comments: Right hallux demonstrates cellulitis of the fibular aspect of the toe.  Positive abscess noted within nail groove.  Positive ingrown toenail noted.  All fibular aspect.  Patient demonstrates small atypical verruca submetatarsal 5 left foot  Neurological:      Mental Status: She is alert.   Psychiatric:         Mood and Affect: Mood normal.         Thought Content: Thought content normal.         Judgment: Judgment normal.

## 2025-05-30 ENCOUNTER — TELEPHONE (OUTPATIENT)
Age: 75
End: 2025-05-30

## 2025-05-30 DIAGNOSIS — M80.00XD AGE-RELATED OSTEOPOROSIS WITH CURRENT PATHOLOGICAL FRACTURE WITH ROUTINE HEALING: Primary | ICD-10-CM

## 2025-05-30 RX ORDER — ATORVASTATIN CALCIUM 20 MG/1
20 TABLET, FILM COATED ORAL
Qty: 90 TABLET | Refills: 1 | Status: SHIPPED | OUTPATIENT
Start: 2025-05-30

## 2025-05-30 NOTE — TELEPHONE ENCOUNTER
Swetha called bc she said Felicia planned to start her on reclast at the infusion center. She said it was suppose to be run through her insurance and she would like to schedule at the [facility]. Her schedule is very full so she might need the number to schedule herself at the infusion center, she wasn't able to give me any set availability.    Please follow up with patient.

## 2025-06-05 ENCOUNTER — TELEPHONE (OUTPATIENT)
Dept: ENDOCRINOLOGY | Facility: CLINIC | Age: 75
End: 2025-06-05

## 2025-06-05 RX ORDER — ZOLEDRONIC ACID 0.05 MG/ML
5 INJECTION, SOLUTION INTRAVENOUS ONCE
OUTPATIENT
Start: 2025-06-19

## 2025-06-05 RX ORDER — SODIUM CHLORIDE 9 MG/ML
20 INJECTION, SOLUTION INTRAVENOUS ONCE
OUTPATIENT
Start: 2025-06-19

## 2025-06-05 NOTE — TELEPHONE ENCOUNTER
Called and LM for patient to see what the best days/times are for her to be scheduled. Phone number provided for callback.

## 2025-06-05 NOTE — TELEPHONE ENCOUNTER
Diagnosis: Age-related osteoporosis with current pathological fracture with routine healing [M80.00XD]     Medication/Dosage: Zoledronic Acid Reclast 5 mg    Failed/ Intolerant to or Contraindicated: no    Screening:              Labs-  Vitamin D Done 4/18/2025, CMP Done 4/18/2025              Imaging-    Site of Medication Administration: IV    Comments:         Provider information, Luzmaria See MD    NPI: [id number]    LIC: [id number]

## 2025-06-05 NOTE — TELEPHONE ENCOUNTER
Covering for Felicia Montilla DNP. Therapy plan ordered and forwarded to physician for cosign. Thank you.

## 2025-06-06 NOTE — TELEPHONE ENCOUNTER
Patient would like to go to [facility], preferably on a Thursday/ Friday after 10:00am.     Will call and schedule patient.

## 2025-06-11 ENCOUNTER — TELEPHONE (OUTPATIENT)
Age: 75
End: 2025-06-11

## 2025-06-11 NOTE — TELEPHONE ENCOUNTER
Spoke with pt confirming procedure. She has her  and I reminded her to hold Eliquis 2 days before procedure. She will be called the day before with her arrival time.

## 2025-06-12 ENCOUNTER — APPOINTMENT (OUTPATIENT)
Dept: LAB | Facility: HOSPITAL | Age: 75
End: 2025-06-12
Attending: INTERNAL MEDICINE
Payer: COMMERCIAL

## 2025-06-12 ENCOUNTER — RESULTS FOLLOW-UP (OUTPATIENT)
Dept: ENDOCRINOLOGY | Facility: CLINIC | Age: 75
End: 2025-06-12

## 2025-06-12 DIAGNOSIS — M85.89 OSTEOPENIA OF MULTIPLE SITES: ICD-10-CM

## 2025-06-12 DIAGNOSIS — M80.00XD AGE-RELATED OSTEOPOROSIS WITH CURRENT PATHOLOGICAL FRACTURE WITH ROUTINE HEALING: ICD-10-CM

## 2025-06-12 LAB
ALBUMIN SERPL BCG-MCNC: 4 G/DL (ref 3.5–5)
ALP SERPL-CCNC: 55 U/L (ref 34–104)
ALT SERPL W P-5'-P-CCNC: 15 U/L (ref 7–52)
ANION GAP SERPL CALCULATED.3IONS-SCNC: 12 MMOL/L (ref 4–13)
AST SERPL W P-5'-P-CCNC: 21 U/L (ref 13–39)
BILIRUB SERPL-MCNC: 1.3 MG/DL (ref 0.2–1)
BUN SERPL-MCNC: 14 MG/DL (ref 5–25)
CALCIUM SERPL-MCNC: 9.1 MG/DL (ref 8.4–10.2)
CHLORIDE SERPL-SCNC: 103 MMOL/L (ref 96–108)
CO2 SERPL-SCNC: 26 MMOL/L (ref 21–32)
CREAT SERPL-MCNC: 0.8 MG/DL (ref 0.6–1.3)
GFR SERPL CREATININE-BSD FRML MDRD: 72 ML/MIN/1.73SQ M
GLUCOSE P FAST SERPL-MCNC: 104 MG/DL (ref 65–99)
POTASSIUM SERPL-SCNC: 3.6 MMOL/L (ref 3.5–5.3)
PROT SERPL-MCNC: 6.4 G/DL (ref 6.4–8.4)
SODIUM SERPL-SCNC: 141 MMOL/L (ref 135–147)

## 2025-06-12 PROCEDURE — 36415 COLL VENOUS BLD VENIPUNCTURE: CPT

## 2025-06-12 PROCEDURE — 80053 COMPREHEN METABOLIC PANEL: CPT

## 2025-06-13 ENCOUNTER — REMOTE DEVICE CLINIC VISIT (OUTPATIENT)
Dept: CARDIOLOGY CLINIC | Facility: CLINIC | Age: 75
End: 2025-06-13
Payer: COMMERCIAL

## 2025-06-13 DIAGNOSIS — Z95.0 CARDIAC PACEMAKER IN SITU: Primary | ICD-10-CM

## 2025-06-13 PROCEDURE — 93294 REM INTERROG EVL PM/LDLS PM: CPT | Performed by: INTERNAL MEDICINE

## 2025-06-13 PROCEDURE — 93296 REM INTERROG EVL PM/IDS: CPT | Performed by: INTERNAL MEDICINE

## 2025-06-15 DIAGNOSIS — E03.0 CONGENITAL HYPOTHYROIDISM WITH DIFFUSE GOITER: ICD-10-CM

## 2025-06-16 ENCOUNTER — RESULTS FOLLOW-UP (OUTPATIENT)
Dept: CARDIOLOGY CLINIC | Facility: CLINIC | Age: 75
End: 2025-06-16

## 2025-06-16 RX ORDER — LEVOTHYROXINE SODIUM 50 UG/1
50 TABLET ORAL DAILY
Qty: 90 TABLET | Refills: 1 | Status: SHIPPED | OUTPATIENT
Start: 2025-06-16

## 2025-06-16 NOTE — PROGRESS NOTES
"Results for orders placed or performed in visit on 06/13/25   Cardiac EP device report    Narrative    MDT DC/PM-ACTIVE SYSTEM IS MRI CONDITIONAL  CARELINK TRANSMISSION: PRESENTING EGRAM AF BVP@ 96 BPM. BATTERY STATUS \"10 YRS.\" AP 0% CRT PACING (BIV) 100% (LV) 0%. ALL AVAILABLE LEAD PARAMETERS WITHIN NORMAL LIMITS. 100% AF BURDEN-PT ON ELIQUIS. OPTI-VOL WITHIN NORMAL LIMITS. NORMAL DEVICE FUNCTION. NC         "

## 2025-06-17 ENCOUNTER — OFFICE VISIT (OUTPATIENT)
Dept: CARDIOLOGY CLINIC | Facility: CLINIC | Age: 75
End: 2025-06-17
Payer: COMMERCIAL

## 2025-06-17 ENCOUNTER — ANESTHESIA (OUTPATIENT)
Dept: ANESTHESIOLOGY | Facility: HOSPITAL | Age: 75
End: 2025-06-17

## 2025-06-17 ENCOUNTER — ANESTHESIA EVENT (OUTPATIENT)
Dept: ANESTHESIOLOGY | Facility: HOSPITAL | Age: 75
End: 2025-06-17

## 2025-06-17 VITALS
HEIGHT: 65 IN | HEART RATE: 94 BPM | DIASTOLIC BLOOD PRESSURE: 88 MMHG | SYSTOLIC BLOOD PRESSURE: 136 MMHG | WEIGHT: 220 LBS | BODY MASS INDEX: 36.65 KG/M2

## 2025-06-17 DIAGNOSIS — M79.89 SWELLING OF LOWER EXTREMITY: ICD-10-CM

## 2025-06-17 DIAGNOSIS — I49.5 TACHY-BRADY SYNDROME (HCC): Primary | ICD-10-CM

## 2025-06-17 DIAGNOSIS — R22.42 LOCALIZED SWELLING OF LEFT LOWER LEG: ICD-10-CM

## 2025-06-17 LAB
QRS AXIS: 270 DEGREES
QRSD INTERVAL: 132 MS
QT INTERVAL: 388 MS
QTC INTERVAL: 486 MS
T WAVE AXIS: 81 DEGREES
VENTRICULAR RATE: 94 BPM

## 2025-06-17 PROCEDURE — 93000 ELECTROCARDIOGRAM COMPLETE: CPT | Performed by: INTERNAL MEDICINE

## 2025-06-17 PROCEDURE — 99215 OFFICE O/P EST HI 40 MIN: CPT | Performed by: INTERNAL MEDICINE

## 2025-06-17 RX ORDER — SODIUM CHLORIDE, SODIUM LACTATE, POTASSIUM CHLORIDE, CALCIUM CHLORIDE 600; 310; 30; 20 MG/100ML; MG/100ML; MG/100ML; MG/100ML
75 INJECTION, SOLUTION INTRAVENOUS CONTINUOUS
Status: CANCELLED | OUTPATIENT
Start: 2025-06-17

## 2025-06-17 NOTE — PATIENT INSTRUCTIONS
Obtain lower extremity ultrasound     Obtain an limited echocardiogram     Reduce fluid intake by 1/2    Take additional Bumex in the evening for 3 days

## 2025-06-17 NOTE — PROGRESS NOTES
EPS follow-up patient Evaluation - Swetha Lopez 74 y.o. female MRN: [id number]       Referring:Dr. Ness    CC/HPI:   It was a pleasure to see Swetha Lopez in our arrhythmia clinic at [facility]. As you know she is a 74 y.o. woman with persistent atrial fibrillation, HTN, HLD, anxiety, GERD, hiatal hernia, hypertension, hyperlipidemia, IBS, rheumatoid arthritis who presented 12/1/2022 to discuss management of atrial arrhythmias.    She was diagnosed with atrial fibrillation in January at the PCP office.  She has mild coronary artery disease but no stents.  She had a repeat catheterization in 2017 which showed no significant coronary disease.  She has had history of bleeding ulcer and required hospitalization in the past needing blood transfusion.  She had used [employer] mobile device in January which had suggested atrial fibrillation.  She has been having on and off palpitations and exercise intolerance since September 2021. Echocardiogram has shown severe left atrial dilation.  She was started on Toprol XL and Eliquis.  As she continued to have symptoms with exertion cardioversion was recommended however she declined the option in April.  Option was again recommended in July office visit and this time she agreed to proceed with it.  She also had exertional chest tightness and stress test was performed which was negative.  A cardioversion in October was only transiently successful.  Her metoprolol dose was increased in October and flecainide is 100 mg twice daily was started.  She underwent repeat cardioversion on November 3rd which was successful.  However she was back in atrial fibrillation after 18 hours.  EKG performed on November 9, 2022 confirm she was back in atrial fibrillation.  Now she presents to discuss further options.    She presented with her  who also provided further history.  Patient did report having fatigue when having atrial fibrillation.  She did note  palpitations when she went back into atrial fibrillation after recent cardioversion.  She denied any significant caffeine or alcohol use.  She denied any drug use.  She felt significant lightheadedness/dizziness since being on flecainide and is afraid to drive a car.  She noted symptoms happening even at rest.    Office visit 1/23/2023:  After discussing options she opted to proceed with ablation procedure.  Patient had pulmonary vein isolation, posterior wall isolation and empiric CTI line placement on December 23, 2022.      She was maintained on flecainide 50 mg twice daily and Toprol-XL 25 mg daily.  Her blood pressure was elevated afterwards and losartan 25 mg was restarted. She reported feeling well. She denied any palpitations. She did have loss of vision in the right eye after ablation. Neurology team was consulted and brain MRI was done. She was diagnosed with TIA. Her vision recovered spontaneously. She was not placed on aspirin after the procedure to hx of GI bleeding.     Office visit 8/29/23:  She was seen in our EP ofice. Her heart was noted to be low and therefore flecainide and Toprol XL were discontinued. She had PAC/PVC and diltiazem was started.     Office visit 12/6/2023:  Swetha presented for a follow-up visit. She had event monitor and had bradycardia at night. She continued to have palpitations therefore we implanted PPM and restarted flecainide. She continued to feel symptoms mainly when exerting while walking. She was not able to keep up the pace. She felt better after the pacemaker but not back to normal self.     She attributed symptoms possibly to flecainide. She did feel better on diltiazem.     Office visit 3/7/24:    Swetha presented for a follow-up visit. After her last visit, we discontinued flecainide as it was giving her significant symptoms. She was placed on diltiazem.     She reported feeling better on diltiazem. She noted fatigue still with walking. She also noticed  "palpitations, with last episode occurring on 3/5/24. She was reporting compliance with medications.     We opted to proceed with AV node ablation given difficulty in controlling her atrial fibrillation with medications and symptoms.    Office visit 6/11/2024:  After previous visit she was loaded with dofetilide 250 mcg q12h. She continued to have atrial fibrillation. She underwent AV node ablation on May 9, 2024.  She was doing well at the follow-up visit.     Office visit 12/9/2024  Swetha presented for a follow-up visit.  After her previous visit, her symptoms worsened and echocardiogram was obtained on June 26, 2024 which showed ejection fraction decreased to 35%.  She underwent left heart cath which showed nonobstructive coronary disease.    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  She had the procedure done on August 27, 2024.    Repeat echocardiogram performed on November 20, 2024 shows ejection fraction has improved to 40-45%.  Symptomatically she feels better but not back to her baseline.  She is able to walk up the stairs but only few steps before she has to rest.  Prior to upgrade she was not able to do that.  She also has mild chest discomfort at the site of generator.  She notes discomfort is occurring mainly at time when she is sleeping on her left side.  It prompts her to sleep on the right side.  She otherwise denies any bleeding episodes, dizziness, lightheadedness or palpitations.    Interval history:  Swetha now presents for a follow-up visit.  She feels much better since the last visit and feels her pacemaker has finally \"kick in\".  She did have a trip to [place] in April.  During the trip she had increased swelling in lower extremity mainly the left.  It did not improved and the right lower extremity also was started to swell up when she came back from the trip.  She is starting to notice decreasing swelling but it " is very slow.  She drinks about 4 big cups of water/tea.  She takes her Bumex in the morning and but notices the effect of diuresis in the morning only.  She has also gained about 7 pounds.    Past Medical History:  Past Medical History:   Diagnosis Date    A-fib (HCC)     Allergic     Anemia     Anxiety     resolved 10/4/17    Arthritis     Asthma     seasonal    Atrial fibrillation (HCC) 01/2022    newly diagnosed on eliquis    Chronic kidney disease     kidney stone    Colon polyp     Disease of thyroid gland     Diverticulitis of colon     Dysphagia     GERD (gastroesophageal reflux disease)     Goiter, nodular     partial thyroidectomy    Hiatal hernia     repaired 2018    Hiatal hernia with GERD without esophagitis 04/2018    surgical correction    History of esophageal varices with bleeding     History of pernicious anemia     History of transfusion     x1 after bleeding ulcer    Hyperlipidemia     Hypertension     Irritable bowel syndrome     Neck mass     2 small areas left side by jawline and midneck US scheduled 0820/21    Pacemaker-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 cardiac 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 05/09/2024    RA (rheumatoid arthritis) ([facility])     S/P AV kike ablation 5/9/2024 05/09/2024    s/p Medtronic dual chamber PPM 10/24/2023 05/09/2024    Seasonal allergies     Vertigo     Visual impairment     Wears glasses     for reading       Medications:      Current Outpatient Medications:     apixaban (Eliquis) 5 mg, Take 1 tablet (5 mg total) by mouth 2 (two) times a day, Disp: 180 tablet, Rfl: 3    atorvastatin (LIPITOR) 20 mg tablet, TAKE 1 TABLET BY MOUTH DAILY AT  BEDTIME, Disp: 90 tablet, Rfl: 1    bumetanide (BUMEX) 1 mg tablet, TAKE 1 TABLET BY MOUTH DAILY, Disp: 90 tablet, Rfl: 1    Calcium Carb-Cholecalciferol 600-1000 MG-UNIT CAPS, Take by mouth every morning gummy, Disp: , Rfl:     calcium carbonate (TUMS) 500 mg chewable tablet, Chew 1 tablet in the morning., Disp: , Rfl:     dicyclomine  (BENTYL) 10 mg capsule, Take 1 capsule (10 mg total) by mouth 3 (three) times a day as needed (abdominal cramping and diarrhea), Disp: 90 capsule, Rfl: 1    levothyroxine 50 mcg tablet, TAKE 1 TABLET BY MOUTH DAILY, Disp: 90 tablet, Rfl: 1    metoprolol succinate (TOPROL-XL) 25 mg 24 hr tablet, TAKE 1 TABLET BY MOUTH TWICE  DAILY, Disp: 180 tablet, Rfl: 1    omeprazole (PriLOSEC) 40 MG capsule, TAKE 1 CAPSULE BY MOUTH TWICE  DAILY, Disp: 180 capsule, Rfl: 1    potassium chloride (Klor-Con M10) 10 mEq tablet, Take 1 tablet (10 mEq total) by mouth daily, Disp: 90 tablet, Rfl: 3     Family History   Problem Relation Name Age of Onset    Alzheimer's disease Mother Fernanda Yater     Cancer Mother Fernanda Yater         skin     Thyroid disease Mother Fernanda Yater     Ulcerative colitis Mother Fernanda Yater     Arthritis Mother Fernanda Yater     Dementia Mother Fernanda Yater     Hyperlipidemia Mother Fernanda Yater     Cancer Father Jaziel Yater         prostate    Stroke Father Jaziel Yater     Hypertension Father Jaziel Yater     Prostate cancer Father Jaziel Yater     Arthritis Father Jaziel Yater     Rheum arthritis Father Jaziel Yater     Thyroid disease Sister Leonora Tye     Ulcerative colitis Sister Leonora Tye     Heart disease Sister Leonora Tye     Dementia Sister Leonora Tye     Hyperlipidemia Brother Jazeil Yater     Dementia Maternal Grandmother Tiffany Nevarez     No Known Problems Paternal Grandmother      Asthma Son Godfrey Lopez     No Known Problems Son      Mental illness Neg Hx       Social History     Socioeconomic History    Marital status: /Civil Union     Spouse name: Not on file    Number of children: 2    Years of education: Not on file    Highest education level: Not on file   Occupational History    Not on file   Tobacco Use    Smoking status: Never     Passive exposure: Never    Smokeless tobacco: Never   Vaping Use    Vaping status: Never Used   Substance and Sexual  Activity    Alcohol use: Not Currently     Comment: stopped 10/2023    Drug use: Never    Sexual activity: Not Currently     Partners: Male     Birth control/protection: Surgical   Other Topics Concern    Not on file   Social History Narrative    Feels safe at home     Social Drivers of Health     Financial Resource Strain: Low Risk  (11/6/2023)    Overall Financial Resource Strain (CARDIA)     Difficulty of Paying Living Expenses: Not hard at all   Food Insecurity: No Food Insecurity (11/6/2024)    Nursing - Inadequate Food Risk Classification     Worried About Running Out of Food in the Last Year: Never true     Ran Out of Food in the Last Year: Never true     Ran Out of Food in the Last Year: Not on file   Transportation Needs: No Transportation Needs (11/6/2024)    PRAPARE - Transportation     Lack of Transportation (Medical): No     Lack of Transportation (Non-Medical): No   Physical Activity: Not on file   Stress: Not on file   Social Connections: Not on file   Intimate Partner Violence: Not on file   Housing Stability: Low Risk  (11/6/2024)    Housing Stability Vital Sign     Unable to Pay for Housing in the Last Year: No     Number of Times Moved in the Last Year: 0     Homeless in the Last Year: No     Social History     Tobacco Use   Smoking Status Never    Passive exposure: Never   Smokeless Tobacco Never     Social History     Substance and Sexual Activity   Alcohol Use Not Currently    Comment: stopped 10/2023       Review of Systems   Constitutional: Positive for malaise/fatigue. Negative for chills and fever.   HENT: Negative.     Eyes:  Negative for blurred vision and double vision.   Cardiovascular:  Positive for leg swelling. Negative for chest pain, dyspnea on exertion, near-syncope, orthopnea, palpitations, paroxysmal nocturnal dyspnea and syncope.   Respiratory:  Negative for cough and sputum production.    Endocrine: Negative.    Skin: Negative.  Negative for rash.   Musculoskeletal:  Positive  "for arthritis. Negative for joint pain.   Gastrointestinal:  Negative for abdominal pain, nausea and vomiting.   Genitourinary: Negative.    Neurological:  Negative for dizziness and light-headedness.   Psychiatric/Behavioral: Negative.  The patient is not nervous/anxious.         Objective:     Vitals: Blood pressure 136/88, pulse 94, height 5' 5\" (1.651 m), weight 99.8 kg (220 lb), not currently breastfeeding., Body mass index is 36.61 kg/m².,        Physical Exam:    GEN: Swetha Lopez appears well, alert and oriented x 3, pleasant and cooperative; obese   HEENT: pupils equal, round, and reactive to light; extraocular muscles intact  NECK: supple, no carotid bruits   HEART: Regular rate and rhythm, normal S1 and S2, no murmurs, clicks, gallops or rubs   LUNGS: clear to auscultation bilaterally; no wheezes, rales, or rhonchi   ABDOMEN: normal bowel sounds, soft, no tenderness, no distention  EXTREMITIES: peripheral pulses normal; no clubbing, cyanosis; +2 edema  NEURO: no focal findings   SKIN: normal without suspicious lesions on exposed skin      Labs & Results:  Below is the patient's most recent value for Albumin, ALT, AST, BUN, Calcium, Chloride, Cholesterol, CO2, Creatinine, GFR, Glucose, HDL, Hematocrit, Hemoglobin, Hemoglobin A1C, LDL, Magnesium, Phosphorus, Platelets, Potassium, PSA, Sodium, Triglycerides, and WBC.   Lab Results   Component Value Date    ALT 15 06/12/2025    AST 21 06/12/2025    BUN 14 06/12/2025    CALCIUM 9.1 06/12/2025     06/12/2025    CHOL 144 09/01/2016    CO2 26 06/12/2025    CREATININE 0.80 06/12/2025    HDL 58 12/23/2024    HCT 41.2 08/27/2024    HGB 13.6 08/27/2024    HGBA1C 5.5 12/25/2022    MG 2.0 03/28/2024    PHOS 4.6 (H) 12/23/2024     08/27/2024    K 3.6 06/12/2025     09/01/2016    TRIG 107 12/23/2024    WBC 5.36 08/27/2024     Note: for a comprehensive list of the patient's lab results, access the Results Review activity.          Cardiac testing: "     I personally reviewed the ECG performed in the clinic on 06/17/25. It reveals atrial fibrillation, with paced rhythm.    Echocardiograms:  No results found for this or any previous visit.    No results found for this or any previous visit.      Catheterizations:   No results found for this or any previous visit.      Stress Tests:  Results for orders placed during the hospital encounter of 04/21/22    NM myocardial perfusion spect (stress and/or rest)    Interpretation Summary    Stress ECG: Arrhythmias during recovery: rare PVCs. The ECG was equivocal for ischemia. The ECG was not diagnostic due to pharmacological (vasodilator) stress. The stress ECG is equivocal for ischemia after pharmacologic vasodilation.    Stress Function: Left ventricular function post-stress is normal. Post-stress ejection fraction is 59 %.    Perfusion: There are no perfusion defects.    Stress ECG: A Miguel protocol stress test was performed. The patient reached stage 2.0 of the protocol after exercising for 4 min and 5 sec and had a maximal HR of 151 bpm (102 % of MPHR) and 7.0 METS. The patient experienced no angina during the test. The test was stopped because the patient experienced dyspnea. The patient achieved the target heart rate. The patient reported dyspnea during the stress test. Onset of symptoms occurred at stage 2 of the protocol. Symptoms ended during recovery. Blood pressure demonstrated a normal response and heart rate demonstrated a normal response to stress. The patient's heart rate recovery was normal.    Negative study for evidence of pharmacological induced ischemia or prior infarction. No major symptoms with vasodilation. Elevated baseline blood pressure of 150/100 noted.      Holter monitor -   No results found for this or any previous visit.    No results found for this or any previous visit.        ASSESSMENT/PLAN:  1. Persistent atrial fibrillation  Patient was diagnosed with atrial fibrillation in January  2021  She then started to have on of palpitations  She was started on metoprolol and Eliquis  Cardioversion was performed in October 2022 however it was unsuccessful  She was started on flecainide 100 mg twice daily and metoprolol dose was increased  She had repeat cardioversion on November 3, 2022 which was successful in restoring sinus rhythm however it only lasted 18 hours  We discuss next step in management option including alternative rhythm medication versus ablation procedure  After answering all the questions she opted to proceed with ablation procedure  She is s/p pulmonary vein isolation, posterior wall isolation and empiric CTI line placement on December 23, 2022  She had TIA next day during which she lost vision in the right eye.  Brain MRI was negative for stroke.  Neurology team was consulted and diagnosed her with TIA.  She is currently maintained on flecainide 50 mg twice daily and metoprolol 12.5 mg daily  She will need to be maintained on Eliquis due to TIA.  Metoprolol dose was increased to 25 mg daily  She had noticed her [employer] mobile hide revealed slow heart rate  Therefore flecainide and metoprolol were discontinued  She continues to have palpitations with cardiac event monitor showing PAC/PVC  She was started on diltiazem 120 mg daily but continued to have symptoms  Given tachycardia/bradycardia syndrome she underwent pacemaker placement  Flecainide and metoprolol were restarted  She continues to feel dyspneic when exerting  Rate histogram shows she may not be getting higher ventricular rates with exertion  We increased rate response  Discontinued flecainide and started diltiazem 120 mg daily  Continues to feel fatigue, and appears to be in atrial fibrillation all the time  Discussed plan to restore sinus rhythm with dofetilide/Tikosyn and she is agreeable.  Will d/c diltiazem after sotalol/dofetilide   She underwent dofetilide admission and was loaded with 250 mcg every 12 hours of  dofetilide.  She continued to have atrial fibrillation therefore she underwent AV node ablation in May.  Her symptoms worsened therefore echo was obtained which showed ejection fraction decreased to 35%  She underwent upgrade of her pacemaker to biventricular pacemaker  Echocardiogram afterwards in November showed EF improved to 40 to 45% however it has not normalized.  At last visit we increased her lower rate to 70 bpm and made rate response more sensitive  She has felt better since last visit but now has swelling in lower extremities after prolonged car ride  Will get lower extremity ultrasound to rule out DVT though low likelihood given she is on anticoagulation  Will obtain echocardiogram to evaluate ejection fraction; last EF was 40-45%  I have advised her to cut down on drinking fluid by half and take additional Bumex 1 mg in the evening for next 3 days  Advised her to call me with the results    2. Hypertension  Patient was maintained on losartan  Diastolic blood pressure elevated in the office  Previously was on losartan but blood pressure came low and losartan was discontinued  Now having elevated diastolic BP- will restart losartan at 25 mg once daily  Blood pressure was low therefore it was discontinued    3. Hyperlipidemia   Maintained on atorvastatin 20 mg daily    4. Hypothyroidism  Maintained on levothyroxine 50 mcg daily    5. Diastolic heart failure  Maintain on Bumex 1 mg daily  Obtain limited echocardiogram  Take additional Bumex in the evening for next 3 days    Follow-up in 6 months

## 2025-06-18 ENCOUNTER — HOSPITAL ENCOUNTER (OUTPATIENT)
Dept: PERIOP | Facility: HOSPITAL | Age: 75
Setting detail: OUTPATIENT SURGERY
Discharge: HOME | End: 2025-06-18
Attending: PHYSICIAN ASSISTANT
Payer: COMMERCIAL

## 2025-06-18 ENCOUNTER — ANESTHESIA (OUTPATIENT)
Dept: PERIOP | Facility: HOSPITAL | Age: 75
End: 2025-06-18
Payer: COMMERCIAL

## 2025-06-18 ENCOUNTER — ANESTHESIA EVENT (OUTPATIENT)
Dept: PERIOP | Facility: HOSPITAL | Age: 75
End: 2025-06-18
Payer: COMMERCIAL

## 2025-06-18 VITALS
SYSTOLIC BLOOD PRESSURE: 120 MMHG | DIASTOLIC BLOOD PRESSURE: 78 MMHG | TEMPERATURE: 98.2 F | OXYGEN SATURATION: 99 % | HEART RATE: 80 BPM | RESPIRATION RATE: 16 BRPM

## 2025-06-18 DIAGNOSIS — R13.19 ESOPHAGEAL DYSPHAGIA: ICD-10-CM

## 2025-06-18 DIAGNOSIS — K21.00 GASTROESOPHAGEAL REFLUX DISEASE WITH ESOPHAGITIS WITHOUT HEMORRHAGE: ICD-10-CM

## 2025-06-18 PROCEDURE — 43239 EGD BIOPSY SINGLE/MULTIPLE: CPT | Performed by: INTERNAL MEDICINE

## 2025-06-18 PROCEDURE — 88305 TISSUE EXAM BY PATHOLOGIST: CPT | Performed by: PATHOLOGY

## 2025-06-18 PROCEDURE — 43249 ESOPH EGD DILATION <30 MM: CPT | Performed by: INTERNAL MEDICINE

## 2025-06-18 RX ORDER — LIDOCAINE HYDROCHLORIDE 10 MG/ML
INJECTION, SOLUTION EPIDURAL; INFILTRATION; INTRACAUDAL; PERINEURAL AS NEEDED
Status: DISCONTINUED | OUTPATIENT
Start: 2025-06-18 | End: 2025-06-18

## 2025-06-18 RX ORDER — SODIUM CHLORIDE, SODIUM LACTATE, POTASSIUM CHLORIDE, CALCIUM CHLORIDE 600; 310; 30; 20 MG/100ML; MG/100ML; MG/100ML; MG/100ML
INJECTION, SOLUTION INTRAVENOUS CONTINUOUS PRN
Status: DISCONTINUED | OUTPATIENT
Start: 2025-06-18 | End: 2025-06-18

## 2025-06-18 RX ORDER — PROPOFOL 10 MG/ML
INJECTION, EMULSION INTRAVENOUS AS NEEDED
Status: DISCONTINUED | OUTPATIENT
Start: 2025-06-18 | End: 2025-06-18

## 2025-06-18 RX ORDER — SODIUM CHLORIDE, SODIUM LACTATE, POTASSIUM CHLORIDE, CALCIUM CHLORIDE 600; 310; 30; 20 MG/100ML; MG/100ML; MG/100ML; MG/100ML
75 INJECTION, SOLUTION INTRAVENOUS CONTINUOUS
Status: DISCONTINUED | OUTPATIENT
Start: 2025-06-18 | End: 2025-06-22 | Stop reason: HOSPADM

## 2025-06-18 RX ORDER — SODIUM CHLORIDE, SODIUM LACTATE, POTASSIUM CHLORIDE, CALCIUM CHLORIDE 600; 310; 30; 20 MG/100ML; MG/100ML; MG/100ML; MG/100ML
125 INJECTION, SOLUTION INTRAVENOUS CONTINUOUS
Status: DISCONTINUED | OUTPATIENT
Start: 2025-06-18 | End: 2025-06-22 | Stop reason: HOSPADM

## 2025-06-18 RX ADMIN — SODIUM CHLORIDE, SODIUM LACTATE, POTASSIUM CHLORIDE, AND CALCIUM CHLORIDE: .6; .31; .03; .02 INJECTION, SOLUTION INTRAVENOUS at 11:50

## 2025-06-18 RX ADMIN — LIDOCAINE HYDROCHLORIDE 5 ML: 10 INJECTION, SOLUTION EPIDURAL; INFILTRATION; INTRACAUDAL; PERINEURAL at 12:01

## 2025-06-18 RX ADMIN — PROPOFOL 60 MG: 10 INJECTION, EMULSION INTRAVENOUS at 12:03

## 2025-06-18 RX ADMIN — SODIUM CHLORIDE, SODIUM LACTATE, POTASSIUM CHLORIDE, AND CALCIUM CHLORIDE 75 ML/HR: .6; .31; .03; .02 INJECTION, SOLUTION INTRAVENOUS at 10:21

## 2025-06-18 RX ADMIN — PROPOFOL 120 MG: 10 INJECTION, EMULSION INTRAVENOUS at 12:01

## 2025-06-18 NOTE — H&P
History and Physical -  [facility]  Swetha Lopez 74 y.o. female MRN: [id number]    HPI: Swetha Lopez is a 74 y.o. year old female who presents with GERD, dysphagia.       Review of Systems    Historical Information   Past Medical History[1]  Past Surgical History[2]  Social History   Social History     Substance and Sexual Activity   Alcohol Use Not Currently    Comment: stopped 10/2023     Social History     Substance and Sexual Activity   Drug Use Never     Tobacco Use History[3]  Family History[4]    Meds/Allergies     Not in a hospital admission.    Allergies[5]    Objective     /96   Pulse 99   Temp 98.2 °F (36.8 °C) (Skin)   Resp 18   SpO2 96%       PHYSICAL EXAM    Gen: NAD  CV: RRR  CHEST: Clear  ABD: soft, NT/ND  EXT: no edema  Neuro: AAO      ASSESSMENT/PLAN:  This is a 74 y.o. year old female here for GERD, dysphagia.     PLAN:   Procedure: egd           [1]   Past Medical History:  Diagnosis Date    A-fib (HCC)     Allergic     Anemia     Anxiety     resolved 10/4/17    Arthritis     Asthma     seasonal    Atrial fibrillation (HCC) 01/2022    newly diagnosed on eliquis    Chronic kidney disease     kidney stone    Colon polyp     Disease of thyroid gland     Diverticulitis of colon     Dysphagia     GERD (gastroesophageal reflux disease)     Goiter, nodular     partial thyroidectomy    Hiatal hernia     repaired 2018    Hiatal hernia with GERD without esophagitis 04/2018    surgical correction    History of esophageal varices with bleeding     History of pernicious anemia     History of transfusion     x1 after bleeding ulcer    Hyperlipidemia     Hypertension     Irritable bowel syndrome     Neck mass     2 small areas left side by jawline and midneck US scheduled 0820/21    Pacemaker-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 cardiac 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 05/09/2024    RA (rheumatoid arthritis) (HCC)     S/P AV kike ablation 5/9/2024 05/09/2024    s/p Medtronic dual  chamber PPM 10/24/2023 05/09/2024    Seasonal allergies     Vertigo     Visual impairment     Wears glasses     for reading   [2]   Past Surgical History:  Procedure Laterality Date    AV NODE ABLATION  05/09/2024    BREAST BIOPSY Bilateral     negative    CARDIAC CATHETERIZATION      x2 secondary to exertional chest pain, due to lung issues, possible mild COPD    CARDIAC CATHETERIZATION Left 07/08/2024    Procedure: Cardiac Left Heart Cath;  Surgeon: Dede Oleary DO;  Location: [facility];  Service: Cardiology    CARDIAC CATHETERIZATION N/A 07/08/2024    Procedure: Cardiac Coronary Angiogram;  Surgeon: Dede Oleary DO;  Location: [facility];  Service: Cardiology    CARDIAC ELECTROPHYSIOLOGY PROCEDURE N/A 12/23/2022    Procedure: Cardiac eps/afib ablation;  Surgeon: Sunil Eubanks MD;  Location:  [facility] C[facility];  Service: Cardiology    CARDIAC ELECTROPHYSIOLOGY PROCEDURE N/A 12/23/2022    Procedure: Cardiac eps/aflutter ablation;  Surgeon: Sunil Eubanks MD;  Location:  [facility];  Service: Cardiology    CARDIAC ELECTROPHYSIOLOGY PROCEDURE N/A 10/24/2023    Procedure: Cardiac pacer implant DC PM;  Surgeon: Sunil Eubanks MD;  [facility]: [facility];  Service: Cardiology    CARDIAC ELECTROPHYSIOLOGY PROCEDURE N/A 05/09/2024    Procedure: Cardiac eps/av node ablation;  Surgeon: Sunil Eubanks MD;  Location:  [facility] CATH [facility];  Service: Cardiology    CARDIAC ELECTROPHYSIOLOGY PROCEDURE N/A 08/27/2024    Procedure: Cardiac upgrade from DC Pacer to BIV Pacer;  Surgeon: Sunil Eubanks MD;  Location: [facility];  Service: Cardiology    CATARACT EXTRACTION Bilateral     CHOLECYSTECTOMY  2015    lap - last assessed 10/4/17    COLONOSCOPY      complete    ESOPHAGOGASTRODUODENOSCOPY N/A 01/23/2018    Procedure: ESOPHAGOGASTRODUODENOSCOPY (EGD);  Surgeon: Richard Bledsoe MD;  Location: [facility] GI [facility];  Service: Gastroenterology    HAND FRACTURE REPAIR Left  05/22/2024    Procedure: LEFT THUMB PROXIMAL PHALANX, INTERNAL FUSION OF METACARPAL PHALANX JOINT, DISTAL RADIUS BONE GRAFT.;  Surgeon: Diane Duvall MD;  Location: [facility];  Service: Orthopedics    HYSTERECTOMY      partial - ovaries remain    IR UPPER EXTREMITY VENOGRAM- DIAGNOSTIC  08/12/2024    LIPOMA RESECTION      right thigh    RI ESOPHAGOSCOPY FLEXIBLE TRANSORAL WITH BIOPSY N/A 04/11/2018    Procedure: ESOPHAGOGASTRODUODENOSCOPY (EGD);  Surgeon: Yeison Masterson MD;  Location:  MAIN OR;  Service: Thoracic    RI LAPS RPR PARAESPHGL HRNA INCL FUNDPLSTY W/MESH N/A 04/11/2018    Procedure: REPAIR HERNIA PARAESOPHAGEAL  LAPAROSCOPIC,ELEONORA GASTROPLASTY, TUEPET FUNDOPLICATION;  Surgeon: Yeison Masterson MD;  Location:  MAIN OR;  Service: Thoracic    RI XCAPSL CTRC RMVL INSJ IO LENS PROSTH W/O ECP Right 08/23/2021    Procedure: EXTRACTION EXTRACAPSULAR CATARACT PHACO INTRAOCULAR LENS (IOL);  Surgeon: Malcolm Morris MD;  Location: [facility] MAIN OR;  Service: Ophthalmology    RI XCAPSL CTRC RMVL INSJ IO LENS PROSTH W/O ECP Left 11/15/2021    Procedure: EXTRACTION EXTRACAPSULAR CATARACT PHACO INTRAOCULAR LENS (IOL);  Surgeon: Malcolm Morris MD;  Location: [facility] MAIN OR;  Service: Ophthalmology    SKIN BIOPSY Right     lump benign - last assessed 10/4/17    THYROIDECTOMY, PARTIAL  1977    TONSILLECTOMY      UPPER GASTROINTESTINAL ENDOSCOPY      US GUIDED THYROID BIOPSY  02/21/2023   [3]   Social History  Tobacco Use   Smoking Status Never    Passive exposure: Never   Smokeless Tobacco Never   [4]   Family History  Problem Relation Name Age of Onset    Alzheimer's disease Mother Fernanda Yater     Cancer Mother Fernanda Yater         skin     Thyroid disease Mother Fernanda Yater     Ulcerative colitis Mother Fernanda Yater     Arthritis Mother Fernanda Yater     Dementia Mother Fernanda Yater     Hyperlipidemia Mother Fernanda Yater     Cancer Father Jaziel Quintero         prostate    Stroke Father Jaziel Quintero      Hypertension Father Jaziel Quintero     Prostate cancer Father Jaziel Quintero     Arthritis Father Jaziel Quintero     Rheum arthritis Father Jaziel Quintero     Thyroid disease Sister Leonora Gamble     Ulcerative colitis Sister Leonora Gamble     Heart disease Sister Leonora Gamble     Dementia Sister Leonora Gamble     Hyperlipidemia Brother Jaziel Quintero     Dementia Maternal Grandmother Tiffany Nevarez     No Known Problems Paternal Grandmother      Asthma Son Godfrey Lopez     No Known Problems Son      Mental illness Neg Hx     [5]   Allergies  Allergen Reactions    Orange (Diagnostic) - Food Allergy Anaphylaxis     Throat closes    Pantoprazole Headache    Penicillins Other (See Comments)     During allergy testing instructed to NEVER take PCN  Patient has tolerated cefazolin.    Gluten Meal - Food Allergy      Following a gluten free diet on her own    Lactose - Food Allergy Diarrhea    Sulfa Antibiotics Rash

## 2025-06-18 NOTE — ANESTHESIA PREPROCEDURE EVALUATION
Procedure:  EGD    Relevant Problems   CARDIO   (+) Benign hypertension   (+) Hemorrhoids   (+) Mixed hyperlipidemia   (+) Pacemaker-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 cardiac 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   (+) Persistent atrial fibrillation (HCC)   (+) Pulmonary hypertension (HCC)   (+) Tachy-lizet syndrome (HCC)   (+) s/p Medtronic dual chamber PPM 10/24/2023      ENDO   (+) Congenital hypothyroidism with diffuse goiter      GI/HEPATIC   (+) Hiatal hernia   (+) Liver lesion, left lobe      GYN   (+) History of hysterectomy      MUSCULOSKELETAL   (+) Rheumatoid arthritis (HCC)      NEURO/PSYCH   (+) Anxiety      PULMONARY   (+) Asthma        Physical Exam    Airway     Mallampati score: II  TM Distance: >3 FB  Neck ROM: full      Cardiovascular  Cardiovascular exam normal    Dental       Pulmonary  Pulmonary exam normal     Neurological      Other Findings  post-pubertal.      Anesthesia Plan  ASA Score- 4     Anesthesia Type- IV sedation with anesthesia with ASA Monitors.         Additional Monitors:     Airway Plan:     Comment: Chronic af, vpaced.       Plan Factors-Exercise tolerance (METS): <4 METS.    Chart reviewed. EKG reviewed. Imaging results reviewed. Existing labs reviewed. Patient summary reviewed.                  Induction- intravenous.    Postoperative Plan-         Informed Consent- Anesthetic plan and risks discussed with patient.  I personally reviewed this patient with the CRNA. Discussed and agreed on the Anesthesia Plan with the CRNA..      NPO Status:  Vitals Value Taken Time   Date of last liquid 06/18/25 06/18/25 10:02   Time of last liquid 0700 06/18/25 10:02   Date of last solid 06/17/25 06/18/25 10:02   Time of last solid 0700 06/18/25 10:02

## 2025-06-18 NOTE — ANESTHESIA POSTPROCEDURE EVALUATION
Post-Op Assessment Note    CV Status:  Stable         Mental Status:  Sleepy   Hydration Status:  Stable   PONV Controlled:  Controlled   Airway Patency:  Patent     Post Op Vitals Reviewed: Yes    No anethesia notable event occurred.    Staff: CRNA           Last Filed PACU Vitals:  Vitals Value Taken Time   Temp     Pulse 69    /72    Resp 20    SpO2 98

## 2025-06-19 ENCOUNTER — HOSPITAL ENCOUNTER (OUTPATIENT)
Dept: INFUSION CENTER | Facility: HOSPITAL | Age: 75
Discharge: HOME | End: 2025-06-19
Attending: INTERNAL MEDICINE
Payer: COMMERCIAL

## 2025-06-19 VITALS
HEART RATE: 92 BPM | BODY MASS INDEX: 37.06 KG/M2 | TEMPERATURE: 99.7 F | HEIGHT: 65 IN | RESPIRATION RATE: 18 BRPM | OXYGEN SATURATION: 99 % | DIASTOLIC BLOOD PRESSURE: 90 MMHG | SYSTOLIC BLOOD PRESSURE: 132 MMHG | WEIGHT: 222.44 LBS

## 2025-06-19 DIAGNOSIS — M80.00XD AGE-RELATED OSTEOPOROSIS WITH CURRENT PATHOLOGICAL FRACTURE WITH ROUTINE HEALING: Primary | ICD-10-CM

## 2025-06-19 PROCEDURE — 96365 THER/PROPH/DIAG IV INF INIT: CPT

## 2025-06-19 RX ORDER — SODIUM CHLORIDE 9 MG/ML
20 INJECTION, SOLUTION INTRAVENOUS ONCE
Status: COMPLETED | OUTPATIENT
Start: 2025-06-19 | End: 2025-06-19

## 2025-06-19 RX ORDER — SODIUM CHLORIDE 9 MG/ML
20 INJECTION, SOLUTION INTRAVENOUS ONCE
OUTPATIENT
Start: 2025-07-17

## 2025-06-19 RX ORDER — ZOLEDRONIC ACID 0.05 MG/ML
5 INJECTION, SOLUTION INTRAVENOUS ONCE
Status: COMPLETED | OUTPATIENT
Start: 2025-06-19 | End: 2025-06-19

## 2025-06-19 RX ORDER — ZOLEDRONIC ACID 0.05 MG/ML
5 INJECTION, SOLUTION INTRAVENOUS ONCE
OUTPATIENT
Start: 2025-07-17

## 2025-06-19 RX ADMIN — ZOLEDRONIC ACID 5 MG: 0.05 INJECTION, SOLUTION INTRAVENOUS at 13:29

## 2025-06-19 RX ADMIN — SODIUM CHLORIDE 20 ML/HR: 9 INJECTION, SOLUTION INTRAVENOUS at 13:28

## 2025-06-19 NOTE — PROGRESS NOTES
Swetha Lopez  tolerated treatment well with no complications.  Pt has no future infusion appts as she plans to follow up with her ordering provider and schedule infusion appt if needed at that time.   AVS printed and given to Swetha Lopez:  Yes

## 2025-06-23 PROCEDURE — 88305 TISSUE EXAM BY PATHOLOGIST: CPT | Performed by: PATHOLOGY

## 2025-07-07 ENCOUNTER — HOSPITAL ENCOUNTER (OUTPATIENT)
Dept: RADIOLOGY | Facility: HOSPITAL | Age: 75
Discharge: HOME | End: 2025-07-07
Attending: INTERNAL MEDICINE
Payer: COMMERCIAL

## 2025-07-07 ENCOUNTER — HOSPITAL ENCOUNTER (OUTPATIENT)
Dept: NON INVASIVE DIAGNOSTICS | Facility: HOSPITAL | Age: 75
Discharge: HOME | End: 2025-07-07
Attending: INTERNAL MEDICINE
Payer: COMMERCIAL

## 2025-07-07 ENCOUNTER — RESULTS FOLLOW-UP (OUTPATIENT)
Dept: CARDIOLOGY CLINIC | Facility: CLINIC | Age: 75
End: 2025-07-07

## 2025-07-07 VITALS
SYSTOLIC BLOOD PRESSURE: 131 MMHG | BODY MASS INDEX: 36.99 KG/M2 | HEART RATE: 98 BPM | DIASTOLIC BLOOD PRESSURE: 91 MMHG | HEIGHT: 65 IN | WEIGHT: 222 LBS

## 2025-07-07 DIAGNOSIS — R22.42 LOCALIZED SWELLING OF LEFT LOWER LEG: ICD-10-CM

## 2025-07-07 DIAGNOSIS — I49.5 TACHY-BRADY SYNDROME (HCC): ICD-10-CM

## 2025-07-07 DIAGNOSIS — M79.89 SWELLING OF LOWER EXTREMITY: ICD-10-CM

## 2025-07-07 LAB
AORTIC ROOT: 3.5 CM
AV LVOT PEAK GRADIENT: 3 MMHG
AV PEAK GRADIENT: 4 MMHG
BSA FOR ECHO PROCEDURE: 2.07 M2
DOP CALC LVOT AREA: 3.14 CM2
DOP CALC LVOT DIAMETER: 2 CM
E WAVE DECELERATION TIME: 156 MS
E/A RATIO: 3.36
FRACTIONAL SHORTENING: 18 (ref 28–44)
GLOBAL LONGITUIDAL STRAIN: -9 %
INTERVENTRICULAR SEPTUM IN DIASTOLE (PARASTERNAL SHORT AXIS VIEW): 1 CM
INTERVENTRICULAR SEPTUM: 1 CM (ref 0.6–1.1)
LAAS-AP2: 42 CM2
LAAS-AP4: 44.3 CM2
LEFT ATRIUM AREA SYSTOLE SINGLE PLANE A4C: 43.9 CM2
LEFT ATRIUM SIZE: 5 CM
LEFT ATRIUM VOLUME (MOD BIPLANE): 178 ML
LEFT ATRIUM VOLUME INDEX (MOD BIPLANE): 86 ML/M2
LEFT INTERNAL DIMENSION IN SYSTOLE: 4.2 CM (ref 2.1–4)
LEFT VENTRICULAR INTERNAL DIMENSION IN DIASTOLE: 5.1 CM (ref 3.5–6)
LEFT VENTRICULAR POSTERIOR WALL IN END DIASTOLE: 1.1 CM
LEFT VENTRICULAR STROKE VOLUME: 46 ML
LVSV (TEICH): 46 ML
MV E'TISSUE VEL-LAT: 9 CM/S
MV E'TISSUE VEL-SEP: 5 CM/S
MV EROA: 0.2 CM2
MV PEAK A VEL: 0.25 M/S
MV PEAK E VEL: 84 CM/S
MV REGURGITANT VOLUME: 39 ML
MV STENOSIS PRESSURE HALF TIME: 45 MS
MV VALVE AREA P 1/2 METHOD: 4.89
PISA MRMAX VEL: 0.34 M/S
PISA RADIUS: 0.7 CM
RA PRESSURE ESTIMATED: 8 MMHG
RIGHT ATRIUM AREA SYSTOLE A4C: 28.5 CM2
RIGHT VENTRICLE ID DIMENSION: 3.5 CM
RV PSP: 52 MMHG
SL CV LEFT ATRIUM LENGTH A2C: 8.2 CM
SL CV LV EF: 35
SL CV PED ECHO LEFT VENTRICLE DIASTOLIC VOLUME (MOD BIPLANE) 2D: 123 ML
SL CV PED ECHO LEFT VENTRICLE SYSTOLIC VOLUME (MOD BIPLANE) 2D: 77 ML
TR MAX PG: 44 MMHG
TR PEAK VELOCITY: 3.3 M/S
TRICUSPID ANNULAR PLANE SYSTOLIC EXCURSION: 1.1 CM
TRICUSPID VALVE PEAK REGURGITATION VELOCITY: 3.33 M/S

## 2025-07-07 PROCEDURE — 93970 EXTREMITY STUDY: CPT

## 2025-07-07 PROCEDURE — 93306 TTE W/DOPPLER COMPLETE: CPT | Performed by: INTERNAL MEDICINE

## 2025-07-07 PROCEDURE — 93306 TTE W/DOPPLER COMPLETE: CPT

## 2025-07-07 PROCEDURE — 93970 EXTREMITY STUDY: CPT | Performed by: SURGERY

## 2025-07-07 NOTE — TELEPHONE ENCOUNTER
Spoke to patient about EF     Reduced from 45% to 35%    Will need to optimize therapy and repeat ECHO 3 months later.     Will place referral to HF team

## 2025-07-17 NOTE — PROGRESS NOTES
"Advanced Heart Failure/Pulmonary Hypertension Outpatient Note - Swetha Lopez 75 y.o. female MRN: [id number]    @ Encounter: [id number]      Assessment:  75 y.o. female PMH and acute problems listed later in this note (a partial list may also be included within 'assessment' section) presents for consultation.  I first met Swetha Lopez on 7/2025.  Referred by No ref. provider found.    The patient's primary cardiac team is EP, Dr. Eubanks.  HFmrEF 45% in 2024 > about 45% in 2025 my review. LVEF reported as 35% in late 2024 and 2025, in setting of reports including LVEF 55% in 2022 and 4/2024 > 35% in 6/2024>40% 11/2024).  My echo review LVEF 2022 55% and has been 40-45% range since 9846-1626 without overt change during this timeperiod.   AF sp DCCVs. Sp AF ablation 2022  Sp AVN ablation 5/2024 and had pre-existing 10/2023 D-PPM with septal lead   Sp 8/2024 CRT-P upgrade from prior 10/2023 D-PPM with septal lead   CAD. 7/2024 LHC:    No angiographic evidence of significant obstructive CAD, thogh mild diffuse disease is noted in the LAD/LCX and moderate disease is noted in a signficantly ectatic RCA    LVEDP normal without gradient on LV-AO pullback  catheterization in 2017 which showed no significant coronary disease.   She has had history of bleeding ulcer   HTN  HLD      Today I have reviewed all pertinent labs/imaging/data including but not limited to:        Lab Units 06/12/25  0854 04/18/25  0924 12/23/24  1041   CREATININE mg/dL 0.80 0.74 0.78         Lab Results   Component Value Date    K 3.6 06/12/2025     Lab Results   Component Value Date    HGBA1C 5.5 12/25/2022     Lab Results   Component Value Date    KCS9EKLJNRLZ 0.715 04/18/2025     Lab Results   Component Value Date    LDLCALC 60 12/23/2024     No results found for: \"BNP\"   No results found for: \"NTBNP\"       TODAY'S PLAN:     07/18/25  I am meeting patient for the first time today  Warm, euvolemic  No new cardiac complaints, feels ongoing " unchanged fatigue  Referral per EP for re-reduction in LVEF to 35% on echo report.  My review: LVEF 55% in 2022 >> 45% range since 4/2024 including most recent 7/2025 echo.  Intermittent mild dizziness  Home SBP usually 90s long term    In light of her symptoms, which are likely multifactorial, will enhance GDMT  Some caution given lower BP and dizziness  Start low dsoe arni, then check BMP 1 week after.  Send [id number] for price check, do not start yet  Rpt echo downstream    May need diuretic reduction future if able to advance gdmt    AF plan per EP    TLC, needs weight loss    Referral to sleep med given today    Follow up:  With me in 4 weeks or sooner if symptoms evolve.  In addition to follow up with their other medical providers    Studies:  Today I have reviewed all pertinent patient data/labs/imaging where available, including but not limited to the below studies. This includes my independent interpretation. Selected results may be displayed here but comprehensive listing is omitted for note clarity and can be found in the epic chart.    ECG.    Echo.    Stress.    Cath.    HPI:   75 y.o. female PMH and acute problems listed later in this note (a partial list may also be included within 'assessment' section) presents for consultation.  No new CP/SOB/dizziness/palpitations/syncope.  No new fatigue.  No new unintentional weight changes.  No new leg swelling, PND, pillow orthopnea.  No new fevers, chills, cough, nausea, vomiting, diarrhea, dysuria.      ROS:  10 point ROS negative except as specified in HPI    Past Medical History[1]  Problem List[2]  No current facility-administered medications for this visit.    Current Outpatient Medications   Medication Instructions    apixaban (ELIQUIS) 5 mg, Oral, 2 times daily    atorvastatin (LIPITOR) 20 mg, Oral, Daily at bedtime    bumetanide (BUMEX) 1 mg, Oral, Daily    Calcium Carb-Cholecalciferol 600-1000 MG-UNIT CAPS Every morning    calcium carbonate (TUMS) 500 mg  chewable tablet 1 tablet, Daily    dicyclomine (BENTYL) 10 mg, Oral, 3 times daily PRN    Empagliflozin (JARDIANCE) 10 mg, Oral, Every morning    levothyroxine 50 mcg, Oral, Daily    metoprolol succinate (TOPROL-XL) 25 mg, Oral, 2 times daily    omeprazole (PRILOSEC) 40 mg, Oral, 2 times daily    potassium chloride (Klor-Con M10) 10 mEq tablet 10 mEq, Oral, Daily    sacubitril-valsartan (Entresto) 24-26 MG TABS 1 tablet, Oral, 2 times daily      Allergies[3]  Social History     Socioeconomic History    Marital status: /Civil Union     Spouse name: Not on file    Number of children: 2    Years of education: Not on file    Highest education level: Not on file   Occupational History    Not on file   Tobacco Use    Smoking status: Never     Passive exposure: Never    Smokeless tobacco: Never   Vaping Use    Vaping status: Never Used   Substance and Sexual Activity    Alcohol use: Not Currently     Comment: stopped 10/2023    Drug use: Never    Sexual activity: Not Currently     Partners: Male     Birth control/protection: Surgical   Other Topics Concern    Not on file   Social History Narrative    Feels safe at home     Social Drivers of Health     Financial Resource Strain: Low Risk  (11/6/2023)    Overall Financial Resource Strain (CARDIA)     Difficulty of Paying Living Expenses: Not hard at all   Food Insecurity: Patient Declined (6/18/2025)    Nursing - Inadequate Food Risk Classification     Worried About Running Out of Food in the Last Year: Never true     Ran Out of Food in the Last Year: Never true     Ran Out of Food in the Last Year: Patient declined   Transportation Needs: Patient Declined (6/18/2025)    Nursing - Transportation Risk Classification     Lack of Transportation: Not on file     Lack of Transportation: Patient declined   Physical Activity: Not on file   Stress: Not on file   Social Connections: Not on file   Intimate Partner Violence: Patient Declined (6/18/2025)    Nursing IPS     Feels  "Physically and Emotionally Safe: Not on file     Physically Hurt by Someone: Not on file     Humiliated or Emotionally Abused by Someone: Not on file     Physically Hurt by Someone: Patient declined     Hurt or Threatened by Someone: Patient declined   Housing Stability: Patient Declined (6/18/2025)    Nursing: Inadequate Housing Risk Classification     Has Housing: Not on file     Worried About Losing Housing: Not on file     Unable to Get Utilities: Not on file     Unable to Pay for Housing in the Last Year: Patient declined     Has Housing: Patient declined     Family History[4]    Physical Exam:  Vitals:    07/18/25 1445   BP: 124/78   BP Location: Left arm   Patient Position: Sitting   Cuff Size: Standard   Pulse: 98   SpO2: 95%   Weight: 98.7 kg (217 lb 8 oz)   Height: 5' 5\" (1.651 m)     Constitutional: NAD, non toxic  Ears/nose/mouth/throat: atraumatic  CV: RRR, nl S1S2, no murmurs/rubs/gallups, no JVD, no HJR  Resp: CTABL  GI: Soft, NTND  MSK: no swollen joints in exposed areas  Extr: No edema, warm LE  Pysche: Normal affect  Neuro: appropriate in conversation  Skin: dry and intact in exposed areas    Labs & Results:  Lab Results   Component Value Date    WBC 5.36 08/27/2024    HGB 13.6 08/27/2024    HCT 41.2 08/27/2024    MCV 92 08/27/2024     08/27/2024     Lab Results   Component Value Date    SODIUM 141 06/12/2025    K 3.6 06/12/2025     06/12/2025    CO2 26 06/12/2025    BUN 14 06/12/2025    CREATININE 0.80 06/12/2025    GLUC 103 08/27/2024    CALCIUM 9.1 06/12/2025       Counseling / Coordination of Care  Greater than 50% of total time was spent with the patient and / or family counseling and / or coordination of care. Discussion included diagnoses, most recent studies and any changes in treatment.    Thank you for the opportunity to participate in the care of this patient.    Toro Ponce MD  Attending Physician  Advanced Heart Failure Cardiology  [facility]     "     [1]   Past Medical History:  Diagnosis Date    A-fib (HCC)     Allergic     Anemia     Anxiety     resolved 10/4/17    Arthritis     Asthma     seasonal    Atrial fibrillation (HCC) 01/2022    newly diagnosed on eliquis    Chronic kidney disease     kidney stone    Colon polyp     Disease of thyroid gland     Diverticulitis of colon     Dysphagia     GERD (gastroesophageal reflux disease)     Goiter, nodular     partial thyroidectomy    Hiatal hernia     repaired 2018    Hiatal hernia with GERD without esophagitis 04/2018    surgical correction    History of esophageal varices with bleeding     History of pernicious anemia     History of transfusion     x1 after bleeding ulcer    Hyperlipidemia     Hypertension     Irritable bowel syndrome     Neck mass     2 small areas left side by jawline and midneck US scheduled 0820/21    Pacemaker-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 cardiac 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 05/09/2024    RA (rheumatoid arthritis) (HCC)     S/P AV kike ablation 5/9/2024 05/09/2024    s/p Medtronic dual chamber PPM 10/24/2023 05/09/2024    Seasonal allergies     Vertigo     Visual impairment     Wears glasses     for reading   [2]   Patient Active Problem List  Diagnosis    Hiatal hernia    GERD with esophagitis    Hypokalemia    Benign hypertension    Mixed hyperlipidemia    Congenital hypothyroidism with diffuse goiter    Liver lesion, left lobe    Hemorrhoids    IBS (irritable bowel syndrome)    Multiple thyroid nodules    Stress incontinence, female    Abnormal CT of liver    Anxiety    Rheumatoid arthritis (HCC)    History of hysterectomy    Parotid mass    Xerostomia    Asthma    Persistent atrial fibrillation (HCC)    Peripheral vision loss, right    Pulmonary hypertension (HCC)    History of TIA (transient ischemic attack)    Age-related osteoporosis with current pathological fracture with routine healing    Vitamin D deficiency    Tachy-lizet syndrome (HCC)    S/P AV kike ablation 5/9/2024     Pacemaker-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 cardiac 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    s/p Medtronic dual chamber PPM 10/24/2023    Diarrhea    Nonischemic cardiomyopathy (HCC)    Obesity, morbid (HCC)   [3]   Allergies  Allergen Reactions    Orange (Diagnostic) - Food Allergy Anaphylaxis     Throat closes    Pantoprazole Headache    Penicillins Other (See Comments)     During allergy testing instructed to NEVER take PCN  Patient has tolerated cefazolin.    Gluten Meal - Food Allergy      Following a gluten free diet on her own    Lactose - Food Allergy Diarrhea    Sulfa Antibiotics Rash   [4]   Family History  Problem Relation Name Age of Onset    Alzheimer's disease Mother Fernanda Yater     Cancer Mother Fernanda Yater         skin     Thyroid disease Mother Fernanda Yater     Ulcerative colitis Mother Fernanda Yater     Arthritis Mother Fernanda Yater     Dementia Mother Fernanda Yater     Hyperlipidemia Mother Fernanda Yater     Cancer Father Jaziel Yater         prostate    Stroke Father Jaziel Yater     Hypertension Father Jaziel Yater     Prostate cancer Father Jaziel Yater     Arthritis Father Jaziel Yater     Rheum arthritis Father Jaziel Yater     Thyroid disease Sister Leonora Tye     Ulcerative colitis Sister Leonora Tye     Heart disease Sister Leonora Tye     Dementia Sister Leonora Tye     Hyperlipidemia Brother Jaziel Yater     Dementia Maternal Grandmother Tiffany Nevarez     No Known Problems Paternal Grandmother      Asthma Son Godfrey Lopez     No Known Problems Son      Mental illness Neg Hx

## 2025-07-18 ENCOUNTER — CONSULT (OUTPATIENT)
Dept: CARDIOLOGY CLINIC | Facility: CLINIC | Age: 75
End: 2025-07-18
Payer: COMMERCIAL

## 2025-07-18 VITALS
HEIGHT: 65 IN | OXYGEN SATURATION: 95 % | DIASTOLIC BLOOD PRESSURE: 78 MMHG | BODY MASS INDEX: 36.24 KG/M2 | HEART RATE: 98 BPM | WEIGHT: 217.5 LBS | SYSTOLIC BLOOD PRESSURE: 124 MMHG

## 2025-07-18 DIAGNOSIS — I48.91 NEW ONSET ATRIAL FIBRILLATION (HCC): ICD-10-CM

## 2025-07-18 DIAGNOSIS — I48.91 ATRIAL FIBRILLATION, UNSPECIFIED TYPE (HCC): ICD-10-CM

## 2025-07-18 DIAGNOSIS — I49.5 TACHY-BRADY SYNDROME (HCC): ICD-10-CM

## 2025-07-18 DIAGNOSIS — I50.20 HFREF (HEART FAILURE WITH REDUCED EJECTION FRACTION) (HCC): Primary | ICD-10-CM

## 2025-07-18 PROCEDURE — 99204 OFFICE O/P NEW MOD 45 MIN: CPT | Performed by: STUDENT IN AN ORGANIZED HEALTH CARE EDUCATION/TRAINING PROGRAM

## 2025-07-18 RX ORDER — SACUBITRIL AND VALSARTAN 24; 26 MG/1; MG/1
1 TABLET, FILM COATED ORAL 2 TIMES DAILY
Qty: 180 TABLET | Refills: 5 | Status: SHIPPED | OUTPATIENT
Start: 2025-07-18 | End: 2027-01-09

## 2025-07-29 ENCOUNTER — APPOINTMENT (OUTPATIENT)
Dept: LAB | Facility: HOSPITAL | Age: 75
End: 2025-07-29
Payer: COMMERCIAL

## 2025-07-29 ENCOUNTER — TELEPHONE (OUTPATIENT)
Age: 75
End: 2025-07-29

## 2025-07-29 DIAGNOSIS — R17 ELEVATED BILIRUBIN: ICD-10-CM

## 2025-07-29 DIAGNOSIS — I50.20 HFREF (HEART FAILURE WITH REDUCED EJECTION FRACTION) (HCC): ICD-10-CM

## 2025-07-29 LAB
ALBUMIN SERPL BCG-MCNC: 4 G/DL (ref 3.5–5)
ALP SERPL-CCNC: 52 U/L (ref 34–104)
ALT SERPL W P-5'-P-CCNC: 16 U/L (ref 7–52)
ANION GAP SERPL CALCULATED.3IONS-SCNC: 7 MMOL/L (ref 4–13)
AST SERPL W P-5'-P-CCNC: 22 U/L (ref 13–39)
BILIRUB DIRECT SERPL-MCNC: 0.21 MG/DL (ref 0–0.2)
BILIRUB SERPL-MCNC: 1 MG/DL (ref 0.2–1)
BUN SERPL-MCNC: 11 MG/DL (ref 5–25)
CALCIUM SERPL-MCNC: 8.6 MG/DL (ref 8.4–10.2)
CHLORIDE SERPL-SCNC: 104 MMOL/L (ref 96–108)
CO2 SERPL-SCNC: 28 MMOL/L (ref 21–32)
CREAT SERPL-MCNC: 0.77 MG/DL (ref 0.6–1.3)
GFR SERPL CREATININE-BSD FRML MDRD: 75 ML/MIN/1.73SQ M
GLUCOSE P FAST SERPL-MCNC: 102 MG/DL (ref 65–99)
POTASSIUM SERPL-SCNC: 3.4 MMOL/L (ref 3.5–5.3)
PROT SERPL-MCNC: 6.5 G/DL (ref 6.4–8.4)
SODIUM SERPL-SCNC: 139 MMOL/L (ref 135–147)

## 2025-07-29 PROCEDURE — 80076 HEPATIC FUNCTION PANEL: CPT

## 2025-07-29 PROCEDURE — 36415 COLL VENOUS BLD VENIPUNCTURE: CPT

## 2025-07-29 PROCEDURE — 80048 BASIC METABOLIC PNL TOTAL CA: CPT

## 2025-08-07 ENCOUNTER — PROCEDURE VISIT (OUTPATIENT)
Age: 75
End: 2025-08-07
Payer: COMMERCIAL

## 2025-08-07 VITALS — HEIGHT: 65 IN | BODY MASS INDEX: 36.15 KG/M2 | WEIGHT: 217 LBS | RESPIRATION RATE: 18 BRPM

## 2025-08-07 DIAGNOSIS — B35.1 ONYCHOMYCOSIS: ICD-10-CM

## 2025-08-07 DIAGNOSIS — M79.671 RIGHT FOOT PAIN: ICD-10-CM

## 2025-08-07 DIAGNOSIS — B07.0 PLANTAR WARTS: ICD-10-CM

## 2025-08-07 DIAGNOSIS — L03.031 PARONYCHIA OF TOENAIL OF RIGHT FOOT: Primary | ICD-10-CM

## 2025-08-07 PROCEDURE — 99213 OFFICE O/P EST LOW 20 MIN: CPT | Performed by: PODIATRIST

## 2025-08-07 RX ORDER — KETOCONAZOLE 20 MG/G
CREAM TOPICAL DAILY
Qty: 60 G | Refills: 1 | Status: SHIPPED | OUTPATIENT
Start: 2025-08-07 | End: 2025-09-06

## 2025-08-20 ENCOUNTER — OFFICE VISIT (OUTPATIENT)
Dept: CARDIOLOGY CLINIC | Facility: CLINIC | Age: 75
End: 2025-08-20
Payer: COMMERCIAL

## 2025-08-20 VITALS
WEIGHT: 216.4 LBS | HEIGHT: 65 IN | OXYGEN SATURATION: 97 % | SYSTOLIC BLOOD PRESSURE: 122 MMHG | BODY MASS INDEX: 36.06 KG/M2 | DIASTOLIC BLOOD PRESSURE: 78 MMHG | HEART RATE: 98 BPM

## 2025-08-20 DIAGNOSIS — I10 BENIGN HYPERTENSION: ICD-10-CM

## 2025-08-20 DIAGNOSIS — I48.91 NEW ONSET ATRIAL FIBRILLATION (HCC): ICD-10-CM

## 2025-08-20 DIAGNOSIS — E87.6 HYPOKALEMIA: ICD-10-CM

## 2025-08-20 DIAGNOSIS — I50.20 HFREF (HEART FAILURE WITH REDUCED EJECTION FRACTION) (HCC): Primary | ICD-10-CM

## 2025-08-20 PROCEDURE — 99214 OFFICE O/P EST MOD 30 MIN: CPT | Performed by: STUDENT IN AN ORGANIZED HEALTH CARE EDUCATION/TRAINING PROGRAM

## 2025-08-20 RX ORDER — POTASSIUM CHLORIDE 750 MG/1
10 TABLET, EXTENDED RELEASE ORAL
Qty: 90 TABLET | Refills: 3 | Status: SHIPPED | OUTPATIENT
Start: 2025-08-20

## 2025-08-20 RX ORDER — BUMETANIDE 1 MG/1
1 TABLET ORAL
Qty: 15 TABLET | Refills: 17 | Status: SHIPPED | OUTPATIENT
Start: 2025-08-20 | End: 2027-02-11

## (undated) DEVICE — SUT MONOCRYL 4-0 PS-2 18 IN Y496G

## (undated) DEVICE — ACTIVE FMS W/ INTREPID* ULTRA SLEEVES, 0.9MM 45° ABS* INTREPID* BALANCED TIP: Brand: ALCON

## (undated) DEVICE — CATH 4F INF. MPA2 100CM 2SH: Brand: INFINITI

## (undated) DEVICE — INVIEW CLEAR LEGGINGS: Brand: CONVERTORS

## (undated) DEVICE — CHLORAPREP HI-LITE 26ML ORANGE

## (undated) DEVICE — PADDING CAST 4 IN  COTTON STRL

## (undated) DEVICE — BUTTON SWITCH PENCIL HOLSTER: Brand: VALLEYLAB

## (undated) DEVICE — 1200CC GUARDIAN II: Brand: GUARDIAN

## (undated) DEVICE — MICROPUNCTURE INTRODUCER SET SILHOUETTE TRANSITIONLESS WITH STAINLESS STEEL WIRE GUIDE: Brand: MICROPUNCTURE

## (undated) DEVICE — BITE BLOCK MAXI 60FR LF STRAP

## (undated) DEVICE — SLITTER ADJUSTABLE

## (undated) DEVICE — INTREPID® TRANSFORMER IA HP: Brand: INTREPID®

## (undated) DEVICE — DISPOSABLE BIOPSY VALVE MAJ-1555: Brand: SINGLE USE BIOPSY VALVE (STERILE)

## (undated) DEVICE — INTENDED FOR TISSUE SEPARATION, AND OTHER PROCEDURES THAT REQUIRE A SHARP SURGICAL BLADE TO PUNCTURE OR CUT.: Brand: BARD-PARKER SAFETY BLADES SIZE 11, STERILE

## (undated) DEVICE — EYE PACK CUSTOM -FINNEGAN

## (undated) DEVICE — ENDOPATH XCEL BLADELESS TROCARS WITH STABILITY SLEEVES: Brand: ENDOPATH XCEL

## (undated) DEVICE — SUT ETHIBOND 0 SH/SH 36 IN X524H

## (undated) DEVICE — GLOVE SRG BIOGEL ECLIPSE 7.5

## (undated) DEVICE — INTRO SHEATH PEEL AWAY 9 FR

## (undated) DEVICE — AIRLIFE™  ADULT CUSHION NASAL CANNULA WITH 7 FOOT (2.1 M) CRUSH-RESISTANT OXYGEN TUBING, AND U/CONNECT-IT ADAPTER: Brand: AIRLIFE™

## (undated) DEVICE — PINNACLE INTRODUCER SHEATH: Brand: PINNACLE

## (undated) DEVICE — ENDOPATH XCEL UNIVERSAL TROCAR STABLILITY SLEEVES: Brand: ENDOPATH XCEL

## (undated) DEVICE — GLOVE SRG BIOGEL 7.5

## (undated) DEVICE — CATH ULTRASOUND ACUNAV ICE 8FR 90CM GE VIVID-I

## (undated) DEVICE — DGW .035 FC J3MM 260CM TEF: Brand: EMERALD

## (undated) DEVICE — TRAY FOLEY 16FR URIMETER SURESTEP

## (undated) DEVICE — DGW .035 FC J3MM 150CM TEF: Brand: EMERALD

## (undated) DEVICE — KERLIX BANDAGE ROLL: Brand: KERLIX

## (undated) DEVICE — TR BAND RADIAL ARTERY COMPRESSION DEVICE: Brand: TR BAND

## (undated) DEVICE — GLOVE EXAM NON-STRL NTRL PLUS LRG PF

## (undated) DEVICE — ENDOPATH PNEUMONEEDLE INSUFFLATION NEEDLES WITH LUER LOCK CONNECTORS 120MM: Brand: ENDOPATH

## (undated) DEVICE — AIR INJECT CANNULA 27GA: Brand: OPHTHALMIC CANNULA

## (undated) DEVICE — ENDOPOUCH RETRIEVER SPECIMEN RETRIEVAL BAGS: Brand: ENDOPOUCH RETRIEVER

## (undated) DEVICE — Device: Brand: OMNICLOSE TROCAR SITE CLOSURE DEVICE

## (undated) DEVICE — 2.0MM/2.4MM PLATE HOLDING TAK: Brand: OSTEOMED

## (undated) DEVICE — 3M™ STERI-STRIP™ REINFORCED ADHESIVE SKIN CLOSURES, R1547, 1/2 IN X 4 IN (12 MM X 100 MM), 6 STRIPS/ENVELOPE: Brand: 3M™ STERI-STRIP™

## (undated) DEVICE — TIBURON HAND DRAPE: Brand: CONVERTORS

## (undated) DEVICE — MEDI-VAC YANKAUER SUCTION HANDLE: Brand: CARDINAL HEALTH

## (undated) DEVICE — B-H IRRIGATING CAN 19GA FLAT ANGLED 8MM: Brand: OPHTHALMIC CANNULA

## (undated) DEVICE — CATH DIAG 5FR IMPULSE 110CM 6S PIG 145 ANG

## (undated) DEVICE — TUBING SUCTION 5MM X 12 FT

## (undated) DEVICE — SUT ETHIBOND 2-0 SH/SH 36 IN X523H

## (undated) DEVICE — "MAJ-901 WATER CONTAINER SET CV-160/140": Brand: WATER CONTAINER

## (undated) DEVICE — TK® QUICK LOAD® UNIT: Brand: TK® QUICK LOAD®

## (undated) DEVICE — "MB-142 MOUTHPIECE": Brand: MOUTHPIECE

## (undated) DEVICE — GLOVE INDICATOR PI UNDERGLOVE SZ 8 BLUE

## (undated) DEVICE — GLIDESHEATH SLENDER STAINLESS STEEL KIT: Brand: GLIDESHEATH SLENDER

## (undated) DEVICE — SUT MONOCRYL 3-0 PS-2 27 IN Y427H

## (undated) DEVICE — Device: Brand: SMARTABLATE

## (undated) DEVICE — TK® TI-KNOT® DEVICE: Brand: TK® TI-KNOT®

## (undated) DEVICE — DRAPE FLUID WARMER (BIRD BATH)

## (undated) DEVICE — GUIDE SHEATH SRO 8.5 FR

## (undated) DEVICE — CLEARCUT® SLIT KNIFE INTREPID MICRO-COAXIAL SYSTEM 2.4 SB: Brand: CLEARCUT®; INTREPID

## (undated) DEVICE — RUNTHROUGH NS EXTRA FLOPPY PTCA GUIDEWIRE: Brand: RUNTHROUGH

## (undated) DEVICE — CAP PROTECTIVE 0.45IN WHITE

## (undated) DEVICE — THE MONARCH® "D" CARTRIDGE IS A SINGLE-USE POLYPROPYLENE CARTRIDGE FOR POSTERIOR CHAMBER IOL DELIVERY: Brand: MONARCH® III

## (undated) DEVICE — CUFF TOURNIQUET 18 X 4 IN QUICK CONNECT DISP 1 BLADDER

## (undated) DEVICE — BIPOLAR CORD DISP

## (undated) DEVICE — ASTOUND STANDARD SURGICAL GOWN, XL: Brand: CONVERTORS

## (undated) DEVICE — PRECISION THIN (7.0 X 0.38 X 15.0MM)

## (undated) DEVICE — GAUZE SPONGES,16 PLY: Brand: CURITY

## (undated) DEVICE — 60 ML SYRINGE,REGULAR TIP: Brand: MONOJECT

## (undated) DEVICE — DRAPE SHEET THREE QUARTER

## (undated) DEVICE — BRUSH ENDO CLEANING DBL-HEADER

## (undated) DEVICE — INTRO SHEATH PEEL AWAY 7FR

## (undated) DEVICE — SUT MONOCRYL 4-0 PS-2 27 IN Y426H

## (undated) DEVICE — EXOFIN PRECISION PEN HIGH VISCOSITY TOPICAL SKIN ADHESIVE: Brand: EXOFIN PRECISION PEN, 1G

## (undated) DEVICE — Device: Brand: DEFENDO AIR/WATER/SUCTION AND BIOPSY VALVE

## (undated) DEVICE — MICROSURGICAL INSTRUMENT IRR. CYSTITOME 25GA STRAIGHT-REVERSE CUTTING: Brand: ALCON

## (undated) DEVICE — CAST PADDING 4 IN SYNTHETIC NON-STRL

## (undated) DEVICE — CATH ABLATION SAFIRE TX 8MM LRG CURL

## (undated) DEVICE — GENERAL/ PLASTIC TRAY STANDARD: Brand: DEROYAL

## (undated) DEVICE — Device: Brand: WEBSTER

## (undated) DEVICE — NEEDLE TRANSSEPTAL BRK-1 98CM

## (undated) DEVICE — 5 MM CURVED DISSECTORS WITH MONOPOLAR CAUTERY: Brand: ENDOPATH

## (undated) DEVICE — STOCKINETTE REGULAR

## (undated) DEVICE — HARMONIC ACE 5MM DIAMETER SHEARS 36CM SHAFT LENGTH + ADAPTIVE TISSUE TECHNOLOGY FOR USE WITH GENERATOR G11: Brand: HARMONIC ACE

## (undated) DEVICE — INTENDED FOR TISSUE SEPARATION, AND OTHER PROCEDURES THAT REQUIRE A SHARP SURGICAL BLADE TO PUNCTURE OR CUT.: Brand: BARD-PARKER SAFETY BLADES SIZE 15, STERILE

## (undated) DEVICE — AIR AND WATER TUBING/CAP SET FOR OLYMPUS® SCOPES: Brand: ERBE

## (undated) DEVICE — COVIDIEN ENDO GIA PURPLE (MED) RELOAD 45MM

## (undated) DEVICE — SUT FIBERWIRE 3-0 3/8 CIRCLE T-43 18IN AR-7227-01

## (undated) DEVICE — OCCLUSIVE GAUZE STRIP,3% BISMUTH TRIBROMOPHENATE IN PETROLATUM BLEND: Brand: XEROFORM

## (undated) DEVICE — Device: Brand: PROTRACK PIGTAIL WIRE

## (undated) DEVICE — INSUFLATION TUBING INSUFLOW (LEXION)

## (undated) DEVICE — GLOVE SRG BIOGEL 7

## (undated) DEVICE — 3000CC GUARDIAN II: Brand: GUARDIAN

## (undated) DEVICE — SINGLE-USE BIOPSY FORCEPS: Brand: RADIAL JAW 4

## (undated) DEVICE — ENDOPATH 5MM CURVED SCISSORS WITH MONOPOLAR CAUTERY: Brand: ENDOPATH

## (undated) DEVICE — Device: Brand: WEBSTER CS

## (undated) DEVICE — BANDAGE, ESMARK LF STR 6"X9' (20/CS): Brand: CYPRESS

## (undated) DEVICE — Device: Brand: PENTARAY NAV

## (undated) DEVICE — Device: Brand: VIZIGO

## (undated) DEVICE — PACK PBDS LAP CHOLE RF

## (undated) DEVICE — "MH-443 SUCTION VALVE F/EVIS140 EVIS160": Brand: SUCTION VALVE

## (undated) DEVICE — TRAVELKIT CONTAINS FIRST STEP KIT (200ML EP-4 KIT) AND SOILED SCOPE BAG - 1 KIT: Brand: TRAVELKIT CONTAINS FIRST STEP KIT AND SOILED SCOPE BAG

## (undated) DEVICE — Device: Brand: THERMOCOOL SMARTTOUCH SF

## (undated) DEVICE — CATH GUIDING FIXED SHAPE 43CM

## (undated) DEVICE — PDS II VLT 0 107CM AG ST3: Brand: ENDOLOOP

## (undated) DEVICE — UTILITY MARKER,BLACK WITH LABELS: Brand: DEVON

## (undated) DEVICE — "MH-438 A/W VLVE F/140 EVIS-140": Brand: AIR/WATER VALVE

## (undated) DEVICE — Device: Brand: ASAHI SILVERWAY

## (undated) DEVICE — STAPLER GIA HANDLE STAND 4MM

## (undated) DEVICE — TUBING BUBBLE CLEAR 5MM X 100 FT NS

## (undated) DEVICE — SYRINGE 10ML LL

## (undated) DEVICE — Device: Brand: REFERENCE PATCH CARTO 3

## (undated) DEVICE — 3M™ IOBAN™ 2 ANTIMICROBIAL INCISE DRAPE 6650EZ: Brand: IOBAN™ 2

## (undated) DEVICE — SOLIDIFIER FLUID WASTE CONTROL 1500ML

## (undated) DEVICE — MICROPUNCTURE INTRODUCER SET SILHOUETTE TRANSITIONLESS PUSH-PLUS DESIGN WITH NITINOL WIRE GUIDE: Brand: MICROPUNCTURE

## (undated) DEVICE — 2000CC GUARDIAN II: Brand: GUARDIAN

## (undated) DEVICE — NEEDLE 25G X 1 1/2

## (undated) DEVICE — ADHESIVE SKN CLSR HISTOACRYL FLEX 0.5ML LF

## (undated) DEVICE — LUBRICANT SURGILUBE TUBE 4 OZ  FLIP TOP

## (undated) DEVICE — GLOVE INDICATOR PI UNDERGLOVE SZ 7 BLUE

## (undated) DEVICE — ACE WRAP 4 IN UNSTERILE

## (undated) DEVICE — RADIFOCUS OPTITORQUE ANGIOGRAPHIC CATHETER: Brand: OPTITORQUE

## (undated) DEVICE — SWAN-GANZ DOUBLE LUMEN MONITORING CATHETER: Brand: SWAN-GANZ

## (undated) DEVICE — PENROSE DRAIN, 18 X 3 8: Brand: CARDINAL HEALTH

## (undated) DEVICE — GUIDE SHEATH 9FR ATTAIN COMMAND 9FR EH CURVE

## (undated) DEVICE — 1.5MM DRILL, QUICK RELEASE: Brand: OSTEOMED

## (undated) DEVICE — SUT VICRYL 0 REEL 54 IN J287G